# Patient Record
Sex: MALE | Race: WHITE | NOT HISPANIC OR LATINO
[De-identification: names, ages, dates, MRNs, and addresses within clinical notes are randomized per-mention and may not be internally consistent; named-entity substitution may affect disease eponyms.]

---

## 2017-02-21 ENCOUNTER — APPOINTMENT (OUTPATIENT)
Dept: VASCULAR SURGERY | Facility: CLINIC | Age: 42
End: 2017-02-21

## 2017-02-21 VITALS
TEMPERATURE: 98 F | WEIGHT: 240 LBS | BODY MASS INDEX: 32.51 KG/M2 | HEIGHT: 72 IN | DIASTOLIC BLOOD PRESSURE: 80 MMHG | SYSTOLIC BLOOD PRESSURE: 133 MMHG | HEART RATE: 89 BPM

## 2017-02-25 ENCOUNTER — INPATIENT (INPATIENT)
Facility: HOSPITAL | Age: 42
LOS: 4 days | Discharge: HOME | End: 2017-03-02
Attending: SPECIALIST | Admitting: SPECIALIST
Payer: COMMERCIAL

## 2017-02-25 VITALS
TEMPERATURE: 98 F | OXYGEN SATURATION: 97 % | RESPIRATION RATE: 16 BRPM | HEART RATE: 113 BPM | SYSTOLIC BLOOD PRESSURE: 114 MMHG | DIASTOLIC BLOOD PRESSURE: 73 MMHG

## 2017-02-25 DIAGNOSIS — K56.69 OTHER INTESTINAL OBSTRUCTION: ICD-10-CM

## 2017-02-25 DIAGNOSIS — Z98.89 OTHER SPECIFIED POSTPROCEDURAL STATES: Chronic | ICD-10-CM

## 2017-02-25 LAB
ALBUMIN SERPL ELPH-MCNC: 4.9 G/DL — SIGNIFICANT CHANGE UP (ref 3.3–5)
ALP SERPL-CCNC: 36 U/L — LOW (ref 40–120)
ALT FLD-CCNC: 20 U/L — SIGNIFICANT CHANGE UP (ref 4–41)
APTT BLD: 27.8 SEC — SIGNIFICANT CHANGE UP (ref 27.5–37.4)
AST SERPL-CCNC: 30 U/L — SIGNIFICANT CHANGE UP (ref 4–40)
BASE EXCESS BLDV CALC-SCNC: 0.6 MMOL/L — SIGNIFICANT CHANGE UP
BASOPHILS # BLD AUTO: 0.01 K/UL — SIGNIFICANT CHANGE UP (ref 0–0.2)
BASOPHILS NFR BLD AUTO: 0.1 % — SIGNIFICANT CHANGE UP (ref 0–2)
BILIRUB SERPL-MCNC: 0.5 MG/DL — SIGNIFICANT CHANGE UP (ref 0.2–1.2)
BLOOD GAS VENOUS - CREATININE: 1.1 MG/DL — SIGNIFICANT CHANGE UP (ref 0.5–1.3)
BUN SERPL-MCNC: 24 MG/DL — HIGH (ref 7–23)
CALCIUM SERPL-MCNC: 10.1 MG/DL — SIGNIFICANT CHANGE UP (ref 8.4–10.5)
CHLORIDE BLDV-SCNC: 106 MMOL/L — SIGNIFICANT CHANGE UP (ref 96–108)
CHLORIDE SERPL-SCNC: 104 MMOL/L — SIGNIFICANT CHANGE UP (ref 98–107)
CO2 SERPL-SCNC: 23 MMOL/L — SIGNIFICANT CHANGE UP (ref 22–31)
CREAT SERPL-MCNC: 1.13 MG/DL — SIGNIFICANT CHANGE UP (ref 0.5–1.3)
EOSINOPHIL # BLD AUTO: 0.03 K/UL — SIGNIFICANT CHANGE UP (ref 0–0.5)
EOSINOPHIL NFR BLD AUTO: 0.3 % — SIGNIFICANT CHANGE UP (ref 0–6)
GAS PNL BLDV: 144 MMOL/L — SIGNIFICANT CHANGE UP (ref 136–146)
GLUCOSE BLDV-MCNC: 189 — HIGH (ref 70–99)
GLUCOSE SERPL-MCNC: 189 MG/DL — HIGH (ref 70–99)
HCO3 BLDV-SCNC: 23 MMOL/L — SIGNIFICANT CHANGE UP (ref 20–27)
HCT VFR BLD CALC: 45.4 % — SIGNIFICANT CHANGE UP (ref 39–50)
HCT VFR BLDV CALC: 45.2 % — SIGNIFICANT CHANGE UP (ref 39–51)
HGB BLD-MCNC: 14.2 G/DL — SIGNIFICANT CHANGE UP (ref 13–17)
HGB BLDV-MCNC: 14.8 G/DL — SIGNIFICANT CHANGE UP (ref 13–17)
IMM GRANULOCYTES NFR BLD AUTO: 0.3 % — SIGNIFICANT CHANGE UP (ref 0–1.5)
INR BLD: 1.02 — SIGNIFICANT CHANGE UP (ref 0.87–1.18)
LACTATE BLDV-MCNC: 2.4 MMOL/L — HIGH (ref 0.5–2)
LIDOCAIN IGE QN: 19.1 U/L — SIGNIFICANT CHANGE UP (ref 7–60)
LYMPHOCYTES # BLD AUTO: 0.93 K/UL — LOW (ref 1–3.3)
LYMPHOCYTES # BLD AUTO: 8 % — LOW (ref 13–44)
MCHC RBC-ENTMCNC: 27.5 PG — SIGNIFICANT CHANGE UP (ref 27–34)
MCHC RBC-ENTMCNC: 31.3 % — LOW (ref 32–36)
MCV RBC AUTO: 88 FL — SIGNIFICANT CHANGE UP (ref 80–100)
MONOCYTES # BLD AUTO: 0.95 K/UL — HIGH (ref 0–0.9)
MONOCYTES NFR BLD AUTO: 8.2 % — SIGNIFICANT CHANGE UP (ref 2–14)
NEUTROPHILS # BLD AUTO: 9.63 K/UL — HIGH (ref 1.8–7.4)
NEUTROPHILS NFR BLD AUTO: 83.1 % — HIGH (ref 43–77)
PCO2 BLDV: 49 MMHG — SIGNIFICANT CHANGE UP (ref 41–51)
PH BLDV: 7.34 PH — SIGNIFICANT CHANGE UP (ref 7.32–7.43)
PLATELET # BLD AUTO: 267 K/UL — SIGNIFICANT CHANGE UP (ref 150–400)
PMV BLD: 11.5 FL — SIGNIFICANT CHANGE UP (ref 7–13)
PO2 BLDV: 34 MMHG — LOW (ref 35–40)
POTASSIUM BLDV-SCNC: 4 MMOL/L — SIGNIFICANT CHANGE UP (ref 3.4–4.5)
POTASSIUM SERPL-MCNC: 4.9 MMOL/L — SIGNIFICANT CHANGE UP (ref 3.5–5.3)
POTASSIUM SERPL-SCNC: 4.9 MMOL/L — SIGNIFICANT CHANGE UP (ref 3.5–5.3)
PROT SERPL-MCNC: 8.3 G/DL — SIGNIFICANT CHANGE UP (ref 6–8.3)
PROTHROM AB SERPL-ACNC: 11.6 SEC — SIGNIFICANT CHANGE UP (ref 10–13.1)
RBC # BLD: 5.16 M/UL — SIGNIFICANT CHANGE UP (ref 4.2–5.8)
RBC # FLD: 15.3 % — HIGH (ref 10.3–14.5)
SAO2 % BLDV: 53.6 % — LOW (ref 60–85)
SODIUM SERPL-SCNC: 146 MMOL/L — HIGH (ref 135–145)
WBC # BLD: 11.58 K/UL — HIGH (ref 3.8–10.5)
WBC # FLD AUTO: 11.58 K/UL — HIGH (ref 3.8–10.5)

## 2017-02-25 PROCEDURE — 74176 CT ABD & PELVIS W/O CONTRAST: CPT | Mod: 26

## 2017-02-25 PROCEDURE — 71010: CPT | Mod: 26,59

## 2017-02-25 PROCEDURE — 71020: CPT | Mod: 26

## 2017-02-25 RX ORDER — INSULIN LISPRO 100/ML
VIAL (ML) SUBCUTANEOUS EVERY 6 HOURS
Qty: 0 | Refills: 0 | Status: DISCONTINUED | OUTPATIENT
Start: 2017-02-25 | End: 2017-02-28

## 2017-02-25 RX ORDER — SODIUM CHLORIDE 9 MG/ML
1000 INJECTION INTRAMUSCULAR; INTRAVENOUS; SUBCUTANEOUS ONCE
Qty: 0 | Refills: 0 | Status: COMPLETED | OUTPATIENT
Start: 2017-02-25 | End: 2017-02-25

## 2017-02-25 RX ORDER — PANTOPRAZOLE SODIUM 20 MG/1
40 TABLET, DELAYED RELEASE ORAL DAILY
Qty: 0 | Refills: 0 | Status: DISCONTINUED | OUTPATIENT
Start: 2017-02-25 | End: 2017-02-28

## 2017-02-25 RX ORDER — SODIUM CHLORIDE 9 MG/ML
1000 INJECTION, SOLUTION INTRAVENOUS
Qty: 0 | Refills: 0 | Status: DISCONTINUED | OUTPATIENT
Start: 2017-02-25 | End: 2017-02-27

## 2017-02-25 RX ORDER — ENOXAPARIN SODIUM 100 MG/ML
40 INJECTION SUBCUTANEOUS EVERY 24 HOURS
Qty: 0 | Refills: 0 | Status: DISCONTINUED | OUTPATIENT
Start: 2017-02-25 | End: 2017-03-02

## 2017-02-25 RX ORDER — METOPROLOL TARTRATE 50 MG
5 TABLET ORAL EVERY 6 HOURS
Qty: 0 | Refills: 0 | Status: DISCONTINUED | OUTPATIENT
Start: 2017-02-25 | End: 2017-02-28

## 2017-02-25 RX ORDER — ACETAMINOPHEN 500 MG
650 TABLET ORAL ONCE
Qty: 0 | Refills: 0 | Status: COMPLETED | OUTPATIENT
Start: 2017-02-25 | End: 2017-02-25

## 2017-02-25 RX ORDER — ONDANSETRON 8 MG/1
4 TABLET, FILM COATED ORAL ONCE
Qty: 0 | Refills: 0 | Status: COMPLETED | OUTPATIENT
Start: 2017-02-25 | End: 2017-02-25

## 2017-02-25 RX ADMIN — Medication 650 MILLIGRAM(S): at 22:16

## 2017-02-25 RX ADMIN — ONDANSETRON 4 MILLIGRAM(S): 8 TABLET, FILM COATED ORAL at 22:16

## 2017-02-25 RX ADMIN — Medication 650 MILLIGRAM(S): at 23:24

## 2017-02-25 RX ADMIN — SODIUM CHLORIDE 1000 MILLILITER(S): 9 INJECTION INTRAMUSCULAR; INTRAVENOUS; SUBCUTANEOUS at 21:44

## 2017-02-25 NOTE — ED SUB INTERN NOTE - OBJECTIVE STATEMENT FT
41M w/PMH of Diverticulitis s/p Buchanan's and reversal, multiple SBO's s/p 2x lysis of adhesions, post surgical DVT 7/16 s/p Eliquis  DM, HTN, HLD,  p/w 1 day of progressively worse colicky, achy, 5/10 abdominal pain, nausea, and watery, diarrhea. The patient has no appetite, denies vomiting, and last ate at 2 pm, which he describes as "uncomfortable." The patient states that this is similar to his usual SBO symptoms, and states that he usually is dehydrated and requires an NGT to resolve his symptoms. He endorses some sweats, lightheadedness and abdominal distention.   (-) fever, SOB, CP, V, dysuria, polyuria, recent illness/antibiotics, leg swelling/pain.

## 2017-02-25 NOTE — ED PROVIDER NOTE - OBJECTIVE STATEMENT
41M w/PMH of Diverticulitis s/p Buchanan's and reversal, multiple SBO's s/p 2x lysis of adhesions, post surgical DVT 7/16 s/p Eliquis  DM, HTN, HLD,  p/w 1 day of progressively worse colicky, achy, 5/10 abdominal pain, nausea, and watery, diarrhea. The patient has no appetite, denies vomiting, and last ate at 2 pm, which he describes as "uncomfortable." The patient states that this is similar to his usual SBO symptoms, and states that he usually is dehydrated and requires an NGT to resolve his symptoms. He endorses some sweats, lightheadedness and abdominal distention.

## 2017-02-25 NOTE — ED ADULT NURSE NOTE - OBJECTIVE STATEMENT
40 yo male.  Pt c/o colicky, achy, 5/10 abdominal pain.  Pt denies vomiting, and last ate at 2 pm.  The patient states that this is similar to his usual SBO symptoms. complains of some sweats, lightheadedness and abdominal distention.  22g R hand.  meds as ordered

## 2017-02-25 NOTE — ED PROVIDER NOTE - ATTENDING CONTRIBUTION TO CARE
DR Bloch authored hpi, pmh, ROS, PE,MDM, SH DR Bloch authored hpi, pmh, ROS, PE,MDM, SH. Results reviewed, SBO on CT scan- admit to surgery

## 2017-02-25 NOTE — H&P ADULT. - ASSESSMENT
42 yo M with recurrent SBO, likely adhesive  - admit to surgery  - NPO/IVF  - NGT placed in ED with immediate return of 400cc gastric contents  - await GI function  - trend labs

## 2017-02-25 NOTE — ED ADULT NURSE REASSESSMENT NOTE - NS ED NURSE REASSESS COMMENT FT1
Pt has NG tube to continuous suction.  Pt already drained 1100cc green bile from NG tube.  Pt is resting comfortable in bed.  Will continue to monitor patient closely.  Kang SWAN.

## 2017-02-25 NOTE — H&P ADULT. - GASTROINTESTINAL COMMENTS
soft, mod distended, R sided tenderness (most RLQ) soft, mod distended, R sided tenderness (most RLQ), reducible ventral hernia

## 2017-02-25 NOTE — ED SUB INTERN NOTE - PHYSICAL EXAMINATION
Gen: well-nourished, appears in mild discomfort  Resp: CTAB,  CV: tachycardic, m/r/g  Abd: well-healed midline incisions, increased bowel sounds with tinkling and rushes, distended, mildly tender in epigastric and middle right quadrant,   MSK: no calf swelling or pain to palpation or dorsiflexion.

## 2017-02-25 NOTE — ED ADULT NURSE NOTE - CHIEF COMPLAINT QUOTE
Pt st "I have stomach pains and nausea since this am...I have hx of bowel obstructions and this is a freq problem." HX 2009 bowel resection, ostomy reversal . hx diverticulitis. Pt c/o feeling lightheaded, appears pale. DVG=[id number]

## 2017-02-25 NOTE — ED SUB INTERN NOTE - MEDICAL DECISION MAKING DETAILS
41M w/ diverticulitis sp giron's and reversal as well as multiple SBO's who presents w/ abdominal pain/N/watery D, considering partial SBO vs gastroenteritis.   -CBC, CMP, UA, CXR, CT abdomen wo contrast  - NPO for likely SBO, 1000 bolus NS, then with maintenance fluids  -Tylenol for pain  -Zofran for Nausea,

## 2017-02-25 NOTE — ED ADULT TRIAGE NOTE - CHIEF COMPLAINT QUOTE
Pt st "I have stomach pains and nausea since this am...I have hx of bowel obstructions and this is a freq problem." HX 2009 bowel resection, ostomy reversal . hx diverticulitis. Pt c/o feeling lightheaded, appears pale. MLE=025

## 2017-02-25 NOTE — ED SUB INTERN NOTE - PMH
Diabetes mellitus, type 2    Diverticulitis    DVT (deep venous thrombosis)    Hyperlipidemia    Hypertension    SBO (small bowel obstruction)

## 2017-02-26 LAB
BLD GP AB SCN SERPL QL: NEGATIVE — SIGNIFICANT CHANGE UP
BUN SERPL-MCNC: 24 MG/DL — HIGH (ref 7–23)
CALCIUM SERPL-MCNC: 9.3 MG/DL — SIGNIFICANT CHANGE UP (ref 8.4–10.5)
CHLORIDE SERPL-SCNC: 104 MMOL/L — SIGNIFICANT CHANGE UP (ref 98–107)
CO2 SERPL-SCNC: 23 MMOL/L — SIGNIFICANT CHANGE UP (ref 22–31)
CREAT SERPL-MCNC: 0.96 MG/DL — SIGNIFICANT CHANGE UP (ref 0.5–1.3)
GLUCOSE SERPL-MCNC: 137 MG/DL — HIGH (ref 70–99)
HBA1C BLD-MCNC: 6.9 % — HIGH (ref 4–5.6)
HCT VFR BLD CALC: 41.2 % — SIGNIFICANT CHANGE UP (ref 39–50)
HGB BLD-MCNC: 12.9 G/DL — LOW (ref 13–17)
MAGNESIUM SERPL-MCNC: 1.9 MG/DL — SIGNIFICANT CHANGE UP (ref 1.6–2.6)
MCHC RBC-ENTMCNC: 27.4 PG — SIGNIFICANT CHANGE UP (ref 27–34)
MCHC RBC-ENTMCNC: 31.3 % — LOW (ref 32–36)
MCV RBC AUTO: 87.7 FL — SIGNIFICANT CHANGE UP (ref 80–100)
PHOSPHATE SERPL-MCNC: 4 MG/DL — SIGNIFICANT CHANGE UP (ref 2.5–4.5)
PLATELET # BLD AUTO: 248 K/UL — SIGNIFICANT CHANGE UP (ref 150–400)
PMV BLD: 11.7 FL — SIGNIFICANT CHANGE UP (ref 7–13)
POTASSIUM SERPL-MCNC: 3.9 MMOL/L — SIGNIFICANT CHANGE UP (ref 3.5–5.3)
POTASSIUM SERPL-SCNC: 3.9 MMOL/L — SIGNIFICANT CHANGE UP (ref 3.5–5.3)
RBC # BLD: 4.7 M/UL — SIGNIFICANT CHANGE UP (ref 4.2–5.8)
RBC # FLD: 15.5 % — HIGH (ref 10.3–14.5)
RH IG SCN BLD-IMP: POSITIVE — SIGNIFICANT CHANGE UP
RH IG SCN BLD-IMP: POSITIVE — SIGNIFICANT CHANGE UP
SODIUM SERPL-SCNC: 143 MMOL/L — SIGNIFICANT CHANGE UP (ref 135–145)
WBC # BLD: 4.45 K/UL — SIGNIFICANT CHANGE UP (ref 3.8–10.5)
WBC # FLD AUTO: 4.45 K/UL — SIGNIFICANT CHANGE UP (ref 3.8–10.5)

## 2017-02-26 RX ORDER — SODIUM CHLORIDE 9 MG/ML
1000 INJECTION, SOLUTION INTRAVENOUS ONCE
Qty: 0 | Refills: 0 | Status: COMPLETED | OUTPATIENT
Start: 2017-02-26 | End: 2017-02-27

## 2017-02-26 RX ORDER — MAGNESIUM SULFATE 500 MG/ML
2 VIAL (ML) INJECTION ONCE
Qty: 0 | Refills: 0 | Status: COMPLETED | OUTPATIENT
Start: 2017-02-26 | End: 2017-02-26

## 2017-02-26 RX ORDER — ACETAMINOPHEN 500 MG
1000 TABLET ORAL ONCE
Qty: 0 | Refills: 0 | Status: COMPLETED | OUTPATIENT
Start: 2017-02-26 | End: 2017-02-26

## 2017-02-26 RX ORDER — POTASSIUM CHLORIDE 20 MEQ
10 PACKET (EA) ORAL ONCE
Qty: 0 | Refills: 0 | Status: COMPLETED | OUTPATIENT
Start: 2017-02-26 | End: 2017-02-26

## 2017-02-26 RX ORDER — KETOROLAC TROMETHAMINE 30 MG/ML
30 SYRINGE (ML) INJECTION ONCE
Qty: 0 | Refills: 0 | Status: DISCONTINUED | OUTPATIENT
Start: 2017-02-26 | End: 2017-02-26

## 2017-02-26 RX ADMIN — Medication 5 MILLIGRAM(S): at 06:29

## 2017-02-26 RX ADMIN — Medication 400 MILLIGRAM(S): at 02:55

## 2017-02-26 RX ADMIN — ENOXAPARIN SODIUM 40 MILLIGRAM(S): 100 INJECTION SUBCUTANEOUS at 06:29

## 2017-02-26 RX ADMIN — Medication 5 MILLIGRAM(S): at 01:09

## 2017-02-26 RX ADMIN — PANTOPRAZOLE SODIUM 40 MILLIGRAM(S): 20 TABLET, DELAYED RELEASE ORAL at 12:48

## 2017-02-26 RX ADMIN — Medication 100 MILLIEQUIVALENT(S): at 12:49

## 2017-02-26 RX ADMIN — Medication 5 MILLIGRAM(S): at 17:52

## 2017-02-26 RX ADMIN — Medication 1000 MILLIGRAM(S): at 03:25

## 2017-02-26 RX ADMIN — Medication 5 MILLIGRAM(S): at 12:48

## 2017-02-26 RX ADMIN — Medication 50 GRAM(S): at 12:48

## 2017-02-27 LAB
BUN SERPL-MCNC: 23 MG/DL — SIGNIFICANT CHANGE UP (ref 7–23)
CALCIUM SERPL-MCNC: 9 MG/DL — SIGNIFICANT CHANGE UP (ref 8.4–10.5)
CHLORIDE SERPL-SCNC: 108 MMOL/L — HIGH (ref 98–107)
CO2 SERPL-SCNC: 25 MMOL/L — SIGNIFICANT CHANGE UP (ref 22–31)
CREAT SERPL-MCNC: 0.87 MG/DL — SIGNIFICANT CHANGE UP (ref 0.5–1.3)
GLUCOSE SERPL-MCNC: 134 MG/DL — HIGH (ref 70–99)
POTASSIUM SERPL-MCNC: 3.6 MMOL/L — SIGNIFICANT CHANGE UP (ref 3.5–5.3)
POTASSIUM SERPL-SCNC: 3.6 MMOL/L — SIGNIFICANT CHANGE UP (ref 3.5–5.3)
SODIUM SERPL-SCNC: 145 MMOL/L — SIGNIFICANT CHANGE UP (ref 135–145)

## 2017-02-27 RX ORDER — DEXTROSE MONOHYDRATE, SODIUM CHLORIDE, AND POTASSIUM CHLORIDE 50; .745; 4.5 G/1000ML; G/1000ML; G/1000ML
1000 INJECTION, SOLUTION INTRAVENOUS
Qty: 0 | Refills: 0 | Status: DISCONTINUED | OUTPATIENT
Start: 2017-02-27 | End: 2017-02-28

## 2017-02-27 RX ORDER — POTASSIUM CHLORIDE 20 MEQ
10 PACKET (EA) ORAL
Qty: 0 | Refills: 0 | Status: COMPLETED | OUTPATIENT
Start: 2017-02-27 | End: 2017-02-27

## 2017-02-27 RX ORDER — BENZOCAINE AND MENTHOL 5; 1 G/100ML; G/100ML
1 LIQUID ORAL THREE TIMES A DAY
Qty: 0 | Refills: 0 | Status: DISCONTINUED | OUTPATIENT
Start: 2017-02-27 | End: 2017-02-28

## 2017-02-27 RX ADMIN — DEXTROSE MONOHYDRATE, SODIUM CHLORIDE, AND POTASSIUM CHLORIDE 125 MILLILITER(S): 50; .745; 4.5 INJECTION, SOLUTION INTRAVENOUS at 10:06

## 2017-02-27 RX ADMIN — ENOXAPARIN SODIUM 40 MILLIGRAM(S): 100 INJECTION SUBCUTANEOUS at 05:09

## 2017-02-27 RX ADMIN — SODIUM CHLORIDE 2000 MILLILITER(S): 9 INJECTION, SOLUTION INTRAVENOUS at 00:51

## 2017-02-27 RX ADMIN — Medication 5 MILLIGRAM(S): at 00:19

## 2017-02-27 RX ADMIN — Medication 5 MILLIGRAM(S): at 12:14

## 2017-02-27 RX ADMIN — PANTOPRAZOLE SODIUM 40 MILLIGRAM(S): 20 TABLET, DELAYED RELEASE ORAL at 12:15

## 2017-02-27 RX ADMIN — Medication 100 MILLIEQUIVALENT(S): at 11:08

## 2017-02-27 RX ADMIN — Medication 5 MILLIGRAM(S): at 05:09

## 2017-02-27 RX ADMIN — Medication 100 MILLIEQUIVALENT(S): at 12:15

## 2017-02-27 RX ADMIN — Medication 100 MILLIEQUIVALENT(S): at 10:06

## 2017-02-27 RX ADMIN — Medication 5 MILLIGRAM(S): at 17:37

## 2017-02-28 LAB
BUN SERPL-MCNC: 15 MG/DL — SIGNIFICANT CHANGE UP (ref 7–23)
CALCIUM SERPL-MCNC: 8.9 MG/DL — SIGNIFICANT CHANGE UP (ref 8.4–10.5)
CHLORIDE SERPL-SCNC: 109 MMOL/L — HIGH (ref 98–107)
CO2 SERPL-SCNC: 22 MMOL/L — SIGNIFICANT CHANGE UP (ref 22–31)
CREAT SERPL-MCNC: 0.72 MG/DL — SIGNIFICANT CHANGE UP (ref 0.5–1.3)
GLUCOSE SERPL-MCNC: 163 MG/DL — HIGH (ref 70–99)
HCT VFR BLD CALC: 35 % — LOW (ref 39–50)
HGB BLD-MCNC: 10.6 G/DL — LOW (ref 13–17)
MAGNESIUM SERPL-MCNC: 2.1 MG/DL — SIGNIFICANT CHANGE UP (ref 1.6–2.6)
MCHC RBC-ENTMCNC: 26.7 PG — LOW (ref 27–34)
MCHC RBC-ENTMCNC: 30.3 % — LOW (ref 32–36)
MCV RBC AUTO: 88.2 FL — SIGNIFICANT CHANGE UP (ref 80–100)
PHOSPHATE SERPL-MCNC: 2.4 MG/DL — LOW (ref 2.5–4.5)
PLATELET # BLD AUTO: 169 K/UL — SIGNIFICANT CHANGE UP (ref 150–400)
PMV BLD: 10.5 FL — SIGNIFICANT CHANGE UP (ref 7–13)
POTASSIUM SERPL-MCNC: 4 MMOL/L — SIGNIFICANT CHANGE UP (ref 3.5–5.3)
POTASSIUM SERPL-SCNC: 4 MMOL/L — SIGNIFICANT CHANGE UP (ref 3.5–5.3)
RBC # BLD: 3.97 M/UL — LOW (ref 4.2–5.8)
RBC # FLD: 15.2 % — HIGH (ref 10.3–14.5)
SODIUM SERPL-SCNC: 145 MMOL/L — SIGNIFICANT CHANGE UP (ref 135–145)
WBC # BLD: 6.83 K/UL — SIGNIFICANT CHANGE UP (ref 3.8–10.5)
WBC # FLD AUTO: 6.83 K/UL — SIGNIFICANT CHANGE UP (ref 3.8–10.5)

## 2017-02-28 RX ORDER — INSULIN LISPRO 100/ML
VIAL (ML) SUBCUTANEOUS
Qty: 0 | Refills: 0 | Status: DISCONTINUED | OUTPATIENT
Start: 2017-02-28 | End: 2017-03-02

## 2017-02-28 RX ORDER — FENOFIBRATE,MICRONIZED 130 MG
145 CAPSULE ORAL DAILY
Qty: 0 | Refills: 0 | Status: DISCONTINUED | OUTPATIENT
Start: 2017-02-28 | End: 2017-03-02

## 2017-02-28 RX ORDER — PANTOPRAZOLE SODIUM 20 MG/1
40 TABLET, DELAYED RELEASE ORAL
Qty: 0 | Refills: 0 | Status: DISCONTINUED | OUTPATIENT
Start: 2017-02-28 | End: 2017-03-02

## 2017-02-28 RX ORDER — INSULIN LISPRO 100/ML
VIAL (ML) SUBCUTANEOUS AT BEDTIME
Qty: 0 | Refills: 0 | Status: DISCONTINUED | OUTPATIENT
Start: 2017-02-28 | End: 2017-03-02

## 2017-02-28 RX ORDER — METOPROLOL TARTRATE 50 MG
50 TABLET ORAL
Qty: 0 | Refills: 0 | Status: DISCONTINUED | OUTPATIENT
Start: 2017-02-28 | End: 2017-03-02

## 2017-02-28 RX ORDER — VALSARTAN 80 MG/1
80 TABLET ORAL DAILY
Qty: 0 | Refills: 0 | Status: DISCONTINUED | OUTPATIENT
Start: 2017-02-28 | End: 2017-03-02

## 2017-02-28 RX ADMIN — DEXTROSE MONOHYDRATE, SODIUM CHLORIDE, AND POTASSIUM CHLORIDE 125 MILLILITER(S): 50; .745; 4.5 INJECTION, SOLUTION INTRAVENOUS at 00:50

## 2017-02-28 RX ADMIN — ENOXAPARIN SODIUM 40 MILLIGRAM(S): 100 INJECTION SUBCUTANEOUS at 06:26

## 2017-02-28 RX ADMIN — BENZOCAINE AND MENTHOL 1 LOZENGE: 5; 1 LIQUID ORAL at 06:27

## 2017-02-28 RX ADMIN — Medication 5 MILLIGRAM(S): at 12:04

## 2017-02-28 RX ADMIN — DEXTROSE MONOHYDRATE, SODIUM CHLORIDE, AND POTASSIUM CHLORIDE 125 MILLILITER(S): 50; .745; 4.5 INJECTION, SOLUTION INTRAVENOUS at 06:26

## 2017-02-28 RX ADMIN — Medication 5 MILLIGRAM(S): at 06:27

## 2017-02-28 RX ADMIN — PANTOPRAZOLE SODIUM 40 MILLIGRAM(S): 20 TABLET, DELAYED RELEASE ORAL at 12:04

## 2017-03-01 ENCOUNTER — TRANSCRIPTION ENCOUNTER (OUTPATIENT)
Age: 42
End: 2017-03-01

## 2017-03-01 RX ORDER — LORATADINE 10 MG/1
10 TABLET ORAL DAILY
Qty: 0 | Refills: 0 | Status: DISCONTINUED | OUTPATIENT
Start: 2017-03-01 | End: 2017-03-02

## 2017-03-01 RX ADMIN — Medication 145 MILLIGRAM(S): at 12:21

## 2017-03-01 RX ADMIN — Medication 50 MILLIGRAM(S): at 19:03

## 2017-03-01 RX ADMIN — PANTOPRAZOLE SODIUM 40 MILLIGRAM(S): 20 TABLET, DELAYED RELEASE ORAL at 06:01

## 2017-03-01 RX ADMIN — VALSARTAN 80 MILLIGRAM(S): 80 TABLET ORAL at 06:18

## 2017-03-01 RX ADMIN — Medication 50 MILLIGRAM(S): at 06:01

## 2017-03-01 RX ADMIN — ENOXAPARIN SODIUM 40 MILLIGRAM(S): 100 INJECTION SUBCUTANEOUS at 06:02

## 2017-03-01 NOTE — DISCHARGE NOTE ADULT - CARE PROVIDER_API CALL
Hubert Whitney), Surgery  [address] [facility] [address] [place] [id number]  Phone: [phone]  Fax: [phone]

## 2017-03-01 NOTE — DISCHARGE NOTE ADULT - ADDITIONAL INSTRUCTIONS
Please follow up with your primary care doctor regarding your hospitalization and Dr. Whitney in 1-2 weeks.

## 2017-03-01 NOTE — DISCHARGE NOTE ADULT - MEDICATION SUMMARY - MEDICATIONS TO TAKE
I will START or STAY ON the medications listed below when I get home from the hospital:    Diovan 80 mg oral tablet  -- 1 tab(s) by mouth once a day  -- hold for low blood pressure  -- Indication: For Blood Pressure    metFORMIN 500 mg oral tablet  -- 1 tab(s) by mouth 3 times a day  -- Indication: For Diabetes    Farxiga 5 mg oral tablet  -- 1 tab(s) by mouth once a day  -- Indication: For Diabetes    TriCor 145 mg oral tablet  -- 1 tab(s) by mouth once a day  -- Indication: For Cholesterol    Toprol- mg oral tablet, extended release  -- 1 tab(s) by mouth once a day  -- Indication: For Blood Pressure    Protonix 40 mg oral delayed release tablet  -- 1 tab(s) by mouth once a day  -- Indication: For Reflux

## 2017-03-01 NOTE — DISCHARGE NOTE ADULT - PLAN OF CARE
normal/full GI function ACTIVITY: You may return to your usual level of physical activity.  DIET: Return to your usual diet.  NOTIFY YOUR SURGEON IF: You have any fever (over 100.4 F) or chills, persistent nausea/vomiting, persistent diarrhea, or if your pain is not controlled on your discharge pain medications.  FOLLOW-UP: Please follow up with your primary care physician in one week regarding your hospitalization.  Please follow up with Dr. Whitney in 1-2 weeks. Please follow up with your primary care physician and endocrinologist regarding your hospitalization. Please follow up with your primary care physician regarding your hospitalization

## 2017-03-01 NOTE — DISCHARGE NOTE ADULT - CARE PLAN
Principal Discharge DX:	Small bowel obstruction  Goal:	normal/full GI function  Instructions for follow-up, activity and diet:	ACTIVITY: You may return to your usual level of physical activity.  DIET: Return to your usual diet.  NOTIFY YOUR SURGEON IF: You have any fever (over 100.4 F) or chills, persistent nausea/vomiting, persistent diarrhea, or if your pain is not controlled on your discharge pain medications.  FOLLOW-UP: Please follow up with your primary care physician in one week regarding your hospitalization.  Please follow up with Dr. Whitney in 1-2 weeks.  Secondary Diagnosis:	Diabetes mellitus, type 2  Instructions for follow-up, activity and diet:	Please follow up with your primary care physician and endocrinologist regarding your hospitalization.  Secondary Diagnosis:	Hypertension  Instructions for follow-up, activity and diet:	Please follow up with your primary care physician regarding your hospitalization  Secondary Diagnosis:	Hyperlipidemia  Instructions for follow-up, activity and diet:	Please follow up with your primary care physician regarding your hospitalization

## 2017-03-01 NOTE — DISCHARGE NOTE ADULT - CONDITIONS AT DISCHARGE
Pt A&Ox4. VS Stable. Pt tolerating regular diet, denies any nausea/vomiting. Out of bed and ambulating and voiding adequately.

## 2017-03-01 NOTE — DISCHARGE NOTE ADULT - HOSPITAL COURSE
40 yo M with hx of Isi's and reversal 2009 c/b recurrent SBOs (>15, most managed conservatively, laparotomy/LINDA x2) now presents with 1 day hx of abd pain and distention.  Pt reports no flatus/BM since yesterday AM, with increasing abd distention and associated nausea/emesis.  Feels same as prior episodes of SBOs.    [Of note, pt was previously on A/C for RLE DVT, recently discontinued by vascular surgeon ]    CT abd/pelvis: IMPRESSION: Small bowel obstruction, likely secondary to an effusion in   the right lower quadrant.     Pt admitted to general surgery service and managed nonoperatively with NGT decompression, bowel rest, IV fluid hydration.  As GI function returned, NGT was clamped and removed.  Diet was slowly restarted and advanced as tolerated. Fingerstick glucose remained well controlled.  Pt currently ambulating, voiding, tolerating a regular diet, and pain free. 40 yo M with hx of Isi's and reversal 2009 c/b recurrent SBOs (>15, most managed conservatively, laparotomy/LINDA x2) now presents with 1 day hx of abd pain and distention.  Pt reports no flatus/BM since yesterday AM, with increasing abd distention and associated nausea/emesis.  Feels same as prior episodes of SBOs.    [Of note, pt was previously on A/C for RLE DVT, recently discontinued by vascular surgeon ]    CT abd/pelvis showed small bowel obstruction, likely secondary to an effusion in the right lower quadrant.     Pt admitted to general surgery service and managed nonoperatively with NGT decompression, bowel rest, IV fluid hydration.  As GI function returned, NGT was clamped and removed.  Diet was slowly restarted and advanced as tolerated. Fingerstick glucose remained well controlled.  Pt currently ambulating, voiding, tolerating a regular diet, and pain free. Pt discharged in stable condition.

## 2017-03-02 VITALS
DIASTOLIC BLOOD PRESSURE: 71 MMHG | TEMPERATURE: 98 F | HEART RATE: 70 BPM | SYSTOLIC BLOOD PRESSURE: 117 MMHG | RESPIRATION RATE: 16 BRPM | OXYGEN SATURATION: 96 %

## 2017-03-02 RX ADMIN — Medication 50 MILLIGRAM(S): at 06:51

## 2017-03-02 RX ADMIN — Medication 145 MILLIGRAM(S): at 11:47

## 2017-03-02 RX ADMIN — ENOXAPARIN SODIUM 40 MILLIGRAM(S): 100 INJECTION SUBCUTANEOUS at 06:51

## 2017-03-02 RX ADMIN — VALSARTAN 80 MILLIGRAM(S): 80 TABLET ORAL at 06:51

## 2017-03-02 RX ADMIN — PANTOPRAZOLE SODIUM 40 MILLIGRAM(S): 20 TABLET, DELAYED RELEASE ORAL at 06:51

## 2017-03-02 RX ADMIN — LORATADINE 10 MILLIGRAM(S): 10 TABLET ORAL at 13:41

## 2017-05-05 ENCOUNTER — INPATIENT (INPATIENT)
Facility: HOSPITAL | Age: 42
LOS: 2 days | Discharge: HOME | End: 2017-05-08
Attending: SURGERY | Admitting: SURGERY
Payer: COMMERCIAL

## 2017-05-05 VITALS
OXYGEN SATURATION: 99 % | DIASTOLIC BLOOD PRESSURE: 80 MMHG | SYSTOLIC BLOOD PRESSURE: 132 MMHG | HEART RATE: 101 BPM | TEMPERATURE: 99 F | RESPIRATION RATE: 20 BRPM

## 2017-05-05 DIAGNOSIS — K56.69 OTHER INTESTINAL OBSTRUCTION: ICD-10-CM

## 2017-05-05 DIAGNOSIS — Z98.89 OTHER SPECIFIED POSTPROCEDURAL STATES: Chronic | ICD-10-CM

## 2017-05-05 LAB
ALBUMIN SERPL ELPH-MCNC: 4.6 G/DL — SIGNIFICANT CHANGE UP (ref 3.3–5)
ALP SERPL-CCNC: 33 U/L — LOW (ref 40–120)
ALT FLD-CCNC: 20 U/L — SIGNIFICANT CHANGE UP (ref 4–41)
APPEARANCE UR: CLEAR — SIGNIFICANT CHANGE UP
APTT BLD: 28.2 SEC — SIGNIFICANT CHANGE UP (ref 27.5–37.4)
AST SERPL-CCNC: 16 U/L — SIGNIFICANT CHANGE UP (ref 4–40)
BASE EXCESS BLDV CALC-SCNC: 1.4 MMOL/L — SIGNIFICANT CHANGE UP
BASOPHILS # BLD AUTO: 0.01 K/UL — SIGNIFICANT CHANGE UP (ref 0–0.2)
BASOPHILS NFR BLD AUTO: 0.1 % — SIGNIFICANT CHANGE UP (ref 0–2)
BILIRUB SERPL-MCNC: 0.8 MG/DL — SIGNIFICANT CHANGE UP (ref 0.2–1.2)
BILIRUB UR-MCNC: NEGATIVE — SIGNIFICANT CHANGE UP
BLD GP AB SCN SERPL QL: NEGATIVE — SIGNIFICANT CHANGE UP
BLOOD GAS VENOUS - CREATININE: 0.87 MG/DL — SIGNIFICANT CHANGE UP (ref 0.5–1.3)
BLOOD UR QL VISUAL: NEGATIVE — SIGNIFICANT CHANGE UP
BUN SERPL-MCNC: 23 MG/DL — SIGNIFICANT CHANGE UP (ref 7–23)
CALCIUM SERPL-MCNC: 9.9 MG/DL — SIGNIFICANT CHANGE UP (ref 8.4–10.5)
CHLORIDE BLDV-SCNC: 107 MMOL/L — SIGNIFICANT CHANGE UP (ref 96–108)
CHLORIDE SERPL-SCNC: 105 MMOL/L — SIGNIFICANT CHANGE UP (ref 98–107)
CO2 SERPL-SCNC: 22 MMOL/L — SIGNIFICANT CHANGE UP (ref 22–31)
COLOR SPEC: YELLOW — SIGNIFICANT CHANGE UP
CREAT SERPL-MCNC: 0.9 MG/DL — SIGNIFICANT CHANGE UP (ref 0.5–1.3)
EOSINOPHIL # BLD AUTO: 0.02 K/UL — SIGNIFICANT CHANGE UP (ref 0–0.5)
EOSINOPHIL NFR BLD AUTO: 0.2 % — SIGNIFICANT CHANGE UP (ref 0–6)
GAS PNL BLDV: 137 MMOL/L — SIGNIFICANT CHANGE UP (ref 136–146)
GLUCOSE BLDV-MCNC: 158 — HIGH (ref 70–99)
GLUCOSE SERPL-MCNC: 152 MG/DL — HIGH (ref 70–99)
GLUCOSE UR-MCNC: >1000 — SIGNIFICANT CHANGE UP
HCO3 BLDV-SCNC: 25 MMOL/L — SIGNIFICANT CHANGE UP (ref 20–27)
HCT VFR BLD CALC: 43.1 % — SIGNIFICANT CHANGE UP (ref 39–50)
HCT VFR BLDV CALC: 41.7 % — SIGNIFICANT CHANGE UP (ref 39–51)
HGB BLD-MCNC: 13.3 G/DL — SIGNIFICANT CHANGE UP (ref 13–17)
HGB BLDV-MCNC: 13.6 G/DL — SIGNIFICANT CHANGE UP (ref 13–17)
IMM GRANULOCYTES NFR BLD AUTO: 0.3 % — SIGNIFICANT CHANGE UP (ref 0–1.5)
INR BLD: 0.99 — SIGNIFICANT CHANGE UP (ref 0.88–1.17)
KETONES UR-MCNC: SIGNIFICANT CHANGE UP
LACTATE BLDV-MCNC: 1.7 MMOL/L — SIGNIFICANT CHANGE UP (ref 0.5–2)
LEUKOCYTE ESTERASE UR-ACNC: NEGATIVE — SIGNIFICANT CHANGE UP
LYMPHOCYTES # BLD AUTO: 0.73 K/UL — LOW (ref 1–3.3)
LYMPHOCYTES # BLD AUTO: 6.2 % — LOW (ref 13–44)
MCHC RBC-ENTMCNC: 27.1 PG — SIGNIFICANT CHANGE UP (ref 27–34)
MCHC RBC-ENTMCNC: 30.9 % — LOW (ref 32–36)
MCV RBC AUTO: 88 FL — SIGNIFICANT CHANGE UP (ref 80–100)
MONOCYTES # BLD AUTO: 0.48 K/UL — SIGNIFICANT CHANGE UP (ref 0–0.9)
MONOCYTES NFR BLD AUTO: 4.1 % — SIGNIFICANT CHANGE UP (ref 2–14)
MUCOUS THREADS # UR AUTO: SIGNIFICANT CHANGE UP
NEUTROPHILS # BLD AUTO: 10.47 K/UL — HIGH (ref 1.8–7.4)
NEUTROPHILS NFR BLD AUTO: 89.1 % — HIGH (ref 43–77)
NITRITE UR-MCNC: NEGATIVE — SIGNIFICANT CHANGE UP
PCO2 BLDV: 41 MMHG — SIGNIFICANT CHANGE UP (ref 41–51)
PH BLDV: 7.41 PH — SIGNIFICANT CHANGE UP (ref 7.32–7.43)
PH UR: 6.5 — SIGNIFICANT CHANGE UP (ref 4.6–8)
PLATELET # BLD AUTO: 247 K/UL — SIGNIFICANT CHANGE UP (ref 150–400)
PMV BLD: 11 FL — SIGNIFICANT CHANGE UP (ref 7–13)
PO2 BLDV: 56 MMHG — HIGH (ref 35–40)
POTASSIUM BLDV-SCNC: 3.8 MMOL/L — SIGNIFICANT CHANGE UP (ref 3.4–4.5)
POTASSIUM SERPL-MCNC: 4.1 MMOL/L — SIGNIFICANT CHANGE UP (ref 3.5–5.3)
POTASSIUM SERPL-SCNC: 4.1 MMOL/L — SIGNIFICANT CHANGE UP (ref 3.5–5.3)
PROT SERPL-MCNC: 7.8 G/DL — SIGNIFICANT CHANGE UP (ref 6–8.3)
PROT UR-MCNC: 10 — SIGNIFICANT CHANGE UP
PROTHROM AB SERPL-ACNC: 11.1 SEC — SIGNIFICANT CHANGE UP (ref 9.8–13.1)
RBC # BLD: 4.9 M/UL — SIGNIFICANT CHANGE UP (ref 4.2–5.8)
RBC # FLD: 15.4 % — HIGH (ref 10.3–14.5)
RBC CASTS # UR COMP ASSIST: SIGNIFICANT CHANGE UP (ref 0–?)
RH IG SCN BLD-IMP: POSITIVE — SIGNIFICANT CHANGE UP
SAO2 % BLDV: 87.5 % — HIGH (ref 60–85)
SODIUM SERPL-SCNC: 143 MMOL/L — SIGNIFICANT CHANGE UP (ref 135–145)
SP GR SPEC: > 1.04 — HIGH (ref 1–1.03)
UROBILINOGEN FLD QL: NORMAL E.U. — SIGNIFICANT CHANGE UP (ref 0.1–0.2)
WBC # BLD: 11.74 K/UL — HIGH (ref 3.8–10.5)
WBC # FLD AUTO: 11.74 K/UL — HIGH (ref 3.8–10.5)
WBC UR QL: SIGNIFICANT CHANGE UP (ref 0–?)

## 2017-05-05 PROCEDURE — 74176 CT ABD & PELVIS W/O CONTRAST: CPT | Mod: 26

## 2017-05-05 PROCEDURE — 74010: CPT | Mod: 26

## 2017-05-05 PROCEDURE — 99222 1ST HOSP IP/OBS MODERATE 55: CPT | Mod: GC

## 2017-05-05 RX ORDER — METOPROLOL TARTRATE 50 MG
5 TABLET ORAL EVERY 6 HOURS
Qty: 0 | Refills: 0 | Status: DISCONTINUED | OUTPATIENT
Start: 2017-05-05 | End: 2017-05-07

## 2017-05-05 RX ORDER — ENOXAPARIN SODIUM 100 MG/ML
40 INJECTION SUBCUTANEOUS DAILY
Qty: 0 | Refills: 0 | Status: DISCONTINUED | OUTPATIENT
Start: 2017-05-05 | End: 2017-05-08

## 2017-05-05 RX ORDER — INSULIN LISPRO 100/ML
VIAL (ML) SUBCUTANEOUS EVERY 6 HOURS
Qty: 0 | Refills: 0 | Status: DISCONTINUED | OUTPATIENT
Start: 2017-05-05 | End: 2017-05-07

## 2017-05-05 RX ORDER — ONDANSETRON 8 MG/1
4 TABLET, FILM COATED ORAL ONCE
Qty: 0 | Refills: 0 | Status: DISCONTINUED | OUTPATIENT
Start: 2017-05-05 | End: 2017-05-05

## 2017-05-05 RX ORDER — ACETAMINOPHEN 500 MG
1000 TABLET ORAL ONCE
Qty: 0 | Refills: 0 | Status: COMPLETED | OUTPATIENT
Start: 2017-05-05 | End: 2017-05-05

## 2017-05-05 RX ORDER — SODIUM CHLORIDE 9 MG/ML
1000 INJECTION, SOLUTION INTRAVENOUS
Qty: 0 | Refills: 0 | Status: DISCONTINUED | OUTPATIENT
Start: 2017-05-05 | End: 2017-05-07

## 2017-05-05 RX ORDER — ONDANSETRON 8 MG/1
4 TABLET, FILM COATED ORAL ONCE
Qty: 0 | Refills: 0 | Status: COMPLETED | OUTPATIENT
Start: 2017-05-05 | End: 2017-05-05

## 2017-05-05 RX ORDER — SODIUM CHLORIDE 9 MG/ML
2000 INJECTION INTRAMUSCULAR; INTRAVENOUS; SUBCUTANEOUS ONCE
Qty: 0 | Refills: 0 | Status: COMPLETED | OUTPATIENT
Start: 2017-05-05 | End: 2017-05-05

## 2017-05-05 RX ORDER — PANTOPRAZOLE SODIUM 20 MG/1
40 TABLET, DELAYED RELEASE ORAL DAILY
Qty: 0 | Refills: 0 | Status: DISCONTINUED | OUTPATIENT
Start: 2017-05-05 | End: 2017-05-07

## 2017-05-05 RX ADMIN — ONDANSETRON 4 MILLIGRAM(S): 8 TABLET, FILM COATED ORAL at 19:55

## 2017-05-05 RX ADMIN — SODIUM CHLORIDE 1000 MILLILITER(S): 9 INJECTION INTRAMUSCULAR; INTRAVENOUS; SUBCUTANEOUS at 19:29

## 2017-05-05 RX ADMIN — Medication 1000 MILLIGRAM(S): at 19:55

## 2017-05-05 RX ADMIN — SODIUM CHLORIDE 125 MILLILITER(S): 9 INJECTION, SOLUTION INTRAVENOUS at 23:23

## 2017-05-05 RX ADMIN — Medication 400 MILLIGRAM(S): at 19:29

## 2017-05-05 NOTE — ED ADULT NURSE NOTE - OBJECTIVE STATEMENT
40 y/o male pt accompanied by parents, aox3, ambulatory, presents to the ed with c/o diffuse abd pain and nausea that started this morning, last BM today, unable to pass gas. Pt has had several episodes of bowel obstruction before, 2009 - perforated diverticulitis, hartmans procedure, sts symptoms now are similar. Pt seen by MD, SL placed by MD, labs sent, VS as noted, NGT being placed by surgery team at this time, NAD, will monitor

## 2017-05-05 NOTE — ED ADULT TRIAGE NOTE - CHIEF COMPLAINT QUOTE
pt with Mult. Hx. of mult. ABD obstructions after 2009 for burst Diverticulitis coming today with ABD + Nausea since this AM.   denies fever, dysuria.

## 2017-05-05 NOTE — ED PROVIDER NOTE - MEDICAL DECISION MAKING DETAILS
42 yo M, c/o 16 hrs abdominal pain, nausea, vomiting, and Hx of multiple SBOs.  2009 had perforated diverticulitis, sepsis, giron's procedure by Dr. White.  Since then has experienced multiple SBOs requiring lysis of adhesions.  VS: tachy 101.  Physical Exam: adult M, mild distress, CTA B, tachy, Abd:  soft, tympanic, diffusely TTP, decreased bowel sounds.  No lower extremity edema.  Impression:  likely recurrent SBO.  Multiple CTs in past, will

## 2017-05-05 NOTE — ED PROVIDER NOTE - OBJECTIVE STATEMENT
40 yo M, c/o 16 hrs abdominal pain, nausea, vomiting, and Hx of multiple SBOs.  2009 had perforated diverticulitis, sepsis, giron's procedure by Dr. White.  Since then has experienced multiple SBOs requiring lysis of adhesions.

## 2017-05-05 NOTE — ED PROVIDER NOTE - PHYSICAL EXAMINATION
VS: tachy 101.  Physical Exam: adult M, mild distress, CTA B, tachy, Abd:  soft, tympanic, diffusely TTP, decreased bowel sounds.  No lower extremity edema.

## 2017-05-06 PROCEDURE — 71010: CPT | Mod: 26

## 2017-05-06 RX ADMIN — ENOXAPARIN SODIUM 40 MILLIGRAM(S): 100 INJECTION SUBCUTANEOUS at 12:27

## 2017-05-06 RX ADMIN — Medication 5 MILLIGRAM(S): at 12:27

## 2017-05-06 RX ADMIN — SODIUM CHLORIDE 125 MILLILITER(S): 9 INJECTION, SOLUTION INTRAVENOUS at 06:19

## 2017-05-06 RX ADMIN — Medication 5 MILLIGRAM(S): at 06:19

## 2017-05-06 RX ADMIN — Medication 5 MILLIGRAM(S): at 00:11

## 2017-05-06 RX ADMIN — Medication 5 MILLIGRAM(S): at 18:26

## 2017-05-06 RX ADMIN — PANTOPRAZOLE SODIUM 40 MILLIGRAM(S): 20 TABLET, DELAYED RELEASE ORAL at 12:27

## 2017-05-06 NOTE — H&P ADULT. - GASTROINTESTINAL COMMENTS
Seem HPI soft, NT, mildly distended, large mouth ventral hernia,  well healed incisions, no skin erythema

## 2017-05-06 NOTE — H&P ADULT. - HISTORY OF PRESENT ILLNESS
The patient is a 41 year old M with hx of Isi's and reversal 2009 c/b recurrent SBOs (>15, most managed conservatively, laparotomy/LINDA x2) now presents with 1 day hx of abd pain and distention.  Pt reports no flatus/BM since yesterday AM, with increasing abd distention and associated nausea/emesis.  Feels same as prior episodes of SBOs The patient is a 41 year old M with hx of Isi's and reversal 2009 c/b recurrent SBOs (>15, most managed conservatively, laparotomy/LINDA x2) now presents with 1 day hx of abd pain and distention.  Pt reports no flatus/BM since yesterday AM, with increasing abd distention and associated nausea/emesis.  Feels same as prior episodes of SBOs    In the ED; hemodynamically stable.  Abd exam with mild distension, diffuse TTP without rebound or guarding, large mouth ventral hernia, well healed abdominal scars.  Labs benign.  CT with SBO.  NGT inserted prior to CT with return of 500 cc bilious contents

## 2017-05-07 LAB
BUN SERPL-MCNC: 18 MG/DL — SIGNIFICANT CHANGE UP (ref 7–23)
CALCIUM SERPL-MCNC: 8.7 MG/DL — SIGNIFICANT CHANGE UP (ref 8.4–10.5)
CHLORIDE SERPL-SCNC: 105 MMOL/L — SIGNIFICANT CHANGE UP (ref 98–107)
CO2 SERPL-SCNC: 23 MMOL/L — SIGNIFICANT CHANGE UP (ref 22–31)
CREAT SERPL-MCNC: 0.85 MG/DL — SIGNIFICANT CHANGE UP (ref 0.5–1.3)
GLUCOSE SERPL-MCNC: 97 MG/DL — SIGNIFICANT CHANGE UP (ref 70–99)
HCT VFR BLD CALC: 35.7 % — LOW (ref 39–50)
HGB BLD-MCNC: 10.8 G/DL — LOW (ref 13–17)
MAGNESIUM SERPL-MCNC: 1.8 MG/DL — SIGNIFICANT CHANGE UP (ref 1.6–2.6)
MCHC RBC-ENTMCNC: 27.1 PG — SIGNIFICANT CHANGE UP (ref 27–34)
MCHC RBC-ENTMCNC: 30.3 % — LOW (ref 32–36)
MCV RBC AUTO: 89.5 FL — SIGNIFICANT CHANGE UP (ref 80–100)
PHOSPHATE SERPL-MCNC: 2.5 MG/DL — SIGNIFICANT CHANGE UP (ref 2.5–4.5)
PLATELET # BLD AUTO: 177 K/UL — SIGNIFICANT CHANGE UP (ref 150–400)
PMV BLD: 10.7 FL — SIGNIFICANT CHANGE UP (ref 7–13)
POTASSIUM SERPL-MCNC: 3.7 MMOL/L — SIGNIFICANT CHANGE UP (ref 3.5–5.3)
POTASSIUM SERPL-SCNC: 3.7 MMOL/L — SIGNIFICANT CHANGE UP (ref 3.5–5.3)
RBC # BLD: 3.99 M/UL — LOW (ref 4.2–5.8)
RBC # FLD: 15.7 % — HIGH (ref 10.3–14.5)
SODIUM SERPL-SCNC: 143 MMOL/L — SIGNIFICANT CHANGE UP (ref 135–145)
WBC # BLD: 4.16 K/UL — SIGNIFICANT CHANGE UP (ref 3.8–10.5)
WBC # FLD AUTO: 4.16 K/UL — SIGNIFICANT CHANGE UP (ref 3.8–10.5)

## 2017-05-07 RX ORDER — DEXTROSE MONOHYDRATE, SODIUM CHLORIDE, AND POTASSIUM CHLORIDE 50; .745; 4.5 G/1000ML; G/1000ML; G/1000ML
1000 INJECTION, SOLUTION INTRAVENOUS
Qty: 0 | Refills: 0 | Status: DISCONTINUED | OUTPATIENT
Start: 2017-05-07 | End: 2017-05-07

## 2017-05-07 RX ORDER — METOPROLOL TARTRATE 50 MG
50 TABLET ORAL
Qty: 0 | Refills: 0 | Status: DISCONTINUED | OUTPATIENT
Start: 2017-05-07 | End: 2017-05-08

## 2017-05-07 RX ORDER — PANTOPRAZOLE SODIUM 20 MG/1
40 TABLET, DELAYED RELEASE ORAL
Qty: 0 | Refills: 0 | Status: DISCONTINUED | OUTPATIENT
Start: 2017-05-07 | End: 2017-05-08

## 2017-05-07 RX ORDER — INSULIN LISPRO 100/ML
VIAL (ML) SUBCUTANEOUS
Qty: 0 | Refills: 0 | Status: DISCONTINUED | OUTPATIENT
Start: 2017-05-07 | End: 2017-05-08

## 2017-05-07 RX ORDER — VALSARTAN 80 MG/1
80 TABLET ORAL DAILY
Qty: 0 | Refills: 0 | Status: DISCONTINUED | OUTPATIENT
Start: 2017-05-07 | End: 2017-05-08

## 2017-05-07 RX ORDER — FENOFIBRATE,MICRONIZED 130 MG
145 CAPSULE ORAL DAILY
Qty: 0 | Refills: 0 | Status: DISCONTINUED | OUTPATIENT
Start: 2017-05-07 | End: 2017-05-08

## 2017-05-07 RX ADMIN — Medication 145 MILLIGRAM(S): at 11:49

## 2017-05-07 RX ADMIN — Medication 50 MILLIGRAM(S): at 06:00

## 2017-05-07 RX ADMIN — Medication 5 MILLIGRAM(S): at 00:12

## 2017-05-07 RX ADMIN — SODIUM CHLORIDE 125 MILLILITER(S): 9 INJECTION, SOLUTION INTRAVENOUS at 00:12

## 2017-05-07 RX ADMIN — VALSARTAN 80 MILLIGRAM(S): 80 TABLET ORAL at 06:00

## 2017-05-07 RX ADMIN — Medication: at 23:01

## 2017-05-07 RX ADMIN — PANTOPRAZOLE SODIUM 40 MILLIGRAM(S): 20 TABLET, DELAYED RELEASE ORAL at 06:00

## 2017-05-07 RX ADMIN — Medication 50 MILLIGRAM(S): at 17:54

## 2017-05-07 RX ADMIN — ENOXAPARIN SODIUM 40 MILLIGRAM(S): 100 INJECTION SUBCUTANEOUS at 11:49

## 2017-05-08 ENCOUNTER — TRANSCRIPTION ENCOUNTER (OUTPATIENT)
Age: 42
End: 2017-05-08

## 2017-05-08 VITALS
OXYGEN SATURATION: 98 % | RESPIRATION RATE: 16 BRPM | HEART RATE: 71 BPM | TEMPERATURE: 99 F | DIASTOLIC BLOOD PRESSURE: 69 MMHG | SYSTOLIC BLOOD PRESSURE: 116 MMHG

## 2017-05-08 RX ADMIN — Medication 145 MILLIGRAM(S): at 11:16

## 2017-05-08 RX ADMIN — PANTOPRAZOLE SODIUM 40 MILLIGRAM(S): 20 TABLET, DELAYED RELEASE ORAL at 06:12

## 2017-05-08 RX ADMIN — Medication 50 MILLIGRAM(S): at 06:12

## 2017-05-08 RX ADMIN — ENOXAPARIN SODIUM 40 MILLIGRAM(S): 100 INJECTION SUBCUTANEOUS at 11:16

## 2017-05-08 RX ADMIN — VALSARTAN 80 MILLIGRAM(S): 80 TABLET ORAL at 06:12

## 2017-05-08 NOTE — DISCHARGE NOTE ADULT - CARE PLAN
Principal Discharge DX:	SBO (small bowel obstruction)  Goal:	Diet was advanced as tolerated  Instructions for follow-up, activity and diet:	ACTIVITY: No heavy lifting or straining. Otherwise, you may return to your usual level of physical activity.    DIET: Return to your usual diet.  NOTIFY YOUR SURGEON IF: You have persistent nausea/vomiting, persistent diarrhea, or if your pain is not controlled on your discharge pain medications.  FOLLOW-UP: Please follow up with your primary care physician in one week regarding your hospitalization. Please call Dr. White to make an appointment in one week [phone]  Secondary Diagnosis:	Diabetes mellitus, type 2  Goal:	Please follow up with primary medical doctor within one week of discharge  Secondary Diagnosis:	Hypertension  Goal:	Please follow up with primary medical doctor within one week of discharge Principal Discharge DX:	SBO (small bowel obstruction)  Goal:	Diet was advanced as tolerated  Instructions for follow-up, activity and diet:	ACTIVITY: No heavy lifting or straining. Otherwise, you may return to your usual level of physical activity.    DIET: Return to your usual diet.  NOTIFY YOUR SURGEON IF: You have persistent nausea/vomiting, persistent diarrhea, or if your pain is not controlled on your discharge pain medications.  FOLLOW-UP: Please follow up with your primary care physician in one week regarding your hospitalization. Please call Dr. White to make an appointment in one week [phone]  Secondary Diagnosis:	Diabetes mellitus, type 2  Goal:	Please follow up with primary medical doctor within one week of discharge  Secondary Diagnosis:	Hypertension  Goal:	Please follow up with primary medical doctor within one week of discharge Principal Discharge DX:	SBO (small bowel obstruction)  Goal:	Diet was advanced as tolerated  Instructions for follow-up, activity and diet:	ACTIVITY: No heavy lifting or straining. Otherwise, you may return to your usual level of physical activity.    DIET: Return to your usual diet.  NOTIFY YOUR SURGEON IF: You have persistent nausea/vomiting, persistent diarrhea, or if your pain is not controlled on your discharge pain medications.  FOLLOW-UP: Please follow up with your primary care physician in one week regarding your hospitalization. Please call Dr. White to make an appointment in one week [phone]  Secondary Diagnosis:	Diabetes mellitus, type 2  Goal:	Please follow up with primary medical doctor within one week of discharge  Secondary Diagnosis:	Hypertension  Goal:	Please follow up with primary medical doctor within one week of discharge

## 2017-05-08 NOTE — DISCHARGE NOTE ADULT - HOSPITAL COURSE
The patient is a 41 year old M with hx of Isi's and reversal 2009 c/b recurrent SBOs (>15, most managed conservatively, laparotomy/LINDA x2) now presents with 1 day hx of abd pain and distention.  Pt reports no flatus/BM since yesterday AM, with increasing abd distention and associated nausea/emesis.  Feels same as prior episodes of SBOs  In the ED; hemodynamically stable.  Abd exam with mild distension, diffuse TTP without rebound or guarding, large mouth ventral hernia, well healed abdominal scars.  Labs benign.  CT with SBO.  NGT inserted prior to CT with return of 500 cc bilious contents     Patient began passing flatus, and reporting GI function. Diet was advanced as tolerated. Patient currently on LFD. Vital signs remain stable.   5/8: Patient voiding on own, ambulating, tolerating LFD. Stable for discharge home with follow up appointment with surgeon within one week of discharge.

## 2017-05-08 NOTE — DISCHARGE NOTE ADULT - CARE PROVIDER_API CALL
Musa White), ColonRectal Surgery; Surgery  [address] [place] [address]  Phone: [phone]  Fax: [phone]

## 2017-05-08 NOTE — DISCHARGE NOTE ADULT - PATIENT PORTAL LINK FT
“You can access the Follow[employer] Patient Portal, offered by [facility], by registering with the following website: [email or web address]”

## 2017-05-08 NOTE — DISCHARGE NOTE ADULT - CONDITIONS AT DISCHARGE
Patient alert and oriented times 4. Vitals signs stable. Tolerating low fiber diet. No c/o nausea or vomiting. Patient discharged to home. Discharge instructions given and understood. IV removed.

## 2017-05-08 NOTE — DISCHARGE NOTE ADULT - PLAN OF CARE
Diet was advanced as tolerated ACTIVITY: No heavy lifting or straining. Otherwise, you may return to your usual level of physical activity.    DIET: Return to your usual diet.  NOTIFY YOUR SURGEON IF: You have persistent nausea/vomiting, persistent diarrhea, or if your pain is not controlled on your discharge pain medications.  FOLLOW-UP: Please follow up with your primary care physician in one week regarding your hospitalization. Please call Dr. White to make an appointment in one week [phone] Please follow up with primary medical doctor within one week of discharge

## 2017-05-08 NOTE — DISCHARGE NOTE ADULT - INSTRUCTIONS
Low fiber diet Report of inability to tolerate diet, increasing abdominal pain, or nausea or vomiting

## 2017-05-08 NOTE — DISCHARGE NOTE ADULT - MEDICATION SUMMARY - MEDICATIONS TO TAKE
I will START or STAY ON the medications listed below when I get home from the hospital:    Diovan 80 mg oral tablet  -- 1 tab(s) by mouth once a day  -- hold for low blood pressure  -- Indication: For HTN    metFORMIN 500 mg oral tablet  -- 1 tab(s) by mouth 3 times a day  -- Indication: For DM    Farxiga 5 mg oral tablet  -- 1 tab(s) by mouth once a day  -- Indication: For DM    TriCor 145 mg oral tablet  -- 1 tab(s) by mouth once a day  -- Indication: For HLD    Toprol- mg oral tablet, extended release  -- 1 tab(s) by mouth once a day  -- Indication: For HTN    Protonix 40 mg oral delayed release tablet  -- 1 tab(s) by mouth once a day  -- Indication: For Protonix

## 2017-07-11 ENCOUNTER — APPOINTMENT (OUTPATIENT)
Dept: VASCULAR SURGERY | Facility: CLINIC | Age: 42
End: 2017-07-11

## 2017-07-11 VITALS
WEIGHT: 242 LBS | HEART RATE: 79 BPM | HEIGHT: 72 IN | TEMPERATURE: 98 F | BODY MASS INDEX: 32.78 KG/M2 | SYSTOLIC BLOOD PRESSURE: 125 MMHG | DIASTOLIC BLOOD PRESSURE: 79 MMHG

## 2017-07-11 DIAGNOSIS — I80.201 PHLEBITIS AND THROMBOPHLEBITIS OF UNSPECIFIED DEEP VESSELS OF RIGHT LOWER EXTREMITY: ICD-10-CM

## 2017-08-28 ENCOUNTER — EMERGENCY (EMERGENCY)
Facility: HOSPITAL | Age: 42
LOS: 1 days | Discharge: HOME | End: 2017-08-28
Attending: EMERGENCY MEDICINE
Payer: COMMERCIAL

## 2017-08-28 VITALS
HEART RATE: 106 BPM | WEIGHT: 240.08 LBS | RESPIRATION RATE: 16 BRPM | SYSTOLIC BLOOD PRESSURE: 134 MMHG | TEMPERATURE: 98 F | OXYGEN SATURATION: 98 % | DIASTOLIC BLOOD PRESSURE: 80 MMHG

## 2017-08-28 DIAGNOSIS — Z98.89 OTHER SPECIFIED POSTPROCEDURAL STATES: Chronic | ICD-10-CM

## 2017-08-28 PROCEDURE — 99284 EMERGENCY DEPT VISIT MOD MDM: CPT

## 2017-08-28 PROCEDURE — 99282 EMERGENCY DEPT VISIT SF MDM: CPT

## 2017-08-28 PROCEDURE — 99283 EMERGENCY DEPT VISIT LOW MDM: CPT

## 2017-08-28 NOTE — ED PROVIDER NOTE - ATTENDING CONTRIBUTION TO CARE
41M h/o DM presents with bleeding from varicose vein on R leg. No specific trauma, bleeding started spontaneously. On exam well appearing, nad, breathing comfortably, 2+ pulses, RLE + punctate area of persistent bleeding on posterior calf.  Local wound care and bleeding stopped.

## 2017-08-28 NOTE — ED PROVIDER NOTE - OBJECTIVE STATEMENT
41 year-old male, history of DVT (stopped Eliquis a few months ago as per doctor's instructions), HTN, HLD, DM, varicose veins, presents with cc RLE bleeding today. While walking noticed spontaneous bleeding from varicose veins on the posterior ankle area. Applied pressure with relief of bleeding. Denies any pain, injury, numbness/tingling or any other complaints.

## 2017-08-28 NOTE — ED PROVIDER NOTE - PROGRESS NOTE DETAILS
Bleeding stopped with local infiltration of lido/epi, surgicell and pressure dressing. No bleeding for 1 hour. Will discharge with vasc surgery follow up in 2-3 days. Instructed to keep dressing and avoid wetting area x 24-36 hrs. Will give cane to help relieve pressure. Pt is well appearing walking with steady gait, stable for discharge and follow up without fail with medical doctor. I had a detailed discussion with the patient and/or guardian regarding the historical points, exam findings, and any diagnostic results supporting the discharge diagnosis. Pt educated on care and need for follow up. Strict return instructions and red flag signs and symptoms discussed with patient. Questions answered. Pt shows understanding of discharge information and agrees to follow.

## 2017-08-28 NOTE — ED ADULT TRIAGE NOTE - CHIEF COMPLAINT QUOTE
BIBA with c/o bleeding from right lower leg, as per patient he has vericous vein bleeding. Patient not on blood thinners.

## 2017-08-28 NOTE — ED PROVIDER NOTE - PHYSICAL EXAMINATION
Right distal posterior extremity varicose vein site actively bleeding. No calf tenderness. No sensory deficits.

## 2017-08-28 NOTE — ED PROVIDER NOTE - MEDICAL DECISION MAKING DETAILS
41 year-old male, presents with spontaneous varicose vein bleeding today. Well-appearing, tachycardic, afebrile. Neurovascularly intact. PlanL pressure dressing and elevation, and re-assess.

## 2017-08-29 ENCOUNTER — APPOINTMENT (OUTPATIENT)
Dept: VASCULAR SURGERY | Facility: CLINIC | Age: 42
End: 2017-08-29
Payer: COMMERCIAL

## 2017-08-29 VITALS
SYSTOLIC BLOOD PRESSURE: 120 MMHG | TEMPERATURE: 98.4 F | DIASTOLIC BLOOD PRESSURE: 76 MMHG | HEIGHT: 72 IN | WEIGHT: 242 LBS | HEART RATE: 86 BPM | BODY MASS INDEX: 32.78 KG/M2

## 2017-08-29 DIAGNOSIS — I83.891 VARICOSE VEINS OF RIGHT LOWER EXTREMITY WITH OTHER COMPLICATIONS: ICD-10-CM

## 2017-08-29 PROCEDURE — 93971 EXTREMITY STUDY: CPT | Mod: RT

## 2017-08-29 PROCEDURE — 99213 OFFICE O/P EST LOW 20 MIN: CPT | Mod: 25

## 2017-09-01 DIAGNOSIS — E78.5 HYPERLIPIDEMIA, UNSPECIFIED: ICD-10-CM

## 2017-09-01 DIAGNOSIS — I83.891 VARICOSE VEINS OF RIGHT LOWER EXTREMITY WITH OTHER COMPLICATIONS: ICD-10-CM

## 2017-09-01 DIAGNOSIS — Z88.5 ALLERGY STATUS TO NARCOTIC AGENT: ICD-10-CM

## 2017-09-01 DIAGNOSIS — Z91.041 RADIOGRAPHIC DYE ALLERGY STATUS: ICD-10-CM

## 2017-09-01 DIAGNOSIS — I10 ESSENTIAL (PRIMARY) HYPERTENSION: ICD-10-CM

## 2017-09-01 DIAGNOSIS — E11.9 TYPE 2 DIABETES MELLITUS WITHOUT COMPLICATIONS: ICD-10-CM

## 2017-09-01 DIAGNOSIS — Z86.718 PERSONAL HISTORY OF OTHER VENOUS THROMBOSIS AND EMBOLISM: ICD-10-CM

## 2017-09-06 ENCOUNTER — INPATIENT (INPATIENT)
Facility: HOSPITAL | Age: 42
LOS: 4 days | Discharge: HOME | End: 2017-09-11
Attending: SURGERY | Admitting: SURGERY
Payer: COMMERCIAL

## 2017-09-06 VITALS
RESPIRATION RATE: 18 BRPM | DIASTOLIC BLOOD PRESSURE: 65 MMHG | HEART RATE: 116 BPM | OXYGEN SATURATION: 100 % | SYSTOLIC BLOOD PRESSURE: 118 MMHG | TEMPERATURE: 98 F

## 2017-09-06 DIAGNOSIS — Z98.89 OTHER SPECIFIED POSTPROCEDURAL STATES: Chronic | ICD-10-CM

## 2017-09-06 DIAGNOSIS — K56.69 OTHER INTESTINAL OBSTRUCTION: ICD-10-CM

## 2017-09-06 LAB
APPEARANCE UR: CLEAR — SIGNIFICANT CHANGE UP
APTT BLD: 28.2 SEC — SIGNIFICANT CHANGE UP (ref 27.5–37.4)
BACTERIA # UR AUTO: SIGNIFICANT CHANGE UP
BASE EXCESS BLDV CALC-SCNC: 1.2 MMOL/L — SIGNIFICANT CHANGE UP
BASOPHILS # BLD AUTO: 0.01 K/UL — SIGNIFICANT CHANGE UP (ref 0–0.2)
BASOPHILS NFR BLD AUTO: 0.1 % — SIGNIFICANT CHANGE UP (ref 0–2)
BASOPHILS NFR SPEC: 0 % — SIGNIFICANT CHANGE UP (ref 0–2)
BILIRUB UR-MCNC: NEGATIVE — SIGNIFICANT CHANGE UP
BLD GP AB SCN SERPL QL: NEGATIVE — SIGNIFICANT CHANGE UP
BLOOD GAS VENOUS - CREATININE: 1.14 MG/DL — SIGNIFICANT CHANGE UP (ref 0.5–1.3)
BLOOD UR QL VISUAL: NEGATIVE — SIGNIFICANT CHANGE UP
BUN SERPL-MCNC: 29 MG/DL — HIGH (ref 7–23)
CALCIUM SERPL-MCNC: 10.3 MG/DL — SIGNIFICANT CHANGE UP (ref 8.4–10.5)
CHLORIDE BLDV-SCNC: 105 MMOL/L — SIGNIFICANT CHANGE UP (ref 96–108)
CHLORIDE SERPL-SCNC: 103 MMOL/L — SIGNIFICANT CHANGE UP (ref 98–107)
CO2 SERPL-SCNC: 22 MMOL/L — SIGNIFICANT CHANGE UP (ref 22–31)
COLOR SPEC: YELLOW — SIGNIFICANT CHANGE UP
CREAT SERPL-MCNC: 1.2 MG/DL — SIGNIFICANT CHANGE UP (ref 0.5–1.3)
EOSINOPHIL # BLD AUTO: 0.02 K/UL — SIGNIFICANT CHANGE UP (ref 0–0.5)
EOSINOPHIL NFR BLD AUTO: 0.2 % — SIGNIFICANT CHANGE UP (ref 0–6)
EOSINOPHIL NFR FLD: 0 % — SIGNIFICANT CHANGE UP (ref 0–6)
GAS PNL BLDV: 144 MMOL/L — SIGNIFICANT CHANGE UP (ref 136–146)
GLUCOSE BLDV-MCNC: 171 — HIGH (ref 70–99)
GLUCOSE SERPL-MCNC: 176 MG/DL — HIGH (ref 70–99)
GLUCOSE UR-MCNC: >1000 — SIGNIFICANT CHANGE UP
HCO3 BLDV-SCNC: 24 MMOL/L — SIGNIFICANT CHANGE UP (ref 20–27)
HCT VFR BLD CALC: 41.2 % — SIGNIFICANT CHANGE UP (ref 39–50)
HCT VFR BLDV CALC: 40.1 % — SIGNIFICANT CHANGE UP (ref 39–51)
HGB BLD-MCNC: 12.8 G/DL — LOW (ref 13–17)
HGB BLDV-MCNC: 13 G/DL — SIGNIFICANT CHANGE UP (ref 13–17)
IMM GRANULOCYTES # BLD AUTO: 0.03 # — SIGNIFICANT CHANGE UP
IMM GRANULOCYTES NFR BLD AUTO: 0.3 % — SIGNIFICANT CHANGE UP (ref 0–1.5)
INR BLD: 0.97 — SIGNIFICANT CHANGE UP (ref 0.88–1.17)
KETONES UR-MCNC: SIGNIFICANT CHANGE UP
LACTATE BLDV-MCNC: 2.9 MMOL/L — HIGH (ref 0.5–2)
LEUKOCYTE ESTERASE UR-ACNC: NEGATIVE — SIGNIFICANT CHANGE UP
LYMPHOCYTES # BLD AUTO: 0.78 K/UL — LOW (ref 1–3.3)
LYMPHOCYTES # BLD AUTO: 8.5 % — LOW (ref 13–44)
LYMPHOCYTES NFR SPEC AUTO: 13 % — SIGNIFICANT CHANGE UP (ref 13–44)
MANUAL SMEAR VERIFICATION: SIGNIFICANT CHANGE UP
MCHC RBC-ENTMCNC: 27.4 PG — SIGNIFICANT CHANGE UP (ref 27–34)
MCHC RBC-ENTMCNC: 31.1 % — LOW (ref 32–36)
MCV RBC AUTO: 88 FL — SIGNIFICANT CHANGE UP (ref 80–100)
MONOCYTES # BLD AUTO: 0.82 K/UL — SIGNIFICANT CHANGE UP (ref 0–0.9)
MONOCYTES NFR BLD AUTO: 9 % — SIGNIFICANT CHANGE UP (ref 2–14)
MONOCYTES NFR BLD: 9 % — SIGNIFICANT CHANGE UP (ref 2–9)
MORPHOLOGY BLD-IMP: NORMAL — SIGNIFICANT CHANGE UP
MUCOUS THREADS # UR AUTO: SIGNIFICANT CHANGE UP
NEUTROPHIL AB SER-ACNC: 41 % — LOW (ref 43–77)
NEUTROPHILS # BLD AUTO: 7.47 K/UL — HIGH (ref 1.8–7.4)
NEUTROPHILS NFR BLD AUTO: 81.9 % — HIGH (ref 43–77)
NEUTS BAND # BLD: 37 % — HIGH (ref 0–6)
NITRITE UR-MCNC: NEGATIVE — SIGNIFICANT CHANGE UP
NON-SQ EPI CELLS # UR AUTO: <1 — SIGNIFICANT CHANGE UP
NRBC # FLD: 0 — SIGNIFICANT CHANGE UP
PCO2 BLDV: 45 MMHG — SIGNIFICANT CHANGE UP (ref 41–51)
PH BLDV: 7.38 PH — SIGNIFICANT CHANGE UP (ref 7.32–7.43)
PH UR: 6 — SIGNIFICANT CHANGE UP (ref 4.6–8)
PLATELET # BLD AUTO: 267 K/UL — SIGNIFICANT CHANGE UP (ref 150–400)
PLATELET COUNT - ESTIMATE: NORMAL — SIGNIFICANT CHANGE UP
PMV BLD: 11.1 FL — SIGNIFICANT CHANGE UP (ref 7–13)
PO2 BLDV: 38 MMHG — SIGNIFICANT CHANGE UP (ref 35–40)
POTASSIUM BLDV-SCNC: 4.2 MMOL/L — SIGNIFICANT CHANGE UP (ref 3.4–4.5)
POTASSIUM SERPL-MCNC: 4.3 MMOL/L — SIGNIFICANT CHANGE UP (ref 3.5–5.3)
POTASSIUM SERPL-SCNC: 4.3 MMOL/L — SIGNIFICANT CHANGE UP (ref 3.5–5.3)
PROT UR-MCNC: 10 — SIGNIFICANT CHANGE UP
PROTHROM AB SERPL-ACNC: 10.9 SEC — SIGNIFICANT CHANGE UP (ref 9.8–13.1)
RBC # BLD: 4.68 M/UL — SIGNIFICANT CHANGE UP (ref 4.2–5.8)
RBC # FLD: 15.2 % — HIGH (ref 10.3–14.5)
RBC CASTS # UR COMP ASSIST: SIGNIFICANT CHANGE UP (ref 0–?)
REVIEW TO FOLLOW: YES — SIGNIFICANT CHANGE UP
RH IG SCN BLD-IMP: POSITIVE — SIGNIFICANT CHANGE UP
SAO2 % BLDV: 64 % — SIGNIFICANT CHANGE UP (ref 60–85)
SODIUM SERPL-SCNC: 145 MMOL/L — SIGNIFICANT CHANGE UP (ref 135–145)
SP GR SPEC: > 1.04 — HIGH (ref 1–1.03)
SQUAMOUS # UR AUTO: SIGNIFICANT CHANGE UP
UROBILINOGEN FLD QL: NORMAL E.U. — SIGNIFICANT CHANGE UP (ref 0.1–0.2)
WBC # BLD: 9.13 K/UL — SIGNIFICANT CHANGE UP (ref 3.8–10.5)
WBC # FLD AUTO: 9.13 K/UL — SIGNIFICANT CHANGE UP (ref 3.8–10.5)
WBC UR QL: SIGNIFICANT CHANGE UP (ref 0–?)

## 2017-09-06 PROCEDURE — 99222 1ST HOSP IP/OBS MODERATE 55: CPT | Mod: GC

## 2017-09-06 PROCEDURE — 74010: CPT | Mod: 26

## 2017-09-06 RX ORDER — ONDANSETRON 8 MG/1
4 TABLET, FILM COATED ORAL ONCE
Qty: 0 | Refills: 0 | Status: COMPLETED | OUTPATIENT
Start: 2017-09-06 | End: 2017-09-07

## 2017-09-06 RX ORDER — METFORMIN HYDROCHLORIDE 850 MG/1
1 TABLET ORAL
Qty: 0 | Refills: 0 | COMMUNITY

## 2017-09-06 RX ORDER — CIPROFLOXACIN LACTATE 400MG/40ML
400 VIAL (ML) INTRAVENOUS ONCE
Qty: 0 | Refills: 0 | Status: COMPLETED | OUTPATIENT
Start: 2017-09-06 | End: 2017-09-06

## 2017-09-06 RX ORDER — METRONIDAZOLE 500 MG
500 TABLET ORAL ONCE
Qty: 0 | Refills: 0 | Status: COMPLETED | OUTPATIENT
Start: 2017-09-06 | End: 2017-09-06

## 2017-09-06 RX ORDER — ACETAMINOPHEN 500 MG
1000 TABLET ORAL ONCE
Qty: 0 | Refills: 0 | Status: COMPLETED | OUTPATIENT
Start: 2017-09-06 | End: 2017-09-06

## 2017-09-06 RX ORDER — SODIUM CHLORIDE 9 MG/ML
1000 INJECTION INTRAMUSCULAR; INTRAVENOUS; SUBCUTANEOUS ONCE
Qty: 0 | Refills: 0 | Status: COMPLETED | OUTPATIENT
Start: 2017-09-06 | End: 2017-09-06

## 2017-09-06 RX ORDER — ONDANSETRON 8 MG/1
4 TABLET, FILM COATED ORAL ONCE
Qty: 0 | Refills: 0 | Status: DISCONTINUED | OUTPATIENT
Start: 2017-09-06 | End: 2017-09-06

## 2017-09-06 RX ADMIN — Medication 1000 MILLIGRAM(S): at 21:00

## 2017-09-06 RX ADMIN — Medication 400 MILLIGRAM(S): at 20:30

## 2017-09-06 RX ADMIN — Medication 200 MILLIGRAM(S): at 22:13

## 2017-09-06 RX ADMIN — SODIUM CHLORIDE 1000 MILLILITER(S): 9 INJECTION INTRAMUSCULAR; INTRAVENOUS; SUBCUTANEOUS at 20:30

## 2017-09-06 RX ADMIN — Medication 100 MILLIGRAM(S): at 23:39

## 2017-09-06 NOTE — H&P ADULT - NSHPPHYSICALEXAM_GEN_ALL_CORE
41 years old male alert and oriented x3 in no acute distress, well nourished, obese, well groomed.  Head: atraumatic, normo cephalic  Abdomen: softly distended, tender, no CVA tenderness  MS: varicose veins in bilateral lower extremity, no edema, no calf tenderness.  Neurologic: intact, reflexes present, strength 5/5 alert and oriented x3 in no acute distress, well nourished, obese, well groomed.  Head: atraumatic, normo cephalic  Abdomen: softly distended, tender, no CVA tenderness  MS: varicose veins in bilateral lower extremity, no edema, no calf tenderness.  Neurologic: intact, reflexes present, strength 5/5

## 2017-09-06 NOTE — ED ADULT NURSE NOTE - OBJECTIVE STATEMENT
Patient received in RM 25 A&ox3 and ambulatory at baseline. C/Of epigastric pain, n/v x1 and abdominal distention since today. Patient with a history of diverticulitis and several SBO's. Denies diarrhea, fevers, chills. PE and history as noted below.

## 2017-09-06 NOTE — ED PROVIDER NOTE - ATTENDING CONTRIBUTION TO CARE
40 yo male with DM and multiple SBO s/p bowel resection for perforated diverticulitis years ago c/o abdominal pain, nausea and vomiting consistent with his previous SBO. Pt typically gets barium for PO contrast since has allergy to PO contrast. Pt requesting NG tube at this time. Exam: well appearing, MMM, epigastric and RUQ TTP, + hypoactive bowel sounds, cardiac and lung exam wnl. Plan: labs, CXR, NG, CT abdomen, pain control, surgical consult, reassess.

## 2017-09-06 NOTE — H&P ADULT - NSHPLABSRESULTS_GEN_ALL_CORE
Abx - Flat and Upright  Ct with contrast  pending official radiology results labs: BMP, PT, aPTT, INR, Plasma, Type and screen, blood gas venous, UA,   Abx - Flat and Upright  Ct abdomen and pelvis with PO contrast  pending official radiology results labs: BMP, PT, aPTT, INR, Plasma, Type and screen, blood gas venous, UA,   Abx - Flat and Upright  Ct abdomen and pelvis with PO contrast  pending official radiology results    < from: CT Abdomen and Pelvis w/ Oral Cont (09.07.17 @ 01:07) >      EXAM:  CT ABDOMEN AND PELVIS OC        PROCEDURE DATE:  Sep  7 2017         INTERPRETATION:  CLINICAL INFORMATION: History of diverticulitis and   small bowel obstruction presenting with abdominal pain and distention.    COMPARISON: CT abdomen and pelvis performed 5/5/2017.    PROCEDURE:   CT of the Abdomen and Pelvis was performed without intravenous contrast.   Intravenous contrast: None.  Oral contrast: positive contrast was administered.  Sagittal and coronal reformats were performed.    FINDINGS:    LOWER CHEST: Bilateral gynecomastia.    LIVER: Diffuse hepatic steatosis. Hepatomegaly measuring up to 21 cm.  BILE DUCTS: Normal caliber.  GALLBLADDER: Cholelithiasis.  SPLEEN: Within normal limits.  PANCREAS: Within normal limits.  ADRENALS: Within normal limits.  KIDNEYS/URETERS: No hydronephrosis.    BLADDER: Collapsed limiting evaluation.  REPRODUCTIVE ORGANS: The prostate is within normal limits.    BOWEL: Enteric tube terminates with tip in the gastric body. There is   redemonstration of a large ventral abdominal wall hernia containing small   bowel. The proximal small bowel is dilated measuring up to 4.6 cm to the   level of a transition point in the distal jejunum/proximal ileum exiting   the ventral abdominal wall hernia in the right lower quadrant (series 2,   images ) compatible with a small bowel obstruction. A ventral   pelvic wall hernia is seen containing nonobstructed distal small bowel. A   rectosigmoid anastomosis is noted. Colonic diverticulosis. Appendix is   unremarkable.  PERITONEUM: No ascites.  VESSELS: Within normal limits.  RETROPERITONEUM: No lymphadenopathy.    ABDOMINAL WALL: Ventral abdominal and pelvic wall hernias.  BONES: Mild degenerative changes of the spine.    IMPRESSION:     Smallbowel obstruction with a transition point in the distal   jejunum/proximal ileum exiting the ventral abdominal hernia in the right   lower quadrant which is similar in appearance compared to a CT of the   abdomen and pelvis from 5/5/2017.                  IZABELLA MCGUIRE M.D., RADIOLOGY RESIDENT  This document has been electronically signed.  OLIMPIA BATISTA M.D., ATTENDING RADIOLOGIST  This document has been electronically signed. Sep  7 2017  2:18AM

## 2017-09-06 NOTE — H&P ADULT - ASSESSMENT
41 years old male with PMHX SBO presents to ED for complaints of abdominal pain similar to previous SBO. Pt was put NPO, NG tube drained up to 2 liters abdominal fluid. Pt received 450 ml Barium sulfate 2% contrast for CT imaging. Pt is admitted to

## 2017-09-06 NOTE — ED PROVIDER NOTE - OBJECTIVE STATEMENT
42yo male pmh DM, HTN, diverticulitis s/p Buchanan's procedure c/b multiple SBOs p/w abdominal pain, cramping in epigastrium now constant a/w nausea, NBNB emesis x 2, mild diarrhea. States this is similar to SBO pain in past. +abd distention. Denies f/c/chest pain, dyspnea, urinary symptoms, LE swelling.  Surgeon Dr White

## 2017-09-06 NOTE — ED ADULT TRIAGE NOTE - CHIEF COMPLAINT QUOTE
c/o generalized abd pain and bloating with nausea and vomiting since early this morning.   pt appears pale.   h/o diverticulitis, SBO, DM, HTN

## 2017-09-06 NOTE — H&P ADULT - HISTORY OF PRESENT ILLNESS
41yrs old male with PMHX DM, HTN, diverticulitis s/p Buchanan's procedure c/b multiple SBOs p/w abdominal pain, cramping in epigastrium now constant a/w nausea, NBNB emesis x 2, mild diarrhea x 1day. Patient states this is similar to SBO pain in past. +abd distention. Patient admits to bloating and nausea, vomiting, last meal at 12:30 pm and denies chest pain, dyspnea, vomiting, fever, HA, urinary symptoms, LE swelling. Patient also states that his right leg varicose veins spontaneously started bleeding  when riding home in the train one week ago and he was treated by Dr. Rubio with injections. 41yrs old male with PMHX DM, HTN, diverticulitis s/p Buchanan's procedure c/b multiple SBOs p/w abdominal pain, cramping in epigastrium now constant a/w nausea, NBNB emesis x 2, mild diarrhea x 1day. Patient states this is similar to SBO pain in past. +abd distention. Patient admits to bloating and nausea, vomiting, last meal at 12:30 pm and denies chest pain, dyspnea, fever, HA, urinary symptoms, LE swelling. Patient also states that his right leg varicose veins spontaneously started bleeding  when riding home in the train one week ago and he was treated by Dr. Rubio with injections. 41yrs old male with PMHX DM, HTN, diverticulitis s/p Buchanan's procedure c/b multiple SBOs p/w abdominal pain, cramping in epigastrium now constant a/w nausea, NBNB emesis x 2, mild diarrhea x 1day. Patient states this is similar to SBO pain in past. +abd distention. Patient admits to bloating and nausea, vomiting, last meal at 12:30 pm and denies chest pain, dyspnea, fever, HA, urinary symptoms, LE swelling. Patient also states that his right leg varicose veins spontaneously started bleeding  when riding home in the train one week ago and he was treated by Dr. Willams with injections.

## 2017-09-06 NOTE — ED PROVIDER NOTE - MEDICAL DECISION MAKING DETAILS
41M pmh SBOs, diverticulitis p/w abd pain and distention, concern for SBO. Surgery consulted. CT a/p with barium contrast due to gastrograffin allergy. Pain control, labs, zofran

## 2017-09-06 NOTE — H&P ADULT - NSHPREVIEWOFSYSTEMS_GEN_ALL_CORE
general: no fever and no chills, no recent weight change, fatigue, night sweats, weakness.  Skin: bilateral Lower extremity varicose veins, no abrasions, no jaundice, no lesions, no pruritis, and no rashes.  Eyes: no discharge, no irritation, no pain, no redness, and no visual changes.  ENMT:no ear pain no hearing loss, tinnittus, vertigo. Nose: no nasal congestion, no rhinorrhea, epistaxis.Mouth/Throat: no dysphagia, no hoarseness and no throat pain.Neck: no lumps, no pain, no stiffness and no swollen glands.  CARDIOVASCULAR: normal rate and rhythm, no chest pain and no edema.  RESPIRATORY: no chest pain, no cough, and no shortness of breath.    · Gastrointestinal [+]: abdominal pain, diarrhea, vomiting, no melena  GENITOURINARY: no dysuria, no frequency, and no hematuria.  MUSCULOSKELETAL: no back pain, no gout, no musculoskeletal pain, no neck pain, and no weakness.  SKIN:   NEURO: no loss of consciousness, no gait abnormality, no headache, no sensory deficits, and no weakness.  PSYCHIATRIC: no known mental health issues. general: no fever and no chills, no recent weight change, fatigue, night sweats, weakness.  Skin: bilateral Lower extremity varicose veins, no abrasions, no jaundice, no lesions, no pruritis, and no rashes.  Eyes: no discharge, no irritation, no pain, no redness, and no visual changes.  ENMT:no ear pain no hearing loss, tinnitus, vertigo. Nose: no nasal congestion, no rhinorrhea, epistaxis.Mouth/Throat: no dysphagia, no hoarseness and no throat pain. Neck: no lumps, no pain, no stiffness and no swollen glands.  CARDIOVASCULAR: normal rate and rhythm, no chest pain and no edema.  RESPIRATORY: no chest pain, no cough, and no shortness of breath.    · Gastrointestinal [+]: abdominal pain, diarrhea, vomiting, no melena  GENITOURINARY: no dysuria, no frequency, and no hematuria.  MUSCULOSKELETAL: no back pain, no gout, no musculoskeletal pain, no neck pain, and no weakness.  SKIN:   NEURO: no loss of consciousness, no gait abnormality, no headache, no sensory deficits, and no weakness.  PSYCHIATRIC: no known mental health issues.

## 2017-09-07 DIAGNOSIS — E78.5 HYPERLIPIDEMIA, UNSPECIFIED: ICD-10-CM

## 2017-09-07 DIAGNOSIS — K56.69 OTHER INTESTINAL OBSTRUCTION: ICD-10-CM

## 2017-09-07 DIAGNOSIS — I10 ESSENTIAL (PRIMARY) HYPERTENSION: ICD-10-CM

## 2017-09-07 DIAGNOSIS — E11.9 TYPE 2 DIABETES MELLITUS WITHOUT COMPLICATIONS: ICD-10-CM

## 2017-09-07 DIAGNOSIS — K57.93 DIVERTICULITIS OF INTESTINE, PART UNSPECIFIED, WITHOUT PERFORATION OR ABSCESS WITH BLEEDING: ICD-10-CM

## 2017-09-07 LAB
BUN SERPL-MCNC: 31 MG/DL — HIGH (ref 7–23)
CALCIUM SERPL-MCNC: 9.6 MG/DL — SIGNIFICANT CHANGE UP (ref 8.4–10.5)
CHLORIDE SERPL-SCNC: 104 MMOL/L — SIGNIFICANT CHANGE UP (ref 98–107)
CO2 SERPL-SCNC: 23 MMOL/L — SIGNIFICANT CHANGE UP (ref 22–31)
CREAT SERPL-MCNC: 1 MG/DL — SIGNIFICANT CHANGE UP (ref 0.5–1.3)
GLUCOSE SERPL-MCNC: 137 MG/DL — HIGH (ref 70–99)
HCT VFR BLD CALC: 37.1 % — LOW (ref 39–50)
HGB BLD-MCNC: 11.8 G/DL — LOW (ref 13–17)
LACTATE SERPL-SCNC: 1.3 MMOL/L — SIGNIFICANT CHANGE UP (ref 0.5–2)
MAGNESIUM SERPL-MCNC: 1.9 MG/DL — SIGNIFICANT CHANGE UP (ref 1.6–2.6)
MCHC RBC-ENTMCNC: 27.8 PG — SIGNIFICANT CHANGE UP (ref 27–34)
MCHC RBC-ENTMCNC: 31.8 % — LOW (ref 32–36)
MCV RBC AUTO: 87.3 FL — SIGNIFICANT CHANGE UP (ref 80–100)
NRBC # FLD: 0 — SIGNIFICANT CHANGE UP
PHOSPHATE SERPL-MCNC: 3.3 MG/DL — SIGNIFICANT CHANGE UP (ref 2.5–4.5)
PLATELET # BLD AUTO: 222 K/UL — SIGNIFICANT CHANGE UP (ref 150–400)
PMV BLD: 10.7 FL — SIGNIFICANT CHANGE UP (ref 7–13)
POTASSIUM SERPL-MCNC: 3.8 MMOL/L — SIGNIFICANT CHANGE UP (ref 3.5–5.3)
POTASSIUM SERPL-SCNC: 3.8 MMOL/L — SIGNIFICANT CHANGE UP (ref 3.5–5.3)
RBC # BLD: 4.25 M/UL — SIGNIFICANT CHANGE UP (ref 4.2–5.8)
RBC # FLD: 15.4 % — HIGH (ref 10.3–14.5)
SODIUM SERPL-SCNC: 145 MMOL/L — SIGNIFICANT CHANGE UP (ref 135–145)
WBC # BLD: 5.18 K/UL — SIGNIFICANT CHANGE UP (ref 3.8–10.5)
WBC # FLD AUTO: 5.18 K/UL — SIGNIFICANT CHANGE UP (ref 3.8–10.5)

## 2017-09-07 PROCEDURE — 99232 SBSQ HOSP IP/OBS MODERATE 35: CPT | Mod: GC

## 2017-09-07 PROCEDURE — 74176 CT ABD & PELVIS W/O CONTRAST: CPT | Mod: 26

## 2017-09-07 RX ORDER — SODIUM CHLORIDE 9 MG/ML
1000 INJECTION, SOLUTION INTRAVENOUS ONCE
Qty: 0 | Refills: 0 | Status: COMPLETED | OUTPATIENT
Start: 2017-09-07 | End: 2017-09-07

## 2017-09-07 RX ORDER — ENOXAPARIN SODIUM 100 MG/ML
40 INJECTION SUBCUTANEOUS DAILY
Qty: 0 | Refills: 0 | Status: DISCONTINUED | OUTPATIENT
Start: 2017-09-07 | End: 2017-09-11

## 2017-09-07 RX ORDER — ACETAMINOPHEN 500 MG
1000 TABLET ORAL ONCE
Qty: 0 | Refills: 0 | Status: COMPLETED | OUTPATIENT
Start: 2017-09-07 | End: 2017-09-07

## 2017-09-07 RX ORDER — DEXTROSE MONOHYDRATE, SODIUM CHLORIDE, AND POTASSIUM CHLORIDE 50; .745; 4.5 G/1000ML; G/1000ML; G/1000ML
1000 INJECTION, SOLUTION INTRAVENOUS
Qty: 0 | Refills: 0 | Status: DISCONTINUED | OUTPATIENT
Start: 2017-09-07 | End: 2017-09-09

## 2017-09-07 RX ORDER — SODIUM CHLORIDE 9 MG/ML
1000 INJECTION, SOLUTION INTRAVENOUS
Qty: 0 | Refills: 0 | Status: DISCONTINUED | OUTPATIENT
Start: 2017-09-07 | End: 2017-09-07

## 2017-09-07 RX ORDER — PANTOPRAZOLE SODIUM 20 MG/1
40 TABLET, DELAYED RELEASE ORAL DAILY
Qty: 0 | Refills: 0 | Status: DISCONTINUED | OUTPATIENT
Start: 2017-09-07 | End: 2017-09-10

## 2017-09-07 RX ORDER — BENZOCAINE AND MENTHOL 5; 1 G/100ML; G/100ML
1 LIQUID ORAL
Qty: 0 | Refills: 0 | Status: DISCONTINUED | OUTPATIENT
Start: 2017-09-07 | End: 2017-09-10

## 2017-09-07 RX ADMIN — ENOXAPARIN SODIUM 40 MILLIGRAM(S): 100 INJECTION SUBCUTANEOUS at 12:10

## 2017-09-07 RX ADMIN — PANTOPRAZOLE SODIUM 40 MILLIGRAM(S): 20 TABLET, DELAYED RELEASE ORAL at 12:10

## 2017-09-07 RX ADMIN — ONDANSETRON 4 MILLIGRAM(S): 8 TABLET, FILM COATED ORAL at 00:13

## 2017-09-07 RX ADMIN — SODIUM CHLORIDE 1000 MILLILITER(S): 9 INJECTION, SOLUTION INTRAVENOUS at 18:48

## 2017-09-07 RX ADMIN — Medication 400 MILLIGRAM(S): at 10:34

## 2017-09-07 RX ADMIN — BENZOCAINE AND MENTHOL 1 LOZENGE: 5; 1 LIQUID ORAL at 20:34

## 2017-09-07 RX ADMIN — Medication 1000 MILLIGRAM(S): at 11:00

## 2017-09-07 RX ADMIN — SODIUM CHLORIDE 100 MILLILITER(S): 9 INJECTION, SOLUTION INTRAVENOUS at 10:35

## 2017-09-07 NOTE — PROGRESS NOTE ADULT - SUBJECTIVE AND OBJECTIVE BOX
A Team Progress Note    S: No acute events overnight. Patient resting comfortably in bed. Pain well controlled. Nausea improved after placement of NGT. Denies flatus or bowel movement.    O:  T(C): 37.5 (09-07-17 @ 06:35), Max: 37.5 (09-07-17 @ 06:35)  HR: 103 (09-07-17 @ 06:35) (103 - 116)  BP: 140/87 (09-07-17 @ 06:35) (118/65 - 140/87)  RR: 18 (09-07-17 @ 06:35) (16 - 18)  SpO2: 98% (09-07-17 @ 06:35) (97% - 100%)  Wt(kg): --    09-06 @ 07:01  -  09-07 @ 07:00  --------------------------------------------------------  IN:  Total IN: 0 mL    OUT:    Nasoenteral Tube: 400 mL    Voided: 400 mL  Total OUT: 800 mL    Total NET: -800 mL        CBC Full  -  ( 07 Sep 2017 03:20 )  WBC Count : 5.18 K/uL  Hemoglobin : 11.8 g/dL  Hematocrit : 37.1 %  Platelet Count - Automated : 222 K/uL  Mean Cell Volume : 87.3 fL  Mean Cell Hemoglobin : 27.8 pg  Mean Cell Hemoglobin Concentration : 31.8 %      CAPILLARY BLOOD GLUCOSE  132 (07 Sep 2017 01:19)  176 (06 Sep 2017 18:33)    CT 9/7/17  IMPRESSION:   Small bowel obstruction with a transition point in the distal   jejunum/proximal ileum exiting the ventral abdominal hernia in the right   lower quadrant which is similar in appearance compared to a CT of the   abdomen and pelvis from 5/5/2017.        PHYSICAL EXAM:  Gen: A&Ox3, laying in bed in NAD  Abd: Soft, slightly tender to palpation in lower quadrants, ND  Lines: NGT in place

## 2017-09-08 LAB
BACTERIA UR CULT: SIGNIFICANT CHANGE UP
BUN SERPL-MCNC: 24 MG/DL — HIGH (ref 7–23)
CALCIUM SERPL-MCNC: 8.5 MG/DL — SIGNIFICANT CHANGE UP (ref 8.4–10.5)
CHLORIDE SERPL-SCNC: 107 MMOL/L — SIGNIFICANT CHANGE UP (ref 98–107)
CO2 SERPL-SCNC: 25 MMOL/L — SIGNIFICANT CHANGE UP (ref 22–31)
CREAT SERPL-MCNC: 0.92 MG/DL — SIGNIFICANT CHANGE UP (ref 0.5–1.3)
GLUCOSE SERPL-MCNC: 154 MG/DL — HIGH (ref 70–99)
HBA1C BLD-MCNC: 6.7 % — HIGH (ref 4–5.6)
HCT VFR BLD CALC: 35.2 % — LOW (ref 39–50)
HGB BLD-MCNC: 11 G/DL — LOW (ref 13–17)
MAGNESIUM SERPL-MCNC: 1.8 MG/DL — SIGNIFICANT CHANGE UP (ref 1.6–2.6)
MCHC RBC-ENTMCNC: 27.7 PG — SIGNIFICANT CHANGE UP (ref 27–34)
MCHC RBC-ENTMCNC: 31.3 % — LOW (ref 32–36)
MCV RBC AUTO: 88.7 FL — SIGNIFICANT CHANGE UP (ref 80–100)
NRBC # FLD: 0 — SIGNIFICANT CHANGE UP
PHOSPHATE SERPL-MCNC: 2.1 MG/DL — LOW (ref 2.5–4.5)
PLATELET # BLD AUTO: 190 K/UL — SIGNIFICANT CHANGE UP (ref 150–400)
PMV BLD: 10.9 FL — SIGNIFICANT CHANGE UP (ref 7–13)
POTASSIUM SERPL-MCNC: 3.4 MMOL/L — LOW (ref 3.5–5.3)
POTASSIUM SERPL-SCNC: 3.4 MMOL/L — LOW (ref 3.5–5.3)
RBC # BLD: 3.97 M/UL — LOW (ref 4.2–5.8)
RBC # FLD: 15.2 % — HIGH (ref 10.3–14.5)
SODIUM SERPL-SCNC: 142 MMOL/L — SIGNIFICANT CHANGE UP (ref 135–145)
SPECIMEN SOURCE: SIGNIFICANT CHANGE UP
WBC # BLD: 4.86 K/UL — SIGNIFICANT CHANGE UP (ref 3.8–10.5)
WBC # FLD AUTO: 4.86 K/UL — SIGNIFICANT CHANGE UP (ref 3.8–10.5)

## 2017-09-08 PROCEDURE — 99232 SBSQ HOSP IP/OBS MODERATE 35: CPT | Mod: GC

## 2017-09-08 RX ORDER — VALSARTAN 80 MG/1
80 TABLET ORAL DAILY
Qty: 0 | Refills: 0 | Status: DISCONTINUED | OUTPATIENT
Start: 2017-09-08 | End: 2017-09-11

## 2017-09-08 RX ORDER — DEXTROSE 50 % IN WATER 50 %
25 SYRINGE (ML) INTRAVENOUS ONCE
Qty: 0 | Refills: 0 | Status: DISCONTINUED | OUTPATIENT
Start: 2017-09-08 | End: 2017-09-08

## 2017-09-08 RX ORDER — SODIUM CHLORIDE 9 MG/ML
1000 INJECTION, SOLUTION INTRAVENOUS
Qty: 0 | Refills: 0 | Status: DISCONTINUED | OUTPATIENT
Start: 2017-09-08 | End: 2017-09-08

## 2017-09-08 RX ORDER — DEXTROSE 50 % IN WATER 50 %
1 SYRINGE (ML) INTRAVENOUS ONCE
Qty: 0 | Refills: 0 | Status: DISCONTINUED | OUTPATIENT
Start: 2017-09-08 | End: 2017-09-08

## 2017-09-08 RX ORDER — DEXTROSE 50 % IN WATER 50 %
12.5 SYRINGE (ML) INTRAVENOUS ONCE
Qty: 0 | Refills: 0 | Status: DISCONTINUED | OUTPATIENT
Start: 2017-09-08 | End: 2017-09-08

## 2017-09-08 RX ORDER — INSULIN LISPRO 100/ML
VIAL (ML) SUBCUTANEOUS
Qty: 0 | Refills: 0 | Status: DISCONTINUED | OUTPATIENT
Start: 2017-09-08 | End: 2017-09-08

## 2017-09-08 RX ORDER — DIPHENHYDRAMINE HCL 50 MG
25 CAPSULE ORAL ONCE
Qty: 0 | Refills: 0 | Status: COMPLETED | OUTPATIENT
Start: 2017-09-08 | End: 2017-09-09

## 2017-09-08 RX ORDER — DIPHENHYDRAMINE HCL 50 MG
25 CAPSULE ORAL ONCE
Qty: 0 | Refills: 0 | Status: COMPLETED | OUTPATIENT
Start: 2017-09-08 | End: 2017-09-08

## 2017-09-08 RX ORDER — INSULIN LISPRO 100/ML
VIAL (ML) SUBCUTANEOUS EVERY 6 HOURS
Qty: 0 | Refills: 0 | Status: DISCONTINUED | OUTPATIENT
Start: 2017-09-08 | End: 2017-09-09

## 2017-09-08 RX ORDER — POTASSIUM CHLORIDE 20 MEQ
10 PACKET (EA) ORAL
Qty: 0 | Refills: 0 | Status: COMPLETED | OUTPATIENT
Start: 2017-09-08 | End: 2017-09-08

## 2017-09-08 RX ORDER — METOPROLOL TARTRATE 50 MG
100 TABLET ORAL DAILY
Qty: 0 | Refills: 0 | Status: DISCONTINUED | OUTPATIENT
Start: 2017-09-08 | End: 2017-09-11

## 2017-09-08 RX ORDER — GLUCAGON INJECTION, SOLUTION 0.5 MG/.1ML
1 INJECTION, SOLUTION SUBCUTANEOUS ONCE
Qty: 0 | Refills: 0 | Status: DISCONTINUED | OUTPATIENT
Start: 2017-09-08 | End: 2017-09-08

## 2017-09-08 RX ADMIN — Medication 100 MILLIEQUIVALENT(S): at 14:00

## 2017-09-08 RX ADMIN — ENOXAPARIN SODIUM 40 MILLIGRAM(S): 100 INJECTION SUBCUTANEOUS at 11:14

## 2017-09-08 RX ADMIN — Medication 100 MILLIEQUIVALENT(S): at 17:41

## 2017-09-08 RX ADMIN — Medication 63.75 MILLIMOLE(S): at 11:13

## 2017-09-08 RX ADMIN — Medication 25 MILLIGRAM(S): at 18:52

## 2017-09-08 RX ADMIN — DEXTROSE MONOHYDRATE, SODIUM CHLORIDE, AND POTASSIUM CHLORIDE 100 MILLILITER(S): 50; .745; 4.5 INJECTION, SOLUTION INTRAVENOUS at 11:13

## 2017-09-08 RX ADMIN — Medication 100 MILLIEQUIVALENT(S): at 16:17

## 2017-09-08 RX ADMIN — DEXTROSE MONOHYDRATE, SODIUM CHLORIDE, AND POTASSIUM CHLORIDE 50 MILLILITER(S): 50; .745; 4.5 INJECTION, SOLUTION INTRAVENOUS at 18:53

## 2017-09-08 RX ADMIN — PANTOPRAZOLE SODIUM 40 MILLIGRAM(S): 20 TABLET, DELAYED RELEASE ORAL at 11:13

## 2017-09-08 NOTE — PROGRESS NOTE ADULT - SUBJECTIVE AND OBJECTIVE BOX
A TEAM PROGRESS NOTE     Patient is a 41y old  Male who presents with a chief complaint of Pt is 41 years old male complaining of abdominal pain (06 Sep 2017 23:27)      SUBJECTIVE: Pt seen and examined at bedside . Pt states pain is improving . Passing  had bowel movement. NGT output was 600 ml.   pain controlled, no nausea/vomiting/headache/dizziness/chest pain/shortness of breath.     OBJECTIVE:  PHYSICAL EXAM:  GA: NAD  Abdomen:  -  soft, less distended, non tender .       Vitals/Labs:  Vital Signs Last 24 Hrs  T(C): 36.6 (08 Sep 2017 05:41), Max: 37.7 (07 Sep 2017 10:53)  T(F): 97.9 (08 Sep 2017 05:41), Max: 99.8 (07 Sep 2017 10:53)  HR: 99 (08 Sep 2017 05:41) (93 - 110)  BP: 116/63 (08 Sep 2017 05:41) (116/63 - 131/74)  BP(mean): --  RR: 17 (08 Sep 2017 05:41) (17 - 20)  SpO2: 96% (08 Sep 2017 05:41) (96% - 99%)    I&O's Detail    07 Sep 2017 07:01  -  08 Sep 2017 07:00  --------------------------------------------------------  IN:    dextrose 5% + sodium chloride 0.45% with potassium chloride 20 mEq/L: 1200 mL    lactated ringers.: 600 mL  Total IN: 1800 mL    OUT:    Nasoenteral Tube: 600 mL    Voided: 1525 mL  Total OUT: 2125 mL    Total NET: -325 mL                                11.0   4.86  )-----------( 190      ( 08 Sep 2017 06:30 )             35.2       09-08    142  |  107  |  24<H>  ----------------------------<  154<H>  3.4<L>   |  25  |  0.92    Ca    8.5      08 Sep 2017 06:30  Phos  2.1     09-08  Mg     1.8     09-08        CAPILLARY BLOOD GLUCOSE  141 (08 Sep 2017 05:41)  120 (07 Sep 2017 22:29)  102 (07 Sep 2017 17:34)  129 (07 Sep 2017 12:04)  139 (07 Sep 2017 10:53)              PT/INR - ( 06 Sep 2017 20:30 )   PT: 10.9 SEC;   INR: 0.97          PTT - ( 06 Sep 2017 20:30 )  PTT:28.2 SEC    Urinalysis Basic - ( 06 Sep 2017 20:32 )    Color: YELLOW / Appearance: CLEAR / SG: > 1.040 / pH: 6.0  Gluc: >1000 / Ketone: TRACE  / Bili: NEGATIVE / Urobili: NORMAL E.U.   Blood: NEGATIVE / Protein: 10 / Nitrite: NEGATIVE   Leuk Esterase: NEGATIVE / RBC: 0-2 / WBC 0-2   Sq Epi: OCC / Non Sq Epi: x / Bacteria: FEW        ASSESSMENT/PLAN: LAUREN ESCAMILLA 41y Male SBO   - Diet: NPO   - Pain control   - Input and outputs   - DVT ppx  - OOB as tolerated   -NGT clamp  trial     A team  [id number]

## 2017-09-08 NOTE — PROGRESS NOTE ADULT - SUBJECTIVE AND OBJECTIVE BOX
overnite. Abdomen soft, non tender, Mild distention , Ct reviewed . Surgical option again discussed. To clamp NG. Abigailld

## 2017-09-09 LAB
BUN SERPL-MCNC: 13 MG/DL — SIGNIFICANT CHANGE UP (ref 7–23)
CALCIUM SERPL-MCNC: 8.4 MG/DL — SIGNIFICANT CHANGE UP (ref 8.4–10.5)
CHLORIDE SERPL-SCNC: 102 MMOL/L — SIGNIFICANT CHANGE UP (ref 98–107)
CO2 SERPL-SCNC: 23 MMOL/L — SIGNIFICANT CHANGE UP (ref 22–31)
CREAT SERPL-MCNC: 0.81 MG/DL — SIGNIFICANT CHANGE UP (ref 0.5–1.3)
GLUCOSE SERPL-MCNC: 114 MG/DL — HIGH (ref 70–99)
HCT VFR BLD CALC: 34.5 % — LOW (ref 39–50)
HGB BLD-MCNC: 10.9 G/DL — LOW (ref 13–17)
MAGNESIUM SERPL-MCNC: 1.7 MG/DL — SIGNIFICANT CHANGE UP (ref 1.6–2.6)
MCHC RBC-ENTMCNC: 27.8 PG — SIGNIFICANT CHANGE UP (ref 27–34)
MCHC RBC-ENTMCNC: 31.6 % — LOW (ref 32–36)
MCV RBC AUTO: 88 FL — SIGNIFICANT CHANGE UP (ref 80–100)
NRBC # FLD: 0 — SIGNIFICANT CHANGE UP
PHOSPHATE SERPL-MCNC: 2.4 MG/DL — LOW (ref 2.5–4.5)
PLATELET # BLD AUTO: 176 K/UL — SIGNIFICANT CHANGE UP (ref 150–400)
PMV BLD: 10.6 FL — SIGNIFICANT CHANGE UP (ref 7–13)
POTASSIUM SERPL-MCNC: 3.2 MMOL/L — LOW (ref 3.5–5.3)
POTASSIUM SERPL-SCNC: 3.2 MMOL/L — LOW (ref 3.5–5.3)
RBC # BLD: 3.92 M/UL — LOW (ref 4.2–5.8)
RBC # FLD: 15.1 % — HIGH (ref 10.3–14.5)
SODIUM SERPL-SCNC: 140 MMOL/L — SIGNIFICANT CHANGE UP (ref 135–145)
WBC # BLD: 5.36 K/UL — SIGNIFICANT CHANGE UP (ref 3.8–10.5)
WBC # FLD AUTO: 5.36 K/UL — SIGNIFICANT CHANGE UP (ref 3.8–10.5)

## 2017-09-09 RX ORDER — SODIUM CHLORIDE 9 MG/ML
1000 INJECTION, SOLUTION INTRAVENOUS ONCE
Qty: 0 | Refills: 0 | Status: COMPLETED | OUTPATIENT
Start: 2017-09-09 | End: 2017-09-09

## 2017-09-09 RX ORDER — INSULIN LISPRO 100/ML
VIAL (ML) SUBCUTANEOUS
Qty: 0 | Refills: 0 | Status: DISCONTINUED | OUTPATIENT
Start: 2017-09-09 | End: 2017-09-11

## 2017-09-09 RX ORDER — DIPHENHYDRAMINE HCL 50 MG
25 CAPSULE ORAL ONCE
Qty: 0 | Refills: 0 | Status: COMPLETED | OUTPATIENT
Start: 2017-09-09 | End: 2017-09-09

## 2017-09-09 RX ORDER — MAGNESIUM SULFATE 500 MG/ML
2 VIAL (ML) INJECTION ONCE
Qty: 0 | Refills: 0 | Status: COMPLETED | OUTPATIENT
Start: 2017-09-09 | End: 2017-09-09

## 2017-09-09 RX ORDER — POTASSIUM PHOSPHATE, MONOBASIC POTASSIUM PHOSPHATE, DIBASIC 236; 224 MG/ML; MG/ML
15 INJECTION, SOLUTION INTRAVENOUS ONCE
Qty: 0 | Refills: 0 | Status: COMPLETED | OUTPATIENT
Start: 2017-09-09 | End: 2017-09-09

## 2017-09-09 RX ORDER — POTASSIUM CHLORIDE 20 MEQ
10 PACKET (EA) ORAL
Qty: 0 | Refills: 0 | Status: COMPLETED | OUTPATIENT
Start: 2017-09-09 | End: 2017-09-09

## 2017-09-09 RX ORDER — HYDROXYZINE HCL 10 MG
25 TABLET ORAL EVERY 6 HOURS
Qty: 0 | Refills: 0 | Status: DISCONTINUED | OUTPATIENT
Start: 2017-09-09 | End: 2017-09-11

## 2017-09-09 RX ORDER — DIPHENHYDRAMINE HCL 50 MG
25 CAPSULE ORAL EVERY 6 HOURS
Qty: 0 | Refills: 0 | Status: DISCONTINUED | OUTPATIENT
Start: 2017-09-09 | End: 2017-09-11

## 2017-09-09 RX ADMIN — Medication 25 MILLIGRAM(S): at 10:30

## 2017-09-09 RX ADMIN — Medication 100 MILLIEQUIVALENT(S): at 16:29

## 2017-09-09 RX ADMIN — POTASSIUM PHOSPHATE, MONOBASIC POTASSIUM PHOSPHATE, DIBASIC 62.5 MILLIMOLE(S): 236; 224 INJECTION, SOLUTION INTRAVENOUS at 20:24

## 2017-09-09 RX ADMIN — Medication 50 GRAM(S): at 18:48

## 2017-09-09 RX ADMIN — SODIUM CHLORIDE 500 MILLILITER(S): 9 INJECTION, SOLUTION INTRAVENOUS at 20:28

## 2017-09-09 RX ADMIN — DEXTROSE MONOHYDRATE, SODIUM CHLORIDE, AND POTASSIUM CHLORIDE 50 MILLILITER(S): 50; .745; 4.5 INJECTION, SOLUTION INTRAVENOUS at 19:42

## 2017-09-09 RX ADMIN — Medication 100 MILLIEQUIVALENT(S): at 17:52

## 2017-09-09 RX ADMIN — Medication 25 MILLIGRAM(S): at 18:17

## 2017-09-09 RX ADMIN — PANTOPRAZOLE SODIUM 40 MILLIGRAM(S): 20 TABLET, DELAYED RELEASE ORAL at 11:30

## 2017-09-09 RX ADMIN — ENOXAPARIN SODIUM 40 MILLIGRAM(S): 100 INJECTION SUBCUTANEOUS at 11:30

## 2017-09-09 RX ADMIN — Medication 100 MILLIEQUIVALENT(S): at 15:18

## 2017-09-09 RX ADMIN — Medication 25 MILLIGRAM(S): at 02:45

## 2017-09-09 RX ADMIN — Medication 25 MILLIGRAM(S): at 19:40

## 2017-09-09 RX ADMIN — Medication 1: at 18:47

## 2017-09-09 NOTE — PROGRESS NOTE ADULT - SUBJECTIVE AND OBJECTIVE BOX
A Team Progress Note    S: No acute events overnight. Continues to deny nausea. +flatus; continues to have diarrhea but no formed bowel movement.    O:  T(C): 37.6 (09-09-17 @ 10:00), Max: 37.6 (09-09-17 @ 10:00)  HR: 102 (09-09-17 @ 10:00) (88 - 109)  BP: 124/71 (09-09-17 @ 10:00) (122/73 - 136/72)  RR: 18 (09-09-17 @ 10:00) (18 - 20)  SpO2: 96% (09-09-17 @ 10:00) (95% - 100%)  Wt(kg): --    09-08 @ 07:01  -  09-09 @ 07:00  --------------------------------------------------------  IN:  Total IN: 0 mL    OUT:    Voided: 450 mL  Total OUT: 450 mL    Total NET: -450 mL      09-09 @ 07:01  -  09-09 @ 14:04  --------------------------------------------------------  IN:  Total IN: 0 mL    OUT:    Other: 450 mL  Total OUT: 450 mL    Total NET: -450 mL        CBC Full  -  ( 09 Sep 2017 06:00 )  WBC Count : 5.36 K/uL  Hemoglobin : 10.9 g/dL  Hematocrit : 34.5 %  Platelet Count - Automated : 176 K/uL  Mean Cell Volume : 88.0 fL  Mean Cell Hemoglobin : 27.8 pg  Mean Cell Hemoglobin Concentration : 31.6 %    CAPILLARY BLOOD GLUCOSE  105 (09 Sep 2017 12:35)  124 (08 Sep 2017 21:25)  139 (08 Sep 2017 17:37)          PHYSICAL EXAM:  Gen: A&Ox3, laying in bed in NAD  Abdomen soft, non tender, Mild distention

## 2017-09-10 LAB
BUN SERPL-MCNC: 12 MG/DL — SIGNIFICANT CHANGE UP (ref 7–23)
CALCIUM SERPL-MCNC: 8.4 MG/DL — SIGNIFICANT CHANGE UP (ref 8.4–10.5)
CHLORIDE SERPL-SCNC: 102 MMOL/L — SIGNIFICANT CHANGE UP (ref 98–107)
CO2 SERPL-SCNC: 24 MMOL/L — SIGNIFICANT CHANGE UP (ref 22–31)
CREAT SERPL-MCNC: 0.8 MG/DL — SIGNIFICANT CHANGE UP (ref 0.5–1.3)
GLUCOSE SERPL-MCNC: 121 MG/DL — HIGH (ref 70–99)
HCT VFR BLD CALC: 33.7 % — LOW (ref 39–50)
HGB BLD-MCNC: 10.5 G/DL — LOW (ref 13–17)
MAGNESIUM SERPL-MCNC: 2 MG/DL — SIGNIFICANT CHANGE UP (ref 1.6–2.6)
MCHC RBC-ENTMCNC: 27.1 PG — SIGNIFICANT CHANGE UP (ref 27–34)
MCHC RBC-ENTMCNC: 31.2 % — LOW (ref 32–36)
MCV RBC AUTO: 86.9 FL — SIGNIFICANT CHANGE UP (ref 80–100)
NRBC # FLD: 0 — SIGNIFICANT CHANGE UP
PHOSPHATE SERPL-MCNC: 3.2 MG/DL — SIGNIFICANT CHANGE UP (ref 2.5–4.5)
PLATELET # BLD AUTO: 191 K/UL — SIGNIFICANT CHANGE UP (ref 150–400)
PMV BLD: 11.1 FL — SIGNIFICANT CHANGE UP (ref 7–13)
POTASSIUM SERPL-MCNC: 3.6 MMOL/L — SIGNIFICANT CHANGE UP (ref 3.5–5.3)
POTASSIUM SERPL-SCNC: 3.6 MMOL/L — SIGNIFICANT CHANGE UP (ref 3.5–5.3)
RBC # BLD: 3.88 M/UL — LOW (ref 4.2–5.8)
RBC # FLD: 15.1 % — HIGH (ref 10.3–14.5)
SODIUM SERPL-SCNC: 140 MMOL/L — SIGNIFICANT CHANGE UP (ref 135–145)
WBC # BLD: 5.17 K/UL — SIGNIFICANT CHANGE UP (ref 3.8–10.5)
WBC # FLD AUTO: 5.17 K/UL — SIGNIFICANT CHANGE UP (ref 3.8–10.5)

## 2017-09-10 PROCEDURE — 99232 SBSQ HOSP IP/OBS MODERATE 35: CPT | Mod: GC

## 2017-09-10 RX ORDER — FENOFIBRATE,MICRONIZED 130 MG
145 CAPSULE ORAL DAILY
Qty: 0 | Refills: 0 | Status: DISCONTINUED | OUTPATIENT
Start: 2017-09-10 | End: 2017-09-11

## 2017-09-10 RX ORDER — PANTOPRAZOLE SODIUM 20 MG/1
40 TABLET, DELAYED RELEASE ORAL
Qty: 0 | Refills: 0 | Status: DISCONTINUED | OUTPATIENT
Start: 2017-09-10 | End: 2017-09-11

## 2017-09-10 RX ADMIN — Medication: at 22:42

## 2017-09-10 RX ADMIN — Medication 25 MILLIGRAM(S): at 00:51

## 2017-09-10 RX ADMIN — Medication 25 MILLIGRAM(S): at 14:31

## 2017-09-10 RX ADMIN — Medication 25 MILLIGRAM(S): at 23:14

## 2017-09-10 RX ADMIN — PANTOPRAZOLE SODIUM 40 MILLIGRAM(S): 20 TABLET, DELAYED RELEASE ORAL at 11:46

## 2017-09-10 RX ADMIN — Medication 25 MILLIGRAM(S): at 18:06

## 2017-09-10 RX ADMIN — Medication 2: at 18:05

## 2017-09-10 RX ADMIN — Medication 25 MILLIGRAM(S): at 06:36

## 2017-09-10 RX ADMIN — Medication 20 MILLIGRAM(S): at 18:06

## 2017-09-10 RX ADMIN — Medication 80 MILLIGRAM(S): at 11:47

## 2017-09-10 RX ADMIN — Medication 1: at 14:30

## 2017-09-10 RX ADMIN — ENOXAPARIN SODIUM 40 MILLIGRAM(S): 100 INJECTION SUBCUTANEOUS at 11:46

## 2017-09-10 NOTE — PROGRESS NOTE ADULT - SUBJECTIVE AND OBJECTIVE BOX
Surgery A Team Progress Note    STATUS POST:      POST OPERATIVE DAY #        SUBJECTIVE:       OBJECTIVE:   T(C): 37.4 (09-10-17 @ 02:06), Max: 37.9 (09-09-17 @ 22:00)  HR: 79 (09-10-17 @ 02:06) (79 - 102)  BP: 116/65 (09-10-17 @ 02:06) (103/52 - 134/84)  RR: 18 (09-10-17 @ 02:06) (18 - 18)  SpO2: 99% (09-10-17 @ 02:06) (96% - 100%)  Wt(kg): --  CAPILLARY BLOOD GLUCOSE  142 (09 Sep 2017 22:55)  184 (09 Sep 2017 17:52)  105 (09 Sep 2017 12:35)        I&O's Detail    08 Sep 2017 07:01  -  09 Sep 2017 07:00  --------------------------------------------------------  IN:  Total IN: 0 mL    OUT:    Voided: 450 mL  Total OUT: 450 mL    Total NET: -450 mL      09 Sep 2017 07:01  -  10 Sep 2017 06:25  --------------------------------------------------------  IN:    IV PiggyBack: 1600 mL    Oral Fluid: 150 mL  Total IN: 1750 mL    OUT:    Other: 450 mL    Voided: 1400 mL  Total OUT: 1850 mL    Total NET: -100 mL          Physical exam:  General: Surgery A Team Progress Note    Presented with JAREDO, HD#5    SUBJECTIVE:   Patient was seen and examined this morning. There was no acute event overnight. He is resting comfortably in bed. Pain is well controlled. he has developed a patchy rash on her abdomen and LE bilaterally (most likely 2/2 antibiotics treatment). he has moderate intching and burning sensation. Patient has flatus, but no bowel movement. He does not report fever, n/v, chest pain, or shortness of breath.     OBJECTIVE:   T(C): 37.4 (09-10-17 @ 02:06), Max: 37.9 (09-09-17 @ 22:00)  HR: 79 (09-10-17 @ 02:06) (79 - 102)  BP: 116/65 (09-10-17 @ 02:06) (103/52 - 134/84)  RR: 18 (09-10-17 @ 02:06) (18 - 18)  SpO2: 99% (09-10-17 @ 02:06) (96% - 100%)  Wt(kg): --  CAPILLARY BLOOD GLUCOSE  142 (09 Sep 2017 22:55)  184 (09 Sep 2017 17:52)  105 (09 Sep 2017 12:35)        I&O's Detail    08 Sep 2017 07:01  -  09 Sep 2017 07:00  --------------------------------------------------------  IN:  Total IN: 0 mL    OUT:    Voided: 450 mL  Total OUT: 450 mL    Total NET: -450 mL      09 Sep 2017 07:01  -  10 Sep 2017 06:25  --------------------------------------------------------  IN:    IV PiggyBack: 1600 mL    Oral Fluid: 150 mL  Total IN: 1750 mL    OUT:    Other: 450 mL    Voided: 1400 mL  Total OUT: 1850 mL    Total NET: -100 mL        PHYSICAL EXAM:  Gen: Well-nourished, well-developed, A&O x3, resting in bed in no acute distress  CV: RRR  Resp: Patent airways, unlabored   Abdomen: Soft, nontender, mild distention  Extremities: All 4 extremities warm and well perfused, no edema

## 2017-09-10 NOTE — PROGRESS NOTE ADULT - SUBJECTIVE AND OBJECTIVE BOX
Tolerating diet, VSS, Had major rash likely related to antibiotics. Exam, diffuse patchy  rash. Abdomen soft nontender. To start steroid tapering dose. Likely Discharge. Instructions given. DHeld

## 2017-09-11 ENCOUNTER — TRANSCRIPTION ENCOUNTER (OUTPATIENT)
Age: 42
End: 2017-09-11

## 2017-09-11 VITALS
OXYGEN SATURATION: 100 % | HEART RATE: 80 BPM | TEMPERATURE: 98 F | DIASTOLIC BLOOD PRESSURE: 92 MMHG | RESPIRATION RATE: 16 BRPM | SYSTOLIC BLOOD PRESSURE: 126 MMHG

## 2017-09-11 LAB
BUN SERPL-MCNC: 14 MG/DL — SIGNIFICANT CHANGE UP (ref 7–23)
CALCIUM SERPL-MCNC: 9.4 MG/DL — SIGNIFICANT CHANGE UP (ref 8.4–10.5)
CHLORIDE SERPL-SCNC: 106 MMOL/L — SIGNIFICANT CHANGE UP (ref 98–107)
CO2 SERPL-SCNC: 22 MMOL/L — SIGNIFICANT CHANGE UP (ref 22–31)
CREAT SERPL-MCNC: 0.75 MG/DL — SIGNIFICANT CHANGE UP (ref 0.5–1.3)
GLUCOSE SERPL-MCNC: 163 MG/DL — HIGH (ref 70–99)
MAGNESIUM SERPL-MCNC: 2.2 MG/DL — SIGNIFICANT CHANGE UP (ref 1.6–2.6)
PHOSPHATE SERPL-MCNC: 3.6 MG/DL — SIGNIFICANT CHANGE UP (ref 2.5–4.5)
POTASSIUM SERPL-MCNC: 4.3 MMOL/L — SIGNIFICANT CHANGE UP (ref 3.5–5.3)
POTASSIUM SERPL-SCNC: 4.3 MMOL/L — SIGNIFICANT CHANGE UP (ref 3.5–5.3)
SODIUM SERPL-SCNC: 141 MMOL/L — SIGNIFICANT CHANGE UP (ref 135–145)

## 2017-09-11 RX ORDER — INFLUENZA VIRUS VACCINE 15; 15; 15; 15 UG/.5ML; UG/.5ML; UG/.5ML; UG/.5ML
0.5 SUSPENSION INTRAMUSCULAR ONCE
Qty: 0 | Refills: 0 | Status: COMPLETED | OUTPATIENT
Start: 2017-09-11 | End: 2017-09-11

## 2017-09-11 RX ORDER — DIPHENHYDRAMINE HCL 50 MG
1 CAPSULE ORAL
Qty: 0 | Refills: 0 | COMMUNITY
Start: 2017-09-11

## 2017-09-11 RX ORDER — HYDROXYZINE HCL 10 MG
1 TABLET ORAL
Qty: 0 | Refills: 0 | COMMUNITY
Start: 2017-09-11

## 2017-09-11 RX ORDER — HYDROXYZINE HCL 10 MG
25 TABLET ORAL EVERY 6 HOURS
Qty: 0 | Refills: 0 | Status: DISCONTINUED | OUTPATIENT
Start: 2017-09-11 | End: 2017-09-11

## 2017-09-11 RX ADMIN — Medication 4 MILLIGRAM(S): at 06:30

## 2017-09-11 RX ADMIN — INFLUENZA VIRUS VACCINE 0.5 MILLILITER(S): 15; 15; 15; 15 SUSPENSION INTRAMUSCULAR at 14:02

## 2017-09-11 RX ADMIN — Medication 40 MILLIGRAM(S): at 08:02

## 2017-09-11 RX ADMIN — Medication 25 MILLIGRAM(S): at 06:30

## 2017-09-11 RX ADMIN — Medication 145 MILLIGRAM(S): at 14:02

## 2017-09-11 RX ADMIN — Medication 4 MILLIGRAM(S): at 14:02

## 2017-09-11 RX ADMIN — VALSARTAN 80 MILLIGRAM(S): 80 TABLET ORAL at 06:30

## 2017-09-11 RX ADMIN — PANTOPRAZOLE SODIUM 40 MILLIGRAM(S): 20 TABLET, DELAYED RELEASE ORAL at 06:30

## 2017-09-11 RX ADMIN — Medication: at 13:04

## 2017-09-11 NOTE — PROGRESS NOTE ADULT - SUBJECTIVE AND OBJECTIVE BOX
A Team Progress Note    S: No acute events overnight. Still feels itchy from rash. Slightly improved.    O:  T(C): 36.4 (09-11-17 @ 06:27), Max: 37.2 (09-10-17 @ 17:56)  HR: 64 (09-11-17 @ 06:27) (64 - 111)  BP: 121/74 (09-11-17 @ 06:27) (121/74 - 134/79)  RR: 17 (09-11-17 @ 06:27) (16 - 18)  SpO2: 100% (09-11-17 @ 06:27) (97% - 100%)  Wt(kg): --    09-10 @ 07:01  -  09-11 @ 07:00  --------------------------------------------------------  IN:    Oral Fluid: 1540 mL  Total IN: 1540 mL    OUT:    Voided: 1900 mL  Total OUT: 1900 mL    Total NET: -360 mL        CBC Full  -  ( 10 Sep 2017 06:30 )  WBC Count : 5.17 K/uL  Hemoglobin : 10.5 g/dL  Hematocrit : 33.7 %  Platelet Count - Automated : 191 K/uL  Mean Cell Volume : 86.9 fL  Mean Cell Hemoglobin : 27.1 pg  Mean Cell Hemoglobin Concentration : 31.2 %      CAPILLARY BLOOD GLUCOSE  253 (10 Sep 2017 22:21)  235 (10 Sep 2017 17:56)  161 (10 Sep 2017 12:01)  133 (10 Sep 2017 08:35)          PHYSICAL EXAM:  Gen: A&Ox3, laying in bed in NAD  Abdomen soft, non tender, Mild distention   Skin: Rash covering B/l UE and LE and abdomen; varies in shade from light to dark red; areas of clearing now noted on arms and thighs b/l that are improved since yesterday. Overall improving.

## 2017-09-11 NOTE — DISCHARGE NOTE ADULT - ADDITIONAL INSTRUCTIONS
Please follow up with Dr. White within 1-2 weeks after discharge from the hospital. You may call [phone] to schedule an appointment.  Please follow up with an allergist regarding possible antibiotic allergy. Please follow up with Dr. White within 1-2 weeks after discharge from the hospital. You may call [phone] to schedule an appointment.  Please follow up with an allergist regarding possible antibiotic allergy.   Below are directions for the steroid taper  Day 2: 4 mg (2 tabs) after dinner, and 8 mg (4 tabs) at bedtime  Day 3: 4 mg (2 tabs) PO before breakfast, 4 (2 tabs) mg after lunch, 4 (2 tabs) mg after dinner, and 4 mg (2 tabs) at bedtime  Day 4: 4 mg (2 tabs) PO before breakfast, 4 mg (2 tabs) after lunch, and 4 mg ( 2 tabs) at bedtime  Day 5: 4 mg (2 tabs) PO before breakfast, and 4 (tabs) mg at bedtime  Day 6: 4 mg (2 tabs) PO before breakfast Please follow up with Dr. White within 1-2 weeks after discharge from the hospital. You may call [phone] to schedule an appointment.  Please follow up with an allergist regarding possible antibiotic allergy.   Spoke with [facility] pharmacist at [facility] Below are directions for the steroid taper:  Day 2: 4 mg (2 tabs) after dinner, and 8 mg (4 tabs) at bedtime  Day 3: 4 mg (2 tabs) PO before breakfast, 4 (2 tabs) mg after lunch, 4 (2 tabs) mg after dinner, and 4 mg (2 tabs) at bedtime  Day 4: 4 mg (2 tabs) PO before breakfast, 4 mg (2 tabs) after lunch, and 4 mg ( 2 tabs) at bedtime  Day 5: 4 mg (2 tabs) PO before breakfast, and 4 (tabs) mg at bedtime  Day 6: 4 mg (2 tabs) PO before breakfast

## 2017-09-11 NOTE — DISCHARGE NOTE ADULT - PATIENT PORTAL LINK FT
“You can access the Follow[employer] Patient Portal, offered by [facility], by registering with the following website: [email or web address]”

## 2017-09-11 NOTE — DISCHARGE NOTE ADULT - CARE PROVIDER_API CALL
Musa White), ColonRectal Surgery; Surgery  [address] [place] [address]  Phone: [phone]  Fax: [phone]

## 2017-09-11 NOTE — DISCHARGE NOTE ADULT - MEDICATION SUMMARY - MEDICATIONS TO TAKE
I will START or STAY ON the medications listed below when I get home from the hospital:    Medrol 2 mg oral tablet  -- 4 milligram(s) by mouth taper as directed  -- It is very important that you take or use this exactly as directed.  Do not skip doses or discontinue unless directed by your doctor.  Obtain medical advice before taking any non-prescription drugs as some may affect the action of this medication.  Take with food or milk.    -- Indication: For Taper steriods as directed    Diovan 80 mg oral tablet  -- 1 tab(s) by mouth once a day  -- hold for low blood pressure  -- Indication: For HTN    glimepiride 4 mg oral tablet  -- 1 tab(s) by mouth once a day  -- Indication: For DM    glimepiride 1 mg oral tablet  -- 1 tab(s) by mouth once a day  -- Indication: For DM    Farxiga 5 mg oral tablet  -- 1 tab(s) by mouth once a day  -- Indication: For DM    diphenhydrAMINE 25 mg oral capsule  -- 1 cap(s) by mouth every 6 hours, As needed, Rash and/or Itching  -- Indication: For benadryl     TriCor 145 mg oral tablet  -- 1 tab(s) by mouth once a day  -- Indication: For HLD    hydrOXYzine hydrochloride 25 mg oral tablet  -- 1 tab(s) by mouth every 6 hours, As needed, Itching  -- Indication: For Home medication    Toprol- mg oral tablet, extended release  -- 1 tab(s) by mouth once a day  -- Indication: For HTN    Protonix 40 mg oral delayed release tablet  -- 1 tab(s) by mouth once a day  -- Indication: For Home medication

## 2017-09-11 NOTE — DISCHARGE NOTE ADULT - CARE PLAN
Principal Discharge DX:	SBO (small bowel obstruction)  Goal:	Resolution and tolerance of PO intake  Instructions for follow-up, activity and diet:	The patient may resume a regular diet and activity. Take medications as prescribed.  Secondary Diagnosis:	Hyperlipidemia, unspecified hyperlipidemia type  Secondary Diagnosis:	Type 2 diabetes mellitus without complication, unspecified long term insulin use status  Secondary Diagnosis:	Essential hypertension

## 2017-09-11 NOTE — DISCHARGE NOTE ADULT - HOSPITAL COURSE
41yrs old male with PMHX DM, HTN, diverticulitis s/p Buchanan's procedure c/b multiple SBOs p/w abdominal pain, cramping in epigastrium now constant a/w nausea, NBNB emesis x 2, mild diarrhea x 1day. Patient states this is similar to SBO pain in past. +abd distention. Patient admits to bloating and nausea, vomiting, last meal at 12:30 pm and denies chest pain, dyspnea, fever, HA, urinary symptoms, LE swelling.     Admitted for SBO. Findings on CXR and CT consistent with SBO. CT showed a small bowel obstruction with a transition point in the distal jejunum/proximal ileum exiting the ventral abdominal hernia in the right lower quadrant which is similar in appearance compared to a CT of the abdomen and pelvis from 5/5/2017.    Lactate was normal. Nausea improved after placement of NGT. Patient developed rash after administration of antibiotics in ED (allergy not previously documented). On HD 2 NGT was clamped w/ low residual and then pulled. Patient was given IV Solumedrol for rash. The patient continued to tolerate a diet and his rash improved with steroids. On 9/11 the patient is hemodynamically stable, tolerating a diet, and his rash is improving on steroids. At this time he is ready for discharge. 41yrs old male with PMHX DM, HTN, diverticulitis s/p Buchanan's procedure c/b multiple SBOs p/w abdominal pain, cramping in epigastrium now constant a/w nausea, NBNB emesis x 2, mild diarrhea x 1day. Patient states this is similar to SBO pain in past. +abd distention. Patient admits to bloating and nausea, vomiting, last meal at 12:30 pm and denies chest pain, dyspnea, fever, HA, urinary symptoms, LE swelling.     Admitted for SBO. Findings on CXR and CT consistent with SBO. CT showed a small bowel obstruction with a transition point in the distal jejunum/proximal ileum exiting the ventral abdominal hernia in the right lower quadrant which is similar in appearance compared to a CT of the abdomen and pelvis from 5/5/2017.    Lactate was normal. Nausea improved after placement of NGT. Patient developed rash after administration of antibiotics in ED (allergy not previously documented). On HD 2 NGT was clamped w/ low residual and then pulled. Patient was given IV Solumedrol for rash. The patient continued to tolerate a diet and his rash improved with steroids. On 9/11 the patient is hemodynamically stable, tolerating a diet, and his rash is improving on steroids. At this time he is ready for discharge.     Below are directions for the steroid taper  Day 2: 4 mg after dinner, and 8 mg at bedtime  Day 3: 4 mg PO before breakfast, 4 mg after lunch, 4 mg after dinner, and 4 mg at bedtime  Day 4: 4 mg PO before breakfast, 4 mg after lunch, and 4 mg at bedtime  Day 5: 4 mg PO before breakfast, and 4 mg at bedtime  Day 6: 4 mg PO before breakfast 41yrs old male with PMHX DM, HTN, diverticulitis s/p Buchanan's procedure c/b multiple SBOs p/w abdominal pain, cramping in epigastrium now constant a/w nausea, NBNB emesis x 2, mild diarrhea x 1day. Patient states this is similar to SBO pain in past. +abd distention. Patient admits to bloating and nausea, vomiting, last meal at 12:30 pm and denies chest pain, dyspnea, fever, HA, urinary symptoms, LE swelling.     Admitted for SBO. Findings on CXR and CT consistent with SBO. CT showed a small bowel obstruction with a transition point in the distal jejunum/proximal ileum exiting the ventral abdominal hernia in the right lower quadrant which is similar in appearance compared to a CT of the abdomen and pelvis from 5/5/2017.    Lactate was normal. Nausea improved after placement of NGT. Patient developed rash after administration of antibiotics in ED (allergy not previously documented). On HD 2 NGT was clamped w/ low residual and then pulled. Patient was given IV Solumedrol for rash. The patient continued to tolerate a diet and his rash improved with steroids. On 9/11 the patient is hemodynamically stable, tolerating a diet, and his rash is improving on steroids. At this time he is ready for discharge. Spoke with [facility] pharmacist at [facility] because Unable to free text the rest of tape. Instructions given to patient.     Below are directions for the steroid taper  Day 2: 4 mg after dinner, and 8 mg at bedtime  Day 3: 4 mg PO before breakfast, 4 mg after lunch, 4 mg after dinner, and 4 mg at bedtime  Day 4: 4 mg PO before breakfast, 4 mg after lunch, and 4 mg at bedtime  Day 5: 4 mg PO before breakfast, and 4 mg at bedtime  Day 6: 4 mg PO before breakfast

## 2017-09-11 NOTE — DISCHARGE NOTE ADULT - SECONDARY DIAGNOSIS.
Hyperlipidemia, unspecified hyperlipidemia type Type 2 diabetes mellitus without complication, unspecified long term insulin use status Essential hypertension

## 2017-09-11 NOTE — PROGRESS NOTE ADULT - ASSESSMENT
A/P: 41M w/ hx of multiple SBO p/w abd pain, nausea, NBNB emesis, and mild diarrhea w/ relief of nausea following NGT placement. Patient is doing well awaiting ROBF. Lactate normal.      - Monitor GI function  - Diet: NPO/NGT  - Monitor labs, replete electrolytes as needed  - Encourage OOB as tolerated    Tim Carter M.D.
A/P: 41M w/ hx of multiple SBO p/w abd pain, nausea, NBNB emesis, and mild diarrhea w/ relief of nausea following NGT placement. Patient is doing well with ROBF and has not had any more nausea. Patient tolerated CLD.    - Adv diet to regular  - Monitor patient tolerating diet.  - Monitor GI function  - Monitor labs, replete electrolytes as needed  - Encourage OOB as tolerated    Tim Carter M.D.
A/P: 41M w/ hx of multiple SBO p/w abd pain, nausea, NBNB emesis, and mild diarrhea w/ relief of nausea following NGT placement. Patient is doing well with ROBF and has not had any more nausea. Patient tolerating diet. Rash improving.    - Rash: Medrol taper; atarax prn benadryl prn  - Diet: Regular  - Monitor patient tolerating diet / Monitor GI function  - Dispo: Discharge to home today    Tim Carter M.D.
A/P: 41M w/ hx of multiple SBO p/w abd pain, nausea, NBNB emesis, and mild diarrhea w/ relief of nausea following NGT placement. Patient is doing well with ROBF and has not had any more nausea. His rash is most likely secondary to antibiotics therapy which he received in ED on Wednesday. labs is unremarkable    - Diet: regular  - Ordered Salumoderal 80 mg IVP,monitor the rash, may need another dose or prednisone tapering upon discgarge.  - Monitor GI function  - Monitor labs, replete electrolytes as needed  - Encourage OOB as tolerated

## 2017-09-11 NOTE — DISCHARGE NOTE ADULT - INSTRUCTIONS
Resume your normal diet. Please NOTIFY MD for any of the following s/s: S/S infection (Fever >100.4, chills), uncontrolled pain not relieved by pain medications, persistent nausea/vomiting or inability to tolerate diet. No heavy lifting; Please drink 6-8 glasses of water daily to stay hydrated.

## 2017-09-11 NOTE — DISCHARGE NOTE ADULT - PLAN OF CARE
Resolution and tolerance of PO intake The patient may resume a regular diet and activity. Take medications as prescribed.

## 2017-09-12 ENCOUNTER — APPOINTMENT (OUTPATIENT)
Dept: VASCULAR SURGERY | Facility: CLINIC | Age: 42
End: 2017-09-12
Payer: COMMERCIAL

## 2017-09-12 VITALS
TEMPERATURE: 98.3 F | WEIGHT: 242 LBS | SYSTOLIC BLOOD PRESSURE: 131 MMHG | BODY MASS INDEX: 32.78 KG/M2 | HEART RATE: 82 BPM | DIASTOLIC BLOOD PRESSURE: 80 MMHG | HEIGHT: 72 IN

## 2017-09-12 PROCEDURE — 99024 POSTOP FOLLOW-UP VISIT: CPT
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
## 2017-11-02 NOTE — ED PROVIDER NOTE - INPATIENT RESIDENT/ACP NOTIFIED DATE
Left message on answering machine for pt to return call. He is not her primary PCP.  If she is still at [facility] asked her to address with Nurses there.    06-Sep-2017 23:45

## 2017-11-13 ENCOUNTER — INPATIENT (INPATIENT)
Facility: HOSPITAL | Age: 42
LOS: 4 days | Discharge: HOME | End: 2017-11-18
Attending: SURGERY | Admitting: SURGERY
Payer: COMMERCIAL

## 2017-11-13 VITALS
RESPIRATION RATE: 16 BRPM | OXYGEN SATURATION: 99 % | DIASTOLIC BLOOD PRESSURE: 80 MMHG | SYSTOLIC BLOOD PRESSURE: 126 MMHG | HEART RATE: 100 BPM

## 2017-11-13 DIAGNOSIS — K56.609 UNSPECIFIED INTESTINAL OBSTRUCTION, UNSPECIFIED AS TO PARTIAL VERSUS COMPLETE OBSTRUCTION: ICD-10-CM

## 2017-11-13 DIAGNOSIS — Z98.89 OTHER SPECIFIED POSTPROCEDURAL STATES: Chronic | ICD-10-CM

## 2017-11-13 LAB
ALBUMIN SERPL ELPH-MCNC: 5 G/DL — SIGNIFICANT CHANGE UP (ref 3.3–5)
ALP SERPL-CCNC: 35 U/L — LOW (ref 40–120)
ALT FLD-CCNC: 24 U/L — SIGNIFICANT CHANGE UP (ref 4–41)
APTT BLD: 29.2 SEC — SIGNIFICANT CHANGE UP (ref 27.5–37.4)
AST SERPL-CCNC: 23 U/L — SIGNIFICANT CHANGE UP (ref 4–40)
BASE EXCESS BLDV CALC-SCNC: 1.9 MMOL/L — SIGNIFICANT CHANGE UP
BASOPHILS # BLD AUTO: 0.01 K/UL — SIGNIFICANT CHANGE UP (ref 0–0.2)
BASOPHILS NFR BLD AUTO: 0.1 % — SIGNIFICANT CHANGE UP (ref 0–2)
BILIRUB SERPL-MCNC: 0.8 MG/DL — SIGNIFICANT CHANGE UP (ref 0.2–1.2)
BLD GP AB SCN SERPL QL: NEGATIVE — SIGNIFICANT CHANGE UP
BLOOD GAS VENOUS - CREATININE: 1.22 MG/DL — SIGNIFICANT CHANGE UP (ref 0.5–1.3)
BUN SERPL-MCNC: 27 MG/DL — HIGH (ref 7–23)
CALCIUM SERPL-MCNC: 9.8 MG/DL — SIGNIFICANT CHANGE UP (ref 8.4–10.5)
CHLORIDE BLDV-SCNC: 108 MMOL/L — SIGNIFICANT CHANGE UP (ref 96–108)
CHLORIDE SERPL-SCNC: 102 MMOL/L — SIGNIFICANT CHANGE UP (ref 98–107)
CO2 SERPL-SCNC: 24 MMOL/L — SIGNIFICANT CHANGE UP (ref 22–31)
CREAT SERPL-MCNC: 1.22 MG/DL — SIGNIFICANT CHANGE UP (ref 0.5–1.3)
EOSINOPHIL # BLD AUTO: 0.03 K/UL — SIGNIFICANT CHANGE UP (ref 0–0.5)
EOSINOPHIL NFR BLD AUTO: 0.4 % — SIGNIFICANT CHANGE UP (ref 0–6)
GAS PNL BLDV: 137 MMOL/L — SIGNIFICANT CHANGE UP (ref 136–146)
GLUCOSE BLDV-MCNC: 159 — HIGH (ref 70–99)
GLUCOSE SERPL-MCNC: 168 MG/DL — HIGH (ref 70–99)
HCO3 BLDV-SCNC: 25 MMOL/L — SIGNIFICANT CHANGE UP (ref 20–27)
HCT VFR BLD CALC: 43.7 % — SIGNIFICANT CHANGE UP (ref 39–50)
HCT VFR BLDV CALC: 43.7 % — SIGNIFICANT CHANGE UP (ref 39–51)
HGB BLD-MCNC: 13.6 G/DL — SIGNIFICANT CHANGE UP (ref 13–17)
HGB BLDV-MCNC: 14.2 G/DL — SIGNIFICANT CHANGE UP (ref 13–17)
IMM GRANULOCYTES # BLD AUTO: 0.02 # — SIGNIFICANT CHANGE UP
IMM GRANULOCYTES NFR BLD AUTO: 0.2 % — SIGNIFICANT CHANGE UP (ref 0–1.5)
INR BLD: 1.01 — SIGNIFICANT CHANGE UP (ref 0.88–1.17)
LACTATE BLDV-MCNC: 2.1 MMOL/L — HIGH (ref 0.5–2)
LIDOCAIN IGE QN: 15.3 U/L — SIGNIFICANT CHANGE UP (ref 7–60)
LYMPHOCYTES # BLD AUTO: 1.22 K/UL — SIGNIFICANT CHANGE UP (ref 1–3.3)
LYMPHOCYTES # BLD AUTO: 15 % — SIGNIFICANT CHANGE UP (ref 13–44)
MCHC RBC-ENTMCNC: 27 PG — SIGNIFICANT CHANGE UP (ref 27–34)
MCHC RBC-ENTMCNC: 31.1 % — LOW (ref 32–36)
MCV RBC AUTO: 86.9 FL — SIGNIFICANT CHANGE UP (ref 80–100)
MONOCYTES # BLD AUTO: 1.01 K/UL — HIGH (ref 0–0.9)
MONOCYTES NFR BLD AUTO: 12.4 % — SIGNIFICANT CHANGE UP (ref 2–14)
NEUTROPHILS # BLD AUTO: 5.87 K/UL — SIGNIFICANT CHANGE UP (ref 1.8–7.4)
NEUTROPHILS NFR BLD AUTO: 71.9 % — SIGNIFICANT CHANGE UP (ref 43–77)
NRBC # FLD: 0 — SIGNIFICANT CHANGE UP
PCO2 BLDV: 46 MMHG — SIGNIFICANT CHANGE UP (ref 41–51)
PH BLDV: 7.38 PH — SIGNIFICANT CHANGE UP (ref 7.32–7.43)
PLATELET # BLD AUTO: 301 K/UL — SIGNIFICANT CHANGE UP (ref 150–400)
PMV BLD: 11.2 FL — SIGNIFICANT CHANGE UP (ref 7–13)
PO2 BLDV: 37 MMHG — SIGNIFICANT CHANGE UP (ref 35–40)
POTASSIUM BLDV-SCNC: 3.8 MMOL/L — SIGNIFICANT CHANGE UP (ref 3.4–4.5)
POTASSIUM SERPL-MCNC: 4 MMOL/L — SIGNIFICANT CHANGE UP (ref 3.5–5.3)
POTASSIUM SERPL-SCNC: 4 MMOL/L — SIGNIFICANT CHANGE UP (ref 3.5–5.3)
PROT SERPL-MCNC: 8.5 G/DL — HIGH (ref 6–8.3)
PROTHROM AB SERPL-ACNC: 11.3 SEC — SIGNIFICANT CHANGE UP (ref 9.8–13.1)
RBC # BLD: 5.03 M/UL — SIGNIFICANT CHANGE UP (ref 4.2–5.8)
RBC # FLD: 15 % — HIGH (ref 10.3–14.5)
RH IG SCN BLD-IMP: POSITIVE — SIGNIFICANT CHANGE UP
SAO2 % BLDV: 64.8 % — SIGNIFICANT CHANGE UP (ref 60–85)
SODIUM SERPL-SCNC: 143 MMOL/L — SIGNIFICANT CHANGE UP (ref 135–145)
WBC # BLD: 8.16 K/UL — SIGNIFICANT CHANGE UP (ref 3.8–10.5)
WBC # FLD AUTO: 8.16 K/UL — SIGNIFICANT CHANGE UP (ref 3.8–10.5)

## 2017-11-13 PROCEDURE — 99222 1ST HOSP IP/OBS MODERATE 55: CPT | Mod: GC

## 2017-11-13 PROCEDURE — 71010: CPT | Mod: 26

## 2017-11-13 PROCEDURE — 74000: CPT | Mod: 26

## 2017-11-13 RX ORDER — DEXTROSE 50 % IN WATER 50 %
25 SYRINGE (ML) INTRAVENOUS ONCE
Qty: 0 | Refills: 0 | Status: DISCONTINUED | OUTPATIENT
Start: 2017-11-13 | End: 2017-11-18

## 2017-11-13 RX ORDER — SODIUM CHLORIDE 9 MG/ML
1000 INJECTION, SOLUTION INTRAVENOUS
Qty: 0 | Refills: 0 | Status: DISCONTINUED | OUTPATIENT
Start: 2017-11-13 | End: 2017-11-15

## 2017-11-13 RX ORDER — ONDANSETRON 8 MG/1
4 TABLET, FILM COATED ORAL ONCE
Qty: 0 | Refills: 0 | Status: COMPLETED | OUTPATIENT
Start: 2017-11-13 | End: 2017-11-13

## 2017-11-13 RX ORDER — ENOXAPARIN SODIUM 100 MG/ML
40 INJECTION SUBCUTANEOUS EVERY 24 HOURS
Qty: 0 | Refills: 0 | Status: DISCONTINUED | OUTPATIENT
Start: 2017-11-13 | End: 2017-11-18

## 2017-11-13 RX ORDER — DEXTROSE 50 % IN WATER 50 %
12.5 SYRINGE (ML) INTRAVENOUS ONCE
Qty: 0 | Refills: 0 | Status: DISCONTINUED | OUTPATIENT
Start: 2017-11-13 | End: 2017-11-18

## 2017-11-13 RX ORDER — SODIUM CHLORIDE 9 MG/ML
1000 INJECTION, SOLUTION INTRAVENOUS
Qty: 0 | Refills: 0 | Status: DISCONTINUED | OUTPATIENT
Start: 2017-11-13 | End: 2017-11-18

## 2017-11-13 RX ORDER — INSULIN LISPRO 100/ML
VIAL (ML) SUBCUTANEOUS EVERY 6 HOURS
Qty: 0 | Refills: 0 | Status: DISCONTINUED | OUTPATIENT
Start: 2017-11-13 | End: 2017-11-18

## 2017-11-13 RX ORDER — PANTOPRAZOLE SODIUM 20 MG/1
40 TABLET, DELAYED RELEASE ORAL DAILY
Qty: 0 | Refills: 0 | Status: DISCONTINUED | OUTPATIENT
Start: 2017-11-13 | End: 2017-11-16

## 2017-11-13 RX ORDER — SODIUM CHLORIDE 9 MG/ML
1000 INJECTION INTRAMUSCULAR; INTRAVENOUS; SUBCUTANEOUS ONCE
Qty: 0 | Refills: 0 | Status: COMPLETED | OUTPATIENT
Start: 2017-11-13 | End: 2017-11-13

## 2017-11-13 RX ORDER — DEXTROSE 50 % IN WATER 50 %
1 SYRINGE (ML) INTRAVENOUS ONCE
Qty: 0 | Refills: 0 | Status: DISCONTINUED | OUTPATIENT
Start: 2017-11-13 | End: 2017-11-18

## 2017-11-13 RX ORDER — METOPROLOL TARTRATE 50 MG
5 TABLET ORAL EVERY 6 HOURS
Qty: 0 | Refills: 0 | Status: DISCONTINUED | OUTPATIENT
Start: 2017-11-13 | End: 2017-11-16

## 2017-11-13 RX ORDER — GLUCAGON INJECTION, SOLUTION 0.5 MG/.1ML
1 INJECTION, SOLUTION SUBCUTANEOUS ONCE
Qty: 0 | Refills: 0 | Status: DISCONTINUED | OUTPATIENT
Start: 2017-11-13 | End: 2017-11-18

## 2017-11-13 RX ADMIN — SODIUM CHLORIDE 100 MILLILITER(S): 9 INJECTION, SOLUTION INTRAVENOUS at 20:33

## 2017-11-13 RX ADMIN — PANTOPRAZOLE SODIUM 40 MILLIGRAM(S): 20 TABLET, DELAYED RELEASE ORAL at 20:34

## 2017-11-13 RX ADMIN — SODIUM CHLORIDE 1000 MILLILITER(S): 9 INJECTION INTRAMUSCULAR; INTRAVENOUS; SUBCUTANEOUS at 17:23

## 2017-11-13 RX ADMIN — ENOXAPARIN SODIUM 40 MILLIGRAM(S): 100 INJECTION SUBCUTANEOUS at 20:34

## 2017-11-13 RX ADMIN — ONDANSETRON 4 MILLIGRAM(S): 8 TABLET, FILM COATED ORAL at 17:23

## 2017-11-13 NOTE — ED PROVIDER NOTE - ATTENDING CONTRIBUTION TO CARE
Dr. Kennedy:  I have personally performed a face to face bedside history and physical examination of this patient. I have discussed the history, examination, review of systems, assessment and plan of management with the resident. I have reviewed the electronic medical record and amended it to reflect my history, review of systems, physical exam, assessment and plan.    42M h/o HTN, DM, diverticulitis s/p Buchanan's, multiple SBOs, presents with one day of abdominal pain/distension with N/V.  States it feels similar to previous SBO.  Had diarrhea this morning, no flatus since then.      Exam:  - nad  - rrr  - ctab  - abd distended, diffusely uncomfortable to palpation    A/P  - concern for recurrent SBO  - cbc, cmp, ua, coags  - abd XR  - surgery consult

## 2017-11-13 NOTE — H&P ADULT - NSHPLABSRESULTS_GEN_ALL_CORE
CBC Full  -  ( 13 Nov 2017 17:30 )  WBC Count : 8.16 K/uL  Hemoglobin : 13.6 g/dL  Hematocrit : 43.7 %  Platelet Count - Automated : 301 K/uL  Mean Cell Volume : 86.9 fL  Mean Cell Hemoglobin : 27.0 pg  Mean Cell Hemoglobin Concentration : 31.1 %  Auto Neutrophil # : 5.87 K/uL  Auto Lymphocyte # : 1.22 K/uL  Auto Monocyte # : 1.01 K/uL  Auto Eosinophil # : 0.03 K/uL  Auto Basophil # : 0.01 K/uL  Auto Neutrophil % : 71.9 %  Auto Lymphocyte % : 15.0 %  Auto Monocyte % : 12.4 %  Auto Eosinophil % : 0.4 %  Auto Basophil % : 0.1 %

## 2017-11-13 NOTE — ED PROVIDER NOTE - OBJECTIVE STATEMENT
42yrs old male with PMHX DM, HTN, diverticulitis s/p Buchanan's procedure c/b multiple SBOs p/w abdominal pain, nausea, vomiting, feels similar to prior SBOs. no fevers, chills. Pt spoke with Dr abbott prior to arrival, aware that pt is in ED. Last BM today, had diarrhea.  Pt with contrast allergy, usually get barium through NGT

## 2017-11-13 NOTE — ED ADULT NURSE NOTE - OBJECTIVE STATEMENT
pt is a 42 yr old male c/o abd pain with nausea. pt has extensive hx sbo, diverticulitis. PIV placed, labs sent. ngt to be placed. no s/s of acute distress at this time.

## 2017-11-13 NOTE — H&P ADULT - HISTORY OF PRESENT ILLNESS
41yo M with hx perforated diverticulitis (2009; c/b liver/brain abscesses) s/p Isi's s/p reversal c/b multiple (20+) SBOs over the last 8 years, three episodes requiring exlap, LINDA, now presents with abdominal pain x 1 day a/w nausea (no vomiting). No fevers/chills. Pain began last night and has been constant and located in the midline. Pt has a known ventral hernia which is followed by his surgeon. Pt states in the past that he usually gets immediate relief after the placement of an NGT.

## 2017-11-13 NOTE — H&P ADULT - ASSESSMENT
43yo M with hx of recurrent SBOs p/w likely SBO.   - Will obtain CXR/AXR, and defer obtaining a CT scan since the pt's symptoms closely mirror his previous presentations.  - NPO / NGT  - IVF  - Serial abd exams  - Will hold PO diabetes meds and continue beta blocker in IV form  - AM labs  - D/w  Held    A team  [id number]

## 2017-11-13 NOTE — ED ADULT TRIAGE NOTE - CHIEF COMPLAINT QUOTE
Patient has c/o abdominal pain, nausea and vomiting since this morning. Pt also c/o lightheadedness.

## 2017-11-13 NOTE — H&P ADULT - NSHPPHYSICALEXAM_GEN_ALL_CORE
Gen: NAD  HEENT: no scleral injection, EOMI, no neck masses  CV: S1/S2  Resp: clear to auscultation bilaterally  Abdomen: soft, obese, tender to palpation in the midline, large ventral hernia noted (reducible), moderately distended, no guarding/rebound  Ext: warm well perfused

## 2017-11-14 LAB
BUN SERPL-MCNC: 29 MG/DL — HIGH (ref 7–23)
CALCIUM SERPL-MCNC: 9 MG/DL — SIGNIFICANT CHANGE UP (ref 8.4–10.5)
CHLORIDE SERPL-SCNC: 106 MMOL/L — SIGNIFICANT CHANGE UP (ref 98–107)
CO2 SERPL-SCNC: 24 MMOL/L — SIGNIFICANT CHANGE UP (ref 22–31)
CREAT SERPL-MCNC: 1.11 MG/DL — SIGNIFICANT CHANGE UP (ref 0.5–1.3)
GLUCOSE BLDC GLUCOMTR-MCNC: 104 MG/DL — HIGH (ref 70–99)
GLUCOSE BLDC GLUCOMTR-MCNC: 107 MG/DL — HIGH (ref 70–99)
GLUCOSE BLDC GLUCOMTR-MCNC: 119 MG/DL — HIGH (ref 70–99)
GLUCOSE BLDC GLUCOMTR-MCNC: 141 MG/DL — HIGH (ref 70–99)
GLUCOSE SERPL-MCNC: 128 MG/DL — HIGH (ref 70–99)
HBA1C BLD-MCNC: 6.8 % — HIGH (ref 4–5.6)
HCT VFR BLD CALC: 38.5 % — LOW (ref 39–50)
HGB BLD-MCNC: 11.9 G/DL — LOW (ref 13–17)
MAGNESIUM SERPL-MCNC: 2.1 MG/DL — SIGNIFICANT CHANGE UP (ref 1.6–2.6)
MCHC RBC-ENTMCNC: 27 PG — SIGNIFICANT CHANGE UP (ref 27–34)
MCHC RBC-ENTMCNC: 30.9 % — LOW (ref 32–36)
MCV RBC AUTO: 87.3 FL — SIGNIFICANT CHANGE UP (ref 80–100)
NRBC # FLD: 0 — SIGNIFICANT CHANGE UP
PHOSPHATE SERPL-MCNC: 2.4 MG/DL — LOW (ref 2.5–4.5)
PLATELET # BLD AUTO: 220 K/UL — SIGNIFICANT CHANGE UP (ref 150–400)
PMV BLD: 11.2 FL — SIGNIFICANT CHANGE UP (ref 7–13)
POTASSIUM SERPL-MCNC: 3.8 MMOL/L — SIGNIFICANT CHANGE UP (ref 3.5–5.3)
POTASSIUM SERPL-SCNC: 3.8 MMOL/L — SIGNIFICANT CHANGE UP (ref 3.5–5.3)
RBC # BLD: 4.41 M/UL — SIGNIFICANT CHANGE UP (ref 4.2–5.8)
RBC # FLD: 15.2 % — HIGH (ref 10.3–14.5)
SODIUM SERPL-SCNC: 143 MMOL/L — SIGNIFICANT CHANGE UP (ref 135–145)
WBC # BLD: 5.25 K/UL — SIGNIFICANT CHANGE UP (ref 3.8–10.5)
WBC # FLD AUTO: 5.25 K/UL — SIGNIFICANT CHANGE UP (ref 3.8–10.5)

## 2017-11-14 PROCEDURE — 99232 SBSQ HOSP IP/OBS MODERATE 35: CPT | Mod: GC

## 2017-11-14 RX ORDER — POTASSIUM PHOSPHATE, MONOBASIC POTASSIUM PHOSPHATE, DIBASIC 236; 224 MG/ML; MG/ML
15 INJECTION, SOLUTION INTRAVENOUS ONCE
Qty: 0 | Refills: 0 | Status: COMPLETED | OUTPATIENT
Start: 2017-11-14 | End: 2017-11-14

## 2017-11-14 RX ORDER — BENZOCAINE AND MENTHOL 5; 1 G/100ML; G/100ML
1 LIQUID ORAL
Qty: 0 | Refills: 0 | Status: DISCONTINUED | OUTPATIENT
Start: 2017-11-14 | End: 2017-11-18

## 2017-11-14 RX ORDER — SODIUM CHLORIDE 9 MG/ML
1000 INJECTION, SOLUTION INTRAVENOUS ONCE
Qty: 0 | Refills: 0 | Status: COMPLETED | OUTPATIENT
Start: 2017-11-14 | End: 2017-11-14

## 2017-11-14 RX ADMIN — PANTOPRAZOLE SODIUM 40 MILLIGRAM(S): 20 TABLET, DELAYED RELEASE ORAL at 11:19

## 2017-11-14 RX ADMIN — Medication 5 MILLIGRAM(S): at 17:59

## 2017-11-14 RX ADMIN — SODIUM CHLORIDE 2000 MILLILITER(S): 9 INJECTION, SOLUTION INTRAVENOUS at 18:01

## 2017-11-14 RX ADMIN — SODIUM CHLORIDE 100 MILLILITER(S): 9 INJECTION, SOLUTION INTRAVENOUS at 18:00

## 2017-11-14 RX ADMIN — SODIUM CHLORIDE 100 MILLILITER(S): 9 INJECTION, SOLUTION INTRAVENOUS at 18:01

## 2017-11-14 RX ADMIN — Medication 5 MILLIGRAM(S): at 05:34

## 2017-11-14 RX ADMIN — POTASSIUM PHOSPHATE, MONOBASIC POTASSIUM PHOSPHATE, DIBASIC 62.5 MILLIMOLE(S): 236; 224 INJECTION, SOLUTION INTRAVENOUS at 10:45

## 2017-11-14 RX ADMIN — BENZOCAINE AND MENTHOL 1 LOZENGE: 5; 1 LIQUID ORAL at 17:59

## 2017-11-14 RX ADMIN — Medication 5 MILLIGRAM(S): at 00:03

## 2017-11-14 RX ADMIN — SODIUM CHLORIDE 100 MILLILITER(S): 9 INJECTION, SOLUTION INTRAVENOUS at 00:03

## 2017-11-14 RX ADMIN — Medication 5 MILLIGRAM(S): at 11:19

## 2017-11-15 LAB
BUN SERPL-MCNC: 23 MG/DL — SIGNIFICANT CHANGE UP (ref 7–23)
CALCIUM SERPL-MCNC: 8.8 MG/DL — SIGNIFICANT CHANGE UP (ref 8.4–10.5)
CHLORIDE SERPL-SCNC: 108 MMOL/L — HIGH (ref 98–107)
CO2 SERPL-SCNC: 24 MMOL/L — SIGNIFICANT CHANGE UP (ref 22–31)
CREAT SERPL-MCNC: 0.96 MG/DL — SIGNIFICANT CHANGE UP (ref 0.5–1.3)
GLUCOSE BLDC GLUCOMTR-MCNC: 112 MG/DL — HIGH (ref 70–99)
GLUCOSE BLDC GLUCOMTR-MCNC: 116 MG/DL — HIGH (ref 70–99)
GLUCOSE BLDC GLUCOMTR-MCNC: 99 MG/DL — SIGNIFICANT CHANGE UP (ref 70–99)
GLUCOSE SERPL-MCNC: 112 MG/DL — HIGH (ref 70–99)
HCT VFR BLD CALC: 36 % — LOW (ref 39–50)
HGB BLD-MCNC: 10.7 G/DL — LOW (ref 13–17)
MAGNESIUM SERPL-MCNC: 2 MG/DL — SIGNIFICANT CHANGE UP (ref 1.6–2.6)
MCHC RBC-ENTMCNC: 26.5 PG — LOW (ref 27–34)
MCHC RBC-ENTMCNC: 29.7 % — LOW (ref 32–36)
MCV RBC AUTO: 89.1 FL — SIGNIFICANT CHANGE UP (ref 80–100)
NRBC # FLD: 0 — SIGNIFICANT CHANGE UP
PHOSPHATE SERPL-MCNC: 2.7 MG/DL — SIGNIFICANT CHANGE UP (ref 2.5–4.5)
PLATELET # BLD AUTO: 190 K/UL — SIGNIFICANT CHANGE UP (ref 150–400)
PMV BLD: 11.3 FL — SIGNIFICANT CHANGE UP (ref 7–13)
POTASSIUM SERPL-MCNC: 3.8 MMOL/L — SIGNIFICANT CHANGE UP (ref 3.5–5.3)
POTASSIUM SERPL-SCNC: 3.8 MMOL/L — SIGNIFICANT CHANGE UP (ref 3.5–5.3)
RBC # BLD: 4.04 M/UL — LOW (ref 4.2–5.8)
RBC # FLD: 14.8 % — HIGH (ref 10.3–14.5)
SODIUM SERPL-SCNC: 145 MMOL/L — SIGNIFICANT CHANGE UP (ref 135–145)
WBC # BLD: 5.46 K/UL — SIGNIFICANT CHANGE UP (ref 3.8–10.5)
WBC # FLD AUTO: 5.46 K/UL — SIGNIFICANT CHANGE UP (ref 3.8–10.5)

## 2017-11-15 RX ORDER — POTASSIUM PHOSPHATE, MONOBASIC POTASSIUM PHOSPHATE, DIBASIC 236; 224 MG/ML; MG/ML
15 INJECTION, SOLUTION INTRAVENOUS ONCE
Qty: 0 | Refills: 0 | Status: COMPLETED | OUTPATIENT
Start: 2017-11-15 | End: 2017-11-15

## 2017-11-15 RX ORDER — DEXTROSE MONOHYDRATE, SODIUM CHLORIDE, AND POTASSIUM CHLORIDE 50; .745; 4.5 G/1000ML; G/1000ML; G/1000ML
1000 INJECTION, SOLUTION INTRAVENOUS
Qty: 0 | Refills: 0 | Status: DISCONTINUED | OUTPATIENT
Start: 2017-11-15 | End: 2017-11-16

## 2017-11-15 RX ADMIN — BENZOCAINE AND MENTHOL 1 LOZENGE: 5; 1 LIQUID ORAL at 15:38

## 2017-11-15 RX ADMIN — Medication 5 MILLIGRAM(S): at 17:20

## 2017-11-15 RX ADMIN — Medication 5 MILLIGRAM(S): at 00:00

## 2017-11-15 RX ADMIN — DEXTROSE MONOHYDRATE, SODIUM CHLORIDE, AND POTASSIUM CHLORIDE 100 MILLILITER(S): 50; .745; 4.5 INJECTION, SOLUTION INTRAVENOUS at 09:42

## 2017-11-15 RX ADMIN — Medication 5 MILLIGRAM(S): at 11:28

## 2017-11-15 RX ADMIN — SODIUM CHLORIDE 100 MILLILITER(S): 9 INJECTION, SOLUTION INTRAVENOUS at 00:00

## 2017-11-15 RX ADMIN — DEXTROSE MONOHYDRATE, SODIUM CHLORIDE, AND POTASSIUM CHLORIDE 100 MILLILITER(S): 50; .745; 4.5 INJECTION, SOLUTION INTRAVENOUS at 23:15

## 2017-11-15 RX ADMIN — Medication 5 MILLIGRAM(S): at 05:55

## 2017-11-15 RX ADMIN — SODIUM CHLORIDE 100 MILLILITER(S): 9 INJECTION, SOLUTION INTRAVENOUS at 05:55

## 2017-11-15 RX ADMIN — Medication 5 MILLIGRAM(S): at 23:15

## 2017-11-15 RX ADMIN — POTASSIUM PHOSPHATE, MONOBASIC POTASSIUM PHOSPHATE, DIBASIC 62.5 MILLIMOLE(S): 236; 224 INJECTION, SOLUTION INTRAVENOUS at 08:41

## 2017-11-15 RX ADMIN — PANTOPRAZOLE SODIUM 40 MILLIGRAM(S): 20 TABLET, DELAYED RELEASE ORAL at 11:28

## 2017-11-16 LAB
BUN SERPL-MCNC: 19 MG/DL — SIGNIFICANT CHANGE UP (ref 7–23)
CALCIUM SERPL-MCNC: 9 MG/DL — SIGNIFICANT CHANGE UP (ref 8.4–10.5)
CHLORIDE SERPL-SCNC: 108 MMOL/L — HIGH (ref 98–107)
CO2 SERPL-SCNC: 23 MMOL/L — SIGNIFICANT CHANGE UP (ref 22–31)
CREAT SERPL-MCNC: 0.82 MG/DL — SIGNIFICANT CHANGE UP (ref 0.5–1.3)
GLUCOSE BLDC GLUCOMTR-MCNC: 123 MG/DL — HIGH (ref 70–99)
GLUCOSE BLDC GLUCOMTR-MCNC: 124 MG/DL — HIGH (ref 70–99)
GLUCOSE BLDC GLUCOMTR-MCNC: 92 MG/DL — SIGNIFICANT CHANGE UP (ref 70–99)
GLUCOSE SERPL-MCNC: 136 MG/DL — HIGH (ref 70–99)
HCT VFR BLD CALC: 35.8 % — LOW (ref 39–50)
HGB BLD-MCNC: 10.7 G/DL — LOW (ref 13–17)
MAGNESIUM SERPL-MCNC: 2 MG/DL — SIGNIFICANT CHANGE UP (ref 1.6–2.6)
MCHC RBC-ENTMCNC: 26.5 PG — LOW (ref 27–34)
MCHC RBC-ENTMCNC: 29.9 % — LOW (ref 32–36)
MCV RBC AUTO: 88.6 FL — SIGNIFICANT CHANGE UP (ref 80–100)
NRBC # FLD: 0 — SIGNIFICANT CHANGE UP
PHOSPHATE SERPL-MCNC: 3 MG/DL — SIGNIFICANT CHANGE UP (ref 2.5–4.5)
PLATELET # BLD AUTO: 181 K/UL — SIGNIFICANT CHANGE UP (ref 150–400)
PMV BLD: 10.9 FL — SIGNIFICANT CHANGE UP (ref 7–13)
POTASSIUM SERPL-MCNC: 3.8 MMOL/L — SIGNIFICANT CHANGE UP (ref 3.5–5.3)
POTASSIUM SERPL-SCNC: 3.8 MMOL/L — SIGNIFICANT CHANGE UP (ref 3.5–5.3)
RBC # BLD: 4.04 M/UL — LOW (ref 4.2–5.8)
RBC # FLD: 14.5 % — SIGNIFICANT CHANGE UP (ref 10.3–14.5)
SODIUM SERPL-SCNC: 143 MMOL/L — SIGNIFICANT CHANGE UP (ref 135–145)
WBC # BLD: 5.65 K/UL — SIGNIFICANT CHANGE UP (ref 3.8–10.5)
WBC # FLD AUTO: 5.65 K/UL — SIGNIFICANT CHANGE UP (ref 3.8–10.5)

## 2017-11-16 PROCEDURE — 99232 SBSQ HOSP IP/OBS MODERATE 35: CPT | Mod: GC

## 2017-11-16 RX ORDER — FENOFIBRATE,MICRONIZED 130 MG
145 CAPSULE ORAL DAILY
Qty: 0 | Refills: 0 | Status: DISCONTINUED | OUTPATIENT
Start: 2017-11-16 | End: 2017-11-18

## 2017-11-16 RX ORDER — METOPROLOL TARTRATE 50 MG
100 TABLET ORAL DAILY
Qty: 0 | Refills: 0 | Status: DISCONTINUED | OUTPATIENT
Start: 2017-11-16 | End: 2017-11-18

## 2017-11-16 RX ORDER — PANTOPRAZOLE SODIUM 20 MG/1
40 TABLET, DELAYED RELEASE ORAL
Qty: 0 | Refills: 0 | Status: DISCONTINUED | OUTPATIENT
Start: 2017-11-16 | End: 2017-11-18

## 2017-11-16 RX ORDER — POTASSIUM CHLORIDE 20 MEQ
10 PACKET (EA) ORAL
Qty: 0 | Refills: 0 | Status: COMPLETED | OUTPATIENT
Start: 2017-11-16 | End: 2017-11-16

## 2017-11-16 RX ADMIN — DEXTROSE MONOHYDRATE, SODIUM CHLORIDE, AND POTASSIUM CHLORIDE 100 MILLILITER(S): 50; .745; 4.5 INJECTION, SOLUTION INTRAVENOUS at 06:59

## 2017-11-16 RX ADMIN — Medication 5 MILLIGRAM(S): at 06:59

## 2017-11-16 RX ADMIN — Medication 100 MILLIEQUIVALENT(S): at 10:55

## 2017-11-16 RX ADMIN — Medication 5 MILLIGRAM(S): at 12:48

## 2017-11-16 RX ADMIN — ENOXAPARIN SODIUM 40 MILLIGRAM(S): 100 INJECTION SUBCUTANEOUS at 21:18

## 2017-11-16 RX ADMIN — PANTOPRAZOLE SODIUM 40 MILLIGRAM(S): 20 TABLET, DELAYED RELEASE ORAL at 12:48

## 2017-11-16 RX ADMIN — Medication 100 MILLIGRAM(S): at 19:58

## 2017-11-16 RX ADMIN — Medication 145 MILLIGRAM(S): at 19:58

## 2017-11-16 RX ADMIN — Medication 100 MILLIEQUIVALENT(S): at 12:48

## 2017-11-16 RX ADMIN — DEXTROSE MONOHYDRATE, SODIUM CHLORIDE, AND POTASSIUM CHLORIDE 50 MILLILITER(S): 50; .745; 4.5 INJECTION, SOLUTION INTRAVENOUS at 10:55

## 2017-11-17 LAB
BUN SERPL-MCNC: 14 MG/DL — SIGNIFICANT CHANGE UP (ref 7–23)
CALCIUM SERPL-MCNC: 9 MG/DL — SIGNIFICANT CHANGE UP (ref 8.4–10.5)
CHLORIDE SERPL-SCNC: 105 MMOL/L — SIGNIFICANT CHANGE UP (ref 98–107)
CO2 SERPL-SCNC: 14 MMOL/L — LOW (ref 22–31)
CREAT SERPL-MCNC: 0.84 MG/DL — SIGNIFICANT CHANGE UP (ref 0.5–1.3)
GLUCOSE BLDC GLUCOMTR-MCNC: 102 MG/DL — HIGH (ref 70–99)
GLUCOSE BLDC GLUCOMTR-MCNC: 121 MG/DL — HIGH (ref 70–99)
GLUCOSE BLDC GLUCOMTR-MCNC: 146 MG/DL — HIGH (ref 70–99)
GLUCOSE BLDC GLUCOMTR-MCNC: 98 MG/DL — SIGNIFICANT CHANGE UP (ref 70–99)
GLUCOSE SERPL-MCNC: 111 MG/DL — HIGH (ref 70–99)
HCT VFR BLD CALC: 36.8 % — LOW (ref 39–50)
HGB BLD-MCNC: 10.9 G/DL — LOW (ref 13–17)
MAGNESIUM SERPL-MCNC: 2 MG/DL — SIGNIFICANT CHANGE UP (ref 1.6–2.6)
MCHC RBC-ENTMCNC: 26.4 PG — LOW (ref 27–34)
MCHC RBC-ENTMCNC: 29.6 % — LOW (ref 32–36)
MCV RBC AUTO: 89.1 FL — SIGNIFICANT CHANGE UP (ref 80–100)
NRBC # FLD: 0 — SIGNIFICANT CHANGE UP
PHOSPHATE SERPL-MCNC: 3.9 MG/DL — SIGNIFICANT CHANGE UP (ref 2.5–4.5)
PLATELET # BLD AUTO: 173 K/UL — SIGNIFICANT CHANGE UP (ref 150–400)
PMV BLD: 10.8 FL — SIGNIFICANT CHANGE UP (ref 7–13)
POTASSIUM SERPL-MCNC: 5.3 MMOL/L — SIGNIFICANT CHANGE UP (ref 3.5–5.3)
POTASSIUM SERPL-SCNC: 5.3 MMOL/L — SIGNIFICANT CHANGE UP (ref 3.5–5.3)
RBC # BLD: 4.13 M/UL — LOW (ref 4.2–5.8)
RBC # FLD: 14.2 % — SIGNIFICANT CHANGE UP (ref 10.3–14.5)
SODIUM SERPL-SCNC: 135 MMOL/L — SIGNIFICANT CHANGE UP (ref 135–145)
WBC # BLD: 5.77 K/UL — SIGNIFICANT CHANGE UP (ref 3.8–10.5)
WBC # FLD AUTO: 5.77 K/UL — SIGNIFICANT CHANGE UP (ref 3.8–10.5)

## 2017-11-17 PROCEDURE — 99232 SBSQ HOSP IP/OBS MODERATE 35: CPT | Mod: GC

## 2017-11-17 RX ADMIN — ENOXAPARIN SODIUM 40 MILLIGRAM(S): 100 INJECTION SUBCUTANEOUS at 18:36

## 2017-11-17 RX ADMIN — PANTOPRAZOLE SODIUM 40 MILLIGRAM(S): 20 TABLET, DELAYED RELEASE ORAL at 05:48

## 2017-11-17 RX ADMIN — Medication 145 MILLIGRAM(S): at 11:47

## 2017-11-18 ENCOUNTER — TRANSCRIPTION ENCOUNTER (OUTPATIENT)
Age: 42
End: 2017-11-18

## 2017-11-18 VITALS
RESPIRATION RATE: 18 BRPM | SYSTOLIC BLOOD PRESSURE: 120 MMHG | OXYGEN SATURATION: 96 % | HEART RATE: 65 BPM | TEMPERATURE: 99 F | DIASTOLIC BLOOD PRESSURE: 67 MMHG

## 2017-11-18 LAB
GLUCOSE BLDC GLUCOMTR-MCNC: 114 MG/DL — HIGH (ref 70–99)
GLUCOSE BLDC GLUCOMTR-MCNC: 120 MG/DL — HIGH (ref 70–99)
GLUCOSE BLDC GLUCOMTR-MCNC: 128 MG/DL — HIGH (ref 70–99)

## 2017-11-18 PROCEDURE — 99238 HOSP IP/OBS DSCHRG MGMT 30/<: CPT

## 2017-11-18 RX ORDER — PANTOPRAZOLE SODIUM 20 MG/1
1 TABLET, DELAYED RELEASE ORAL
Qty: 0 | Refills: 0 | COMMUNITY
Start: 2017-11-18

## 2017-11-18 RX ADMIN — Medication 100 MILLIGRAM(S): at 06:05

## 2017-11-18 RX ADMIN — Medication 145 MILLIGRAM(S): at 11:20

## 2017-11-18 RX ADMIN — PANTOPRAZOLE SODIUM 40 MILLIGRAM(S): 20 TABLET, DELAYED RELEASE ORAL at 06:05

## 2017-11-18 NOTE — PROGRESS NOTE ADULT - ASSESSMENT
43yo M with SBO, history of recurrent SBOs    Plan:  - NPO / NGT  - IVF  - Serial abd exams  - F/u GI function  - Ambulate  - DVT ppx
41yo M with SBO, history of recurrent SBOs    PLAN:    - Diet: LRD  - Activity: OOB/ambulation  - Incentive spirometry  - F/u GI function  - DVT prophylaxis  - Discharge today

## 2017-11-18 NOTE — DISCHARGE NOTE ADULT - ADDITIONAL INSTRUCTIONS
Please make a follow up appointment with Dr. White in 1-2 weeks. You can call his office at [phone].

## 2017-11-18 NOTE — DISCHARGE NOTE ADULT - HOSPITAL COURSE
42 year old male with hx perforated diverticulitis (2009; c/b liver/brain abscesses) s/p Isi's s/p reversal c/b multiple (20+) SBOs over the last 8 years, three episodes requiring exlap, LINDA, now presented to the ED on 11/13/17 with abdominal pain x 1 day a/w nausea (no vomiting). No fevers/chills. Pain began the night before and was constant and located in the midline. Pt has a known ventral hernia which is followed by his surgeon. Pt stated in the past that he usually gets immediate relief after the placement of an NGT. He was admitted with NG tube and IV fluids. He began passing gas on 11/15/17. He was advanced to a liquid diet on 11/16/17. He was advanced to a low residue diet on 11/17/17 and was having bowel movements. He now has no pain or nausea and has regular GI function. Pt is tolerating a diet, voiding and ambulating.  Pt is ready for discharge in stable condition.  Pt will follow up with Dr. White as an outpatient in one to two weeks.

## 2017-11-18 NOTE — DISCHARGE NOTE ADULT - CONDITIONS AT DISCHARGE
A&OX4. No distress noted. No c/o pain. Skin assessment as per documented. Pt being discharged home. Pt verbalized understanding of discharge instructions. IV removed with clean gauzed placed. Pt refuses wheelchair to lobby and will walk to lobby with family. Safety maintained.

## 2017-11-18 NOTE — DISCHARGE NOTE ADULT - PATIENT PORTAL LINK FT
“You can access the Follow[facility] Patient Portal, offered by [facility], by registering with the following website: [email or web address]”

## 2017-11-18 NOTE — DISCHARGE NOTE ADULT - CARE PROVIDERS DIRECT ADDRESSES
[email or web address]

## 2017-11-18 NOTE — DISCHARGE NOTE ADULT - CARE PROVIDER_API CALL
Musa White), ColonRectal Surgery; Surgery  [address] [place] [address]  Phone: [phone]  Fax: [phone]

## 2017-11-18 NOTE — PROGRESS NOTE ADULT - SUBJECTIVE AND OBJECTIVE BOX
A Team Surgery Progress Note - pager [phone]    Patient is a 42y old  Male who presents with a chief complaint of Abdominal pain, nausea (13 Nov 2017 18:08)      SUBJECTIVE: Patient seen and examined on A team morning rounds. No acute events overnight. No nausea. NG tube functioning with dark brown output. Mildly distended today. Has been ambulating.      OBJECTIVE:     ** Physical Exam **  Gen: Alert, NAD  Abd: Soft, mildly distended and tender.     ** Vital signs / I&O's **    Vital Signs Last 24 Hrs  T(C): 36.7 (14 Nov 2017 10:33), Max: 37 (14 Nov 2017 02:05)  T(F): 98 (14 Nov 2017 10:33), Max: 98.6 (14 Nov 2017 02:05)  HR: 93 (14 Nov 2017 10:33) (88 - 100)  BP: 121/77 (14 Nov 2017 10:33) (121/61 - 135/73)  BP(mean): --  RR: 18 (14 Nov 2017 10:33) (16 - 18)  SpO2: 96% (14 Nov 2017 10:33) (96% - 100%)      13 Nov 2017 07:01  -  14 Nov 2017 07:00  --------------------------------------------------------  IN:    lactated ringers.: 900 mL  Total IN: 900 mL    OUT:    Nasoenteral Tube: 1575 mL    Voided: 605 mL  Total OUT: 2180 mL    Total NET: -1280 mL    ** Labs **                          11.9   5.25  )-----------( 220      ( 14 Nov 2017 07:29 )             38.5     14 Nov 2017 07:29    143    |  106    |  29     ----------------------------<  128    3.8     |  24     |  1.11     Ca    9.0        14 Nov 2017 07:29  Phos  2.4       14 Nov 2017 07:29  Mg     2.1       14 Nov 2017 07:29    TPro  8.5    /  Alb  5.0    /  TBili  0.8    /  DBili  x      /  AST  23     /  ALT  24     /  AlkPhos  35     13 Nov 2017 17:21    PT/INR - ( 13 Nov 2017 17:30 )   PT: 11.3 SEC;   INR: 1.01          PTT - ( 13 Nov 2017 17:30 )  PTT:29.2 SEC  CAPILLARY BLOOD GLUCOSE      POCT Blood Glucose.: 119 mg/dL (14 Nov 2017 11:59)  POCT Blood Glucose.: 104 mg/dL (14 Nov 2017 05:39)  POCT Blood Glucose.: 141 mg/dL (13 Nov 2017 23:57)        LIVER FUNCTIONS - ( 13 Nov 2017 17:21 )  Alb: 5.0 g/dL / Pro: 8.5 g/dL / ALK PHOS: 35 u/L / ALT: 24 u/L / AST: 23 u/L / GGT: x               MEDICATIONS  (STANDING):  dextrose 5%. 1000 milliLiter(s) (50 mL/Hr) IV Continuous <Continuous>  dextrose 50% Injectable 12.5 Gram(s) IV Push once  dextrose 50% Injectable 25 Gram(s) IV Push once  dextrose 50% Injectable 25 Gram(s) IV Push once  enoxaparin Injectable 40 milliGRAM(s) SubCutaneous every 24 hours  insulin lispro (HumaLOG) corrective regimen sliding scale   SubCutaneous every 6 hours  lactated ringers. 1000 milliLiter(s) (100 mL/Hr) IV Continuous <Continuous>  metoprolol    tartrate Injectable 5 milliGRAM(s) IV Push every 6 hours  pantoprazole  Injectable 40 milliGRAM(s) IV Push daily    MEDICATIONS  (PRN):  dextrose Gel 1 Dose(s) Oral once PRN Blood Glucose LESS THAN 70 milliGRAM(s)/deciliter  glucagon  Injectable 1 milliGRAM(s) IntraMuscular once PRN Glucose LESS THAN 70 milligrams/deciliter
A Team (General Surgery) Daily Progress Note    SUBJECTIVE:  Pt seen and examined, and is resting comfortably in bed. No acute events overnight. Pain is adequately controlled on current regimen. Pt has no complaints at this time. Positive for flatus and negative for BM (last BM on 11/13).     OBJECTIVE:  Vital Signs Last 24 Hrs  T(C): 36.7 (15 Nov 2017 05:53), Max: 36.7 (14 Nov 2017 10:33)  T(F): 98.1 (15 Nov 2017 05:53), Max: 98.1 (14 Nov 2017 14:30)  HR: 91 (15 Nov 2017 05:53) (85 - 94)  BP: 130/75 (15 Nov 2017 05:53) (121/77 - 136/79)  BP(mean): --  RR: 18 (15 Nov 2017 05:53) (18 - 18)  SpO2: 97% (15 Nov 2017 05:53) (96% - 100%)    I&O's Detail    14 Nov 2017 07:01  -  15 Nov 2017 07:00  --------------------------------------------------------  IN:    IV PiggyBack: 400 mL    Lactated Ringers IV Bolus: 1000 mL    lactated ringers.: 2250 mL  Total IN: 3650 mL    OUT:    Nasoenteral Tube: 520 mL    Voided: 790 mL  Total OUT: 1310 mL    Total NET: 2340 mL        Exam:  GEN: A&Ox3, NAD  HEENT: atraumatic, normocephalic  CV: no JVD  RESP: no increased work of breathing, no use of accessory muscles  GI/ABD: soft, appropriately tender, mildly distended, no rebound or guarding, NGT w/ bilious output  : deferred  EXTREMITIES: warm, pink, well-perfused                        10.7   5.46  )-----------( 190      ( 15 Nov 2017 06:00 )             36.0       11-15    145  |  108<H>  |  23  ----------------------------<  112<H>  3.8   |  24  |  0.96    Ca    8.8      15 Nov 2017 06:00  Phos  2.7     11-15  Mg     2.0     11-15    TPro  8.5<H>  /  Alb  5.0  /  TBili  0.8  /  DBili  x   /  AST  23  /  ALT  24  /  AlkPhos  35<L>  11-13      PT/INR - ( 13 Nov 2017 17:30 )   PT: 11.3 SEC;   INR: 1.01          PTT - ( 13 Nov 2017 17:30 )  PTT:29.2 SEC    ASSESSMENT:  43yo M with SBO, history of recurrent SBOs    PLAN:    - Diet: NPO/NGT/IVF  - Activity: OOB/ambulation  - Incentive spirometry  - F/u GI function  - DVT prophylaxis  - Dispo: admitted to A-team surgery        Fanny Selby MD PGY-1  pager: [phone]
A Team (General Surgery) Daily Progress Note    SUBJECTIVE:  Pt seen and examined, and is resting comfortably in bed. No acute events overnight. Pain is adequately controlled on current regimen. Pt has no complaints at this time. Positive for flatus and negative for BM.     OBJECTIVE:  Vital Signs Last 24 Hrs  T(C): 36.4 (17 Nov 2017 05:48), Max: 36.9 (16 Nov 2017 14:05)  T(F): 97.6 (17 Nov 2017 05:48), Max: 98.4 (16 Nov 2017 14:05)  HR: 62 (17 Nov 2017 05:48) (60 - 72)  BP: 126/83 (17 Nov 2017 05:48) (117/74 - 128/81)  BP(mean): --  RR: 18 (17 Nov 2017 05:48) (16 - 18)  SpO2: 98% (17 Nov 2017 05:48) (94% - 99%)    I&O's Detail    16 Nov 2017 07:01  -  17 Nov 2017 07:00  --------------------------------------------------------  IN:    dextrose 5% + sodium chloride 0.45% with potassium chloride 20 mEq/L: 400 mL  Total IN: 400 mL    OUT:    Voided: 835 mL  Total OUT: 835 mL    Total NET: -435 mL        Exam:  GEN: A&Ox3, NAD  HEENT: atraumatic, normocephalic  CV: no JVD  RESP: no increased work of breathing, no use of accessory muscles  GI/ABD: soft, appropriately tender, mildly distended, no rebound or guarding  : deferred  EXTREMITIES: warm, pink, well-perfused                        10.9   5.77  )-----------( 173      ( 17 Nov 2017 06:15 )             36.8       11-16    143  |  108<H>  |  19  ----------------------------<  136<H>  3.8   |  23  |  0.82    Ca    9.0      16 Nov 2017 06:05  Phos  3.0     11-16  Mg     2.0     11-16        ASSESSMENT:  43yo M with SBO, history of recurrent SBOs    PLAN:    - Diet: LRD  - Decrease IVF  - Activity: OOB/ambulation  - Incentive spirometry  - F/u GI function  - DVT prophylaxis  - Dispo: planning        Fanny Selby MD PGY-1  pager: [phone]
A Team (General Surgery) Daily Progress Note    SUBJECTIVE:  Pt seen and examined, and is resting comfortably in bed. No acute events overnight. Pain is adequately controlled on current regimen. Pt has no complaints at this time. Positive for flatus and negative for BM.     OBJECTIVE:  Vital Signs Last 24 Hrs  T(C): 36.9 (16 Nov 2017 06:58), Max: 36.9 (15 Nov 2017 23:13)  T(F): 98.5 (16 Nov 2017 06:58), Max: 98.5 (15 Nov 2017 23:13)  HR: 78 (16 Nov 2017 06:58) (76 - 86)  BP: 128/83 (16 Nov 2017 06:58) (124/72 - 129/82)  BP(mean): --  RR: 18 (16 Nov 2017 06:58) (18 - 18)  SpO2: 99% (16 Nov 2017 06:58) (97% - 99%)    I&O's Detail    15 Nov 2017 07:01  -  16 Nov 2017 07:00  --------------------------------------------------------  IN:    dextrose 5% + sodium chloride 0.45% with potassium chloride 20 mEq/L: 1200 mL    IV PiggyBack: 105 mL    lactated ringers.: 400 mL  Total IN: 1705 mL    OUT:    Nasoenteral Tube: 100 mL    Voided: 760 mL  Total OUT: 860 mL    Total NET: 845 mL      16 Nov 2017 07:01  -  16 Nov 2017 10:29  --------------------------------------------------------  IN:    dextrose 5% + sodium chloride 0.45% with potassium chloride 20 mEq/L: 400 mL  Total IN: 400 mL    OUT:  Total OUT: 0 mL    Total NET: 400 mL        Exam:  GEN: A&Ox3, NAD  HEENT: atraumatic, normocephalic  CV: no JVD  RESP: no increased work of breathing, no use of accessory muscles  GI/ABD: soft, non-tender, mildly distended, no rebound or guarding  : deferred  EXTREMITIES: warm, pink, well-perfused                        10.7   5.65  )-----------( 181      ( 16 Nov 2017 06:05 )             35.8       11-16    143  |  108<H>  |  19  ----------------------------<  136<H>  3.8   |  23  |  0.82    Ca    9.0      16 Nov 2017 06:05  Phos  3.0     11-16  Mg     2.0     11-16            ASSESSMENT:  43yo M with SBO, history of recurrent SBOs    PLAN:    - Diet: CLD  - Decrease IVF  - Activity: OOB/ambulation  - Incentive spirometry  - F/u GI function  - DVT prophylaxis  - Dispo: planning        Fanny Selby MD PGY-1  pager: [phone]
A Team Surgery Progress Note - pager [phone]    Patient is a 42y old  Male who presents with a chief complaint of Abdominal pain, nausea (13 Nov 2017 18:08)      SUBJECTIVE: Patient seen and examined on A team morning rounds. No acute events overnight. Positive for flatus/BM. Tolerating low residue diet. No pain/nausea/vomiting. Ambulating and voiding appropriately.      OBJECTIVE:     ** Physical Exam **  Gen: Alert, NAD  Abd: Soft, nontender, nondistended.    ** Vital signs / I&O's **    Vital Signs Last 24 Hrs  T(C): 37.2 (18 Nov 2017 09:54), Max: 37.2 (18 Nov 2017 09:54)  T(F): 98.9 (18 Nov 2017 09:54), Max: 98.9 (18 Nov 2017 09:54)  HR: 65 (18 Nov 2017 09:54) (65 - 77)  BP: 120/67 (18 Nov 2017 09:54) (116/64 - 133/79)  BP(mean): --  RR: 18 (18 Nov 2017 09:54) (18 - 18)  SpO2: 96% (18 Nov 2017 09:54) (96% - 99%)      17 Nov 2017 07:01  -  18 Nov 2017 07:00  --------------------------------------------------------  IN:  Total IN: 0 mL    OUT:    Voided: 1100 mL  Total OUT: 1100 mL    Total NET: -1100 mL            ** Labs **                          10.9   5.77  )-----------( 173      ( 17 Nov 2017 06:15 )             36.8     17 Nov 2017 06:15    135    |  105    |  14     ----------------------------<  111    5.3     |  14     |  0.84     Ca    9.0        17 Nov 2017 06:15  Phos  3.9       17 Nov 2017 06:15  Mg     2.0       17 Nov 2017 06:15    CAPILLARY BLOOD GLUCOSE    POCT Blood Glucose.: 114 mg/dL (18 Nov 2017 07:20)  POCT Blood Glucose.: 120 mg/dL (18 Nov 2017 00:58)  POCT Blood Glucose.: 146 mg/dL (17 Nov 2017 17:52)  POCT Blood Glucose.: 102 mg/dL (17 Nov 2017 11:40)    MEDICATIONS  (STANDING):  dextrose 5%. 1000 milliLiter(s) (50 mL/Hr) IV Continuous <Continuous>  dextrose 50% Injectable 12.5 Gram(s) IV Push once  dextrose 50% Injectable 25 Gram(s) IV Push once  dextrose 50% Injectable 25 Gram(s) IV Push once  enoxaparin Injectable 40 milliGRAM(s) SubCutaneous every 24 hours  fenofibrate Tablet 145 milliGRAM(s) Oral daily  insulin lispro (HumaLOG) corrective regimen sliding scale   SubCutaneous every 6 hours  metoprolol succinate  milliGRAM(s) Oral daily  pantoprazole    Tablet 40 milliGRAM(s) Oral before breakfast    MEDICATIONS  (PRN):  benzocaine 15 mG/menthol 3.6 mG Lozenge 1 Lozenge Oral every 3 hours PRN Sore Throat  dextrose Gel 1 Dose(s) Oral once PRN Blood Glucose LESS THAN 70 milliGRAM(s)/deciliter  glucagon  Injectable 1 milliGRAM(s) IntraMuscular once PRN Glucose LESS THAN 70 milligrams/deciliter
Soing well, resumed GI function, Exam =neg. to advance diet. DHeld
VSS, Exam negative. Passing flatus, to startLiquids. DHeld

## 2017-11-18 NOTE — DISCHARGE NOTE ADULT - PLAN OF CARE
Return to level of activity prior to admission Please follow up with Dr. White within 1-2 weeks after discharge from the hospital. You may call [phone] to schedule an appointment. Notify your surgeon and return to ER for temperatures greater than 101, chills sweats, pain not controlled with pain medications, persistent nausea and vomiting, or acutely concerning matters to you, that may require urgent medical attention.

## 2017-11-18 NOTE — DISCHARGE NOTE ADULT - CARE PLAN
Principal Discharge DX:	SBO (small bowel obstruction)  Goal:	Return to level of activity prior to admission  Instructions for follow-up, activity and diet:	Please follow up with Dr. White within 1-2 weeks after discharge from the hospital. You may call [phone] to schedule an appointment. Notify your surgeon and return to ER for temperatures greater than 101, chills sweats, pain not controlled with pain medications, persistent nausea and vomiting, or acutely concerning matters to you, that may require urgent medical attention.

## 2017-11-18 NOTE — DISCHARGE NOTE ADULT - MEDICATION SUMMARY - MEDICATIONS TO TAKE
I will START or STAY ON the medications listed below when I get home from the hospital:    Medrol 2 mg oral tablet  -- 4 milligram(s) by mouth taper as directed  -- It is very important that you take or use this exactly as directed.  Do not skip doses or discontinue unless directed by your doctor.  Obtain medical advice before taking any non-prescription drugs as some may affect the action of this medication.  Take with food or milk.    -- Indication: For Home medication    Diovan 80 mg oral tablet  -- 1 tab(s) by mouth once a day  -- hold for low blood pressure  -- Indication: For Home medication    glimepiride 4 mg oral tablet  -- 1 tab(s) by mouth once a day  -- Indication: For Home medication    glimepiride 1 mg oral tablet  -- 1 tab(s) by mouth once a day  -- Indication: For Home medication    Farxiga 5 mg oral tablet  -- 1 tab(s) by mouth once a day  -- Indication: For Home medication    diphenhydrAMINE 25 mg oral capsule  -- 1 cap(s) by mouth every 6 hours, As needed, Rash and/or Itching  -- Indication: For Home medication    TriCor 145 mg oral tablet  -- 1 tab(s) by mouth once a day  -- Indication: For Home medication    Toprol- mg oral tablet, extended release  -- 1 tab(s) by mouth once a day  -- Indication: For Home medication    pantoprazole 40 mg oral delayed release tablet  -- 1 tab(s) by mouth once a day (before a meal)  -- Indication: For Home medication

## 2017-12-22 ENCOUNTER — INPATIENT (INPATIENT)
Facility: HOSPITAL | Age: 42
LOS: 3 days | Discharge: HOME | End: 2017-12-26
Attending: SPECIALIST | Admitting: SPECIALIST
Payer: COMMERCIAL

## 2017-12-22 VITALS
HEART RATE: 100 BPM | DIASTOLIC BLOOD PRESSURE: 72 MMHG | TEMPERATURE: 98 F | SYSTOLIC BLOOD PRESSURE: 122 MMHG | RESPIRATION RATE: 16 BRPM | OXYGEN SATURATION: 98 %

## 2017-12-22 DIAGNOSIS — R10.9 UNSPECIFIED ABDOMINAL PAIN: ICD-10-CM

## 2017-12-22 DIAGNOSIS — Z98.89 OTHER SPECIFIED POSTPROCEDURAL STATES: Chronic | ICD-10-CM

## 2017-12-22 LAB
ALBUMIN SERPL ELPH-MCNC: 4.6 G/DL — SIGNIFICANT CHANGE UP (ref 3.3–5)
ALP SERPL-CCNC: 36 U/L — LOW (ref 40–120)
ALT FLD-CCNC: 33 U/L — SIGNIFICANT CHANGE UP (ref 4–41)
APTT BLD: 27.8 SEC — SIGNIFICANT CHANGE UP (ref 27.5–37.4)
AST SERPL-CCNC: 30 U/L — SIGNIFICANT CHANGE UP (ref 4–40)
BASE EXCESS BLDV CALC-SCNC: 1.1 MMOL/L — SIGNIFICANT CHANGE UP
BASOPHILS # BLD AUTO: 0.03 K/UL — SIGNIFICANT CHANGE UP (ref 0–0.2)
BASOPHILS NFR BLD AUTO: 0.2 % — SIGNIFICANT CHANGE UP (ref 0–2)
BILIRUB SERPL-MCNC: 0.5 MG/DL — SIGNIFICANT CHANGE UP (ref 0.2–1.2)
BLD GP AB SCN SERPL QL: NEGATIVE — SIGNIFICANT CHANGE UP
BLOOD GAS VENOUS - CREATININE: 0.99 MG/DL — SIGNIFICANT CHANGE UP (ref 0.5–1.3)
BUN SERPL-MCNC: 22 MG/DL — SIGNIFICANT CHANGE UP (ref 7–23)
CALCIUM SERPL-MCNC: 9.7 MG/DL — SIGNIFICANT CHANGE UP (ref 8.4–10.5)
CHLORIDE BLDV-SCNC: 111 MMOL/L — HIGH (ref 96–108)
CHLORIDE SERPL-SCNC: 103 MMOL/L — SIGNIFICANT CHANGE UP (ref 98–107)
CO2 SERPL-SCNC: 22 MMOL/L — SIGNIFICANT CHANGE UP (ref 22–31)
CREAT SERPL-MCNC: 1.06 MG/DL — SIGNIFICANT CHANGE UP (ref 0.5–1.3)
EOSINOPHIL # BLD AUTO: 0.01 K/UL — SIGNIFICANT CHANGE UP (ref 0–0.5)
EOSINOPHIL NFR BLD AUTO: 0.1 % — SIGNIFICANT CHANGE UP (ref 0–6)
GAS PNL BLDV: 137 MMOL/L — SIGNIFICANT CHANGE UP (ref 136–146)
GLUCOSE BLDV-MCNC: 140 — HIGH (ref 70–99)
GLUCOSE SERPL-MCNC: 135 MG/DL — HIGH (ref 70–99)
HCO3 BLDV-SCNC: 25 MMOL/L — SIGNIFICANT CHANGE UP (ref 20–27)
HCT VFR BLD CALC: 43.1 % — SIGNIFICANT CHANGE UP (ref 39–50)
HCT VFR BLDV CALC: 42.3 % — SIGNIFICANT CHANGE UP (ref 39–51)
HGB BLD-MCNC: 13.5 G/DL — SIGNIFICANT CHANGE UP (ref 13–17)
HGB BLDV-MCNC: 13.8 G/DL — SIGNIFICANT CHANGE UP (ref 13–17)
IMM GRANULOCYTES # BLD AUTO: 0.06 # — SIGNIFICANT CHANGE UP
IMM GRANULOCYTES NFR BLD AUTO: 0.3 % — SIGNIFICANT CHANGE UP (ref 0–1.5)
INR BLD: 1 — SIGNIFICANT CHANGE UP (ref 0.88–1.17)
LACTATE BLDV-MCNC: 1.6 MMOL/L — SIGNIFICANT CHANGE UP (ref 0.5–2)
LYMPHOCYTES # BLD AUTO: 1.24 K/UL — SIGNIFICANT CHANGE UP (ref 1–3.3)
LYMPHOCYTES # BLD AUTO: 7 % — LOW (ref 13–44)
MCHC RBC-ENTMCNC: 27.2 PG — SIGNIFICANT CHANGE UP (ref 27–34)
MCHC RBC-ENTMCNC: 31.3 % — LOW (ref 32–36)
MCV RBC AUTO: 86.9 FL — SIGNIFICANT CHANGE UP (ref 80–100)
MONOCYTES # BLD AUTO: 0.79 K/UL — SIGNIFICANT CHANGE UP (ref 0–0.9)
MONOCYTES NFR BLD AUTO: 4.5 % — SIGNIFICANT CHANGE UP (ref 2–14)
NEUTROPHILS # BLD AUTO: 15.51 K/UL — HIGH (ref 1.8–7.4)
NEUTROPHILS NFR BLD AUTO: 87.9 % — HIGH (ref 43–77)
NRBC # FLD: 0 — SIGNIFICANT CHANGE UP
PCO2 BLDV: 43 MMHG — SIGNIFICANT CHANGE UP (ref 41–51)
PH BLDV: 7.39 PH — SIGNIFICANT CHANGE UP (ref 7.32–7.43)
PLATELET # BLD AUTO: 264 K/UL — SIGNIFICANT CHANGE UP (ref 150–400)
PMV BLD: 10.9 FL — SIGNIFICANT CHANGE UP (ref 7–13)
PO2 BLDV: 53 MMHG — HIGH (ref 35–40)
POTASSIUM BLDV-SCNC: 4.3 MMOL/L — SIGNIFICANT CHANGE UP (ref 3.4–4.5)
POTASSIUM SERPL-MCNC: 4.7 MMOL/L — SIGNIFICANT CHANGE UP (ref 3.5–5.3)
POTASSIUM SERPL-SCNC: 4.7 MMOL/L — SIGNIFICANT CHANGE UP (ref 3.5–5.3)
PROT SERPL-MCNC: 8.1 G/DL — SIGNIFICANT CHANGE UP (ref 6–8.3)
PROTHROM AB SERPL-ACNC: 11.1 SEC — SIGNIFICANT CHANGE UP (ref 9.8–13.1)
RBC # BLD: 4.96 M/UL — SIGNIFICANT CHANGE UP (ref 4.2–5.8)
RBC # FLD: 15.4 % — HIGH (ref 10.3–14.5)
RH IG SCN BLD-IMP: POSITIVE — SIGNIFICANT CHANGE UP
SAO2 % BLDV: 84.3 % — SIGNIFICANT CHANGE UP (ref 60–85)
SODIUM SERPL-SCNC: 143 MMOL/L — SIGNIFICANT CHANGE UP (ref 135–145)
WBC # BLD: 17.64 K/UL — HIGH (ref 3.8–10.5)
WBC # FLD AUTO: 17.64 K/UL — HIGH (ref 3.8–10.5)

## 2017-12-22 PROCEDURE — 71010: CPT | Mod: 26

## 2017-12-22 PROCEDURE — 74176 CT ABD & PELVIS W/O CONTRAST: CPT | Mod: 26

## 2017-12-22 RX ORDER — SODIUM CHLORIDE 9 MG/ML
1000 INJECTION INTRAMUSCULAR; INTRAVENOUS; SUBCUTANEOUS ONCE
Qty: 0 | Refills: 0 | Status: COMPLETED | OUTPATIENT
Start: 2017-12-22 | End: 2017-12-22

## 2017-12-22 RX ORDER — ONDANSETRON 8 MG/1
8 TABLET, FILM COATED ORAL ONCE
Qty: 0 | Refills: 0 | Status: COMPLETED | OUTPATIENT
Start: 2017-12-22 | End: 2017-12-22

## 2017-12-22 RX ORDER — ACETAMINOPHEN 500 MG
1000 TABLET ORAL ONCE
Qty: 0 | Refills: 0 | Status: COMPLETED | OUTPATIENT
Start: 2017-12-22 | End: 2017-12-22

## 2017-12-22 RX ADMIN — Medication 1000 MILLIGRAM(S): at 19:13

## 2017-12-22 RX ADMIN — ONDANSETRON 8 MILLIGRAM(S): 8 TABLET, FILM COATED ORAL at 18:44

## 2017-12-22 RX ADMIN — Medication 400 MILLIGRAM(S): at 18:44

## 2017-12-22 RX ADMIN — SODIUM CHLORIDE 1000 MILLILITER(S): 9 INJECTION INTRAMUSCULAR; INTRAVENOUS; SUBCUTANEOUS at 19:13

## 2017-12-22 NOTE — ED PROVIDER NOTE - OBJECTIVE STATEMENT
41 y/o male w/ hx of DM, HTN, HLD, SBO s/p resections (total of 4 last july 2016),  p/w abdominal pain and nausea. Symptoms started today, several hours after eating. Endorsing nausea, generalized abdominal pain, pressure-like, 7/10, and abdominal distention. Not passing flatus. No diarrhea. No urinary complaints. Currently endorsing nausea.    Surgeon: Held  PMD: private 41 y/o male w/ hx of DM, HTN, HLD, SBO s/p resections (total of 4 last july 2016),  p/w abdominal pain and nausea. Symptoms started today, several hours after eating. Endorsing nausea, generalized abdominal pain, pressure-like, 7/10, and abdominal distention. Not passing flatus. No diarrhea. No urinary complaints. Currently endorsing nausea.  Surgeon: Christopher  PMD: private  Attending - Agree with above.  I evaluated patient myself.  41 y/o M with parents at bedside.  Hx multiple episodes SBO post having perforated diverticulitis.  To ED with c/o abd pain, abd distention, and nausea since yesterday, c/w SBO.  His surgeon is Dr. White and told Dr. Whitney is covering and referred to ED.  No chest pain or shortness of breathe.  No fever.

## 2017-12-22 NOTE — ED ADULT TRIAGE NOTE - CHIEF COMPLAINT QUOTE
Pt with PMH of multiple intestinal obstructions c/o abd pain/bloating and nausea. Pt states the symptoms are the same as when he had SBOs in the past.

## 2017-12-22 NOTE — ED PROVIDER NOTE - MEDICAL DECISION MAKING DETAILS
43 y/o with hx SBO, presents with abd pain and nausea.  Likely SBO.  Check labs, CT.  Dr. Whitney at bedside.

## 2017-12-22 NOTE — ED ADULT NURSE NOTE - OBJECTIVE STATEMENT
Pt recd A+Ox3. C/o generalized abd pain that began today a few hours after eating with N/V. Pt with hx of SOB with 4 bowel resections. Abd is distended and pt c/o pressure and nausea. Will medicate as ordered and continue to monitor and reassess.

## 2017-12-22 NOTE — ED PROVIDER NOTE - PHYSICAL EXAMINATION
ATTENDING PHYSICAL EXAM DR. Williamson ***GEN - NAD; A+O x3 ***HEAD - NC/AT ***EYES/NOSE - PERRL, EOMI, mucous membranes moist, no discharge ***THROAT: Oral cavity and pharynx normal. No inflammation, swelling, exudate, or lesions.  ***NECK: Neck supple, non-tender without lymphadenopathy, no masses, no JVD.   ***PULMONARY - CTA b/l, symmetric breath sounds. ***CARDIAC -s1s2, RRR, no M,R,G  ***ABDOMEN - Noted multiple surgical scars, distended, soft, b/l upper abd tenderness to palpation, No rebound or guarding.  No mass appreciated.   ***BACK - no CVA tenderness, Normal  spine ***EXTREMITIES - symmetric pulses, 2+ dp, capillary refill < 2 seconds, no clubbing, no cyanosis, no edema ***SKIN - no rash or bruising   ***NEUROLOGIC - alert, CN 2-12 intact

## 2017-12-23 LAB
BUN SERPL-MCNC: 26 MG/DL — HIGH (ref 7–23)
CALCIUM SERPL-MCNC: 8.8 MG/DL — SIGNIFICANT CHANGE UP (ref 8.4–10.5)
CHLORIDE SERPL-SCNC: 106 MMOL/L — SIGNIFICANT CHANGE UP (ref 98–107)
CO2 SERPL-SCNC: 24 MMOL/L — SIGNIFICANT CHANGE UP (ref 22–31)
CREAT SERPL-MCNC: 1.02 MG/DL — SIGNIFICANT CHANGE UP (ref 0.5–1.3)
GLUCOSE BLDC GLUCOMTR-MCNC: 116 MG/DL — HIGH (ref 70–99)
GLUCOSE BLDC GLUCOMTR-MCNC: 129 MG/DL — HIGH (ref 70–99)
GLUCOSE BLDC GLUCOMTR-MCNC: 92 MG/DL — SIGNIFICANT CHANGE UP (ref 70–99)
GLUCOSE BLDC GLUCOMTR-MCNC: 94 MG/DL — SIGNIFICANT CHANGE UP (ref 70–99)
GLUCOSE SERPL-MCNC: 120 MG/DL — HIGH (ref 70–99)
HCT VFR BLD CALC: 38.7 % — LOW (ref 39–50)
HGB BLD-MCNC: 11.9 G/DL — LOW (ref 13–17)
MAGNESIUM SERPL-MCNC: 2 MG/DL — SIGNIFICANT CHANGE UP (ref 1.6–2.6)
MCHC RBC-ENTMCNC: 26.3 PG — LOW (ref 27–34)
MCHC RBC-ENTMCNC: 30.7 % — LOW (ref 32–36)
MCV RBC AUTO: 85.6 FL — SIGNIFICANT CHANGE UP (ref 80–100)
NRBC # FLD: 0 — SIGNIFICANT CHANGE UP
PHOSPHATE SERPL-MCNC: 3.1 MG/DL — SIGNIFICANT CHANGE UP (ref 2.5–4.5)
PLATELET # BLD AUTO: 221 K/UL — SIGNIFICANT CHANGE UP (ref 150–400)
PMV BLD: 11.1 FL — SIGNIFICANT CHANGE UP (ref 7–13)
POTASSIUM SERPL-MCNC: 4.1 MMOL/L — SIGNIFICANT CHANGE UP (ref 3.5–5.3)
POTASSIUM SERPL-SCNC: 4.1 MMOL/L — SIGNIFICANT CHANGE UP (ref 3.5–5.3)
RBC # BLD: 4.52 M/UL — SIGNIFICANT CHANGE UP (ref 4.2–5.8)
RBC # FLD: 15.8 % — HIGH (ref 10.3–14.5)
SODIUM SERPL-SCNC: 144 MMOL/L — SIGNIFICANT CHANGE UP (ref 135–145)
WBC # BLD: 6.86 K/UL — SIGNIFICANT CHANGE UP (ref 3.8–10.5)
WBC # FLD AUTO: 6.86 K/UL — SIGNIFICANT CHANGE UP (ref 3.8–10.5)

## 2017-12-23 RX ORDER — DEXTROSE 50 % IN WATER 50 %
25 SYRINGE (ML) INTRAVENOUS ONCE
Qty: 0 | Refills: 0 | Status: DISCONTINUED | OUTPATIENT
Start: 2017-12-23 | End: 2017-12-26

## 2017-12-23 RX ORDER — DEXTROSE 50 % IN WATER 50 %
12.5 SYRINGE (ML) INTRAVENOUS ONCE
Qty: 0 | Refills: 0 | Status: DISCONTINUED | OUTPATIENT
Start: 2017-12-23 | End: 2017-12-26

## 2017-12-23 RX ORDER — SODIUM CHLORIDE 9 MG/ML
1000 INJECTION, SOLUTION INTRAVENOUS
Qty: 0 | Refills: 0 | Status: DISCONTINUED | OUTPATIENT
Start: 2017-12-23 | End: 2017-12-24

## 2017-12-23 RX ORDER — GLUCAGON INJECTION, SOLUTION 0.5 MG/.1ML
1 INJECTION, SOLUTION SUBCUTANEOUS ONCE
Qty: 0 | Refills: 0 | Status: DISCONTINUED | OUTPATIENT
Start: 2017-12-23 | End: 2017-12-26

## 2017-12-23 RX ORDER — ENOXAPARIN SODIUM 100 MG/ML
40 INJECTION SUBCUTANEOUS DAILY
Qty: 0 | Refills: 0 | Status: DISCONTINUED | OUTPATIENT
Start: 2017-12-23 | End: 2017-12-26

## 2017-12-23 RX ORDER — BENZOCAINE AND MENTHOL 5; 1 G/100ML; G/100ML
1 LIQUID ORAL
Qty: 0 | Refills: 0 | Status: DISCONTINUED | OUTPATIENT
Start: 2017-12-23 | End: 2017-12-25

## 2017-12-23 RX ORDER — DEXTROSE 50 % IN WATER 50 %
1 SYRINGE (ML) INTRAVENOUS ONCE
Qty: 0 | Refills: 0 | Status: DISCONTINUED | OUTPATIENT
Start: 2017-12-23 | End: 2017-12-26

## 2017-12-23 RX ORDER — INSULIN LISPRO 100/ML
VIAL (ML) SUBCUTANEOUS EVERY 6 HOURS
Qty: 0 | Refills: 0 | Status: DISCONTINUED | OUTPATIENT
Start: 2017-12-23 | End: 2017-12-25

## 2017-12-23 RX ORDER — SODIUM CHLORIDE 9 MG/ML
1000 INJECTION, SOLUTION INTRAVENOUS
Qty: 0 | Refills: 0 | Status: DISCONTINUED | OUTPATIENT
Start: 2017-12-23 | End: 2017-12-26

## 2017-12-23 RX ORDER — METOPROLOL TARTRATE 50 MG
5 TABLET ORAL EVERY 6 HOURS
Qty: 0 | Refills: 0 | Status: DISCONTINUED | OUTPATIENT
Start: 2017-12-23 | End: 2017-12-25

## 2017-12-23 RX ORDER — PANTOPRAZOLE SODIUM 20 MG/1
40 TABLET, DELAYED RELEASE ORAL DAILY
Qty: 0 | Refills: 0 | Status: DISCONTINUED | OUTPATIENT
Start: 2017-12-23 | End: 2017-12-25

## 2017-12-23 RX ADMIN — Medication 5 MILLIGRAM(S): at 18:35

## 2017-12-23 RX ADMIN — PANTOPRAZOLE SODIUM 40 MILLIGRAM(S): 20 TABLET, DELAYED RELEASE ORAL at 12:31

## 2017-12-23 RX ADMIN — SODIUM CHLORIDE 150 MILLILITER(S): 9 INJECTION, SOLUTION INTRAVENOUS at 21:57

## 2017-12-23 RX ADMIN — SODIUM CHLORIDE 150 MILLILITER(S): 9 INJECTION, SOLUTION INTRAVENOUS at 18:40

## 2017-12-23 RX ADMIN — Medication 5 MILLIGRAM(S): at 23:49

## 2017-12-23 RX ADMIN — ENOXAPARIN SODIUM 40 MILLIGRAM(S): 100 INJECTION SUBCUTANEOUS at 12:30

## 2017-12-23 RX ADMIN — Medication 5 MILLIGRAM(S): at 12:35

## 2017-12-23 RX ADMIN — SODIUM CHLORIDE 150 MILLILITER(S): 9 INJECTION, SOLUTION INTRAVENOUS at 12:30

## 2017-12-23 RX ADMIN — Medication 5 MILLIGRAM(S): at 06:41

## 2017-12-23 RX ADMIN — SODIUM CHLORIDE 150 MILLILITER(S): 9 INJECTION, SOLUTION INTRAVENOUS at 03:37

## 2017-12-23 RX ADMIN — BENZOCAINE AND MENTHOL 1 LOZENGE: 5; 1 LIQUID ORAL at 21:57

## 2017-12-23 NOTE — H&P ADULT - HISTORY OF PRESENT ILLNESS
GENERAL SURGERY H&P    HPI: 43 y/o M w/ hx of HTN, HLD, DM, DVT (s/p surgery, previously on eliquis), diverticulitis s/p Isi's and ostomy reversal (2009 with Dr. White) c/b recurrent SBOs requiring two bowel resections (last 7/2016, both with Dr. White) who presents with abdominal pain, distention and nausea. Patient says that over the past year he has been hospitalized multiple times with SBOs and they have resolved with conservative management. He says that today he went to work and began to notice worsening abdominal pain. Describes the pain and being located in a horizontal band across the upper quadrants of the abdomen and crampy in nature. Throughout the day he passed no flatus and did not have a BM, and increasingly became nausea, however did not vomit. Since it felt similar to previous SBOs, he came to the ED. In the ED an NGT was placed and per the patient he quickly filled a canister and felt immediate relief. Currently he does not have any pain and is resting comfortably in his bed. Says that 1 hour ago he had a small episode of diarrhea.    PAST MEDICAL & SURGICAL HISTORY:  DVT (deep venous thrombosis)  Diverticulitis  SBO (small bowel obstruction)  Diabetes mellitus, type 2  Hyperlipidemia  Hypertension  H/O exploratory laparotomy: LINDA, x3  S/P Isi's procedure: with reversal    FAMILY HISTORY:  No pertinent family history in first degree relatives    MEDICATIONS  (STANDING):  dextrose 5%. 1000 milliLiter(s) (50 mL/Hr) IV Continuous <Continuous>  dextrose 50% Injectable 12.5 Gram(s) IV Push once  dextrose 50% Injectable 25 Gram(s) IV Push once  dextrose 50% Injectable 25 Gram(s) IV Push once  insulin lispro (HumaLOG) corrective regimen sliding scale   SubCutaneous every 6 hours  lactated ringers. 1000 milliLiter(s) (150 mL/Hr) IV Continuous <Continuous>  metoprolol    tartrate Injectable 5 milliGRAM(s) IV Push every 6 hours  pantoprazole  Injectable 40 milliGRAM(s) IV Push daily    MEDICATIONS  (PRN):  dextrose Gel 1 Dose(s) Oral once PRN Blood Glucose LESS THAN 70 milliGRAM(s)/deciliter  glucagon  Injectable 1 milliGRAM(s) IntraMuscular once PRN Glucose LESS THAN 70 milligrams/deciliter    Allergies  ceftriaxone (Rash)  cephalosporins (Rash)  Cipro (Unknown)  contrast media (iodine-based) (Rash)  Flagyl (Unknown)  iodine containing compounds (Rash)  morphine (Rash)    Vital Signs Last 24 Hrs  T(C): 36.8 (23 Dec 2017 01:33), Max: 36.8 (23 Dec 2017 01:33)  T(F): 98.3 (23 Dec 2017 01:33), Max: 98.3 (23 Dec 2017 01:33)  HR: 98 (23 Dec 2017 01:33) (98 - 100)  BP: 130/86 (23 Dec 2017 01:33) (122/72 - 142/80)  RR: 20 (23 Dec 2017 01:33) (16 - 20)  SpO2: 95% (23 Dec 2017 01:33) (95% - 100%)    Daily Height in cm: 182.88 (22 Dec 2017 23:26)                          13.5   17.64 )-----------( 264      ( 22 Dec 2017 18:30 )             43.1     12-22    143  |  103  |  22  ----------------------------<  135<H>  4.7   |  22  |  1.06    Ca    9.7      22 Dec 2017 18:30    TPro  8.1  /  Alb  4.6  /  TBili  0.5  /  DBili  x   /  AST  30  /  ALT  33  /  AlkPhos  36<L>  12-22    PT/INR - ( 22 Dec 2017 18:30 )   PT: 11.1 SEC;   INR: 1.00       PTT - ( 22 Dec 2017 18:30 )  PTT:27.8 SEC    IMAGING STUDIES:  CT A/P non-contrast  IMPRESSION:   Small bowel obstruction with a transition point in the distal small bowel   exiting the ventral abdominal wall hernia slightly right of midline,   similar in presentation to prior studies.  No abdominal ascites or free air.    CXR  NGT below diaphragm with tip in stomach (prelim read)    PHYSICAL EXAM:  Gen: NAD, appears well, obese  Resp: non-labored breathing, no distress  Abd: softly distended, minimally TTP in upper quadrants, no rebound, no guarding. Healed surgical scars present. Large ventral right sided hernia palpable and reducible.   Ext: WWP distally, moves all extremities spontaneously

## 2017-12-23 NOTE — PROGRESS NOTE ADULT - ATTENDING COMMENTS
Above noted. Feels better. Had one episode of diarrhea overnight. Hasn't passed anything since.  Physical Exam: Afebrile.  Abdomen: Soft, has mild tenderness on palpation over the right side.  Labs:  WBC 6.86 today. Nasogastric output is blood tinged, less output than initially in the ER.  Plan: Continue present management.

## 2017-12-23 NOTE — H&P ADULT - ASSESSMENT
43 y/o M w/ hx of HTN, HLD, DM, DVT (s/p surgery, previously on eliquis), diverticulitis s/p Isi's and ostomy reversal (2009 with Dr. White) c/b recurrent SBOs s/p two bowel resections (last 7/16) who presents with abdominal pain, distention and nausea. Imaging is consistent with small bowel obstruction.    Plan:  - NPO/NGT  - IVF  - serial abdominal exams  - monitor for return of bowel fxn, monitor NGT output  - f/u AM labs  - pain medications PRN following abdominal exams  - SSI, home meds  - q4 vitals  - q4 I/Os    Patient discussed with Dr. Wero Cagle MD

## 2017-12-23 NOTE — PROGRESS NOTE ADULT - SUBJECTIVE AND OBJECTIVE BOX
B TEAM SURGERY DAILY PROGRESS NOTE:    S: Patient seen and examined. An NGT was placed overnight that has had significant output. He also had some diarrhea overnight, but it could have been contents distal to his obstruction.    O:  Vital Signs Last 24 Hrs  T(C): 36.7 (23 Dec 2017 09:56), Max: 36.8 (23 Dec 2017 01:33)  T(F): 98 (23 Dec 2017 09:56), Max: 98.3 (23 Dec 2017 01:33)  HR: 98 (23 Dec 2017 12:28) (89 - 100)  BP: 133/77 (23 Dec 2017 12:28) (122/72 - 142/80)  BP(mean): --  RR: 18 (23 Dec 2017 12:28) (16 - 20)  SpO2: 98% (23 Dec 2017 12:28) (95% - 100%)    I&O's Detail    22 Dec 2017 07:01  -  23 Dec 2017 07:00  --------------------------------------------------------  IN:  Total IN: 0 mL    OUT:    Nasoenteral Tube: 400 mL  Total OUT: 400 mL    Total NET: -400 mL      23 Dec 2017 07:01  -  23 Dec 2017 14:55  --------------------------------------------------------  IN:  Total IN: 0 mL    OUT:    Voided: 350 mL  Total OUT: 350 mL    Total NET: -350 mL    MEDICATIONS  (STANDING):  dextrose 5%. 1000 milliLiter(s) (50 mL/Hr) IV Continuous <Continuous>  dextrose 50% Injectable 12.5 Gram(s) IV Push once  dextrose 50% Injectable 25 Gram(s) IV Push once  dextrose 50% Injectable 25 Gram(s) IV Push once  enoxaparin Injectable 40 milliGRAM(s) SubCutaneous daily  insulin lispro (HumaLOG) corrective regimen sliding scale   SubCutaneous every 6 hours  lactated ringers. 1000 milliLiter(s) (150 mL/Hr) IV Continuous <Continuous>  metoprolol    tartrate Injectable 5 milliGRAM(s) IV Push every 6 hours  pantoprazole  Injectable 40 milliGRAM(s) IV Push daily    MEDICATIONS  (PRN):  dextrose Gel 1 Dose(s) Oral once PRN Blood Glucose LESS THAN 70 milliGRAM(s)/deciliter  glucagon  Injectable 1 milliGRAM(s) IntraMuscular once PRN Glucose LESS THAN 70 milligrams/deciliter                        11.9   6.86  )-----------( 221      ( 23 Dec 2017 06:00 )             38.7   12-23    144  |  106  |  26<H>  ----------------------------<  120<H>  4.1   |  24  |  1.02    Ca    8.8      23 Dec 2017 06:00  Phos  3.1     12-23  Mg     2.0     12-23    TPro  8.1  /  Alb  4.6  /  TBili  0.5  /  DBili  x   /  AST  30  /  ALT  33  /  AlkPhos  36<L>  12-22    Physical Exam:  Gen: Laying in bed, NAD, alert and oriented, NGT in place  Resp: Unlabored breathing  Abd: soft, non-tender, non-distended, significant ventral hernia to the right of midline    Lines:   IV: patent, in place.     < from: CT Abdomen and Pelvis No Cont (12.22.17 @ 21:20) >  IMPRESSION:   Small bowel obstruction with a transition point in the distal small bowel   exiting the ventral abdominal wall hernia slightly right of midline,   similar in presentation to prior studies.  No abdominal ascites or free air.    < end of copied text >

## 2017-12-23 NOTE — PROGRESS NOTE ADULT - ASSESSMENT
A: 43 y/o M w/ diverticulitis s/p Isi's and ostomy reversal (2009 with Dr. White) c/b recurrent SBOs s/p two bowel resections (last 7/16), presented with abdominal pain, distention and nausea. Found to have a small bowel obstruction.    Plan:  - NPO/NGT  - IVF  - Monitor GI function  - Lovenox DVT ppx

## 2017-12-24 LAB
BUN SERPL-MCNC: 19 MG/DL — SIGNIFICANT CHANGE UP (ref 7–23)
CALCIUM SERPL-MCNC: 8.7 MG/DL — SIGNIFICANT CHANGE UP (ref 8.4–10.5)
CHLORIDE SERPL-SCNC: 107 MMOL/L — SIGNIFICANT CHANGE UP (ref 98–107)
CO2 SERPL-SCNC: 25 MMOL/L — SIGNIFICANT CHANGE UP (ref 22–31)
CREAT SERPL-MCNC: 0.82 MG/DL — SIGNIFICANT CHANGE UP (ref 0.5–1.3)
GLUCOSE BLDC GLUCOMTR-MCNC: 106 MG/DL — HIGH (ref 70–99)
GLUCOSE BLDC GLUCOMTR-MCNC: 123 MG/DL — HIGH (ref 70–99)
GLUCOSE BLDC GLUCOMTR-MCNC: 126 MG/DL — HIGH (ref 70–99)
GLUCOSE BLDC GLUCOMTR-MCNC: 91 MG/DL — SIGNIFICANT CHANGE UP (ref 70–99)
GLUCOSE SERPL-MCNC: 125 MG/DL — HIGH (ref 70–99)
HCT VFR BLD CALC: 36.9 % — LOW (ref 39–50)
HGB BLD-MCNC: 11 G/DL — LOW (ref 13–17)
MCHC RBC-ENTMCNC: 26.1 PG — LOW (ref 27–34)
MCHC RBC-ENTMCNC: 29.8 % — LOW (ref 32–36)
MCV RBC AUTO: 87.6 FL — SIGNIFICANT CHANGE UP (ref 80–100)
NRBC # FLD: 0 — SIGNIFICANT CHANGE UP
PLATELET # BLD AUTO: 194 K/UL — SIGNIFICANT CHANGE UP (ref 150–400)
PMV BLD: 10.8 FL — SIGNIFICANT CHANGE UP (ref 7–13)
POTASSIUM SERPL-MCNC: 3.9 MMOL/L — SIGNIFICANT CHANGE UP (ref 3.5–5.3)
POTASSIUM SERPL-SCNC: 3.9 MMOL/L — SIGNIFICANT CHANGE UP (ref 3.5–5.3)
RBC # BLD: 4.21 M/UL — SIGNIFICANT CHANGE UP (ref 4.2–5.8)
RBC # FLD: 15.4 % — HIGH (ref 10.3–14.5)
SODIUM SERPL-SCNC: 145 MMOL/L — SIGNIFICANT CHANGE UP (ref 135–145)
WBC # BLD: 6.9 K/UL — SIGNIFICANT CHANGE UP (ref 3.8–10.5)
WBC # FLD AUTO: 6.9 K/UL — SIGNIFICANT CHANGE UP (ref 3.8–10.5)

## 2017-12-24 RX ORDER — DEXTROSE MONOHYDRATE, SODIUM CHLORIDE, AND POTASSIUM CHLORIDE 50; .745; 4.5 G/1000ML; G/1000ML; G/1000ML
1000 INJECTION, SOLUTION INTRAVENOUS
Qty: 0 | Refills: 0 | Status: DISCONTINUED | OUTPATIENT
Start: 2017-12-24 | End: 2017-12-25

## 2017-12-24 RX ADMIN — DEXTROSE MONOHYDRATE, SODIUM CHLORIDE, AND POTASSIUM CHLORIDE 125 MILLILITER(S): 50; .745; 4.5 INJECTION, SOLUTION INTRAVENOUS at 11:58

## 2017-12-24 RX ADMIN — DEXTROSE MONOHYDRATE, SODIUM CHLORIDE, AND POTASSIUM CHLORIDE 125 MILLILITER(S): 50; .745; 4.5 INJECTION, SOLUTION INTRAVENOUS at 18:05

## 2017-12-24 RX ADMIN — ENOXAPARIN SODIUM 40 MILLIGRAM(S): 100 INJECTION SUBCUTANEOUS at 11:44

## 2017-12-24 RX ADMIN — SODIUM CHLORIDE 150 MILLILITER(S): 9 INJECTION, SOLUTION INTRAVENOUS at 05:57

## 2017-12-24 RX ADMIN — PANTOPRAZOLE SODIUM 40 MILLIGRAM(S): 20 TABLET, DELAYED RELEASE ORAL at 11:44

## 2017-12-24 RX ADMIN — Medication 5 MILLIGRAM(S): at 18:02

## 2017-12-24 RX ADMIN — BENZOCAINE AND MENTHOL 1 LOZENGE: 5; 1 LIQUID ORAL at 11:17

## 2017-12-24 RX ADMIN — Medication 5 MILLIGRAM(S): at 05:55

## 2017-12-24 NOTE — PROGRESS NOTE ADULT - SUBJECTIVE AND OBJECTIVE BOX
B TEAM SURGERY DAILY PROGRESS NOTE:    S: Patient seen and examined. No acute events overnight. He endorses some discomfort from the NGT. He denies any nausea/vomiting/pain. He is negative for flatus and bowel movements.    O:  Vital Signs Last 24 Hrs  T(C): 36.8 (23 Dec 2017 23:45), Max: 36.8 (23 Dec 2017 23:45)  T(F): 98.2 (23 Dec 2017 23:45), Max: 98.2 (23 Dec 2017 23:45)  HR: 98 (23 Dec 2017 23:45) (87 - 98)  BP: 124/58 (23 Dec 2017 23:45) (124/58 - 142/87)  BP(mean): --  RR: 18 (23 Dec 2017 23:45) (18 - 18)  SpO2: 96% (23 Dec 2017 23:45) (96% - 99%)    I&O's Detail    22 Dec 2017 07:01  -  23 Dec 2017 07:00  --------------------------------------------------------  IN:  Total IN: 0 mL    OUT:    Nasoenteral Tube: 400 mL  Total OUT: 400 mL    Total NET: -400 mL      23 Dec 2017 07:01  -  24 Dec 2017 01:56  --------------------------------------------------------  IN:    lactated ringers.: 1350 mL  Total IN: 1350 mL    OUT:    Nasoenteral Tube: 225 mL    Voided: 700 mL  Total OUT: 925 mL    Total NET: 425 mL    MEDICATIONS  (STANDING):  dextrose 5%. 1000 milliLiter(s) (50 mL/Hr) IV Continuous <Continuous>  dextrose 50% Injectable 12.5 Gram(s) IV Push once  dextrose 50% Injectable 25 Gram(s) IV Push once  dextrose 50% Injectable 25 Gram(s) IV Push once  enoxaparin Injectable 40 milliGRAM(s) SubCutaneous daily  insulin lispro (HumaLOG) corrective regimen sliding scale   SubCutaneous every 6 hours  lactated ringers. 1000 milliLiter(s) (150 mL/Hr) IV Continuous <Continuous>  metoprolol    tartrate Injectable 5 milliGRAM(s) IV Push every 6 hours  pantoprazole  Injectable 40 milliGRAM(s) IV Push daily    MEDICATIONS  (PRN):  benzocaine 15 mG/menthol 3.6 mG Lozenge 1 Lozenge Oral every 2 hours PRN Sore Throat  dextrose Gel 1 Dose(s) Oral once PRN Blood Glucose LESS THAN 70 milliGRAM(s)/deciliter  glucagon  Injectable 1 milliGRAM(s) IntraMuscular once PRN Glucose LESS THAN 70 milligrams/deciliter                        11.9   6.86  )-----------( 221      ( 23 Dec 2017 06:00 )             38.7     12-23    144  |  106  |  26<H>  ----------------------------<  120<H>  4.1   |  24  |  1.02    Ca    8.8      23 Dec 2017 06:00  Phos  3.1     12-23  Mg     2.0     12-23    TPro  8.1  /  Alb  4.6  /  TBili  0.5  /  DBili  x   /  AST  30  /  ALT  33  /  AlkPhos  36<L>  12-22    Physical Exam:  Gen: Laying in bed, NAD, alert and oriented, NGT in place  Resp: Unlabored breathing  Abd: soft, non-tender, non-distended, significant ventral hernia to the right of midline    Lines:   IV: patent, in place.

## 2017-12-24 NOTE — PROGRESS NOTE ADULT - ASSESSMENT
A: 43 y/o M w/ diverticulitis s/p Isi's and ostomy reversal (2009 with Dr. White) c/b recurrent SBOs s/p two bowel resections (last 7/16), presented with abdominal pain, distention and nausea. Found to have a small bowel obstruction.    Plan:  - NPO/NGT  - IVF  - Monitor GI function  - Lovenox DVT ppx  - Lozenges for throat discomfort

## 2017-12-25 ENCOUNTER — TRANSCRIPTION ENCOUNTER (OUTPATIENT)
Age: 42
End: 2017-12-25

## 2017-12-25 LAB
BUN SERPL-MCNC: 15 MG/DL — SIGNIFICANT CHANGE UP (ref 7–23)
CALCIUM SERPL-MCNC: 8.5 MG/DL — SIGNIFICANT CHANGE UP (ref 8.4–10.5)
CHLORIDE SERPL-SCNC: 105 MMOL/L — SIGNIFICANT CHANGE UP (ref 98–107)
CO2 SERPL-SCNC: 26 MMOL/L — SIGNIFICANT CHANGE UP (ref 22–31)
CREAT SERPL-MCNC: 0.85 MG/DL — SIGNIFICANT CHANGE UP (ref 0.5–1.3)
GLUCOSE BLDC GLUCOMTR-MCNC: 111 MG/DL — HIGH (ref 70–99)
GLUCOSE BLDC GLUCOMTR-MCNC: 122 MG/DL — HIGH (ref 70–99)
GLUCOSE BLDC GLUCOMTR-MCNC: 144 MG/DL — HIGH (ref 70–99)
GLUCOSE BLDC GLUCOMTR-MCNC: 148 MG/DL — HIGH (ref 70–99)
GLUCOSE SERPL-MCNC: 139 MG/DL — HIGH (ref 70–99)
HCT VFR BLD CALC: 35.2 % — LOW (ref 39–50)
HGB BLD-MCNC: 10.5 G/DL — LOW (ref 13–17)
MAGNESIUM SERPL-MCNC: 2 MG/DL — SIGNIFICANT CHANGE UP (ref 1.6–2.6)
MCHC RBC-ENTMCNC: 26.3 PG — LOW (ref 27–34)
MCHC RBC-ENTMCNC: 29.8 % — LOW (ref 32–36)
MCV RBC AUTO: 88.2 FL — SIGNIFICANT CHANGE UP (ref 80–100)
NRBC # FLD: 0 — SIGNIFICANT CHANGE UP
PHOSPHATE SERPL-MCNC: 2.9 MG/DL — SIGNIFICANT CHANGE UP (ref 2.5–4.5)
PLATELET # BLD AUTO: 176 K/UL — SIGNIFICANT CHANGE UP (ref 150–400)
PMV BLD: 10.9 FL — SIGNIFICANT CHANGE UP (ref 7–13)
POTASSIUM SERPL-MCNC: 3.9 MMOL/L — SIGNIFICANT CHANGE UP (ref 3.5–5.3)
POTASSIUM SERPL-SCNC: 3.9 MMOL/L — SIGNIFICANT CHANGE UP (ref 3.5–5.3)
RBC # BLD: 3.99 M/UL — LOW (ref 4.2–5.8)
RBC # FLD: 15.1 % — HIGH (ref 10.3–14.5)
SODIUM SERPL-SCNC: 141 MMOL/L — SIGNIFICANT CHANGE UP (ref 135–145)
WBC # BLD: 5.72 K/UL — SIGNIFICANT CHANGE UP (ref 3.8–10.5)
WBC # FLD AUTO: 5.72 K/UL — SIGNIFICANT CHANGE UP (ref 3.8–10.5)

## 2017-12-25 RX ORDER — SODIUM CHLORIDE 9 MG/ML
3 INJECTION INTRAMUSCULAR; INTRAVENOUS; SUBCUTANEOUS EVERY 8 HOURS
Qty: 0 | Refills: 0 | Status: DISCONTINUED | OUTPATIENT
Start: 2017-12-25 | End: 2017-12-26

## 2017-12-25 RX ORDER — METOPROLOL TARTRATE 50 MG
100 TABLET ORAL DAILY
Qty: 0 | Refills: 0 | Status: DISCONTINUED | OUTPATIENT
Start: 2017-12-25 | End: 2017-12-26

## 2017-12-25 RX ORDER — PANTOPRAZOLE SODIUM 20 MG/1
40 TABLET, DELAYED RELEASE ORAL
Qty: 0 | Refills: 0 | Status: DISCONTINUED | OUTPATIENT
Start: 2017-12-25 | End: 2017-12-26

## 2017-12-25 RX ORDER — ATORVASTATIN CALCIUM 80 MG/1
20 TABLET, FILM COATED ORAL AT BEDTIME
Qty: 0 | Refills: 0 | Status: DISCONTINUED | OUTPATIENT
Start: 2017-12-25 | End: 2017-12-26

## 2017-12-25 RX ORDER — FENOFIBRATE,MICRONIZED 130 MG
145 CAPSULE ORAL DAILY
Qty: 0 | Refills: 0 | Status: DISCONTINUED | OUTPATIENT
Start: 2017-12-25 | End: 2017-12-26

## 2017-12-25 RX ORDER — VALSARTAN 80 MG/1
80 TABLET ORAL DAILY
Qty: 0 | Refills: 0 | Status: DISCONTINUED | OUTPATIENT
Start: 2017-12-25 | End: 2017-12-26

## 2017-12-25 RX ORDER — INSULIN LISPRO 100/ML
VIAL (ML) SUBCUTANEOUS
Qty: 0 | Refills: 0 | Status: DISCONTINUED | OUTPATIENT
Start: 2017-12-25 | End: 2017-12-26

## 2017-12-25 RX ORDER — DEXTROSE MONOHYDRATE, SODIUM CHLORIDE, AND POTASSIUM CHLORIDE 50; .745; 4.5 G/1000ML; G/1000ML; G/1000ML
1000 INJECTION, SOLUTION INTRAVENOUS
Qty: 0 | Refills: 0 | Status: DISCONTINUED | OUTPATIENT
Start: 2017-12-25 | End: 2017-12-25

## 2017-12-25 RX ADMIN — SODIUM CHLORIDE 3 MILLILITER(S): 9 INJECTION INTRAMUSCULAR; INTRAVENOUS; SUBCUTANEOUS at 14:10

## 2017-12-25 RX ADMIN — Medication 100 MILLIGRAM(S): at 14:08

## 2017-12-25 RX ADMIN — VALSARTAN 80 MILLIGRAM(S): 80 TABLET ORAL at 14:08

## 2017-12-25 RX ADMIN — Medication 145 MILLIGRAM(S): at 12:55

## 2017-12-25 RX ADMIN — DEXTROSE MONOHYDRATE, SODIUM CHLORIDE, AND POTASSIUM CHLORIDE 75 MILLILITER(S): 50; .745; 4.5 INJECTION, SOLUTION INTRAVENOUS at 06:25

## 2017-12-25 RX ADMIN — ENOXAPARIN SODIUM 40 MILLIGRAM(S): 100 INJECTION SUBCUTANEOUS at 12:31

## 2017-12-25 RX ADMIN — Medication 5 MILLIGRAM(S): at 06:24

## 2017-12-25 RX ADMIN — ATORVASTATIN CALCIUM 20 MILLIGRAM(S): 80 TABLET, FILM COATED ORAL at 21:43

## 2017-12-25 RX ADMIN — SODIUM CHLORIDE 3 MILLILITER(S): 9 INJECTION INTRAMUSCULAR; INTRAVENOUS; SUBCUTANEOUS at 21:37

## 2017-12-25 NOTE — DISCHARGE NOTE ADULT - CARE PLAN
Principal Discharge DX:	Abdominal pain  Goal:	recovery  Instructions for follow-up, activity and diet:	You have had obstruction of your bowel. If you go without passing flatus or bowel movements for an extended period of time and have symptoms similar to what brought you in, please call the office.

## 2017-12-25 NOTE — DISCHARGE NOTE ADULT - PLAN OF CARE
recovery You have had obstruction of your bowel. If you go without passing flatus or bowel movements for an extended period of time and have symptoms similar to what brought you in, please call the office.

## 2017-12-25 NOTE — DISCHARGE NOTE ADULT - OTHER SIGNIFICANT FINDINGS
< from: CT Abdomen and Pelvis No Cont (12.22.17 @ 21:20) >  IMPRESSION:   Small bowel obstruction with a transition point in the distal small bowel   exiting the ventral abdominal wall hernia slightly right of midline,   similar in presentation to prior studies.  No abdominal ascites or free air.    < end of copied text >

## 2017-12-25 NOTE — PROGRESS NOTE ADULT - ASSESSMENT
41 y/o M w/ diverticulitis s/p Isi's and ostomy reversal (2009 with Dr. White) c/b recurrent SBOs s/p two bowel resections (last 7/16), presented with abdominal pain, distention and nausea. Found to have a small bowel obstruction.    Plan:  - Diet: Full liquids  - IVF @ 75  - Monitor GI function  - AM labs   - Lovenox DVT ppx

## 2017-12-25 NOTE — PROGRESS NOTE ADULT - ATTENDING COMMENTS
Above noted. Tolerating a regular diet. Had a bowel movement earlier today.  Plan: Discharge this evening if stable.

## 2017-12-25 NOTE — DISCHARGE NOTE ADULT - HOSPITAL COURSE
This patient is a 43 y/o M w/ hx of HTN, HLD, DM, DVT (s/p surgery, previously on eliquis), diverticulitis s/p Isi's and ostomy reversal (2009 with Dr. White) c/b recurrent SBOs requiring two bowel resections (last 7/2016, both with Dr. White) who presented with abdominal pain, distention and nausea. In the ED an NGT was placed and per the patient he quickly filled a canister and felt immediate relief. He underwent a CT scan (results below) and was found to have a small bowel obstruction. During his hospital stay, he re-gained flatus and bowel movements, NGT was removed, and diet was advanced. At discharge, he was tolerating a diet and can follow-up with Dr. Whitney/Christopher on an as-needed basis.

## 2017-12-25 NOTE — PROGRESS NOTE ADULT - SUBJECTIVE AND OBJECTIVE BOX
B Team Surgery Progress Note - pager [phone]    Patient is a 42y old  Male who presents with a chief complaint of Abdominal pain (23 Dec 2017 04:02)      SUBJECTIVE: Patient seen and examined on B team morning rounds. No acute events overnight. Patient tolerating full liquids. Has been ambulating. Denies nausea/vomiting since removal of NGT.       OBJECTIVE:     ** Physical Exam **  Gen: Laying in bed, NAD, alert and oriented  Resp: Unlabored breathing  Abd: soft, non-tender, non-distended, significant ventral hernia to the right of midline      ** Vital signs / I&O's **    Vital Signs Last 24 Hrs  T(C): 36.8 (25 Dec 2017 00:27), Max: 37.1 (24 Dec 2017 11:43)  T(F): 98.3 (25 Dec 2017 00:27), Max: 98.8 (24 Dec 2017 11:43)  HR: 62 (25 Dec 2017 00:27) (62 - 91)  BP: 118/53 (25 Dec 2017 00:27) (109/68 - 138/74)  BP(mean): --  RR: 17 (25 Dec 2017 00:27) (17 - 18)  SpO2: 97% (25 Dec 2017 00:27) (96% - 99%)      23 Dec 2017 07:01  -  24 Dec 2017 07:00  --------------------------------------------------------  IN:    lactated ringers.: 3150 mL  Total IN: 3150 mL    OUT:    Nasoenteral Tube: 450 mL    Voided: 1200 mL  Total OUT: 1650 mL    Total NET: 1500 mL      24 Dec 2017 07:01  -  25 Dec 2017 04:58  --------------------------------------------------------  IN:    dextrose 5% + sodium chloride 0.45% with potassium chloride 20 mEq/L: 750 mL    lactated ringers.: 600 mL  Total IN: 1350 mL    OUT:    Nasoenteral Tube: 100 mL  Total OUT: 100 mL    Total NET: 1250 mL            ** Labs **                          11.0   6.90  )-----------( 194      ( 24 Dec 2017 06:12 )             36.9     24 Dec 2017 18:43    145    |  107    |  19     ----------------------------<  125    3.9     |  25     |  0.82     Ca    8.7        24 Dec 2017 18:43  Phos  3.1       23 Dec 2017 06:00  Mg     2.0       23 Dec 2017 06:00        CAPILLARY BLOOD GLUCOSE      POCT Blood Glucose.: 126 mg/dL (24 Dec 2017 23:45)  POCT Blood Glucose.: 123 mg/dL (24 Dec 2017 18:25)  POCT Blood Glucose.: 91 mg/dL (24 Dec 2017 11:52)  POCT Blood Glucose.: 106 mg/dL (24 Dec 2017 05:59)              MEDICATIONS  (STANDING):  dextrose 5% + sodium chloride 0.45% with potassium chloride 20 mEq/L 1000 milliLiter(s) (125 mL/Hr) IV Continuous <Continuous>  dextrose 5%. 1000 milliLiter(s) (50 mL/Hr) IV Continuous <Continuous>  dextrose 50% Injectable 12.5 Gram(s) IV Push once  dextrose 50% Injectable 25 Gram(s) IV Push once  dextrose 50% Injectable 25 Gram(s) IV Push once  enoxaparin Injectable 40 milliGRAM(s) SubCutaneous daily  insulin lispro (HumaLOG) corrective regimen sliding scale   SubCutaneous Before meals and at bedtime  metoprolol    tartrate Injectable 5 milliGRAM(s) IV Push every 6 hours  pantoprazole  Injectable 40 milliGRAM(s) IV Push daily    MEDICATIONS  (PRN):  benzocaine 15 mG/menthol 3.6 mG Lozenge 1 Lozenge Oral every 2 hours PRN Sore Throat  dextrose Gel 1 Dose(s) Oral once PRN Blood Glucose LESS THAN 70 milliGRAM(s)/deciliter  glucagon  Injectable 1 milliGRAM(s) IntraMuscular once PRN Glucose LESS THAN 70 milligrams/deciliter

## 2017-12-25 NOTE — DISCHARGE NOTE ADULT - PATIENT PORTAL LINK FT
“You can access the [facility] Patient Portal, offered by [facility], by registering with the following website: [email or web address]”

## 2017-12-25 NOTE — DISCHARGE NOTE ADULT - CARE PROVIDER_API CALL
Hubert Whitney), Surgery  [address] [facility]  [address] [place] [address]  Phone: [phone]  Fax: [phone]

## 2017-12-26 VITALS
TEMPERATURE: 98 F | RESPIRATION RATE: 17 BRPM | OXYGEN SATURATION: 98 % | DIASTOLIC BLOOD PRESSURE: 60 MMHG | SYSTOLIC BLOOD PRESSURE: 121 MMHG | HEART RATE: 67 BPM

## 2017-12-26 LAB
GLUCOSE BLDC GLUCOMTR-MCNC: 113 MG/DL — HIGH (ref 70–99)
GLUCOSE BLDC GLUCOMTR-MCNC: 126 MG/DL — HIGH (ref 70–99)

## 2017-12-26 RX ADMIN — SODIUM CHLORIDE 3 MILLILITER(S): 9 INJECTION INTRAMUSCULAR; INTRAVENOUS; SUBCUTANEOUS at 05:37

## 2017-12-26 RX ADMIN — PANTOPRAZOLE SODIUM 40 MILLIGRAM(S): 20 TABLET, DELAYED RELEASE ORAL at 05:36

## 2017-12-26 RX ADMIN — ENOXAPARIN SODIUM 40 MILLIGRAM(S): 100 INJECTION SUBCUTANEOUS at 11:34

## 2017-12-26 RX ADMIN — VALSARTAN 80 MILLIGRAM(S): 80 TABLET ORAL at 05:36

## 2017-12-26 RX ADMIN — Medication 145 MILLIGRAM(S): at 11:34

## 2017-12-26 RX ADMIN — Medication 100 MILLIGRAM(S): at 05:36

## 2017-12-26 NOTE — PROGRESS NOTE ADULT - SUBJECTIVE AND OBJECTIVE BOX
B TEAM SURGERY DAILY PROGRESS NOTE:    S: Patient seen and examined. No acute events overnight. He did not feel comfortable leaving last night, despite having tolerated lunch and dinner. He is positive for flatus and bowel movements and will be leaving this morning.     O:  Vital Signs Last 24 Hrs  T(C): 36.9 (26 Dec 2017 09:49), Max: 36.9 (25 Dec 2017 14:06)  T(F): 98.5 (26 Dec 2017 09:49), Max: 98.5 (26 Dec 2017 09:49)  HR: 67 (26 Dec 2017 09:49) (65 - 73)  BP: 121/60 (26 Dec 2017 09:49) (105/67 - 131/82)  BP(mean): --  RR: 17 (26 Dec 2017 09:49) (17 - 18)  SpO2: 98% (26 Dec 2017 09:49) (96% - 98%)    I&O's Detail    25 Dec 2017 07:01  -  26 Dec 2017 07:00  --------------------------------------------------------  IN:    Oral Fluid: 720 mL  Total IN: 720 mL    OUT:  Total OUT: 0 mL    Total NET: 720 mL    MEDICATIONS  (STANDING):  atorvastatin 20 milliGRAM(s) Oral at bedtime  dextrose 5%. 1000 milliLiter(s) (50 mL/Hr) IV Continuous <Continuous>  dextrose 50% Injectable 12.5 Gram(s) IV Push once  dextrose 50% Injectable 25 Gram(s) IV Push once  dextrose 50% Injectable 25 Gram(s) IV Push once  enoxaparin Injectable 40 milliGRAM(s) SubCutaneous daily  fenofibrate Tablet 145 milliGRAM(s) Oral daily  insulin lispro (HumaLOG) corrective regimen sliding scale   SubCutaneous Before meals and at bedtime  metoprolol succinate  milliGRAM(s) Oral daily  pantoprazole    Tablet 40 milliGRAM(s) Oral before breakfast  sodium chloride 0.9% lock flush 3 milliLiter(s) IV Push every 8 hours  valsartan 80 milliGRAM(s) Oral daily    MEDICATIONS  (PRN):  dextrose Gel 1 Dose(s) Oral once PRN Blood Glucose LESS THAN 70 milliGRAM(s)/deciliter  glucagon  Injectable 1 milliGRAM(s) IntraMuscular once PRN Glucose LESS THAN 70 milligrams/deciliter                    10.5   5.72  )-----------( 176      ( 25 Dec 2017 06:40 )             35.2   12-25    141  |  105  |  15  ----------------------------<  139<H>  3.9   |  26  |  0.85    Ca    8.5      25 Dec 2017 06:40  Phos  2.9     12-25  Mg     2.0     12-25    Physical Exam:  Gen: Laying in bed, NAD, alert and oriented.   Resp: Unlabored breathing  Abd: soft, NT, ND, ventral hernia to the R of midline    Lines:   IV: patent, in place.

## 2017-12-26 NOTE — PROGRESS NOTE ADULT - ASSESSMENT
A: 41 y/o M w/ diverticulitis s/p Isi's and ostomy reversal (2009 with Dr. White) c/b recurrent SBOs s/p two bowel resections (last 7/16), presented with abdominal pain, distention and nausea. Found to have a small bowel obstruction.    Plan:  - Regular diet  - Monitor GI function  - d/c

## 2018-01-23 ENCOUNTER — INPATIENT (INPATIENT)
Facility: HOSPITAL | Age: 43
LOS: 2 days | Discharge: HOME | End: 2018-01-26
Attending: SURGERY | Admitting: SURGERY
Payer: COMMERCIAL

## 2018-01-23 VITALS
DIASTOLIC BLOOD PRESSURE: 80 MMHG | OXYGEN SATURATION: 100 % | HEART RATE: 110 BPM | TEMPERATURE: 98 F | RESPIRATION RATE: 18 BRPM | SYSTOLIC BLOOD PRESSURE: 131 MMHG

## 2018-01-23 DIAGNOSIS — Z98.89 OTHER SPECIFIED POSTPROCEDURAL STATES: Chronic | ICD-10-CM

## 2018-01-23 DIAGNOSIS — K56.609 UNSPECIFIED INTESTINAL OBSTRUCTION, UNSPECIFIED AS TO PARTIAL VERSUS COMPLETE OBSTRUCTION: ICD-10-CM

## 2018-01-23 LAB
ALBUMIN SERPL ELPH-MCNC: 4.7 G/DL — SIGNIFICANT CHANGE UP (ref 3.3–5)
ALP SERPL-CCNC: 35 U/L — LOW (ref 40–120)
ALT FLD-CCNC: 26 U/L — SIGNIFICANT CHANGE UP (ref 4–41)
ANISOCYTOSIS BLD QL: SLIGHT — SIGNIFICANT CHANGE UP
APPEARANCE UR: CLEAR — SIGNIFICANT CHANGE UP
AST SERPL-CCNC: 19 U/L — SIGNIFICANT CHANGE UP (ref 4–40)
BACTERIA # UR AUTO: SIGNIFICANT CHANGE UP
BASE EXCESS BLDV CALC-SCNC: 1.2 MMOL/L — SIGNIFICANT CHANGE UP
BASOPHILS # BLD AUTO: 0.02 K/UL — SIGNIFICANT CHANGE UP (ref 0–0.2)
BASOPHILS NFR BLD AUTO: 0.2 % — SIGNIFICANT CHANGE UP (ref 0–2)
BASOPHILS NFR SPEC: 0 % — SIGNIFICANT CHANGE UP (ref 0–2)
BILIRUB SERPL-MCNC: 0.8 MG/DL — SIGNIFICANT CHANGE UP (ref 0.2–1.2)
BILIRUB UR-MCNC: NEGATIVE — SIGNIFICANT CHANGE UP
BLOOD GAS VENOUS - CREATININE: 0.99 MG/DL — SIGNIFICANT CHANGE UP (ref 0.5–1.3)
BLOOD UR QL VISUAL: NEGATIVE — SIGNIFICANT CHANGE UP
BUN SERPL-MCNC: 28 MG/DL — HIGH (ref 7–23)
CALCIUM SERPL-MCNC: 9.4 MG/DL — SIGNIFICANT CHANGE UP (ref 8.4–10.5)
CHLORIDE BLDV-SCNC: 106 MMOL/L — SIGNIFICANT CHANGE UP (ref 96–108)
CHLORIDE SERPL-SCNC: 105 MMOL/L — SIGNIFICANT CHANGE UP (ref 98–107)
CO2 SERPL-SCNC: 25 MMOL/L — SIGNIFICANT CHANGE UP (ref 22–31)
COLOR SPEC: YELLOW — SIGNIFICANT CHANGE UP
CREAT SERPL-MCNC: 1.17 MG/DL — SIGNIFICANT CHANGE UP (ref 0.5–1.3)
EOSINOPHIL # BLD AUTO: 0.04 K/UL — SIGNIFICANT CHANGE UP (ref 0–0.5)
EOSINOPHIL NFR BLD AUTO: 0.4 % — SIGNIFICANT CHANGE UP (ref 0–6)
EOSINOPHIL NFR FLD: 0 % — SIGNIFICANT CHANGE UP (ref 0–6)
GAS PNL BLDV: 140 MMOL/L — SIGNIFICANT CHANGE UP (ref 136–146)
GIANT PLATELETS BLD QL SMEAR: PRESENT — SIGNIFICANT CHANGE UP
GLUCOSE BLDV-MCNC: 142 — HIGH (ref 70–99)
GLUCOSE SERPL-MCNC: 151 MG/DL — HIGH (ref 70–99)
GLUCOSE UR-MCNC: >1000 — HIGH
HCO3 BLDV-SCNC: 24 MMOL/L — SIGNIFICANT CHANGE UP (ref 20–27)
HCT VFR BLD CALC: 44.7 % — SIGNIFICANT CHANGE UP (ref 39–50)
HCT VFR BLDV CALC: 39.3 % — SIGNIFICANT CHANGE UP (ref 39–51)
HGB BLD-MCNC: 13.2 G/DL — SIGNIFICANT CHANGE UP (ref 13–17)
HGB BLDV-MCNC: 12.8 G/DL — LOW (ref 13–17)
HYPOCHROMIA BLD QL: SLIGHT — SIGNIFICANT CHANGE UP
IMM GRANULOCYTES # BLD AUTO: 0.03 # — SIGNIFICANT CHANGE UP
IMM GRANULOCYTES NFR BLD AUTO: 0.3 % — SIGNIFICANT CHANGE UP (ref 0–1.5)
KETONES UR-MCNC: SIGNIFICANT CHANGE UP
LACTATE BLDV-MCNC: 1.6 MMOL/L — SIGNIFICANT CHANGE UP (ref 0.5–2)
LEUKOCYTE ESTERASE UR-ACNC: NEGATIVE — SIGNIFICANT CHANGE UP
LIDOCAIN IGE QN: 20.4 U/L — SIGNIFICANT CHANGE UP (ref 7–60)
LYMPHOCYTES # BLD AUTO: 1.94 K/UL — SIGNIFICANT CHANGE UP (ref 1–3.3)
LYMPHOCYTES # BLD AUTO: 19.2 % — SIGNIFICANT CHANGE UP (ref 13–44)
LYMPHOCYTES NFR SPEC AUTO: 22.4 % — SIGNIFICANT CHANGE UP (ref 13–44)
MCHC RBC-ENTMCNC: 25.9 PG — LOW (ref 27–34)
MCHC RBC-ENTMCNC: 29.5 % — LOW (ref 32–36)
MCV RBC AUTO: 87.8 FL — SIGNIFICANT CHANGE UP (ref 80–100)
METAMYELOCYTES # FLD: 0.9 % — SIGNIFICANT CHANGE UP (ref 0–1)
MICROCYTES BLD QL: SLIGHT — SIGNIFICANT CHANGE UP
MONOCYTES # BLD AUTO: 1.02 K/UL — HIGH (ref 0–0.9)
MONOCYTES NFR BLD AUTO: 10.1 % — SIGNIFICANT CHANGE UP (ref 2–14)
MONOCYTES NFR BLD: 8.6 % — SIGNIFICANT CHANGE UP (ref 2–9)
MUCOUS THREADS # UR AUTO: SIGNIFICANT CHANGE UP
NEUTROPHIL AB SER-ACNC: 51.7 % — SIGNIFICANT CHANGE UP (ref 43–77)
NEUTROPHILS # BLD AUTO: 7.05 K/UL — SIGNIFICANT CHANGE UP (ref 1.8–7.4)
NEUTROPHILS NFR BLD AUTO: 69.8 % — SIGNIFICANT CHANGE UP (ref 43–77)
NEUTS BAND # BLD: 9.5 % — HIGH (ref 0–6)
NITRITE UR-MCNC: NEGATIVE — SIGNIFICANT CHANGE UP
NON-SQ EPI CELLS # UR AUTO: <1 — SIGNIFICANT CHANGE UP
NRBC # FLD: 0 — SIGNIFICANT CHANGE UP
OVALOCYTES BLD QL SMEAR: SLIGHT — SIGNIFICANT CHANGE UP
PCO2 BLDV: 44 MMHG — SIGNIFICANT CHANGE UP (ref 41–51)
PH BLDV: 7.39 PH — SIGNIFICANT CHANGE UP (ref 7.32–7.43)
PH UR: 6.5 — SIGNIFICANT CHANGE UP (ref 4.6–8)
PLATELET # BLD AUTO: 279 K/UL — SIGNIFICANT CHANGE UP (ref 150–400)
PLATELET COUNT - ESTIMATE: NORMAL — SIGNIFICANT CHANGE UP
PMV BLD: 10.7 FL — SIGNIFICANT CHANGE UP (ref 7–13)
PO2 BLDV: 36 MMHG — SIGNIFICANT CHANGE UP (ref 35–40)
POTASSIUM BLDV-SCNC: 3.8 MMOL/L — SIGNIFICANT CHANGE UP (ref 3.4–4.5)
POTASSIUM SERPL-MCNC: 4.2 MMOL/L — SIGNIFICANT CHANGE UP (ref 3.5–5.3)
POTASSIUM SERPL-SCNC: 4.2 MMOL/L — SIGNIFICANT CHANGE UP (ref 3.5–5.3)
PROT SERPL-MCNC: 8 G/DL — SIGNIFICANT CHANGE UP (ref 6–8.3)
PROT UR-MCNC: 20 MG/DL — SIGNIFICANT CHANGE UP
RBC # BLD: 5.09 M/UL — SIGNIFICANT CHANGE UP (ref 4.2–5.8)
RBC # FLD: 15.9 % — HIGH (ref 10.3–14.5)
RBC CASTS # UR COMP ASSIST: HIGH (ref 0–?)
REVIEW TO FOLLOW: YES — SIGNIFICANT CHANGE UP
SAO2 % BLDV: 62.3 % — SIGNIFICANT CHANGE UP (ref 60–85)
SODIUM SERPL-SCNC: 144 MMOL/L — SIGNIFICANT CHANGE UP (ref 135–145)
SP GR SPEC: > 1.04 — HIGH (ref 1–1.04)
UROBILINOGEN FLD QL: 1 MG/DL — SIGNIFICANT CHANGE UP
VARIANT LYMPHS # BLD: 6.9 % — SIGNIFICANT CHANGE UP
WBC # BLD: 10.1 K/UL — SIGNIFICANT CHANGE UP (ref 3.8–10.5)
WBC # FLD AUTO: 10.1 K/UL — SIGNIFICANT CHANGE UP (ref 3.8–10.5)
WBC UR QL: SIGNIFICANT CHANGE UP (ref 0–?)

## 2018-01-23 PROCEDURE — 74176 CT ABD & PELVIS W/O CONTRAST: CPT | Mod: 26

## 2018-01-23 PROCEDURE — 74019 RADEX ABDOMEN 2 VIEWS: CPT | Mod: 26

## 2018-01-23 PROCEDURE — 99222 1ST HOSP IP/OBS MODERATE 55: CPT | Mod: GC

## 2018-01-23 RX ORDER — DEXTROSE 50 % IN WATER 50 %
25 SYRINGE (ML) INTRAVENOUS ONCE
Qty: 0 | Refills: 0 | Status: DISCONTINUED | OUTPATIENT
Start: 2018-01-23 | End: 2018-01-26

## 2018-01-23 RX ORDER — METOPROLOL TARTRATE 50 MG
5 TABLET ORAL EVERY 6 HOURS
Qty: 0 | Refills: 0 | Status: DISCONTINUED | OUTPATIENT
Start: 2018-01-23 | End: 2018-01-26

## 2018-01-23 RX ORDER — FENOFIBRATE,MICRONIZED 130 MG
145 CAPSULE ORAL DAILY
Qty: 0 | Refills: 0 | Status: DISCONTINUED | OUTPATIENT
Start: 2018-01-23 | End: 2018-01-26

## 2018-01-23 RX ORDER — SODIUM CHLORIDE 9 MG/ML
1000 INJECTION INTRAMUSCULAR; INTRAVENOUS; SUBCUTANEOUS ONCE
Qty: 0 | Refills: 0 | Status: COMPLETED | OUTPATIENT
Start: 2018-01-23 | End: 2018-01-23

## 2018-01-23 RX ORDER — INSULIN LISPRO 100/ML
VIAL (ML) SUBCUTANEOUS EVERY 6 HOURS
Qty: 0 | Refills: 0 | Status: DISCONTINUED | OUTPATIENT
Start: 2018-01-23 | End: 2018-01-26

## 2018-01-23 RX ORDER — SODIUM CHLORIDE 9 MG/ML
1000 INJECTION INTRAMUSCULAR; INTRAVENOUS; SUBCUTANEOUS
Qty: 0 | Refills: 0 | Status: DISCONTINUED | OUTPATIENT
Start: 2018-01-23 | End: 2018-01-24

## 2018-01-23 RX ORDER — SODIUM CHLORIDE 9 MG/ML
1000 INJECTION, SOLUTION INTRAVENOUS
Qty: 0 | Refills: 0 | Status: DISCONTINUED | OUTPATIENT
Start: 2018-01-23 | End: 2018-01-26

## 2018-01-23 RX ORDER — ONDANSETRON 8 MG/1
4 TABLET, FILM COATED ORAL ONCE
Qty: 0 | Refills: 0 | Status: COMPLETED | OUTPATIENT
Start: 2018-01-23 | End: 2018-01-23

## 2018-01-23 RX ORDER — DEXTROSE 50 % IN WATER 50 %
12.5 SYRINGE (ML) INTRAVENOUS ONCE
Qty: 0 | Refills: 0 | Status: DISCONTINUED | OUTPATIENT
Start: 2018-01-23 | End: 2018-01-26

## 2018-01-23 RX ORDER — HEPARIN SODIUM 5000 [USP'U]/ML
5000 INJECTION INTRAVENOUS; SUBCUTANEOUS EVERY 8 HOURS
Qty: 0 | Refills: 0 | Status: DISCONTINUED | OUTPATIENT
Start: 2018-01-23 | End: 2018-01-26

## 2018-01-23 RX ORDER — GLUCAGON INJECTION, SOLUTION 0.5 MG/.1ML
1 INJECTION, SOLUTION SUBCUTANEOUS ONCE
Qty: 0 | Refills: 0 | Status: DISCONTINUED | OUTPATIENT
Start: 2018-01-23 | End: 2018-01-26

## 2018-01-23 RX ORDER — ATORVASTATIN CALCIUM 80 MG/1
20 TABLET, FILM COATED ORAL AT BEDTIME
Qty: 0 | Refills: 0 | Status: DISCONTINUED | OUTPATIENT
Start: 2018-01-23 | End: 2018-01-24

## 2018-01-23 RX ORDER — VALSARTAN 80 MG/1
80 TABLET ORAL DAILY
Qty: 0 | Refills: 0 | Status: DISCONTINUED | OUTPATIENT
Start: 2018-01-23 | End: 2018-01-24

## 2018-01-23 RX ORDER — ACETAMINOPHEN 500 MG
1000 TABLET ORAL ONCE
Qty: 0 | Refills: 0 | Status: COMPLETED | OUTPATIENT
Start: 2018-01-23 | End: 2018-01-23

## 2018-01-23 RX ORDER — DEXTROSE 50 % IN WATER 50 %
1 SYRINGE (ML) INTRAVENOUS ONCE
Qty: 0 | Refills: 0 | Status: DISCONTINUED | OUTPATIENT
Start: 2018-01-23 | End: 2018-01-26

## 2018-01-23 RX ORDER — PANTOPRAZOLE SODIUM 20 MG/1
40 TABLET, DELAYED RELEASE ORAL
Qty: 0 | Refills: 0 | Status: DISCONTINUED | OUTPATIENT
Start: 2018-01-23 | End: 2018-01-24

## 2018-01-23 RX ADMIN — Medication 1000 MILLIGRAM(S): at 13:15

## 2018-01-23 RX ADMIN — ONDANSETRON 4 MILLIGRAM(S): 8 TABLET, FILM COATED ORAL at 12:26

## 2018-01-23 RX ADMIN — SODIUM CHLORIDE 120 MILLILITER(S): 9 INJECTION INTRAMUSCULAR; INTRAVENOUS; SUBCUTANEOUS at 21:57

## 2018-01-23 RX ADMIN — SODIUM CHLORIDE 1000 MILLILITER(S): 9 INJECTION INTRAMUSCULAR; INTRAVENOUS; SUBCUTANEOUS at 12:26

## 2018-01-23 RX ADMIN — HEPARIN SODIUM 5000 UNIT(S): 5000 INJECTION INTRAVENOUS; SUBCUTANEOUS at 22:21

## 2018-01-23 RX ADMIN — SODIUM CHLORIDE 1000 MILLILITER(S): 9 INJECTION INTRAMUSCULAR; INTRAVENOUS; SUBCUTANEOUS at 14:40

## 2018-01-23 RX ADMIN — SODIUM CHLORIDE 120 MILLILITER(S): 9 INJECTION INTRAMUSCULAR; INTRAVENOUS; SUBCUTANEOUS at 17:01

## 2018-01-23 RX ADMIN — Medication 400 MILLIGRAM(S): at 12:40

## 2018-01-23 NOTE — H&P ADULT - NSHPLABSRESULTS_GEN_ALL_CORE
13.2   10.10 )-----------( 279      ( 23 Jan 2018 11:40 )             44.7   01-23    144  |  105  |  28<H>  ----------------------------<  151<H>  4.2   |  25  |  1.17    Ca    9.4      23 Jan 2018 11:40    TPro  8.0  /  Alb  4.7  /  TBili  0.8  /  DBili  x   /  AST  19  /  ALT  26  /  AlkPhos  35<L>  01-23    < from: CT Abdomen and Pelvis No Cont (01.23.18 @ 15:32) >    IMPRESSION:     Small bowel obstruction with transition point in a right lower quadrant   ventral wall hernia.     Mild mesenteric edema is present, suspicious for closed loop.    < end of copied text >

## 2018-01-23 NOTE — H&P ADULT - NSHPPHYSICALEXAM_GEN_ALL_CORE
General: well developed, well nourished, NAD  Neuro: alert and oriented, no focal deficits, moves all extremities spontaneously  HEENT: NCAT, EOMI, anicteric, mucosa moist  Respiratory: airway patent, respirations unlabored  CVS: regular rate and rhythm  Abdomen: moderately distended, minimal tenderness, no rebound, no guarding, palpable, reducible hernia.  Extremities: no edema, sensation and movement grossly intact  Skin: warm, dry, appropriate color

## 2018-01-23 NOTE — ED ADULT NURSE NOTE - ED STAT RN HANDOFF DETAILS
handoff report given to SOSA Major pt in NAD, awaiting transportation.  Will continue to monitor patient closely.

## 2018-01-23 NOTE — ED ADULT TRIAGE NOTE - CHIEF COMPLAINT QUOTE
Co diffuse upper abdominal pain, nausea and diarrhea since yesterday. Hx of SBO. States pain is similar to when he had an obstruction.

## 2018-01-23 NOTE — ED PROVIDER NOTE - MEDICAL DECISION MAKING DETAILS
Epigastric abdominal pain, similar in nature to previous SBO Concern for pancreatitis, obstruction, gastroenteritis. Will give IVFs and antiemetics reassess.

## 2018-01-23 NOTE — ED ADULT NURSE NOTE - OBJECTIVE STATEMENT
received pt A&Ox3 in no apparent distress at this time. #20g IVl to R AC, bloods drawn and sent to the lab. no s/s of infiltration noted at this time. family and MD at bedside. cardiac monitor in place. vss. pending MD reyna.

## 2018-01-23 NOTE — H&P ADULT - NSHPSOCIALHISTORY_GEN_ALL_CORE
Denies EtOH, tobacco, illicit drug use. Currently employed as a school psychologist. Lives at home with mother, father and sister.

## 2018-01-23 NOTE — ED PROVIDER NOTE - NS ED ROS FT
CONSTITUTIONAL: No fevers, no chills  Eyes: no visual changes  Ears: no ear drainage, no ear pain  Nose: no nasal congestion  Mouth/Throat: no sore throat  Cardiovascular: No Chest pain  Respiratory: No SOB  Gastrointestinal: +nausea, +abd pain  Genitourinary: no dysuria, no hematuria  SKIN: no rashes.  NEURO: no headache  PSYCHIATRIC: no known mental health issues.

## 2018-01-23 NOTE — ED PROVIDER NOTE - OBJECTIVE STATEMENT
42 YOM pmh of DM, HTN, HLD, SBO s/p resections (total of 4 last july 2016),  p/w abdominal pain and nausea. Epigastric abdominal pain and distension since last night progressively worsening. Pt endorses nausea no vomiting. Pt also had diarrhea last episode 30 minutes ago. Pt denies any blood in stools or dark tarry stools, denies fevers, chills, denies chest pain. Pt states pain is similar to previous SBOs.

## 2018-01-23 NOTE — H&P ADULT - HISTORY OF PRESENT ILLNESS
HPI: 42 year old male with history of multiple small bowel obstructions, well known to the surgical service, presenting with abdominal pain, distension and nausea consistent with prior episodes of SBO. Patient started having discomfort yesterday evening, last flatus overnight, and last BM (loose) this AM. Continued to have some nausea, and came to the ED for further management. Patient has significant allergy to both IV and PO contrast and underwent a non-con CT that showed evidence of bowel obstruction with ventral hernia. Denies fevers, chills.    PMHx: HTN, HLD, DM II, diverticulitis s/p Hartmanns (2009) and reversal (2009), SBO with SBR (most recently 2016), DVT s/p eliquis now off A/C, DM II, brain abscess    PSHx: Isi's, reversal, ex-lap/SBR    Medications (home): crestor, metoprolol, diovan, protonix  Allergies: ceftriaxone (Rash), cephalosporins (Rash), Cipro (Unknown), contrast media (iodine-based) (Rash), Flagyl (Unknown)  iodine containing compounds (Rash)  morphine (Rash)    Social Hx: Denies tobacco, EtOH, illicit drug use.    Physical exam significant for abdominal distension with minimal tenderness    Imaging and labs remarkable for evidence of SBO

## 2018-01-23 NOTE — H&P ADULT - ASSESSMENT
42 year old male with recurrent small bowel obstruction, hemodynamically stable, afebrile.    - lower concern for closed loop SBO by clinical metrics  - NPO, NG tube if nausea worsen or patient vomits  - serial abdominal exams  - await return of bowel function    discussed with Dr. White  --CARMELINA Sanabria, [id number]

## 2018-01-24 LAB
BASE EXCESS BLDV CALC-SCNC: -1.2 MMOL/L — SIGNIFICANT CHANGE UP
BLOOD GAS VENOUS - CREATININE: 0.91 MG/DL — SIGNIFICANT CHANGE UP (ref 0.5–1.3)
BUN SERPL-MCNC: 24 MG/DL — HIGH (ref 7–23)
CALCIUM SERPL-MCNC: 8 MG/DL — LOW (ref 8.4–10.5)
CHLORIDE BLDV-SCNC: 111 MMOL/L — HIGH (ref 96–108)
CHLORIDE SERPL-SCNC: 109 MMOL/L — HIGH (ref 98–107)
CO2 SERPL-SCNC: 22 MMOL/L — SIGNIFICANT CHANGE UP (ref 22–31)
CREAT SERPL-MCNC: 0.95 MG/DL — SIGNIFICANT CHANGE UP (ref 0.5–1.3)
GAS PNL BLDV: 141 MMOL/L — SIGNIFICANT CHANGE UP (ref 136–146)
GLUCOSE BLDC GLUCOMTR-MCNC: 101 MG/DL — HIGH (ref 70–99)
GLUCOSE BLDC GLUCOMTR-MCNC: 111 MG/DL — HIGH (ref 70–99)
GLUCOSE BLDC GLUCOMTR-MCNC: 113 MG/DL — HIGH (ref 70–99)
GLUCOSE BLDC GLUCOMTR-MCNC: 121 MG/DL — HIGH (ref 70–99)
GLUCOSE BLDC GLUCOMTR-MCNC: 89 MG/DL — SIGNIFICANT CHANGE UP (ref 70–99)
GLUCOSE BLDV-MCNC: 92 — SIGNIFICANT CHANGE UP (ref 70–99)
GLUCOSE SERPL-MCNC: 102 MG/DL — HIGH (ref 70–99)
HCO3 BLDV-SCNC: 23 MMOL/L — SIGNIFICANT CHANGE UP (ref 20–27)
HCT VFR BLD CALC: 35.6 % — LOW (ref 39–50)
HCT VFR BLDV CALC: 46.4 % — SIGNIFICANT CHANGE UP (ref 39–51)
HGB BLD-MCNC: 10.8 G/DL — LOW (ref 13–17)
HGB BLDV-MCNC: 15.2 G/DL — SIGNIFICANT CHANGE UP (ref 13–17)
LACTATE BLDV-MCNC: 1.2 MMOL/L — SIGNIFICANT CHANGE UP (ref 0.5–2)
MAGNESIUM SERPL-MCNC: 1.9 MG/DL — SIGNIFICANT CHANGE UP (ref 1.6–2.6)
MCHC RBC-ENTMCNC: 26.1 PG — LOW (ref 27–34)
MCHC RBC-ENTMCNC: 30.3 % — LOW (ref 32–36)
MCV RBC AUTO: 86 FL — SIGNIFICANT CHANGE UP (ref 80–100)
NRBC # FLD: 0 — SIGNIFICANT CHANGE UP
PCO2 BLDV: 41 MMHG — SIGNIFICANT CHANGE UP (ref 41–51)
PH BLDV: 7.37 PH — SIGNIFICANT CHANGE UP (ref 7.32–7.43)
PHOSPHATE SERPL-MCNC: 2.2 MG/DL — LOW (ref 2.5–4.5)
PLATELET # BLD AUTO: 181 K/UL — SIGNIFICANT CHANGE UP (ref 150–400)
PMV BLD: 10.8 FL — SIGNIFICANT CHANGE UP (ref 7–13)
PO2 BLDV: 62 MMHG — HIGH (ref 35–40)
POTASSIUM BLDV-SCNC: 3.6 MMOL/L — SIGNIFICANT CHANGE UP (ref 3.4–4.5)
POTASSIUM SERPL-MCNC: 3.7 MMOL/L — SIGNIFICANT CHANGE UP (ref 3.5–5.3)
POTASSIUM SERPL-SCNC: 3.7 MMOL/L — SIGNIFICANT CHANGE UP (ref 3.5–5.3)
RBC # BLD: 4.14 M/UL — LOW (ref 4.2–5.8)
RBC # FLD: 15.9 % — HIGH (ref 10.3–14.5)
SAO2 % BLDV: 89.2 % — HIGH (ref 60–85)
SODIUM SERPL-SCNC: 143 MMOL/L — SIGNIFICANT CHANGE UP (ref 135–145)
WBC # BLD: 4.42 K/UL — SIGNIFICANT CHANGE UP (ref 3.8–10.5)
WBC # FLD AUTO: 4.42 K/UL — SIGNIFICANT CHANGE UP (ref 3.8–10.5)

## 2018-01-24 PROCEDURE — 99232 SBSQ HOSP IP/OBS MODERATE 35: CPT | Mod: GC

## 2018-01-24 RX ORDER — DEXTROSE MONOHYDRATE, SODIUM CHLORIDE, AND POTASSIUM CHLORIDE 50; .745; 4.5 G/1000ML; G/1000ML; G/1000ML
1000 INJECTION, SOLUTION INTRAVENOUS
Qty: 0 | Refills: 0 | Status: DISCONTINUED | OUTPATIENT
Start: 2018-01-24 | End: 2018-01-26

## 2018-01-24 RX ORDER — BENZOCAINE AND MENTHOL 5; 1 G/100ML; G/100ML
1 LIQUID ORAL
Qty: 0 | Refills: 0 | Status: DISCONTINUED | OUTPATIENT
Start: 2018-01-24 | End: 2018-01-26

## 2018-01-24 RX ORDER — PANTOPRAZOLE SODIUM 20 MG/1
40 TABLET, DELAYED RELEASE ORAL DAILY
Qty: 0 | Refills: 0 | Status: DISCONTINUED | OUTPATIENT
Start: 2018-01-24 | End: 2018-01-26

## 2018-01-24 RX ORDER — MAGNESIUM SULFATE 500 MG/ML
1 VIAL (ML) INJECTION ONCE
Qty: 0 | Refills: 0 | Status: COMPLETED | OUTPATIENT
Start: 2018-01-24 | End: 2018-01-24

## 2018-01-24 RX ORDER — POTASSIUM PHOSPHATE, MONOBASIC POTASSIUM PHOSPHATE, DIBASIC 236; 224 MG/ML; MG/ML
15 INJECTION, SOLUTION INTRAVENOUS ONCE
Qty: 0 | Refills: 0 | Status: COMPLETED | OUTPATIENT
Start: 2018-01-24 | End: 2018-01-24

## 2018-01-24 RX ADMIN — HEPARIN SODIUM 5000 UNIT(S): 5000 INJECTION INTRAVENOUS; SUBCUTANEOUS at 07:50

## 2018-01-24 RX ADMIN — SODIUM CHLORIDE 120 MILLILITER(S): 9 INJECTION INTRAMUSCULAR; INTRAVENOUS; SUBCUTANEOUS at 01:54

## 2018-01-24 RX ADMIN — DEXTROSE MONOHYDRATE, SODIUM CHLORIDE, AND POTASSIUM CHLORIDE 125 MILLILITER(S): 50; .745; 4.5 INJECTION, SOLUTION INTRAVENOUS at 17:45

## 2018-01-24 RX ADMIN — HEPARIN SODIUM 5000 UNIT(S): 5000 INJECTION INTRAVENOUS; SUBCUTANEOUS at 21:12

## 2018-01-24 RX ADMIN — HEPARIN SODIUM 5000 UNIT(S): 5000 INJECTION INTRAVENOUS; SUBCUTANEOUS at 14:20

## 2018-01-24 RX ADMIN — Medication 5 MILLIGRAM(S): at 01:54

## 2018-01-24 RX ADMIN — POTASSIUM PHOSPHATE, MONOBASIC POTASSIUM PHOSPHATE, DIBASIC 62.5 MILLIMOLE(S): 236; 224 INJECTION, SOLUTION INTRAVENOUS at 10:51

## 2018-01-24 RX ADMIN — DEXTROSE MONOHYDRATE, SODIUM CHLORIDE, AND POTASSIUM CHLORIDE 125 MILLILITER(S): 50; .745; 4.5 INJECTION, SOLUTION INTRAVENOUS at 21:12

## 2018-01-24 RX ADMIN — Medication 5 MILLIGRAM(S): at 07:51

## 2018-01-24 RX ADMIN — BENZOCAINE AND MENTHOL 1 LOZENGE: 5; 1 LIQUID ORAL at 14:38

## 2018-01-24 RX ADMIN — PANTOPRAZOLE SODIUM 40 MILLIGRAM(S): 20 TABLET, DELAYED RELEASE ORAL at 17:45

## 2018-01-24 RX ADMIN — Medication 5 MILLIGRAM(S): at 11:53

## 2018-01-24 RX ADMIN — Medication 5 MILLIGRAM(S): at 17:45

## 2018-01-24 RX ADMIN — DEXTROSE MONOHYDRATE, SODIUM CHLORIDE, AND POTASSIUM CHLORIDE 125 MILLILITER(S): 50; .745; 4.5 INJECTION, SOLUTION INTRAVENOUS at 10:49

## 2018-01-24 RX ADMIN — Medication 100 GRAM(S): at 09:34

## 2018-01-24 NOTE — PROGRESS NOTE ADULT - ASSESSMENT
42 year old male with recurrent small bowel obstruction, hemodynamically stable, afebrile.    Plan:  - NPO/IVF  - Monitor NGT output  - Monitor for return of Bowel Function  - Strict I's and O's  - DVT PPx  - f/u AM labs, replete as needed  - Dispo: d/c home when bowel function returns and tolerating diet     A Team [id number]

## 2018-01-24 NOTE — PROGRESS NOTE ADULT - SUBJECTIVE AND OBJECTIVE BOX
A TEAM GENERAL SURGERY DAILY PROGRESS NOTE:       Subjective: Patient examined at bedside. No issues overnight. Pain well controlled. - Flatus/ - BM. Denied n/v, fever, chills, CP or SOB.        MEDICATIONS  (STANDING):  atorvastatin 20 milliGRAM(s) Oral at bedtime  dextrose 5%. 1000 milliLiter(s) (50 mL/Hr) IV Continuous <Continuous>  dextrose 50% Injectable 12.5 Gram(s) IV Push once  dextrose 50% Injectable 25 Gram(s) IV Push once  dextrose 50% Injectable 25 Gram(s) IV Push once  fenofibrate Tablet 145 milliGRAM(s) Oral daily  heparin  Injectable 5000 Unit(s) SubCutaneous every 8 hours  insulin lispro (HumaLOG) corrective regimen sliding scale   SubCutaneous every 6 hours  metoprolol    tartrate Injectable 5 milliGRAM(s) IV Push every 6 hours  pantoprazole    Tablet 40 milliGRAM(s) Oral before breakfast  sodium chloride 0.9%. 1000 milliLiter(s) (120 mL/Hr) IV Continuous <Continuous>  valsartan 80 milliGRAM(s) Oral daily    MEDICATIONS  (PRN):  dextrose Gel 1 Dose(s) Oral once PRN Blood Glucose LESS THAN 70 milliGRAM(s)/deciliter  glucagon  Injectable 1 milliGRAM(s) IntraMuscular once PRN Glucose LESS THAN 70 milligrams/deciliter      Objective:  Vital Signs Last 24 Hrs  T(C): 36.6 (24 Jan 2018 01:45), Max: 36.8 (23 Jan 2018 16:53)  T(F): 97.9 (24 Jan 2018 01:45), Max: 98.2 (23 Jan 2018 16:53)  HR: 88 (24 Jan 2018 01:45) (88 - 114)  BP: 116/72 (24 Jan 2018 01:45) (116/72 - 170/89)  BP(mean): --  RR: 18 (24 Jan 2018 01:45) (15 - 18)  SpO2: 97% (24 Jan 2018 01:45) (96% - 100%)    Gen: NAD, lying comfortably in bed  Resp: Breathing comfortably on RA  Cardiac: RRR, no murmurs , gallops or rubs  GI: soft, distended, tender at epigastrium . NGT in place to suction with bilious output. no rebound or guarding.   Ext: All 4 extremities warm and well perfused, no edema    I&O's Detail    23 Jan 2018 07:01  -  24 Jan 2018 05:55  --------------------------------------------------------  IN:    sodium chloride 0.9%.: 720 mL  Total IN: 720 mL    OUT:    Nasoenteral Tube: 25 mL    Voided: 150 mL  Total OUT: 175 mL    Total NET: 545 mL          Daily Height in cm: 182.88 (23 Jan 2018 22:37)    Daily     LABS:                        13.2   10.10 )-----------( 279      ( 23 Jan 2018 11:40 )             44.7     01-23    144  |  105  |  28<H>  ----------------------------<  151<H>  4.2   |  25  |  1.17    Ca    9.4      23 Jan 2018 11:40    TPro  8.0  /  Alb  4.7  /  TBili  0.8  /  DBili  x   /  AST  19  /  ALT  26  /  AlkPhos  35<L>  01-23      Urinalysis Basic - ( 23 Jan 2018 11:40 )    Color: YELLOW / Appearance: CLEAR / SG: > 1.040 / pH: 6.5  Gluc: >1000 / Ketone: TRACE  / Bili: NEGATIVE / Urobili: 1 mg/dL   Blood: NEGATIVE / Protein: 20 mg/dL / Nitrite: NEGATIVE   Leuk Esterase: NEGATIVE / RBC: 5-10 / WBC 2-5   Sq Epi: x / Non Sq Epi: x / Bacteria: FEW        RADIOLOGY & ADDITIONAL STUDIES:

## 2018-01-24 NOTE — CHART NOTE - NSCHARTNOTEFT_GEN_A_CORE
Patient seen and examined at bedside. Endorsing nausea and abdominal pain much improved with NGT. Still no flatus or BM.     Gen: Lying comfortably in bed, NAD  Resp: Breathing comfortably on RA  GI: soft, distended, TTP of epigastrium. No rebound or guarding.   Ext: Moving all 4 ext spontaneously  Lines: NGT with dark bilious output    SBO appears stable currently. No peritoneal signs. Will continue to monitor closely. Repeat VBG, f/u repeat lactate.

## 2018-01-25 LAB
BUN SERPL-MCNC: 14 MG/DL — SIGNIFICANT CHANGE UP (ref 7–23)
CALCIUM SERPL-MCNC: 8.2 MG/DL — LOW (ref 8.4–10.5)
CHLORIDE SERPL-SCNC: 107 MMOL/L — SIGNIFICANT CHANGE UP (ref 98–107)
CO2 SERPL-SCNC: 24 MMOL/L — SIGNIFICANT CHANGE UP (ref 22–31)
CREAT SERPL-MCNC: 0.77 MG/DL — SIGNIFICANT CHANGE UP (ref 0.5–1.3)
GLUCOSE BLDC GLUCOMTR-MCNC: 110 MG/DL — HIGH (ref 70–99)
GLUCOSE BLDC GLUCOMTR-MCNC: 129 MG/DL — HIGH (ref 70–99)
GLUCOSE BLDC GLUCOMTR-MCNC: 131 MG/DL — HIGH (ref 70–99)
GLUCOSE BLDC GLUCOMTR-MCNC: 142 MG/DL — HIGH (ref 70–99)
GLUCOSE SERPL-MCNC: 144 MG/DL — HIGH (ref 70–99)
HCT VFR BLD CALC: 34.4 % — LOW (ref 39–50)
HGB BLD-MCNC: 10.3 G/DL — LOW (ref 13–17)
MAGNESIUM SERPL-MCNC: 2.1 MG/DL — SIGNIFICANT CHANGE UP (ref 1.6–2.6)
MCHC RBC-ENTMCNC: 25.8 PG — LOW (ref 27–34)
MCHC RBC-ENTMCNC: 29.9 % — LOW (ref 32–36)
MCV RBC AUTO: 86 FL — SIGNIFICANT CHANGE UP (ref 80–100)
NRBC # FLD: 0 — SIGNIFICANT CHANGE UP
PHOSPHATE SERPL-MCNC: 2.1 MG/DL — LOW (ref 2.5–4.5)
PLATELET # BLD AUTO: 162 K/UL — SIGNIFICANT CHANGE UP (ref 150–400)
PMV BLD: 10.3 FL — SIGNIFICANT CHANGE UP (ref 7–13)
POTASSIUM SERPL-MCNC: 3.9 MMOL/L — SIGNIFICANT CHANGE UP (ref 3.5–5.3)
POTASSIUM SERPL-SCNC: 3.9 MMOL/L — SIGNIFICANT CHANGE UP (ref 3.5–5.3)
RBC # BLD: 4 M/UL — LOW (ref 4.2–5.8)
RBC # FLD: 15.5 % — HIGH (ref 10.3–14.5)
SODIUM SERPL-SCNC: 141 MMOL/L — SIGNIFICANT CHANGE UP (ref 135–145)
WBC # BLD: 5.32 K/UL — SIGNIFICANT CHANGE UP (ref 3.8–10.5)
WBC # FLD AUTO: 5.32 K/UL — SIGNIFICANT CHANGE UP (ref 3.8–10.5)

## 2018-01-25 RX ORDER — POTASSIUM PHOSPHATE, MONOBASIC POTASSIUM PHOSPHATE, DIBASIC 236; 224 MG/ML; MG/ML
15 INJECTION, SOLUTION INTRAVENOUS ONCE
Qty: 0 | Refills: 0 | Status: COMPLETED | OUTPATIENT
Start: 2018-01-25 | End: 2018-01-25

## 2018-01-25 RX ADMIN — HEPARIN SODIUM 5000 UNIT(S): 5000 INJECTION INTRAVENOUS; SUBCUTANEOUS at 23:08

## 2018-01-25 RX ADMIN — DEXTROSE MONOHYDRATE, SODIUM CHLORIDE, AND POTASSIUM CHLORIDE 125 MILLILITER(S): 50; .745; 4.5 INJECTION, SOLUTION INTRAVENOUS at 00:05

## 2018-01-25 RX ADMIN — POTASSIUM PHOSPHATE, MONOBASIC POTASSIUM PHOSPHATE, DIBASIC 62.5 MILLIMOLE(S): 236; 224 INJECTION, SOLUTION INTRAVENOUS at 11:15

## 2018-01-25 RX ADMIN — DEXTROSE MONOHYDRATE, SODIUM CHLORIDE, AND POTASSIUM CHLORIDE 125 MILLILITER(S): 50; .745; 4.5 INJECTION, SOLUTION INTRAVENOUS at 19:10

## 2018-01-25 RX ADMIN — Medication 5 MILLIGRAM(S): at 05:51

## 2018-01-25 RX ADMIN — Medication 5 MILLIGRAM(S): at 23:08

## 2018-01-25 RX ADMIN — Medication 5 MILLIGRAM(S): at 00:05

## 2018-01-25 RX ADMIN — Medication 5 MILLIGRAM(S): at 17:28

## 2018-01-25 RX ADMIN — DEXTROSE MONOHYDRATE, SODIUM CHLORIDE, AND POTASSIUM CHLORIDE 125 MILLILITER(S): 50; .745; 4.5 INJECTION, SOLUTION INTRAVENOUS at 11:15

## 2018-01-25 RX ADMIN — HEPARIN SODIUM 5000 UNIT(S): 5000 INJECTION INTRAVENOUS; SUBCUTANEOUS at 14:18

## 2018-01-25 RX ADMIN — HEPARIN SODIUM 5000 UNIT(S): 5000 INJECTION INTRAVENOUS; SUBCUTANEOUS at 05:51

## 2018-01-25 RX ADMIN — Medication 145 MILLIGRAM(S): at 11:40

## 2018-01-25 RX ADMIN — Medication 5 MILLIGRAM(S): at 11:50

## 2018-01-25 RX ADMIN — PANTOPRAZOLE SODIUM 40 MILLIGRAM(S): 20 TABLET, DELAYED RELEASE ORAL at 11:40

## 2018-01-25 NOTE — PROGRESS NOTE ADULT - ASSESSMENT
42 year old male with recurrent small bowel obstruction, now resolved.     Plan:  - CLD  - Strict I's and O's  - DVT PPx  - f/u AM labs, replete as needed  - Dispo: d/c home when bowel function returns and tolerating diet     A Team [id number]

## 2018-01-25 NOTE — PROGRESS NOTE ADULT - SUBJECTIVE AND OBJECTIVE BOX
A TEAM GENERAL SURGERY DAILY PROGRESS NOTE:       Subjective: Passed clamp trial today; NGT removed. Tolerated CLD.     MEDICATIONS  (STANDING):  dextrose 5% + sodium chloride 0.45% with potassium chloride 20 mEq/L 1000 milliLiter(s) (125 mL/Hr) IV Continuous <Continuous>  dextrose 5%. 1000 milliLiter(s) (50 mL/Hr) IV Continuous <Continuous>  dextrose 50% Injectable 12.5 Gram(s) IV Push once  dextrose 50% Injectable 25 Gram(s) IV Push once  dextrose 50% Injectable 25 Gram(s) IV Push once  fenofibrate Tablet 145 milliGRAM(s) Oral daily  heparin  Injectable 5000 Unit(s) SubCutaneous every 8 hours  insulin lispro (HumaLOG) corrective regimen sliding scale   SubCutaneous every 6 hours  metoprolol    tartrate Injectable 5 milliGRAM(s) IV Push every 6 hours  pantoprazole  Injectable 40 milliGRAM(s) IV Push daily    MEDICATIONS  (PRN):  benzocaine 15 mG/menthol 3.6 mG Lozenge 1 Lozenge Oral every 1 hour PRN Sore Throat  dextrose Gel 1 Dose(s) Oral once PRN Blood Glucose LESS THAN 70 milliGRAM(s)/deciliter  glucagon  Injectable 1 milliGRAM(s) IntraMuscular once PRN Glucose LESS THAN 70 milligrams/deciliter    ICU Vital Signs Last 24 Hrs  T(C): 36.8 (25 Jan 2018 23:07), Max: 36.9 (25 Jan 2018 10:00)  T(F): 98.2 (25 Jan 2018 23:07), Max: 98.5 (25 Jan 2018 10:00)  HR: 72 (25 Jan 2018 23:07) (70 - 84)  BP: 125/75 (25 Jan 2018 23:07) (112/72 - 132/85)  BP(mean): --  ABP: --  ABP(mean): --  RR: 18 (25 Jan 2018 23:07) (18 - 18)  SpO2: 98% (25 Jan 2018 23:07) (97% - 99%)    Gen: NAD, lying comfortably in bed  Resp: Breathing comfortably on RA  Cardiac: normotensive, regular rate   GI: soft, minimally distended, tender at epigastrium .   Ext: All 4 extremities warm and well perfused, no edema    I&O's Detail    24 Jan 2018 07:01  -  25 Jan 2018 07:00  --------------------------------------------------------  IN:    dextrose 5% + sodium chloride 0.45% with potassium chloride 20 mEq/L: 1000 mL    IV PiggyBack: 350 mL  Total IN: 1350 mL    OUT:    Nasoenteral Tube: 525 mL    Voided: 1000 mL  Total OUT: 1525 mL    Total NET: -175 mL      25 Jan 2018 07:01  -  25 Jan 2018 23:33  --------------------------------------------------------  IN:    dextrose 5% + sodium chloride 0.45% with potassium chloride 20 mEq/L: 1375 mL    IV PiggyBack: 250 mL    Oral Fluid: 380 mL  Total IN: 2005 mL    OUT:    Voided: 600 mL  Total OUT: 600 mL    Total NET: 1405 mL    LABS:                        10.3   5.32  )-----------( 162      ( 25 Jan 2018 06:05 )             34.4     01-25    141  |  107  |  14  ----------------------------<  144<H>  3.9   |  24  |  0.77    Ca    8.2<L>      25 Jan 2018 06:05  Phos  2.1     01-25  Mg     2.1     01-25      RADIOLOGY & ADDITIONAL STUDIES:

## 2018-01-26 ENCOUNTER — TRANSCRIPTION ENCOUNTER (OUTPATIENT)
Age: 43
End: 2018-01-26

## 2018-01-26 VITALS
TEMPERATURE: 98 F | DIASTOLIC BLOOD PRESSURE: 79 MMHG | HEART RATE: 78 BPM | RESPIRATION RATE: 18 BRPM | SYSTOLIC BLOOD PRESSURE: 131 MMHG | OXYGEN SATURATION: 99 %

## 2018-01-26 LAB
BUN SERPL-MCNC: 9 MG/DL — SIGNIFICANT CHANGE UP (ref 7–23)
CALCIUM SERPL-MCNC: 8.5 MG/DL — SIGNIFICANT CHANGE UP (ref 8.4–10.5)
CHLORIDE SERPL-SCNC: 105 MMOL/L — SIGNIFICANT CHANGE UP (ref 98–107)
CO2 SERPL-SCNC: 24 MMOL/L — SIGNIFICANT CHANGE UP (ref 22–31)
CREAT SERPL-MCNC: 0.77 MG/DL — SIGNIFICANT CHANGE UP (ref 0.5–1.3)
GLUCOSE BLDC GLUCOMTR-MCNC: 114 MG/DL — HIGH (ref 70–99)
GLUCOSE BLDC GLUCOMTR-MCNC: 138 MG/DL — HIGH (ref 70–99)
GLUCOSE SERPL-MCNC: 133 MG/DL — HIGH (ref 70–99)
HCT VFR BLD CALC: 34.5 % — LOW (ref 39–50)
HGB BLD-MCNC: 10.4 G/DL — LOW (ref 13–17)
MAGNESIUM SERPL-MCNC: 2 MG/DL — SIGNIFICANT CHANGE UP (ref 1.6–2.6)
MCHC RBC-ENTMCNC: 26.1 PG — LOW (ref 27–34)
MCHC RBC-ENTMCNC: 30.1 % — LOW (ref 32–36)
MCV RBC AUTO: 86.5 FL — SIGNIFICANT CHANGE UP (ref 80–100)
NRBC # FLD: 0 — SIGNIFICANT CHANGE UP
PHOSPHATE SERPL-MCNC: 3 MG/DL — SIGNIFICANT CHANGE UP (ref 2.5–4.5)
PLATELET # BLD AUTO: 167 K/UL — SIGNIFICANT CHANGE UP (ref 150–400)
PMV BLD: 10.7 FL — SIGNIFICANT CHANGE UP (ref 7–13)
POTASSIUM SERPL-MCNC: 4.1 MMOL/L — SIGNIFICANT CHANGE UP (ref 3.5–5.3)
POTASSIUM SERPL-SCNC: 4.1 MMOL/L — SIGNIFICANT CHANGE UP (ref 3.5–5.3)
RBC # BLD: 3.99 M/UL — LOW (ref 4.2–5.8)
RBC # FLD: 15.2 % — HIGH (ref 10.3–14.5)
SODIUM SERPL-SCNC: 138 MMOL/L — SIGNIFICANT CHANGE UP (ref 135–145)
WBC # BLD: 5.34 K/UL — SIGNIFICANT CHANGE UP (ref 3.8–10.5)
WBC # FLD AUTO: 5.34 K/UL — SIGNIFICANT CHANGE UP (ref 3.8–10.5)

## 2018-01-26 PROCEDURE — 99238 HOSP IP/OBS DSCHRG MGMT 30/<: CPT

## 2018-01-26 RX ORDER — SODIUM CHLORIDE 9 MG/ML
3 INJECTION INTRAMUSCULAR; INTRAVENOUS; SUBCUTANEOUS EVERY 8 HOURS
Qty: 0 | Refills: 0 | Status: DISCONTINUED | OUTPATIENT
Start: 2018-01-26 | End: 2018-01-26

## 2018-01-26 RX ADMIN — DEXTROSE MONOHYDRATE, SODIUM CHLORIDE, AND POTASSIUM CHLORIDE 125 MILLILITER(S): 50; .745; 4.5 INJECTION, SOLUTION INTRAVENOUS at 13:53

## 2018-01-26 RX ADMIN — Medication 5 MILLIGRAM(S): at 06:44

## 2018-01-26 RX ADMIN — DEXTROSE MONOHYDRATE, SODIUM CHLORIDE, AND POTASSIUM CHLORIDE 125 MILLILITER(S): 50; .745; 4.5 INJECTION, SOLUTION INTRAVENOUS at 03:50

## 2018-01-26 RX ADMIN — Medication 145 MILLIGRAM(S): at 11:44

## 2018-01-26 RX ADMIN — HEPARIN SODIUM 5000 UNIT(S): 5000 INJECTION INTRAVENOUS; SUBCUTANEOUS at 06:44

## 2018-01-26 RX ADMIN — SODIUM CHLORIDE 3 MILLILITER(S): 9 INJECTION INTRAMUSCULAR; INTRAVENOUS; SUBCUTANEOUS at 14:00

## 2018-01-26 RX ADMIN — Medication 5 MILLIGRAM(S): at 11:48

## 2018-01-26 RX ADMIN — PANTOPRAZOLE SODIUM 40 MILLIGRAM(S): 20 TABLET, DELAYED RELEASE ORAL at 11:44

## 2018-01-26 RX ADMIN — HEPARIN SODIUM 5000 UNIT(S): 5000 INJECTION INTRAVENOUS; SUBCUTANEOUS at 13:53

## 2018-01-26 NOTE — DISCHARGE NOTE ADULT - CARE PROVIDER_API CALL
Musa White), ColonRectal Surgery; Surgery  [address] [place] [address]  Phone: [phone]  Fax: [phone]

## 2018-01-26 NOTE — DISCHARGE NOTE ADULT - CARE PLAN
Principal Discharge DX:	SBO (small bowel obstruction)  Goal:	normal GI function  Assessment and plan of treatment:	ACTIVITY: You may return to your usual level of physical activity.   DIET: Return to your diabetic, low fiber diet.  NOTIFY YOUR SURGEON IF: You have persistent nausea/vomiting, persistent diarrhea, or if your pain is not controlled on your discharge pain medications.  FOLLOW-UP: Please follow up with your primary care physician in one week regarding your hospitalization.  Please follow up with Dr. White in 1-2 weeks.  Call [phone] to schedule an appointment.  Secondary Diagnosis:	Diabetes mellitus, type 2  Assessment and plan of treatment:	Please follow up with your primary care physician regarding your hospitalization  Secondary Diagnosis:	Hyperlipidemia  Assessment and plan of treatment:	Please follow up with your primary care physician regarding your hospitalization  Secondary Diagnosis:	Hypertension  Assessment and plan of treatment:	Please follow up with your primary care physician regarding your hospitalization

## 2018-01-26 NOTE — DISCHARGE NOTE ADULT - PATIENT PORTAL LINK FT
“You can access the [facility] Patient Portal, offered by [facility], by registering with the following website: [email or web address]”

## 2018-01-26 NOTE — DISCHARGE NOTE ADULT - HOSPITAL COURSE
HPI: 42 year old male with history of multiple small bowel obstructions, well known to the surgical service, presenting with abdominal pain, distension and nausea consistent with prior episodes of SBO. Patient started having discomfort yesterday evening, last flatus overnight, and last BM (loose) this AM. Continued to have some nausea, and came to the ED for further management. Patient has significant allergy to both IV and PO contrast and underwent a non-con CT that showed evidence of bowel obstruction with ventral hernia. Denies fevers, chills.     PMHx: HTN, HLD, DM II, diverticulitis s/p Hartmanns (2009) and reversal (2009), SBO with SBR (most recently 2016), DVT s/p eliquis now off A/C, DM II, brain abscess    PSHx: Isi's, reversal, ex-lap/SBR    Medications (home): crestor, metoprolol, diovan, protonix  Allergies: ceftriaxone (Rash), cephalosporins (Rash), Cipro (Unknown), contrast media (iodine-based) (Rash), Flagyl (Unknown)  iodine containing compounds (Rash)  morphine (Rash)    Social Hx: Denies tobacco, EtOH, illicit drug use.    Physical exam significant for abdominal distension with minimal tenderness    AXR: IMPRESSION: Several dilated small bowel loops disproportionate in caliber to the colon suspicious for obstruction. In addition, bowel loops projecting in the vicinity of the ischial tuberosities may reflect bowel herniation. Also correlate with findings on subsequently performed abdomen/pelvis CT.  No pneumoperitoneum. No abnormal calcifications, solid organomegaly, or gross mass densities.  Unremarkable osseous structures.    CT abd/pelvis: IMPRESSION: Small bowel obstruction with transition point in a right lower quadrant ventral wall hernia. Mild mesenteric edema is present, suspicious for closed loop.    Pt admitted to general surgery service and managed nonoperatively with bowel rest, NGT decompression, and IVF hydration.  As bowel function returned and NGT output decreased a clamp trial was done and NGT removed.  Diet was slowly restarted and advanced as tolerated.  Pt currently ambulating, voiding, tolerating a regular diet, and pain free.  Per team and attending, pt is hemodynamically stable for discharge home to follow up as an outpatient.

## 2018-01-26 NOTE — PROGRESS NOTE ADULT - SUBJECTIVE AND OBJECTIVE BOX
A TEAM GENERAL SURGERY DAILY PROGRESS NOTE:       Subjective: Tolerating clear diet, no nausea or vomiting. Passing flatus.     MEDICATIONS  (STANDING):  dextrose 5% + sodium chloride 0.45% with potassium chloride 20 mEq/L 1000 milliLiter(s) (125 mL/Hr) IV Continuous <Continuous>  dextrose 5%. 1000 milliLiter(s) (50 mL/Hr) IV Continuous <Continuous>  dextrose 50% Injectable 12.5 Gram(s) IV Push once  dextrose 50% Injectable 25 Gram(s) IV Push once  dextrose 50% Injectable 25 Gram(s) IV Push once  fenofibrate Tablet 145 milliGRAM(s) Oral daily  heparin  Injectable 5000 Unit(s) SubCutaneous every 8 hours  insulin lispro (HumaLOG) corrective regimen sliding scale   SubCutaneous every 6 hours  metoprolol    tartrate Injectable 5 milliGRAM(s) IV Push every 6 hours  pantoprazole  Injectable 40 milliGRAM(s) IV Push daily    MEDICATIONS  (PRN):  benzocaine 15 mG/menthol 3.6 mG Lozenge 1 Lozenge Oral every 1 hour PRN Sore Throat  dextrose Gel 1 Dose(s) Oral once PRN Blood Glucose LESS THAN 70 milliGRAM(s)/deciliter  glucagon  Injectable 1 milliGRAM(s) IntraMuscular once PRN Glucose LESS THAN 70 milligrams/deciliter    Afebrile, BP: 127/77, HR: 70, RR: 18, 97% RA    Gen: NAD, lying comfortably in bed  Resp: Breathing comfortably on RA  Cardiac: normotensive, regular rate   GI: soft, minimally distended, minimally tender at epigastrium .   Ext: All 4 extremities warm and well perfused, no edema      I&O's Summary    25 Jan 2018 07:01  -  26 Jan 2018 07:00  --------------------------------------------------------  IN: 2005 mL / OUT: 1100 mL / NET: 905 mL      LABS:                           10.4   5.34  )-----------( 167      ( 26 Jan 2018 05:30 )             34.5     01-26    138  |  105  |  9   ----------------------------<  133<H>  4.1   |  24  |  0.77    Ca    8.5      26 Jan 2018 05:30  Phos  3.0     01-26  Mg     2.0     01-26

## 2018-01-26 NOTE — DISCHARGE NOTE ADULT - MEDICATION SUMMARY - MEDICATIONS TO TAKE
I will START or STAY ON the medications listed below when I get home from the hospital:    Diovan 80 mg oral tablet  -- 1 tab(s) by mouth once a day  -- hold for low blood pressure  -- Indication: For HTN    glimepiride 4 mg oral tablet  -- 1 tab(s) by mouth once a day  -- Indication: For DM    glimepiride 1 mg oral tablet  -- 1 tab(s) by mouth once a day  -- Indication: For DM    Farxiga 5 mg oral tablet  -- 1 tab(s) by mouth once a day  -- Indication: For DM    diphenhydrAMINE 25 mg oral capsule  -- 1 cap(s) by mouth every 6 hours, As needed, Rash and/or Itching  -- Indication: For Home Med    Crestor 5 mg oral tablet  -- 1 tab(s) by mouth once a day (at bedtime)  -- Indication: For Cholesterol    TriCor 145 mg oral tablet  -- 1 tab(s) by mouth once a day  -- Indication: For Cholesterol    Toprol- mg oral tablet, extended release  -- 1 tab(s) by mouth once a day  -- Indication: For HTN    pantoprazole 40 mg oral delayed release tablet  -- 1 tab(s) by mouth once a day (before a meal)  -- Indication: For GERD

## 2018-01-26 NOTE — PROGRESS NOTE ADULT - ASSESSMENT
42 year old male with recurrent small bowel obstruction, now resolved.     Plan:  - LRD today  - Strict I's and O's  - DVT PPx  - f/u AM labs, replete as needed  - Dispo: d/c home if tolerating diet later today         A Team [id number]

## 2018-01-26 NOTE — DISCHARGE NOTE ADULT - PLAN OF CARE
normal GI function ACTIVITY: You may return to your usual level of physical activity.   DIET: Return to your diabetic, low fiber diet.  NOTIFY YOUR SURGEON IF: You have persistent nausea/vomiting, persistent diarrhea, or if your pain is not controlled on your discharge pain medications.  FOLLOW-UP: Please follow up with your primary care physician in one week regarding your hospitalization.  Please follow up with Dr. White in 1-2 weeks.  Call [phone] to schedule an appointment. Please follow up with your primary care physician regarding your hospitalization

## 2018-02-20 ENCOUNTER — APPOINTMENT (OUTPATIENT)
Dept: VASCULAR SURGERY | Facility: CLINIC | Age: 43
End: 2018-02-20
Payer: COMMERCIAL

## 2018-02-20 VITALS
TEMPERATURE: 98.6 F | WEIGHT: 250 LBS | SYSTOLIC BLOOD PRESSURE: 128 MMHG | HEART RATE: 89 BPM | HEIGHT: 72 IN | DIASTOLIC BLOOD PRESSURE: 81 MMHG | BODY MASS INDEX: 33.86 KG/M2

## 2018-02-20 DIAGNOSIS — I87.2 VENOUS INSUFFICIENCY (CHRONIC) (PERIPHERAL): ICD-10-CM

## 2018-02-20 PROCEDURE — 99212 OFFICE O/P EST SF 10 MIN: CPT

## 2018-04-03 ENCOUNTER — APPOINTMENT (OUTPATIENT)
Dept: VASCULAR SURGERY | Facility: CLINIC | Age: 43
End: 2018-04-03
Payer: SELF-PAY

## 2018-04-03 VITALS
TEMPERATURE: 98.1 F | BODY MASS INDEX: 33.86 KG/M2 | WEIGHT: 250 LBS | HEIGHT: 72 IN | SYSTOLIC BLOOD PRESSURE: 128 MMHG | DIASTOLIC BLOOD PRESSURE: 75 MMHG | HEART RATE: 97 BPM

## 2018-04-03 DIAGNOSIS — L85.3 XEROSIS CUTIS: ICD-10-CM

## 2018-04-03 PROCEDURE — 36471 NJX SCLRSNT MLT INCMPTNT VN: CPT

## 2018-04-03 RX ORDER — AMMONIUM LACTATE 12 %
12 CREAM (GRAM) TOPICAL TWICE DAILY
Qty: 280 | Refills: 6 | Status: ACTIVE | COMMUNITY
Start: 2018-04-03 | End: 1900-01-01

## 2018-04-17 ENCOUNTER — INPATIENT (INPATIENT)
Facility: HOSPITAL | Age: 43
LOS: 1 days | Discharge: HOME | End: 2018-04-19
Attending: SURGERY | Admitting: SURGERY
Payer: COMMERCIAL

## 2018-04-17 VITALS
SYSTOLIC BLOOD PRESSURE: 119 MMHG | DIASTOLIC BLOOD PRESSURE: 67 MMHG | HEART RATE: 114 BPM | RESPIRATION RATE: 18 BRPM | TEMPERATURE: 98 F | OXYGEN SATURATION: 98 %

## 2018-04-17 DIAGNOSIS — Z98.89 OTHER SPECIFIED POSTPROCEDURAL STATES: Chronic | ICD-10-CM

## 2018-04-17 DIAGNOSIS — R10.84 GENERALIZED ABDOMINAL PAIN: ICD-10-CM

## 2018-04-17 LAB
ALBUMIN SERPL ELPH-MCNC: 4.3 G/DL — SIGNIFICANT CHANGE UP (ref 3.3–5)
ALP SERPL-CCNC: 32 U/L — LOW (ref 40–120)
ALT FLD-CCNC: 25 U/L — SIGNIFICANT CHANGE UP (ref 4–41)
ANISOCYTOSIS BLD QL: SLIGHT — SIGNIFICANT CHANGE UP
APPEARANCE UR: CLEAR — SIGNIFICANT CHANGE UP
AST SERPL-CCNC: 15 U/L — SIGNIFICANT CHANGE UP (ref 4–40)
BASE EXCESS BLDV CALC-SCNC: -1.5 MMOL/L — SIGNIFICANT CHANGE UP
BASOPHILS # BLD AUTO: 0.01 K/UL — SIGNIFICANT CHANGE UP (ref 0–0.2)
BASOPHILS NFR BLD AUTO: 0.1 % — SIGNIFICANT CHANGE UP (ref 0–2)
BASOPHILS NFR SPEC: 0 % — SIGNIFICANT CHANGE UP (ref 0–2)
BILIRUB SERPL-MCNC: 0.6 MG/DL — SIGNIFICANT CHANGE UP (ref 0.2–1.2)
BILIRUB UR-MCNC: NEGATIVE — SIGNIFICANT CHANGE UP
BLOOD GAS VENOUS - CREATININE: 0.93 MG/DL — SIGNIFICANT CHANGE UP (ref 0.5–1.3)
BLOOD UR QL VISUAL: NEGATIVE — SIGNIFICANT CHANGE UP
BUN SERPL-MCNC: 26 MG/DL — HIGH (ref 7–23)
CALCIUM SERPL-MCNC: 9.1 MG/DL — SIGNIFICANT CHANGE UP (ref 8.4–10.5)
CHLORIDE BLDV-SCNC: 110 MMOL/L — HIGH (ref 96–108)
CHLORIDE SERPL-SCNC: 103 MMOL/L — SIGNIFICANT CHANGE UP (ref 98–107)
CO2 SERPL-SCNC: 21 MMOL/L — LOW (ref 22–31)
COLOR SPEC: YELLOW — SIGNIFICANT CHANGE UP
CREAT SERPL-MCNC: 1.05 MG/DL — SIGNIFICANT CHANGE UP (ref 0.5–1.3)
EOSINOPHIL # BLD AUTO: 0.12 K/UL — SIGNIFICANT CHANGE UP (ref 0–0.5)
EOSINOPHIL NFR BLD AUTO: 1.6 % — SIGNIFICANT CHANGE UP (ref 0–6)
EOSINOPHIL NFR FLD: 1 % — SIGNIFICANT CHANGE UP (ref 0–6)
GAS PNL BLDV: 139 MMOL/L — SIGNIFICANT CHANGE UP (ref 136–146)
GLUCOSE BLDC GLUCOMTR-MCNC: 91 MG/DL — SIGNIFICANT CHANGE UP (ref 70–99)
GLUCOSE BLDV-MCNC: 141 — HIGH (ref 70–99)
GLUCOSE SERPL-MCNC: 139 MG/DL — HIGH (ref 70–99)
GLUCOSE UR-MCNC: >1000 — SIGNIFICANT CHANGE UP
HCO3 BLDV-SCNC: 23 MMOL/L — SIGNIFICANT CHANGE UP (ref 20–27)
HCT VFR BLD CALC: 41.5 % — SIGNIFICANT CHANGE UP (ref 39–50)
HCT VFR BLDV CALC: 41.8 % — SIGNIFICANT CHANGE UP (ref 39–51)
HGB BLD-MCNC: 12.5 G/DL — LOW (ref 13–17)
HGB BLDV-MCNC: 13.6 G/DL — SIGNIFICANT CHANGE UP (ref 13–17)
IMM GRANULOCYTES # BLD AUTO: 0.01 # — SIGNIFICANT CHANGE UP
IMM GRANULOCYTES NFR BLD AUTO: 0.1 % — SIGNIFICANT CHANGE UP (ref 0–1.5)
KETONES UR-MCNC: NEGATIVE — SIGNIFICANT CHANGE UP
LACTATE BLDV-MCNC: 1.8 MMOL/L — SIGNIFICANT CHANGE UP (ref 0.5–2)
LEUKOCYTE ESTERASE UR-ACNC: NEGATIVE — SIGNIFICANT CHANGE UP
LG PLATELETS BLD QL AUTO: SLIGHT — SIGNIFICANT CHANGE UP
LYMPHOCYTES # BLD AUTO: 2.78 K/UL — SIGNIFICANT CHANGE UP (ref 1–3.3)
LYMPHOCYTES # BLD AUTO: 37.8 % — SIGNIFICANT CHANGE UP (ref 13–44)
LYMPHOCYTES NFR SPEC AUTO: 29 % — SIGNIFICANT CHANGE UP (ref 13–44)
MANUAL SMEAR VERIFICATION: SIGNIFICANT CHANGE UP
MCHC RBC-ENTMCNC: 26.5 PG — LOW (ref 27–34)
MCHC RBC-ENTMCNC: 30.1 % — LOW (ref 32–36)
MCV RBC AUTO: 87.9 FL — SIGNIFICANT CHANGE UP (ref 80–100)
MONOCYTES # BLD AUTO: 1.09 K/UL — HIGH (ref 0–0.9)
MONOCYTES NFR BLD AUTO: 14.8 % — HIGH (ref 2–14)
MONOCYTES NFR BLD: 12 % — HIGH (ref 2–9)
MUCOUS THREADS # UR AUTO: SIGNIFICANT CHANGE UP
NEUTROPHIL AB SER-ACNC: 33 % — LOW (ref 43–77)
NEUTROPHILS # BLD AUTO: 3.35 K/UL — SIGNIFICANT CHANGE UP (ref 1.8–7.4)
NEUTROPHILS NFR BLD AUTO: 45.6 % — SIGNIFICANT CHANGE UP (ref 43–77)
NEUTS BAND # BLD: 15 % — HIGH (ref 0–6)
NITRITE UR-MCNC: NEGATIVE — SIGNIFICANT CHANGE UP
NRBC # BLD: 0 /100WBC — SIGNIFICANT CHANGE UP
NRBC # FLD: 0 — SIGNIFICANT CHANGE UP
OVALOCYTES BLD QL SMEAR: SLIGHT — SIGNIFICANT CHANGE UP
PCO2 BLDV: 41 MMHG — SIGNIFICANT CHANGE UP (ref 41–51)
PH BLDV: 7.37 PH — SIGNIFICANT CHANGE UP (ref 7.32–7.43)
PH UR: 6 — SIGNIFICANT CHANGE UP (ref 4.6–8)
PLATELET # BLD AUTO: 278 K/UL — SIGNIFICANT CHANGE UP (ref 150–400)
PLATELET COUNT - ESTIMATE: NORMAL — SIGNIFICANT CHANGE UP
PMV BLD: 10.8 FL — SIGNIFICANT CHANGE UP (ref 7–13)
PO2 BLDV: 46 MMHG — HIGH (ref 35–40)
POTASSIUM BLDV-SCNC: 3.8 MMOL/L — SIGNIFICANT CHANGE UP (ref 3.4–4.5)
POTASSIUM SERPL-MCNC: 4 MMOL/L — SIGNIFICANT CHANGE UP (ref 3.5–5.3)
POTASSIUM SERPL-SCNC: 4 MMOL/L — SIGNIFICANT CHANGE UP (ref 3.5–5.3)
PROT SERPL-MCNC: 7.6 G/DL — SIGNIFICANT CHANGE UP (ref 6–8.3)
PROT UR-MCNC: 30 MG/DL — HIGH
RBC # BLD: 4.72 M/UL — SIGNIFICANT CHANGE UP (ref 4.2–5.8)
RBC # FLD: 15.5 % — HIGH (ref 10.3–14.5)
RBC CASTS # UR COMP ASSIST: SIGNIFICANT CHANGE UP (ref 0–?)
SAO2 % BLDV: 77.8 % — SIGNIFICANT CHANGE UP (ref 60–85)
SODIUM SERPL-SCNC: 140 MMOL/L — SIGNIFICANT CHANGE UP (ref 135–145)
SP GR SPEC: > 1.04 — HIGH (ref 1–1.04)
UROBILINOGEN FLD QL: NORMAL MG/DL — SIGNIFICANT CHANGE UP
VARIANT LYMPHS # BLD: 10 % — SIGNIFICANT CHANGE UP
WBC # BLD: 7.36 K/UL — SIGNIFICANT CHANGE UP (ref 3.8–10.5)
WBC # FLD AUTO: 7.36 K/UL — SIGNIFICANT CHANGE UP (ref 3.8–10.5)
WBC UR QL: SIGNIFICANT CHANGE UP (ref 0–?)

## 2018-04-17 PROCEDURE — 99222 1ST HOSP IP/OBS MODERATE 55: CPT | Mod: GC

## 2018-04-17 PROCEDURE — 74018 RADEX ABDOMEN 1 VIEW: CPT | Mod: 26

## 2018-04-17 PROCEDURE — 71046 X-RAY EXAM CHEST 2 VIEWS: CPT | Mod: 26

## 2018-04-17 PROCEDURE — 74176 CT ABD & PELVIS W/O CONTRAST: CPT | Mod: 26

## 2018-04-17 RX ORDER — DEXTROSE 50 % IN WATER 50 %
12.5 SYRINGE (ML) INTRAVENOUS ONCE
Qty: 0 | Refills: 0 | Status: DISCONTINUED | OUTPATIENT
Start: 2018-04-17 | End: 2018-04-19

## 2018-04-17 RX ORDER — DEXTROSE 50 % IN WATER 50 %
25 SYRINGE (ML) INTRAVENOUS ONCE
Qty: 0 | Refills: 0 | Status: DISCONTINUED | OUTPATIENT
Start: 2018-04-17 | End: 2018-04-19

## 2018-04-17 RX ORDER — SODIUM CHLORIDE 9 MG/ML
1000 INJECTION, SOLUTION INTRAVENOUS
Qty: 0 | Refills: 0 | Status: DISCONTINUED | OUTPATIENT
Start: 2018-04-17 | End: 2018-04-18

## 2018-04-17 RX ORDER — DEXTROSE 50 % IN WATER 50 %
1 SYRINGE (ML) INTRAVENOUS ONCE
Qty: 0 | Refills: 0 | Status: DISCONTINUED | OUTPATIENT
Start: 2018-04-17 | End: 2018-04-19

## 2018-04-17 RX ORDER — ENOXAPARIN SODIUM 100 MG/ML
40 INJECTION SUBCUTANEOUS EVERY 24 HOURS
Qty: 0 | Refills: 0 | Status: DISCONTINUED | OUTPATIENT
Start: 2018-04-17 | End: 2018-04-19

## 2018-04-17 RX ORDER — SODIUM CHLORIDE 9 MG/ML
1000 INJECTION, SOLUTION INTRAVENOUS ONCE
Qty: 0 | Refills: 0 | Status: COMPLETED | OUTPATIENT
Start: 2018-04-17 | End: 2018-04-17

## 2018-04-17 RX ORDER — PANTOPRAZOLE SODIUM 20 MG/1
40 TABLET, DELAYED RELEASE ORAL DAILY
Qty: 0 | Refills: 0 | Status: DISCONTINUED | OUTPATIENT
Start: 2018-04-17 | End: 2018-04-18

## 2018-04-17 RX ORDER — ONDANSETRON 8 MG/1
4 TABLET, FILM COATED ORAL ONCE
Qty: 0 | Refills: 0 | Status: COMPLETED | OUTPATIENT
Start: 2018-04-17 | End: 2018-04-17

## 2018-04-17 RX ORDER — METOPROLOL TARTRATE 50 MG
5 TABLET ORAL EVERY 6 HOURS
Qty: 0 | Refills: 0 | Status: DISCONTINUED | OUTPATIENT
Start: 2018-04-17 | End: 2018-04-18

## 2018-04-17 RX ORDER — GLUCAGON INJECTION, SOLUTION 0.5 MG/.1ML
1 INJECTION, SOLUTION SUBCUTANEOUS ONCE
Qty: 0 | Refills: 0 | Status: DISCONTINUED | OUTPATIENT
Start: 2018-04-17 | End: 2018-04-19

## 2018-04-17 RX ORDER — SODIUM CHLORIDE 9 MG/ML
1000 INJECTION INTRAMUSCULAR; INTRAVENOUS; SUBCUTANEOUS ONCE
Qty: 0 | Refills: 0 | Status: COMPLETED | OUTPATIENT
Start: 2018-04-17 | End: 2018-04-17

## 2018-04-17 RX ORDER — INSULIN LISPRO 100/ML
VIAL (ML) SUBCUTANEOUS EVERY 6 HOURS
Qty: 0 | Refills: 0 | Status: DISCONTINUED | OUTPATIENT
Start: 2018-04-17 | End: 2018-04-18

## 2018-04-17 RX ADMIN — SODIUM CHLORIDE 125 MILLILITER(S): 9 INJECTION, SOLUTION INTRAVENOUS at 23:35

## 2018-04-17 RX ADMIN — SODIUM CHLORIDE 1000 MILLILITER(S): 9 INJECTION INTRAMUSCULAR; INTRAVENOUS; SUBCUTANEOUS at 13:36

## 2018-04-17 RX ADMIN — Medication 5 MILLIGRAM(S): at 23:35

## 2018-04-17 RX ADMIN — SODIUM CHLORIDE 1000 MILLILITER(S): 9 INJECTION, SOLUTION INTRAVENOUS at 20:13

## 2018-04-17 RX ADMIN — ONDANSETRON 4 MILLIGRAM(S): 8 TABLET, FILM COATED ORAL at 13:36

## 2018-04-17 RX ADMIN — ENOXAPARIN SODIUM 40 MILLIGRAM(S): 100 INJECTION SUBCUTANEOUS at 23:34

## 2018-04-17 NOTE — ED PROVIDER NOTE - MEDICAL DECISION MAKING DETAILS
42 year old M PMHx diverticulitis, DM2, multiple small bowel obstructions, presenting with abdominal pain, N/V, diarrhea since last PM, DDx: SBO, appendicitis, will obtain basic labs + VBG, IVF, anti-emetics CT abd/pelvis with PO contrast, reevaluate 42 year old M PMHx diverticulitis, DM2, multiple small bowel obstructions, presenting with abdominal pain, N/V, diarrhea since last PM, DDx: SBO, appendicitis, will obtain basic labs + VBG, IVF, anti-emetics CT abd/pelvis with PO contrast, reevaluate    ROCHELLE Holden MD: Pt is a 41 y/o male with PMH of diverticulitis, DM2, multiple small bowel obstructions, who p/w abdominal pain, N/V, diarrhea since last PM. Pt has had multiple abdominal surgeries done by Dr. White in the past for multiple SBOs. He had 2 bouts of N/V and 2 episodes of diarrhea since last PM. Last BM was this AM, does not think he has passed flatus since then. Denies fevers, + chills, denies CP/palpitations, dysuria.  DDx: SBO, ileus, gastroenteritis, other intraabdominal pathology  Plan: basic labs, CT A/P, and per Surgery (who knows pt and who has already seen him in ED), place NGT and once labs result, admit to Surgery under Dr. White

## 2018-04-17 NOTE — ED PROVIDER NOTE - OBJECTIVE STATEMENT
42 year old M PMHx diverticulitis, DM2, multiple small bowel obstructions, presenting with abdominal pain, N/V, diarrhea since last PM. Pt has had multiple abdominal surgeries done by Dr. White in the past for multiple SBOs. He had 2 bouts of N/V and 2 episodes of diarrhea since last PM. Denies fevers, has had some chills, denies CP/palpitations, dysuria.

## 2018-04-17 NOTE — ED PROVIDER NOTE - NS ED ROS FT
GENERAL: +chills, EYES: no change in vision, HEENT: no trouble swallowing or speaking, CARDIAC: no chest pain, PULMONARY: no cough or SOB, GI: +abd pain, +N/V, +diarrhea, no constipation, : No changes in urination, SKIN: no rashes, NEURO: no headache,  MSK: No joint pain ~William Gardner M.D. Resident

## 2018-04-17 NOTE — ED ADULT NURSE NOTE - OBJECTIVE STATEMENT
Received patient to room 2 with c/o abdominal discomfort, nausea, vomiting and diarrhea. Pt has hx of multiple bowel obstructions. Pt evaluated by MD. Pt has family at bedside. IVL placed to left AC 20 gauge and labs drawn and sent. Awaiting results, further testing, evaluation and disposition.    SOSA Chung

## 2018-04-17 NOTE — ED PROVIDER NOTE - PROGRESS NOTE DETAILS
Per surgery, insert NGT, admit to their service after initial labwork results ED Attending Dr. Dickerson: called by lab RE: 15% bands; I notified surgery team ED Attending Dr. Dickerson: called by lab RE: 15% bands; I notified surgery team that pt is admitted to

## 2018-04-17 NOTE — H&P ADULT - NSHPPHYSICALEXAM_GEN_ALL_CORE
T(C): 36.9 (04-17-18 @ 23:33), Max: 36.9 (04-17-18 @ 23:33)  HR: 84 (04-17-18 @ 23:33) (81 - 114)  BP: 129/72 (04-17-18 @ 23:33) (111/65 - 129/72)  RR: 18 (04-17-18 @ 23:33) (18 - 20)  SpO2: 97% (04-17-18 @ 23:33) (97% - 99%)    POCT Blood Glucose.: 91 mg/dL (17 Apr 2018 23:19)    General: NAD, Lying in bed comfortably  Neuro: A+Ox3, no focal deficits  HEENT: NC/AT, EOMI  Cardio: RRR, nml S1/S2  Resp: Good effort, CTA b/l  GI/Abd: Soft, mildly distended, obese, multiple well-healed incisions, right sided hernia soft without overlying changes  Vascular: All 4 extremities warm  Musculoskeletal: All 4 extremities moving spontaneously, no limitations.

## 2018-04-17 NOTE — H&P ADULT - HISTORY OF PRESENT ILLNESS
Patient is a 42y old  Male who presents with a chief complaint of abdominal pain and nausea.  Patient has a history of multiple abdominal surgeries, including most recently a laparotomy with SBR in 2016.  He has had multiple admissions for bowel obstructions, most recently in January 2018, managed non-operatively.  Starting yesterday evening, he began having abdominal pain.  He also had 2 episodes of emesis through the night.  He states that he hasn't been feeling well since sunday morning.  Last BM was early this morning, and was diarrhea.  He hasn't had any PO intake today due to nausea.  Patient states that he feels the same now as he did on his previous admission for SBO.  Patient has had chronic ventral hernias, and he states this ventral hernia feels soft and unchanged from usual.      Patient denies fevers/chills, denies lightheadedness/dizziness, denies SOB/chest pain.    --------------------------------------------------------------------------------------------

## 2018-04-17 NOTE — ED ADULT TRIAGE NOTE - CHIEF COMPLAINT QUOTE
p/t with hx multiple SBO c/o of diffuse abd pain, nausea, vomiting and diarrhea since this am p/t appears uncomfortable

## 2018-04-17 NOTE — ED PROVIDER NOTE - PHYSICAL EXAMINATION
Gen: AAOx3, non-toxic  Head: NCAT  HEENT: EOMI, oral mucosa moist, normal conjunctiva  Lung: CTAB, no respiratory distress, no wheezes/rhonchi/rales B/L, speaking in full sentences  CV: RRR, no murmurs, rubs or gallops  Abd: soft, RLQ tenderness no guarding, no CVA tenderness  MSK: no visible deformities  Neuro: No focal sensory or motor deficits  Skin: Warm, well perfused, no rash  Psych: normal affect.   ~William Gardner M.D. Resident

## 2018-04-17 NOTE — H&P ADULT - ASSESSMENT
ASSESSMENT: Patient is a 42y old m with abdominal pain, nausea, concerning for SBO.    PLAN:   - NGT placed by ED, 300 mL obtained on insertion, placement confirmed by radiographs  - CT performed after NGT placed, no further evidence of SBO (likely resolving)  - Keep NGT to suction overnight  - f/u Gi function, patient having BM after contrast through NGT.   - Admit to general surgery, Dr. White.   - Serial abdominal exams.   - Patient and plan discussed with Attending, Dr. White.     Asa Rosales MD PGY3  A Team Surgery, #[id number]

## 2018-04-17 NOTE — H&P ADULT - NSHPLABSRESULTS_GEN_ALL_CORE
CBC (04-17 @ 16:20)                              12.5<L>                         7.36    )----------------(  278        45.6  % Neutrophils, 37.8  % Lymphocytes, ANC: 3.35                                41.5      BMP (04-17 @ 11:40)             140     |  103     |  26<H> 		Ca++ --      Ca 9.1                ---------------------------------( 139<H>		Mg --                 4.0     |  21<L>   |  1.05  			Ph --        LFTs (04-17 @ 11:40)      TPro 7.6 / Alb 4.3 / TBili 0.6 / DBili -- / AST 15 / ALT 25 / AlkPhos 32<L>      VBG (04-17 @ 11:40)     7.37 / 41 / 46<H> / 23 / -1.5 / 77.8%     Lactate: 1.8    < from: CT Abdomen and Pelvis w/ Oral Cont (04.17.18 @ 16:38) >    IMPRESSION: Multiple ventral hernias are unchanged. No bowel obstruction.   No appendicitis.    < end of copied text >

## 2018-04-18 ENCOUNTER — APPOINTMENT (OUTPATIENT)
Dept: VASCULAR SURGERY | Facility: CLINIC | Age: 43
End: 2018-04-18

## 2018-04-18 ENCOUNTER — TRANSCRIPTION ENCOUNTER (OUTPATIENT)
Age: 43
End: 2018-04-18

## 2018-04-18 LAB
BUN SERPL-MCNC: 22 MG/DL — SIGNIFICANT CHANGE UP (ref 7–23)
CALCIUM SERPL-MCNC: 8.4 MG/DL — SIGNIFICANT CHANGE UP (ref 8.4–10.5)
CHLORIDE SERPL-SCNC: 106 MMOL/L — SIGNIFICANT CHANGE UP (ref 98–107)
CO2 SERPL-SCNC: 21 MMOL/L — LOW (ref 22–31)
CREAT SERPL-MCNC: 0.93 MG/DL — SIGNIFICANT CHANGE UP (ref 0.5–1.3)
GLUCOSE BLDC GLUCOMTR-MCNC: 110 MG/DL — HIGH (ref 70–99)
GLUCOSE BLDC GLUCOMTR-MCNC: 153 MG/DL — HIGH (ref 70–99)
GLUCOSE BLDC GLUCOMTR-MCNC: 86 MG/DL — SIGNIFICANT CHANGE UP (ref 70–99)
GLUCOSE BLDC GLUCOMTR-MCNC: 98 MG/DL — SIGNIFICANT CHANGE UP (ref 70–99)
GLUCOSE SERPL-MCNC: 90 MG/DL — SIGNIFICANT CHANGE UP (ref 70–99)
HBA1C BLD-MCNC: 6.7 % — HIGH (ref 4–5.6)
HCT VFR BLD CALC: 36.5 % — LOW (ref 39–50)
HGB BLD-MCNC: 11 G/DL — LOW (ref 13–17)
MAGNESIUM SERPL-MCNC: 2 MG/DL — SIGNIFICANT CHANGE UP (ref 1.6–2.6)
MCHC RBC-ENTMCNC: 26.7 PG — LOW (ref 27–34)
MCHC RBC-ENTMCNC: 30.1 % — LOW (ref 32–36)
MCV RBC AUTO: 88.6 FL — SIGNIFICANT CHANGE UP (ref 80–100)
NRBC # FLD: 0 — SIGNIFICANT CHANGE UP
PHOSPHATE SERPL-MCNC: 2.6 MG/DL — SIGNIFICANT CHANGE UP (ref 2.5–4.5)
PLATELET # BLD AUTO: 187 K/UL — SIGNIFICANT CHANGE UP (ref 150–400)
PMV BLD: 11 FL — SIGNIFICANT CHANGE UP (ref 7–13)
POTASSIUM SERPL-MCNC: 3.5 MMOL/L — SIGNIFICANT CHANGE UP (ref 3.5–5.3)
POTASSIUM SERPL-SCNC: 3.5 MMOL/L — SIGNIFICANT CHANGE UP (ref 3.5–5.3)
RBC # BLD: 4.12 M/UL — LOW (ref 4.2–5.8)
RBC # FLD: 15.3 % — HIGH (ref 10.3–14.5)
SODIUM SERPL-SCNC: 141 MMOL/L — SIGNIFICANT CHANGE UP (ref 135–145)
WBC # BLD: 4.76 K/UL — SIGNIFICANT CHANGE UP (ref 3.8–10.5)
WBC # FLD AUTO: 4.76 K/UL — SIGNIFICANT CHANGE UP (ref 3.8–10.5)

## 2018-04-18 RX ORDER — PANTOPRAZOLE SODIUM 20 MG/1
40 TABLET, DELAYED RELEASE ORAL
Qty: 0 | Refills: 0 | Status: DISCONTINUED | OUTPATIENT
Start: 2018-04-18 | End: 2018-04-19

## 2018-04-18 RX ORDER — FENOFIBRATE,MICRONIZED 130 MG
145 CAPSULE ORAL DAILY
Qty: 0 | Refills: 0 | Status: DISCONTINUED | OUTPATIENT
Start: 2018-04-18 | End: 2018-04-19

## 2018-04-18 RX ORDER — ATORVASTATIN CALCIUM 80 MG/1
20 TABLET, FILM COATED ORAL AT BEDTIME
Qty: 0 | Refills: 0 | Status: DISCONTINUED | OUTPATIENT
Start: 2018-04-18 | End: 2018-04-19

## 2018-04-18 RX ORDER — INSULIN LISPRO 100/ML
VIAL (ML) SUBCUTANEOUS
Qty: 0 | Refills: 0 | Status: DISCONTINUED | OUTPATIENT
Start: 2018-04-18 | End: 2018-04-19

## 2018-04-18 RX ORDER — POTASSIUM PHOSPHATE, MONOBASIC POTASSIUM PHOSPHATE, DIBASIC 236; 224 MG/ML; MG/ML
15 INJECTION, SOLUTION INTRAVENOUS ONCE
Qty: 0 | Refills: 0 | Status: COMPLETED | OUTPATIENT
Start: 2018-04-18 | End: 2018-04-18

## 2018-04-18 RX ORDER — VALSARTAN 80 MG/1
80 TABLET ORAL DAILY
Qty: 0 | Refills: 0 | Status: DISCONTINUED | OUTPATIENT
Start: 2018-04-18 | End: 2018-04-19

## 2018-04-18 RX ORDER — INSULIN LISPRO 100/ML
VIAL (ML) SUBCUTANEOUS AT BEDTIME
Qty: 0 | Refills: 0 | Status: DISCONTINUED | OUTPATIENT
Start: 2018-04-18 | End: 2018-04-19

## 2018-04-18 RX ORDER — METOPROLOL TARTRATE 50 MG
100 TABLET ORAL DAILY
Qty: 0 | Refills: 0 | Status: DISCONTINUED | OUTPATIENT
Start: 2018-04-18 | End: 2018-04-19

## 2018-04-18 RX ADMIN — Medication 5 MILLIGRAM(S): at 12:53

## 2018-04-18 RX ADMIN — Medication 5 MILLIGRAM(S): at 06:53

## 2018-04-18 RX ADMIN — ENOXAPARIN SODIUM 40 MILLIGRAM(S): 100 INJECTION SUBCUTANEOUS at 21:24

## 2018-04-18 RX ADMIN — PANTOPRAZOLE SODIUM 40 MILLIGRAM(S): 20 TABLET, DELAYED RELEASE ORAL at 12:53

## 2018-04-18 RX ADMIN — ATORVASTATIN CALCIUM 20 MILLIGRAM(S): 80 TABLET, FILM COATED ORAL at 21:25

## 2018-04-18 RX ADMIN — Medication 145 MILLIGRAM(S): at 17:00

## 2018-04-18 RX ADMIN — VALSARTAN 80 MILLIGRAM(S): 80 TABLET ORAL at 17:01

## 2018-04-18 RX ADMIN — POTASSIUM PHOSPHATE, MONOBASIC POTASSIUM PHOSPHATE, DIBASIC 62.5 MILLIMOLE(S): 236; 224 INJECTION, SOLUTION INTRAVENOUS at 10:33

## 2018-04-18 RX ADMIN — SODIUM CHLORIDE 125 MILLILITER(S): 9 INJECTION, SOLUTION INTRAVENOUS at 06:53

## 2018-04-18 NOTE — DISCHARGE NOTE ADULT - CARE PLAN
Principal Discharge DX:	SBO (small bowel obstruction)  Goal:	Resolution of obstruction  Assessment and plan of treatment:	Please resume regular diet at home    If you begin to have nausea/vomiting or decreased gas/bowel movements, refrain from eating and call Dr. White's office or present to the Emergency department for further evaluation.

## 2018-04-18 NOTE — DISCHARGE NOTE ADULT - CARE PROVIDER_API CALL
Musa White), ColonRectal Surgery; Surgery  [address] [place] [address]  Phone: [phone]  Fax: [phone]

## 2018-04-18 NOTE — DISCHARGE NOTE ADULT - PATIENT PORTAL LINK FT
You can access the [email or web address], offered by [facility], by registering with the following website: [email or web address]

## 2018-04-18 NOTE — DISCHARGE NOTE ADULT - HOSPITAL COURSE
Patient is a 42y old  Male who presents with a chief complaint of abdominal pain and nausea.  Patient has a history of multiple abdominal surgeries, including most recently a laparotomy with SBR in 2016.  He has had multiple admissions for bowel obstructions, most recently in January 2018, managed non-operatively.  Starting yesterday evening, he began having abdominal pain.  He also had 2 episodes of emesis through the night.  He states that he hasn't been feeling well since sunday morning.  Last BM was early this morning, and was diarrhea.  He hasn't had any PO intake today due to nausea.  Patient states that he feels the same now as he did on his previous admission for SBO.  Patient has had chronic ventral hernias, and he states this ventral hernia feels soft and unchanged from usual.      Patient denies fevers/chills, denies lightheadedness/dizziness, denies SOB/chest pain.    Patient made NPO/IVF, NGT placed. CT scan preformed using contrast through NGT:    CT Abdomen and Pelvis w/ Oral Cont (04.17.18 @ 16:38)  Multiple ventral hernias are unchanged. No bowel obstruction.   No appendicitis.    Patient began to have BM after receiving contrast. Patient continued to have flatus and BM overnight. Passed NGT clamp trial in the AM with 0 residuals. NGT discontinued and advanced to CLD. Tolerated CLD for lunch, advanced to LRD for dinner. Patient tolerated dinner and was discharged home in stable condition

## 2018-04-18 NOTE — PROGRESS NOTE ADULT - ASSESSMENT
42y old m with abdominal pain, nausea, concerning for SBO, currently with +GI fxn    PLAN:   - NGT clamp trial  - If passes clamp trial with low residuals, will ADAT  - Monitor abdominal exams  - F/U AM labs  - DVT ppx  - Dispo: Floor

## 2018-04-18 NOTE — DISCHARGE NOTE ADULT - ADDITIONAL INSTRUCTIONS
Please follow up with:  1) Dr. White 7-10 days from discharge  2) Your primary care physician 7-10 days from discharge

## 2018-04-18 NOTE — DISCHARGE NOTE ADULT - MEDICATION SUMMARY - MEDICATIONS TO TAKE
I will START or STAY ON the medications listed below when I get home from the hospital:    Diovan 80 mg oral tablet  -- 1 tab(s) by mouth once a day  -- hold for low blood pressure  -- Indication: For Home medication    glimepiride 4 mg oral tablet  -- 1 tab(s) by mouth once a day  -- Indication: For Home medication    glimepiride 1 mg oral tablet  -- 1 tab(s) by mouth once a day  -- Indication: For Home medication    Farxiga 5 mg oral tablet  -- 1 tab(s) by mouth once a day  -- Indication: For Home medication    diphenhydrAMINE 25 mg oral capsule  -- 1 cap(s) by mouth every 6 hours, As needed, Rash and/or Itching  -- Indication: For Home medication    Crestor 5 mg oral tablet  -- 1 tab(s) by mouth once a day (at bedtime)  -- Indication: For Home medication    TriCor 145 mg oral tablet  -- 1 tab(s) by mouth once a day  -- Indication: For Home medication    Toprol- mg oral tablet, extended release  -- 1 tab(s) by mouth once a day  -- Indication: For Home medication    pantoprazole 40 mg oral delayed release tablet  -- 1 tab(s) by mouth once a day (before a meal)  -- Indication: For Home medication

## 2018-04-18 NOTE — DISCHARGE NOTE ADULT - PLAN OF CARE
Resolution of obstruction Please resume regular diet at home    If you begin to have nausea/vomiting or decreased gas/bowel movements, refrain from eating and call Dr. White's office or present to the Emergency department for further evaluation.

## 2018-04-18 NOTE — PROGRESS NOTE ADULT - SUBJECTIVE AND OBJECTIVE BOX
GENERAL SURGERY DAILY PROGRESS NOTE      Subjective:  Admitted overnight for SBO. This AM pt feeling well overall. Denies flatus but endorses BM. Pain well controlled.        Objective:    PE:  General: NAD, Lying in bed comfortably  Resp: Good effort, CTA b/l  GI/Abd: Soft, mildly distended, obese, multiple well-healed incisions, right sided hernia soft without overlying changes. NGT in place    Vital Signs Last 24 Hrs  T(C): 36.6 (18 Apr 2018 06:50), Max: 36.9 (17 Apr 2018 23:33)  T(F): 97.8 (18 Apr 2018 06:50), Max: 98.4 (17 Apr 2018 23:33)  HR: 85 (18 Apr 2018 06:50) (78 - 114)  BP: 138/87 (18 Apr 2018 06:50) (111/65 - 138/87)  BP(mean): --  RR: 18 (18 Apr 2018 06:50) (17 - 20)  SpO2: 98% (18 Apr 2018 06:50) (97% - 99%)    I&O's Detail    17 Apr 2018 07:01  -  18 Apr 2018 06:58  --------------------------------------------------------  IN:  Total IN: 0 mL    OUT:    Voided: 210 mL  Total OUT: 210 mL    Total NET: -210 mL          Daily     Daily     MEDICATIONS  (STANDING):  dextrose 5%. 1000 milliLiter(s) (50 mL/Hr) IV Continuous <Continuous>  dextrose 50% Injectable 12.5 Gram(s) IV Push once  dextrose 50% Injectable 25 Gram(s) IV Push once  dextrose 50% Injectable 25 Gram(s) IV Push once  enoxaparin Injectable 40 milliGRAM(s) SubCutaneous every 24 hours  insulin lispro (HumaLOG) corrective regimen sliding scale   SubCutaneous every 6 hours  lactated ringers. 1000 milliLiter(s) (125 mL/Hr) IV Continuous <Continuous>  metoprolol tartrate Injectable 5 milliGRAM(s) IV Push every 6 hours  pantoprazole  Injectable 40 milliGRAM(s) IV Push daily    MEDICATIONS  (PRN):  dextrose Gel 1 Dose(s) Oral once PRN Blood Glucose LESS THAN 70 milliGRAM(s)/deciliter  glucagon  Injectable 1 milliGRAM(s) IntraMuscular once PRN Glucose LESS THAN 70 milligrams/deciliter      LABS:                        11.0   4.76  )-----------( 187      ( 18 Apr 2018 06:00 )             36.5     04-17    140  |  103  |  26<H>  ----------------------------<  139<H>  4.0   |  21<L>  |  1.05    Ca    9.1      17 Apr 2018 11:40    TPro  7.6  /  Alb  4.3  /  TBili  0.6  /  DBili  x   /  AST  15  /  ALT  25  /  AlkPhos  32<L>  04-17      Urinalysis Basic - ( 17 Apr 2018 16:53 )    Color: YELLOW / Appearance: CLEAR / SG: > 1.040 / pH: 6.0  Gluc: >1000 / Ketone: NEGATIVE  / Bili: NEGATIVE / Urobili: NORMAL mg/dL   Blood: NEGATIVE / Protein: 30 mg/dL / Nitrite: NEGATIVE   Leuk Esterase: NEGATIVE / RBC: 0-2 / WBC 0-2   Sq Epi: x / Non Sq Epi: x / Bacteria: x        RADIOLOGY & ADDITIONAL STUDIES:

## 2018-04-19 VITALS
DIASTOLIC BLOOD PRESSURE: 86 MMHG | RESPIRATION RATE: 18 BRPM | OXYGEN SATURATION: 97 % | SYSTOLIC BLOOD PRESSURE: 125 MMHG | TEMPERATURE: 98 F | HEART RATE: 72 BPM

## 2018-04-19 LAB
GLUCOSE BLDC GLUCOMTR-MCNC: 146 MG/DL — HIGH (ref 70–99)
GLUCOSE BLDC GLUCOMTR-MCNC: 147 MG/DL — HIGH (ref 70–99)

## 2018-04-19 PROCEDURE — 99238 HOSP IP/OBS DSCHRG MGMT 30/<: CPT

## 2018-04-19 RX ADMIN — VALSARTAN 80 MILLIGRAM(S): 80 TABLET ORAL at 05:58

## 2018-04-19 RX ADMIN — Medication 100 MILLIGRAM(S): at 05:58

## 2018-04-19 RX ADMIN — PANTOPRAZOLE SODIUM 40 MILLIGRAM(S): 20 TABLET, DELAYED RELEASE ORAL at 05:58

## 2018-04-19 NOTE — PROGRESS NOTE ADULT - ASSESSMENT
42y old m with abdominal pain, nausea, concerning for SBO, currently with +GI fxn    PLAN:   - Continue Low residue diet  - DVT ppx  - Dispo: Discharge home today

## 2018-04-19 NOTE — PROGRESS NOTE ADULT - SUBJECTIVE AND OBJECTIVE BOX
Doing well. Wound clean, stoma functional. WBC coming down . Tolerating regular diet. To arrane discharge with antibiotic and wound care. DHeld

## 2018-04-19 NOTE — PROGRESS NOTE ADULT - SUBJECTIVE AND OBJECTIVE BOX
GENERAL SURGERY DAILY PROGRESS NOTE      Subjective:  NAEO. This AM pt feels well overall. Tolerating LRD. Denies N/V. Endorses flatus.       Objective:    PE:  General: NAD, Lying in bed comfortably  Resp: Good effort, CTA b/l  GI/Abd: Soft, mildly distended, obese, multiple well-healed incisions, right sided hernia soft without overlying changes.     Vital Signs Last 24 Hrs  T(C): 36.4 (2018 05:56), Max: 36.8 (2018 17:18)  T(F): 97.6 (2018 05:56), Max: 98.2 (2018 17:18)  HR: 72 (2018 05:56) (72 - 78)  BP: 125/86 (2018 05:56) (112/79 - 125/86)  BP(mean): --  RR: 18 (2018 05:56) (18 - 20)  SpO2: 97% (2018 05:56) (97% - 100%)    I&O's Detail    2018 07:01  -  2018 07:00  --------------------------------------------------------  IN:    Oral Fluid: 250 mL  Total IN: 250 mL    OUT:    Voided: 1150 mL  Total OUT: 1150 mL    Total NET: -900 mL          Daily     Daily Weight in k.3 (2018 01:43)    MEDICATIONS  (STANDING):  atorvastatin 20 milliGRAM(s) Oral at bedtime  dextrose 50% Injectable 12.5 Gram(s) IV Push once  dextrose 50% Injectable 25 Gram(s) IV Push once  dextrose 50% Injectable 25 Gram(s) IV Push once  enoxaparin Injectable 40 milliGRAM(s) SubCutaneous every 24 hours  fenofibrate Tablet 145 milliGRAM(s) Oral daily  insulin lispro (HumaLOG) corrective regimen sliding scale   SubCutaneous three times a day before meals  insulin lispro (HumaLOG) corrective regimen sliding scale   SubCutaneous at bedtime  metoprolol succinate  milliGRAM(s) Oral daily  pantoprazole    Tablet 40 milliGRAM(s) Oral before breakfast  valsartan 80 milliGRAM(s) Oral daily    MEDICATIONS  (PRN):  dextrose Gel 1 Dose(s) Oral once PRN Blood Glucose LESS THAN 70 milliGRAM(s)/deciliter  glucagon  Injectable 1 milliGRAM(s) IntraMuscular once PRN Glucose LESS THAN 70 milligrams/deciliter      LABS:                        11.0   4.76  )-----------( 187      ( 2018 06:00 )             36.5     04-18    141  |  106  |  22  ----------------------------<  90  3.5   |  21<L>  |  0.93    Ca    8.4      2018 06:00  Phos  2.6     -18  Mg     2.0     -18        Urinalysis Basic - ( 2018 16:53 )    Color: YELLOW / Appearance: CLEAR / SG: > 1.040 / pH: 6.0  Gluc: >1000 / Ketone: NEGATIVE  / Bili: NEGATIVE / Urobili: NORMAL mg/dL   Blood: NEGATIVE / Protein: 30 mg/dL / Nitrite: NEGATIVE   Leuk Esterase: NEGATIVE / RBC: 0-2 / WBC 0-2   Sq Epi: x / Non Sq Epi: x / Bacteria: x        RADIOLOGY & ADDITIONAL STUDIES:

## 2018-05-15 ENCOUNTER — INPATIENT (INPATIENT)
Facility: HOSPITAL | Age: 43
LOS: 1 days | Discharge: HOME | End: 2018-05-17
Attending: SURGERY | Admitting: SURGERY
Payer: COMMERCIAL

## 2018-05-15 VITALS
OXYGEN SATURATION: 98 % | RESPIRATION RATE: 20 BRPM | HEART RATE: 91 BPM | TEMPERATURE: 99 F | DIASTOLIC BLOOD PRESSURE: 65 MMHG | SYSTOLIC BLOOD PRESSURE: 106 MMHG

## 2018-05-15 DIAGNOSIS — K56.609 UNSPECIFIED INTESTINAL OBSTRUCTION, UNSPECIFIED AS TO PARTIAL VERSUS COMPLETE OBSTRUCTION: ICD-10-CM

## 2018-05-15 DIAGNOSIS — Z98.89 OTHER SPECIFIED POSTPROCEDURAL STATES: Chronic | ICD-10-CM

## 2018-05-15 LAB
ALBUMIN SERPL ELPH-MCNC: 4.4 G/DL — SIGNIFICANT CHANGE UP (ref 3.3–5)
ALP SERPL-CCNC: 38 U/L — LOW (ref 40–120)
ALT FLD-CCNC: 23 U/L — SIGNIFICANT CHANGE UP (ref 4–41)
ANISOCYTOSIS BLD QL: SLIGHT — SIGNIFICANT CHANGE UP
APPEARANCE UR: CLEAR — SIGNIFICANT CHANGE UP
APTT BLD: 27.8 SEC — SIGNIFICANT CHANGE UP (ref 27.5–37.4)
AST SERPL-CCNC: 15 U/L — SIGNIFICANT CHANGE UP (ref 4–40)
BACTERIA # UR AUTO: SIGNIFICANT CHANGE UP
BASE EXCESS BLDV CALC-SCNC: 1.5 MMOL/L — SIGNIFICANT CHANGE UP
BASOPHILS # BLD AUTO: 0.01 K/UL — SIGNIFICANT CHANGE UP (ref 0–0.2)
BASOPHILS NFR BLD AUTO: 0.1 % — SIGNIFICANT CHANGE UP (ref 0–2)
BASOPHILS NFR SPEC: 0 % — SIGNIFICANT CHANGE UP (ref 0–2)
BILIRUB SERPL-MCNC: 0.8 MG/DL — SIGNIFICANT CHANGE UP (ref 0.2–1.2)
BILIRUB UR-MCNC: NEGATIVE — SIGNIFICANT CHANGE UP
BLD GP AB SCN SERPL QL: NEGATIVE — SIGNIFICANT CHANGE UP
BLOOD GAS VENOUS - CREATININE: 0.84 MG/DL — SIGNIFICANT CHANGE UP (ref 0.5–1.3)
BLOOD UR QL VISUAL: NEGATIVE — SIGNIFICANT CHANGE UP
BUN SERPL-MCNC: 26 MG/DL — HIGH (ref 7–23)
CALCIUM SERPL-MCNC: 9 MG/DL — SIGNIFICANT CHANGE UP (ref 8.4–10.5)
CHLORIDE BLDV-SCNC: 110 MMOL/L — HIGH (ref 96–108)
CHLORIDE SERPL-SCNC: 101 MMOL/L — SIGNIFICANT CHANGE UP (ref 98–107)
CO2 SERPL-SCNC: 24 MMOL/L — SIGNIFICANT CHANGE UP (ref 22–31)
COLOR SPEC: YELLOW — SIGNIFICANT CHANGE UP
CREAT SERPL-MCNC: 0.99 MG/DL — SIGNIFICANT CHANGE UP (ref 0.5–1.3)
EOSINOPHIL # BLD AUTO: 0.06 K/UL — SIGNIFICANT CHANGE UP (ref 0–0.5)
EOSINOPHIL NFR BLD AUTO: 0.8 % — SIGNIFICANT CHANGE UP (ref 0–6)
EOSINOPHIL NFR FLD: 1.7 % — SIGNIFICANT CHANGE UP (ref 0–6)
GAS PNL BLDV: 136 MMOL/L — SIGNIFICANT CHANGE UP (ref 136–146)
GIANT PLATELETS BLD QL SMEAR: PRESENT — SIGNIFICANT CHANGE UP
GLUCOSE BLDC GLUCOMTR-MCNC: 106 MG/DL — HIGH (ref 70–99)
GLUCOSE BLDC GLUCOMTR-MCNC: 114 MG/DL — HIGH (ref 70–99)
GLUCOSE BLDV-MCNC: 138 — HIGH (ref 70–99)
GLUCOSE SERPL-MCNC: 142 MG/DL — HIGH (ref 70–99)
GLUCOSE UR-MCNC: 500 — SIGNIFICANT CHANGE UP
HCO3 BLDV-SCNC: 25 MMOL/L — SIGNIFICANT CHANGE UP (ref 20–27)
HCT VFR BLD CALC: 42 % — SIGNIFICANT CHANGE UP (ref 39–50)
HCT VFR BLDV CALC: 41.8 % — SIGNIFICANT CHANGE UP (ref 39–51)
HGB BLD-MCNC: 12.9 G/DL — LOW (ref 13–17)
HGB BLDV-MCNC: 13.6 G/DL — SIGNIFICANT CHANGE UP (ref 13–17)
IMM GRANULOCYTES # BLD AUTO: 0.01 # — SIGNIFICANT CHANGE UP
IMM GRANULOCYTES NFR BLD AUTO: 0.1 % — SIGNIFICANT CHANGE UP (ref 0–1.5)
INR BLD: 1.02 — SIGNIFICANT CHANGE UP (ref 0.88–1.17)
KETONES UR-MCNC: SIGNIFICANT CHANGE UP
LACTATE BLDV-MCNC: 1.6 MMOL/L — SIGNIFICANT CHANGE UP (ref 0.5–2)
LEUKOCYTE ESTERASE UR-ACNC: NEGATIVE — SIGNIFICANT CHANGE UP
LIDOCAIN IGE QN: 16.8 U/L — SIGNIFICANT CHANGE UP (ref 7–60)
LYMPHOCYTES # BLD AUTO: 1.86 K/UL — SIGNIFICANT CHANGE UP (ref 1–3.3)
LYMPHOCYTES # BLD AUTO: 25.4 % — SIGNIFICANT CHANGE UP (ref 13–44)
LYMPHOCYTES NFR SPEC AUTO: 18.4 % — SIGNIFICANT CHANGE UP (ref 13–44)
MACROCYTES BLD QL: SLIGHT — SIGNIFICANT CHANGE UP
MCHC RBC-ENTMCNC: 27.1 PG — SIGNIFICANT CHANGE UP (ref 27–34)
MCHC RBC-ENTMCNC: 30.7 % — LOW (ref 32–36)
MCV RBC AUTO: 88.2 FL — SIGNIFICANT CHANGE UP (ref 80–100)
METAMYELOCYTES # FLD: 1.8 % — HIGH (ref 0–1)
MONOCYTES # BLD AUTO: 0.84 K/UL — SIGNIFICANT CHANGE UP (ref 0–0.9)
MONOCYTES NFR BLD AUTO: 11.5 % — SIGNIFICANT CHANGE UP (ref 2–14)
MONOCYTES NFR BLD: 8.8 % — SIGNIFICANT CHANGE UP (ref 2–9)
MUCOUS THREADS # UR AUTO: SIGNIFICANT CHANGE UP
MYELOCYTES NFR BLD: 0.9 % — HIGH (ref 0–0)
NEUTROPHIL AB SER-ACNC: 52.6 % — SIGNIFICANT CHANGE UP (ref 43–77)
NEUTROPHILS # BLD AUTO: 4.54 K/UL — SIGNIFICANT CHANGE UP (ref 1.8–7.4)
NEUTROPHILS NFR BLD AUTO: 62.1 % — SIGNIFICANT CHANGE UP (ref 43–77)
NEUTS BAND # BLD: 8.8 % — HIGH (ref 0–6)
NITRITE UR-MCNC: NEGATIVE — SIGNIFICANT CHANGE UP
NON-SQ EPI CELLS # UR AUTO: 1 — SIGNIFICANT CHANGE UP
NRBC # FLD: 0 — SIGNIFICANT CHANGE UP
PCO2 BLDV: 47 MMHG — SIGNIFICANT CHANGE UP (ref 41–51)
PH BLDV: 7.37 PH — SIGNIFICANT CHANGE UP (ref 7.32–7.43)
PH UR: 6 — SIGNIFICANT CHANGE UP (ref 4.6–8)
PLATELET # BLD AUTO: 256 K/UL — SIGNIFICANT CHANGE UP (ref 150–400)
PLATELET COUNT - ESTIMATE: NORMAL — SIGNIFICANT CHANGE UP
PMV BLD: 10.6 FL — SIGNIFICANT CHANGE UP (ref 7–13)
PO2 BLDV: 36 MMHG — SIGNIFICANT CHANGE UP (ref 35–40)
POIKILOCYTOSIS BLD QL AUTO: SLIGHT — SIGNIFICANT CHANGE UP
POLYCHROMASIA BLD QL SMEAR: SLIGHT — SIGNIFICANT CHANGE UP
POTASSIUM BLDV-SCNC: 3.9 MMOL/L — SIGNIFICANT CHANGE UP (ref 3.4–4.5)
POTASSIUM SERPL-MCNC: 4 MMOL/L — SIGNIFICANT CHANGE UP (ref 3.5–5.3)
POTASSIUM SERPL-SCNC: 4 MMOL/L — SIGNIFICANT CHANGE UP (ref 3.5–5.3)
PROT SERPL-MCNC: 7.2 G/DL — SIGNIFICANT CHANGE UP (ref 6–8.3)
PROT UR-MCNC: 30 MG/DL — HIGH
PROTHROM AB SERPL-ACNC: 11.7 SEC — SIGNIFICANT CHANGE UP (ref 9.8–13.1)
RBC # BLD: 4.76 M/UL — SIGNIFICANT CHANGE UP (ref 4.2–5.8)
RBC # FLD: 15.6 % — HIGH (ref 10.3–14.5)
RBC CASTS # UR COMP ASSIST: HIGH (ref 0–?)
RH IG SCN BLD-IMP: POSITIVE — SIGNIFICANT CHANGE UP
SAO2 % BLDV: 62.8 % — SIGNIFICANT CHANGE UP (ref 60–85)
SODIUM SERPL-SCNC: 138 MMOL/L — SIGNIFICANT CHANGE UP (ref 135–145)
SP GR SPEC: 1.03 — SIGNIFICANT CHANGE UP (ref 1–1.04)
UROBILINOGEN FLD QL: 2 MG/DL — HIGH
VARIANT LYMPHS # BLD: 7 % — SIGNIFICANT CHANGE UP
WBC # BLD: 7.32 K/UL — SIGNIFICANT CHANGE UP (ref 3.8–10.5)
WBC # FLD AUTO: 7.32 K/UL — SIGNIFICANT CHANGE UP (ref 3.8–10.5)
WBC UR QL: SIGNIFICANT CHANGE UP (ref 0–?)

## 2018-05-15 PROCEDURE — 74176 CT ABD & PELVIS W/O CONTRAST: CPT | Mod: 26

## 2018-05-15 PROCEDURE — 71045 X-RAY EXAM CHEST 1 VIEW: CPT | Mod: 26

## 2018-05-15 PROCEDURE — 99222 1ST HOSP IP/OBS MODERATE 55: CPT | Mod: GC

## 2018-05-15 RX ORDER — ENOXAPARIN SODIUM 100 MG/ML
40 INJECTION SUBCUTANEOUS EVERY 24 HOURS
Qty: 0 | Refills: 0 | Status: DISCONTINUED | OUTPATIENT
Start: 2018-05-15 | End: 2018-05-17

## 2018-05-15 RX ORDER — SODIUM CHLORIDE 9 MG/ML
1000 INJECTION INTRAMUSCULAR; INTRAVENOUS; SUBCUTANEOUS ONCE
Qty: 0 | Refills: 0 | Status: COMPLETED | OUTPATIENT
Start: 2018-05-15 | End: 2018-05-15

## 2018-05-15 RX ORDER — PANTOPRAZOLE SODIUM 20 MG/1
40 TABLET, DELAYED RELEASE ORAL EVERY 24 HOURS
Qty: 0 | Refills: 0 | Status: DISCONTINUED | OUTPATIENT
Start: 2018-05-15 | End: 2018-05-15

## 2018-05-15 RX ORDER — KETOROLAC TROMETHAMINE 30 MG/ML
15 SYRINGE (ML) INJECTION ONCE
Qty: 0 | Refills: 0 | Status: DISCONTINUED | OUTPATIENT
Start: 2018-05-15 | End: 2018-05-15

## 2018-05-15 RX ORDER — INSULIN LISPRO 100/ML
VIAL (ML) SUBCUTANEOUS EVERY 6 HOURS
Qty: 0 | Refills: 0 | Status: DISCONTINUED | OUTPATIENT
Start: 2018-05-15 | End: 2018-05-16

## 2018-05-15 RX ORDER — METOPROLOL TARTRATE 50 MG
5 TABLET ORAL EVERY 6 HOURS
Qty: 0 | Refills: 0 | Status: DISCONTINUED | OUTPATIENT
Start: 2018-05-15 | End: 2018-05-15

## 2018-05-15 RX ORDER — SODIUM CHLORIDE 9 MG/ML
1000 INJECTION, SOLUTION INTRAVENOUS
Qty: 0 | Refills: 0 | Status: DISCONTINUED | OUTPATIENT
Start: 2018-05-15 | End: 2018-05-16

## 2018-05-15 RX ORDER — METOPROLOL TARTRATE 50 MG
5 TABLET ORAL EVERY 6 HOURS
Qty: 0 | Refills: 0 | Status: DISCONTINUED | OUTPATIENT
Start: 2018-05-15 | End: 2018-05-16

## 2018-05-15 RX ORDER — PANTOPRAZOLE SODIUM 20 MG/1
40 TABLET, DELAYED RELEASE ORAL EVERY 24 HOURS
Qty: 0 | Refills: 0 | Status: COMPLETED | OUTPATIENT
Start: 2018-05-15 | End: 2018-05-15

## 2018-05-15 RX ORDER — ONDANSETRON 8 MG/1
4 TABLET, FILM COATED ORAL ONCE
Qty: 0 | Refills: 0 | Status: COMPLETED | OUTPATIENT
Start: 2018-05-15 | End: 2018-05-15

## 2018-05-15 RX ADMIN — SODIUM CHLORIDE 125 MILLILITER(S): 9 INJECTION, SOLUTION INTRAVENOUS at 16:59

## 2018-05-15 RX ADMIN — Medication 15 MILLIGRAM(S): at 14:58

## 2018-05-15 RX ADMIN — Medication 15 MILLIGRAM(S): at 15:15

## 2018-05-15 RX ADMIN — Medication 5 MILLIGRAM(S): at 18:36

## 2018-05-15 RX ADMIN — PANTOPRAZOLE SODIUM 40 MILLIGRAM(S): 20 TABLET, DELAYED RELEASE ORAL at 18:36

## 2018-05-15 RX ADMIN — SODIUM CHLORIDE 2000 MILLILITER(S): 9 INJECTION INTRAMUSCULAR; INTRAVENOUS; SUBCUTANEOUS at 12:42

## 2018-05-15 RX ADMIN — ENOXAPARIN SODIUM 40 MILLIGRAM(S): 100 INJECTION SUBCUTANEOUS at 18:36

## 2018-05-15 RX ADMIN — SODIUM CHLORIDE 125 MILLILITER(S): 9 INJECTION, SOLUTION INTRAVENOUS at 18:36

## 2018-05-15 RX ADMIN — ONDANSETRON 4 MILLIGRAM(S): 8 TABLET, FILM COATED ORAL at 12:42

## 2018-05-15 NOTE — H&P ADULT - ASSESSMENT
42M p/w abdominal pain, nausea, concerning for recurrent SBO.     #Admit to A team Surgery, Attending Dr. White    -ED requested to please place NGT  -f/u CT scan    -Diet: NPO  -IVF: LR@125/h  -serial abdominal exams  -start lispro ISS q6h  -convert home protonix to IV    -hold home diovan (valsartan), tricor (fenofibrate), oral DM Rx while NPO      (Discussed w/ attending Dr. White.)      NUZHAT Ortega MD (PGY2)    (Please page [facility] A team Surgery, [phone] for questions/concerns.)

## 2018-05-15 NOTE — H&P ADULT - HISTORY OF PRESENT ILLNESS
42M PMHx multiple abdominal surgeries, including most recently a laparotomy with SBR in 2016, who presented to [facility] ED the morning of 5/15/18 complaining of nausea/vomiting x3days. Patient recently admitted w/ SBO 1/23-1/26 and 4/17-4/19, and has had multiple admissions for bowel obstructions, managed non-operatively.  Began having abdominal pain ~3 days ago associated w/ N/V and increasing distension. Last BM the night prior to presentation, diarrhea; denies flatus today.  He hasn't had any PO intake today due to nausea.  Reports that his symptoms feel similar to those he experience during his prior admissions for SBO. Patient has had chronic ventral hernias, and he states this ventral hernia feels soft and unchanged from usual.      In ED VS: T37.1, P91, /65, RR20, SpO2 98% on RA. Labs notable for WBC 7.3, venous lactate 1.6. Surgery consulted.

## 2018-05-15 NOTE — ED PROVIDER NOTE - OBJECTIVE STATEMENT
42M with PMH of DM, HLD, HTN, multiple SBO presenting with abd pain, nausea, vomiting x3 days. Patient endorses that symptoms feel very similar to prior episodes of obstruction with last SBO about 1 month ago. Endorses obstruction issues started after diverticulitis c/b perforation with about 27 SBO episodes since. States he had 3 loose BM yesterday. Denies fever, chills, cp, sob, dizziness, dysuria   Surgeon: Dr. White

## 2018-05-15 NOTE — H&P ADULT - NSHPLABSRESULTS_GEN_ALL_CORE
Labs:                        12.9   7.32  )-----------( 256      ( 15 May 2018 12:20 )             42.0     PT/INR - ( 15 May 2018 12:20 )   PT: 11.7 SEC;   INR: 1.02          PTT - ( 15 May 2018 12:20 )  PTT:27.8 SEC  05-15    138  |  101  |  26<H>  ----------------------------<  142<H>  4.0   |  24  |  0.99    Ca    9.0      15 May 2018 12:20    TPro  7.2  /  Alb  4.4  /  TBili  0.8  /  DBili  x   /  AST  15  /  ALT  23  /  AlkPhos  38<L>  0515    Urinalysis Basic - ( 15 May 2018 13:00 )    Color: YELLOW / Appearance: CLEAR / S.031 / pH: 6.0  Gluc: 500 / Ketone: TRACE  / Bili: NEGATIVE / Urobili: 2 mg/dL   Blood: NEGATIVE / Protein: 30 mg/dL / Nitrite: NEGATIVE   Leuk Esterase: NEGATIVE / RBC: 5-10 / WBC 2-5   Sq Epi: x / Non Sq Epi: x / Bacteria: FEW      Imaging and other studies:  CT abd/pelvis: pending

## 2018-05-15 NOTE — ED PROVIDER NOTE - ATTENDING CONTRIBUTION TO CARE
[place] pt presents with nausea  no BM  and mid abd pain  c/w prior episodes of obstruction  exam  pt with clear lungs  card RRR S1S2  abd distended and resonant  no significant focal tenderness   ?hernia felt rt mid/low abd  non tender  Pt seen by surgery  CT arranged  admitted by surgery prior to CT

## 2018-05-15 NOTE — ED PROVIDER NOTE - MEDICAL DECISION MAKING DETAILS
42M hx of multiple SBO presenting with n/v abd pain. Concern for episode of SBO. WIll obtain CT, surgery consult, reassess

## 2018-05-15 NOTE — H&P ADULT - NSHPPHYSICALEXAM_GEN_ALL_CORE
T(C): 37.4 (05-15-18 @ 12:23)  HR: 89 (05-15-18 @ 12:23) (89 - 91)  BP: 109/64 (05-15-18 @ 12:23) (106/65 - 109/64)  RR: 16 (05-15-18 @ 12:23) (16 - 20)  SpO2: 96% (05-15-18 @ 12:23) (96% - 98%)  Wt(kg): --  Tmax: T(C): , Max: 37.4 (05-15-18 @ 12:23)  Wt(kg): --    General: well developed, well nourished, NAD, overweight  Neuro: alert and oriented, no focal deficits, moves all extremities spontaneously  HEENT: NCAT, anicteric, mucosa moist  Respiratory: airway patent, respirations unlabored on RA  CVS: regular  Abdomen: soft, mildly tender, mildly distended; R lower abdominal hernia, soft, no skin changes; multiple well healed surgical scars  Extremities: no LE edema, sensation and movement grossly intact  Skin: warm, dry, appropriate color

## 2018-05-15 NOTE — ED ADULT NURSE NOTE - OBJECTIVE STATEMENT
Received pt into spot #15. Pt ambulatory to room, no acute distress noted. Pt A/O x 4 calm cooperative, c/o abdominal distension 4 days with pain, nausea and diarrhea. Last BM yesterday. Denies any urinary symptoms. Hx bowel obstruction in past. Pt c/o nausea presently with pain 6/10 pain scale. Bloodwork obtained and sent. Pt evaluated by Dr. Smith. Parents at bedside.

## 2018-05-15 NOTE — ED ADULT NURSE NOTE - CHIEF COMPLAINT QUOTE
c/o abdominal pain headaches c/o abdominal distention pt history of obstruction has had two surgeries for SBO pt nauseous c/o diarrhea

## 2018-05-15 NOTE — ED PROVIDER NOTE - PROGRESS NOTE DETAILS
Sarah: as per surgery, can admit to Dr White without CT results however, recommends CT scan before transfer. Request NGT placement

## 2018-05-15 NOTE — CONSULT NOTE ADULT - ASSESSMENT
42M p/w abdominal pain, nausea, concerning for recurrent SBO.     #Admit to A team Surgery, Attending Dr. White    -ED requested to please place NGT  -f/u CT scan    -Diet: NPO  -IVF: LR@125/h  -serial abdominal exams      NUZHAT Ortega MD (PGY2)    (Please page [facility] A team Surgery, [phone] for questions/concerns.) 42M p/w abdominal pain, nausea, concerning for recurrent SBO.     #Admit to A team Surgery, Attending Dr. White    -ED requested to please place NGT  -f/u CT scan    -Diet: NPO  -IVF: LR@125/h  -serial abdominal exams  -start lispro ISS q6h  -convert home protonix to IV    -hold home diovan (valsartan), tricor (fenofibrate), oral DM Rx while NPO      (Discussed w/ attending Dr. White.)      NUZHAT Ortega MD (PGY2)    (Please page [facility] A team Surgery, [phone] for questions/concerns.)

## 2018-05-15 NOTE — CONSULT NOTE ADULT - SUBJECTIVE AND OBJECTIVE BOX
#Admit to A team Surgery, Attending Dr. White    -please place NGT  -f/u CT scan    -Diet: NPO  -IVF: General Surgery Consult  A team [id number]    Consultant: Dr. SARAH White    CC: 42y old Male admitted with a chief complaint of nausea/vomiting/distension    HPI:  42M PMHx multiple abdominal surgeries, including most recently a laparotomy with SBR in , who presented to [facility] ED the morning of 5/15/18 complaining of nausea/vomiting x3days. Patient recently admitted w/ SBO - and -, and has had multiple admissions for bowel obstructions, managed non-operatively.  Began having abdominal pain ~3 days ago associated w/ N/V and increasing distension. Last BM the night prior to presentation, diarrhea; denies flatus today.  He hasn't had any PO intake today due to nausea.  Reports that his symptoms feel similar to those he experience during his prior admissions for SBO. Patient has had chronic ventral hernias, and he states this ventral hernia feels soft and unchanged from usual.      In ED VS: T37.1, P91, /65, RR20, SpO2 98% on RA. Labs notable for WBC 7.3, venous lactate 1.6. Surgery consulted.     PMHx: DVT (deep venous thrombosis)  Diverticulitis  SBO (small bowel obstruction)  Diabetes mellitus, type 2  Brain abscess  Personal History of DVT (Deep  Hyperlipidemia  Hypertension    PSHx: H/O exploratory laparotomy  S/P Isi's procedure  Diverticulitis of Sigmoid Colon    Medications (inpatient): x  Medications (PRN): x    Allergies: ceftriaxone (Rash)  cephalosporins (Rash)  Cipro (Unknown)  contrast media (iodine-based) (Rash)  Flagyl (Unknown)  iodine containing compounds (Rash)  morphine (Rash)  (Intolerances: x)    Social Hx: denies tobacco/EtOH    Physical Exam  T(C): 37.4 (05-15-18 @ 12:23)  HR: 89 (05-15-18 @ 12:23) (89 - 91)  BP: 109/64 (05-15-18 @ 12:23) (106/65 - 109/64)  RR: 16 (05-15-18 @ 12:23) (16 - 20)  SpO2: 96% (05-15-18 @ 12:23) (96% - 98%)  Wt(kg): --  Tmax: T(C): , Max: 37.4 (05-15-18 @ 12:23)  Wt(kg): --    General: well developed, well nourished, NAD, overweight  Neuro: alert and oriented, no focal deficits, moves all extremities spontaneously  HEENT: NCAT, anicteric, mucosa moist  Respiratory: airway patent, respirations unlabored on RA  CVS: regular  Abdomen: soft, mildly tender, mildly distended; R lower abdominal hernia, soft, no skin changes; multiple well healed surgical scars  Extremities: no LE edema, sensation and movement grossly intact  Skin: warm, dry, appropriate color    Labs:                        12.9   7.32  )-----------( 256      ( 15 May 2018 12:20 )             42.0     PT/INR - ( 15 May 2018 12:20 )   PT: 11.7 SEC;   INR: 1.02          PTT - ( 15 May 2018 12:20 )  PTT:27.8 SEC  05-15    138  |  101  |  26<H>  ----------------------------<  142<H>  4.0   |  24  |  0.99    Ca    9.0      15 May 2018 12:20    TPro  7.2  /  Alb  4.4  /  TBili  0.8  /  DBili  x   /  AST  15  /  ALT  23  /  AlkPhos  38<L>  05-15    Urinalysis Basic - ( 15 May 2018 13:00 )    Color: YELLOW / Appearance: CLEAR / S.031 / pH: 6.0  Gluc: 500 / Ketone: TRACE  / Bili: NEGATIVE / Urobili: 2 mg/dL   Blood: NEGATIVE / Protein: 30 mg/dL / Nitrite: NEGATIVE   Leuk Esterase: NEGATIVE / RBC: 5-10 / WBC 2-5   Sq Epi: x / Non Sq Epi: x / Bacteria: FEW      Imaging and other studies:  CT abd/pelvis: pending

## 2018-05-15 NOTE — ED PROVIDER NOTE - CARE PLAN
Principal Discharge DX:	SBO (small bowel obstruction) Principal Discharge DX:	Abdominal pain  Secondary Diagnosis:	Nausea and vomiting

## 2018-05-16 LAB
ALBUMIN SERPL ELPH-MCNC: 4.1 G/DL — SIGNIFICANT CHANGE UP (ref 3.3–5)
ALP SERPL-CCNC: 36 U/L — LOW (ref 40–120)
ALT FLD-CCNC: 20 U/L — SIGNIFICANT CHANGE UP (ref 4–41)
AST SERPL-CCNC: 17 U/L — SIGNIFICANT CHANGE UP (ref 4–40)
BASOPHILS # BLD AUTO: 0.01 K/UL — SIGNIFICANT CHANGE UP (ref 0–0.2)
BASOPHILS NFR BLD AUTO: 0.2 % — SIGNIFICANT CHANGE UP (ref 0–2)
BILIRUB SERPL-MCNC: 0.6 MG/DL — SIGNIFICANT CHANGE UP (ref 0.2–1.2)
BUN SERPL-MCNC: 22 MG/DL — SIGNIFICANT CHANGE UP (ref 7–23)
CALCIUM SERPL-MCNC: 9.1 MG/DL — SIGNIFICANT CHANGE UP (ref 8.4–10.5)
CHLORIDE SERPL-SCNC: 102 MMOL/L — SIGNIFICANT CHANGE UP (ref 98–107)
CO2 SERPL-SCNC: 25 MMOL/L — SIGNIFICANT CHANGE UP (ref 22–31)
CREAT SERPL-MCNC: 0.94 MG/DL — SIGNIFICANT CHANGE UP (ref 0.5–1.3)
EOSINOPHIL # BLD AUTO: 0.06 K/UL — SIGNIFICANT CHANGE UP (ref 0–0.5)
EOSINOPHIL NFR BLD AUTO: 0.9 % — SIGNIFICANT CHANGE UP (ref 0–6)
GLUCOSE BLDC GLUCOMTR-MCNC: 115 MG/DL — HIGH (ref 70–99)
GLUCOSE BLDC GLUCOMTR-MCNC: 116 MG/DL — HIGH (ref 70–99)
GLUCOSE SERPL-MCNC: 133 MG/DL — HIGH (ref 70–99)
HCT VFR BLD CALC: 38.9 % — LOW (ref 39–50)
HGB BLD-MCNC: 12.1 G/DL — LOW (ref 13–17)
IMM GRANULOCYTES # BLD AUTO: 0.02 # — SIGNIFICANT CHANGE UP
IMM GRANULOCYTES NFR BLD AUTO: 0.3 % — SIGNIFICANT CHANGE UP (ref 0–1.5)
LYMPHOCYTES # BLD AUTO: 1.53 K/UL — SIGNIFICANT CHANGE UP (ref 1–3.3)
LYMPHOCYTES # BLD AUTO: 23.6 % — SIGNIFICANT CHANGE UP (ref 13–44)
MAGNESIUM SERPL-MCNC: 1.9 MG/DL — SIGNIFICANT CHANGE UP (ref 1.6–2.6)
MCHC RBC-ENTMCNC: 26.9 PG — LOW (ref 27–34)
MCHC RBC-ENTMCNC: 31.1 % — LOW (ref 32–36)
MCV RBC AUTO: 86.6 FL — SIGNIFICANT CHANGE UP (ref 80–100)
MONOCYTES # BLD AUTO: 0.46 K/UL — SIGNIFICANT CHANGE UP (ref 0–0.9)
MONOCYTES NFR BLD AUTO: 7.1 % — SIGNIFICANT CHANGE UP (ref 2–14)
NEUTROPHILS # BLD AUTO: 4.41 K/UL — SIGNIFICANT CHANGE UP (ref 1.8–7.4)
NEUTROPHILS NFR BLD AUTO: 67.9 % — SIGNIFICANT CHANGE UP (ref 43–77)
NRBC # FLD: 0 — SIGNIFICANT CHANGE UP
PHOSPHATE SERPL-MCNC: 2.4 MG/DL — LOW (ref 2.5–4.5)
PLATELET # BLD AUTO: 201 K/UL — SIGNIFICANT CHANGE UP (ref 150–400)
PMV BLD: 10.7 FL — SIGNIFICANT CHANGE UP (ref 7–13)
POTASSIUM SERPL-MCNC: 3.8 MMOL/L — SIGNIFICANT CHANGE UP (ref 3.5–5.3)
POTASSIUM SERPL-SCNC: 3.8 MMOL/L — SIGNIFICANT CHANGE UP (ref 3.5–5.3)
PROT SERPL-MCNC: 7.1 G/DL — SIGNIFICANT CHANGE UP (ref 6–8.3)
RBC # BLD: 4.49 M/UL — SIGNIFICANT CHANGE UP (ref 4.2–5.8)
RBC # FLD: 15.2 % — HIGH (ref 10.3–14.5)
SODIUM SERPL-SCNC: 141 MMOL/L — SIGNIFICANT CHANGE UP (ref 135–145)
WBC # BLD: 6.49 K/UL — SIGNIFICANT CHANGE UP (ref 3.8–10.5)
WBC # FLD AUTO: 6.49 K/UL — SIGNIFICANT CHANGE UP (ref 3.8–10.5)

## 2018-05-16 PROCEDURE — 99232 SBSQ HOSP IP/OBS MODERATE 35: CPT | Mod: GC

## 2018-05-16 RX ORDER — POLYETHYLENE GLYCOL 3350 17 G/17G
17 POWDER, FOR SOLUTION ORAL ONCE
Qty: 0 | Refills: 0 | Status: COMPLETED | OUTPATIENT
Start: 2018-05-16 | End: 2018-05-16

## 2018-05-16 RX ORDER — SODIUM CHLORIDE 9 MG/ML
1000 INJECTION, SOLUTION INTRAVENOUS
Qty: 0 | Refills: 0 | Status: DISCONTINUED | OUTPATIENT
Start: 2018-05-16 | End: 2018-05-17

## 2018-05-16 RX ORDER — VALSARTAN 80 MG/1
80 TABLET ORAL DAILY
Qty: 0 | Refills: 0 | Status: DISCONTINUED | OUTPATIENT
Start: 2018-05-16 | End: 2018-05-17

## 2018-05-16 RX ORDER — INSULIN LISPRO 100/ML
VIAL (ML) SUBCUTANEOUS
Qty: 0 | Refills: 0 | Status: DISCONTINUED | OUTPATIENT
Start: 2018-05-16 | End: 2018-05-17

## 2018-05-16 RX ORDER — SIMETHICONE 80 MG/1
80 TABLET, CHEWABLE ORAL
Qty: 0 | Refills: 0 | Status: DISCONTINUED | OUTPATIENT
Start: 2018-05-16 | End: 2018-05-17

## 2018-05-16 RX ORDER — SIMETHICONE 80 MG/1
80 TABLET, CHEWABLE ORAL
Qty: 0 | Refills: 0 | Status: DISCONTINUED | OUTPATIENT
Start: 2018-05-16 | End: 2018-05-16

## 2018-05-16 RX ORDER — ATORVASTATIN CALCIUM 80 MG/1
20 TABLET, FILM COATED ORAL AT BEDTIME
Qty: 0 | Refills: 0 | Status: DISCONTINUED | OUTPATIENT
Start: 2018-05-16 | End: 2018-05-17

## 2018-05-16 RX ORDER — METOPROLOL TARTRATE 50 MG
50 TABLET ORAL
Qty: 0 | Refills: 0 | Status: DISCONTINUED | OUTPATIENT
Start: 2018-05-16 | End: 2018-05-17

## 2018-05-16 RX ORDER — FENOFIBRATE,MICRONIZED 130 MG
145 CAPSULE ORAL DAILY
Qty: 0 | Refills: 0 | Status: DISCONTINUED | OUTPATIENT
Start: 2018-05-16 | End: 2018-05-17

## 2018-05-16 RX ORDER — POTASSIUM PHOSPHATE, MONOBASIC POTASSIUM PHOSPHATE, DIBASIC 236; 224 MG/ML; MG/ML
15 INJECTION, SOLUTION INTRAVENOUS ONCE
Qty: 0 | Refills: 0 | Status: COMPLETED | OUTPATIENT
Start: 2018-05-16 | End: 2018-05-16

## 2018-05-16 RX ORDER — PANTOPRAZOLE SODIUM 20 MG/1
40 TABLET, DELAYED RELEASE ORAL
Qty: 0 | Refills: 0 | Status: DISCONTINUED | OUTPATIENT
Start: 2018-05-16 | End: 2018-05-17

## 2018-05-16 RX ADMIN — SODIUM CHLORIDE 100 MILLILITER(S): 9 INJECTION, SOLUTION INTRAVENOUS at 13:08

## 2018-05-16 RX ADMIN — Medication 50 MILLIGRAM(S): at 18:19

## 2018-05-16 RX ADMIN — SIMETHICONE 80 MILLIGRAM(S): 80 TABLET, CHEWABLE ORAL at 18:23

## 2018-05-16 RX ADMIN — Medication 145 MILLIGRAM(S): at 18:19

## 2018-05-16 RX ADMIN — POTASSIUM PHOSPHATE, MONOBASIC POTASSIUM PHOSPHATE, DIBASIC 62.5 MILLIMOLE(S): 236; 224 INJECTION, SOLUTION INTRAVENOUS at 18:20

## 2018-05-16 RX ADMIN — Medication 5 MILLIGRAM(S): at 00:36

## 2018-05-16 RX ADMIN — VALSARTAN 80 MILLIGRAM(S): 80 TABLET ORAL at 18:19

## 2018-05-16 RX ADMIN — ATORVASTATIN CALCIUM 20 MILLIGRAM(S): 80 TABLET, FILM COATED ORAL at 22:03

## 2018-05-16 RX ADMIN — PANTOPRAZOLE SODIUM 40 MILLIGRAM(S): 20 TABLET, DELAYED RELEASE ORAL at 18:22

## 2018-05-16 RX ADMIN — Medication 5 MILLIGRAM(S): at 05:43

## 2018-05-16 RX ADMIN — Medication 5 MILLIGRAM(S): at 13:04

## 2018-05-16 RX ADMIN — ENOXAPARIN SODIUM 40 MILLIGRAM(S): 100 INJECTION SUBCUTANEOUS at 18:20

## 2018-05-16 NOTE — PROGRESS NOTE ADULT - SUBJECTIVE AND OBJECTIVE BOX
Surgery Progress Note    S:   No acute events overnight  CT scan shows chronically dilated small bowel but no obstruction  No n/v.  Hemodynamically stable and afebrile.    T(C): 36.8 (05-16-18 @ 02:32), Max: 37.4 (05-15-18 @ 12:23)  HR: 82 (05-16-18 @ 02:32) (81 - 91)  BP: 130/75 (05-16-18 @ 02:32) (104/65 - 133/70)  RR: 17 (05-16-18 @ 02:32) (16 - 20)  SpO2: 96% (05-16-18 @ 02:32) (96% - 98%)  Wt(kg): --    General: well developed, well nourished, NAD, overweight  HEENT: NGT  Respiratory: airway patent, respirations unlabored on RA  CV RRR  Abdomen: soft, mildly tender, mildly distended; R lower abdominal hernia, soft, no skin changes; multiple well healed surgical scars  Extremities: no LE edema, sensation and movement grossly intact    05-15 @ 07:01  -  05-16 @ 04:16  --------------------------------------------------------  IN:    lactated ringers.: 625 mL  Total IN: 625 mL    OUT:    Nasoenteral Tube: 250 mL    Voided: 200 mL  Total OUT: 450 mL    Total NET: 175 mL               12.9   7.32  )-----------( 256      ( 15 May 2018 12:20 )             42.0     CAPILLARY BLOOD GLUCOSE      POCT Blood Glucose.: 106 mg/dL (15 May 2018 23:48)  POCT Blood Glucose.: 114 mg/dL (15 May 2018 17:45)  POCT Blood Glucose.: 107 mg/dL (15 May 2018 15:20)    < from: CT Abdomen and Pelvis w/ Oral Cont (05.15.18 @ 15:58) >  IMPRESSION: Chronically dilated small bowel and ventral hernias,   unchanged. No evidence of obstruction.    < end of copied text >  < from: CT Abdomen and Pelvis w/ Oral Cont (05.15.18 @ 15:58) >  BOWEL: Rectosigmoid anastomosis. Normal appendix. Chronically dilated   small bowel without evidence of obstruction. Oral contrast progressed   into the right colon. Ventral hernias containing small bowel are again   noted.    < end of copied text >

## 2018-05-16 NOTE — PROGRESS NOTE ADULT - ASSESSMENT
42M p/w abdominal pain, nausea, concerning for recurrent SBO. CT scan shows no obstruction, but has chronically dilated small bowel    -NGT  -NPO/IVF  -Await return of bowel function  -IVF: LR@125/h  -serial abdominal exams  -Change medications to IV  -hold home diovan (valsartan), tricor (fenofibrate), oral DM Rx while NPO    Surgery A  [id number]

## 2018-05-17 ENCOUNTER — TRANSCRIPTION ENCOUNTER (OUTPATIENT)
Age: 43
End: 2018-05-17

## 2018-05-17 VITALS
OXYGEN SATURATION: 97 % | DIASTOLIC BLOOD PRESSURE: 85 MMHG | SYSTOLIC BLOOD PRESSURE: 137 MMHG | RESPIRATION RATE: 17 BRPM | HEART RATE: 71 BPM | TEMPERATURE: 98 F

## 2018-05-17 LAB
BUN SERPL-MCNC: 15 MG/DL — SIGNIFICANT CHANGE UP (ref 7–23)
CALCIUM SERPL-MCNC: 8.9 MG/DL — SIGNIFICANT CHANGE UP (ref 8.4–10.5)
CHLORIDE SERPL-SCNC: 104 MMOL/L — SIGNIFICANT CHANGE UP (ref 98–107)
CO2 SERPL-SCNC: 25 MMOL/L — SIGNIFICANT CHANGE UP (ref 22–31)
CREAT SERPL-MCNC: 0.85 MG/DL — SIGNIFICANT CHANGE UP (ref 0.5–1.3)
GLUCOSE BLDC GLUCOMTR-MCNC: 149 MG/DL — HIGH (ref 70–99)
GLUCOSE BLDC GLUCOMTR-MCNC: 157 MG/DL — HIGH (ref 70–99)
GLUCOSE SERPL-MCNC: 144 MG/DL — HIGH (ref 70–99)
HCT VFR BLD CALC: 33.4 % — LOW (ref 39–50)
HGB BLD-MCNC: 10.6 G/DL — LOW (ref 13–17)
MAGNESIUM SERPL-MCNC: 2 MG/DL — SIGNIFICANT CHANGE UP (ref 1.6–2.6)
MCHC RBC-ENTMCNC: 27.7 PG — SIGNIFICANT CHANGE UP (ref 27–34)
MCHC RBC-ENTMCNC: 31.7 % — LOW (ref 32–36)
MCV RBC AUTO: 87.4 FL — SIGNIFICANT CHANGE UP (ref 80–100)
NRBC # FLD: 0 — SIGNIFICANT CHANGE UP
PHOSPHATE SERPL-MCNC: 3.1 MG/DL — SIGNIFICANT CHANGE UP (ref 2.5–4.5)
PLATELET # BLD AUTO: 166 K/UL — SIGNIFICANT CHANGE UP (ref 150–400)
PMV BLD: 10.6 FL — SIGNIFICANT CHANGE UP (ref 7–13)
POTASSIUM SERPL-MCNC: 3.5 MMOL/L — SIGNIFICANT CHANGE UP (ref 3.5–5.3)
POTASSIUM SERPL-SCNC: 3.5 MMOL/L — SIGNIFICANT CHANGE UP (ref 3.5–5.3)
RBC # BLD: 3.82 M/UL — LOW (ref 4.2–5.8)
RBC # FLD: 14.9 % — HIGH (ref 10.3–14.5)
SODIUM SERPL-SCNC: 136 MMOL/L — SIGNIFICANT CHANGE UP (ref 135–145)
WBC # BLD: 4.74 K/UL — SIGNIFICANT CHANGE UP (ref 3.8–10.5)
WBC # FLD AUTO: 4.74 K/UL — SIGNIFICANT CHANGE UP (ref 3.8–10.5)

## 2018-05-17 PROCEDURE — 99238 HOSP IP/OBS DSCHRG MGMT 30/<: CPT

## 2018-05-17 RX ORDER — POTASSIUM CHLORIDE 20 MEQ
20 PACKET (EA) ORAL
Qty: 0 | Refills: 0 | Status: COMPLETED | OUTPATIENT
Start: 2018-05-17 | End: 2018-05-17

## 2018-05-17 RX ADMIN — SIMETHICONE 80 MILLIGRAM(S): 80 TABLET, CHEWABLE ORAL at 11:25

## 2018-05-17 RX ADMIN — SIMETHICONE 80 MILLIGRAM(S): 80 TABLET, CHEWABLE ORAL at 06:03

## 2018-05-17 RX ADMIN — Medication 145 MILLIGRAM(S): at 11:24

## 2018-05-17 RX ADMIN — SIMETHICONE 80 MILLIGRAM(S): 80 TABLET, CHEWABLE ORAL at 00:00

## 2018-05-17 RX ADMIN — Medication 1: at 13:24

## 2018-05-17 RX ADMIN — Medication 50 MILLIGRAM(S): at 06:03

## 2018-05-17 RX ADMIN — PANTOPRAZOLE SODIUM 40 MILLIGRAM(S): 20 TABLET, DELAYED RELEASE ORAL at 06:03

## 2018-05-17 RX ADMIN — Medication 20 MILLIEQUIVALENT(S): at 13:24

## 2018-05-17 RX ADMIN — Medication 20 MILLIEQUIVALENT(S): at 11:24

## 2018-05-17 RX ADMIN — VALSARTAN 80 MILLIGRAM(S): 80 TABLET ORAL at 06:03

## 2018-05-17 NOTE — DISCHARGE NOTE ADULT - PATIENT PORTAL LINK FT
You can access the [facility], offered by [facility], by registering with the following website: [email or web address]

## 2018-05-17 NOTE — DISCHARGE NOTE ADULT - PLAN OF CARE
normal GI function ACTIVITY: You may return to your usual level of physical activity.   DIET: Return to your usual diabetic DASH diet.  NOTIFY YOUR SURGEON IF: You have any fever (over 100.4 F) or chills, persistent nausea/vomiting, persistent diarrhea, or if your pain is not controlled on your discharge pain medications.  FOLLOW-UP: Please follow up with your primary care physician in one week regarding your hospitalization.  You may follow up with Dr. White in two weeks.  Call to schedule an appointment. Please follow up with your primary care physician regarding your hospitalization

## 2018-05-17 NOTE — PROGRESS NOTE ADULT - ASSESSMENT
42M p/w abdominal pain, nausea, concerning for recurrent SBO. CT scan shows no obstruction, but has chronically dilated small bowel.  NGT removed yesterday.  Started miralax and simethicone.    -Advance diet as tolerated, likely Regular today  -mIVF  -Monitor GI fxn  -serial abdominal exams  -PO medications  -If tolerating diet, likely d/c home today    Surgery A  [id number]

## 2018-05-17 NOTE — DISCHARGE NOTE ADULT - CARE PLAN
Principal Discharge DX:	Abdominal pain  Goal:	normal GI function  Assessment and plan of treatment:	ACTIVITY: You may return to your usual level of physical activity.   DIET: Return to your usual diabetic DASH diet.  NOTIFY YOUR SURGEON IF: You have any fever (over 100.4 F) or chills, persistent nausea/vomiting, persistent diarrhea, or if your pain is not controlled on your discharge pain medications.  FOLLOW-UP: Please follow up with your primary care physician in one week regarding your hospitalization.  You may follow up with Dr. White in two weeks.  Call to schedule an appointment.  Secondary Diagnosis:	Diabetes mellitus, type 2  Assessment and plan of treatment:	Please follow up with your primary care physician regarding your hospitalization  Secondary Diagnosis:	Hyperlipidemia  Assessment and plan of treatment:	Please follow up with your primary care physician regarding your hospitalization  Secondary Diagnosis:	Hypertension  Assessment and plan of treatment:	Please follow up with your primary care physician regarding your hospitalization

## 2018-05-17 NOTE — DISCHARGE NOTE ADULT - MEDICATION SUMMARY - MEDICATIONS TO TAKE
I will START or STAY ON the medications listed below when I get home from the hospital:    Diovan 80 mg oral tablet  -- 1 tab(s) by mouth once a day  -- hold for low blood pressure  -- Indication: For HTN    glimepiride 4 mg oral tablet  -- 1 tab(s) by mouth once a day  -- Indication: For DM    glimepiride 1 mg oral tablet  -- 1 tab(s) by mouth once a day  -- Indication: For DM    Farxiga 5 mg oral tablet  -- 1 tab(s) by mouth once a day  -- Indication: For DM    diphenhydrAMINE 25 mg oral capsule  -- 1 cap(s) by mouth every 6 hours, As needed, Rash and/or Itching  -- Indication: For PRN itching/rash    Crestor 5 mg oral tablet  -- 1 tab(s) by mouth once a day (at bedtime)  -- Indication: For HLD    TriCor 145 mg oral tablet  -- 1 tab(s) by mouth once a day  -- Indication: For HLD    Toprol- mg oral tablet, extended release  -- 1 tab(s) by mouth once a day  -- Indication: For HTN    pantoprazole 40 mg oral delayed release tablet  -- 1 tab(s) by mouth once a day (before a meal)  -- Indication: For GERD

## 2018-05-17 NOTE — DISCHARGE NOTE ADULT - MEDICATION SUMMARY - MEDICATIONS TO CHANGE
The following information and instructions were given:    NPO after MN except sips of water with routine prescribed medication (except blood thinner, diabetes, or weight reducing medication) unless otherwise instructed by your physician.  Do not eat, drink, smoke or chew gum after MN the night before surgery. This also includes no mints.    DO NOT shave, wear makeup or dark nail polish.    Remove all jewelry (advised to go to jeweler if unable to remove).    Leave anything you consider valuable at home.    Leave your suitcase in the car until after your surgery.    Bring the following with you (if applicable)   -picture ID and insurance cards   -Co-pay/deductible required by insurance   -Medications in the original bottles (not a list) including all over-the-counter  medications if not brought to PAT   -Copy of advance directive, living will or power of  documents if not  brought to PAT   -CPAP or BIPAP mask and tubing (do not bring machine)   -Skin prep instructions sheet   -PAT Pass   Education booklet, brochure, handout or binder given to patient.    Pain Control After Surgery handout given to patient.    Respirex use (handout given to patient) and pneumonia prevention.    Signs and Symptoms of infection.    DVT Prevention stressing the importance of ambulation.    Patient to apply Chlorhexadine wipes to surgical area (as instructed) the night before procedure and the AM of procedure.      Hysterectomy book given  
I will SWITCH the dose or number of times a day I take the medications listed below when I get home from the hospital:  None

## 2018-05-17 NOTE — PROGRESS NOTE ADULT - SUBJECTIVE AND OBJECTIVE BOX
Surgery Progress Note    S:   No acute events overnight  Passed NGT clamp trial, and NGT removed yesterday  Advanced to CLD, Started simethicone and miralax, PO medications    T(C): 36.7 (05-17-18 @ 02:16), Max: 37.1 (05-16-18 @ 10:29)  HR: 67 (05-17-18 @ 02:16) (66 - 95)  BP: 117/75 (05-17-18 @ 02:16) (109/71 - 142/86)  RR: 18 (05-17-18 @ 02:16) (16 - 18)  SpO2: 98% (05-17-18 @ 02:16) (97% - 99%)  Wt(kg): --    Physical Exam  General: well developed, well nourished, NAD, overweight  Respiratory: airway patent, respirations unlabored on RA  CV RRR  Abdomen: soft, mildly tender, mildly distended; R lower abdominal hernia, soft, no skin changes; multiple well healed surgical scars  Extremities: no LE edema, sensation and movement grossly intact    05-15 @ 07:01  -  05-16 @ 07:00  --------------------------------------------------------  IN:    lactated ringers.: 1625 mL  Total IN: 1625 mL    OUT:    Nasoenteral Tube: 450 mL    Voided: 600 mL  Total OUT: 1050 mL    Total NET: 575 mL      05-16 @ 07:01  -  05-17 @ 03:56  --------------------------------------------------------  IN:    dextrose 5% + sodium chloride 0.45%.: 800 mL    IV PiggyBack: 250 mL    lactated ringers.: 500 mL    Oral Fluid: 840 mL  Total IN: 2390 mL    OUT:    Voided: 1200 mL  Total OUT: 1200 mL    Total NET: 1190 mL                        12.1   6.49  )-----------( 201      ( 16 May 2018 11:00 )             38.9     CAPILLARY BLOOD GLUCOSE      POCT Blood Glucose.: 137 mg/dL (16 May 2018 17:13)  POCT Blood Glucose.: 116 mg/dL (16 May 2018 12:06)  POCT Blood Glucose.: 115 mg/dL (16 May 2018 06:04)

## 2018-05-17 NOTE — DISCHARGE NOTE ADULT - CARE PROVIDER_API CALL
Musa White), ColonRectal Surgery; Surgery  [address] [place] [address]  Phone: [phone]  Fax: [phone]

## 2018-05-17 NOTE — PROGRESS NOTE ADULT - SUBJECTIVE AND OBJECTIVE BOX
Passing flatus, No complaint,Exam , mild distention, hernia noted, non tender. Potential surgery discussed with patient. Abigailld

## 2018-05-17 NOTE — DISCHARGE NOTE ADULT - HOSPITAL COURSE
42M PMHx multiple abdominal surgeries, including most recently a laparotomy with SBR in 2016, who presented to [facility] ED the morning of 5/15/18 complaining of nausea/vomiting x3days. Patient recently admitted w/ SBO 1/23-1/26 and 4/17-4/19, and has had multiple admissions for bowel obstructions, managed non-operatively.  Began having abdominal pain ~3 days ago associated w/ N/V and increasing distension. Last BM the night prior to presentation, diarrhea; denies flatus today.  He hasn't had any PO intake today due to nausea.  Reports that his symptoms feel similar to those he experience during his prior admissions for SBO. Patient has had chronic ventral hernias, and he states this ventral hernia feels soft and unchanged from usual.      CTabd/pelvis: IMPRESSION: Chronically dilated small bowel and ventral hernias, unchanged. No evidence of obstruction.    Pt admitted to general surgery service and managed nonoperatively with bowel rest, NGT decompression, and IVF hydration.  As GI function returned and abdominal pain resolved diet was slowly restarted and advanced as tolerated.  Pt currently ambulating, voiding, tolerating a regular diet, and pain free.  Per team and attending, pt is hemodynamically stable for discharge home to follow up as an outpatient. 42M PMHx multiple abdominal surgeries, including most recently a laparotomy with SBR in 2016, who presented to [facility] ED the morning of 5/15/18 complaining of nausea/vomiting x3days. Patient recently admitted w/ SBO 1/23-1/26 and 4/17-4/19, and has had multiple admissions for bowel obstructions, managed non-operatively.  Began having abdominal pain ~3 days ago associated w/ N/V and increasing distension. Last BM the night prior to presentation, diarrhea; denies flatus today.  He hasn't had any PO intake today due to nausea.  Reports that his symptoms feel similar to those he experience during his prior admissions for SBO. Patient has had chronic ventral hernias, and he states this ventral hernia feels soft and unchanged from usual.      CTabd/pelvis: IMPRESSION: Chronically dilated small bowel and ventral hernias, unchanged. No evidence of obstruction.    Pt admitted to general surgery service and managed nonoperatively with bowel rest, NGT decompression, and IVF hydration.  As GI function returned and abdominal pain resolved NGT clamp trial performed and NGT removed.  Diet was slowly restarted and advanced as tolerated.  Pt currently ambulating, voiding, tolerating a regular diet, and pain free.  Per team and attending, pt is hemodynamically stable for discharge home to follow up as an outpatient.

## 2018-06-14 ENCOUNTER — INPATIENT (INPATIENT)
Facility: HOSPITAL | Age: 43
LOS: 10 days | Discharge: HOME | End: 2018-06-25
Attending: SURGERY | Admitting: SURGERY
Payer: COMMERCIAL

## 2018-06-14 VITALS
SYSTOLIC BLOOD PRESSURE: 133 MMHG | DIASTOLIC BLOOD PRESSURE: 76 MMHG | WEIGHT: 250 LBS | TEMPERATURE: 98 F | HEIGHT: 72 IN | RESPIRATION RATE: 18 BRPM | OXYGEN SATURATION: 100 % | HEART RATE: 115 BPM

## 2018-06-14 DIAGNOSIS — Z98.89 OTHER SPECIFIED POSTPROCEDURAL STATES: Chronic | ICD-10-CM

## 2018-06-14 DIAGNOSIS — K56.600 PARTIAL INTESTINAL OBSTRUCTION, UNSPECIFIED AS TO CAUSE: ICD-10-CM

## 2018-06-14 PROCEDURE — 74176 CT ABD & PELVIS W/O CONTRAST: CPT | Mod: 26

## 2018-06-14 PROCEDURE — 71045 X-RAY EXAM CHEST 1 VIEW: CPT | Mod: 26

## 2018-06-14 PROCEDURE — 99222 1ST HOSP IP/OBS MODERATE 55: CPT | Mod: GC

## 2018-06-14 RX ORDER — METOPROLOL TARTRATE 50 MG
5 TABLET ORAL EVERY 6 HOURS
Qty: 0 | Refills: 0 | Status: DISCONTINUED | OUTPATIENT
Start: 2018-06-14 | End: 2018-06-23

## 2018-06-14 RX ORDER — ONDANSETRON 8 MG/1
4 TABLET, FILM COATED ORAL ONCE
Qty: 0 | Refills: 0 | Status: COMPLETED | OUTPATIENT
Start: 2018-06-14 | End: 2018-06-14

## 2018-06-14 RX ORDER — SODIUM CHLORIDE 9 MG/ML
1000 INJECTION, SOLUTION INTRAVENOUS ONCE
Qty: 0 | Refills: 0 | Status: COMPLETED | OUTPATIENT
Start: 2018-06-14 | End: 2018-06-14

## 2018-06-14 RX ORDER — PANTOPRAZOLE SODIUM 20 MG/1
40 TABLET, DELAYED RELEASE ORAL DAILY
Qty: 0 | Refills: 0 | Status: DISCONTINUED | OUTPATIENT
Start: 2018-06-14 | End: 2018-06-23

## 2018-06-14 RX ORDER — TETRACAINE/BENZOCAINE/BUTAMBEN 2%-14%-2%
1 OINTMENT (GRAM) TOPICAL ONCE
Qty: 0 | Refills: 0 | Status: COMPLETED | OUTPATIENT
Start: 2018-06-14 | End: 2018-06-14

## 2018-06-14 RX ORDER — ENOXAPARIN SODIUM 100 MG/ML
40 INJECTION SUBCUTANEOUS DAILY
Qty: 0 | Refills: 0 | Status: DISCONTINUED | OUTPATIENT
Start: 2018-06-14 | End: 2018-06-25

## 2018-06-14 RX ORDER — SODIUM CHLORIDE 9 MG/ML
1000 INJECTION, SOLUTION INTRAVENOUS
Qty: 0 | Refills: 0 | Status: DISCONTINUED | OUTPATIENT
Start: 2018-06-14 | End: 2018-06-17

## 2018-06-14 RX ORDER — INSULIN LISPRO 100/ML
VIAL (ML) SUBCUTANEOUS
Qty: 0 | Refills: 0 | Status: DISCONTINUED | OUTPATIENT
Start: 2018-06-14 | End: 2018-06-16

## 2018-06-14 RX ORDER — ACETAMINOPHEN 500 MG
1000 TABLET ORAL ONCE
Qty: 0 | Refills: 0 | Status: COMPLETED | OUTPATIENT
Start: 2018-06-14 | End: 2018-06-14

## 2018-06-14 RX ADMIN — PANTOPRAZOLE SODIUM 40 MILLIGRAM(S): 20 TABLET, DELAYED RELEASE ORAL at 21:01

## 2018-06-14 RX ADMIN — Medication 1000 MILLIGRAM(S): at 21:01

## 2018-06-14 RX ADMIN — SODIUM CHLORIDE 1000 MILLILITER(S): 9 INJECTION, SOLUTION INTRAVENOUS at 18:10

## 2018-06-14 RX ADMIN — Medication 400 MILLIGRAM(S): at 18:10

## 2018-06-14 RX ADMIN — ENOXAPARIN SODIUM 40 MILLIGRAM(S): 100 INJECTION SUBCUTANEOUS at 21:01

## 2018-06-14 RX ADMIN — ONDANSETRON 4 MILLIGRAM(S): 8 TABLET, FILM COATED ORAL at 18:10

## 2018-06-14 RX ADMIN — Medication 5 MILLIGRAM(S): at 21:01

## 2018-06-14 RX ADMIN — SODIUM CHLORIDE 125 MILLILITER(S): 9 INJECTION, SOLUTION INTRAVENOUS at 21:01

## 2018-06-14 RX ADMIN — Medication 1 SPRAY(S): at 18:10

## 2018-06-14 NOTE — H&P ADULT - HISTORY OF PRESENT ILLNESS
HPI: This is a 42M PMHx multiple abdominal surgeries, including most recently a laparotomy with SBR in 2016, who presented to [facility] ED the morning of 5/15/18 complaining of nausea/vomiting x3days. Patient recently admitted w/ SBO 1/23-1/26 and 4/17-4/19, and has had multiple admissions for bowel obstructions, managed non-operatively.  Began having abdominal pain today associated w/ N/V and increasing distension. Last BM today and not sure if passed flatus today. Reports that his symptoms feel similar to those he experience during his prior admissions for SBO. Patient has had chronic ventral hernias, and he states this ventral hernia feels soft and unchanged from usual.

## 2018-06-14 NOTE — ED PROVIDER NOTE - MEDICAL DECISION MAKING DETAILS
Plan: Pre-op labs, NG tube, CXR confirm placement, zofran and analgesia, CT abd/pelvis with PO contrast, EKG

## 2018-06-14 NOTE — H&P ADULT - NSHPPHYSICALEXAM_GEN_ALL_CORE
General:  NAD  Neurology: A&Ox3  Respiratory: CTA B/L  CV: RRR, S1S2, no murmur  Abdominal: Soft, mildly distended, mildly tender in right lower quadrant. Ventral chronic hernia on right side of abdomen.   MSK: No edema, + peripheral pulses, FROM all 4 extremity

## 2018-06-14 NOTE — ED PROVIDER NOTE - PROGRESS NOTE DETAILS
NGT placed in right nare on first attempt without issue, 800cc of stomach content evacuated and suction placed to intermittent. Pt tolerated procedure well and felt improvement of abdominal distension and discomfort. Pt has been admitted to Dr. White

## 2018-06-14 NOTE — ED ADULT NURSE NOTE - OBJECTIVE STATEMENT
received pt A&Ox3 in no apparent distress at this time. received pt A&Ox3 in no apparent distress at this time. #20g IVL to L AC, bloods drawn and sent to the lab. no s/s of infiltration noted at this time. family and MD at bedside. vss. ngt placed by MD. pt for ct scan.

## 2018-06-14 NOTE — H&P ADULT - ASSESSMENT
Assessment: This is a 42M PMHx multiple abdominal surgeries, including most recently a laparotomy with SBR in 2016, who was admitted for SBO recently, presents with SBO.     - Admit to A team surgery (Dr. White)   - NPO/NGT  - IVF  - Follow up GI function   - Discussed with Dr. White     [id number]

## 2018-06-14 NOTE — ED ADULT TRIAGE NOTE - CHIEF COMPLAINT QUOTE
Pt c/o of abdomina pain nausea, vomiting with diarrhea x 2 Pt appears very uncomfortable in triage and is diaphoretic.  PMH SBO diverticulitis DM

## 2018-06-14 NOTE — H&P ADULT - NSHPLABSRESULTS_GEN_ALL_CORE
LABS:                          13.9   9.19  )-----------( 289      ( 14 Jun 2018 17:30 )             45.8       06-14    142  |  100  |  23  ----------------------------<  172<H>  4.3   |  26  |  1.01    Ca    10.2      14 Jun 2018 17:30    TPro  8.3  /  Alb  5.1<H>  /  TBili  0.8  /  DBili  x   /  AST  19  /  ALT  28  /  AlkPhos  41  06-14    PT/INR - ( 14 Jun 2018 17:30 )   PT: 11.3 SEC;   INR: 0.98          PTT - ( 14 Jun 2018 17:30 )  PTT:25.3 SEC      Urinalysis Basic - ( 14 Jun 2018 17:30 )    Color: YELLOW / Appearance: CLEAR / SG: > 1.040 / pH: 6.5  Gluc: >1000 / Ketone: TRACE  / Bili: NEGATIVE / Urobili: NORMAL mg/dL   Blood: NEGATIVE / Protein: 30 mg/dL / Nitrite: NEGATIVE   Leuk Esterase: NEGATIVE / RBC: 0-2 / WBC 0-2   Sq Epi: x / Non Sq Epi: x / Bacteria: x        RADIOLOGY & ADDITIONAL STUDIES :

## 2018-06-15 LAB
BLD GP AB SCN SERPL QL: NEGATIVE — SIGNIFICANT CHANGE UP
BUN SERPL-MCNC: 25 MG/DL — HIGH (ref 7–23)
CALCIUM SERPL-MCNC: 8.9 MG/DL — SIGNIFICANT CHANGE UP (ref 8.4–10.5)
CHLORIDE SERPL-SCNC: 102 MMOL/L — SIGNIFICANT CHANGE UP (ref 98–107)
CO2 SERPL-SCNC: 23 MMOL/L — SIGNIFICANT CHANGE UP (ref 22–31)
CREAT SERPL-MCNC: 0.93 MG/DL — SIGNIFICANT CHANGE UP (ref 0.5–1.3)
GLUCOSE BLDC GLUCOMTR-MCNC: 135 MG/DL — HIGH (ref 70–99)
GLUCOSE BLDC GLUCOMTR-MCNC: 141 MG/DL — HIGH (ref 70–99)
GLUCOSE BLDC GLUCOMTR-MCNC: 95 MG/DL — SIGNIFICANT CHANGE UP (ref 70–99)
GLUCOSE BLDC GLUCOMTR-MCNC: 99 MG/DL — SIGNIFICANT CHANGE UP (ref 70–99)
GLUCOSE SERPL-MCNC: 120 MG/DL — HIGH (ref 70–99)
HCT VFR BLD CALC: 37.4 % — LOW (ref 39–50)
HGB BLD-MCNC: 11.8 G/DL — LOW (ref 13–17)
MCHC RBC-ENTMCNC: 26.7 PG — LOW (ref 27–34)
MCHC RBC-ENTMCNC: 31.6 % — LOW (ref 32–36)
MCV RBC AUTO: 84.6 FL — SIGNIFICANT CHANGE UP (ref 80–100)
NRBC # FLD: 0 — SIGNIFICANT CHANGE UP
PLATELET # BLD AUTO: 219 K/UL — SIGNIFICANT CHANGE UP (ref 150–400)
PMV BLD: 10.9 FL — SIGNIFICANT CHANGE UP (ref 7–13)
POTASSIUM SERPL-MCNC: 3.7 MMOL/L — SIGNIFICANT CHANGE UP (ref 3.5–5.3)
POTASSIUM SERPL-SCNC: 3.7 MMOL/L — SIGNIFICANT CHANGE UP (ref 3.5–5.3)
RBC # BLD: 4.42 M/UL — SIGNIFICANT CHANGE UP (ref 4.2–5.8)
RBC # FLD: 16 % — HIGH (ref 10.3–14.5)
RH IG SCN BLD-IMP: POSITIVE — SIGNIFICANT CHANGE UP
SODIUM SERPL-SCNC: 141 MMOL/L — SIGNIFICANT CHANGE UP (ref 135–145)
WBC # BLD: 5.35 K/UL — SIGNIFICANT CHANGE UP (ref 3.8–10.5)
WBC # FLD AUTO: 5.35 K/UL — SIGNIFICANT CHANGE UP (ref 3.8–10.5)

## 2018-06-15 PROCEDURE — 99232 SBSQ HOSP IP/OBS MODERATE 35: CPT | Mod: GC

## 2018-06-15 RX ORDER — SODIUM CHLORIDE 9 MG/ML
1000 INJECTION, SOLUTION INTRAVENOUS ONCE
Qty: 0 | Refills: 0 | Status: COMPLETED | OUTPATIENT
Start: 2018-06-15 | End: 2018-06-15

## 2018-06-15 RX ORDER — POTASSIUM CHLORIDE 20 MEQ
20 PACKET (EA) ORAL ONCE
Qty: 0 | Refills: 0 | Status: DISCONTINUED | OUTPATIENT
Start: 2018-06-15 | End: 2018-06-15

## 2018-06-15 RX ORDER — POTASSIUM CHLORIDE 20 MEQ
10 PACKET (EA) ORAL ONCE
Qty: 0 | Refills: 0 | Status: COMPLETED | OUTPATIENT
Start: 2018-06-15 | End: 2018-06-15

## 2018-06-15 RX ADMIN — Medication 5 MILLIGRAM(S): at 12:13

## 2018-06-15 RX ADMIN — Medication 5 MILLIGRAM(S): at 00:01

## 2018-06-15 RX ADMIN — SODIUM CHLORIDE 125 MILLILITER(S): 9 INJECTION, SOLUTION INTRAVENOUS at 11:06

## 2018-06-15 RX ADMIN — PANTOPRAZOLE SODIUM 40 MILLIGRAM(S): 20 TABLET, DELAYED RELEASE ORAL at 12:13

## 2018-06-15 RX ADMIN — SODIUM CHLORIDE 2000 MILLILITER(S): 9 INJECTION, SOLUTION INTRAVENOUS at 00:30

## 2018-06-15 RX ADMIN — Medication 5 MILLIGRAM(S): at 18:06

## 2018-06-15 RX ADMIN — Medication 5 MILLIGRAM(S): at 06:11

## 2018-06-15 RX ADMIN — ENOXAPARIN SODIUM 40 MILLIGRAM(S): 100 INJECTION SUBCUTANEOUS at 11:12

## 2018-06-15 RX ADMIN — Medication 100 MILLIEQUIVALENT(S): at 11:06

## 2018-06-15 RX ADMIN — Medication 5 MILLIGRAM(S): at 23:45

## 2018-06-15 NOTE — PROGRESS NOTE ADULT - ASSESSMENT
A: 42M presenting with SBO.    P:  - NPO/NGT  - Lovenox DVT ppx  - Clarify CT read  - Pain control  - OOB/Ambulate

## 2018-06-16 LAB
BUN SERPL-MCNC: 19 MG/DL — SIGNIFICANT CHANGE UP (ref 7–23)
CALCIUM SERPL-MCNC: 8.7 MG/DL — SIGNIFICANT CHANGE UP (ref 8.4–10.5)
CHLORIDE SERPL-SCNC: 101 MMOL/L — SIGNIFICANT CHANGE UP (ref 98–107)
CO2 SERPL-SCNC: 23 MMOL/L — SIGNIFICANT CHANGE UP (ref 22–31)
CREAT SERPL-MCNC: 0.83 MG/DL — SIGNIFICANT CHANGE UP (ref 0.5–1.3)
GLUCOSE BLDC GLUCOMTR-MCNC: 112 MG/DL — HIGH (ref 70–99)
GLUCOSE BLDC GLUCOMTR-MCNC: 124 MG/DL — HIGH (ref 70–99)
GLUCOSE BLDC GLUCOMTR-MCNC: 87 MG/DL — SIGNIFICANT CHANGE UP (ref 70–99)
GLUCOSE BLDC GLUCOMTR-MCNC: 92 MG/DL — SIGNIFICANT CHANGE UP (ref 70–99)
GLUCOSE SERPL-MCNC: 114 MG/DL — HIGH (ref 70–99)
HCT VFR BLD CALC: 34.3 % — LOW (ref 39–50)
HGB BLD-MCNC: 10.5 G/DL — LOW (ref 13–17)
MAGNESIUM SERPL-MCNC: 1.9 MG/DL — SIGNIFICANT CHANGE UP (ref 1.6–2.6)
MCHC RBC-ENTMCNC: 27.2 PG — SIGNIFICANT CHANGE UP (ref 27–34)
MCHC RBC-ENTMCNC: 30.6 % — LOW (ref 32–36)
MCV RBC AUTO: 88.9 FL — SIGNIFICANT CHANGE UP (ref 80–100)
NRBC # FLD: 0 — SIGNIFICANT CHANGE UP
PHOSPHATE SERPL-MCNC: 2.5 MG/DL — SIGNIFICANT CHANGE UP (ref 2.5–4.5)
PLATELET # BLD AUTO: 178 K/UL — SIGNIFICANT CHANGE UP (ref 150–400)
PMV BLD: 11.1 FL — SIGNIFICANT CHANGE UP (ref 7–13)
POTASSIUM SERPL-MCNC: 3.6 MMOL/L — SIGNIFICANT CHANGE UP (ref 3.5–5.3)
POTASSIUM SERPL-SCNC: 3.6 MMOL/L — SIGNIFICANT CHANGE UP (ref 3.5–5.3)
RBC # BLD: 3.86 M/UL — LOW (ref 4.2–5.8)
RBC # FLD: 15.5 % — HIGH (ref 10.3–14.5)
SODIUM SERPL-SCNC: 140 MMOL/L — SIGNIFICANT CHANGE UP (ref 135–145)
WBC # BLD: 5.02 K/UL — SIGNIFICANT CHANGE UP (ref 3.8–10.5)
WBC # FLD AUTO: 5.02 K/UL — SIGNIFICANT CHANGE UP (ref 3.8–10.5)

## 2018-06-16 RX ORDER — SODIUM CHLORIDE 9 MG/ML
1000 INJECTION, SOLUTION INTRAVENOUS ONCE
Qty: 0 | Refills: 0 | Status: COMPLETED | OUTPATIENT
Start: 2018-06-16 | End: 2018-06-16

## 2018-06-16 RX ORDER — POTASSIUM PHOSPHATE, MONOBASIC POTASSIUM PHOSPHATE, DIBASIC 236; 224 MG/ML; MG/ML
15 INJECTION, SOLUTION INTRAVENOUS ONCE
Qty: 0 | Refills: 0 | Status: COMPLETED | OUTPATIENT
Start: 2018-06-16 | End: 2018-06-16

## 2018-06-16 RX ORDER — INSULIN LISPRO 100/ML
VIAL (ML) SUBCUTANEOUS EVERY 6 HOURS
Qty: 0 | Refills: 0 | Status: DISCONTINUED | OUTPATIENT
Start: 2018-06-16 | End: 2018-06-22

## 2018-06-16 RX ORDER — BENZOCAINE AND MENTHOL 5; 1 G/100ML; G/100ML
1 LIQUID ORAL EVERY 4 HOURS
Qty: 0 | Refills: 0 | Status: DISCONTINUED | OUTPATIENT
Start: 2018-06-16 | End: 2018-06-23

## 2018-06-16 RX ORDER — ACETAMINOPHEN 500 MG
1000 TABLET ORAL ONCE
Qty: 0 | Refills: 0 | Status: COMPLETED | OUTPATIENT
Start: 2018-06-16 | End: 2018-06-16

## 2018-06-16 RX ADMIN — PANTOPRAZOLE SODIUM 40 MILLIGRAM(S): 20 TABLET, DELAYED RELEASE ORAL at 13:05

## 2018-06-16 RX ADMIN — SODIUM CHLORIDE 125 MILLILITER(S): 9 INJECTION, SOLUTION INTRAVENOUS at 23:15

## 2018-06-16 RX ADMIN — Medication 5 MILLIGRAM(S): at 13:05

## 2018-06-16 RX ADMIN — POTASSIUM PHOSPHATE, MONOBASIC POTASSIUM PHOSPHATE, DIBASIC 62.5 MILLIMOLE(S): 236; 224 INJECTION, SOLUTION INTRAVENOUS at 13:05

## 2018-06-16 RX ADMIN — SODIUM CHLORIDE 2000 MILLILITER(S): 9 INJECTION, SOLUTION INTRAVENOUS at 17:31

## 2018-06-16 RX ADMIN — BENZOCAINE AND MENTHOL 1 LOZENGE: 5; 1 LIQUID ORAL at 17:31

## 2018-06-16 RX ADMIN — SODIUM CHLORIDE 125 MILLILITER(S): 9 INJECTION, SOLUTION INTRAVENOUS at 10:38

## 2018-06-16 RX ADMIN — Medication 1000 MILLIGRAM(S): at 10:55

## 2018-06-16 RX ADMIN — Medication 400 MILLIGRAM(S): at 10:38

## 2018-06-16 RX ADMIN — Medication 5 MILLIGRAM(S): at 05:25

## 2018-06-16 RX ADMIN — Medication 5 MILLIGRAM(S): at 23:15

## 2018-06-16 RX ADMIN — ENOXAPARIN SODIUM 40 MILLIGRAM(S): 100 INJECTION SUBCUTANEOUS at 13:05

## 2018-06-16 RX ADMIN — Medication 5 MILLIGRAM(S): at 17:31

## 2018-06-17 LAB
APTT BLD: 29.3 SEC — SIGNIFICANT CHANGE UP (ref 27.5–37.4)
BLD GP AB SCN SERPL QL: NEGATIVE — SIGNIFICANT CHANGE UP
BUN SERPL-MCNC: 14 MG/DL — SIGNIFICANT CHANGE UP (ref 7–23)
CALCIUM SERPL-MCNC: 9.1 MG/DL — SIGNIFICANT CHANGE UP (ref 8.4–10.5)
CHLORIDE SERPL-SCNC: 104 MMOL/L — SIGNIFICANT CHANGE UP (ref 98–107)
CO2 SERPL-SCNC: 21 MMOL/L — LOW (ref 22–31)
CREAT SERPL-MCNC: 0.72 MG/DL — SIGNIFICANT CHANGE UP (ref 0.5–1.3)
GLUCOSE BLDC GLUCOMTR-MCNC: 104 MG/DL — HIGH (ref 70–99)
GLUCOSE BLDC GLUCOMTR-MCNC: 121 MG/DL — HIGH (ref 70–99)
GLUCOSE BLDC GLUCOMTR-MCNC: 85 MG/DL — SIGNIFICANT CHANGE UP (ref 70–99)
GLUCOSE BLDC GLUCOMTR-MCNC: 90 MG/DL — SIGNIFICANT CHANGE UP (ref 70–99)
GLUCOSE BLDC GLUCOMTR-MCNC: 93 MG/DL — SIGNIFICANT CHANGE UP (ref 70–99)
GLUCOSE SERPL-MCNC: 93 MG/DL — SIGNIFICANT CHANGE UP (ref 70–99)
HCT VFR BLD CALC: 32.8 % — LOW (ref 39–50)
HGB BLD-MCNC: 10.1 G/DL — LOW (ref 13–17)
INR BLD: 1.06 — SIGNIFICANT CHANGE UP (ref 0.88–1.17)
MAGNESIUM SERPL-MCNC: 1.9 MG/DL — SIGNIFICANT CHANGE UP (ref 1.6–2.6)
MCHC RBC-ENTMCNC: 27.1 PG — SIGNIFICANT CHANGE UP (ref 27–34)
MCHC RBC-ENTMCNC: 30.8 % — LOW (ref 32–36)
MCV RBC AUTO: 87.9 FL — SIGNIFICANT CHANGE UP (ref 80–100)
NRBC # FLD: 0 — SIGNIFICANT CHANGE UP
PHOSPHATE SERPL-MCNC: 2.9 MG/DL — SIGNIFICANT CHANGE UP (ref 2.5–4.5)
PLATELET # BLD AUTO: 157 K/UL — SIGNIFICANT CHANGE UP (ref 150–400)
PMV BLD: 10.4 FL — SIGNIFICANT CHANGE UP (ref 7–13)
POTASSIUM SERPL-MCNC: 3.8 MMOL/L — SIGNIFICANT CHANGE UP (ref 3.5–5.3)
POTASSIUM SERPL-SCNC: 3.8 MMOL/L — SIGNIFICANT CHANGE UP (ref 3.5–5.3)
PROTHROM AB SERPL-ACNC: 11.8 SEC — SIGNIFICANT CHANGE UP (ref 9.8–13.1)
RBC # BLD: 3.73 M/UL — LOW (ref 4.2–5.8)
RBC # FLD: 14.9 % — HIGH (ref 10.3–14.5)
RH IG SCN BLD-IMP: POSITIVE — SIGNIFICANT CHANGE UP
SODIUM SERPL-SCNC: 144 MMOL/L — SIGNIFICANT CHANGE UP (ref 135–145)
WBC # BLD: 6.02 K/UL — SIGNIFICANT CHANGE UP (ref 3.8–10.5)
WBC # FLD AUTO: 6.02 K/UL — SIGNIFICANT CHANGE UP (ref 3.8–10.5)

## 2018-06-17 PROCEDURE — 99232 SBSQ HOSP IP/OBS MODERATE 35: CPT | Mod: GC

## 2018-06-17 RX ORDER — SODIUM CHLORIDE 9 MG/ML
1000 INJECTION, SOLUTION INTRAVENOUS
Qty: 0 | Refills: 0 | Status: DISCONTINUED | OUTPATIENT
Start: 2018-06-17 | End: 2018-06-23

## 2018-06-17 RX ADMIN — Medication 5 MILLIGRAM(S): at 23:49

## 2018-06-17 RX ADMIN — SODIUM CHLORIDE 125 MILLILITER(S): 9 INJECTION, SOLUTION INTRAVENOUS at 16:34

## 2018-06-17 RX ADMIN — Medication 5 MILLIGRAM(S): at 17:43

## 2018-06-17 RX ADMIN — BENZOCAINE AND MENTHOL 1 LOZENGE: 5; 1 LIQUID ORAL at 19:55

## 2018-06-17 RX ADMIN — Medication 5 MILLIGRAM(S): at 11:47

## 2018-06-17 RX ADMIN — Medication 5 MILLIGRAM(S): at 05:17

## 2018-06-17 RX ADMIN — BENZOCAINE AND MENTHOL 1 LOZENGE: 5; 1 LIQUID ORAL at 09:21

## 2018-06-17 RX ADMIN — PANTOPRAZOLE SODIUM 40 MILLIGRAM(S): 20 TABLET, DELAYED RELEASE ORAL at 11:44

## 2018-06-17 RX ADMIN — ENOXAPARIN SODIUM 40 MILLIGRAM(S): 100 INJECTION SUBCUTANEOUS at 11:44

## 2018-06-17 NOTE — PROGRESS NOTE ADULT - ASSESSMENT
42M presenting with SBO:  - NPO/NGT  - clamp trial for NGT today  - Pain control  - F/u AM labs  - OOB/DVT ppx  - OR planning for a later date  - Full support in place

## 2018-06-18 ENCOUNTER — TRANSCRIPTION ENCOUNTER (OUTPATIENT)
Age: 43
End: 2018-06-18

## 2018-06-18 LAB
BUN SERPL-MCNC: 12 MG/DL — SIGNIFICANT CHANGE UP (ref 7–23)
CALCIUM SERPL-MCNC: 8.8 MG/DL — SIGNIFICANT CHANGE UP (ref 8.4–10.5)
CHLORIDE SERPL-SCNC: 101 MMOL/L — SIGNIFICANT CHANGE UP (ref 98–107)
CO2 SERPL-SCNC: 23 MMOL/L — SIGNIFICANT CHANGE UP (ref 22–31)
CREAT SERPL-MCNC: 0.74 MG/DL — SIGNIFICANT CHANGE UP (ref 0.5–1.3)
GLUCOSE BLDC GLUCOMTR-MCNC: 116 MG/DL — HIGH (ref 70–99)
GLUCOSE BLDC GLUCOMTR-MCNC: 120 MG/DL — HIGH (ref 70–99)
GLUCOSE BLDC GLUCOMTR-MCNC: 123 MG/DL — HIGH (ref 70–99)
GLUCOSE BLDC GLUCOMTR-MCNC: 146 MG/DL — HIGH (ref 70–99)
GLUCOSE SERPL-MCNC: 140 MG/DL — HIGH (ref 70–99)
HCT VFR BLD CALC: 34.5 % — LOW (ref 39–50)
HGB BLD-MCNC: 10.5 G/DL — LOW (ref 13–17)
MAGNESIUM SERPL-MCNC: 1.8 MG/DL — SIGNIFICANT CHANGE UP (ref 1.6–2.6)
MCHC RBC-ENTMCNC: 26.4 PG — LOW (ref 27–34)
MCHC RBC-ENTMCNC: 30.4 % — LOW (ref 32–36)
MCV RBC AUTO: 86.7 FL — SIGNIFICANT CHANGE UP (ref 80–100)
NRBC # FLD: 0 — SIGNIFICANT CHANGE UP
PHOSPHATE SERPL-MCNC: 3 MG/DL — SIGNIFICANT CHANGE UP (ref 2.5–4.5)
PLATELET # BLD AUTO: 176 K/UL — SIGNIFICANT CHANGE UP (ref 150–400)
PMV BLD: 10.7 FL — SIGNIFICANT CHANGE UP (ref 7–13)
POTASSIUM SERPL-MCNC: 3.5 MMOL/L — SIGNIFICANT CHANGE UP (ref 3.5–5.3)
POTASSIUM SERPL-SCNC: 3.5 MMOL/L — SIGNIFICANT CHANGE UP (ref 3.5–5.3)
RBC # BLD: 3.98 M/UL — LOW (ref 4.2–5.8)
RBC # FLD: 14.7 % — HIGH (ref 10.3–14.5)
SODIUM SERPL-SCNC: 138 MMOL/L — SIGNIFICANT CHANGE UP (ref 135–145)
WBC # BLD: 6.18 K/UL — SIGNIFICANT CHANGE UP (ref 3.8–10.5)
WBC # FLD AUTO: 6.18 K/UL — SIGNIFICANT CHANGE UP (ref 3.8–10.5)

## 2018-06-18 PROCEDURE — 99232 SBSQ HOSP IP/OBS MODERATE 35: CPT | Mod: 57,GC

## 2018-06-18 RX ORDER — POTASSIUM CHLORIDE 20 MEQ
10 PACKET (EA) ORAL
Qty: 0 | Refills: 0 | Status: COMPLETED | OUTPATIENT
Start: 2018-06-18 | End: 2018-06-18

## 2018-06-18 RX ORDER — MAGNESIUM SULFATE 500 MG/ML
2 VIAL (ML) INJECTION ONCE
Qty: 0 | Refills: 0 | Status: COMPLETED | OUTPATIENT
Start: 2018-06-18 | End: 2018-06-18

## 2018-06-18 RX ADMIN — PANTOPRAZOLE SODIUM 40 MILLIGRAM(S): 20 TABLET, DELAYED RELEASE ORAL at 11:40

## 2018-06-18 RX ADMIN — Medication 100 MILLIEQUIVALENT(S): at 11:41

## 2018-06-18 RX ADMIN — Medication 5 MILLIGRAM(S): at 11:41

## 2018-06-18 RX ADMIN — ENOXAPARIN SODIUM 40 MILLIGRAM(S): 100 INJECTION SUBCUTANEOUS at 11:39

## 2018-06-18 RX ADMIN — Medication 100 MILLIEQUIVALENT(S): at 10:15

## 2018-06-18 RX ADMIN — Medication 100 MILLIEQUIVALENT(S): at 13:29

## 2018-06-18 RX ADMIN — Medication 50 GRAM(S): at 09:08

## 2018-06-18 RX ADMIN — Medication 5 MILLIGRAM(S): at 06:16

## 2018-06-18 NOTE — PROGRESS NOTE ADULT - ASSESSMENT
42M presenting with SBO:  - NPO/NGT  - May clamp trial and assess for N/V  - Pain control  - F/u AM labs  - OOB/DVT ppx  - OR planning for sometime this week  - Full support in place

## 2018-06-19 ENCOUNTER — RESULT REVIEW (OUTPATIENT)
Age: 43
End: 2018-06-19

## 2018-06-19 LAB
APTT BLD: 29.3 SEC — SIGNIFICANT CHANGE UP (ref 27.5–37.4)
BLD GP AB SCN SERPL QL: NEGATIVE — SIGNIFICANT CHANGE UP
BUN SERPL-MCNC: 7 MG/DL — SIGNIFICANT CHANGE UP (ref 7–23)
CALCIUM SERPL-MCNC: 8.6 MG/DL — SIGNIFICANT CHANGE UP (ref 8.4–10.5)
CHLORIDE SERPL-SCNC: 103 MMOL/L — SIGNIFICANT CHANGE UP (ref 98–107)
CO2 SERPL-SCNC: 23 MMOL/L — SIGNIFICANT CHANGE UP (ref 22–31)
CREAT SERPL-MCNC: 0.76 MG/DL — SIGNIFICANT CHANGE UP (ref 0.5–1.3)
GLUCOSE BLDC GLUCOMTR-MCNC: 133 MG/DL — HIGH (ref 70–99)
GLUCOSE BLDC GLUCOMTR-MCNC: 140 MG/DL — HIGH (ref 70–99)
GLUCOSE BLDC GLUCOMTR-MCNC: 166 MG/DL — HIGH (ref 70–99)
GLUCOSE SERPL-MCNC: 140 MG/DL — HIGH (ref 70–99)
HCT VFR BLD CALC: 33.9 % — LOW (ref 39–50)
HGB BLD-MCNC: 10.5 G/DL — LOW (ref 13–17)
INR BLD: 1.07 — SIGNIFICANT CHANGE UP (ref 0.88–1.17)
MAGNESIUM SERPL-MCNC: 2 MG/DL — SIGNIFICANT CHANGE UP (ref 1.6–2.6)
MCHC RBC-ENTMCNC: 26.8 PG — LOW (ref 27–34)
MCHC RBC-ENTMCNC: 31 % — LOW (ref 32–36)
MCV RBC AUTO: 86.5 FL — SIGNIFICANT CHANGE UP (ref 80–100)
NRBC # FLD: 0 — SIGNIFICANT CHANGE UP
PHOSPHATE SERPL-MCNC: 3.5 MG/DL — SIGNIFICANT CHANGE UP (ref 2.5–4.5)
PLATELET # BLD AUTO: 156 K/UL — SIGNIFICANT CHANGE UP (ref 150–400)
PMV BLD: 11 FL — SIGNIFICANT CHANGE UP (ref 7–13)
POTASSIUM SERPL-MCNC: 3.5 MMOL/L — SIGNIFICANT CHANGE UP (ref 3.5–5.3)
POTASSIUM SERPL-SCNC: 3.5 MMOL/L — SIGNIFICANT CHANGE UP (ref 3.5–5.3)
PROTHROM AB SERPL-ACNC: 11.9 SEC — SIGNIFICANT CHANGE UP (ref 9.8–13.1)
RBC # BLD: 3.92 M/UL — LOW (ref 4.2–5.8)
RBC # FLD: 14.6 % — HIGH (ref 10.3–14.5)
RH IG SCN BLD-IMP: POSITIVE — SIGNIFICANT CHANGE UP
SODIUM SERPL-SCNC: 137 MMOL/L — SIGNIFICANT CHANGE UP (ref 135–145)
WBC # BLD: 5.57 K/UL — SIGNIFICANT CHANGE UP (ref 3.8–10.5)
WBC # FLD AUTO: 5.57 K/UL — SIGNIFICANT CHANGE UP (ref 3.8–10.5)

## 2018-06-19 PROCEDURE — 49568: CPT | Mod: GC

## 2018-06-19 PROCEDURE — 49561: CPT | Mod: GC

## 2018-06-19 PROCEDURE — 88307 TISSUE EXAM BY PATHOLOGIST: CPT | Mod: 26

## 2018-06-19 RX ORDER — FENTANYL CITRATE 50 UG/ML
30 INJECTION INTRAVENOUS
Qty: 0 | Refills: 0 | Status: DISCONTINUED | OUTPATIENT
Start: 2018-06-19 | End: 2018-06-22

## 2018-06-19 RX ORDER — ONDANSETRON 8 MG/1
4 TABLET, FILM COATED ORAL EVERY 6 HOURS
Qty: 0 | Refills: 0 | Status: DISCONTINUED | OUTPATIENT
Start: 2018-06-19 | End: 2018-06-23

## 2018-06-19 RX ORDER — POTASSIUM CHLORIDE 20 MEQ
10 PACKET (EA) ORAL
Qty: 0 | Refills: 0 | Status: COMPLETED | OUTPATIENT
Start: 2018-06-19 | End: 2018-06-19

## 2018-06-19 RX ORDER — NALOXONE HYDROCHLORIDE 4 MG/.1ML
0.1 SPRAY NASAL
Qty: 0 | Refills: 0 | Status: DISCONTINUED | OUTPATIENT
Start: 2018-06-19 | End: 2018-06-22

## 2018-06-19 RX ADMIN — ENOXAPARIN SODIUM 40 MILLIGRAM(S): 100 INJECTION SUBCUTANEOUS at 11:59

## 2018-06-19 RX ADMIN — Medication 5 MILLIGRAM(S): at 11:59

## 2018-06-19 RX ADMIN — Medication 100 MILLIEQUIVALENT(S): at 09:51

## 2018-06-19 RX ADMIN — SODIUM CHLORIDE 125 MILLILITER(S): 9 INJECTION, SOLUTION INTRAVENOUS at 05:51

## 2018-06-19 RX ADMIN — Medication 100 MILLIEQUIVALENT(S): at 07:26

## 2018-06-19 RX ADMIN — Medication 5 MILLIGRAM(S): at 23:37

## 2018-06-19 RX ADMIN — Medication 5 MILLIGRAM(S): at 05:51

## 2018-06-19 RX ADMIN — FENTANYL CITRATE 30 MILLILITER(S): 50 INJECTION INTRAVENOUS at 22:47

## 2018-06-19 RX ADMIN — Medication 100 MILLIEQUIVALENT(S): at 08:40

## 2018-06-19 RX ADMIN — Medication 1: at 23:37

## 2018-06-19 RX ADMIN — SODIUM CHLORIDE 125 MILLILITER(S): 9 INJECTION, SOLUTION INTRAVENOUS at 23:38

## 2018-06-19 RX ADMIN — PANTOPRAZOLE SODIUM 40 MILLIGRAM(S): 20 TABLET, DELAYED RELEASE ORAL at 11:59

## 2018-06-19 NOTE — BRIEF OPERATIVE NOTE - OPERATION/FINDINGS
ventral hernia with SBO, lysis of adhesions, ventral hernia repaired with Progrip Mesh overlay, two BHUMI drains at subQ

## 2018-06-19 NOTE — BRIEF OPERATIVE NOTE - PROCEDURE
<<-----Click on this checkbox to enter Procedure Ventral hernia repair with mesh  06/19/2018    Active  [id number]  Exploratory laparotomy for bowel obstruction  06/19/2018    Active  [id number]

## 2018-06-19 NOTE — PROGRESS NOTE ADULT - ASSESSMENT
42M presenting with SBO, nonresolving:  - NPO  - Pain control  - To OR today for Ex lap; LINDA; poss SBR; poss ost; poss washout  - F/u AM labs  - OOB/DVT ppx  - Full support in place

## 2018-06-20 LAB
BUN SERPL-MCNC: 8 MG/DL — SIGNIFICANT CHANGE UP (ref 7–23)
CALCIUM SERPL-MCNC: 8.6 MG/DL — SIGNIFICANT CHANGE UP (ref 8.4–10.5)
CHLORIDE SERPL-SCNC: 99 MMOL/L — SIGNIFICANT CHANGE UP (ref 98–107)
CO2 SERPL-SCNC: 18 MMOL/L — LOW (ref 22–31)
CREAT SERPL-MCNC: 0.75 MG/DL — SIGNIFICANT CHANGE UP (ref 0.5–1.3)
GLUCOSE BLDC GLUCOMTR-MCNC: 149 MG/DL — HIGH (ref 70–99)
GLUCOSE BLDC GLUCOMTR-MCNC: 154 MG/DL — HIGH (ref 70–99)
GLUCOSE BLDC GLUCOMTR-MCNC: 160 MG/DL — HIGH (ref 70–99)
GLUCOSE BLDC GLUCOMTR-MCNC: 166 MG/DL — HIGH (ref 70–99)
GLUCOSE SERPL-MCNC: 229 MG/DL — HIGH (ref 70–99)
HCT VFR BLD CALC: 36 % — LOW (ref 39–50)
HGB BLD-MCNC: 11.4 G/DL — LOW (ref 13–17)
MAGNESIUM SERPL-MCNC: 1.8 MG/DL — SIGNIFICANT CHANGE UP (ref 1.6–2.6)
MCHC RBC-ENTMCNC: 27.3 PG — SIGNIFICANT CHANGE UP (ref 27–34)
MCHC RBC-ENTMCNC: 31.7 % — LOW (ref 32–36)
MCV RBC AUTO: 86.1 FL — SIGNIFICANT CHANGE UP (ref 80–100)
NRBC # FLD: 0 — SIGNIFICANT CHANGE UP
PHOSPHATE SERPL-MCNC: 4.3 MG/DL — SIGNIFICANT CHANGE UP (ref 2.5–4.5)
PLATELET # BLD AUTO: 169 K/UL — SIGNIFICANT CHANGE UP (ref 150–400)
PMV BLD: 11.5 FL — SIGNIFICANT CHANGE UP (ref 7–13)
POTASSIUM SERPL-MCNC: 4.9 MMOL/L — SIGNIFICANT CHANGE UP (ref 3.5–5.3)
POTASSIUM SERPL-SCNC: 4.9 MMOL/L — SIGNIFICANT CHANGE UP (ref 3.5–5.3)
RBC # BLD: 4.18 M/UL — LOW (ref 4.2–5.8)
RBC # FLD: 14.6 % — HIGH (ref 10.3–14.5)
SODIUM SERPL-SCNC: 134 MMOL/L — LOW (ref 135–145)
WBC # BLD: 11.93 K/UL — HIGH (ref 3.8–10.5)
WBC # FLD AUTO: 11.93 K/UL — HIGH (ref 3.8–10.5)

## 2018-06-20 RX ORDER — MAGNESIUM SULFATE 500 MG/ML
2 VIAL (ML) INJECTION ONCE
Qty: 0 | Refills: 0 | Status: COMPLETED | OUTPATIENT
Start: 2018-06-20 | End: 2018-06-20

## 2018-06-20 RX ADMIN — FENTANYL CITRATE 30 MILLILITER(S): 50 INJECTION INTRAVENOUS at 21:54

## 2018-06-20 RX ADMIN — ENOXAPARIN SODIUM 40 MILLIGRAM(S): 100 INJECTION SUBCUTANEOUS at 11:21

## 2018-06-20 RX ADMIN — Medication 1: at 17:29

## 2018-06-20 RX ADMIN — Medication 50 GRAM(S): at 10:46

## 2018-06-20 RX ADMIN — Medication 1: at 12:33

## 2018-06-20 RX ADMIN — BENZOCAINE AND MENTHOL 1 LOZENGE: 5; 1 LIQUID ORAL at 17:29

## 2018-06-20 RX ADMIN — FENTANYL CITRATE 30 MILLILITER(S): 50 INJECTION INTRAVENOUS at 19:59

## 2018-06-20 RX ADMIN — PANTOPRAZOLE SODIUM 40 MILLIGRAM(S): 20 TABLET, DELAYED RELEASE ORAL at 11:21

## 2018-06-20 RX ADMIN — Medication 5 MILLIGRAM(S): at 17:29

## 2018-06-20 RX ADMIN — FENTANYL CITRATE 30 MILLILITER(S): 50 INJECTION INTRAVENOUS at 08:32

## 2018-06-20 RX ADMIN — Medication 5 MILLIGRAM(S): at 11:22

## 2018-06-20 RX ADMIN — Medication 1: at 06:50

## 2018-06-20 RX ADMIN — Medication 5 MILLIGRAM(S): at 06:50

## 2018-06-20 NOTE — PROGRESS NOTE ADULT - ASSESSMENT
A: 42M with a history of numerous SBOs on POD#1 s/p ex-lap for bowel obstruction and ventral hernia repair with mesh.    P:  - Lovenox DVT ppx  - NPO/NGT  - PCA for pain control  - keene for I&Os  - Monitor drain output  - Transfer to the floor  - f/u AM labs 90

## 2018-06-21 LAB
BUN SERPL-MCNC: 6 MG/DL — LOW (ref 7–23)
CALCIUM SERPL-MCNC: 8.7 MG/DL — SIGNIFICANT CHANGE UP (ref 8.4–10.5)
CHLORIDE SERPL-SCNC: 103 MMOL/L — SIGNIFICANT CHANGE UP (ref 98–107)
CO2 SERPL-SCNC: 25 MMOL/L — SIGNIFICANT CHANGE UP (ref 22–31)
CREAT SERPL-MCNC: 0.9 MG/DL — SIGNIFICANT CHANGE UP (ref 0.5–1.3)
GLUCOSE BLDC GLUCOMTR-MCNC: 126 MG/DL — HIGH (ref 70–99)
GLUCOSE BLDC GLUCOMTR-MCNC: 144 MG/DL — HIGH (ref 70–99)
GLUCOSE BLDC GLUCOMTR-MCNC: 155 MG/DL — HIGH (ref 70–99)
GLUCOSE BLDC GLUCOMTR-MCNC: 166 MG/DL — HIGH (ref 70–99)
GLUCOSE SERPL-MCNC: 172 MG/DL — HIGH (ref 70–99)
HCT VFR BLD CALC: 34.5 % — LOW (ref 39–50)
HGB BLD-MCNC: 10.7 G/DL — LOW (ref 13–17)
MAGNESIUM SERPL-MCNC: 1.9 MG/DL — SIGNIFICANT CHANGE UP (ref 1.6–2.6)
MCHC RBC-ENTMCNC: 26.8 PG — LOW (ref 27–34)
MCHC RBC-ENTMCNC: 31 % — LOW (ref 32–36)
MCV RBC AUTO: 86.5 FL — SIGNIFICANT CHANGE UP (ref 80–100)
NRBC # FLD: 0 — SIGNIFICANT CHANGE UP
PHOSPHATE SERPL-MCNC: 3.1 MG/DL — SIGNIFICANT CHANGE UP (ref 2.5–4.5)
PLATELET # BLD AUTO: 204 K/UL — SIGNIFICANT CHANGE UP (ref 150–400)
PMV BLD: 11.1 FL — SIGNIFICANT CHANGE UP (ref 7–13)
POTASSIUM SERPL-MCNC: 3.5 MMOL/L — SIGNIFICANT CHANGE UP (ref 3.5–5.3)
POTASSIUM SERPL-SCNC: 3.5 MMOL/L — SIGNIFICANT CHANGE UP (ref 3.5–5.3)
RBC # BLD: 3.99 M/UL — LOW (ref 4.2–5.8)
RBC # FLD: 15.1 % — HIGH (ref 10.3–14.5)
SODIUM SERPL-SCNC: 140 MMOL/L — SIGNIFICANT CHANGE UP (ref 135–145)
WBC # BLD: 7.21 K/UL — SIGNIFICANT CHANGE UP (ref 3.8–10.5)
WBC # FLD AUTO: 7.21 K/UL — SIGNIFICANT CHANGE UP (ref 3.8–10.5)

## 2018-06-21 RX ORDER — POTASSIUM CHLORIDE 20 MEQ
10 PACKET (EA) ORAL
Qty: 0 | Refills: 0 | Status: COMPLETED | OUTPATIENT
Start: 2018-06-21 | End: 2018-06-21

## 2018-06-21 RX ORDER — MAGNESIUM SULFATE 500 MG/ML
1 VIAL (ML) INJECTION ONCE
Qty: 0 | Refills: 0 | Status: COMPLETED | OUTPATIENT
Start: 2018-06-21 | End: 2018-06-21

## 2018-06-21 RX ADMIN — FENTANYL CITRATE 30 MILLILITER(S): 50 INJECTION INTRAVENOUS at 08:18

## 2018-06-21 RX ADMIN — SODIUM CHLORIDE 125 MILLILITER(S): 9 INJECTION, SOLUTION INTRAVENOUS at 08:50

## 2018-06-21 RX ADMIN — BENZOCAINE AND MENTHOL 1 LOZENGE: 5; 1 LIQUID ORAL at 12:20

## 2018-06-21 RX ADMIN — Medication 5 MILLIGRAM(S): at 05:10

## 2018-06-21 RX ADMIN — Medication 1: at 05:19

## 2018-06-21 RX ADMIN — Medication 1: at 12:16

## 2018-06-21 RX ADMIN — Medication 5 MILLIGRAM(S): at 00:49

## 2018-06-21 RX ADMIN — PANTOPRAZOLE SODIUM 40 MILLIGRAM(S): 20 TABLET, DELAYED RELEASE ORAL at 12:17

## 2018-06-21 RX ADMIN — FENTANYL CITRATE 30 MILLILITER(S): 50 INJECTION INTRAVENOUS at 19:58

## 2018-06-21 RX ADMIN — Medication 5 MILLIGRAM(S): at 12:17

## 2018-06-21 RX ADMIN — SODIUM CHLORIDE 125 MILLILITER(S): 9 INJECTION, SOLUTION INTRAVENOUS at 21:40

## 2018-06-21 RX ADMIN — Medication 100 MILLIEQUIVALENT(S): at 12:16

## 2018-06-21 RX ADMIN — Medication 100 MILLIEQUIVALENT(S): at 08:50

## 2018-06-21 RX ADMIN — Medication 100 GRAM(S): at 08:49

## 2018-06-21 RX ADMIN — FENTANYL CITRATE 30 MILLILITER(S): 50 INJECTION INTRAVENOUS at 21:38

## 2018-06-21 RX ADMIN — Medication 100 MILLIEQUIVALENT(S): at 12:18

## 2018-06-21 RX ADMIN — Medication 5 MILLIGRAM(S): at 18:28

## 2018-06-21 RX ADMIN — ENOXAPARIN SODIUM 40 MILLIGRAM(S): 100 INJECTION SUBCUTANEOUS at 12:17

## 2018-06-21 NOTE — CHART NOTE - NSCHARTNOTEFT_GEN_A_CORE
NUTRITION SERVICES                                                                                  MALNUTRITION ALERT     Attention Health Care Provider: Upon nutritional assessment by the Registered Dietitian your patient was determined to meet criteria / has evidence of the following diagnosis/diagnoses:    [ ] Mild Protein Calorie Malnutrition   [x] Moderate Protein Calorie Malnutrition   [ ] Severe Protein Calorie Malnutrition   [ ] Unspecified Protein Calorie Malnutrition   [ ] Underweight / BMI <19  [ ] Morbid Obesity / BMI >40      By signing this assessment you are acknowledging the diagnosis/diagnoses.       PLAN OF CARE: Refer to Initial Dietitian Evaluation or Nutrition Follow-Up Documentation for Nutritional Recommendations.

## 2018-06-21 NOTE — DIETITIAN INITIAL EVALUATION ADULT. - ENERGY NEEDS
Weight: 247# (112.1kg) (6/21), admit 250# (113.4kg) (6/14)  Height: 72 inches BMI: 33kg/m^2 IBW: 178# (80.9kg) +/-10%  No edema recorded.   *IBW used to calculate protein needs.

## 2018-06-21 NOTE — DIETITIAN INITIAL EVALUATION ADULT. - OTHER INFO
Initial Dietitian Evaluation 2/2 to extended length of stay. Per chart review patient with medical history of multiple abdominal surgeries, including most recently a laparotomy with SBR in 2016, who presented to [facility] ED complaining of nausea/vomiting x3days  presents with SBO. Now /p ex-lap for bowel obstruction and ventral hernia repair with mesh (6/19). Patient NPO with NGT. At baseline patient has good appetite and PO intake. Checks Finger sticks daily at home and reports blood glucose levels are usually ~120. Patient reports possible food allergy to shellfish. Noted to have nausea/vomiting x3 days PTA. No episodes of nausea or vomiting in-house. Patient reports no recent bowel movement. In-house patient with 1.2% weight loss over 1 week based on admit weight 250# and current weight 247#. Patient has not been meeting nutrition needs x7 days. If diet unable to be advanced, strongly consider alternate means of nutrition (TPN) given prolonged NPO status. Case discussed with A-team.

## 2018-06-21 NOTE — PROGRESS NOTE ADULT - ASSESSMENT
43 yo gentleman w history of numerous SBOs s/p ex-lap for bowel obstruction and ventral hernia repair with mesh:  -NPO/NGT  - NGT clamp trial this AM  - Pain control w PCA  - Solorio in place; will d/c and TOV  - Lovenox DVT ppx  - Monitor drain output  - F/u GI function  - F/u AM labs  - Full support in place

## 2018-06-21 NOTE — DIETITIAN INITIAL EVALUATION ADULT. - NS AS NUTRI INTERV MEALS SNACK
1. Continue NPO. As medically appropriate advance diet as tolerated to clear liquid -> low fiber. 2. Given prolonged NPO status strongly consider alternate means of nutrition (TPN). 3. Monitor weights, labs, BM's, skin integrity, p.o. intake if diet able to be advanced.

## 2018-06-22 LAB
BUN SERPL-MCNC: 7 MG/DL — SIGNIFICANT CHANGE UP (ref 7–23)
CALCIUM SERPL-MCNC: 8.6 MG/DL — SIGNIFICANT CHANGE UP (ref 8.4–10.5)
CHLORIDE SERPL-SCNC: 102 MMOL/L — SIGNIFICANT CHANGE UP (ref 98–107)
CO2 SERPL-SCNC: 25 MMOL/L — SIGNIFICANT CHANGE UP (ref 22–31)
CREAT SERPL-MCNC: 0.87 MG/DL — SIGNIFICANT CHANGE UP (ref 0.5–1.3)
GLUCOSE BLDC GLUCOMTR-MCNC: 133 MG/DL — HIGH (ref 70–99)
GLUCOSE BLDC GLUCOMTR-MCNC: 141 MG/DL — HIGH (ref 70–99)
GLUCOSE BLDC GLUCOMTR-MCNC: 149 MG/DL — HIGH (ref 70–99)
GLUCOSE BLDC GLUCOMTR-MCNC: 156 MG/DL — HIGH (ref 70–99)
GLUCOSE SERPL-MCNC: 167 MG/DL — HIGH (ref 70–99)
HCT VFR BLD CALC: 33.6 % — LOW (ref 39–50)
HGB BLD-MCNC: 10.2 G/DL — LOW (ref 13–17)
MAGNESIUM SERPL-MCNC: 1.9 MG/DL — SIGNIFICANT CHANGE UP (ref 1.6–2.6)
MCHC RBC-ENTMCNC: 26.8 PG — LOW (ref 27–34)
MCHC RBC-ENTMCNC: 30.4 % — LOW (ref 32–36)
MCV RBC AUTO: 88.2 FL — SIGNIFICANT CHANGE UP (ref 80–100)
NRBC # FLD: 0 — SIGNIFICANT CHANGE UP
PHOSPHATE SERPL-MCNC: 3 MG/DL — SIGNIFICANT CHANGE UP (ref 2.5–4.5)
PLATELET # BLD AUTO: 201 K/UL — SIGNIFICANT CHANGE UP (ref 150–400)
PMV BLD: 11.1 FL — SIGNIFICANT CHANGE UP (ref 7–13)
POTASSIUM SERPL-MCNC: 3.7 MMOL/L — SIGNIFICANT CHANGE UP (ref 3.5–5.3)
POTASSIUM SERPL-SCNC: 3.7 MMOL/L — SIGNIFICANT CHANGE UP (ref 3.5–5.3)
RBC # BLD: 3.81 M/UL — LOW (ref 4.2–5.8)
RBC # FLD: 15.2 % — HIGH (ref 10.3–14.5)
SODIUM SERPL-SCNC: 137 MMOL/L — SIGNIFICANT CHANGE UP (ref 135–145)
WBC # BLD: 9.21 K/UL — SIGNIFICANT CHANGE UP (ref 3.8–10.5)
WBC # FLD AUTO: 9.21 K/UL — SIGNIFICANT CHANGE UP (ref 3.8–10.5)

## 2018-06-22 RX ORDER — ACETAMINOPHEN 500 MG
1000 TABLET ORAL ONCE
Qty: 0 | Refills: 0 | Status: COMPLETED | OUTPATIENT
Start: 2018-06-22 | End: 2018-06-22

## 2018-06-22 RX ORDER — INSULIN LISPRO 100/ML
VIAL (ML) SUBCUTANEOUS EVERY 6 HOURS
Qty: 0 | Refills: 0 | Status: DISCONTINUED | OUTPATIENT
Start: 2018-06-22 | End: 2018-06-22

## 2018-06-22 RX ORDER — OXYCODONE HYDROCHLORIDE 5 MG/1
5 TABLET ORAL ONCE
Qty: 0 | Refills: 0 | Status: DISCONTINUED | OUTPATIENT
Start: 2018-06-22 | End: 2018-06-25

## 2018-06-22 RX ORDER — ACETAMINOPHEN 500 MG
1000 TABLET ORAL ONCE
Qty: 0 | Refills: 0 | Status: COMPLETED | OUTPATIENT
Start: 2018-06-23 | End: 2018-06-23

## 2018-06-22 RX ORDER — INSULIN LISPRO 100/ML
VIAL (ML) SUBCUTANEOUS
Qty: 0 | Refills: 0 | Status: DISCONTINUED | OUTPATIENT
Start: 2018-06-22 | End: 2018-06-25

## 2018-06-22 RX ORDER — POTASSIUM CHLORIDE 20 MEQ
10 PACKET (EA) ORAL
Qty: 0 | Refills: 0 | Status: COMPLETED | OUTPATIENT
Start: 2018-06-22 | End: 2018-06-22

## 2018-06-22 RX ORDER — DIPHENHYDRAMINE HCL 50 MG
25 CAPSULE ORAL EVERY 6 HOURS
Qty: 0 | Refills: 0 | Status: DISCONTINUED | OUTPATIENT
Start: 2018-06-22 | End: 2018-06-23

## 2018-06-22 RX ADMIN — SODIUM CHLORIDE 75 MILLILITER(S): 9 INJECTION, SOLUTION INTRAVENOUS at 21:36

## 2018-06-22 RX ADMIN — Medication 5 MILLIGRAM(S): at 17:45

## 2018-06-22 RX ADMIN — Medication 5 MILLIGRAM(S): at 23:01

## 2018-06-22 RX ADMIN — Medication 1000 MILLIGRAM(S): at 11:46

## 2018-06-22 RX ADMIN — Medication 400 MILLIGRAM(S): at 11:16

## 2018-06-22 RX ADMIN — Medication 400 MILLIGRAM(S): at 23:00

## 2018-06-22 RX ADMIN — Medication 5 MILLIGRAM(S): at 05:33

## 2018-06-22 RX ADMIN — Medication 100 MILLIEQUIVALENT(S): at 08:01

## 2018-06-22 RX ADMIN — Medication 25 MILLIGRAM(S): at 21:37

## 2018-06-22 RX ADMIN — ENOXAPARIN SODIUM 40 MILLIGRAM(S): 100 INJECTION SUBCUTANEOUS at 11:15

## 2018-06-22 RX ADMIN — PANTOPRAZOLE SODIUM 40 MILLIGRAM(S): 20 TABLET, DELAYED RELEASE ORAL at 11:15

## 2018-06-22 RX ADMIN — Medication 5 MILLIGRAM(S): at 00:54

## 2018-06-22 RX ADMIN — Medication 25 MILLIGRAM(S): at 09:22

## 2018-06-22 RX ADMIN — Medication 100 MILLIEQUIVALENT(S): at 13:47

## 2018-06-22 RX ADMIN — Medication 100 MILLIEQUIVALENT(S): at 11:15

## 2018-06-22 RX ADMIN — Medication 5 MILLIGRAM(S): at 11:23

## 2018-06-22 RX ADMIN — FENTANYL CITRATE 30 MILLILITER(S): 50 INJECTION INTRAVENOUS at 08:03

## 2018-06-22 RX ADMIN — SODIUM CHLORIDE 75 MILLILITER(S): 9 INJECTION, SOLUTION INTRAVENOUS at 17:45

## 2018-06-22 RX ADMIN — Medication 1: at 06:11

## 2018-06-22 RX ADMIN — Medication 400 MILLIGRAM(S): at 17:45

## 2018-06-22 NOTE — PROGRESS NOTE ADULT - ASSESSMENT
43 yo gentleman w history of numerous SBOs s/p ex-lap for bowel obstruction and ventral hernia repair with mesh:  - NPO, will advance this AM  - Pain control w PCA; may try to transition to PO meds  - Monitor drain output  - Lovenox DVT ppx  - F/u GI function  - F/u AM labs  - Full support in place

## 2018-06-23 LAB
BUN SERPL-MCNC: 5 MG/DL — LOW (ref 7–23)
CALCIUM SERPL-MCNC: 8.7 MG/DL — SIGNIFICANT CHANGE UP (ref 8.4–10.5)
CHLORIDE SERPL-SCNC: 103 MMOL/L — SIGNIFICANT CHANGE UP (ref 98–107)
CO2 SERPL-SCNC: 24 MMOL/L — SIGNIFICANT CHANGE UP (ref 22–31)
CREAT SERPL-MCNC: 0.78 MG/DL — SIGNIFICANT CHANGE UP (ref 0.5–1.3)
GLUCOSE BLDC GLUCOMTR-MCNC: 132 MG/DL — HIGH (ref 70–99)
GLUCOSE BLDC GLUCOMTR-MCNC: 136 MG/DL — HIGH (ref 70–99)
GLUCOSE BLDC GLUCOMTR-MCNC: 147 MG/DL — HIGH (ref 70–99)
GLUCOSE BLDC GLUCOMTR-MCNC: 98 MG/DL — SIGNIFICANT CHANGE UP (ref 70–99)
GLUCOSE SERPL-MCNC: 131 MG/DL — HIGH (ref 70–99)
HCT VFR BLD CALC: 31.9 % — LOW (ref 39–50)
HGB BLD-MCNC: 10.1 G/DL — LOW (ref 13–17)
MAGNESIUM SERPL-MCNC: 1.8 MG/DL — SIGNIFICANT CHANGE UP (ref 1.6–2.6)
MCHC RBC-ENTMCNC: 26.6 PG — LOW (ref 27–34)
MCHC RBC-ENTMCNC: 31.7 % — LOW (ref 32–36)
MCV RBC AUTO: 84.2 FL — SIGNIFICANT CHANGE UP (ref 80–100)
NRBC # FLD: 0 — SIGNIFICANT CHANGE UP
PHOSPHATE SERPL-MCNC: 3.5 MG/DL — SIGNIFICANT CHANGE UP (ref 2.5–4.5)
PLATELET # BLD AUTO: 197 K/UL — SIGNIFICANT CHANGE UP (ref 150–400)
PMV BLD: 10.8 FL — SIGNIFICANT CHANGE UP (ref 7–13)
POTASSIUM SERPL-MCNC: 3.9 MMOL/L — SIGNIFICANT CHANGE UP (ref 3.5–5.3)
POTASSIUM SERPL-SCNC: 3.9 MMOL/L — SIGNIFICANT CHANGE UP (ref 3.5–5.3)
RBC # BLD: 3.79 M/UL — LOW (ref 4.2–5.8)
RBC # FLD: 15.2 % — HIGH (ref 10.3–14.5)
SODIUM SERPL-SCNC: 137 MMOL/L — SIGNIFICANT CHANGE UP (ref 135–145)
WBC # BLD: 7.12 K/UL — SIGNIFICANT CHANGE UP (ref 3.8–10.5)
WBC # FLD AUTO: 7.12 K/UL — SIGNIFICANT CHANGE UP (ref 3.8–10.5)

## 2018-06-23 RX ORDER — ATORVASTATIN CALCIUM 80 MG/1
20 TABLET, FILM COATED ORAL AT BEDTIME
Qty: 0 | Refills: 0 | Status: DISCONTINUED | OUTPATIENT
Start: 2018-06-23 | End: 2018-06-25

## 2018-06-23 RX ORDER — FENOFIBRATE,MICRONIZED 130 MG
145 CAPSULE ORAL DAILY
Qty: 0 | Refills: 0 | Status: DISCONTINUED | OUTPATIENT
Start: 2018-06-23 | End: 2018-06-25

## 2018-06-23 RX ORDER — MAGNESIUM SULFATE 500 MG/ML
1 VIAL (ML) INJECTION ONCE
Qty: 0 | Refills: 0 | Status: COMPLETED | OUTPATIENT
Start: 2018-06-23 | End: 2018-06-23

## 2018-06-23 RX ORDER — VALSARTAN 80 MG/1
80 TABLET ORAL DAILY
Qty: 0 | Refills: 0 | Status: DISCONTINUED | OUTPATIENT
Start: 2018-06-23 | End: 2018-06-25

## 2018-06-23 RX ORDER — ACETAMINOPHEN 500 MG
650 TABLET ORAL EVERY 6 HOURS
Qty: 0 | Refills: 0 | Status: DISCONTINUED | OUTPATIENT
Start: 2018-06-23 | End: 2018-06-25

## 2018-06-23 RX ORDER — DIPHENHYDRAMINE HCL 50 MG
25 CAPSULE ORAL EVERY 6 HOURS
Qty: 0 | Refills: 0 | Status: DISCONTINUED | OUTPATIENT
Start: 2018-06-23 | End: 2018-06-25

## 2018-06-23 RX ORDER — METOPROLOL TARTRATE 50 MG
100 TABLET ORAL DAILY
Qty: 0 | Refills: 0 | Status: DISCONTINUED | OUTPATIENT
Start: 2018-06-23 | End: 2018-06-25

## 2018-06-23 RX ORDER — PANTOPRAZOLE SODIUM 20 MG/1
40 TABLET, DELAYED RELEASE ORAL
Qty: 0 | Refills: 0 | Status: DISCONTINUED | OUTPATIENT
Start: 2018-06-23 | End: 2018-06-25

## 2018-06-23 RX ADMIN — Medication 400 MILLIGRAM(S): at 05:02

## 2018-06-23 RX ADMIN — Medication 1000 MILLIGRAM(S): at 00:07

## 2018-06-23 RX ADMIN — Medication 5 MILLIGRAM(S): at 11:18

## 2018-06-23 RX ADMIN — Medication 650 MILLIGRAM(S): at 20:54

## 2018-06-23 RX ADMIN — Medication 5 MILLIGRAM(S): at 18:25

## 2018-06-23 RX ADMIN — PANTOPRAZOLE SODIUM 40 MILLIGRAM(S): 20 TABLET, DELAYED RELEASE ORAL at 11:18

## 2018-06-23 RX ADMIN — SODIUM CHLORIDE 75 MILLILITER(S): 9 INJECTION, SOLUTION INTRAVENOUS at 12:23

## 2018-06-23 RX ADMIN — Medication 100 GRAM(S): at 12:23

## 2018-06-23 RX ADMIN — Medication 1000 MILLIGRAM(S): at 06:28

## 2018-06-23 RX ADMIN — ENOXAPARIN SODIUM 40 MILLIGRAM(S): 100 INJECTION SUBCUTANEOUS at 11:16

## 2018-06-23 RX ADMIN — Medication 5 MILLIGRAM(S): at 05:02

## 2018-06-23 RX ADMIN — Medication 25 MILLIGRAM(S): at 21:00

## 2018-06-23 RX ADMIN — ATORVASTATIN CALCIUM 20 MILLIGRAM(S): 80 TABLET, FILM COATED ORAL at 21:00

## 2018-06-23 RX ADMIN — Medication 650 MILLIGRAM(S): at 19:38

## 2018-06-23 NOTE — PROGRESS NOTE ADULT - ASSESSMENT
A: 43 yo gentleman w history of numerous SBOs s/p ex-lap for bowel obstruction and ventral hernia repair with mesh:    P:  - CLD  - Pain controL  - Monitor drain output  - Lovenox DVT ppx  - F/u GI function  - Full support in place

## 2018-06-24 LAB
BUN SERPL-MCNC: 5 MG/DL — LOW (ref 7–23)
CALCIUM SERPL-MCNC: 8.7 MG/DL — SIGNIFICANT CHANGE UP (ref 8.4–10.5)
CHLORIDE SERPL-SCNC: 102 MMOL/L — SIGNIFICANT CHANGE UP (ref 98–107)
CO2 SERPL-SCNC: 23 MMOL/L — SIGNIFICANT CHANGE UP (ref 22–31)
CREAT SERPL-MCNC: 0.75 MG/DL — SIGNIFICANT CHANGE UP (ref 0.5–1.3)
GLUCOSE BLDC GLUCOMTR-MCNC: 122 MG/DL — HIGH (ref 70–99)
GLUCOSE BLDC GLUCOMTR-MCNC: 153 MG/DL — HIGH (ref 70–99)
GLUCOSE BLDC GLUCOMTR-MCNC: 154 MG/DL — HIGH (ref 70–99)
GLUCOSE BLDC GLUCOMTR-MCNC: 178 MG/DL — HIGH (ref 70–99)
GLUCOSE SERPL-MCNC: 117 MG/DL — HIGH (ref 70–99)
HCT VFR BLD CALC: 34.7 % — LOW (ref 39–50)
HGB BLD-MCNC: 10.5 G/DL — LOW (ref 13–17)
MAGNESIUM SERPL-MCNC: 1.8 MG/DL — SIGNIFICANT CHANGE UP (ref 1.6–2.6)
MCHC RBC-ENTMCNC: 26.3 PG — LOW (ref 27–34)
MCHC RBC-ENTMCNC: 30.3 % — LOW (ref 32–36)
MCV RBC AUTO: 87 FL — SIGNIFICANT CHANGE UP (ref 80–100)
NRBC # FLD: 0 — SIGNIFICANT CHANGE UP
PHOSPHATE SERPL-MCNC: 3.6 MG/DL — SIGNIFICANT CHANGE UP (ref 2.5–4.5)
PLATELET # BLD AUTO: 241 K/UL — SIGNIFICANT CHANGE UP (ref 150–400)
PMV BLD: 11.2 FL — SIGNIFICANT CHANGE UP (ref 7–13)
POTASSIUM SERPL-MCNC: 3.7 MMOL/L — SIGNIFICANT CHANGE UP (ref 3.5–5.3)
POTASSIUM SERPL-SCNC: 3.7 MMOL/L — SIGNIFICANT CHANGE UP (ref 3.5–5.3)
RBC # BLD: 3.99 M/UL — LOW (ref 4.2–5.8)
RBC # FLD: 15 % — HIGH (ref 10.3–14.5)
SODIUM SERPL-SCNC: 137 MMOL/L — SIGNIFICANT CHANGE UP (ref 135–145)
WBC # BLD: 8.76 K/UL — SIGNIFICANT CHANGE UP (ref 3.8–10.5)
WBC # FLD AUTO: 8.76 K/UL — SIGNIFICANT CHANGE UP (ref 3.8–10.5)

## 2018-06-24 RX ADMIN — Medication 25 MILLIGRAM(S): at 09:38

## 2018-06-24 RX ADMIN — Medication 145 MILLIGRAM(S): at 12:32

## 2018-06-24 RX ADMIN — Medication 2: at 12:32

## 2018-06-24 RX ADMIN — Medication 100 MILLIGRAM(S): at 05:04

## 2018-06-24 RX ADMIN — Medication 25 MILLIGRAM(S): at 03:01

## 2018-06-24 RX ADMIN — ENOXAPARIN SODIUM 40 MILLIGRAM(S): 100 INJECTION SUBCUTANEOUS at 12:32

## 2018-06-24 RX ADMIN — Medication 2: at 17:34

## 2018-06-24 RX ADMIN — ATORVASTATIN CALCIUM 20 MILLIGRAM(S): 80 TABLET, FILM COATED ORAL at 21:07

## 2018-06-24 RX ADMIN — Medication 25 MILLIGRAM(S): at 22:07

## 2018-06-24 RX ADMIN — VALSARTAN 80 MILLIGRAM(S): 80 TABLET ORAL at 05:06

## 2018-06-24 RX ADMIN — PANTOPRAZOLE SODIUM 40 MILLIGRAM(S): 20 TABLET, DELAYED RELEASE ORAL at 06:30

## 2018-06-24 RX ADMIN — Medication 25 MILLIGRAM(S): at 15:53

## 2018-06-24 NOTE — PROGRESS NOTE ADULT - ASSESSMENT
A: 41 yo gentleman w history of numerous SBOs s/p ex-lap for bowel obstruction and ventral hernia repair with mesh:    P:  - LRD  - Pain controL  - Monitor drain output  - Lovenox DVT ppx  - F/u GI function  - Full support in place  - discharge planning

## 2018-06-25 ENCOUNTER — TRANSCRIPTION ENCOUNTER (OUTPATIENT)
Age: 43
End: 2018-06-25

## 2018-06-25 VITALS
SYSTOLIC BLOOD PRESSURE: 136 MMHG | TEMPERATURE: 98 F | RESPIRATION RATE: 17 BRPM | HEART RATE: 92 BPM | OXYGEN SATURATION: 98 % | DIASTOLIC BLOOD PRESSURE: 70 MMHG

## 2018-06-25 LAB
BUN SERPL-MCNC: 9 MG/DL — SIGNIFICANT CHANGE UP (ref 7–23)
CALCIUM SERPL-MCNC: 8.9 MG/DL — SIGNIFICANT CHANGE UP (ref 8.4–10.5)
CHLORIDE SERPL-SCNC: 101 MMOL/L — SIGNIFICANT CHANGE UP (ref 98–107)
CO2 SERPL-SCNC: 23 MMOL/L — SIGNIFICANT CHANGE UP (ref 22–31)
CREAT SERPL-MCNC: 0.83 MG/DL — SIGNIFICANT CHANGE UP (ref 0.5–1.3)
GLUCOSE BLDC GLUCOMTR-MCNC: 133 MG/DL — HIGH (ref 70–99)
GLUCOSE BLDC GLUCOMTR-MCNC: 164 MG/DL — HIGH (ref 70–99)
GLUCOSE SERPL-MCNC: 140 MG/DL — HIGH (ref 70–99)
HCT VFR BLD CALC: 32.9 % — LOW (ref 39–50)
HGB BLD-MCNC: 10.5 G/DL — LOW (ref 13–17)
MAGNESIUM SERPL-MCNC: 1.8 MG/DL — SIGNIFICANT CHANGE UP (ref 1.6–2.6)
MCHC RBC-ENTMCNC: 26.6 PG — LOW (ref 27–34)
MCHC RBC-ENTMCNC: 31.9 % — LOW (ref 32–36)
MCV RBC AUTO: 83.3 FL — SIGNIFICANT CHANGE UP (ref 80–100)
NRBC # FLD: 0 — SIGNIFICANT CHANGE UP
PHOSPHATE SERPL-MCNC: 3.9 MG/DL — SIGNIFICANT CHANGE UP (ref 2.5–4.5)
PLATELET # BLD AUTO: 248 K/UL — SIGNIFICANT CHANGE UP (ref 150–400)
PMV BLD: 11.2 FL — SIGNIFICANT CHANGE UP (ref 7–13)
POTASSIUM SERPL-MCNC: 3.8 MMOL/L — SIGNIFICANT CHANGE UP (ref 3.5–5.3)
POTASSIUM SERPL-SCNC: 3.8 MMOL/L — SIGNIFICANT CHANGE UP (ref 3.5–5.3)
RBC # BLD: 3.95 M/UL — LOW (ref 4.2–5.8)
RBC # FLD: 15.2 % — HIGH (ref 10.3–14.5)
SODIUM SERPL-SCNC: 139 MMOL/L — SIGNIFICANT CHANGE UP (ref 135–145)
SURGICAL PATHOLOGY STUDY: SIGNIFICANT CHANGE UP
WBC # BLD: 10.16 K/UL — SIGNIFICANT CHANGE UP (ref 3.8–10.5)
WBC # FLD AUTO: 10.16 K/UL — SIGNIFICANT CHANGE UP (ref 3.8–10.5)

## 2018-06-25 RX ORDER — ACETAMINOPHEN 500 MG
2 TABLET ORAL
Qty: 0 | Refills: 0 | COMMUNITY
Start: 2018-06-25

## 2018-06-25 RX ADMIN — Medication 145 MILLIGRAM(S): at 11:32

## 2018-06-25 RX ADMIN — Medication 25 MILLIGRAM(S): at 10:18

## 2018-06-25 RX ADMIN — Medication 2: at 12:23

## 2018-06-25 RX ADMIN — Medication 100 MILLIGRAM(S): at 05:02

## 2018-06-25 RX ADMIN — PANTOPRAZOLE SODIUM 40 MILLIGRAM(S): 20 TABLET, DELAYED RELEASE ORAL at 06:55

## 2018-06-25 RX ADMIN — ENOXAPARIN SODIUM 40 MILLIGRAM(S): 100 INJECTION SUBCUTANEOUS at 11:32

## 2018-06-25 RX ADMIN — VALSARTAN 80 MILLIGRAM(S): 80 TABLET ORAL at 05:02

## 2018-06-25 RX ADMIN — Medication 25 MILLIGRAM(S): at 04:44

## 2018-06-25 NOTE — PROGRESS NOTE ADULT - ASSESSMENT
A: 43 yo gentleman w history of numerous SBOs s/p ex-lap for bowel obstruction and ventral hernia repair with mesh:    P:  - LRD  - Pain controL  - Monitor drain output  - Lovenox DVT ppx  - F/u GI function  - Full support in place  - discharge planning

## 2018-06-25 NOTE — DISCHARGE NOTE ADULT - HOSPITAL COURSE
This is a 42M PMHx multiple abdominal surgeries, including most recently a laparotomy with SBR in 2016, who presented to [facility] ED the morning of 5/15/18 complaining of nausea/vomiting x3days. Patient recently admitted w/ SBO 1/23-1/26 and 4/17-4/19, and has had multiple admissions for bowel obstructions, managed non-operatively.  Began having abdominal pain today associated w/ N/V and increasing distension. Last BM today and not sure if passed flatus today. Reports that his symptoms feel similar to those he experience during his prior admissions for SBO. Patient has had chronic ventral hernias, and he states this ventral hernia feels soft and unchanged from usual. This is a 42M PMHx multiple abdominal surgeries, including most recently a laparotomy with SBR in 2016, who presented to [facility] ED the morning of 5/15/18 complaining of nausea/vomiting x3days. Patient recently admitted w/ SBO 1/23-1/26 and 4/17-4/19, and has had multiple admissions for bowel obstructions, managed non-operatively.  Began having abdominal pain today associated w/ N/V and increasing distension. Last BM today and not sure if passed flatus today. Reports that his symptoms feel similar to those he experience during his prior admissions for SBO. Patient has had chronic ventral hernias, and he states this ventral hernia feels soft and unchanged from usual.    Patient made NPO and NG tube placed. CT scan 6/14: No small bowel obstruction secondary to a ventral hernia. Appearance is not significantly changed from prior imaging studies. No ascites.  Patient's GI function did not resolve with bowel rest. Patient taken to the OR on 6/19/18 for a ventral hernia with SBO, lysis of adhesions, ventral hernia repaired with Progrip Mesh overlay, two BHUMI drains at subQ. Post operatively patient hemodynamically stable and transferred to the floor. Patient with NG tube post operatively.  Patient OOB and ambulating and pain well controlled. Patient developed a rash post operatively, possibly due to the analgesics or pre op skin cleaning. The rash was helped with benadryl. This is a 42M PMHx multiple abdominal surgeries, including most recently a laparotomy with SBR in 2016, who presented to [facility] ED the morning of 5/15/18 complaining of nausea/vomiting x3days. Patient recently admitted w/ SBO 1/23-1/26 and 4/17-4/19, and has had multiple admissions for bowel obstructions, managed non-operatively.  Began having abdominal pain today associated w/ N/V and increasing distension. Last BM today and not sure if passed flatus today. Reports that his symptoms feel similar to those he experience during his prior admissions for SBO. Patient has had chronic ventral hernias, and he states this ventral hernia feels soft and unchanged from usual.    Patient made NPO and NG tube placed. CT scan 6/14: No small bowel obstruction secondary to a ventral hernia. Appearance is not significantly changed from prior imaging studies. No ascites.  Patient's GI function did not resolve with bowel rest. Patient taken to the OR on 6/19/18 for a ventral hernia with SBO, lysis of adhesions, ventral hernia repaired with Progrip Mesh overlay, two BHUMI drains at subQ. Post operatively patient hemodynamically stable and transferred to the floor. Patient with NG tube post operatively.  Patient OOB and ambulating and pain well controlled. Patient developed a rash post operatively, possibly due to the analgesics or pre op skin cleaning. The rash was helped with benadryl.  Patient's NG tube and keene removed and patient began having return of GI function.  Patient's diet advanced and tolerated. Patient stable for discharge home to follow up as an outpatient.

## 2018-06-25 NOTE — DISCHARGE NOTE ADULT - PLAN OF CARE
You went to the OR for a hernia repair and lysis of adhesions Follow up with Dr. White in one week, call for an appointment. Please follow up with your primary care physician regarding your hospitalization. Do not drive while taking pain medications

## 2018-06-25 NOTE — DISCHARGE NOTE ADULT - ADDITIONAL INSTRUCTIONS
You will be discharged with a BHUMI drain. You will need to empty it and record outputs accurately. This will be taught to you by the nursing staff. Please do not remove the BHUMI drain. It will be removed in the office. Please bring to the office accurate records of output.

## 2018-06-25 NOTE — DISCHARGE NOTE ADULT - CARE PROVIDER_API CALL
Musa White), ColonRectal Surgery; Surgery  [address] [place] [address]  Phone: [phone]  Fax: [phone]

## 2018-06-25 NOTE — PROGRESS NOTE ADULT - SUBJECTIVE AND OBJECTIVE BOX
1Anesthesia Pain Management Service    SUBJECTIVE: Patient is doing well with IV PCA and no significant problems reported.    Pain Scale Score	At rest: ___ 	With Activity: ___ 	[X ] Refer to charted pain scores    THERAPY:    [ ] IV PCA Morphine		[ ] 5 mg/mL	[ ] 1 mg/mL  [ ] IV PCA Hydromorphone	[ ] 5 mg/mL	[X ] 1 mg/mL  [X ] IV PCA Fentanyl		[ X] 50 micrograms/mL    Demand dose __15_ lockout __10_ (minutes) Continuous Rate _0__ Total: __~500_  mcg used (in past 24 hours)      MEDICATIONS  (STANDING):  dextrose 5% + sodium chloride 0.45%. 1000 milliLiter(s) (125 mL/Hr) IV Continuous <Continuous>  enoxaparin Injectable 40 milliGRAM(s) SubCutaneous daily  fentaNYL PCA (50 MICROgram(s)/mL) 30 milliLiter(s) PCA Continuous PCA Continuous  insulin lispro (HumaLOG) corrective regimen sliding scale   SubCutaneous every 6 hours  metoprolol tartrate Injectable 5 milliGRAM(s) IV Push every 6 hours  pantoprazole  Injectable 40 milliGRAM(s) IV Push daily    MEDICATIONS  (PRN):  benzocaine 15 mG/menthol 3.6 mG Lozenge 1 Lozenge Oral every 4 hours PRN Sore Throat  naloxone Injectable 0.1 milliGRAM(s) IV Push every 3 minutes PRN For ANY of the following changes in patient status:  A. RR LESS THAN 10 breaths per minute, B. Oxygen saturation LESS THAN 90%, C. Sedation score of 6  ondansetron Injectable 4 milliGRAM(s) IV Push every 6 hours PRN Nausea      OBJECTIVE:    Sedation Score:	[ X] Alert	[ ] Drowsy 	[ ] Arousable	[ ] Asleep	[ ] Unresponsive    Side Effects:	[X ] None	[ ] Nausea	[ ] Vomiting	[ ] Pruritus  		[ ] Other:    Vital Signs Last 24 Hrs  T(C): 36.7 (21 Jun 2018 10:06), Max: 37.2 (21 Jun 2018 05:09)  T(F): 98 (21 Jun 2018 10:06), Max: 98.9 (21 Jun 2018 05:09)  HR: 111 (21 Jun 2018 10:06) (96 - 111)  BP: 108/84 (21 Jun 2018 10:06) (108/84 - 136/75)  BP(mean): --  RR: 16 (21 Jun 2018 10:06) (16 - 20)  SpO2: 99% (21 Jun 2018 10:06) (95% - 99%)    ASSESSMENT/ PLAN    Therapy to  be:	[ X] Continue   [ ] Discontinued   [ ] Change to prn Analgesics    Documentation and Verification of current medications:   [X] Done	[ ] Not done, not elligible    Comments: Doing well Alert x3 & denies GUSTAVO history. NPO.    Progress Note written now but Patient was seen earlier.
A TEAM SURGERY PROGRESS NOTE    SUBJECTIVE: Pt seen at bedside. NAEO. Pain controlled. -/- GI function. No N/V. Remains afebrile.       Vital Signs Last 24 Hrs  T(C): 37.2 (21 Jun 2018 05:09), Max: 37.2 (21 Jun 2018 05:09)  T(F): 98.9 (21 Jun 2018 05:09), Max: 98.9 (21 Jun 2018 05:09)  HR: 96 (21 Jun 2018 05:09) (88 - 105)  BP: 136/67 (21 Jun 2018 05:09) (127/64 - 137/87)  BP(mean): --  RR: 19 (21 Jun 2018 05:09) (15 - 20)  SpO2: 95% (21 Jun 2018 05:09) (95% - 100%)    Physical Exam  General: NAD, NGT in place  Resp: Unlabored breathing  Abd: soft, appropriately tender, non-distended, midline incision with gauze c/d/i, JPs with serosanguinous fluid  : keene in place    I&O's Summary    20 Jun 2018 07:01  -  21 Jun 2018 07:00  --------------------------------------------------------  IN: 2425 mL / OUT: 3165.5 mL / NET: -740.5 mL      I&O's Detail    20 Jun 2018 07:01  -  21 Jun 2018 07:00  --------------------------------------------------------  IN:    dextrose 5% + sodium chloride 0.45%.: 2375 mL    IV PiggyBack: 50 mL  Total IN: 2425 mL    OUT:    Bulb: 85.5 mL    Bulb: 35 mL    Indwelling Catheter - Urethral: 2145 mL    Nasoenteral Tube: 900 mL  Total OUT: 3165.5 mL    Total NET: -740.5 mL          MEDICATIONS  (STANDING):  dextrose 5% + sodium chloride 0.45%. 1000 milliLiter(s) (125 mL/Hr) IV Continuous <Continuous>  enoxaparin Injectable 40 milliGRAM(s) SubCutaneous daily  fentaNYL PCA (50 MICROgram(s)/mL) 30 milliLiter(s) PCA Continuous PCA Continuous  insulin lispro (HumaLOG) corrective regimen sliding scale   SubCutaneous every 6 hours  magnesium sulfate  IVPB 1 Gram(s) IV Intermittent once  metoprolol tartrate Injectable 5 milliGRAM(s) IV Push every 6 hours  pantoprazole  Injectable 40 milliGRAM(s) IV Push daily  potassium chloride  10 mEq/100 mL IVPB 10 milliEquivalent(s) IV Intermittent every 1 hour    MEDICATIONS  (PRN):  benzocaine 15 mG/menthol 3.6 mG Lozenge 1 Lozenge Oral every 4 hours PRN Sore Throat  naloxone Injectable 0.1 milliGRAM(s) IV Push every 3 minutes PRN For ANY of the following changes in patient status:  A. RR LESS THAN 10 breaths per minute, B. Oxygen saturation LESS THAN 90%, C. Sedation score of 6  ondansetron Injectable 4 milliGRAM(s) IV Push every 6 hours PRN Nausea      LABS:                        10.7   7.21  )-----------( 204      ( 21 Jun 2018 05:15 )             34.5     06-21    140  |  103  |  6<L>  ----------------------------<  172<H>  3.5   |  25  |  0.90    Ca    8.7      21 Jun 2018 05:15  Phos  3.1     06-21  Mg     1.9     06-21            RADIOLOGY & ADDITIONAL STUDIES:
A TEAM SURGERY PROGRESS NOTE    SUBJECTIVE: Pt seen at bedside. NAEO. Pain controlled. No N/V. NPO for OR today. Remains afebrile.       Vital Signs Last 24 Hrs  T(C): 36.4 (19 Jun 2018 05:50), Max: 36.9 (18 Jun 2018 09:45)  T(F): 97.5 (19 Jun 2018 05:50), Max: 98.4 (18 Jun 2018 09:45)  HR: 61 (19 Jun 2018 05:50) (56 - 70)  BP: 125/82 (19 Jun 2018 05:50) (121/73 - 136/80)  BP(mean): --  RR: 20 (19 Jun 2018 05:50) (16 - 20)  SpO2: 95% (19 Jun 2018 05:50) (95% - 100%)    Physical Exam  General: NAD, alert and oriented  Resp: Unlabored breathing  Abd: softly distended, minimally tender in RLQ    I&O's Summary    17 Jun 2018 07:01  -  18 Jun 2018 07:00  --------------------------------------------------------  IN: 3000 mL / OUT: 1070 mL / NET: 1930 mL    18 Jun 2018 07:01  -  19 Jun 2018 06:24  --------------------------------------------------------  IN: 2600 mL / OUT: 2155 mL / NET: 445 mL      I&O's Detail    17 Jun 2018 07:01  -  18 Jun 2018 07:00  --------------------------------------------------------  IN:    dextrose 5% + sodium chloride 0.45%.: 2000 mL    lactated ringers.: 1000 mL  Total IN: 3000 mL    OUT:    Nasoenteral Tube: 25 mL    Voided: 1045 mL  Total OUT: 1070 mL    Total NET: 1930 mL      18 Jun 2018 07:01  -  19 Jun 2018 06:24  --------------------------------------------------------  IN:    dextrose 5% + sodium chloride 0.45%.: 2250 mL    IV PiggyBack: 350 mL  Total IN: 2600 mL    OUT:    Voided: 2155 mL  Total OUT: 2155 mL    Total NET: 445 mL          MEDICATIONS  (STANDING):  dextrose 5% + sodium chloride 0.45%. 1000 milliLiter(s) (125 mL/Hr) IV Continuous <Continuous>  enoxaparin Injectable 40 milliGRAM(s) SubCutaneous daily  insulin lispro (HumaLOG) corrective regimen sliding scale   SubCutaneous every 6 hours  metoprolol tartrate Injectable 5 milliGRAM(s) IV Push every 6 hours  pantoprazole  Injectable 40 milliGRAM(s) IV Push daily    MEDICATIONS  (PRN):  benzocaine 15 mG/menthol 3.6 mG Lozenge 1 Lozenge Oral every 4 hours PRN Sore Throat      LABS:                        10.5   6.18  )-----------( 176      ( 18 Jun 2018 06:10 )             34.5     06-18    138  |  101  |  12  ----------------------------<  140<H>  3.5   |  23  |  0.74    Ca    8.8      18 Jun 2018 06:10  Phos  3.0     06-18  Mg     1.8     06-18            RADIOLOGY & ADDITIONAL STUDIES:
A TEAM SURGERY PROGRESS NOTE    SUBJECTIVE: Pt seen at bedside. NAEO. Pain controlled. No N/V. Remains afebrile.        Vital Signs Last 24 Hrs  T(C): 36.8 (21 Jun 2018 21:30), Max: 37.2 (21 Jun 2018 05:09)  T(F): 98.2 (21 Jun 2018 21:30), Max: 98.9 (21 Jun 2018 05:09)  HR: 91 (21 Jun 2018 21:30) (91 - 111)  BP: 110/54 (21 Jun 2018 21:30) (108/84 - 136/67)  BP(mean): --  RR: 18 (21 Jun 2018 21:30) (16 - 20)  SpO2: 100% (21 Jun 2018 21:30) (95% - 100%)    Physical Exam  General: NAD  Resp: Unlabored breathing  Abd: soft, appropriately tender, non-distended, midline incision with gauze c/d/i, JPs with serosanguinous fluid       I&O's Summary    20 Jun 2018 07:01  -  21 Jun 2018 07:00  --------------------------------------------------------  IN: 2925 mL / OUT: 3265.5 mL / NET: -340.5 mL    21 Jun 2018 07:01  -  22 Jun 2018 00:33  --------------------------------------------------------  IN: 2275 mL / OUT: 810 mL / NET: 1465 mL      I&O's Detail    20 Jun 2018 07:01  -  21 Jun 2018 07:00  --------------------------------------------------------  IN:    dextrose 5% + sodium chloride 0.45%.: 2875 mL    IV PiggyBack: 50 mL  Total IN: 2925 mL    OUT:    Bulb: 85.5 mL    Bulb: 35 mL    Indwelling Catheter - Urethral: 2145 mL    Nasoenteral Tube: 1000 mL  Total OUT: 3265.5 mL    Total NET: -340.5 mL      21 Jun 2018 07:01  -  22 Jun 2018 00:33  --------------------------------------------------------  IN:    dextrose 5% + sodium chloride 0.45%.: 1875 mL    IV PiggyBack: 400 mL  Total IN: 2275 mL    OUT:    Bulb: 50 mL    Bulb: 30 mL    Indwelling Catheter - Urethral: 355 mL    Nasoenteral Tube: 50 mL    Voided: 325 mL  Total OUT: 810 mL    Total NET: 1465 mL          MEDICATIONS  (STANDING):  dextrose 5% + sodium chloride 0.45%. 1000 milliLiter(s) (125 mL/Hr) IV Continuous <Continuous>  enoxaparin Injectable 40 milliGRAM(s) SubCutaneous daily  fentaNYL PCA (50 MICROgram(s)/mL) 30 milliLiter(s) PCA Continuous PCA Continuous  insulin lispro (HumaLOG) corrective regimen sliding scale   SubCutaneous every 6 hours  metoprolol tartrate Injectable 5 milliGRAM(s) IV Push every 6 hours  pantoprazole  Injectable 40 milliGRAM(s) IV Push daily    MEDICATIONS  (PRN):  benzocaine 15 mG/menthol 3.6 mG Lozenge 1 Lozenge Oral every 4 hours PRN Sore Throat  naloxone Injectable 0.1 milliGRAM(s) IV Push every 3 minutes PRN For ANY of the following changes in patient status:  A. RR LESS THAN 10 breaths per minute, B. Oxygen saturation LESS THAN 90%, C. Sedation score of 6  ondansetron Injectable 4 milliGRAM(s) IV Push every 6 hours PRN Nausea      LABS:                        10.7   7.21  )-----------( 204      ( 21 Jun 2018 05:15 )             34.5     06-21    140  |  103  |  6<L>  ----------------------------<  172<H>  3.5   |  25  |  0.90    Ca    8.7      21 Jun 2018 05:15  Phos  3.1     06-21  Mg     1.9     06-21            RADIOLOGY & ADDITIONAL STUDIES:
A TEAM SURGERY PROGRESS NOTE    SUBJECTIVE: Pt seen at bedside. Pain controlled. NGT remains in place. No N/V. + flatus/-BMs. Remains afebrile.       Vital Signs Last 24 Hrs  T(C): 36.6 (17 Jun 2018 05:16), Max: 36.7 (16 Jun 2018 09:52)  T(F): 97.9 (17 Jun 2018 05:16), Max: 98.1 (16 Jun 2018 09:52)  HR: 68 (17 Jun 2018 05:16) (68 - 98)  BP: 125/67 (17 Jun 2018 05:16) (115/64 - 148/87)  BP(mean): --  RR: 16 (17 Jun 2018 05:16) (16 - 20)  SpO2: 97% (17 Jun 2018 05:16) (96% - 99%)    Physical Exam  General: NAD, alert and oriented, NGT in place  Resp: Unlabored breathing  Abd: softly distended, minimally tender in RLQ    I&O's Summary    16 Jun 2018 07:01  -  17 Jun 2018 07:00  --------------------------------------------------------  IN: 1500 mL / OUT: 2050 mL / NET: -550 mL      I&O's Detail    16 Jun 2018 07:01  -  17 Jun 2018 07:00  --------------------------------------------------------  IN:    lactated ringers.: 1500 mL  Total IN: 1500 mL    OUT:    Nasoenteral Tube: 725 mL    Voided: 1325 mL  Total OUT: 2050 mL    Total NET: -550 mL          MEDICATIONS  (STANDING):  enoxaparin Injectable 40 milliGRAM(s) SubCutaneous daily  insulin lispro (HumaLOG) corrective regimen sliding scale   SubCutaneous every 6 hours  lactated ringers. 1000 milliLiter(s) (125 mL/Hr) IV Continuous <Continuous>  metoprolol tartrate Injectable 5 milliGRAM(s) IV Push every 6 hours  pantoprazole  Injectable 40 milliGRAM(s) IV Push daily    MEDICATIONS  (PRN):  benzocaine 15 mG/menthol 3.6 mG Lozenge 1 Lozenge Oral every 4 hours PRN Sore Throat      LABS:                        10.1   6.02  )-----------( 157      ( 17 Jun 2018 05:22 )             32.8     06-17    144  |  104  |  14  ----------------------------<  93  3.8   |  21<L>  |  0.72    Ca    9.1      17 Jun 2018 05:22  Phos  2.9     06-17  Mg     1.9     06-17      PT/INR - ( 17 Jun 2018 05:22 )   PT: 11.8 SEC;   INR: 1.06          PTT - ( 17 Jun 2018 05:22 )  PTT:29.3 SEC      RADIOLOGY & ADDITIONAL STUDIES:
A TEAM SURGERY PROGRESS NOTE    SUBJECTIVE: Pt seen at bedside. Pain controlled. Tolerated NGT to gravity overnight. No N/V. Remains afebrile.       Vital Signs Last 24 Hrs  T(C): 36.6 (17 Jun 2018 23:54), Max: 36.8 (17 Jun 2018 10:50)  T(F): 97.8 (17 Jun 2018 23:54), Max: 98.3 (17 Jun 2018 10:50)  HR: 63 (17 Jun 2018 23:54) (63 - 82)  BP: 118/67 (17 Jun 2018 23:54) (118/67 - 151/77)  BP(mean): --  RR: 18 (17 Jun 2018 23:54) (15 - 20)  SpO2: 98% (17 Jun 2018 23:54) (96% - 100%)    Physical Exam  General: NAD, alert and oriented, NGT in place  Resp: Unlabored breathing  Abd: softly distended, minimally tender in RLQ      I&O's Summary    16 Jun 2018 07:01  -  17 Jun 2018 07:00  --------------------------------------------------------  IN: 1500 mL / OUT: 2050 mL / NET: -550 mL    17 Jun 2018 07:01  -  18 Jun 2018 00:45  --------------------------------------------------------  IN: 1500 mL / OUT: 795 mL / NET: 705 mL      I&O's Detail    16 Jun 2018 07:01  -  17 Jun 2018 07:00  --------------------------------------------------------  IN:    lactated ringers.: 1500 mL  Total IN: 1500 mL    OUT:    Nasoenteral Tube: 725 mL    Voided: 1325 mL  Total OUT: 2050 mL    Total NET: -550 mL      17 Jun 2018 07:01  -  18 Jun 2018 00:45  --------------------------------------------------------  IN:    dextrose 5% + sodium chloride 0.45%.: 500 mL    lactated ringers.: 1000 mL  Total IN: 1500 mL    OUT:    Nasoenteral Tube: 25 mL    Voided: 770 mL  Total OUT: 795 mL    Total NET: 705 mL          MEDICATIONS  (STANDING):  dextrose 5% + sodium chloride 0.45%. 1000 milliLiter(s) (125 mL/Hr) IV Continuous <Continuous>  enoxaparin Injectable 40 milliGRAM(s) SubCutaneous daily  insulin lispro (HumaLOG) corrective regimen sliding scale   SubCutaneous every 6 hours  metoprolol tartrate Injectable 5 milliGRAM(s) IV Push every 6 hours  pantoprazole  Injectable 40 milliGRAM(s) IV Push daily    MEDICATIONS  (PRN):  benzocaine 15 mG/menthol 3.6 mG Lozenge 1 Lozenge Oral every 4 hours PRN Sore Throat      LABS:                        10.1   6.02  )-----------( 157      ( 17 Jun 2018 05:22 )             32.8     06-17    144  |  104  |  14  ----------------------------<  93  3.8   |  21<L>  |  0.72    Ca    9.1      17 Jun 2018 05:22  Phos  2.9     06-17  Mg     1.9     06-17      PT/INR - ( 17 Jun 2018 05:22 )   PT: 11.8 SEC;   INR: 1.06          PTT - ( 17 Jun 2018 05:22 )  PTT:29.3 SEC      RADIOLOGY & ADDITIONAL STUDIES:
A Team Surgery Daily Progress Note:    S: Patient seen and examined. He was admitted overnight. Pain is improved. NGT remains to suction. He denies any flatus/bowel movements.    O:  Vital Signs Last 24 Hrs  T(C): 36.9 (15 Feliciano 2018 06:07), Max: 36.9 (15 Feliciano 2018 06:07)  T(F): 98.5 (15 Feliciano 2018 06:07), Max: 98.5 (15 Feliciano 2018 06:07)  HR: 95 (15 Feliciano 2018 06:07) (94 - 115)  BP: 148/87 (15 Feliciano 2018 06:07) (116/64 - 148/87)  BP(mean): --  RR: 18 (15 Feliciano 2018 06:07) (16 - 19)  SpO2: 97% (15 Feliciano 2018 06:07) (97% - 100%)    I&O's Detail    14 Jun 2018 07:01  -  15 Feliciano 2018 06:51  --------------------------------------------------------  IN:    lactated ringers.: 250 mL  Total IN: 250 mL    OUT:    Voided: 400 mL  Total OUT: 400 mL    Total NET: -150 mL    MEDICATIONS  (STANDING):  enoxaparin Injectable 40 milliGRAM(s) SubCutaneous daily  insulin lispro (HumaLOG) corrective regimen sliding scale   SubCutaneous three times a day before meals  lactated ringers. 1000 milliLiter(s) (125 mL/Hr) IV Continuous <Continuous>  metoprolol tartrate Injectable 5 milliGRAM(s) IV Push every 6 hours  pantoprazole  Injectable 40 milliGRAM(s) IV Push daily    MEDICATIONS  (PRN):                   11.8   5.35  )-----------( 219      ( 15 Feliciano 2018 05:54 )             37.4     06-14    142  |  100  |  23  ----------------------------<  172<H>  4.3   |  26  |  1.01    Ca    10.2      14 Jun 2018 17:30    TPro  8.3  /  Alb  5.1<H>  /  TBili  0.8  /  DBili  x   /  AST  19  /  ALT  28  /  AlkPhos  41  06-14    Physical Exam:  Gen: Laying in bed, NAD, alert and oriented, NGT in  Resp: Unlabored breathing  Abd: softly distended, minimally tender in RLQ    Lines:   IV: patent, in place.     < from: CT Abdomen and Pelvis w/ Oral Cont (06.14.18 @ 20:18) >  EXAM:  CT ABDOMEN AND PELVIS OC        PROCEDURE DATE:  Jun 14 2018         INTERPRETATION:  CLINICAL INFORMATION: Multiple abdominal surgeries.   Nausea and vomiting. Abdominal pain.    COMPARISON: None.    PROCEDURE:   CT of the Abdomen and Pelvis was performed without intravenous contrast.   Intravenous contrast: None.  Oral contrast: positive contrast was administered.  Sagittal and coronal reformats were performed.    FINDINGS:    LOWER CHEST: Bilateral gynecomastia.    LIVER: Hepatic steatosis.  BILE DUCTS: Normal caliber.  GALLBLADDER: Cholelithiasis.  SPLEEN: Within normal limits.  PANCREAS: Within normal limits.  ADRENALS: Within normal limits.  KIDNEYS/URETERS: Within normal limits.    BLADDER: Within normal limits.  REPRODUCTIVE ORGANS: The prostate is within normal limits.    BOWEL: Nasogastric tube with tip in the stomach. Dilated small bowel,   similar to appearance at multiple prior imaging studies. A transition   point in the right lower quadrant associated with a large ventral hernia   consistent with small bowel obstruction. Rectosigmoid anastomosis.  PERITONEUM: No ascites.  VESSELS:  Within normal limits.  RETROPERITONEUM: No lymphadenopathy.    ABDOMINAL WALL: Large ventral hernia containing small bowel.  BONES:Degenerative changes of spine.     IMPRESSION:     No small bowel obstruction secondary to a ventral hernia. Appearance is   not significantly changed from prior imaging studies. No ascites.    < end of copied text >
A Team Surgery Daily Progress Note:    S: Patient seen and examined. No acute events overnight. Pain well controlled with current regimen. Denies nausea/vomiting. Endorses passing gas. R abdominal BHUMI was d/c'ed yesterday.    O:  Vital Signs Last 24 Hrs  T(C): 36.7 (25 Jun 2018 04:41), Max: 36.8 (24 Jun 2018 17:24)  T(F): 98.1 (25 Jun 2018 04:41), Max: 98.2 (24 Jun 2018 17:24)  HR: 85 (25 Jun 2018 04:41) (85 - 101)  BP: 127/67 (25 Jun 2018 04:41) (111/68 - 140/77)  BP(mean): --  RR: 17 (25 Jun 2018 04:41) (16 - 19)  SpO2: 98% (25 Jun 2018 04:41) (96% - 99%)    I&O's Detail    24 Jun 2018 07:01  -  25 Jun 2018 07:00  --------------------------------------------------------  IN:  Total IN: 0 mL    OUT:    Bulb: 10 mL    Bulb: 42.5 mL    Voided: 2050 mL  Total OUT: 2102.5 mL    Total NET: -2102.5 mL    MEDICATIONS  (STANDING):  atorvastatin 20 milliGRAM(s) Oral at bedtime  enoxaparin Injectable 40 milliGRAM(s) SubCutaneous daily  fenofibrate Tablet 145 milliGRAM(s) Oral daily  insulin lispro (HumaLOG) corrective regimen sliding scale   SubCutaneous three times a day before meals  metoprolol succinate  milliGRAM(s) Oral daily  pantoprazole    Tablet 40 milliGRAM(s) Oral before breakfast  valsartan 80 milliGRAM(s) Oral daily    MEDICATIONS  (PRN):  acetaminophen   Tablet. 650 milliGRAM(s) Oral every 6 hours PRN Mild Pain (1 - 3)  diphenhydrAMINE   Capsule 25 milliGRAM(s) Oral every 6 hours PRN Rash and/or Itching  oxyCODONE    IR 5 milliGRAM(s) Oral once PRN Moderate Pain (4 - 6)                        10.5   8.76  )-----------( 241      ( 24 Jun 2018 06:30 )             34.7     06-24    137  |  102  |  5<L>  ----------------------------<  117<H>  3.7   |  23  |  0.75    Ca    8.7      24 Jun 2018 06:30  Phos  3.6     06-24  Mg     1.8     06-24    Physical Exam:  Gen: Laying in bed, NAD, alert and oriented.   Resp: Unlabored breathing  Abd: soft, non-tender, non-distended, rash present, incision with [place] c/d/i, L BHUMI ss    Lines:   IV: patent, in place.
A Team Surgery Daily Progress Note:    S: Patient seen and examined. No acute events overnight. Pain well controlled with current regimen. He endorses pruritus and has a rash on his abdomen and L arm that has developed in the past after surgery. He tolerated clears and is positive for flatus and negative for bowel movements.    O:  Vital Signs Last 24 Hrs  T(C): 36.4 (24 Jun 2018 01:49), Max: 36.8 (23 Jun 2018 11:25)  T(F): 97.6 (24 Jun 2018 01:49), Max: 98.3 (23 Jun 2018 11:25)  HR: 87 (24 Jun 2018 01:49) (73 - 101)  BP: 124/78 (24 Jun 2018 01:49) (124/78 - 137/70)  BP(mean): --  RR: 17 (24 Jun 2018 01:49) (16 - 18)  SpO2: 99% (24 Jun 2018 01:49) (98% - 99%)    I&O's Detail    22 Jun 2018 07:01  -  23 Jun 2018 07:00  --------------------------------------------------------  IN:    dextrose 5% + sodium chloride 0.45%.: 1900 mL  Total IN: 1900 mL    OUT:    Bulb: 22.5 mL    Bulb: 37.5 mL    Voided: 2920 mL  Total OUT: 2980 mL    Total NET: -1080 mL    23 Jun 2018 07:01  -  24 Jun 2018 02:48  --------------------------------------------------------  IN:    dextrose 5% + sodium chloride 0.45%.: 300 mL  Total IN: 300 mL    OUT:    Bulb: 30 mL    Bulb: 22.5 mL    Voided: 3275 mL  Total OUT: 3327.5 mL    Total NET: -3027.5 mL    MEDICATIONS  (STANDING):  atorvastatin 20 milliGRAM(s) Oral at bedtime  enoxaparin Injectable 40 milliGRAM(s) SubCutaneous daily  fenofibrate Tablet 145 milliGRAM(s) Oral daily  insulin lispro (HumaLOG) corrective regimen sliding scale   SubCutaneous three times a day before meals  metoprolol succinate  milliGRAM(s) Oral daily  pantoprazole    Tablet 40 milliGRAM(s) Oral before breakfast  valsartan 80 milliGRAM(s) Oral daily    MEDICATIONS  (PRN):  acetaminophen   Tablet. 650 milliGRAM(s) Oral every 6 hours PRN Mild Pain (1 - 3)  diphenhydrAMINE   Capsule 25 milliGRAM(s) Oral every 6 hours PRN Rash and/or Itching  oxyCODONE    IR 5 milliGRAM(s) Oral once PRN Moderate Pain (4 - 6)                        10.1   7.12  )-----------( 197      ( 23 Jun 2018 05:50 )             31.9   06-23    137  |  103  |  5<L>  ----------------------------<  131<H>  3.9   |  24  |  0.78    Ca    8.7      23 Jun 2018 05:50  Phos  3.5     06-23  Mg     1.8     06-23    Physical Exam:  Gen: Laying in bed, NAD, alert and oriented.   Resp: Unlabored breathing  Abd: soft, non-tender, non-distended, rash present, incision with staples and dressing c/d/i    Lines:   IV: patent, in place.
A Team Surgery Daily Progress Note:    S: Patient seen and examined. No acute events overnight. Pain well controlled with current regimen. He has passed flatus, but has not had bowel movements.    O:  Vital Signs Last 24 Hrs  T(C): 36.8 (23 Jun 2018 11:25), Max: 36.9 (22 Jun 2018 21:00)  T(F): 98.3 (23 Jun 2018 11:25), Max: 98.4 (22 Jun 2018 21:00)  HR: 88 (23 Jun 2018 11:25) (67 - 88)  BP: 137/70 (23 Jun 2018 11:25) (110/65 - 170/74)  BP(mean): --  RR: 16 (23 Jun 2018 11:25) (16 - 18)  SpO2: 98% (23 Jun 2018 11:25) (98% - 99%)    I&O's Detail    22 Jun 2018 07:01  -  23 Jun 2018 07:00  --------------------------------------------------------  IN:    dextrose 5% + sodium chloride 0.45%.: 1900 mL  Total IN: 1900 mL    OUT:    Bulb: 22.5 mL    Bulb: 37.5 mL    Voided: 2920 mL  Total OUT: 2980 mL    Total NET: -1080 mL    23 Jun 2018 07:01  -  23 Jun 2018 12:09  --------------------------------------------------------  IN:    dextrose 5% + sodium chloride 0.45%.: 300 mL  Total IN: 300 mL    OUT:    Bulb: 20 mL    Bulb: 7.5 mL  Total OUT: 27.5 mL    Total NET: 272.5 mL    MEDICATIONS  (STANDING):  dextrose 5% + sodium chloride 0.45%. 1000 milliLiter(s) (75 mL/Hr) IV Continuous <Continuous>  enoxaparin Injectable 40 milliGRAM(s) SubCutaneous daily  insulin lispro (HumaLOG) corrective regimen sliding scale   SubCutaneous three times a day before meals  magnesium sulfate  IVPB 1 Gram(s) IV Intermittent once  metoprolol tartrate Injectable 5 milliGRAM(s) IV Push every 6 hours  pantoprazole  Injectable 40 milliGRAM(s) IV Push daily    MEDICATIONS  (PRN):  benzocaine 15 mG/menthol 3.6 mG Lozenge 1 Lozenge Oral every 4 hours PRN Sore Throat  diphenhydrAMINE   Injectable 25 milliGRAM(s) IV Push every 6 hours PRN Rash and/or Itching  ondansetron Injectable 4 milliGRAM(s) IV Push every 6 hours PRN Nausea  oxyCODONE    IR 5 milliGRAM(s) Oral once PRN Moderate Pain (4 - 6)                        10.1   7.12  )-----------( 197      ( 23 Jun 2018 05:50 )             31.9     06-23    137  |  103  |  5<L>  ----------------------------<  131<H>  3.9   |  24  |  0.78    Ca    8.7      23 Jun 2018 05:50  Phos  3.5     06-23  Mg     1.8     06-23    Physical Exam:  Gen: Laying in bed, NAD, alert and oriented   Resp: Unlabored breathing  Abd: soft, NT, ND, dressings clean/dry/intact, drains serosang.    Lines:   IV: patent, in place.
A Team Surgery Post-op Check/Progress Note:    S: Patient seen and examined. He endorses feeling "sore" in his abdomen. He has been weaned off a nasal canula. He is using his PCA. He denies any nausea/vomiting/flatus/bowel movements.    O:  Vital Signs Last 24 Hrs  T(C): 36.7 (20 Jun 2018 01:00), Max: 36.9 (19 Jun 2018 11:56)  T(F): 98 (20 Jun 2018 01:00), Max: 98.4 (19 Jun 2018 11:56)  HR: 91 (20 Jun 2018 02:30) (61 - 91)  BP: 139/86 (20 Jun 2018 02:30) (122/79 - 157/72)  BP(mean): --  RR: 17 (20 Jun 2018 02:30) (12 - 23)  SpO2: 96% (20 Jun 2018 02:30) (94% - 99%)    I&O's Detail    18 Jun 2018 07:01  -  19 Jun 2018 07:00  --------------------------------------------------------  IN:    dextrose 5% + sodium chloride 0.45%.: 2750 mL    IV PiggyBack: 450 mL  Total IN: 3200 mL    OUT:    Voided: 2155 mL  Total OUT: 2155 mL    Total NET: 1045 mL    19 Jun 2018 07:01  -  20 Jun 2018 03:04  --------------------------------------------------------  IN:    dextrose 5% + sodium chloride 0.45%.: 1250 mL    IV PiggyBack: 300 mL  Total IN: 1550 mL    OUT:    Bulb: 40 mL    Bulb: 90 mL    Indwelling Catheter - Urethral: 825 mL    Voided: 1150 mL  Total OUT: 2105 mL    Total NET: -555 mL    MEDICATIONS  (STANDING):  dextrose 5% + sodium chloride 0.45%. 1000 milliLiter(s) (125 mL/Hr) IV Continuous <Continuous>  enoxaparin Injectable 40 milliGRAM(s) SubCutaneous daily  fentaNYL PCA (50 MICROgram(s)/mL) 30 milliLiter(s) PCA Continuous PCA Continuous  insulin lispro (HumaLOG) corrective regimen sliding scale   SubCutaneous every 6 hours  metoprolol tartrate Injectable 5 milliGRAM(s) IV Push every 6 hours  pantoprazole  Injectable 40 milliGRAM(s) IV Push daily    MEDICATIONS  (PRN):  benzocaine 15 mG/menthol 3.6 mG Lozenge 1 Lozenge Oral every 4 hours PRN Sore Throat  naloxone Injectable 0.1 milliGRAM(s) IV Push every 3 minutes PRN For ANY of the following changes in patient status:  A. RR LESS THAN 10 breaths per minute, B. Oxygen saturation LESS THAN 90%, C. Sedation score of 6  ondansetron Injectable 4 milliGRAM(s) IV Push every 6 hours PRN Nausea                        10.5   5.57  )-----------( 156      ( 19 Jun 2018 05:50 )             33.9     06-19    137  |  103  |  7   ----------------------------<  140<H>  3.5   |  23  |  0.76    Ca    8.6      19 Jun 2018 05:50  Phos  3.5     06-19  Mg     2.0     06-19    Physical Exam:  Gen: Laying in bed, NAD, alert and oriented, keene/PCA/NGT in place  Resp: Unlabored breathing, low 90s saturation  Abd: soft, appropriately tender, non-distended, midline incision with gauze c/d/i, JPs with serosanguinous fluid    Lines:   IV: patent, in place.
ANESTHESIA POSTOP CHECK    42y Male POSTOP DAY 1 S/P     [ X] NO APPARENT ANESTHESIA COMPLICATIONS      Comments:
Anesthesia Pain Management Service    SUBJECTIVE: Patient had facial swelling this morning- concern for allergy to fentanyl given other opioid allergies.       Pain Scale Score	At rest: ___ 	With Activity: ___ 	[X ] Refer to charted pain scores    THERAPY:    [ ] IV PCA Morphine		[ ] 5 mg/mL	[ ] 1 mg/mL  [ ] IV PCA Hydromorphone	[ ] 5 mg/mL	[ ] 1 mg/mL  [ x] IV PCA Fentanyl		[x ] 50 micrograms/mL    Demand dose __15 lockout __10_ (minutes) Continuous Rate 0___ 360mcg      MEDICATIONS  (STANDING):  acetaminophen  IVPB. 1000 milliGRAM(s) IV Intermittent once  acetaminophen  IVPB. 1000 milliGRAM(s) IV Intermittent once  dextrose 5% + sodium chloride 0.45%. 1000 milliLiter(s) (125 mL/Hr) IV Continuous <Continuous>  enoxaparin Injectable 40 milliGRAM(s) SubCutaneous daily  insulin lispro (HumaLOG) corrective regimen sliding scale   SubCutaneous every 6 hours  metoprolol tartrate Injectable 5 milliGRAM(s) IV Push every 6 hours  pantoprazole  Injectable 40 milliGRAM(s) IV Push daily  potassium chloride  10 mEq/100 mL IVPB 10 milliEquivalent(s) IV Intermittent every 1 hour    MEDICATIONS  (PRN):  benzocaine 15 mG/menthol 3.6 mG Lozenge 1 Lozenge Oral every 4 hours PRN Sore Throat  diphenhydrAMINE   Injectable 25 milliGRAM(s) IV Push every 6 hours PRN Rash and/or Itching  ondansetron Injectable 4 milliGRAM(s) IV Push every 6 hours PRN Nausea  oxyCODONE    IR 5 milliGRAM(s) Oral once PRN Moderate Pain (4 - 6)      OBJECTIVE:    Sedation Score:	[ X] Alert	[ ] Drowsy 	[ ] Arousable	[ ] Asleep	[ ] Unresponsive    Side Effects:	[X ] None	[ ] Nausea	[ ] Vomiting	[ ] Pruritus  		[ ] Other:    Vital Signs Last 24 Hrs  T(C): 36.7 (22 Jun 2018 10:50), Max: 37.1 (21 Jun 2018 17:59)  T(F): 98 (22 Jun 2018 10:50), Max: 98.8 (21 Jun 2018 17:59)  HR: 99 (22 Jun 2018 10:50) (89 - 104)  BP: 132/74 (22 Jun 2018 10:50) (110/54 - 132/74)  BP(mean): --  RR: 16 (22 Jun 2018 10:50) (1 - 20)  SpO2: 98% (22 Jun 2018 10:50) (94% - 100%)    ASSESSMENT/ PLAN    Therapy to  be:	[ ] Continue   [ X] Discontinued   [X ] Change to prn Analgesics    Documentation and Verification of current medications:   [X] Done	[ ] Not done, not elligible    Comments: Would continue non-opioids at this point, may consider gabapentin if needed
Doing well, NG out, No flatus, Wound clean , has abdominal rash, no cellulitis. To start sips of H20. Dheld
Doing well, VSS, No fever, rash stable , no evidence of infection. Passing flatus. Wound clean, To advance to LRD. Dheld
VSS, minimal flatus. PE remains distended. labs ok. Discussion regarding surgery surgery is indicated at this time to repair hernia and lyse adhesioins. Patient will think about it and discuss with familoy. Corina
Day ___ of Anesthesia Pain Management Service    SUBJECTIVE:    Pain Scale Score	At rest: ___ 	With Activity: ___ 	[x ] Refer to charted pain scores    THERAPY:    [ ] IV PCA Morphine		[ ] 5 mg/mL	[ ] 1 mg/mL  [ ] IV PCA Hydromorphone	[ ] 5 mg/mL	[ ] 1 mg/mL  [c ] IV PCA Fentanyl		[ ] 50 micrograms/mL    Demand dose 15___ lockout 10___ (minutes) Continuous Rate _0__ Total: _315mcg__  Daily      MEDICATIONS  (STANDING):  dextrose 5% + sodium chloride 0.45%. 1000 milliLiter(s) (125 mL/Hr) IV Continuous <Continuous>  enoxaparin Injectable 40 milliGRAM(s) SubCutaneous daily  fentaNYL PCA (50 MICROgram(s)/mL) 30 milliLiter(s) PCA Continuous PCA Continuous  insulin lispro (HumaLOG) corrective regimen sliding scale   SubCutaneous every 6 hours  metoprolol tartrate Injectable 5 milliGRAM(s) IV Push every 6 hours  pantoprazole  Injectable 40 milliGRAM(s) IV Push daily    MEDICATIONS  (PRN):  benzocaine 15 mG/menthol 3.6 mG Lozenge 1 Lozenge Oral every 4 hours PRN Sore Throat  naloxone Injectable 0.1 milliGRAM(s) IV Push every 3 minutes PRN For ANY of the following changes in patient status:  A. RR LESS THAN 10 breaths per minute, B. Oxygen saturation LESS THAN 90%, C. Sedation score of 6  ondansetron Injectable 4 milliGRAM(s) IV Push every 6 hours PRN Nausea      OBJECTIVE:    Sedation Score:	[x ] Alert	[ ] Drowsy 	[ ] Arousable	[ ] Asleep	[ ] Unresponsive    Side Effects:	[x ] None	[ ] Nausea	[ ] Vomiting	[ ] Pruritus  		[ ] Other:    Vital Signs Last 24 Hrs  T(C): 36.9 (20 Jun 2018 11:20), Max: 36.9 (20 Jun 2018 06:00)  T(F): 98.5 (20 Jun 2018 11:20), Max: 98.5 (20 Jun 2018 11:20)  HR: 88 (20 Jun 2018 11:20) (67 - 97)  BP: 137/87 (20 Jun 2018 11:20) (121/71 - 157/72)  BP(mean): --  RR: 16 (20 Jun 2018 11:20) (12 - 23)  SpO2: 100% (20 Jun 2018 11:20) (94% - 100%)    ASSESSMENT/ PLAN    Therapy to  be:	x[ ] Continue   [ ] Discontinued   [ ] Change to prn Analgesics    Documentation and Verification of current medications:   [X] Done	[ ] Not done, not elligible    Comments:

## 2018-06-25 NOTE — DISCHARGE NOTE ADULT - MEDICATION SUMMARY - MEDICATIONS TO TAKE
I will START or STAY ON the medications listed below when I get home from the hospital:    acetaminophen 325 mg oral tablet  -- 2 tab(s) by mouth every 6 hours, As needed, Mild Pain (1 - 3)  -- Indication: For Pain control    Diovan 80 mg oral tablet  -- 1 tab(s) by mouth once a day  -- hold for low blood pressure  -- Indication: For htn    glimepiride 4 mg oral tablet  -- 1 tab(s) by mouth once a day  -- Indication: For dm    glimepiride 1 mg oral tablet  -- 1 tab(s) by mouth once a day  -- Indication: For dm    Farxiga 5 mg oral tablet  -- 1 tab(s) by mouth once a day  -- Indication: For dm    diphenhydrAMINE 25 mg oral capsule  -- 1 cap(s) by mouth every 6 hours, As needed, Rash and/or Itching  -- Indication: For rash treatment    Crestor 5 mg oral tablet  -- 1 tab(s) by mouth once a day (at bedtime)  -- Indication: For hld    TriCor 145 mg oral tablet  -- 1 tab(s) by mouth once a day  -- Indication: For hld    Toprol- mg oral tablet, extended release  -- 1 tab(s) by mouth once a day  -- Indication: For htn    pantoprazole 40 mg oral delayed release tablet  -- 1 tab(s) by mouth once a day (before a meal)  -- Indication: For gerd

## 2018-06-25 NOTE — DISCHARGE NOTE ADULT - CARE PLAN
Principal Discharge DX:	SBO (small bowel obstruction)  Goal:	You went to the OR for a hernia repair and lysis of adhesions  Assessment and plan of treatment:	Follow up with Dr. White in one week, call for an appointment. Please follow up with your primary care physician regarding your hospitalization. Do not drive while taking pain medications

## 2018-06-25 NOTE — PROGRESS NOTE ADULT - PROVIDER SPECIALTY LIST ADULT
Anesthesia
Pain Medicine
Pain Medicine
Surgery
Pain Medicine

## 2018-07-02 ENCOUNTER — APPOINTMENT (OUTPATIENT)
Dept: SURGERY | Facility: CLINIC | Age: 43
End: 2018-07-02
Payer: COMMERCIAL

## 2018-07-02 VITALS
SYSTOLIC BLOOD PRESSURE: 128 MMHG | BODY MASS INDEX: 33.18 KG/M2 | HEIGHT: 72 IN | TEMPERATURE: 98.8 F | WEIGHT: 245 LBS | DIASTOLIC BLOOD PRESSURE: 80 MMHG | HEART RATE: 93 BPM

## 2018-07-02 DIAGNOSIS — I82.401 ACUTE EMBOLISM AND THROMBOSIS OF UNSPECIFIED DEEP VEINS OF RIGHT LOWER EXTREMITY: ICD-10-CM

## 2018-07-02 PROCEDURE — 99024 POSTOP FOLLOW-UP VISIT: CPT

## 2018-07-11 ENCOUNTER — OUTPATIENT (OUTPATIENT)
Dept: OUTPATIENT SERVICES | Facility: HOSPITAL | Age: 43
LOS: 1 days | End: 2018-07-11
Payer: COMMERCIAL

## 2018-07-11 ENCOUNTER — APPOINTMENT (OUTPATIENT)
Dept: SURGERY | Facility: CLINIC | Age: 43
End: 2018-07-11
Payer: COMMERCIAL

## 2018-07-11 VITALS
DIASTOLIC BLOOD PRESSURE: 83 MMHG | HEART RATE: 108 BPM | BODY MASS INDEX: 33.18 KG/M2 | SYSTOLIC BLOOD PRESSURE: 127 MMHG | TEMPERATURE: 98.6 F | WEIGHT: 245 LBS | HEIGHT: 72 IN

## 2018-07-11 VITALS
HEIGHT: 71 IN | TEMPERATURE: 98 F | HEART RATE: 109 BPM | SYSTOLIC BLOOD PRESSURE: 118 MMHG | OXYGEN SATURATION: 99 % | WEIGHT: 259.93 LBS | DIASTOLIC BLOOD PRESSURE: 70 MMHG | RESPIRATION RATE: 16 BRPM

## 2018-07-11 DIAGNOSIS — E11.9 TYPE 2 DIABETES MELLITUS WITHOUT COMPLICATIONS: ICD-10-CM

## 2018-07-11 DIAGNOSIS — Z98.89 OTHER SPECIFIED POSTPROCEDURAL STATES: Chronic | ICD-10-CM

## 2018-07-11 DIAGNOSIS — S31.109A UNSPECIFIED OPEN WOUND OF ABDOMINAL WALL, UNSPECIFIED QUADRANT WITHOUT PENETRATION INTO PERITONEAL CAVITY, INITIAL ENCOUNTER: ICD-10-CM

## 2018-07-11 DIAGNOSIS — L08.9 OTHER INJURY OF UNSPECIFIED BODY REGION, INITIAL ENCOUNTER: ICD-10-CM

## 2018-07-11 DIAGNOSIS — S31.1 OPEN WOUND OF ABDOMINAL WALL WITHOUT PENETRATION INTO PERITONEAL CAVITY: ICD-10-CM

## 2018-07-11 DIAGNOSIS — Z98.890 OTHER SPECIFIED POSTPROCEDURAL STATES: Chronic | ICD-10-CM

## 2018-07-11 DIAGNOSIS — K21.9 GASTRO-ESOPHAGEAL REFLUX DISEASE WITHOUT ESOPHAGITIS: ICD-10-CM

## 2018-07-11 DIAGNOSIS — I10 ESSENTIAL (PRIMARY) HYPERTENSION: ICD-10-CM

## 2018-07-11 DIAGNOSIS — T14.8XXA OTHER INJURY OF UNSPECIFIED BODY REGION, INITIAL ENCOUNTER: ICD-10-CM

## 2018-07-11 LAB
BUN SERPL-MCNC: 18 MG/DL — SIGNIFICANT CHANGE UP (ref 7–23)
CALCIUM SERPL-MCNC: 9.7 MG/DL — SIGNIFICANT CHANGE UP (ref 8.4–10.5)
CHLORIDE SERPL-SCNC: 100 MMOL/L — SIGNIFICANT CHANGE UP (ref 98–107)
CO2 SERPL-SCNC: 25 MMOL/L — SIGNIFICANT CHANGE UP (ref 22–31)
CREAT SERPL-MCNC: 0.98 MG/DL — SIGNIFICANT CHANGE UP (ref 0.5–1.3)
GLUCOSE SERPL-MCNC: 189 MG/DL — HIGH (ref 70–99)
HBA1C BLD-MCNC: 7.1 % — HIGH (ref 4–5.6)
HCT VFR BLD CALC: 32.5 % — LOW (ref 39–50)
HGB BLD-MCNC: 10.1 G/DL — LOW (ref 13–17)
MCHC RBC-ENTMCNC: 25.9 PG — LOW (ref 27–34)
MCHC RBC-ENTMCNC: 31.1 % — LOW (ref 32–36)
MCV RBC AUTO: 83.3 FL — SIGNIFICANT CHANGE UP (ref 80–100)
NRBC # FLD: 0 — SIGNIFICANT CHANGE UP
PLATELET # BLD AUTO: 373 K/UL — SIGNIFICANT CHANGE UP (ref 150–400)
PMV BLD: 10.8 FL — SIGNIFICANT CHANGE UP (ref 7–13)
POTASSIUM SERPL-MCNC: 4 MMOL/L — SIGNIFICANT CHANGE UP (ref 3.5–5.3)
POTASSIUM SERPL-SCNC: 4 MMOL/L — SIGNIFICANT CHANGE UP (ref 3.5–5.3)
RBC # BLD: 3.9 M/UL — LOW (ref 4.2–5.8)
RBC # FLD: 15.2 % — HIGH (ref 10.3–14.5)
SODIUM SERPL-SCNC: 138 MMOL/L — SIGNIFICANT CHANGE UP (ref 135–145)
WBC # BLD: 12.06 K/UL — HIGH (ref 3.8–10.5)
WBC # FLD AUTO: 12.06 K/UL — HIGH (ref 3.8–10.5)

## 2018-07-11 PROCEDURE — 93010 ELECTROCARDIOGRAM REPORT: CPT

## 2018-07-11 PROCEDURE — 99024 POSTOP FOLLOW-UP VISIT: CPT

## 2018-07-11 RX ORDER — GLIMEPIRIDE 1 MG
1 TABLET ORAL
Qty: 0 | Refills: 0 | COMMUNITY

## 2018-07-11 RX ORDER — ROSUVASTATIN CALCIUM 5 MG/1
1 TABLET ORAL
Qty: 0 | Refills: 0 | COMMUNITY

## 2018-07-11 RX ORDER — AMOXICILLIN AND CLAVULANATE POTASSIUM 875; 125 MG/1; MG/1
875-125 TABLET, COATED ORAL
Qty: 20 | Refills: 0 | Status: ACTIVE | COMMUNITY
Start: 2018-07-11 | End: 1900-01-01

## 2018-07-11 NOTE — H&P PST ADULT - PMH
Diabetes mellitus, type 2    Diverticulitis    DVT (deep venous thrombosis)  RLE 2016, post op, was on eliquis for 6 months  Hyperlipidemia    Hypertension    Open wound of abdominal wall    SBO (small bowel obstruction) Diabetes mellitus, type 2    Diverticulitis    DVT (deep venous thrombosis)  RLE 2016, post op, was on eliquis for 6 months  GERD (gastroesophageal reflux disease)    Hyperlipidemia    Hypertension    Open wound of abdominal wall    SBO (small bowel obstruction)

## 2018-07-11 NOTE — H&P PST ADULT - GASTROINTESTINAL COMMENTS
healing midline abdominal incision, purulent drainage form umbilicus, slightly tender on palpation s/p xlap, ventral hernia repair

## 2018-07-11 NOTE — H&P PST ADULT - NEGATIVE CARDIOVASCULAR SYMPTOMS
no paroxysmal nocturnal dyspnea/no chest pain/no dyspnea on exertion/no orthopnea/no peripheral edema/no claudication/no palpitations

## 2018-07-11 NOTE — H&P PST ADULT - PROBLEM SELECTOR PLAN 2
pt instructed to hold farxiga starting now. Pt instructed to hold metformin 7/12 and 7/13.  Pt instructed to hold glimepiride 7/12 PM and 7/13.  OR booking notified of pt's DM status via fax.

## 2018-07-11 NOTE — H&P PST ADULT - PROBLEM SELECTOR PLAN 3
Pt instructed to take toprol and diovan AM of surgery with a sip of water.   Pt met STOP BANG score for GUSTAVO precaution. OR booking notified via fax.

## 2018-07-11 NOTE — H&P PST ADULT - NEGATIVE ENMT SYMPTOMS
no nasal congestion/no tinnitus/no dysphagia/no vertigo/no sinus symptoms/no hearing difficulty/no ear pain

## 2018-07-11 NOTE — H&P PST ADULT - NEGATIVE NEUROLOGICAL SYMPTOMS
no headache/no generalized seizures/no difficulty walking/no transient paralysis/no weakness/no paresthesias/no vertigo/no loss of sensation

## 2018-07-11 NOTE — H&P PST ADULT - HISTORY OF PRESENT ILLNESS
41 yo male with PMH hypertension, DM2, hyperlipidemia, perforated diverticulitis s/p Buchanan's procedure and reversal, multiple SBO s/p multiple abdominal surgeries presents to pre surgical testing. Pt reports he was admitted for [facility] for SOB 6/2018, s/p xlap, ventral hernia repair on 6/19/2018.  Pt reports he continues to have purulent drainage from umbilicus.  Pt was seen by surgeon, was advised to have wound debridement.  Pt was seen by surgeon this morning, was prescribed Augmentin Pt is scheduled for debridement of abdominal wound on 7/13/18. 41 yo male with PMH hypertension, DM2, hyperlipidemia, perforated diverticulitis s/p Buchanan's procedure and reversal, multiple SBO s/p multiple abdominal surgeries presents to pre surgical testing. Pt reports he was admitted for [facility] for SOB 6/2018, s/p xlap, ventral hernia repair on 6/19/2018.  Pt reports he continues to have purulent drainage from umbilicus.  Pt was seen by surgeon, was advised to have wound debridement.  Pt was seen by surgeon this morning, was prescribed Augmentin. Pt is scheduled for debridement of abdominal wound on 7/13/18.

## 2018-07-11 NOTE — H&P PST ADULT - PROBLEM SELECTOR PLAN 1
Pt is scheduled for debridement of abdominal wound on 7/13/18.   Pre op instructions given and pt able to verbalize understanding.   Case discussed with Dr. Robles, due to tachycardiac and multiple medical issues, pt advised to obtain medical eval preop, pt aware and states he will go to PCPs office today.  Dr. White notified via email. Pt is scheduled for debridement of abdominal wound on 7/13/18.   Pre op instructions given and pt able to verbalize understanding.   Case discussed with Dr. Robles, due to tachycardiac and multiple medical issues, pt advised to obtain medical eval preop, pt aware and states he will go to PCPs office today.  Dr. White notified via email.  OR booking notified of pt's contrast allergy via fax.

## 2018-07-11 NOTE — H&P PST ADULT - CONSTITUTIONAL DETAILS
well-groomed/no distress/well-nourished/well-developed well-developed/well-nourished/well-groomed/obese

## 2018-07-11 NOTE — H&P PST ADULT - NEGATIVE MUSCULOSKELETAL SYMPTOMS
no leg pain R/no stiffness/no myalgia/no muscle cramps/no neck pain/no arthralgia/no arm pain L/no arm pain R/no joint swelling/no muscle weakness/no back pain/no leg pain L

## 2018-07-11 NOTE — H&P PST ADULT - PSH
H/O exploratory laparotomy  LINDA, x3  H/O hernia repair  xlap, ventral hernia repair 6/2018  S/P Isi's procedure  2009 with reversal 2009

## 2018-07-11 NOTE — H&P PST ADULT - MUSCULOSKELETAL
details… detailed exam no joint swelling/no joint erythema/no calf tenderness/normal strength/ROM intact/no joint warmth

## 2018-07-12 ENCOUNTER — TRANSCRIPTION ENCOUNTER (OUTPATIENT)
Age: 43
End: 2018-07-12

## 2018-07-13 ENCOUNTER — APPOINTMENT (OUTPATIENT)
Dept: SURGERY | Facility: AMBULATORY SURGERY CENTER | Age: 43
End: 2018-07-13

## 2018-07-13 ENCOUNTER — OUTPATIENT (OUTPATIENT)
Dept: OUTPATIENT SERVICES | Facility: HOSPITAL | Age: 43
LOS: 1 days | Discharge: HOME | End: 2018-07-13
Payer: COMMERCIAL

## 2018-07-13 VITALS
HEART RATE: 97 BPM | OXYGEN SATURATION: 97 % | SYSTOLIC BLOOD PRESSURE: 124 MMHG | RESPIRATION RATE: 16 BRPM | WEIGHT: 259.93 LBS | DIASTOLIC BLOOD PRESSURE: 69 MMHG | TEMPERATURE: 98 F

## 2018-07-13 VITALS
SYSTOLIC BLOOD PRESSURE: 101 MMHG | OXYGEN SATURATION: 96 % | HEART RATE: 86 BPM | DIASTOLIC BLOOD PRESSURE: 59 MMHG | RESPIRATION RATE: 15 BRPM

## 2018-07-13 DIAGNOSIS — S31.609A UNSPECIFIED OPEN WOUND OF ABDOMINAL WALL, UNSPECIFIED QUADRANT WITH PENETRATION INTO PERITONEAL CAVITY, INITIAL ENCOUNTER: ICD-10-CM

## 2018-07-13 DIAGNOSIS — Z98.89 OTHER SPECIFIED POSTPROCEDURAL STATES: Chronic | ICD-10-CM

## 2018-07-13 DIAGNOSIS — Z98.890 OTHER SPECIFIED POSTPROCEDURAL STATES: Chronic | ICD-10-CM

## 2018-07-13 LAB
GLUCOSE BLDC GLUCOMTR-MCNC: 169 MG/DL — HIGH (ref 70–99)
GRAM STN WND: SIGNIFICANT CHANGE UP
SPECIMEN SOURCE: SIGNIFICANT CHANGE UP

## 2018-07-13 PROCEDURE — 97597 DBRDMT OPN WND 1ST 20 CM/<: CPT | Mod: 59

## 2018-07-13 PROCEDURE — 10061 I&D ABSCESS COMP/MULTIPLE: CPT | Mod: 78

## 2018-07-13 NOTE — BRIEF OPERATIVE NOTE - PROCEDURE
<<-----Click on this checkbox to enter Procedure Exploration and evacuation of wound  07/13/2018    Active  HAIR

## 2018-07-13 NOTE — ASU DISCHARGE PLAN (ADULT/PEDIATRIC). - CONDITIONS AT DISCHARGE
WOUND CARE:    BATHING: Please do not submerge wound underwater. You may shower and/or sponge bathe.  ACTIVITY: No heavy lifting or straining. Otherwise, you may return to your usual level of physical activity. If you are taking narcotic pain medication (such as Percocet) DO NOT drive a car, operate machinery or make important decisions.  DIET: Return to your usual diet.  NOTIFY YOUR SURGEON IF: You have any bleeding that does not stop, any pus draining from your wound(s), any fever (over 100.4 F) or chills, persistent nausea/vomiting, persistent diarrhea, or if your pain is not controlled on your discharge pain medications.  FOLLOW-UP: Please follow up with your primary care physician in one week regarding your hospitalization

## 2018-07-13 NOTE — BRIEF OPERATIVE NOTE - OPERATION/FINDINGS
contained midline seroma underneath prior incision observed and evacuated.  overlay mesh intact and appears uninfected.

## 2018-07-13 NOTE — ASU DISCHARGE PLAN (ADULT/PEDIATRIC). - NOTIFY
Fever greater than 101/Inability to Tolerate Liquids or Foods/Bleeding that does not stop Fever greater than 101/Inability to Tolerate Liquids or Foods/Pain not relieved by Medications/Persistent Nausea and Vomiting/Bleeding that does not stop

## 2018-07-13 NOTE — ASU DISCHARGE PLAN (ADULT/PEDIATRIC). - ASU FOLLOWUP
911 or go to the nearest Emergency Room [facility] ([facility]):

## 2018-07-14 LAB
CULTURE - ACID FAST SMEAR CONCENTRATED: SIGNIFICANT CHANGE UP
SPECIMEN SOURCE: SIGNIFICANT CHANGE UP

## 2018-07-16 LAB
GRAM STN WND: SIGNIFICANT CHANGE UP
METHOD TYPE: SIGNIFICANT CHANGE UP
ORGANISM # SPEC MICROSCOPIC CNT: SIGNIFICANT CHANGE UP
ORGANISM # SPEC MICROSCOPIC CNT: SIGNIFICANT CHANGE UP

## 2018-07-18 LAB
-  AMIKACIN: SIGNIFICANT CHANGE UP
-  AMPICILLIN/SULBACTAM: SIGNIFICANT CHANGE UP
-  AMPICILLIN: SIGNIFICANT CHANGE UP
-  AZTREONAM: SIGNIFICANT CHANGE UP
-  CEFAZOLIN: SIGNIFICANT CHANGE UP
-  CEFEPIME: SIGNIFICANT CHANGE UP
-  CEFOXITIN: SIGNIFICANT CHANGE UP
-  CEFTAZIDIME: SIGNIFICANT CHANGE UP
-  CEFTRIAXONE: SIGNIFICANT CHANGE UP
-  CIPROFLOXACIN: SIGNIFICANT CHANGE UP
-  ERTAPENEM: SIGNIFICANT CHANGE UP
-  GENTAMICIN: SIGNIFICANT CHANGE UP
-  IMIPENEM: SIGNIFICANT CHANGE UP
-  LEVOFLOXACIN: SIGNIFICANT CHANGE UP
-  MEROPENEM: SIGNIFICANT CHANGE UP
-  PIPERACILLIN/TAZOBACTAM: SIGNIFICANT CHANGE UP
-  TIGECYCLINE: SIGNIFICANT CHANGE UP
-  TOBRAMYCIN: SIGNIFICANT CHANGE UP
-  TRIMETHOPRIM/SULFAMETHOXAZOLE: SIGNIFICANT CHANGE UP
CULTURE - SURGICAL SITE: SIGNIFICANT CHANGE UP
SPECIMEN SOURCE: SIGNIFICANT CHANGE UP

## 2018-07-20 LAB — SPECIMEN SOURCE: SIGNIFICANT CHANGE UP

## 2018-07-23 ENCOUNTER — APPOINTMENT (OUTPATIENT)
Dept: SURGERY | Facility: CLINIC | Age: 43
End: 2018-07-23
Payer: COMMERCIAL

## 2018-07-23 VITALS
HEIGHT: 72 IN | DIASTOLIC BLOOD PRESSURE: 80 MMHG | BODY MASS INDEX: 33.18 KG/M2 | HEART RATE: 103 BPM | SYSTOLIC BLOOD PRESSURE: 126 MMHG | WEIGHT: 245 LBS | TEMPERATURE: 98.3 F

## 2018-07-23 DIAGNOSIS — Z86.79 PERSONAL HISTORY OF OTHER DISEASES OF THE CIRCULATORY SYSTEM: ICD-10-CM

## 2018-07-23 DIAGNOSIS — S31.109A UNSPECIFIED OPEN WOUND OF ABDOMINAL WALL, UNSPECIFIED QUADRANT WITHOUT PENETRATION INTO PERITONEAL CAVITY, INITIAL ENCOUNTER: ICD-10-CM

## 2018-07-23 DIAGNOSIS — Z86.39 PERSONAL HISTORY OF OTHER ENDOCRINE, NUTRITIONAL AND METABOLIC DISEASE: ICD-10-CM

## 2018-07-23 PROBLEM — K21.9 GASTRO-ESOPHAGEAL REFLUX DISEASE WITHOUT ESOPHAGITIS: Chronic | Status: ACTIVE | Noted: 2018-07-11

## 2018-07-23 PROCEDURE — 99024 POSTOP FOLLOW-UP VISIT: CPT

## 2018-08-06 ENCOUNTER — APPOINTMENT (OUTPATIENT)
Dept: SURGERY | Facility: CLINIC | Age: 43
End: 2018-08-06
Payer: COMMERCIAL

## 2018-08-06 VITALS
TEMPERATURE: 98.4 F | HEIGHT: 72 IN | BODY MASS INDEX: 33.18 KG/M2 | SYSTOLIC BLOOD PRESSURE: 134 MMHG | WEIGHT: 245 LBS | HEART RATE: 98 BPM | DIASTOLIC BLOOD PRESSURE: 83 MMHG

## 2018-08-06 PROCEDURE — 97605 NEG PRS WND THER DME<=50SQCM: CPT

## 2018-08-06 PROCEDURE — 99024 POSTOP FOLLOW-UP VISIT: CPT

## 2018-08-14 LAB — FUNGUS SPEC QL CULT: SIGNIFICANT CHANGE UP

## 2018-08-20 ENCOUNTER — APPOINTMENT (OUTPATIENT)
Dept: SURGERY | Facility: CLINIC | Age: 43
End: 2018-08-20
Payer: COMMERCIAL

## 2018-08-20 VITALS
HEART RATE: 91 BPM | SYSTOLIC BLOOD PRESSURE: 115 MMHG | WEIGHT: 245 LBS | DIASTOLIC BLOOD PRESSURE: 76 MMHG | BODY MASS INDEX: 33.18 KG/M2 | HEIGHT: 72 IN | TEMPERATURE: 98 F

## 2018-08-20 PROCEDURE — 99024 POSTOP FOLLOW-UP VISIT: CPT

## 2018-08-20 PROCEDURE — 97605 NEG PRS WND THER DME<=50SQCM: CPT

## 2018-08-24 LAB — ACID FAST STN SPEC: SIGNIFICANT CHANGE UP

## 2018-09-05 ENCOUNTER — APPOINTMENT (OUTPATIENT)
Dept: SURGERY | Facility: CLINIC | Age: 43
End: 2018-09-05
Payer: COMMERCIAL

## 2018-09-05 VITALS
SYSTOLIC BLOOD PRESSURE: 127 MMHG | HEART RATE: 91 BPM | DIASTOLIC BLOOD PRESSURE: 74 MMHG | HEIGHT: 72 IN | TEMPERATURE: 98.1 F | BODY MASS INDEX: 33.18 KG/M2 | WEIGHT: 245 LBS

## 2018-09-05 PROCEDURE — 99024 POSTOP FOLLOW-UP VISIT: CPT

## 2018-09-19 ENCOUNTER — APPOINTMENT (OUTPATIENT)
Dept: SURGERY | Facility: CLINIC | Age: 43
End: 2018-09-19
Payer: COMMERCIAL

## 2018-09-19 VITALS
SYSTOLIC BLOOD PRESSURE: 114 MMHG | HEIGHT: 72 IN | TEMPERATURE: 98 F | WEIGHT: 245 LBS | HEART RATE: 83 BPM | DIASTOLIC BLOOD PRESSURE: 73 MMHG | BODY MASS INDEX: 33.18 KG/M2

## 2018-09-19 DIAGNOSIS — Z09 ENCOUNTER FOR FOLLOW-UP EXAMINATION AFTER COMPLETED TREATMENT FOR CONDITIONS OTHER THAN MALIGNANT NEOPLASM: ICD-10-CM

## 2018-09-19 PROCEDURE — 99024 POSTOP FOLLOW-UP VISIT: CPT

## 2018-10-08 ENCOUNTER — APPOINTMENT (OUTPATIENT)
Dept: SURGERY | Facility: CLINIC | Age: 43
End: 2018-10-08
Payer: COMMERCIAL

## 2018-10-08 VITALS
HEIGHT: 72 IN | DIASTOLIC BLOOD PRESSURE: 80 MMHG | HEART RATE: 79 BPM | SYSTOLIC BLOOD PRESSURE: 111 MMHG | TEMPERATURE: 98.4 F | WEIGHT: 245 LBS | BODY MASS INDEX: 33.18 KG/M2

## 2018-10-08 PROCEDURE — 99024 POSTOP FOLLOW-UP VISIT: CPT

## 2018-10-15 ENCOUNTER — APPOINTMENT (OUTPATIENT)
Dept: SURGERY | Facility: CLINIC | Age: 43
End: 2018-10-15

## 2018-10-15 NOTE — DISCHARGE NOTE ADULT - CARE PROVIDERS DIRECT ADDRESSES
Dr. De Los Santos would like you to get blood work today. Occult cards given to Kelly and reviewed instructions on how to collect specimen. Kelly will return occult cards back to the clinic.     We would like to see you back in follow up for a follow up appointment with labs prior.     When you are in need of a refill, please call your pharmacy and they will send us a request.      Copy of appointments, and after visit summary (AVS) given to patient.      If you have any questions please call Chantell Cahmberlain RN, BSN Oncology Hematology  [facility] [phone]. For questions after business hours, or on holidays/weekends, please call our after hours Nurse Triage line [phone]. Thank you.   Stool, labs, UA as work up. F/u after.   
[email or web address]

## 2018-11-05 NOTE — ED ADULT TRIAGE NOTE - CHIEF COMPLAINT QUOTE
c/o abdominal pain headaches c/o abdominal distention pt history of obstruction has had two surgeries for SBO pt nauseous c/o diarrhea Complex Repair And Graft Additional Text (Will Appearing After The Standard Complex Repair Text): The complex repair was not sufficient to completely close the primary defect. The remaining additional defect was repaired with the graft mentioned below.

## 2018-12-07 ENCOUNTER — INPATIENT (INPATIENT)
Facility: HOSPITAL | Age: 43
LOS: 7 days | Discharge: HOME | End: 2018-12-15
Attending: SURGERY | Admitting: SURGERY
Payer: COMMERCIAL

## 2018-12-07 VITALS
OXYGEN SATURATION: 97 % | TEMPERATURE: 98 F | SYSTOLIC BLOOD PRESSURE: 125 MMHG | HEART RATE: 108 BPM | DIASTOLIC BLOOD PRESSURE: 75 MMHG | RESPIRATION RATE: 16 BRPM

## 2018-12-07 DIAGNOSIS — Z98.89 OTHER SPECIFIED POSTPROCEDURAL STATES: Chronic | ICD-10-CM

## 2018-12-07 DIAGNOSIS — Z98.890 OTHER SPECIFIED POSTPROCEDURAL STATES: Chronic | ICD-10-CM

## 2018-12-07 LAB
APTT BLD: 27.1 SEC — LOW (ref 27.5–36.3)
BASE EXCESS BLDV CALC-SCNC: 0.7 MMOL/L — SIGNIFICANT CHANGE UP
BASOPHILS # BLD AUTO: 0.01 K/UL — SIGNIFICANT CHANGE UP (ref 0–0.2)
BASOPHILS NFR BLD AUTO: 0.1 % — SIGNIFICANT CHANGE UP (ref 0–2)
BLOOD GAS VENOUS - CREATININE: 0.91 MG/DL — SIGNIFICANT CHANGE UP (ref 0.5–1.3)
CHLORIDE BLDV-SCNC: 108 MMOL/L — SIGNIFICANT CHANGE UP (ref 96–108)
EOSINOPHIL # BLD AUTO: 0.06 K/UL — SIGNIFICANT CHANGE UP (ref 0–0.5)
EOSINOPHIL NFR BLD AUTO: 0.6 % — SIGNIFICANT CHANGE UP (ref 0–6)
GAS PNL BLDV: 139 MMOL/L — SIGNIFICANT CHANGE UP (ref 136–146)
GLUCOSE BLDV-MCNC: 194 — HIGH (ref 70–99)
HCO3 BLDV-SCNC: 25 MMOL/L — SIGNIFICANT CHANGE UP (ref 20–27)
HCT VFR BLD CALC: 43.1 % — SIGNIFICANT CHANGE UP (ref 39–50)
HCT VFR BLDV CALC: 40.1 % — SIGNIFICANT CHANGE UP (ref 39–51)
HGB BLD-MCNC: 13.1 G/DL — SIGNIFICANT CHANGE UP (ref 13–17)
HGB BLDV-MCNC: 13 G/DL — SIGNIFICANT CHANGE UP (ref 13–17)
IMM GRANULOCYTES # BLD AUTO: 0.02 # — SIGNIFICANT CHANGE UP
IMM GRANULOCYTES NFR BLD AUTO: 0.2 % — SIGNIFICANT CHANGE UP (ref 0–1.5)
INR BLD: 1.02 — SIGNIFICANT CHANGE UP (ref 0.88–1.17)
LACTATE BLDV-MCNC: 1.6 MMOL/L — SIGNIFICANT CHANGE UP (ref 0.5–2)
LYMPHOCYTES # BLD AUTO: 1.07 K/UL — SIGNIFICANT CHANGE UP (ref 1–3.3)
LYMPHOCYTES # BLD AUTO: 11.4 % — LOW (ref 13–44)
MCHC RBC-ENTMCNC: 25.7 PG — LOW (ref 27–34)
MCHC RBC-ENTMCNC: 30.4 % — LOW (ref 32–36)
MCV RBC AUTO: 84.7 FL — SIGNIFICANT CHANGE UP (ref 80–100)
MONOCYTES # BLD AUTO: 0.71 K/UL — SIGNIFICANT CHANGE UP (ref 0–0.9)
MONOCYTES NFR BLD AUTO: 7.6 % — SIGNIFICANT CHANGE UP (ref 2–14)
NEUTROPHILS # BLD AUTO: 7.49 K/UL — HIGH (ref 1.8–7.4)
NEUTROPHILS NFR BLD AUTO: 80.1 % — HIGH (ref 43–77)
NRBC # FLD: 0 — SIGNIFICANT CHANGE UP
PCO2 BLDV: 42 MMHG — SIGNIFICANT CHANGE UP (ref 41–51)
PH BLDV: 7.4 PH — SIGNIFICANT CHANGE UP (ref 7.32–7.43)
PLATELET # BLD AUTO: 271 K/UL — SIGNIFICANT CHANGE UP (ref 150–400)
PMV BLD: 10.8 FL — SIGNIFICANT CHANGE UP (ref 7–13)
PO2 BLDV: 54 MMHG — HIGH (ref 35–40)
POTASSIUM BLDV-SCNC: 3.8 MMOL/L — SIGNIFICANT CHANGE UP (ref 3.4–4.5)
PROTHROM AB SERPL-ACNC: 11.3 SEC — SIGNIFICANT CHANGE UP (ref 9.8–13.1)
RBC # BLD: 5.09 M/UL — SIGNIFICANT CHANGE UP (ref 4.2–5.8)
RBC # FLD: 16.6 % — HIGH (ref 10.3–14.5)
SAO2 % BLDV: 86 % — HIGH (ref 60–85)
WBC # BLD: 9.36 K/UL — SIGNIFICANT CHANGE UP (ref 3.8–10.5)
WBC # FLD AUTO: 9.36 K/UL — SIGNIFICANT CHANGE UP (ref 3.8–10.5)

## 2018-12-07 RX ORDER — ACETAMINOPHEN 500 MG
1000 TABLET ORAL ONCE
Qty: 0 | Refills: 0 | Status: COMPLETED | OUTPATIENT
Start: 2018-12-07 | End: 2018-12-07

## 2018-12-07 RX ORDER — ONDANSETRON 8 MG/1
4 TABLET, FILM COATED ORAL ONCE
Qty: 0 | Refills: 0 | Status: COMPLETED | OUTPATIENT
Start: 2018-12-07 | End: 2018-12-07

## 2018-12-07 RX ORDER — SODIUM CHLORIDE 9 MG/ML
1000 INJECTION, SOLUTION INTRAVENOUS ONCE
Qty: 0 | Refills: 0 | Status: COMPLETED | OUTPATIENT
Start: 2018-12-07 | End: 2018-12-07

## 2018-12-07 RX ADMIN — ONDANSETRON 4 MILLIGRAM(S): 8 TABLET, FILM COATED ORAL at 23:15

## 2018-12-07 RX ADMIN — SODIUM CHLORIDE 1333.33 MILLILITER(S): 9 INJECTION, SOLUTION INTRAVENOUS at 23:15

## 2018-12-07 RX ADMIN — Medication 400 MILLIGRAM(S): at 23:15

## 2018-12-07 RX ADMIN — SODIUM CHLORIDE 1000 MILLILITER(S): 9 INJECTION, SOLUTION INTRAVENOUS at 23:53

## 2018-12-07 RX ADMIN — Medication 1000 MILLIGRAM(S): at 23:35

## 2018-12-07 RX ADMIN — Medication 1000 MILLIGRAM(S): at 23:30

## 2018-12-07 NOTE — ED ADULT NURSE NOTE - OBJECTIVE STATEMENT
Pt rcvd to 19 c/o upper abdominal pain, w/ nausea, no vomiting, denies bowel or bladder symptoms, denies fevers/chills/travel or sick contacts.  Pt aaox4, vitally stable, appearing in no acute distress. Pt reports hx of perforated diverticulitis w/ multiple SBOs, last 4 months ago surgery complicated by infection and requiring wound vac.  Other PMHx DM2 on oral medications.  Unable to obtain peripheral IV access . ED MD Gatito Bettencourt aware, will await USGIV placement, labs, meds and CT.  Pt provided po contrast by ED MD. Will CTM.

## 2018-12-07 NOTE — ED ADULT TRIAGE NOTE - CHIEF COMPLAINT QUOTE
pt arrives w/ c/o abdominal pain, nausea, dizziness. pt states he has hx of hernia surgery in the summer and these symptoms feels similar to previous hernias. pt denies any diarrhea states having normal stools. pt appears comfortable and in NAD.

## 2018-12-07 NOTE — ED PROVIDER NOTE - PHYSICAL EXAMINATION
tara: distended abdomen, epigastric TTP, neg rebound/guarding/pelon's tara: distended abdomen, epigastric TTP, neg rebound/guarding/pelon's    Gen: NAD, non-toxic, conversational  Eyes: PERRL, EOMI   HENT: Normocephalic, atraumatic. External ears normal, no rhinorrhea, dry mucous membranes.   CV: RRR, no M/R/G  Resp: CTAB, non-labored, speaking without difficulty on room air  Abd: soft, +bowel sounds, moderately tender diffusely across the mid abdomen, non rigid but distended, no guarding or rebound tenderness  Back: No CVAT bilaterally, no midline ttp  Skin: dry, wwp   Neuro: AOx3, speech is fluent and appropriate  Psych: Mood frustrated, affect euthymic

## 2018-12-07 NOTE — ED PROVIDER NOTE - PROGRESS NOTE DETAILS
Barium solution given to the patient, will administer second bottle at 12:00AM so patient can go for test at 12:30AM. JESUS San PGY2 surgery consulted, was aware patient would be coming to ed, will see patient. JESUS San PGY2

## 2018-12-07 NOTE — ED PROVIDER NOTE - OBJECTIVE STATEMENT
42 y/o M w/ hx mmp pw AP.  Pt w/ upper AP today, associated w/ nausea/-vomit.  + flatus, + BM today.  Denies fever, CP, SOB, cough,  Feels like his abdomen is distended.  hx of multiple revisions, follows with Dr. White.

## 2018-12-07 NOTE — ED ADULT NURSE NOTE - ED STAT RN HANDOFF DETAILS
Rpt to 891B SOSA Machado - pt vitally stable, in no acute distress, brought by EDT in wheelchair to admission bed.  Surg team aware pt to  go to admit bed w/o ngt and will place in [facility].

## 2018-12-07 NOTE — ED PROVIDER NOTE - ATTENDING CONTRIBUTION TO CARE
I, Sagar Medreos MD, personally saw the patient with the resident, and completed the key components of the history and physical exam. I then discussed the management plan with the resident.    see chart

## 2018-12-07 NOTE — ED PROVIDER NOTE - PMH
Diabetes mellitus, type 2    Diverticulitis    DVT (deep venous thrombosis)  RLE 2016, post op, was on eliquis for 6 months  GERD (gastroesophageal reflux disease)    Hyperlipidemia    Hypertension    Open wound of abdominal wall    SBO (small bowel obstruction)

## 2018-12-08 DIAGNOSIS — K56.690 OTHER PARTIAL INTESTINAL OBSTRUCTION: ICD-10-CM

## 2018-12-08 LAB
ALBUMIN SERPL ELPH-MCNC: 4.9 G/DL — SIGNIFICANT CHANGE UP (ref 3.3–5)
ALP SERPL-CCNC: 39 U/L — LOW (ref 40–120)
ALT FLD-CCNC: 26 U/L — SIGNIFICANT CHANGE UP (ref 4–41)
APTT BLD: 28.2 SEC — SIGNIFICANT CHANGE UP (ref 27.5–36.3)
AST SERPL-CCNC: 16 U/L — SIGNIFICANT CHANGE UP (ref 4–40)
BILIRUB SERPL-MCNC: 0.7 MG/DL — SIGNIFICANT CHANGE UP (ref 0.2–1.2)
BLD GP AB SCN SERPL QL: NEGATIVE — SIGNIFICANT CHANGE UP
BUN SERPL-MCNC: 25 MG/DL — HIGH (ref 7–23)
BUN SERPL-MCNC: 27 MG/DL — HIGH (ref 7–23)
CALCIUM SERPL-MCNC: 10 MG/DL — SIGNIFICANT CHANGE UP (ref 8.4–10.5)
CALCIUM SERPL-MCNC: 9.7 MG/DL — SIGNIFICANT CHANGE UP (ref 8.4–10.5)
CHLORIDE SERPL-SCNC: 100 MMOL/L — SIGNIFICANT CHANGE UP (ref 98–107)
CHLORIDE SERPL-SCNC: 101 MMOL/L — SIGNIFICANT CHANGE UP (ref 98–107)
CO2 SERPL-SCNC: 22 MMOL/L — SIGNIFICANT CHANGE UP (ref 22–31)
CO2 SERPL-SCNC: 23 MMOL/L — SIGNIFICANT CHANGE UP (ref 22–31)
CREAT SERPL-MCNC: 0.93 MG/DL — SIGNIFICANT CHANGE UP (ref 0.5–1.3)
CREAT SERPL-MCNC: 1.02 MG/DL — SIGNIFICANT CHANGE UP (ref 0.5–1.3)
GLUCOSE BLDC GLUCOMTR-MCNC: 142 MG/DL — HIGH (ref 70–99)
GLUCOSE BLDC GLUCOMTR-MCNC: 159 MG/DL — HIGH (ref 70–99)
GLUCOSE SERPL-MCNC: 164 MG/DL — HIGH (ref 70–99)
GLUCOSE SERPL-MCNC: 185 MG/DL — HIGH (ref 70–99)
HCT VFR BLD CALC: 41.4 % — SIGNIFICANT CHANGE UP (ref 39–50)
HGB BLD-MCNC: 12.8 G/DL — LOW (ref 13–17)
INR BLD: 1 — SIGNIFICANT CHANGE UP (ref 0.88–1.17)
LIDOCAIN IGE QN: 14.9 U/L — SIGNIFICANT CHANGE UP (ref 7–60)
MAGNESIUM SERPL-MCNC: 2.1 MG/DL — SIGNIFICANT CHANGE UP (ref 1.6–2.6)
MAGNESIUM SERPL-MCNC: 2.1 MG/DL — SIGNIFICANT CHANGE UP (ref 1.6–2.6)
MCHC RBC-ENTMCNC: 26 PG — LOW (ref 27–34)
MCHC RBC-ENTMCNC: 30.9 % — LOW (ref 32–36)
MCV RBC AUTO: 84.1 FL — SIGNIFICANT CHANGE UP (ref 80–100)
NRBC # FLD: 0 — SIGNIFICANT CHANGE UP
PHOSPHATE SERPL-MCNC: 3.8 MG/DL — SIGNIFICANT CHANGE UP (ref 2.5–4.5)
PHOSPHATE SERPL-MCNC: 4.2 MG/DL — SIGNIFICANT CHANGE UP (ref 2.5–4.5)
PLATELET # BLD AUTO: 253 K/UL — SIGNIFICANT CHANGE UP (ref 150–400)
PMV BLD: 11.1 FL — SIGNIFICANT CHANGE UP (ref 7–13)
POTASSIUM SERPL-MCNC: 3.7 MMOL/L — SIGNIFICANT CHANGE UP (ref 3.5–5.3)
POTASSIUM SERPL-MCNC: 3.9 MMOL/L — SIGNIFICANT CHANGE UP (ref 3.5–5.3)
POTASSIUM SERPL-SCNC: 3.7 MMOL/L — SIGNIFICANT CHANGE UP (ref 3.5–5.3)
POTASSIUM SERPL-SCNC: 3.9 MMOL/L — SIGNIFICANT CHANGE UP (ref 3.5–5.3)
PROT SERPL-MCNC: 8.2 G/DL — SIGNIFICANT CHANGE UP (ref 6–8.3)
PROTHROM AB SERPL-ACNC: 11.4 SEC — SIGNIFICANT CHANGE UP (ref 9.8–13.1)
RBC # BLD: 4.92 M/UL — SIGNIFICANT CHANGE UP (ref 4.2–5.8)
RBC # FLD: 16.8 % — HIGH (ref 10.3–14.5)
RH IG SCN BLD-IMP: POSITIVE — SIGNIFICANT CHANGE UP
SODIUM SERPL-SCNC: 140 MMOL/L — SIGNIFICANT CHANGE UP (ref 135–145)
SODIUM SERPL-SCNC: 141 MMOL/L — SIGNIFICANT CHANGE UP (ref 135–145)
WBC # BLD: 5.21 K/UL — SIGNIFICANT CHANGE UP (ref 3.8–10.5)
WBC # FLD AUTO: 5.21 K/UL — SIGNIFICANT CHANGE UP (ref 3.8–10.5)

## 2018-12-08 PROCEDURE — 71045 X-RAY EXAM CHEST 1 VIEW: CPT | Mod: 26

## 2018-12-08 PROCEDURE — 74176 CT ABD & PELVIS W/O CONTRAST: CPT | Mod: 26

## 2018-12-08 PROCEDURE — 99222 1ST HOSP IP/OBS MODERATE 55: CPT | Mod: GC

## 2018-12-08 RX ORDER — DEXTROSE 50 % IN WATER 50 %
25 SYRINGE (ML) INTRAVENOUS ONCE
Qty: 0 | Refills: 0 | Status: DISCONTINUED | OUTPATIENT
Start: 2018-12-08 | End: 2018-12-08

## 2018-12-08 RX ORDER — ACETAMINOPHEN 500 MG
1000 TABLET ORAL ONCE
Qty: 0 | Refills: 0 | Status: COMPLETED | OUTPATIENT
Start: 2018-12-08 | End: 2018-12-08

## 2018-12-08 RX ORDER — INFLUENZA VIRUS VACCINE 15; 15; 15; 15 UG/.5ML; UG/.5ML; UG/.5ML; UG/.5ML
0.5 SUSPENSION INTRAMUSCULAR ONCE
Qty: 0 | Refills: 0 | Status: COMPLETED | OUTPATIENT
Start: 2018-12-08 | End: 2018-12-08

## 2018-12-08 RX ORDER — METOPROLOL TARTRATE 50 MG
5 TABLET ORAL EVERY 6 HOURS
Qty: 0 | Refills: 0 | Status: DISCONTINUED | OUTPATIENT
Start: 2018-12-08 | End: 2018-12-10

## 2018-12-08 RX ORDER — BENZOCAINE AND MENTHOL 5; 1 G/100ML; G/100ML
1 LIQUID ORAL
Qty: 0 | Refills: 0 | Status: DISCONTINUED | OUTPATIENT
Start: 2018-12-08 | End: 2018-12-15

## 2018-12-08 RX ORDER — DEXTROSE 50 % IN WATER 50 %
15 SYRINGE (ML) INTRAVENOUS ONCE
Qty: 0 | Refills: 0 | Status: DISCONTINUED | OUTPATIENT
Start: 2018-12-08 | End: 2018-12-08

## 2018-12-08 RX ORDER — BENZOCAINE AND MENTHOL 5; 1 G/100ML; G/100ML
1 LIQUID ORAL
Qty: 0 | Refills: 0 | Status: DISCONTINUED | OUTPATIENT
Start: 2018-12-08 | End: 2018-12-08

## 2018-12-08 RX ORDER — PANTOPRAZOLE SODIUM 20 MG/1
40 TABLET, DELAYED RELEASE ORAL DAILY
Qty: 0 | Refills: 0 | Status: DISCONTINUED | OUTPATIENT
Start: 2018-12-08 | End: 2018-12-15

## 2018-12-08 RX ORDER — ONDANSETRON 8 MG/1
4 TABLET, FILM COATED ORAL ONCE
Qty: 0 | Refills: 0 | Status: COMPLETED | OUTPATIENT
Start: 2018-12-08 | End: 2018-12-08

## 2018-12-08 RX ORDER — SODIUM CHLORIDE 9 MG/ML
1000 INJECTION, SOLUTION INTRAVENOUS
Qty: 0 | Refills: 0 | Status: DISCONTINUED | OUTPATIENT
Start: 2018-12-08 | End: 2018-12-11

## 2018-12-08 RX ORDER — INSULIN LISPRO 100/ML
VIAL (ML) SUBCUTANEOUS EVERY 6 HOURS
Qty: 0 | Refills: 0 | Status: DISCONTINUED | OUTPATIENT
Start: 2018-12-08 | End: 2018-12-09

## 2018-12-08 RX ORDER — ENOXAPARIN SODIUM 100 MG/ML
40 INJECTION SUBCUTANEOUS DAILY
Qty: 0 | Refills: 0 | Status: DISCONTINUED | OUTPATIENT
Start: 2018-12-08 | End: 2018-12-15

## 2018-12-08 RX ORDER — SODIUM CHLORIDE 9 MG/ML
1000 INJECTION, SOLUTION INTRAVENOUS
Qty: 0 | Refills: 0 | Status: DISCONTINUED | OUTPATIENT
Start: 2018-12-08 | End: 2018-12-08

## 2018-12-08 RX ORDER — POTASSIUM CHLORIDE 20 MEQ
10 PACKET (EA) ORAL
Qty: 0 | Refills: 0 | Status: COMPLETED | OUTPATIENT
Start: 2018-12-08 | End: 2018-12-08

## 2018-12-08 RX ORDER — GLUCAGON INJECTION, SOLUTION 0.5 MG/.1ML
1 INJECTION, SOLUTION SUBCUTANEOUS ONCE
Qty: 0 | Refills: 0 | Status: DISCONTINUED | OUTPATIENT
Start: 2018-12-08 | End: 2018-12-08

## 2018-12-08 RX ORDER — ACETAMINOPHEN 500 MG
1000 TABLET ORAL ONCE
Qty: 0 | Refills: 0 | Status: COMPLETED | OUTPATIENT
Start: 2018-12-08 | End: 2018-12-09

## 2018-12-08 RX ORDER — DEXTROSE 50 % IN WATER 50 %
12.5 SYRINGE (ML) INTRAVENOUS ONCE
Qty: 0 | Refills: 0 | Status: DISCONTINUED | OUTPATIENT
Start: 2018-12-08 | End: 2018-12-08

## 2018-12-08 RX ADMIN — PANTOPRAZOLE SODIUM 40 MILLIGRAM(S): 20 TABLET, DELAYED RELEASE ORAL at 12:14

## 2018-12-08 RX ADMIN — Medication 100 MILLIEQUIVALENT(S): at 13:56

## 2018-12-08 RX ADMIN — SODIUM CHLORIDE 125 MILLILITER(S): 9 INJECTION, SOLUTION INTRAVENOUS at 12:14

## 2018-12-08 RX ADMIN — Medication 400 MILLIGRAM(S): at 06:47

## 2018-12-08 RX ADMIN — Medication 1000 MILLIGRAM(S): at 17:16

## 2018-12-08 RX ADMIN — Medication 100 MILLIEQUIVALENT(S): at 12:13

## 2018-12-08 RX ADMIN — Medication 1000 MILLIGRAM(S): at 07:17

## 2018-12-08 RX ADMIN — Medication 1: at 13:58

## 2018-12-08 RX ADMIN — Medication 400 MILLIGRAM(S): at 16:46

## 2018-12-08 RX ADMIN — ONDANSETRON 4 MILLIGRAM(S): 8 TABLET, FILM COATED ORAL at 02:06

## 2018-12-08 RX ADMIN — Medication 1: at 06:48

## 2018-12-08 RX ADMIN — ENOXAPARIN SODIUM 40 MILLIGRAM(S): 100 INJECTION SUBCUTANEOUS at 13:56

## 2018-12-08 RX ADMIN — Medication 5 MILLIGRAM(S): at 13:55

## 2018-12-08 NOTE — PATIENT PROFILE ADULT - OVER THE PAST TWO WEEKS HAVE YOU FELT DOWN, DEPRESSED OR HOPELESS?
[de-identified] : 76 year old  male patient with history of stable Essential Hypertension, Type 2 Diabetes Mellitus with hyperglycemia, CAD, MR, Hypercholesterolemia Vitamin D Deficiency, history as stated, presented for follow up examination of Elevated BP checked. Patient is compliant with all medications. Denies shortness of breath, chest pain or abdominal pains at this time. ROS as stated.\par  no

## 2018-12-08 NOTE — H&P ADULT - NSHPPHYSICALEXAM_GEN_ALL_CORE
Gen: Laying in bed, in NAD  HEENT: Normocephalic, atraumatic, MMM  Resp: Nonlabored  Abd: Obese. Soft. Well healed midline incision. TTP in epigastric area. No rebound or gaurding  Ext: NO C/C/E

## 2018-12-08 NOTE — H&P ADULT - HISTORY OF PRESENT ILLNESS
43M PMH GERD, DM II, HLD, HTN, perforated sigmoid diverticulitis s/p Hartmanns (2009), s/p reversal, POC c/b multiple SBOs requiring ex-lap with LINDA (2014, 2016),s/p ex-lap, ventral hernia repair, LINDA (6/2018), POC c/b wound abscess s/p I&D (7/2018), presenting to [facility] ED with abdominal pain, N/V. Patient states that he started to develop some abdominal discomfort this yesterday morning, which worsened throughout the day and became severe in the evening. Pt spoke to Dr. White who encouraged pt to present to ED for further evaluation. Patient has had nausea, no emesis. Denies fevers, CP, SOB.     Upon arrival to [facility] ED, AVSS. WBC 9.36. Lactate 1.6. CT abd/pelvis with PO contrast which showed multiple dilated small bowel loops with a transition point in the anterior abdominal wall consistent with small bowel obstruction. Upon placement of NGT, 1700cc non-bilious content evacuated from stomach.

## 2018-12-08 NOTE — H&P ADULT - ASSESSMENT
43M PMH GERD, DM II, HLD, HTN, perforated sigmoid diverticulitis s/p Hartmanns (2009), s/p reversal, POC c/b multiple SBOs requiring ex-lap with LINDA (2014, 2016),s/p ex-lap, ventral hernia repair, LINDA (6/2018), POC c/b wound abscess s/p I&D (7/2018), presenting to [facility] ED with abdominal pain, revealed on imaging to have SBO    - Admit to A team surgery  - NPO/IVF  - NGT to LCWS, monitor output  - No standing pain medication   - Serial abdominal exams  - Monitor bowel function  - Discussed with attending Dr. Christopher Mcdonough PGY2  A team Surgery  [id number]

## 2018-12-08 NOTE — ED ADULT NURSE REASSESSMENT NOTE - NS ED NURSE REASSESS COMMENT FT1
Pt in no acute distress at this time, Surg c/s @ bedside.  Pt has no active vomiting, but endorses nausea.  Vitally stable.  Plans for NGT for SBO.  Pt to be brought into rm 25 in prep.  Will CTM.

## 2018-12-08 NOTE — H&P ADULT - NSHPLABSRESULTS_GEN_ALL_CORE
Complete Blood Count + Automated Diff (12.07.18 @ 23:08)    Nucleated RBC #: 0    WBC Count: 9.36 K/uL    RBC Count: 5.09 M/uL    Hemoglobin: 13.1: Delta: 10.1 on 07/11/  Delta: 10.1 on 07/11/ g/dL    Hematocrit: 43.1 %    Mean Cell Volume: 84.7 fL    Mean Cell Hemoglobin: 25.7 pg    Mean Cell Hemoglobin Conc: 30.4 %    Red Cell Distrib Width: 16.6 %    Platelet Count - Automated: 271 K/uL    Comprehensive Metabolic, Mg + Phosphorus (12.07.18 @ 23:08)    Phosphorus Level, Serum: 4.2 mg/dL    eGFR if : 102 mL/min    eGFR if Non : 88: The units for eGFR are ml/min/1.73m2     Sodium, Serum: 140 mmol/L    Potassium, Serum: 3.9 mmol/L    Chloride, Serum: 101 mmol/L    Carbon Dioxide, Serum: 22 mmol/L    Blood Urea Nitrogen, Serum: 27 mg/dL    Creatinine, Serum: 1.02 mg/dL    Glucose, Serum: 185 mg/dL    Calcium, Total Serum: 10.0 mg/dL    Protein Total, Serum: 8.2 g/dL    Albumin, Serum: 4.9 g/dL    Bilirubin Total, Serum: 0.7 mg/dL    Alkaline Phosphatase, Serum: 39 u/L    Aspartate Aminotransferase (AST/SGOT): 16 u/L    Alanine Aminotransferase (ALT/SGPT): 26 u/L    Magnesium, Serum: 2.1 mg/dL      CT Abdomen and Pelvis w/ Oral Cont (12.08.18 @ 01:25)    LOWER CHEST: Within normal limits.    LIVER: Within normal limits.  BILE DUCTS: Normal caliber.  GALLBLADDER: Within normal limits.  SPLEEN: Within normal limits.  PANCREAS: Within normal limits.  ADRENALS: Within normal limits.  KIDNEYS/URETERS: Within normal limits.    BLADDER: Within normal limits.  REPRODUCTIVE ORGANS: Prostate within normal limits.    BOWEL: Multiple dilated loops of small bowel are identified with a   transition point in the anterior abdominal wall consistent with a small   bowel obstruction. Rectosigmoid anastomosis. Appendix is normal.  PERITONEUM: No ascites.  VESSELS:  Within normal limits.  RETROPERITONEUM: No lymphadenopathy.    ABDOMINAL WALL: Anterior abdominal wall postsurgical changes.  BONES: Degenerative changes of the spine.    IMPRESSION:     Multiple dilated small bowel loops with a transition point in the   anterior abdominal wall consistent with small bowel obstruction.

## 2018-12-09 LAB
BUN SERPL-MCNC: 24 MG/DL — HIGH (ref 7–23)
CALCIUM SERPL-MCNC: 9.1 MG/DL — SIGNIFICANT CHANGE UP (ref 8.4–10.5)
CHLORIDE SERPL-SCNC: 104 MMOL/L — SIGNIFICANT CHANGE UP (ref 98–107)
CO2 SERPL-SCNC: 25 MMOL/L — SIGNIFICANT CHANGE UP (ref 22–31)
CREAT SERPL-MCNC: 0.93 MG/DL — SIGNIFICANT CHANGE UP (ref 0.5–1.3)
GLUCOSE BLDC GLUCOMTR-MCNC: 132 MG/DL — HIGH (ref 70–99)
GLUCOSE BLDC GLUCOMTR-MCNC: 141 MG/DL — HIGH (ref 70–99)
GLUCOSE BLDC GLUCOMTR-MCNC: 148 MG/DL — HIGH (ref 70–99)
GLUCOSE BLDC GLUCOMTR-MCNC: 155 MG/DL — HIGH (ref 70–99)
GLUCOSE BLDC GLUCOMTR-MCNC: 162 MG/DL — HIGH (ref 70–99)
GLUCOSE SERPL-MCNC: 126 MG/DL — HIGH (ref 70–99)
HBA1C BLD-MCNC: 7.1 % — HIGH (ref 4–5.6)
HCT VFR BLD CALC: 38.4 % — LOW (ref 39–50)
HGB BLD-MCNC: 11.7 G/DL — LOW (ref 13–17)
INR BLD: 1.06 — SIGNIFICANT CHANGE UP (ref 0.88–1.17)
MAGNESIUM SERPL-MCNC: 2.1 MG/DL — SIGNIFICANT CHANGE UP (ref 1.6–2.6)
MCHC RBC-ENTMCNC: 25.9 PG — LOW (ref 27–34)
MCHC RBC-ENTMCNC: 30.5 % — LOW (ref 32–36)
MCV RBC AUTO: 85.1 FL — SIGNIFICANT CHANGE UP (ref 80–100)
NRBC # FLD: 0 — SIGNIFICANT CHANGE UP
PHOSPHATE SERPL-MCNC: 2.7 MG/DL — SIGNIFICANT CHANGE UP (ref 2.5–4.5)
PLATELET # BLD AUTO: 216 K/UL — SIGNIFICANT CHANGE UP (ref 150–400)
PMV BLD: 11.2 FL — SIGNIFICANT CHANGE UP (ref 7–13)
POTASSIUM SERPL-MCNC: 3.8 MMOL/L — SIGNIFICANT CHANGE UP (ref 3.5–5.3)
POTASSIUM SERPL-SCNC: 3.8 MMOL/L — SIGNIFICANT CHANGE UP (ref 3.5–5.3)
PROTHROM AB SERPL-ACNC: 11.8 SEC — SIGNIFICANT CHANGE UP (ref 9.8–13.1)
RBC # BLD: 4.51 M/UL — SIGNIFICANT CHANGE UP (ref 4.2–5.8)
RBC # FLD: 16.9 % — HIGH (ref 10.3–14.5)
SODIUM SERPL-SCNC: 142 MMOL/L — SIGNIFICANT CHANGE UP (ref 135–145)
WBC # BLD: 4.7 K/UL — SIGNIFICANT CHANGE UP (ref 3.8–10.5)
WBC # FLD AUTO: 4.7 K/UL — SIGNIFICANT CHANGE UP (ref 3.8–10.5)

## 2018-12-09 PROCEDURE — 99232 SBSQ HOSP IP/OBS MODERATE 35: CPT | Mod: GC

## 2018-12-09 RX ORDER — ONDANSETRON 8 MG/1
4 TABLET, FILM COATED ORAL ONCE
Qty: 0 | Refills: 0 | Status: COMPLETED | OUTPATIENT
Start: 2018-12-09 | End: 2018-12-09

## 2018-12-09 RX ORDER — POTASSIUM PHOSPHATE, MONOBASIC POTASSIUM PHOSPHATE, DIBASIC 236; 224 MG/ML; MG/ML
15 INJECTION, SOLUTION INTRAVENOUS ONCE
Qty: 0 | Refills: 0 | Status: COMPLETED | OUTPATIENT
Start: 2018-12-09 | End: 2018-12-09

## 2018-12-09 RX ORDER — INSULIN LISPRO 100/ML
VIAL (ML) SUBCUTANEOUS EVERY 6 HOURS
Qty: 0 | Refills: 0 | Status: DISCONTINUED | OUTPATIENT
Start: 2018-12-09 | End: 2018-12-10

## 2018-12-09 RX ORDER — INSULIN LISPRO 100/ML
VIAL (ML) SUBCUTANEOUS
Qty: 0 | Refills: 0 | Status: DISCONTINUED | OUTPATIENT
Start: 2018-12-09 | End: 2018-12-09

## 2018-12-09 RX ADMIN — SODIUM CHLORIDE 125 MILLILITER(S): 9 INJECTION, SOLUTION INTRAVENOUS at 09:42

## 2018-12-09 RX ADMIN — Medication 1: at 00:06

## 2018-12-09 RX ADMIN — ONDANSETRON 4 MILLIGRAM(S): 8 TABLET, FILM COATED ORAL at 20:53

## 2018-12-09 RX ADMIN — Medication 400 MILLIGRAM(S): at 00:06

## 2018-12-09 RX ADMIN — POTASSIUM PHOSPHATE, MONOBASIC POTASSIUM PHOSPHATE, DIBASIC 62.5 MILLIMOLE(S): 236; 224 INJECTION, SOLUTION INTRAVENOUS at 12:35

## 2018-12-09 RX ADMIN — ENOXAPARIN SODIUM 40 MILLIGRAM(S): 100 INJECTION SUBCUTANEOUS at 12:34

## 2018-12-09 RX ADMIN — PANTOPRAZOLE SODIUM 40 MILLIGRAM(S): 20 TABLET, DELAYED RELEASE ORAL at 12:34

## 2018-12-09 RX ADMIN — ONDANSETRON 4 MILLIGRAM(S): 8 TABLET, FILM COATED ORAL at 16:30

## 2018-12-09 RX ADMIN — Medication 5 MILLIGRAM(S): at 18:30

## 2018-12-09 RX ADMIN — Medication 1: at 18:30

## 2018-12-09 RX ADMIN — Medication 5 MILLIGRAM(S): at 12:34

## 2018-12-09 RX ADMIN — SODIUM CHLORIDE 63 MILLILITER(S): 9 INJECTION, SOLUTION INTRAVENOUS at 12:35

## 2018-12-09 RX ADMIN — Medication 1000 MILLIGRAM(S): at 00:36

## 2018-12-09 RX ADMIN — Medication 5 MILLIGRAM(S): at 06:05

## 2018-12-09 RX ADMIN — Medication 5 MILLIGRAM(S): at 00:07

## 2018-12-09 RX ADMIN — BENZOCAINE AND MENTHOL 1 LOZENGE: 5; 1 LIQUID ORAL at 09:41

## 2018-12-10 LAB
BUN SERPL-MCNC: 21 MG/DL — SIGNIFICANT CHANGE UP (ref 7–23)
CALCIUM SERPL-MCNC: 9 MG/DL — SIGNIFICANT CHANGE UP (ref 8.4–10.5)
CHLORIDE SERPL-SCNC: 105 MMOL/L — SIGNIFICANT CHANGE UP (ref 98–107)
CO2 SERPL-SCNC: 24 MMOL/L — SIGNIFICANT CHANGE UP (ref 22–31)
CREAT SERPL-MCNC: 0.82 MG/DL — SIGNIFICANT CHANGE UP (ref 0.5–1.3)
GLUCOSE BLDC GLUCOMTR-MCNC: 110 MG/DL — HIGH (ref 70–99)
GLUCOSE BLDC GLUCOMTR-MCNC: 121 MG/DL — HIGH (ref 70–99)
GLUCOSE BLDC GLUCOMTR-MCNC: 134 MG/DL — HIGH (ref 70–99)
GLUCOSE BLDC GLUCOMTR-MCNC: 157 MG/DL — HIGH (ref 70–99)
GLUCOSE SERPL-MCNC: 119 MG/DL — HIGH (ref 70–99)
HCT VFR BLD CALC: 36.4 % — LOW (ref 39–50)
HGB BLD-MCNC: 10.9 G/DL — LOW (ref 13–17)
MAGNESIUM SERPL-MCNC: 2.1 MG/DL — SIGNIFICANT CHANGE UP (ref 1.6–2.6)
MCHC RBC-ENTMCNC: 26 PG — LOW (ref 27–34)
MCHC RBC-ENTMCNC: 29.9 % — LOW (ref 32–36)
MCV RBC AUTO: 86.9 FL — SIGNIFICANT CHANGE UP (ref 80–100)
NRBC # FLD: 0 — SIGNIFICANT CHANGE UP
PHOSPHATE SERPL-MCNC: 2.1 MG/DL — LOW (ref 2.5–4.5)
PLATELET # BLD AUTO: 199 K/UL — SIGNIFICANT CHANGE UP (ref 150–400)
PMV BLD: 10.6 FL — SIGNIFICANT CHANGE UP (ref 7–13)
POTASSIUM SERPL-MCNC: 3.6 MMOL/L — SIGNIFICANT CHANGE UP (ref 3.5–5.3)
POTASSIUM SERPL-SCNC: 3.6 MMOL/L — SIGNIFICANT CHANGE UP (ref 3.5–5.3)
RBC # BLD: 4.19 M/UL — LOW (ref 4.2–5.8)
RBC # FLD: 16.6 % — HIGH (ref 10.3–14.5)
SODIUM SERPL-SCNC: 143 MMOL/L — SIGNIFICANT CHANGE UP (ref 135–145)
WBC # BLD: 4.98 K/UL — SIGNIFICANT CHANGE UP (ref 3.8–10.5)
WBC # FLD AUTO: 4.98 K/UL — SIGNIFICANT CHANGE UP (ref 3.8–10.5)

## 2018-12-10 RX ORDER — METOPROLOL TARTRATE 50 MG
100 TABLET ORAL DAILY
Qty: 0 | Refills: 0 | Status: DISCONTINUED | OUTPATIENT
Start: 2018-12-10 | End: 2018-12-15

## 2018-12-10 RX ORDER — METOPROLOL TARTRATE 50 MG
100 TABLET ORAL DAILY
Qty: 0 | Refills: 0 | Status: DISCONTINUED | OUTPATIENT
Start: 2018-12-10 | End: 2018-12-10

## 2018-12-10 RX ORDER — ONDANSETRON 8 MG/1
4 TABLET, FILM COATED ORAL ONCE
Qty: 0 | Refills: 0 | Status: COMPLETED | OUTPATIENT
Start: 2018-12-10 | End: 2018-12-10

## 2018-12-10 RX ORDER — INSULIN LISPRO 100/ML
VIAL (ML) SUBCUTANEOUS
Qty: 0 | Refills: 0 | Status: DISCONTINUED | OUTPATIENT
Start: 2018-12-10 | End: 2018-12-11

## 2018-12-10 RX ORDER — FENOFIBRATE,MICRONIZED 130 MG
145 CAPSULE ORAL DAILY
Qty: 0 | Refills: 0 | Status: DISCONTINUED | OUTPATIENT
Start: 2018-12-10 | End: 2018-12-15

## 2018-12-10 RX ORDER — ATORVASTATIN CALCIUM 80 MG/1
20 TABLET, FILM COATED ORAL AT BEDTIME
Qty: 0 | Refills: 0 | Status: DISCONTINUED | OUTPATIENT
Start: 2018-12-10 | End: 2018-12-15

## 2018-12-10 RX ORDER — POTASSIUM PHOSPHATE, MONOBASIC POTASSIUM PHOSPHATE, DIBASIC 236; 224 MG/ML; MG/ML
15 INJECTION, SOLUTION INTRAVENOUS ONCE
Qty: 0 | Refills: 0 | Status: COMPLETED | OUTPATIENT
Start: 2018-12-10 | End: 2018-12-10

## 2018-12-10 RX ADMIN — POTASSIUM PHOSPHATE, MONOBASIC POTASSIUM PHOSPHATE, DIBASIC 62.5 MILLIMOLE(S): 236; 224 INJECTION, SOLUTION INTRAVENOUS at 12:27

## 2018-12-10 RX ADMIN — ENOXAPARIN SODIUM 40 MILLIGRAM(S): 100 INJECTION SUBCUTANEOUS at 11:55

## 2018-12-10 RX ADMIN — SODIUM CHLORIDE 50 MILLILITER(S): 9 INJECTION, SOLUTION INTRAVENOUS at 07:31

## 2018-12-10 RX ADMIN — Medication 5 MILLIGRAM(S): at 05:48

## 2018-12-10 RX ADMIN — ONDANSETRON 4 MILLIGRAM(S): 8 TABLET, FILM COATED ORAL at 15:58

## 2018-12-10 RX ADMIN — Medication 1: at 12:55

## 2018-12-10 RX ADMIN — PANTOPRAZOLE SODIUM 40 MILLIGRAM(S): 20 TABLET, DELAYED RELEASE ORAL at 11:55

## 2018-12-10 RX ADMIN — Medication 5 MILLIGRAM(S): at 00:48

## 2018-12-10 RX ADMIN — ATORVASTATIN CALCIUM 20 MILLIGRAM(S): 80 TABLET, FILM COATED ORAL at 22:31

## 2018-12-10 RX ADMIN — Medication 5 MILLIGRAM(S): at 11:57

## 2018-12-11 LAB
BASOPHILS # BLD AUTO: 0.03 K/UL — SIGNIFICANT CHANGE UP (ref 0–0.2)
BASOPHILS NFR BLD AUTO: 0.4 % — SIGNIFICANT CHANGE UP (ref 0–2)
BUN SERPL-MCNC: 19 MG/DL — SIGNIFICANT CHANGE UP (ref 7–23)
CALCIUM SERPL-MCNC: 8.9 MG/DL — SIGNIFICANT CHANGE UP (ref 8.4–10.5)
CHLORIDE SERPL-SCNC: 104 MMOL/L — SIGNIFICANT CHANGE UP (ref 98–107)
CO2 SERPL-SCNC: 24 MMOL/L — SIGNIFICANT CHANGE UP (ref 22–31)
CREAT SERPL-MCNC: 0.78 MG/DL — SIGNIFICANT CHANGE UP (ref 0.5–1.3)
EOSINOPHIL # BLD AUTO: 0.09 K/UL — SIGNIFICANT CHANGE UP (ref 0–0.5)
EOSINOPHIL NFR BLD AUTO: 1.2 % — SIGNIFICANT CHANGE UP (ref 0–6)
GLUCOSE BLDC GLUCOMTR-MCNC: 114 MG/DL — HIGH (ref 70–99)
GLUCOSE BLDC GLUCOMTR-MCNC: 114 MG/DL — HIGH (ref 70–99)
GLUCOSE BLDC GLUCOMTR-MCNC: 129 MG/DL — HIGH (ref 70–99)
GLUCOSE BLDC GLUCOMTR-MCNC: 94 MG/DL — SIGNIFICANT CHANGE UP (ref 70–99)
GLUCOSE SERPL-MCNC: 89 MG/DL — SIGNIFICANT CHANGE UP (ref 70–99)
HCT VFR BLD CALC: 37.1 % — LOW (ref 39–50)
HGB BLD-MCNC: 11.4 G/DL — LOW (ref 13–17)
IMM GRANULOCYTES # BLD AUTO: 0.1 # — SIGNIFICANT CHANGE UP
IMM GRANULOCYTES NFR BLD AUTO: 1.3 % — SIGNIFICANT CHANGE UP (ref 0–1.5)
LYMPHOCYTES # BLD AUTO: 2.14 K/UL — SIGNIFICANT CHANGE UP (ref 1–3.3)
LYMPHOCYTES # BLD AUTO: 27.6 % — SIGNIFICANT CHANGE UP (ref 13–44)
MAGNESIUM SERPL-MCNC: 2 MG/DL — SIGNIFICANT CHANGE UP (ref 1.6–2.6)
MCHC RBC-ENTMCNC: 25.5 PG — LOW (ref 27–34)
MCHC RBC-ENTMCNC: 30.7 % — LOW (ref 32–36)
MCV RBC AUTO: 83 FL — SIGNIFICANT CHANGE UP (ref 80–100)
MONOCYTES # BLD AUTO: 0.64 K/UL — SIGNIFICANT CHANGE UP (ref 0–0.9)
MONOCYTES NFR BLD AUTO: 8.3 % — SIGNIFICANT CHANGE UP (ref 2–14)
NEUTROPHILS # BLD AUTO: 4.74 K/UL — SIGNIFICANT CHANGE UP (ref 1.8–7.4)
NEUTROPHILS NFR BLD AUTO: 61.2 % — SIGNIFICANT CHANGE UP (ref 43–77)
NRBC # FLD: 0 — SIGNIFICANT CHANGE UP
PHOSPHATE SERPL-MCNC: 2.9 MG/DL — SIGNIFICANT CHANGE UP (ref 2.5–4.5)
PLATELET # BLD AUTO: 211 K/UL — SIGNIFICANT CHANGE UP (ref 150–400)
PMV BLD: 10.5 FL — SIGNIFICANT CHANGE UP (ref 7–13)
POTASSIUM SERPL-MCNC: 4 MMOL/L — SIGNIFICANT CHANGE UP (ref 3.5–5.3)
POTASSIUM SERPL-SCNC: 4 MMOL/L — SIGNIFICANT CHANGE UP (ref 3.5–5.3)
RBC # BLD: 4.47 M/UL — SIGNIFICANT CHANGE UP (ref 4.2–5.8)
RBC # FLD: 16.2 % — HIGH (ref 10.3–14.5)
SODIUM SERPL-SCNC: 140 MMOL/L — SIGNIFICANT CHANGE UP (ref 135–145)
WBC # BLD: 7.74 K/UL — SIGNIFICANT CHANGE UP (ref 3.8–10.5)
WBC # FLD AUTO: 7.74 K/UL — SIGNIFICANT CHANGE UP (ref 3.8–10.5)

## 2018-12-11 PROCEDURE — 99232 SBSQ HOSP IP/OBS MODERATE 35: CPT | Mod: GC

## 2018-12-11 PROCEDURE — 74018 RADEX ABDOMEN 1 VIEW: CPT | Mod: 26

## 2018-12-11 RX ORDER — INSULIN LISPRO 100/ML
VIAL (ML) SUBCUTANEOUS EVERY 6 HOURS
Qty: 0 | Refills: 0 | Status: DISCONTINUED | OUTPATIENT
Start: 2018-12-11 | End: 2018-12-12

## 2018-12-11 RX ORDER — SODIUM CHLORIDE 9 MG/ML
1000 INJECTION, SOLUTION INTRAVENOUS
Qty: 0 | Refills: 0 | Status: DISCONTINUED | OUTPATIENT
Start: 2018-12-11 | End: 2018-12-13

## 2018-12-11 RX ADMIN — Medication 100 MILLIGRAM(S): at 06:30

## 2018-12-11 RX ADMIN — SODIUM CHLORIDE 125 MILLILITER(S): 9 INJECTION, SOLUTION INTRAVENOUS at 12:33

## 2018-12-11 RX ADMIN — PANTOPRAZOLE SODIUM 40 MILLIGRAM(S): 20 TABLET, DELAYED RELEASE ORAL at 12:33

## 2018-12-11 RX ADMIN — Medication 145 MILLIGRAM(S): at 12:33

## 2018-12-11 RX ADMIN — ENOXAPARIN SODIUM 40 MILLIGRAM(S): 100 INJECTION SUBCUTANEOUS at 12:33

## 2018-12-11 RX ADMIN — SODIUM CHLORIDE 150 MILLILITER(S): 9 INJECTION, SOLUTION INTRAVENOUS at 21:43

## 2018-12-11 RX ADMIN — ATORVASTATIN CALCIUM 20 MILLIGRAM(S): 80 TABLET, FILM COATED ORAL at 21:43

## 2018-12-12 LAB
BUN SERPL-MCNC: 15 MG/DL — SIGNIFICANT CHANGE UP (ref 7–23)
CALCIUM SERPL-MCNC: 8.6 MG/DL — SIGNIFICANT CHANGE UP (ref 8.4–10.5)
CHLORIDE SERPL-SCNC: 106 MMOL/L — SIGNIFICANT CHANGE UP (ref 98–107)
CO2 SERPL-SCNC: 23 MMOL/L — SIGNIFICANT CHANGE UP (ref 22–31)
CREAT SERPL-MCNC: 0.78 MG/DL — SIGNIFICANT CHANGE UP (ref 0.5–1.3)
GLUCOSE BLDC GLUCOMTR-MCNC: 123 MG/DL — HIGH (ref 70–99)
GLUCOSE BLDC GLUCOMTR-MCNC: 147 MG/DL — HIGH (ref 70–99)
GLUCOSE BLDC GLUCOMTR-MCNC: 150 MG/DL — HIGH (ref 70–99)
GLUCOSE BLDC GLUCOMTR-MCNC: 155 MG/DL — HIGH (ref 70–99)
GLUCOSE SERPL-MCNC: 152 MG/DL — HIGH (ref 70–99)
HCT VFR BLD CALC: 36.5 % — LOW (ref 39–50)
HGB BLD-MCNC: 10.7 G/DL — LOW (ref 13–17)
MAGNESIUM SERPL-MCNC: 2 MG/DL — SIGNIFICANT CHANGE UP (ref 1.6–2.6)
MCHC RBC-ENTMCNC: 25.2 PG — LOW (ref 27–34)
MCHC RBC-ENTMCNC: 29.3 % — LOW (ref 32–36)
MCV RBC AUTO: 86.1 FL — SIGNIFICANT CHANGE UP (ref 80–100)
NRBC # FLD: 0 — SIGNIFICANT CHANGE UP
PHOSPHATE SERPL-MCNC: 3.5 MG/DL — SIGNIFICANT CHANGE UP (ref 2.5–4.5)
PLATELET # BLD AUTO: 173 K/UL — SIGNIFICANT CHANGE UP (ref 150–400)
PMV BLD: 11.2 FL — SIGNIFICANT CHANGE UP (ref 7–13)
POTASSIUM SERPL-MCNC: 3.6 MMOL/L — SIGNIFICANT CHANGE UP (ref 3.5–5.3)
POTASSIUM SERPL-SCNC: 3.6 MMOL/L — SIGNIFICANT CHANGE UP (ref 3.5–5.3)
RBC # BLD: 4.24 M/UL — SIGNIFICANT CHANGE UP (ref 4.2–5.8)
RBC # FLD: 16.1 % — HIGH (ref 10.3–14.5)
SODIUM SERPL-SCNC: 140 MMOL/L — SIGNIFICANT CHANGE UP (ref 135–145)
WBC # BLD: 5.88 K/UL — SIGNIFICANT CHANGE UP (ref 3.8–10.5)
WBC # FLD AUTO: 5.88 K/UL — SIGNIFICANT CHANGE UP (ref 3.8–10.5)

## 2018-12-12 RX ORDER — INSULIN LISPRO 100/ML
VIAL (ML) SUBCUTANEOUS
Qty: 0 | Refills: 0 | Status: DISCONTINUED | OUTPATIENT
Start: 2018-12-12 | End: 2018-12-12

## 2018-12-12 RX ORDER — INSULIN LISPRO 100/ML
VIAL (ML) SUBCUTANEOUS AT BEDTIME
Qty: 0 | Refills: 0 | Status: DISCONTINUED | OUTPATIENT
Start: 2018-12-12 | End: 2018-12-15

## 2018-12-12 RX ORDER — INSULIN LISPRO 100/ML
VIAL (ML) SUBCUTANEOUS
Qty: 0 | Refills: 0 | Status: DISCONTINUED | OUTPATIENT
Start: 2018-12-12 | End: 2018-12-15

## 2018-12-12 RX ORDER — POTASSIUM CHLORIDE 20 MEQ
10 PACKET (EA) ORAL
Qty: 0 | Refills: 0 | Status: COMPLETED | OUTPATIENT
Start: 2018-12-12 | End: 2018-12-12

## 2018-12-12 RX ADMIN — Medication 100 MILLIEQUIVALENT(S): at 16:01

## 2018-12-12 RX ADMIN — ENOXAPARIN SODIUM 40 MILLIGRAM(S): 100 INJECTION SUBCUTANEOUS at 12:10

## 2018-12-12 RX ADMIN — SODIUM CHLORIDE 150 MILLILITER(S): 9 INJECTION, SOLUTION INTRAVENOUS at 13:48

## 2018-12-12 RX ADMIN — Medication 100 MILLIEQUIVALENT(S): at 12:11

## 2018-12-12 RX ADMIN — Medication 1: at 12:54

## 2018-12-12 RX ADMIN — Medication 145 MILLIGRAM(S): at 12:10

## 2018-12-12 RX ADMIN — SODIUM CHLORIDE 150 MILLILITER(S): 9 INJECTION, SOLUTION INTRAVENOUS at 05:36

## 2018-12-12 RX ADMIN — Medication 100 MILLIGRAM(S): at 05:36

## 2018-12-12 RX ADMIN — ATORVASTATIN CALCIUM 20 MILLIGRAM(S): 80 TABLET, FILM COATED ORAL at 21:50

## 2018-12-12 RX ADMIN — PANTOPRAZOLE SODIUM 40 MILLIGRAM(S): 20 TABLET, DELAYED RELEASE ORAL at 12:10

## 2018-12-12 RX ADMIN — Medication 100 MILLIEQUIVALENT(S): at 13:47

## 2018-12-13 LAB
BUN SERPL-MCNC: 9 MG/DL — SIGNIFICANT CHANGE UP (ref 7–23)
CALCIUM SERPL-MCNC: 8.8 MG/DL — SIGNIFICANT CHANGE UP (ref 8.4–10.5)
CHLORIDE SERPL-SCNC: 105 MMOL/L — SIGNIFICANT CHANGE UP (ref 98–107)
CO2 SERPL-SCNC: 23 MMOL/L — SIGNIFICANT CHANGE UP (ref 22–31)
CREAT SERPL-MCNC: 0.81 MG/DL — SIGNIFICANT CHANGE UP (ref 0.5–1.3)
GLUCOSE BLDC GLUCOMTR-MCNC: 113 MG/DL — HIGH (ref 70–99)
GLUCOSE BLDC GLUCOMTR-MCNC: 147 MG/DL — HIGH (ref 70–99)
GLUCOSE BLDC GLUCOMTR-MCNC: 164 MG/DL — HIGH (ref 70–99)
GLUCOSE SERPL-MCNC: 118 MG/DL — HIGH (ref 70–99)
HCT VFR BLD CALC: 34 % — LOW (ref 39–50)
HGB BLD-MCNC: 10.2 G/DL — LOW (ref 13–17)
MAGNESIUM SERPL-MCNC: 1.9 MG/DL — SIGNIFICANT CHANGE UP (ref 1.6–2.6)
MCHC RBC-ENTMCNC: 25.6 PG — LOW (ref 27–34)
MCHC RBC-ENTMCNC: 30 % — LOW (ref 32–36)
MCV RBC AUTO: 85.4 FL — SIGNIFICANT CHANGE UP (ref 80–100)
NRBC # FLD: 0 — SIGNIFICANT CHANGE UP
PHOSPHATE SERPL-MCNC: 3.7 MG/DL — SIGNIFICANT CHANGE UP (ref 2.5–4.5)
PLATELET # BLD AUTO: 151 K/UL — SIGNIFICANT CHANGE UP (ref 150–400)
PMV BLD: 11.1 FL — SIGNIFICANT CHANGE UP (ref 7–13)
POTASSIUM SERPL-MCNC: 3.5 MMOL/L — SIGNIFICANT CHANGE UP (ref 3.5–5.3)
POTASSIUM SERPL-SCNC: 3.5 MMOL/L — SIGNIFICANT CHANGE UP (ref 3.5–5.3)
RBC # BLD: 3.98 M/UL — LOW (ref 4.2–5.8)
RBC # FLD: 15.9 % — HIGH (ref 10.3–14.5)
SODIUM SERPL-SCNC: 139 MMOL/L — SIGNIFICANT CHANGE UP (ref 135–145)
WBC # BLD: 5.8 K/UL — SIGNIFICANT CHANGE UP (ref 3.8–10.5)
WBC # FLD AUTO: 5.8 K/UL — SIGNIFICANT CHANGE UP (ref 3.8–10.5)

## 2018-12-13 PROCEDURE — 99232 SBSQ HOSP IP/OBS MODERATE 35: CPT | Mod: GC

## 2018-12-13 RX ORDER — POTASSIUM CHLORIDE 20 MEQ
20 PACKET (EA) ORAL ONCE
Qty: 0 | Refills: 0 | Status: COMPLETED | OUTPATIENT
Start: 2018-12-13 | End: 2018-12-13

## 2018-12-13 RX ADMIN — ENOXAPARIN SODIUM 40 MILLIGRAM(S): 100 INJECTION SUBCUTANEOUS at 12:26

## 2018-12-13 RX ADMIN — ATORVASTATIN CALCIUM 20 MILLIGRAM(S): 80 TABLET, FILM COATED ORAL at 21:16

## 2018-12-13 RX ADMIN — PANTOPRAZOLE SODIUM 40 MILLIGRAM(S): 20 TABLET, DELAYED RELEASE ORAL at 12:27

## 2018-12-13 RX ADMIN — Medication 20 MILLIEQUIVALENT(S): at 12:26

## 2018-12-13 RX ADMIN — Medication 100 MILLIGRAM(S): at 05:21

## 2018-12-13 RX ADMIN — Medication 145 MILLIGRAM(S): at 12:26

## 2018-12-14 ENCOUNTER — TRANSCRIPTION ENCOUNTER (OUTPATIENT)
Age: 43
End: 2018-12-14

## 2018-12-14 LAB
BUN SERPL-MCNC: 11 MG/DL — SIGNIFICANT CHANGE UP (ref 7–23)
CALCIUM SERPL-MCNC: 9.4 MG/DL — SIGNIFICANT CHANGE UP (ref 8.4–10.5)
CHLORIDE SERPL-SCNC: 104 MMOL/L — SIGNIFICANT CHANGE UP (ref 98–107)
CO2 SERPL-SCNC: 23 MMOL/L — SIGNIFICANT CHANGE UP (ref 22–31)
CREAT SERPL-MCNC: 0.91 MG/DL — SIGNIFICANT CHANGE UP (ref 0.5–1.3)
GLUCOSE BLDC GLUCOMTR-MCNC: 135 MG/DL — HIGH (ref 70–99)
GLUCOSE BLDC GLUCOMTR-MCNC: 135 MG/DL — HIGH (ref 70–99)
GLUCOSE BLDC GLUCOMTR-MCNC: 161 MG/DL — HIGH (ref 70–99)
GLUCOSE SERPL-MCNC: 129 MG/DL — HIGH (ref 70–99)
HCT VFR BLD CALC: 35.4 % — LOW (ref 39–50)
HGB BLD-MCNC: 10.9 G/DL — LOW (ref 13–17)
MAGNESIUM SERPL-MCNC: 2.1 MG/DL — SIGNIFICANT CHANGE UP (ref 1.6–2.6)
MCHC RBC-ENTMCNC: 26.1 PG — LOW (ref 27–34)
MCHC RBC-ENTMCNC: 30.8 % — LOW (ref 32–36)
MCV RBC AUTO: 84.9 FL — SIGNIFICANT CHANGE UP (ref 80–100)
NRBC # FLD: 0 — SIGNIFICANT CHANGE UP
PHOSPHATE SERPL-MCNC: 4.1 MG/DL — SIGNIFICANT CHANGE UP (ref 2.5–4.5)
PLATELET # BLD AUTO: 175 K/UL — SIGNIFICANT CHANGE UP (ref 150–400)
PMV BLD: 11.3 FL — SIGNIFICANT CHANGE UP (ref 7–13)
POTASSIUM SERPL-MCNC: 3.6 MMOL/L — SIGNIFICANT CHANGE UP (ref 3.5–5.3)
POTASSIUM SERPL-SCNC: 3.6 MMOL/L — SIGNIFICANT CHANGE UP (ref 3.5–5.3)
RBC # BLD: 4.17 M/UL — LOW (ref 4.2–5.8)
RBC # FLD: 15.9 % — HIGH (ref 10.3–14.5)
SODIUM SERPL-SCNC: 139 MMOL/L — SIGNIFICANT CHANGE UP (ref 135–145)
WBC # BLD: 6.78 K/UL — SIGNIFICANT CHANGE UP (ref 3.8–10.5)
WBC # FLD AUTO: 6.78 K/UL — SIGNIFICANT CHANGE UP (ref 3.8–10.5)

## 2018-12-14 RX ORDER — FENOFIBRATE,MICRONIZED 130 MG
145 CAPSULE ORAL
Qty: 0 | Refills: 0 | COMMUNITY

## 2018-12-14 RX ORDER — FENOFIBRATE,MICRONIZED 130 MG
1 CAPSULE ORAL
Qty: 0 | Refills: 0 | DISCHARGE
Start: 2018-12-14

## 2018-12-14 RX ORDER — ATORVASTATIN CALCIUM 80 MG/1
1 TABLET, FILM COATED ORAL
Qty: 0 | Refills: 0 | COMMUNITY
Start: 2018-12-14

## 2018-12-14 RX ADMIN — Medication 145 MILLIGRAM(S): at 12:16

## 2018-12-14 RX ADMIN — ENOXAPARIN SODIUM 40 MILLIGRAM(S): 100 INJECTION SUBCUTANEOUS at 12:16

## 2018-12-14 RX ADMIN — Medication 100 MILLIGRAM(S): at 05:35

## 2018-12-14 RX ADMIN — ATORVASTATIN CALCIUM 20 MILLIGRAM(S): 80 TABLET, FILM COATED ORAL at 21:48

## 2018-12-14 NOTE — DISCHARGE NOTE ADULT - MEDICATION SUMMARY - MEDICATIONS TO TAKE
I will START or STAY ON the medications listed below when I get home from the hospital:    losartan 50 mg oral tablet  -- 1 tab(s) by mouth once a day  -- Indication: For Hypertension    glimepiride 1 mg oral tablet  -- 1 tab(s) by mouth once a day  -- Indication: For DM    metFORMIN  -- 500 milligram(s) by mouth 3 times a day  -- Indication: For DM    Farxiga 5 mg oral tablet  -- 1 tab(s) by mouth once a day  -- Indication: For DM    fenofibrate 145 mg oral tablet  -- 1 tab(s) by mouth once a day  -- Indication: For Hyperlipidemia    Crestor 5 mg oral tablet  -- 1 tab(s) by mouth once a day (at bedtime)  -- Indication: For Hyperlipidemia    Toprol-XL  -- 100 milligram(s) by mouth once a day  -- Indication: For Hypertension    Protonix 40 mg oral delayed release tablet  -- 1 tab(s) by mouth once a day  -- Indication: For GERD (gastroesophageal reflux disease)    multivitamin  -- 1 tab(s) by mouth once a day  -- Indication: For Supplement

## 2018-12-14 NOTE — DISCHARGE NOTE ADULT - CARE PLAN
Principal Discharge DX:	SBO (small bowel obstruction)  Goal:	Return of bowel function, Toleration of diet  Assessment and plan of treatment:	Please call to make a follow-up appointment at [phone] with Dr White in one week.  Please call to make an appointment to follow up with your primary care physician regarding your recent hospitalization.  Secondary Diagnosis:	GERD (gastroesophageal reflux disease)  Goal:	Continue current medication  Secondary Diagnosis:	Hypertension  Goal:	Continue current medication  Secondary Diagnosis:	Hyperlipidemia  Goal:	Continue current medication

## 2018-12-14 NOTE — DISCHARGE NOTE ADULT - CARE PROVIDER_API CALL
Musa White), ColonRectal Surgery; Surgery  [address] [place] [address]  Phone: [phone]  Fax: [phone]

## 2018-12-14 NOTE — DISCHARGE NOTE ADULT - PATIENT PORTAL LINK FT
You can access the [facility] Patient Portal, offered by [facility], by registering with the following website: [email or web address]

## 2018-12-14 NOTE — DISCHARGE NOTE ADULT - PLAN OF CARE
Return of bowel function, Toleration of diet Please call to make a follow-up appointment at [phone] with Dr White in one week.  Please call to make an appointment to follow up with your primary care physician regarding your recent hospitalization. Continue current medication

## 2018-12-14 NOTE — DISCHARGE NOTE ADULT - NS AS ACTIVITY OBS
No Heavy lifting/straining/Showering allowed/Walking-Outdoors allowed/Stairs allowed/Walking-Indoors allowed/Bathing allowed/Do not make important decisions/Do not drive or operate machinery

## 2018-12-14 NOTE — DISCHARGE NOTE ADULT - HOSPITAL COURSE
43M PMH GERD, DM II, HLD, HTN, perforated sigmoid diverticulitis s/p Hartmanns (2009), s/p reversal, POC c/b multiple SBOs requiring ex-lap with LINDA (2014, 2016),s/p ex-lap, ventral hernia repair, LINDA (6/2018), POC c/b wound abscess s/p I&D (7/2018), presenting to [facility] ED with abdominal pain, N/V. Patient states that he started to develop some abdominal discomfort this yesterday morning, which worsened throughout the day and became severe in the evening. Pt spoke to Dr. White who encouraged pt to present to ED for further evaluation. Patient has had nausea, no emesis. Denies fevers, CP, SOB.     Upon arrival to [facility] ED, AVSS. WBC 9.36. Lactate 1.6. CT abd/pelvis with PO contrast which showed multiple dilated small bowel loops with a transition point in the anterior abdominal wall consistent with small bowel obstruction. Upon placement of NGT, 1700cc non-bilious content evacuated from stomach.     Patient had return of GI function, and NGT was removed and patient was slowly advanced from clears to regular diet.    Patient has no pain.  He is voiding and ambulating without difficulty.    Patient is stable for discharge home and will follow up with Dr White. 43M PMH GERD, DM II, HLD, HTN, perforated sigmoid diverticulitis s/p Hartmanns (2009), s/p reversal, POC c/b multiple SBOs requiring ex-lap with LINDA (2014, 2016),s/p ex-lap, ventral hernia repair, LINDA (6/2018), POC c/b wound abscess s/p I&D (7/2018), presenting to [facility] ED with abdominal pain, N/V. Patient states that he started to develop some abdominal discomfort this yesterday morning, which worsened throughout the day and became severe in the evening. Pt spoke to Dr. White who encouraged pt to present to ED for further evaluation. Patient has had nausea, no emesis. Denies fevers, CP, SOB.     Upon arrival to [facility] ED, AVSS. WBC 9.36. Lactate 1.6. CT abd/pelvis with PO contrast which showed multiple dilated small bowel loops with a transition point in the anterior abdominal wall consistent with small bowel obstruction. Upon placement of NGT, 1700cc non-bilious content evacuated from stomach.     Patient had return of GI function, and NGT was removed and patient was slowly advanced from clears to regular diet.    Patient has no pain.  He is voiding and ambulating without difficulty.    Patient is stable for discharge home and will follow up with Dr White.

## 2018-12-15 VITALS
RESPIRATION RATE: 18 BRPM | DIASTOLIC BLOOD PRESSURE: 80 MMHG | SYSTOLIC BLOOD PRESSURE: 141 MMHG | TEMPERATURE: 99 F | HEART RATE: 78 BPM | OXYGEN SATURATION: 96 %

## 2018-12-15 LAB — GLUCOSE BLDC GLUCOMTR-MCNC: 151 MG/DL — HIGH (ref 70–99)

## 2018-12-15 RX ADMIN — Medication 100 MILLIGRAM(S): at 05:29

## 2018-12-15 NOTE — PROGRESS NOTE ADULT - ASSESSMENT
43M PMH GERD, DM II, HLD, HTN, perforated sigmoid diverticulitis s/p Hartmanns (2009), s/p reversal, POC c/b multiple SBOs requiring ex-lap with LINDA (2014, 2016),s/p ex-lap, ventral hernia repair, LINDA (6/2018), POC c/b wound abscess s/p I&D (7/2018), presenting to [facility] ED with abdominal pain, revealed on imaging to have SBO. Now having bowel function.  -Abd x-ray  - Possibly Adv to CLD, later in day today  - Monitor bowel function.  -Home meds    A Surgery
43M PMH GERD, DM II, HLD, HTN, perforated sigmoid diverticulitis s/p Hartmanns (2009), s/p reversal, POC c/b multiple SBOs requiring ex-lap with LINDA (2014, 2016), s/p ex-lap, ventral hernia repair, LINDA (6/2018), POC c/b wound abscess s/p I&D (7/2018), presenting to [facility] ED with abdominal pain, revealed on imaging to have SBO. Now having bowel function.    - On diabetic reg diet, luis enrique well.  - C/w Home meds.  - Luis Enrique diet, pain controlled, d/c home today.    A Surgery
43M PMH GERD, DM II, HLD, HTN, perforated sigmoid diverticulitis s/p Hartmanns (2009), s/p reversal, POC c/b multiple SBOs requiring ex-lap with LINDA (2014, 2016), s/p ex-lap, ventral hernia repair, LINDA (6/2018), POC c/b wound abscess s/p I&D (7/2018), presenting to [facility] ED with abdominal pain, revealed on imaging to have SBO. Now having bowel function.    - On diabetic reg diet, luis enrique well.  - Monitor bowel function. May need NGT replacement if n/v.  - C/w Home meds.  - If c/w luis enrique diet, pain controlled, likely home today.    A Surgery
43M PMH GERD, DM II, HLD, HTN, perforated sigmoid diverticulitis s/p Hartmanns (2009), s/p reversal, POC c/b multiple SBOs requiring ex-lap with LINDA (2014, 2016),s/p ex-lap, ventral hernia repair, LINDA (6/2018), POC c/b wound abscess s/p I&D (7/2018), presenting to [facility] ED with abdominal pain, revealed on imaging to have SBO    - NPO/IVF  - NGT to LCWS, monitor output  - No standing pain medication   - Serial abdominal exams  - Monitor bowel function      A Surgery
43M PMH GERD, DM II, HLD, HTN, perforated sigmoid diverticulitis s/p Hartmanns (2009), s/p reversal, POC c/b multiple SBOs requiring ex-lap with LINDA (2014, 2016),s/p ex-lap, ventral hernia repair, LINDA (6/2018), POC c/b wound abscess s/p I&D (7/2018), presenting to [facility] ED with abdominal pain, revealed on imaging to have SBO. Now having bowel function.    - NPO/IVF.  - NGT clamp trial today. If passes, will d/c NGT and advance to CLD.  - Monitor bowel function.    A Surgery
43M PMH GERD, DM II, HLD, HTN, perforated sigmoid diverticulitis s/p Hartmanns (2009), s/p reversal, POC c/b multiple SBOs requiring ex-lap with LINDA (2014, 2016),s/p ex-lap, ventral hernia repair, LINDA (6/2018), POC c/b wound abscess s/p I&D (7/2018), presenting to [facility] ED with abdominal pain, revealed on imaging to have SBO. Now having bowel function.    - Adv to CLD  - Monitor bowel function.  -Home meds    A Surgery
43M PMH GERD, DM II, HLD, HTN, perforated sigmoid diverticulitis s/p Hartmanns (2009), s/p reversal, POC c/b multiple SBOs requiring ex-lap with LINDA (2014, 2016),s/p ex-lap, ventral hernia repair, LINDA (6/2018), POC c/b wound abscess s/p I&D (7/2018), presenting to [facility] ED with abdominal pain, revealed on imaging to have SBO. Now having bowel function.  - Adv to CLD,   - Monitor bowel function.  -Home meds    A Surgery
43M PMH GERD, DM II, HLD, HTN, perforated sigmoid diverticulitis s/p Hartmanns (2009), s/p reversal, POC c/b multiple SBOs requiring ex-lap with LINDA (2014, 2016),s/p ex-lap, ventral hernia repair, LINDA (6/2018), POC c/b wound abscess s/p I&D (7/2018), presenting to [facility] ED with abdominal pain, revealed on imaging to have SBO. Now having bowel function.  - Cw CLD  - Monitor bowel function. May need NGT replacement if n/v  -C/w Home meds    A Surgery

## 2018-12-15 NOTE — PROGRESS NOTE ADULT - SUBJECTIVE AND OBJECTIVE BOX
Cramps, some loose BMs and flatus. PE minimal distention. Continus liquids. Dheld
Doing well tolerating regular diet. To D/C today. [facility]
Improved, had BM. Abd soft non tender. To advance slowly. DHeld.
Recurrent obstruction CT noted , exam negative. To continue NG. Dheld
Some pain yesterday. Minimal distention. . Again passing small amounts of flatus. Continue liquids only. DHeld
Surgery Progress Note    SUBJECTIVE: Pt seen and examined at bedside. Patient comfortable and in no-apparent distress. Kept CLD yesterday, toelrating  Pain is controlled. +Gas/+BM.             Vital Signs Last 24 Hrs  T(C): 36.8 (13 Dec 2018 05:20), Max: 37 (12 Dec 2018 22:20)  T(F): 98.2 (13 Dec 2018 05:20), Max: 98.6 (12 Dec 2018 22:20)  HR: 66 (13 Dec 2018 05:20) (58 - 69)  BP: 132/82 (13 Dec 2018 05:20) (122/71 - 133/79)  BP(mean): --  RR: 16 (13 Dec 2018 05:20) (16 - 18)  SpO2: 98% (13 Dec 2018 05:20) (98% - 100%)        Physical Exam:  Gen: in NAD  Abd: obese, soft, mildly tender in epigastric area, mildly distended    LABS:                                         10.2   5.80  )-----------( 151      ( 13 Dec 2018 05:15 )             34.0     12-13    139  |  105  |  9   ----------------------------<  118<H>  3.5   |  23  |  0.81    Ca    8.8      13 Dec 2018 05:18  Phos  3.7     12-13  Mg     1.9     12-13
Surgery Progress Note    SUBJECTIVE: Pt seen and examined at bedside. Patient comfortable and in no-apparent distress. Made npo yesterday for belching and nausea. Pain is controlled. +Gas/-BM.     Vital Signs Last 24 Hrs  T(C): 36.6 (12 Dec 2018 09:46), Max: 37 (11 Dec 2018 14:15)  T(F): 97.9 (12 Dec 2018 09:46), Max: 98.6 (11 Dec 2018 14:15)  HR: 69 (12 Dec 2018 09:46) (58 - 75)  BP: 122/71 (12 Dec 2018 09:46) (122/71 - 141/87)  BP(mean): --  RR: 18 (12 Dec 2018 09:46) (17 - 18)  SpO2: 98% (12 Dec 2018 09:46) (96% - 100%)        Physical Exam:  Gen: in NAD  Abd: obese, soft, mildly tender in epigastric area, mildly distended    LABS:                                             10.7   5.88  )-----------( 173      ( 12 Dec 2018 05:49 )             36.5       12-12    140  |  106  |  15  ----------------------------<  152<H>  3.6   |  23  |  0.78    Ca    8.6      12 Dec 2018 05:45  Phos  3.5     12-12  Mg     2.0     12-12
Surgery Progress Note    SUBJECTIVE: Pt seen and examined at bedside. Patient comfortable and in no-apparent distress. No nausea, vomiting, diarrhea. Pain is controlled.     Vital Signs Last 24 Hrs  T(C): 37.1 (15 Dec 2018 10:09), Max: 37.1 (15 Dec 2018 10:09)  T(F): 98.7 (15 Dec 2018 10:09), Max: 98.7 (15 Dec 2018 10:09)  HR: 78 (15 Dec 2018 10:09) (60 - 83)  BP: 141/80 (15 Dec 2018 10:09) (128/75 - 150/87)  BP(mean): --  RR: 18 (15 Dec 2018 10:09) (16 - 18)  SpO2: 96% (15 Dec 2018 10:09) (96% - 100%)    Physical Exam:  Gen: in NAD  Abd: obese, soft, nontender, nondistended    LABS:                        10.9   6.78  )-----------( 175      ( 14 Dec 2018 06:30 )             35.4     12-14    139  |  104  |  11  ----------------------------<  129<H>  3.6   |  23  |  0.91    Ca    9.4      14 Dec 2018 06:30  Phos  4.1     12-14  Mg     2.1     12-14            INs and OUTs:    12-14-18 @ 07:01  -  12-15-18 @ 07:00  --------------------------------------------------------  IN: 1520 mL / OUT: 1900 mL / NET: -380 mL    12-15-18 @ 07:01  -  12-15-18 @ 10:33  --------------------------------------------------------  IN: 0 mL / OUT: 300 mL / NET: -300 mL
Surgery Progress Note    SUBJECTIVE: Pt seen and examined at bedside. Patient comfortable and in no-apparent distress. No nausea, vomiting, diarrhea. Pain is controlled. +Gas. +BM yesterday.    Vital Signs Last 24 Hrs  T(C): 36.7 (14 Dec 2018 05:36), Max: 36.9 (13 Dec 2018 21:15)  T(F): 98 (14 Dec 2018 05:36), Max: 98.5 (13 Dec 2018 21:15)  HR: 68 (14 Dec 2018 05:36) (67 - 74)  BP: 127/77 (14 Dec 2018 05:36) (118/72 - 135/73)  BP(mean): --  RR: 18 (14 Dec 2018 05:36) (16 - 18)  SpO2: 98% (14 Dec 2018 05:36) (98% - 100%)    Physical Exam:  Gen: in NAD  Abd: obese, soft, nontender, nondistended    LABS:                        10.9   6.78  )-----------( 175      ( 14 Dec 2018 06:30 )             35.4     12-14    139  |  104  |  11  ----------------------------<  129<H>  3.6   |  23  |  0.91    Ca    9.4      14 Dec 2018 06:30  Phos  4.1     12-14  Mg     2.1     12-14            INs and OUTs:    12-13-18 @ 07:01  -  12-14-18 @ 07:00  --------------------------------------------------------  IN: 2040 mL / OUT: 1825 mL / NET: 215 mL    12-14-18 @ 07:01  -  12-14-18 @ 10:10  --------------------------------------------------------  IN: 0 mL / OUT: 100 mL / NET: -100 mL
Surgery Progress Note    SUBJECTIVE: Pt seen and examined at bedside. Patient comfortable and in no-apparent distress. No nausea, vomiting, diarrhea. Pain is controlled. +Gas/+BM.    Vital Signs Last 24 Hrs  T(C): 37.5 (09 Dec 2018 10:09), Max: 37.5 (09 Dec 2018 10:09)  T(F): 99.5 (09 Dec 2018 10:09), Max: 99.5 (09 Dec 2018 10:09)  HR: 105 (09 Dec 2018 10:09) (92 - 105)  BP: 132/86 (09 Dec 2018 10:09) (125/79 - 143/87)  BP(mean): --  RR: 18 (09 Dec 2018 10:09) (16 - 18)  SpO2: 97% (09 Dec 2018 10:09) (95% - 100%)    Physical Exam:  Gen: in NAD, NGT in place  Abd: obese, soft, mildly tender in epigastric area, mildly distended    LABS:                        11.7   4.70  )-----------( 216      ( 09 Dec 2018 07:55 )             38.4     12-09    142  |  104  |  24<H>  ----------------------------<  126<H>  3.8   |  25  |  0.93    Ca    9.1      09 Dec 2018 06:40  Phos  2.7     12-09  Mg     2.1     12-09    TPro  8.2  /  Alb  4.9  /  TBili  0.7  /  DBili  x   /  AST  16  /  ALT  26  /  AlkPhos  39<L>  12-07    PT/INR - ( 09 Dec 2018 06:40 )   PT: 11.8 SEC;   INR: 1.06          PTT - ( 08 Dec 2018 07:20 )  PTT:28.2 SEC      INs and OUTs:    12-08-18 @ 07:01  -  12-09-18 @ 07:00  --------------------------------------------------------  IN: 3400 mL / OUT: 1500 mL / NET: 1900 mL    12-09-18 @ 07:01 - 12-09-18 @ 12:13  --------------------------------------------------------  IN: 0 mL / OUT: 500 mL / NET: -500 mL
Surgery Progress Note    SUBJECTIVE: Pt seen and examined at bedside. Patient comfortable and in no-apparent distress. No nausea, vomiting, diarrhea. Pain is controlled. +Gas/+BM. NGT d/c'd yesterday      Vital Signs Last 24 Hrs  T(C): 36.7 (10 Dec 2018 05:45), Max: 37.5 (09 Dec 2018 10:09)  T(F): 98.1 (10 Dec 2018 05:45), Max: 99.5 (09 Dec 2018 10:09)  HR: 79 (10 Dec 2018 05:57) (79 - 105)  BP: 132/80 (10 Dec 2018 05:57) (123/73 - 151/88)  BP(mean): --  RR: 16 (10 Dec 2018 05:45) (16 - 18)  SpO2: 96% (10 Dec 2018 05:45) (95% - 99%)      Physical Exam:  Gen: in NAD  Abd: obese, soft, mildly tender in epigastric area, mildly distended    LABS:                                    11.7   4.70  )-----------( 216      ( 09 Dec 2018 07:55 )             38.4     12-09    142  |  104  |  24<H>  ----------------------------<  126<H>  3.8   |  25  |  0.93    Ca    9.1      09 Dec 2018 06:40  Phos  2.7     12-09  Mg     2.1     12-09
Team A Progress Nore    C/o abd pain. No n/v since admission. Gi fxn: -/- .     Vital Signs Last 24 Hrs  T(C): 36.8 (08 Dec 2018 06:52), Max: 36.8 (08 Dec 2018 06:52)  T(F): 98.2 (08 Dec 2018 06:52), Max: 98.2 (08 Dec 2018 06:52)  HR: 92 (08 Dec 2018 06:52) (92 - 108)  BP: 143/76 (08 Dec 2018 06:52) (125/75 - 143/76)  BP(mean): --  RR: 18 (08 Dec 2018 06:52) (16 - 18)  SpO2: 96% (08 Dec 2018 06:52) (96% - 100%)        Physical Exam: Gen: Laying in bed, in NAD  	HEENT: NGT in place  	Resp: Nonlabored  	Abd: Obese. Soft. Well healed midline incision. TTP in epigastric area. No rebound or guarding  Ext: NO C/C/E                              13.1   9.36  )-----------( 271      ( 07 Dec 2018 23:08 )             43.1       12-07    140  |  101  |  27<H>  ----------------------------<  185<H>  3.9   |  22  |  1.02    Ca    10.0      07 Dec 2018 23:08  Phos  4.2     12-07  Mg     2.1     12-07    TPro  8.2  /  Alb  4.9  /  TBili  0.7  /  DBili  x   /  AST  16  /  ALT  26  /  AlkPhos  39<L>  12-07
Surgery Progress Note    SUBJECTIVE: Pt seen and examined at bedside. Patient comfortable and in no-apparent distress. Made npo for belching and nausea yesterday . Pain is controlled. +Gas/+BM.     Vital Signs Last 24 Hrs  T(C): 36.4 (11 Dec 2018 09:40), Max: 36.9 (10 Dec 2018 17:45)  T(F): 97.6 (11 Dec 2018 09:40), Max: 98.5 (10 Dec 2018 22:18)  HR: 71 (11 Dec 2018 09:40) (65 - 78)  BP: 132/68 (11 Dec 2018 09:40) (126/73 - 143/75)  BP(mean): --  RR: 18 (11 Dec 2018 09:40) (16 - 18)  SpO2: 98% (11 Dec 2018 09:40) (95% - 98%)      Physical Exam:  Gen: in NAD  Abd: obese, soft, mildly tender in epigastric area, mildly distended    LABS:                                               11.4   7.74  )-----------( 211      ( 11 Dec 2018 07:50 )             37.1     12-11    140  |  104  |  19  ----------------------------<  89  4.0   |  24  |  0.78    Ca    8.9      11 Dec 2018 05:35  Phos  2.9     12-11  Mg     2.0     12-11

## 2018-12-22 NOTE — ED SUB INTERN NOTE - PSH
In order to meet Medicare requirements, the clinical documentation must support the information cited in the admission order.  Please be sure to provide detailed and clear documentation about the following in the admitting note/history and physical:
H/O exploratory laparotomy  LINDA  S/P Isi's procedure  with reversal

## 2018-12-22 NOTE — ED PROVIDER NOTE - MEDICAL DECISION MAKING DETAILS
43yo male with multiple SBO p/w abdominal pain n/v/d likelty SBO, surgery following, labs, NGT
no...

## 2019-01-09 NOTE — DISCHARGE NOTE ADULT - NURSING SECTION COMPLETE
Your Child's Health  18-Month-Old Visit      Shiela Vega Faraz  January 9, 2019    Visit Vitals  Temp 98.4 °F (36.9 °C) (Temporal Artery)   Ht 31.5\" (80 cm)   Wt 10.6 kg   HC 49 cm (19.29\")   BMI 16.52 kg/m²     Weight: 23.31 lbs      NUTRITION:  Avoid snacks that cause cavities, including chewy fruit snacks and sugared cereals.  Children should drink between 16 and 24 ounces of milk per day for growth of bones and teeth.       Obesity and being overweight are serious problems in the [place].  You can help your child avoid these problems by following these guidelines:  1. Limit TV and video total time to 2 hours per day or less.  2. Don't encourage your children to eat if they tell you they are full.  Healthy children will not starve themselves.  3. Don't reward your children for cleaning their plates.  If they always leave food, reduce the size of the portions and tell them to ask for more if they are still hungry.  4. Don't allow children to dish out their own portions.  They will imitate adults or imitate what they have seen on TV; in both cases, the amount will be too large.  This results in increased calories and increased waste.  For children above 5 years of age and for adults, people eat more when given larger portions.  5. They should not have access to soda or junk food.      Bottle feeding past one year of age has been associated with an increase in tooth decay. Allowing the child to carry a bottle around and sip it is particularly harmful to teeth.  Although most dentists prefer not to see children until 3 years of age, ask your dentist office for advice.  Cleaning with a rag or soft brush is recommended by most dentists.    DEVELOPMENT: Children have many physical accomplishments at this age.  They can usually walk, climb, drink from a cup, remove clothes, and initiate physical tasks around the house.    LANGUAGE:  Most children at this age can say only a few words.  They will begin to acquire  language mainly in the months ahead.  Shiela may imitate car or animal noises before she starts to say more \"traditional\" words.  You can assist Shiela's language development in many ways:  1. Name the objects in the immediate area.  2. Name body parts.  3. Give Shiela words for actions like \"jump,” \"eat,\" \"drink,\" and \"run.\"  4. Talk with Shiela and listen to what she is trying to say.  5. Read to Shiela. Even naming the objects in magazine ads will help.    TOILET TRAINING:   Readiness for toilet training is sometimes signaled by dryness after naps and the child's demands to be changed as soon as he or she is wet.  Some children express an interest as soon as 18 months, and it is OK to encourage them after that age.  The average age for training in the [place] however, is closer to 3 years.  This means that half of all children train later.  The most effective training methods involve praise and small rewards such as raisins or M and M's.  When Shiela is older (past 4), special \"grownup\" pants may be an incentive.  If she is totally disinterested or resistant, it may be best to put training off for a few months; however, you should continue to say, \"Soon, when you are big, you will go on the potty\" whenever you change her.    TEMPER TANTRUMS:  These begin about the time a child starts walking and can continue to be a problem until the child is 3 1/2 years old or older.  The most effective method of dealing with tantrums is to ignore them and to pay more attention and give more praise when the child is behaving well.   Time-outs can be effective at this age.    CAR SAFETY:  An approved car seat in the back seat of the car is required by [place] law until your child is four years old and weighs at least 40 pounds.  Forward-facing seats are not recommended until your child is two years of age.    ACCIDENT PREVENTION:  1. Kitchen:  Keep dangerous items out of reach, including hot liquids, hot foods, and electrical cords on  appliances such as irons, toasters, and coffee pots.  2. Upstairs Windows:  Use locks or guards.  3. Bathtubs:  Do not leave Shiela alone in the tub. Even babies who have had swimming classes are not safe alone in the tub at this age. Children have even drowned in buckets of water.  4. Water Safety:  Empty any buckets of water. Fence off permanent pools and drain wading pools when not in use.  Keep the toilet lid down.  5. Poisoning: Use safety caps on medicines.  Household  and polishes must be kept out of reach.  Store medicines and  out of sight.  Don't transfer medicines to non-childproofed or unlabeled containers.  Dispose of unused medications.  Be especially careful when visiting older persons or families without children in the house.  They may be in the habit of keeping their medications out on tables.  Syrup of Ipecac is no longer recommended to be kept in the home.  Please dispose of any you might have.  Call the clinic [phone] or the Poison Control Center at [phone] (long distance [phone]) for any known or suspected poisoning.    SMOKING:  Children exposed to tobacco smoke have more ear infections and pneumonia  Cold symptoms last longer. If you smoke, please quit. If you cannot quit, smoke outside. Do not smoke near Shiela, and do not let others smoke near her.    LEAD EXPOSURE:  If there is lead in the house, Shiela may be vulnerable.  Let us know if any of the following apply:  1. Your home was built before 1960 and has peeling or chipping or chalking paint.  Homes built in older cities at the turn of the last century may have lead pipes.  Check to see if any of the above applies to Shiela's sitter's home, , , or any other house where she spends time.  2. You may have other sources of lead in the home, including:  (a) herbal remedies like lucho, ramiro, ghasard, kandu, azarcon, pay-loo-ah, or balagoli; (b) antique toys, especially those made of lead or pewter; (c)  imported mini blinds; and (d) imported canned goods, especially acid foods like tomato and citrus. Do not cook with or store foods in utensils made of crystal, pewter, or imported pottery.  3. You have another child or housemate who is being followed or treated for an elevated lead level.  4. Shiela lives with an adult whose job or hobby involves lead exposure (soldering, battery manufacture, recycling, casting, stained glass, etc.).  5. You live near an active lead smelter, a battery recycling plant, or a plant that processes hot metal.    TUBERCULOSIS:  There is such a low rate of tuberculosis in our area that frequent skin tests are no longer recommended.  However, certain situations do increase Shiela's risk, including contact with AIDS patients, nursing home residents, prisoners, immigrants, or homeless persons.  Please let us know if Shiela has contacts with such persons, or if she has contact with anyone known to have or suspected of having tuberculosis.    SUN EXPOSURE:  If you plan to have Shiela outside , please apply sunscreen.    MEDICATION FOR FEVER OR PAIN:   Acetaminophen liquid (e.g., Tylenol or Tempra) may be given every four hours as needed for pain or fever.  Acetaminophen liquid is less concentrated than the infant dropper bottle type.  Be sure to check which product CONCENTRATION you are using.    INFANT Tylenol/Acetaminophen  Drops (160 mg/5 mL)    Child’s Weight: Dose:  24 - 35 pounds:   160 mg (5.0 mL (1 Teaspoon))    CHILDREN’S Tylenol/Acetaminophen  (160 mg/5 mL)    Child’s Weight: Dose:  24 - 35 pounds:   160 mg (5.0 mL (1 Teaspoon))    CHILDREN'S Ibuprofen (100 mg/5 mL) liquid (for example, Advil or Motrin) may be given every 6 hours as needed for pain or fever.    Child’s Weight: Dose:  24 - 35 pounds:   100 mg (5.0 mL(1Teaspoon))    If Shiela is outside this weight range, call your pediatrician's office for advice      Most Recent Immunizations   Administered Date(s) Administered   • DTaP  (INFANRIX)(DAPTACEL,INFANRIX) 09/26/2018   • DTaP/Hep B/IPV 2017   • HIB, Unspecified Formulation 2017   • Hib (PRP-OMP) 09/26/2018   • MMR 06/20/2018   • Pneumococcal Conjugate 13 valent 06/20/2018   • Rotavirus - pentavalent 2017   • Varicella 06/20/2018       If Shiela develops any of the following reactions within 72 hours after an immunization, notify your pediatrician by calling the pediatric phone nurse:  1. A temperature of 105 degrees or above.  2. More than 3 hours of continuous crying.  3. A shrill, high-pitched cry.  4. A pale, limp spell.  5. A seizure or fainting spell. In this case, you should call 911 or go immediately to the emergency room.    NEXT VISIT:  2 YEARS OF AGE    Thank you for entrusting your care to [facility].   Patient/Caregiver provided printed discharge information.

## 2019-02-04 ENCOUNTER — INPATIENT (INPATIENT)
Facility: HOSPITAL | Age: 44
LOS: 3 days | Discharge: HOME | End: 2019-02-08
Attending: SURGERY | Admitting: SURGERY
Payer: COMMERCIAL

## 2019-02-04 VITALS
TEMPERATURE: 99 F | OXYGEN SATURATION: 99 % | SYSTOLIC BLOOD PRESSURE: 132 MMHG | HEART RATE: 101 BPM | RESPIRATION RATE: 16 BRPM | DIASTOLIC BLOOD PRESSURE: 78 MMHG

## 2019-02-04 DIAGNOSIS — K56.609 UNSPECIFIED INTESTINAL OBSTRUCTION, UNSPECIFIED AS TO PARTIAL VERSUS COMPLETE OBSTRUCTION: ICD-10-CM

## 2019-02-04 DIAGNOSIS — Z98.890 OTHER SPECIFIED POSTPROCEDURAL STATES: Chronic | ICD-10-CM

## 2019-02-04 DIAGNOSIS — Z98.89 OTHER SPECIFIED POSTPROCEDURAL STATES: Chronic | ICD-10-CM

## 2019-02-04 LAB
ALBUMIN SERPL ELPH-MCNC: 4.8 G/DL — SIGNIFICANT CHANGE UP (ref 3.3–5)
ALP SERPL-CCNC: 41 U/L — SIGNIFICANT CHANGE UP (ref 40–120)
ALT FLD-CCNC: 25 U/L — SIGNIFICANT CHANGE UP (ref 4–41)
ANION GAP SERPL CALC-SCNC: 17 MMO/L — HIGH (ref 7–14)
APTT BLD: 27.4 SEC — LOW (ref 27.5–36.3)
AST SERPL-CCNC: 13 U/L — SIGNIFICANT CHANGE UP (ref 4–40)
BASE EXCESS BLDV CALC-SCNC: -0.4 MMOL/L — SIGNIFICANT CHANGE UP
BILIRUB SERPL-MCNC: 0.8 MG/DL — SIGNIFICANT CHANGE UP (ref 0.2–1.2)
BLOOD GAS VENOUS - CREATININE: 0.85 MG/DL — SIGNIFICANT CHANGE UP (ref 0.5–1.3)
BUN SERPL-MCNC: 24 MG/DL — HIGH (ref 7–23)
CALCIUM SERPL-MCNC: 10.5 MG/DL — SIGNIFICANT CHANGE UP (ref 8.4–10.5)
CHLORIDE BLDV-SCNC: 110 MMOL/L — HIGH (ref 96–108)
CHLORIDE SERPL-SCNC: 103 MMOL/L — SIGNIFICANT CHANGE UP (ref 98–107)
CO2 SERPL-SCNC: 21 MMOL/L — LOW (ref 22–31)
CREAT SERPL-MCNC: 1.04 MG/DL — SIGNIFICANT CHANGE UP (ref 0.5–1.3)
GAS PNL BLDV: 139 MMOL/L — SIGNIFICANT CHANGE UP (ref 136–146)
GLUCOSE BLDV-MCNC: 150 — HIGH (ref 70–99)
GLUCOSE SERPL-MCNC: 206 MG/DL — HIGH (ref 70–99)
HCO3 BLDV-SCNC: 24 MMOL/L — SIGNIFICANT CHANGE UP (ref 20–27)
HCT VFR BLD CALC: 45.4 % — SIGNIFICANT CHANGE UP (ref 39–50)
HCT VFR BLDV CALC: 40.8 % — SIGNIFICANT CHANGE UP (ref 39–51)
HGB BLD-MCNC: 13.9 G/DL — SIGNIFICANT CHANGE UP (ref 13–17)
HGB BLDV-MCNC: 13.3 G/DL — SIGNIFICANT CHANGE UP (ref 13–17)
INR BLD: 1.09 — SIGNIFICANT CHANGE UP (ref 0.88–1.17)
LACTATE BLDV-MCNC: 2 MMOL/L — SIGNIFICANT CHANGE UP (ref 0.5–2)
LIDOCAIN IGE QN: 16.6 U/L — SIGNIFICANT CHANGE UP (ref 7–60)
MCHC RBC-ENTMCNC: 26.7 PG — LOW (ref 27–34)
MCHC RBC-ENTMCNC: 30.6 % — LOW (ref 32–36)
MCV RBC AUTO: 87.3 FL — SIGNIFICANT CHANGE UP (ref 80–100)
NRBC # FLD: 0 K/UL — LOW (ref 25–125)
PCO2 BLDV: 42 MMHG — SIGNIFICANT CHANGE UP (ref 41–51)
PH BLDV: 7.38 PH — SIGNIFICANT CHANGE UP (ref 7.32–7.43)
PLATELET # BLD AUTO: 277 K/UL — SIGNIFICANT CHANGE UP (ref 150–400)
PMV BLD: 10.6 FL — SIGNIFICANT CHANGE UP (ref 7–13)
PO2 BLDV: 47 MMHG — HIGH (ref 35–40)
POTASSIUM BLDV-SCNC: 3.9 MMOL/L — SIGNIFICANT CHANGE UP (ref 3.4–4.5)
POTASSIUM SERPL-MCNC: 4.1 MMOL/L — SIGNIFICANT CHANGE UP (ref 3.5–5.3)
POTASSIUM SERPL-SCNC: 4.1 MMOL/L — SIGNIFICANT CHANGE UP (ref 3.5–5.3)
PROT SERPL-MCNC: 8.1 G/DL — SIGNIFICANT CHANGE UP (ref 6–8.3)
PROTHROM AB SERPL-ACNC: 12.1 SEC — SIGNIFICANT CHANGE UP (ref 9.8–13.1)
RBC # BLD: 5.2 M/UL — SIGNIFICANT CHANGE UP (ref 4.2–5.8)
RBC # FLD: 16.8 % — HIGH (ref 10.3–14.5)
SAO2 % BLDV: 78.4 % — SIGNIFICANT CHANGE UP (ref 60–85)
SODIUM SERPL-SCNC: 141 MMOL/L — SIGNIFICANT CHANGE UP (ref 135–145)
WBC # BLD: 7.1 K/UL — SIGNIFICANT CHANGE UP (ref 3.8–10.5)
WBC # FLD AUTO: 7.1 K/UL — SIGNIFICANT CHANGE UP (ref 3.8–10.5)

## 2019-02-04 PROCEDURE — 71045 X-RAY EXAM CHEST 1 VIEW: CPT | Mod: 26

## 2019-02-04 PROCEDURE — 74176 CT ABD & PELVIS W/O CONTRAST: CPT | Mod: 26

## 2019-02-04 PROCEDURE — 99222 1ST HOSP IP/OBS MODERATE 55: CPT | Mod: GC

## 2019-02-04 RX ORDER — ONDANSETRON 8 MG/1
4 TABLET, FILM COATED ORAL ONCE
Qty: 0 | Refills: 0 | Status: COMPLETED | OUTPATIENT
Start: 2019-02-04 | End: 2019-02-04

## 2019-02-04 RX ORDER — ACETAMINOPHEN 500 MG
1000 TABLET ORAL ONCE
Qty: 0 | Refills: 0 | Status: COMPLETED | OUTPATIENT
Start: 2019-02-04 | End: 2019-02-04

## 2019-02-04 RX ORDER — SODIUM CHLORIDE 9 MG/ML
1000 INJECTION, SOLUTION INTRAVENOUS
Qty: 0 | Refills: 0 | Status: DISCONTINUED | OUTPATIENT
Start: 2019-02-04 | End: 2019-02-07

## 2019-02-04 RX ORDER — SODIUM CHLORIDE 9 MG/ML
1000 INJECTION INTRAMUSCULAR; INTRAVENOUS; SUBCUTANEOUS ONCE
Qty: 0 | Refills: 0 | Status: COMPLETED | OUTPATIENT
Start: 2019-02-04 | End: 2019-02-04

## 2019-02-04 RX ORDER — METOPROLOL TARTRATE 50 MG
5 TABLET ORAL EVERY 6 HOURS
Qty: 0 | Refills: 0 | Status: DISCONTINUED | OUTPATIENT
Start: 2019-02-04 | End: 2019-02-08

## 2019-02-04 RX ORDER — DEXTROSE 50 % IN WATER 50 %
25 SYRINGE (ML) INTRAVENOUS ONCE
Qty: 0 | Refills: 0 | Status: DISCONTINUED | OUTPATIENT
Start: 2019-02-04 | End: 2019-02-07

## 2019-02-04 RX ORDER — SODIUM CHLORIDE 9 MG/ML
1000 INJECTION, SOLUTION INTRAVENOUS ONCE
Qty: 0 | Refills: 0 | Status: COMPLETED | OUTPATIENT
Start: 2019-02-04 | End: 2019-02-04

## 2019-02-04 RX ORDER — DEXTROSE 50 % IN WATER 50 %
15 SYRINGE (ML) INTRAVENOUS ONCE
Qty: 0 | Refills: 0 | Status: DISCONTINUED | OUTPATIENT
Start: 2019-02-04 | End: 2019-02-07

## 2019-02-04 RX ORDER — ENOXAPARIN SODIUM 100 MG/ML
40 INJECTION SUBCUTANEOUS DAILY
Qty: 0 | Refills: 0 | Status: DISCONTINUED | OUTPATIENT
Start: 2019-02-04 | End: 2019-02-08

## 2019-02-04 RX ORDER — GLUCAGON INJECTION, SOLUTION 0.5 MG/.1ML
1 INJECTION, SOLUTION SUBCUTANEOUS ONCE
Qty: 0 | Refills: 0 | Status: DISCONTINUED | OUTPATIENT
Start: 2019-02-04 | End: 2019-02-07

## 2019-02-04 RX ORDER — INSULIN LISPRO 100/ML
VIAL (ML) SUBCUTANEOUS EVERY 6 HOURS
Qty: 0 | Refills: 0 | Status: DISCONTINUED | OUTPATIENT
Start: 2019-02-04 | End: 2019-02-07

## 2019-02-04 RX ORDER — DEXTROSE 50 % IN WATER 50 %
12.5 SYRINGE (ML) INTRAVENOUS ONCE
Qty: 0 | Refills: 0 | Status: DISCONTINUED | OUTPATIENT
Start: 2019-02-04 | End: 2019-02-07

## 2019-02-04 RX ORDER — FAMOTIDINE 10 MG/ML
20 INJECTION INTRAVENOUS ONCE
Qty: 0 | Refills: 0 | Status: COMPLETED | OUTPATIENT
Start: 2019-02-04 | End: 2019-02-04

## 2019-02-04 RX ORDER — SODIUM CHLORIDE 9 MG/ML
1000 INJECTION, SOLUTION INTRAVENOUS
Qty: 0 | Refills: 0 | Status: DISCONTINUED | OUTPATIENT
Start: 2019-02-04 | End: 2019-02-06

## 2019-02-04 RX ORDER — BENZOCAINE 10 %
1 GEL (GRAM) MUCOUS MEMBRANE ONCE
Qty: 0 | Refills: 0 | Status: COMPLETED | OUTPATIENT
Start: 2019-02-04 | End: 2019-02-04

## 2019-02-04 RX ORDER — PANTOPRAZOLE SODIUM 20 MG/1
40 TABLET, DELAYED RELEASE ORAL DAILY
Qty: 0 | Refills: 0 | Status: DISCONTINUED | OUTPATIENT
Start: 2019-02-04 | End: 2019-02-08

## 2019-02-04 RX ADMIN — SODIUM CHLORIDE 1000 MILLILITER(S): 9 INJECTION INTRAMUSCULAR; INTRAVENOUS; SUBCUTANEOUS at 12:38

## 2019-02-04 RX ADMIN — SODIUM CHLORIDE 1000 MILLILITER(S): 9 INJECTION, SOLUTION INTRAVENOUS at 13:59

## 2019-02-04 RX ADMIN — Medication 400 MILLIGRAM(S): at 12:38

## 2019-02-04 RX ADMIN — Medication 5 MILLIGRAM(S): at 20:02

## 2019-02-04 RX ADMIN — ONDANSETRON 4 MILLIGRAM(S): 8 TABLET, FILM COATED ORAL at 12:38

## 2019-02-04 RX ADMIN — SODIUM CHLORIDE 125 MILLILITER(S): 9 INJECTION, SOLUTION INTRAVENOUS at 17:37

## 2019-02-04 RX ADMIN — Medication 1 SPRAY(S): at 16:25

## 2019-02-04 RX ADMIN — Medication 1000 MILLIGRAM(S): at 22:45

## 2019-02-04 RX ADMIN — Medication 1000 MILLIGRAM(S): at 12:54

## 2019-02-04 RX ADMIN — Medication 400 MILLIGRAM(S): at 22:30

## 2019-02-04 RX ADMIN — Medication 1000 MILLIGRAM(S): at 12:55

## 2019-02-04 RX ADMIN — SODIUM CHLORIDE 1000 MILLILITER(S): 9 INJECTION INTRAMUSCULAR; INTRAVENOUS; SUBCUTANEOUS at 13:55

## 2019-02-04 RX ADMIN — FAMOTIDINE 20 MILLIGRAM(S): 10 INJECTION INTRAVENOUS at 12:38

## 2019-02-04 RX ADMIN — PANTOPRAZOLE SODIUM 40 MILLIGRAM(S): 20 TABLET, DELAYED RELEASE ORAL at 20:02

## 2019-02-04 RX ADMIN — SODIUM CHLORIDE 1000 MILLILITER(S): 9 INJECTION, SOLUTION INTRAVENOUS at 16:25

## 2019-02-04 NOTE — ED ADULT TRIAGE NOTE - CHIEF COMPLAINT QUOTE
Pt complaining of abdominal pain, N/V/D since last night. Pt states he has a hx of SBO and symptoms are similar. Pt denies chest pain, SOB. fever or chills.

## 2019-02-04 NOTE — H&P ADULT - HISTORY OF PRESENT ILLNESS
43M PMH GERD, DM II, HLD, HTN, perforated sigmoid diverticulitis s/p Hartmanns (2009), s/p reversal, multiple SBOs requiring ex-lap with LINDA (2014, 2016),s/p ex-lap, incisional hernia repair, LINDA (6/2018), POC c/b wound abscess s/p I&D (7/2018), presenting to [facility] ED with abdominal pain and nausea. He was recently admitted in Dec 2018 with SBO managed nonoperatively. He returned home and was in his normal state of health until yesterday, when he began having pain and nausea. He also had diarrhea, which he states typically happens when he is obstructed. He was advised to present to the ED by Dr. White for evaluation.    In the ED he is afebrile, WBC count 7, Cr 1.04, bicarb 21, and CT showing SBO with transition point in the right hemiabdomen. 43M PMH GERD, DM II, HLD, HTN, perforated sigmoid diverticulitis s/p Hartmanns (2009), s/p reversal, multiple SBOs requiring ex-lap with LINDA (2014, 2016),s/p ex-lap, incisional hernia repair, LINDA (6/2018), POC c/b wound abscess s/p I&D (7/2018), presenting to [facility] ED with abdominal pain and nausea. He was recently admitted in Dec 2018 with SBO managed nonoperatively. He returned home and was in his normal state of health until yesterday, when he began having pain and nausea. He also had diarrhea, which he states typically happens when he is obstructed. He was advised to present to the ED by Dr. White for evaluation.    In the ED he is afebrile, WBC count 7, Cr 1.04, bicarb 21, and CT showing SBO with transition point in the right hemiabdomen. NG tube was placed in ED with 800 ml out on insertion.

## 2019-02-04 NOTE — ED PROVIDER NOTE - MEDICAL DECISION MAKING DETAILS
Abdominal pain, nausea, possible recurrent SBO. Plan - will get labs, IV fluids, pain control, speak to Dr White about dx and therapeutic tx.

## 2019-02-04 NOTE — ED ADULT NURSE NOTE - OBJECTIVE STATEMENT
43 year old male with PMH of SBO, states he has had 39 SBO's, presents to the ER with c/o nausea, watery BM, and abdominal pain x2 days Pt states it feels like a SBO   PIV placed labs drawn pt medicated as ordered Plan of care discussed with pt

## 2019-02-04 NOTE — ED PROVIDER NOTE - MUSCULOSKELETAL, MLM
Spine appears normal, range of motion is not limited, no muscle or joint tenderness. Neck supple, no CVA tenderness.

## 2019-02-04 NOTE — H&P ADULT - ASSESSMENT
43M PMH GERD, DM II, HLD, HTN, perforated sigmoid diverticulitis s/p Hartmanns (2009), s/p reversal, multiple SBOs requiring ex-lap with LINDA (2014, 2016),s/p ex-lap, incisional hernia repair, LINDA (6/2018), POC c/b wound abscess s/p I&D (7/2018), admitted now with recurrent SBO with transition point in the right hemiabdomen.    Plan:  - Admit to surgery under Dr. White  - NPO, NG tube, IV fluids  - Serial abdominal exams  - VTE ppx  - Discussed with attending, Dr. White    A team surgery  Pager [phone]

## 2019-02-04 NOTE — H&P ADULT - NSHPPHYSICALEXAM_GEN_ALL_CORE
General: Alert, NAD  Neuro: A+Ox3  HEENT: NC/AT, no asymmetry, no scleral icterus  Cardio: RRR  Resp: Airway patent, unlabored breathing  GI/Abd: Soft, distended, mild tenderness in mid abdomen, no guarding, no peritoneal signs, midline surgical scar well healed  Ext: Warm, no edema

## 2019-02-04 NOTE — ED PROVIDER NOTE - OBJECTIVE STATEMENT
44 y/o c/o x1day h/o nausea, abdominal pain, and watery diarrhea. Pt has h/o perforated diverticulitis [id number] w/ Isi's procedure and reversal. Since then reports [id number] of SBO. Usually presents how he presents today. Allergic to IV contrast and is unable to take it even w/ steroid tx. Allergic to Gastroview. Denies f/c, CP, Difficulty urinating. PMHx includes DM, HTN, HLD, GERD.

## 2019-02-04 NOTE — H&P ADULT - NSHPLABSRESULTS_GEN_ALL_CORE
T(C): 36.9 (02-04-19 @ 14:41), Max: 37 (02-04-19 @ 11:29)  HR: 97 (02-04-19 @ 14:41) (97 - 107)  BP: 160/90 (02-04-19 @ 14:41) (132/78 - 160/90)  RR: 19 (02-04-19 @ 14:41) (16 - 19)  SpO2: 96% (02-04-19 @ 14:41) (96% - 99%)    LABS:                        13.9   7.10  )-----------( 277      ( 04 Feb 2019 12:45 )             45.4     04 Feb 2019 12:45    141    |  103    |  24     ----------------------------<  206    4.1     |  21     |  1.04     Ca    10.5       04 Feb 2019 12:45    TPro  8.1    /  Alb  4.8    /  TBili  0.8    /  DBili  x      /  AST  13     /  ALT  25     /  AlkPhos  41     04 Feb 2019 12:45      CT Abdomen and Pelvis No Cont (02.04.19 @ 13:37)    FINDINGS:    LOWER CHEST: Within normal limits. .    LIVER: Hepatic steatosis.  BILE DUCTS: Normal caliber.  GALLBLADDER: Cholelithiasis.  SPLEEN: Within normal limits.  PANCREAS: Within normal limits.  ADRENALS: Within normal limits.  KIDNEYS/URETERS: Within normal limits.    BLADDER: Within normal limits.  REPRODUCTIVE ORGANS: Prostate is normal in size.    BOWEL: Sigmoid anastomosis. Multiple dilated loops of small bowel with   transition point in the right hemiabdomen (2:99), similar in appearance   to the prior exam 12/8/2018. Scattered colonic diverticula. Appendix is   normal.  PERITONEUM: No ascites and no pneumoperitoneum..  VESSELS:  Within normal limits.  RETROPERITONEUM: No lymphadenopathy.    ABDOMINAL WALL: Small periumbilical hernia containing fat and   nonobstructed loop of transverse colon.  BONES: Degenerative changes.    IMPRESSION:    Small bowel obstruction similar in appearance to the prior exam 12/8/2018.    Hepatic steatosis.

## 2019-02-04 NOTE — H&P ADULT - PSH
H/O exploratory laparotomy  LINDA, x3  H/O hernia repair  xlap, ventral hernia repair 6/2018  S/P Iis's procedure  2009 with reversal 2009

## 2019-02-05 LAB
ANION GAP SERPL CALC-SCNC: 13 MMO/L — SIGNIFICANT CHANGE UP (ref 7–14)
BUN SERPL-MCNC: 24 MG/DL — HIGH (ref 7–23)
CALCIUM SERPL-MCNC: 9.2 MG/DL — SIGNIFICANT CHANGE UP (ref 8.4–10.5)
CHLORIDE SERPL-SCNC: 107 MMOL/L — SIGNIFICANT CHANGE UP (ref 98–107)
CO2 SERPL-SCNC: 23 MMOL/L — SIGNIFICANT CHANGE UP (ref 22–31)
CREAT SERPL-MCNC: 0.97 MG/DL — SIGNIFICANT CHANGE UP (ref 0.5–1.3)
GLUCOSE BLDC GLUCOMTR-MCNC: 107 MG/DL — HIGH (ref 70–99)
GLUCOSE BLDC GLUCOMTR-MCNC: 108 MG/DL — HIGH (ref 70–99)
GLUCOSE BLDC GLUCOMTR-MCNC: 111 MG/DL — HIGH (ref 70–99)
GLUCOSE BLDC GLUCOMTR-MCNC: 142 MG/DL — HIGH (ref 70–99)
GLUCOSE BLDC GLUCOMTR-MCNC: 143 MG/DL — HIGH (ref 70–99)
GLUCOSE SERPL-MCNC: 130 MG/DL — HIGH (ref 70–99)
HCT VFR BLD CALC: 37.4 % — LOW (ref 39–50)
HGB BLD-MCNC: 11.4 G/DL — LOW (ref 13–17)
MAGNESIUM SERPL-MCNC: 1.8 MG/DL — SIGNIFICANT CHANGE UP (ref 1.6–2.6)
MCHC RBC-ENTMCNC: 26.9 PG — LOW (ref 27–34)
MCHC RBC-ENTMCNC: 30.5 % — LOW (ref 32–36)
MCV RBC AUTO: 88.2 FL — SIGNIFICANT CHANGE UP (ref 80–100)
NRBC # FLD: 0 K/UL — LOW (ref 25–125)
PHOSPHATE SERPL-MCNC: 2.5 MG/DL — SIGNIFICANT CHANGE UP (ref 2.5–4.5)
PLATELET # BLD AUTO: 194 K/UL — SIGNIFICANT CHANGE UP (ref 150–400)
PMV BLD: 10.9 FL — SIGNIFICANT CHANGE UP (ref 7–13)
POTASSIUM SERPL-MCNC: 3.6 MMOL/L — SIGNIFICANT CHANGE UP (ref 3.5–5.3)
POTASSIUM SERPL-SCNC: 3.6 MMOL/L — SIGNIFICANT CHANGE UP (ref 3.5–5.3)
RBC # BLD: 4.24 M/UL — SIGNIFICANT CHANGE UP (ref 4.2–5.8)
RBC # FLD: 16.8 % — HIGH (ref 10.3–14.5)
SODIUM SERPL-SCNC: 143 MMOL/L — SIGNIFICANT CHANGE UP (ref 135–145)
WBC # BLD: 5.04 K/UL — SIGNIFICANT CHANGE UP (ref 3.8–10.5)
WBC # FLD AUTO: 5.04 K/UL — SIGNIFICANT CHANGE UP (ref 3.8–10.5)

## 2019-02-05 RX ORDER — BENZOCAINE AND MENTHOL 5; 1 G/100ML; G/100ML
1 LIQUID ORAL
Qty: 0 | Refills: 0 | Status: DISCONTINUED | OUTPATIENT
Start: 2019-02-05 | End: 2019-02-05

## 2019-02-05 RX ORDER — BENZOCAINE AND MENTHOL 5; 1 G/100ML; G/100ML
1 LIQUID ORAL
Qty: 0 | Refills: 0 | Status: DISCONTINUED | OUTPATIENT
Start: 2019-02-05 | End: 2019-02-08

## 2019-02-05 RX ORDER — MAGNESIUM SULFATE 500 MG/ML
2 VIAL (ML) INJECTION ONCE
Qty: 0 | Refills: 0 | Status: COMPLETED | OUTPATIENT
Start: 2019-02-05 | End: 2019-02-05

## 2019-02-05 RX ORDER — POTASSIUM CHLORIDE 20 MEQ
10 PACKET (EA) ORAL
Qty: 0 | Refills: 0 | Status: COMPLETED | OUTPATIENT
Start: 2019-02-05 | End: 2019-02-05

## 2019-02-05 RX ADMIN — SODIUM CHLORIDE 125 MILLILITER(S): 9 INJECTION, SOLUTION INTRAVENOUS at 02:00

## 2019-02-05 RX ADMIN — BENZOCAINE AND MENTHOL 1 LOZENGE: 5; 1 LIQUID ORAL at 12:24

## 2019-02-05 RX ADMIN — Medication 5 MILLIGRAM(S): at 05:29

## 2019-02-05 RX ADMIN — Medication 5 MILLIGRAM(S): at 17:09

## 2019-02-05 RX ADMIN — Medication 100 MILLIEQUIVALENT(S): at 10:43

## 2019-02-05 RX ADMIN — PANTOPRAZOLE SODIUM 40 MILLIGRAM(S): 20 TABLET, DELAYED RELEASE ORAL at 12:24

## 2019-02-05 RX ADMIN — Medication 100 MILLIEQUIVALENT(S): at 14:13

## 2019-02-05 RX ADMIN — Medication 5 MILLIGRAM(S): at 00:50

## 2019-02-05 RX ADMIN — ENOXAPARIN SODIUM 40 MILLIGRAM(S): 100 INJECTION SUBCUTANEOUS at 12:24

## 2019-02-05 RX ADMIN — BENZOCAINE AND MENTHOL 1 LOZENGE: 5; 1 LIQUID ORAL at 20:15

## 2019-02-05 RX ADMIN — BENZOCAINE AND MENTHOL 1 LOZENGE: 5; 1 LIQUID ORAL at 15:44

## 2019-02-05 RX ADMIN — Medication 100 MILLIEQUIVALENT(S): at 12:24

## 2019-02-05 RX ADMIN — Medication 5 MILLIGRAM(S): at 12:25

## 2019-02-05 RX ADMIN — SODIUM CHLORIDE 125 MILLILITER(S): 9 INJECTION, SOLUTION INTRAVENOUS at 09:34

## 2019-02-05 RX ADMIN — Medication 50 GRAM(S): at 09:34

## 2019-02-05 NOTE — PROGRESS NOTE ADULT - ASSESSMENT
Assessment/Plan: 43yMale w/ SBO    -NGT, await GI function  -IVF hydration  -OOB/ambulate  -strict I&O  -VTE ppx

## 2019-02-05 NOTE — PROGRESS NOTE ADULT - SUBJECTIVE AND OBJECTIVE BOX
A Team Surgery    SUBJECTIVE: Pt seen and examined at bedside.  Pt remains NPO with NGT.  Denies N/V or any other complaint at this time.  -/- GI function.        OBJECTIVE: T(C): 36.9 (02-05-19 @ 12:25), Max: 37 (02-05-19 @ 02:28)  HR: 94 (02-05-19 @ 10:17) (87 - 107)  BP: 127/66 (02-05-19 @ 10:17) (127/66 - 160/90)  RR: 18 (02-05-19 @ 10:17) (17 - 19)  SpO2: 98% (02-05-19 @ 10:17) (96% - 100%)  Wt(kg): --  I&O's Summary    04 Feb 2019 07:01  -  05 Feb 2019 07:00  --------------------------------------------------------  IN: 600 mL / OUT: 575 mL / NET: 25 mL    05 Feb 2019 07:01  -  05 Feb 2019 12:39  --------------------------------------------------------  IN: 875 mL / OUT: 370 mL / NET: 505 mL      I&O's Detail    04 Feb 2019 07:01  -  05 Feb 2019 07:00  --------------------------------------------------------  IN:    lactated ringers.: 600 mL  Total IN: 600 mL    OUT:    Nasoenteral Tube: 350 mL    Voided: 225 mL  Total OUT: 575 mL    Total NET: 25 mL      05 Feb 2019 07:01  -  05 Feb 2019 12:39  --------------------------------------------------------  IN:    IV PiggyBack: 250 mL    lactated ringers.: 625 mL  Total IN: 875 mL    OUT:    Nasoenteral Tube: 50 mL    Voided: 320 mL  Total OUT: 370 mL    Total NET: 505 mL        General: NAD  Abdomen: soft, min LLQ TTP, ND    MEDICATIONS  (STANDING):  dextrose 5%. 1000 milliLiter(s) (50 mL/Hr) IV Continuous <Continuous>  dextrose 50% Injectable 12.5 Gram(s) IV Push once  dextrose 50% Injectable 25 Gram(s) IV Push once  dextrose 50% Injectable 25 Gram(s) IV Push once  enoxaparin Injectable 40 milliGRAM(s) SubCutaneous daily  insulin lispro (HumaLOG) corrective regimen sliding scale   SubCutaneous every 6 hours  lactated ringers. 1000 milliLiter(s) (125 mL/Hr) IV Continuous <Continuous>  metoprolol tartrate Injectable 5 milliGRAM(s) IV Push every 6 hours  pantoprazole  Injectable 40 milliGRAM(s) IV Push daily  potassium chloride  10 mEq/100 mL IVPB 10 milliEquivalent(s) IV Intermittent every 1 hour    MEDICATIONS  (PRN):  benzocaine 15 mG/menthol 3.6 mG (Sugar-Free) Lozenge 1 Lozenge Oral four times a day PRN Sore Throat  dextrose 40% Gel 15 Gram(s) Oral once PRN Blood Glucose LESS THAN 70 milliGRAM(s)/deciliter  glucagon  Injectable 1 milliGRAM(s) IntraMuscular once PRN Glucose LESS THAN 70 milligrams/deciliter      LABS:                        11.4   5.04  )-----------( 194      ( 05 Feb 2019 05:30 )             37.4     02-05    143  |  107  |  24<H>  ----------------------------<  130<H>  3.6   |  23  |  0.97    Ca    9.2      05 Feb 2019 05:30  Phos  2.5     02-05  Mg     1.8     02-05    TPro  8.1  /  Alb  4.8  /  TBili  0.8  /  DBili  x   /  AST  13  /  ALT  25  /  AlkPhos  41  02-04    PT/INR - ( 04 Feb 2019 17:40 )   PT: 12.1 SEC;   INR: 1.09          PTT - ( 04 Feb 2019 17:40 )  PTT:27.4 SEC

## 2019-02-06 LAB
ANION GAP SERPL CALC-SCNC: 14 MMO/L — SIGNIFICANT CHANGE UP (ref 7–14)
BUN SERPL-MCNC: 16 MG/DL — SIGNIFICANT CHANGE UP (ref 7–23)
CALCIUM SERPL-MCNC: 8.9 MG/DL — SIGNIFICANT CHANGE UP (ref 8.4–10.5)
CHLORIDE SERPL-SCNC: 102 MMOL/L — SIGNIFICANT CHANGE UP (ref 98–107)
CO2 SERPL-SCNC: 22 MMOL/L — SIGNIFICANT CHANGE UP (ref 22–31)
CREAT SERPL-MCNC: 0.76 MG/DL — SIGNIFICANT CHANGE UP (ref 0.5–1.3)
GLUCOSE BLDC GLUCOMTR-MCNC: 128 MG/DL — HIGH (ref 70–99)
GLUCOSE BLDC GLUCOMTR-MCNC: 128 MG/DL — HIGH (ref 70–99)
GLUCOSE BLDC GLUCOMTR-MCNC: 132 MG/DL — HIGH (ref 70–99)
GLUCOSE SERPL-MCNC: 141 MG/DL — HIGH (ref 70–99)
MAGNESIUM SERPL-MCNC: 1.9 MG/DL — SIGNIFICANT CHANGE UP (ref 1.6–2.6)
PHOSPHATE SERPL-MCNC: 2.4 MG/DL — LOW (ref 2.5–4.5)
POTASSIUM SERPL-MCNC: 3.6 MMOL/L — SIGNIFICANT CHANGE UP (ref 3.5–5.3)
POTASSIUM SERPL-SCNC: 3.6 MMOL/L — SIGNIFICANT CHANGE UP (ref 3.5–5.3)
SODIUM SERPL-SCNC: 138 MMOL/L — SIGNIFICANT CHANGE UP (ref 135–145)

## 2019-02-06 PROCEDURE — 99232 SBSQ HOSP IP/OBS MODERATE 35: CPT | Mod: GC

## 2019-02-06 RX ORDER — POTASSIUM CHLORIDE 20 MEQ
10 PACKET (EA) ORAL
Qty: 0 | Refills: 0 | Status: COMPLETED | OUTPATIENT
Start: 2019-02-06 | End: 2019-02-06

## 2019-02-06 RX ORDER — DEXTROSE MONOHYDRATE, SODIUM CHLORIDE, AND POTASSIUM CHLORIDE 50; .745; 4.5 G/1000ML; G/1000ML; G/1000ML
1000 INJECTION, SOLUTION INTRAVENOUS
Qty: 0 | Refills: 0 | Status: DISCONTINUED | OUTPATIENT
Start: 2019-02-06 | End: 2019-02-08

## 2019-02-06 RX ADMIN — DEXTROSE MONOHYDRATE, SODIUM CHLORIDE, AND POTASSIUM CHLORIDE 125 MILLILITER(S): 50; .745; 4.5 INJECTION, SOLUTION INTRAVENOUS at 11:50

## 2019-02-06 RX ADMIN — Medication 5 MILLIGRAM(S): at 00:01

## 2019-02-06 RX ADMIN — Medication 100 MILLIEQUIVALENT(S): at 16:49

## 2019-02-06 RX ADMIN — Medication 5 MILLIGRAM(S): at 11:51

## 2019-02-06 RX ADMIN — ENOXAPARIN SODIUM 40 MILLIGRAM(S): 100 INJECTION SUBCUTANEOUS at 11:51

## 2019-02-06 RX ADMIN — BENZOCAINE AND MENTHOL 1 LOZENGE: 5; 1 LIQUID ORAL at 16:24

## 2019-02-06 RX ADMIN — Medication 100 MILLIEQUIVALENT(S): at 15:03

## 2019-02-06 RX ADMIN — BENZOCAINE AND MENTHOL 1 LOZENGE: 5; 1 LIQUID ORAL at 05:53

## 2019-02-06 RX ADMIN — Medication 100 MILLIEQUIVALENT(S): at 11:50

## 2019-02-06 RX ADMIN — SODIUM CHLORIDE 125 MILLILITER(S): 9 INJECTION, SOLUTION INTRAVENOUS at 05:54

## 2019-02-06 RX ADMIN — Medication 5 MILLIGRAM(S): at 18:14

## 2019-02-06 RX ADMIN — Medication 5 MILLIGRAM(S): at 05:54

## 2019-02-06 RX ADMIN — PANTOPRAZOLE SODIUM 40 MILLIGRAM(S): 20 TABLET, DELAYED RELEASE ORAL at 11:52

## 2019-02-06 NOTE — PROGRESS NOTE ADULT - ASSESSMENT
Assessment and Plan:   · Assessment	  Assessment/Plan: 43yMale w/ SBO    -NGT, await pt readiness to start diet and dc NGT  -IVF hydration  -OOB/ambulate  -strict I&O  -VTE ppx

## 2019-02-07 LAB
ANION GAP SERPL CALC-SCNC: 10 MMO/L — SIGNIFICANT CHANGE UP (ref 7–14)
BUN SERPL-MCNC: 12 MG/DL — SIGNIFICANT CHANGE UP (ref 7–23)
CALCIUM SERPL-MCNC: 8.8 MG/DL — SIGNIFICANT CHANGE UP (ref 8.4–10.5)
CHLORIDE SERPL-SCNC: 103 MMOL/L — SIGNIFICANT CHANGE UP (ref 98–107)
CO2 SERPL-SCNC: 23 MMOL/L — SIGNIFICANT CHANGE UP (ref 22–31)
CREAT SERPL-MCNC: 0.74 MG/DL — SIGNIFICANT CHANGE UP (ref 0.5–1.3)
GLUCOSE BLDC GLUCOMTR-MCNC: 125 MG/DL — HIGH (ref 70–99)
GLUCOSE BLDC GLUCOMTR-MCNC: 141 MG/DL — HIGH (ref 70–99)
GLUCOSE BLDC GLUCOMTR-MCNC: 146 MG/DL — HIGH (ref 70–99)
GLUCOSE BLDC GLUCOMTR-MCNC: 152 MG/DL — HIGH (ref 70–99)
GLUCOSE BLDC GLUCOMTR-MCNC: 168 MG/DL — HIGH (ref 70–99)
GLUCOSE SERPL-MCNC: 161 MG/DL — HIGH (ref 70–99)
HCT VFR BLD CALC: 33.7 % — LOW (ref 39–50)
HGB BLD-MCNC: 10.3 G/DL — LOW (ref 13–17)
MAGNESIUM SERPL-MCNC: 1.9 MG/DL — SIGNIFICANT CHANGE UP (ref 1.6–2.6)
MCHC RBC-ENTMCNC: 26.5 PG — LOW (ref 27–34)
MCHC RBC-ENTMCNC: 30.6 % — LOW (ref 32–36)
MCV RBC AUTO: 86.9 FL — SIGNIFICANT CHANGE UP (ref 80–100)
NRBC # FLD: 0 K/UL — LOW (ref 25–125)
PHOSPHATE SERPL-MCNC: 2.7 MG/DL — SIGNIFICANT CHANGE UP (ref 2.5–4.5)
PLATELET # BLD AUTO: 185 K/UL — SIGNIFICANT CHANGE UP (ref 150–400)
PMV BLD: 10.6 FL — SIGNIFICANT CHANGE UP (ref 7–13)
POTASSIUM SERPL-MCNC: 3.8 MMOL/L — SIGNIFICANT CHANGE UP (ref 3.5–5.3)
POTASSIUM SERPL-SCNC: 3.8 MMOL/L — SIGNIFICANT CHANGE UP (ref 3.5–5.3)
RBC # BLD: 3.88 M/UL — LOW (ref 4.2–5.8)
RBC # FLD: 15.9 % — HIGH (ref 10.3–14.5)
SODIUM SERPL-SCNC: 136 MMOL/L — SIGNIFICANT CHANGE UP (ref 135–145)
WBC # BLD: 7.46 K/UL — SIGNIFICANT CHANGE UP (ref 3.8–10.5)
WBC # FLD AUTO: 7.46 K/UL — SIGNIFICANT CHANGE UP (ref 3.8–10.5)

## 2019-02-07 RX ORDER — SODIUM CHLORIDE 9 MG/ML
1000 INJECTION, SOLUTION INTRAVENOUS
Qty: 0 | Refills: 0 | Status: DISCONTINUED | OUTPATIENT
Start: 2019-02-07 | End: 2019-02-08

## 2019-02-07 RX ORDER — DEXTROSE 50 % IN WATER 50 %
25 SYRINGE (ML) INTRAVENOUS ONCE
Qty: 0 | Refills: 0 | Status: DISCONTINUED | OUTPATIENT
Start: 2019-02-07 | End: 2019-02-08

## 2019-02-07 RX ORDER — DEXTROSE 50 % IN WATER 50 %
15 SYRINGE (ML) INTRAVENOUS ONCE
Qty: 0 | Refills: 0 | Status: DISCONTINUED | OUTPATIENT
Start: 2019-02-07 | End: 2019-02-08

## 2019-02-07 RX ORDER — GLUCAGON INJECTION, SOLUTION 0.5 MG/.1ML
1 INJECTION, SOLUTION SUBCUTANEOUS ONCE
Qty: 0 | Refills: 0 | Status: DISCONTINUED | OUTPATIENT
Start: 2019-02-07 | End: 2019-02-08

## 2019-02-07 RX ORDER — INSULIN LISPRO 100/ML
VIAL (ML) SUBCUTANEOUS
Qty: 0 | Refills: 0 | Status: DISCONTINUED | OUTPATIENT
Start: 2019-02-07 | End: 2019-02-08

## 2019-02-07 RX ORDER — DEXTROSE 50 % IN WATER 50 %
12.5 SYRINGE (ML) INTRAVENOUS ONCE
Qty: 0 | Refills: 0 | Status: DISCONTINUED | OUTPATIENT
Start: 2019-02-07 | End: 2019-02-08

## 2019-02-07 RX ORDER — INSULIN LISPRO 100/ML
VIAL (ML) SUBCUTANEOUS AT BEDTIME
Qty: 0 | Refills: 0 | Status: DISCONTINUED | OUTPATIENT
Start: 2019-02-07 | End: 2019-02-08

## 2019-02-07 RX ORDER — POTASSIUM CHLORIDE 20 MEQ
10 PACKET (EA) ORAL
Qty: 0 | Refills: 0 | Status: COMPLETED | OUTPATIENT
Start: 2019-02-07 | End: 2019-02-07

## 2019-02-07 RX ADMIN — DEXTROSE MONOHYDRATE, SODIUM CHLORIDE, AND POTASSIUM CHLORIDE 125 MILLILITER(S): 50; .745; 4.5 INJECTION, SOLUTION INTRAVENOUS at 21:28

## 2019-02-07 RX ADMIN — PANTOPRAZOLE SODIUM 40 MILLIGRAM(S): 20 TABLET, DELAYED RELEASE ORAL at 12:23

## 2019-02-07 RX ADMIN — Medication 100 MILLIEQUIVALENT(S): at 10:00

## 2019-02-07 RX ADMIN — Medication 1: at 06:41

## 2019-02-07 RX ADMIN — DEXTROSE MONOHYDRATE, SODIUM CHLORIDE, AND POTASSIUM CHLORIDE 125 MILLILITER(S): 50; .745; 4.5 INJECTION, SOLUTION INTRAVENOUS at 06:03

## 2019-02-07 RX ADMIN — Medication 5 MILLIGRAM(S): at 17:38

## 2019-02-07 RX ADMIN — Medication 100 MILLIEQUIVALENT(S): at 12:30

## 2019-02-07 RX ADMIN — DEXTROSE MONOHYDRATE, SODIUM CHLORIDE, AND POTASSIUM CHLORIDE 125 MILLILITER(S): 50; .745; 4.5 INJECTION, SOLUTION INTRAVENOUS at 00:41

## 2019-02-07 RX ADMIN — ENOXAPARIN SODIUM 40 MILLIGRAM(S): 100 INJECTION SUBCUTANEOUS at 12:22

## 2019-02-07 RX ADMIN — Medication 5 MILLIGRAM(S): at 12:23

## 2019-02-07 RX ADMIN — Medication 5 MILLIGRAM(S): at 06:03

## 2019-02-07 RX ADMIN — Medication 5 MILLIGRAM(S): at 00:40

## 2019-02-07 NOTE — PROGRESS NOTE ADULT - SUBJECTIVE AND OBJECTIVE BOX
Progress note A team     Subjective: pt was seen and examined this morning. NGT clamped ON, denies N/V,  Pt is passing gas and stool.     Vital Signs Last 24 Hrs  T(C): 36.6 (02-07-19 @ 06:00), Max: 36.7 (02-06-19 @ 15:01)  HR: 69 (02-07-19 @ 06:19) (69 - 86)  BP: 132/69 (02-07-19 @ 06:19) (132/69 - 147/73)  RR: 18 (02-07-19 @ 06:00) (17 - 18)  SpO2: 99% (02-07-19 @ 06:00) (98% - 100%)    CAPILLARY BLOOD GLUCOSE      POCT Blood Glucose.: 152 mg/dL (07 Feb 2019 06:12)  POCT Blood Glucose.: 146 mg/dL (07 Feb 2019 00:07)  POCT Blood Glucose.: 132 mg/dL (06 Feb 2019 16:35)  POCT Blood Glucose.: 128 mg/dL (06 Feb 2019 12:00)   N/A      02-06 @ 07:01  -  02-07 @ 07:00  --------------------------------------------------------  IN:    dextrose 5% + sodium chloride 0.45% with potassium chloride 20 mEq/L: 2375 mL  Total IN: 2375 mL    OUT:    Voided: 1020 mL  Total OUT: 1020 mL    Total NET: 1355 mL    LABS:    CBC (02-07 @ 06:13)                              10.3<L>                         7.46    )----------------(  185        --    % Neutrophils, --    % Lymphocytes, ANC: --                                  33.7<L>    BMP (02-07 @ 06:13)             136     |  103     |  12    		Ca++ --      Ca 8.8                ---------------------------------( 161<H>		Mg 1.9                3.8     |  23      |  0.74  			Ph 2.7     BMP (02-06 @ 07:55)             138     |  102     |  16    		Ca++ --      Ca 8.9                ---------------------------------( 141<H>		Mg 1.9                3.6     |  22      |  0.76  			Ph 2.4<L>             Physical exam   General: no acute distress  Resp: non labored breathing   Abd. soft, non distended, NGT in place and clamped      Assessment and Plan:   		  43yMale w/ SBO    -NGT clamped ON, check residuals and possibly dc ngt  -IVF hydration  -OOB/ambulate  -strict I&O  -VTE ppx

## 2019-02-08 ENCOUNTER — TRANSCRIPTION ENCOUNTER (OUTPATIENT)
Age: 44
End: 2019-02-08

## 2019-02-08 VITALS
RESPIRATION RATE: 18 BRPM | HEART RATE: 76 BPM | OXYGEN SATURATION: 100 % | TEMPERATURE: 97 F | SYSTOLIC BLOOD PRESSURE: 123 MMHG | DIASTOLIC BLOOD PRESSURE: 72 MMHG

## 2019-02-08 LAB
ANION GAP SERPL CALC-SCNC: 12 MMO/L — SIGNIFICANT CHANGE UP (ref 7–14)
BUN SERPL-MCNC: 8 MG/DL — SIGNIFICANT CHANGE UP (ref 7–23)
CALCIUM SERPL-MCNC: 9.3 MG/DL — SIGNIFICANT CHANGE UP (ref 8.4–10.5)
CHLORIDE SERPL-SCNC: 105 MMOL/L — SIGNIFICANT CHANGE UP (ref 98–107)
CO2 SERPL-SCNC: 23 MMOL/L — SIGNIFICANT CHANGE UP (ref 22–31)
CREAT SERPL-MCNC: 0.78 MG/DL — SIGNIFICANT CHANGE UP (ref 0.5–1.3)
GLUCOSE BLDC GLUCOMTR-MCNC: 124 MG/DL — HIGH (ref 70–99)
GLUCOSE BLDC GLUCOMTR-MCNC: 157 MG/DL — HIGH (ref 70–99)
GLUCOSE BLDC GLUCOMTR-MCNC: 166 MG/DL — HIGH (ref 70–99)
GLUCOSE SERPL-MCNC: 144 MG/DL — HIGH (ref 70–99)
HCT VFR BLD CALC: 34.2 % — LOW (ref 39–50)
HGB BLD-MCNC: 10.5 G/DL — LOW (ref 13–17)
MAGNESIUM SERPL-MCNC: 1.8 MG/DL — SIGNIFICANT CHANGE UP (ref 1.6–2.6)
MCHC RBC-ENTMCNC: 26.9 PG — LOW (ref 27–34)
MCHC RBC-ENTMCNC: 30.7 % — LOW (ref 32–36)
MCV RBC AUTO: 87.7 FL — SIGNIFICANT CHANGE UP (ref 80–100)
NRBC # FLD: 0 K/UL — LOW (ref 25–125)
PHOSPHATE SERPL-MCNC: 3.5 MG/DL — SIGNIFICANT CHANGE UP (ref 2.5–4.5)
PLATELET # BLD AUTO: 179 K/UL — SIGNIFICANT CHANGE UP (ref 150–400)
PMV BLD: 10.7 FL — SIGNIFICANT CHANGE UP (ref 7–13)
POTASSIUM SERPL-MCNC: 3.9 MMOL/L — SIGNIFICANT CHANGE UP (ref 3.5–5.3)
POTASSIUM SERPL-SCNC: 3.9 MMOL/L — SIGNIFICANT CHANGE UP (ref 3.5–5.3)
RBC # BLD: 3.9 M/UL — LOW (ref 4.2–5.8)
RBC # FLD: 15.5 % — HIGH (ref 10.3–14.5)
SODIUM SERPL-SCNC: 140 MMOL/L — SIGNIFICANT CHANGE UP (ref 135–145)
WBC # BLD: 5.17 K/UL — SIGNIFICANT CHANGE UP (ref 3.8–10.5)
WBC # FLD AUTO: 5.17 K/UL — SIGNIFICANT CHANGE UP (ref 3.8–10.5)

## 2019-02-08 PROCEDURE — 99238 HOSP IP/OBS DSCHRG MGMT 30/<: CPT

## 2019-02-08 RX ORDER — LOSARTAN POTASSIUM 100 MG/1
50 TABLET, FILM COATED ORAL DAILY
Qty: 0 | Refills: 0 | Status: DISCONTINUED | OUTPATIENT
Start: 2019-02-08 | End: 2019-02-08

## 2019-02-08 RX ORDER — METOPROLOL TARTRATE 50 MG
100 TABLET ORAL DAILY
Qty: 0 | Refills: 0 | Status: DISCONTINUED | OUTPATIENT
Start: 2019-02-08 | End: 2019-02-08

## 2019-02-08 RX ADMIN — Medication 100 MILLIGRAM(S): at 05:53

## 2019-02-08 RX ADMIN — PANTOPRAZOLE SODIUM 40 MILLIGRAM(S): 20 TABLET, DELAYED RELEASE ORAL at 11:43

## 2019-02-08 RX ADMIN — Medication 1: at 11:56

## 2019-02-08 RX ADMIN — Medication 1: at 08:26

## 2019-02-08 RX ADMIN — ENOXAPARIN SODIUM 40 MILLIGRAM(S): 100 INJECTION SUBCUTANEOUS at 11:42

## 2019-02-08 RX ADMIN — DEXTROSE MONOHYDRATE, SODIUM CHLORIDE, AND POTASSIUM CHLORIDE 125 MILLILITER(S): 50; .745; 4.5 INJECTION, SOLUTION INTRAVENOUS at 05:53

## 2019-02-08 RX ADMIN — LOSARTAN POTASSIUM 50 MILLIGRAM(S): 100 TABLET, FILM COATED ORAL at 11:41

## 2019-02-08 NOTE — DISCHARGE NOTE ADULT - HOSPITAL COURSE
43M PMH GERD, DM II, HLD, HTN, perforated sigmoid diverticulitis s/p Hartmanns (2009), s/p reversal, multiple SBOs requiring ex-lap with LINDA (2014, 2016),s/p ex-lap, incisional hernia repair, LINDA (6/2018), POC c/b wound abscess s/p I&D (7/2018), presenting to [facility] ED with abdominal pain and nausea. He was recently admitted in Dec 2018 with SBO managed nonoperatively. He returned home and was in his normal state of health until yesterday, when he began having pain and nausea. He also had diarrhea, which he states typically happens when he is obstructed. He was advised to present to the ED by Dr. White for evaluation.    In the ED he is afebrile, WBC count 7, Cr 1.04, bicarb 21, and CT showing SBO with transition point in the right aquilino abdomen. NG tube was placed in ED with 800 ml out on insertion. 43M PMH GERD, DM II, HLD, HTN, perforated sigmoid diverticulitis s/p Hartmanns (2009), s/p reversal, multiple SBOs requiring ex-lap with LINDA (2014, 2016),s/p ex-lap, incisional hernia repair, LINDA (6/2018), POC c/b wound abscess s/p I&D (7/2018), presenting to [facility] ED with abdominal pain and nausea. He was recently admitted in Dec 2018 with SBO managed nonoperatively. He returned home and was in his normal state of health until yesterday, when he began having pain and nausea. He also had diarrhea, which he states typically happens when he is obstructed. He was advised to present to the ED by Dr. White for evaluation.    In the ED he is afebrile, WBC count 7, Cr 1.04, bicarb 21, and CT showing SBO with transition point in the right aquilino abdomen. NG tube was placed in ED with 800 ml out on insertion.    2/7: NGT clamp trial performed and NGT subsequently removed. Patient given clear liquid diet.    2/8: +/- GI function. Tolerated clear liquid diet and advanced to consistent carb low fiber diet. Tolerated consistent carb low fiber diet and deemed stable for discharge.

## 2019-02-08 NOTE — DISCHARGE NOTE ADULT - MEDICATION SUMMARY - MEDICATIONS TO TAKE
I will START or STAY ON the medications listed below when I get home from the hospital:    losartan 50 mg oral tablet  -- 1 tab(s) by mouth once a day  -- Indication: For For blood pressure    glimepiride 1 mg oral tablet  -- 1 tab(s) by mouth once a day  -- Indication: For For diabetes    metFORMIN  -- 500 milligram(s) by mouth 3 times a day  -- Indication: For For diabetes    Farxiga 5 mg oral tablet  -- 1 tab(s) by mouth once a day  -- Indication: For Fora diabetes    fenofibrate 145 mg oral tablet  -- 1 tab(s) by mouth once a day  -- Indication: For For cholesterol    Crestor 5 mg oral tablet  -- 1 tab(s) by mouth once a day (at bedtime)  -- Indication: For For cholesterol    Toprol-XL  -- 100 milligram(s) by mouth once a day  -- Indication: For For blood pressure    Protonix 40 mg oral delayed release tablet  -- 1 tab(s) by mouth once a day  -- Indication: For For acid reflux    multivitamin  -- 1 tab(s) by mouth once a day  -- Indication: For Vitamin supplement

## 2019-02-08 NOTE — DISCHARGE NOTE ADULT - INSTRUCTIONS
low residue/low fiber diet follow up doctors as ordered, notify the doctors or come to emergency room if you have any issues

## 2019-02-08 NOTE — DISCHARGE NOTE ADULT - PATIENT PORTAL LINK FT
You can access the [facility] Patient Portal, offered by [facility], by registering with the following website: [email or web address]

## 2019-02-08 NOTE — PROGRESS NOTE ADULT - ASSESSMENT
44yo M w/ PMHx GERD, DM II, HLD, HTN, perforated sigmoid diverticulitis s/p Hartmanns (2009), s/p reversal, multiple SBOs requiring ex-lap w/ LINDA (2014, 2016),s/p ex-lap, incisional hernia repair, LINDA (6/2018), POC c/b wound abscess s/p I&D (7/2018). Presented 2/4 w/ recurrent SBO w/ transition point in the right hemiabdomen.    Plan  - Pain control:   - Low fiber consistent carb diet  - DVT ppx: Lovenox  - D/C home later today pending diet tolerance

## 2019-02-08 NOTE — DISCHARGE NOTE ADULT - PLAN OF CARE
You had a resolution of your bowel obstruction Follow up with Dr. White in one week, call for an appointment. Please follow up with your primary care physician regarding your hospitalization

## 2019-02-08 NOTE — DISCHARGE NOTE ADULT - CARE PLAN
Principal Discharge DX:	SBO (small bowel obstruction)  Goal:	You had a resolution of your bowel obstruction  Assessment and plan of treatment:	Follow up with Dr. White in one week, call for an appointment. Please follow up with your primary care physician regarding your hospitalization

## 2019-02-08 NOTE — DISCHARGE NOTE ADULT - NS TRANSFER PATIENT BELONGINGS
Clothing/cell phone, I PAD, waalet with credit cards, 's licensure/Cell Phone/PDA (specify)/Wrist Watch/Electronic Device (specify)

## 2019-02-08 NOTE — PROGRESS NOTE ADULT - SUBJECTIVE AND OBJECTIVE BOX
A-Team Surgery Progress Note     Subjective/24hour Events: No acute events overnight. Pain controlled. Tolerating clear liquid diet. +/- GI function. No N/V. No CP or SOB.    Vital Signs:  Vital Signs Last 24 Hrs  T(C): 36.5 (08 Feb 2019 05:51), Max: 36.8 (07 Feb 2019 21:10)  T(F): 97.7 (08 Feb 2019 05:51), Max: 98.3 (07 Feb 2019 21:10)  HR: 71 (08 Feb 2019 05:51) (66 - 79)  BP: 122/62 (08 Feb 2019 05:51) (114/72 - 131/77)  BP(mean): --  RR: 17 (08 Feb 2019 05:51) (16 - 18)  SpO2: 99% (08 Feb 2019 05:51) (98% - 100%)    CAPILLARY BLOOD GLUCOSE      POCT Blood Glucose.: 157 mg/dL (08 Feb 2019 08:19)  POCT Blood Glucose.: 125 mg/dL (07 Feb 2019 22:42)  POCT Blood Glucose.: 168 mg/dL (07 Feb 2019 17:34)  POCT Blood Glucose.: 141 mg/dL (07 Feb 2019 11:52)      I&O's Detail    07 Feb 2019 07:01  -  08 Feb 2019 07:00  --------------------------------------------------------  IN:    dextrose 5% + sodium chloride 0.45% with potassium chloride 20 mEq/L: 2875 mL  Total IN: 2875 mL    OUT:    Voided: 2845 mL  Total OUT: 2845 mL    Total NET: 30 mL      08 Feb 2019 07:01  -  08 Feb 2019 08:47  --------------------------------------------------------  IN:  Total IN: 0 mL    OUT:    Voided: 250 mL  Total OUT: 250 mL    Total NET: -250 mL            MEDICATIONS  (STANDING):  dextrose 5% + sodium chloride 0.45% with potassium chloride 20 mEq/L 1000 milliLiter(s) (125 mL/Hr) IV Continuous <Continuous>  dextrose 5%. 1000 milliLiter(s) (50 mL/Hr) IV Continuous <Continuous>  dextrose 50% Injectable 12.5 Gram(s) IV Push once  dextrose 50% Injectable 25 Gram(s) IV Push once  dextrose 50% Injectable 25 Gram(s) IV Push once  enoxaparin Injectable 40 milliGRAM(s) SubCutaneous daily  insulin lispro (HumaLOG) corrective regimen sliding scale   SubCutaneous three times a day before meals  insulin lispro (HumaLOG) corrective regimen sliding scale   SubCutaneous at bedtime  losartan 50 milliGRAM(s) Oral daily  metoprolol succinate  milliGRAM(s) Oral daily  pantoprazole  Injectable 40 milliGRAM(s) IV Push daily    MEDICATIONS  (PRN):  benzocaine 15 mG/menthol 3.6 mG (Sugar-Free) Lozenge 1 Lozenge Oral four times a day PRN Sore Throat  dextrose 40% Gel 15 Gram(s) Oral once PRN Blood Glucose LESS THAN 70 milliGRAM(s)/deciliter  glucagon  Injectable 1 milliGRAM(s) IntraMuscular once PRN Glucose LESS THAN 70 milligrams/deciliter        Physical Exam:  Gen: NAD  LS: nml respiratory effort  Card: pulse regularly present  GI: abd soft, nontender  Ext: warm      Labs:    02-08    140  |  105  |  8   ----------------------------<  144<H>  3.9   |  23  |  0.78    Ca    9.3      08 Feb 2019 05:45  Phos  3.5     02-08  Mg     1.8     02-08                              10.5   5.17  )-----------( 179      ( 08 Feb 2019 05:45 )             34.2

## 2019-02-08 NOTE — DISCHARGE NOTE ADULT - CONDITIONS AT DISCHARGE
patient alert and otientedx4, ambulating self and well, tolerating to diet, no complaint of nausea or vomiting or abdominal pain, dischanrge instruction given to patient , patient discharge to home in stable conditions

## 2019-02-08 NOTE — DISCHARGE NOTE ADULT - CARE PROVIDER_API CALL
Musa White)  ColonRectal Surgery; Surgery  [address] [place] [address]  Phone: [phone]  Fax: [phone]  Follow Up Time:

## 2019-03-21 ENCOUNTER — INPATIENT (INPATIENT)
Facility: HOSPITAL | Age: 44
LOS: 1 days | Discharge: HOME | End: 2019-03-23
Attending: SURGERY | Admitting: SURGERY
Payer: COMMERCIAL

## 2019-03-21 ENCOUNTER — TRANSCRIPTION ENCOUNTER (OUTPATIENT)
Age: 44
End: 2019-03-21

## 2019-03-21 VITALS
RESPIRATION RATE: 18 BRPM | OXYGEN SATURATION: 97 % | SYSTOLIC BLOOD PRESSURE: 116 MMHG | DIASTOLIC BLOOD PRESSURE: 74 MMHG | HEART RATE: 110 BPM | TEMPERATURE: 98 F

## 2019-03-21 DIAGNOSIS — K56.609 UNSPECIFIED INTESTINAL OBSTRUCTION, UNSPECIFIED AS TO PARTIAL VERSUS COMPLETE OBSTRUCTION: ICD-10-CM

## 2019-03-21 DIAGNOSIS — Z98.890 OTHER SPECIFIED POSTPROCEDURAL STATES: Chronic | ICD-10-CM

## 2019-03-21 DIAGNOSIS — Z98.89 OTHER SPECIFIED POSTPROCEDURAL STATES: Chronic | ICD-10-CM

## 2019-03-21 LAB
ALBUMIN SERPL ELPH-MCNC: 4.8 G/DL — SIGNIFICANT CHANGE UP (ref 3.3–5)
ALP SERPL-CCNC: 39 U/L — LOW (ref 40–120)
ALT FLD-CCNC: 26 U/L — SIGNIFICANT CHANGE UP (ref 4–41)
ANION GAP SERPL CALC-SCNC: 16 MMO/L — HIGH (ref 7–14)
APTT BLD: 27.1 SEC — LOW (ref 27.5–36.3)
AST SERPL-CCNC: 18 U/L — SIGNIFICANT CHANGE UP (ref 4–40)
BASE EXCESS BLDV CALC-SCNC: -0.5 MMOL/L — SIGNIFICANT CHANGE UP
BASOPHILS # BLD AUTO: 0.02 K/UL — SIGNIFICANT CHANGE UP (ref 0–0.2)
BASOPHILS NFR BLD AUTO: 0.2 % — SIGNIFICANT CHANGE UP (ref 0–2)
BASOPHILS NFR SPEC: 0 % — SIGNIFICANT CHANGE UP (ref 0–2)
BILIRUB SERPL-MCNC: 0.7 MG/DL — SIGNIFICANT CHANGE UP (ref 0.2–1.2)
BLASTS # FLD: 0 % — SIGNIFICANT CHANGE UP (ref 0–0)
BLD GP AB SCN SERPL QL: NEGATIVE — SIGNIFICANT CHANGE UP
BLOOD GAS VENOUS - CREATININE: 1.09 MG/DL — SIGNIFICANT CHANGE UP (ref 0.5–1.3)
BUN SERPL-MCNC: 25 MG/DL — HIGH (ref 7–23)
CALCIUM SERPL-MCNC: 10.4 MG/DL — SIGNIFICANT CHANGE UP (ref 8.4–10.5)
CHLORIDE BLDV-SCNC: 109 MMOL/L — HIGH (ref 96–108)
CHLORIDE SERPL-SCNC: 105 MMOL/L — SIGNIFICANT CHANGE UP (ref 98–107)
CO2 SERPL-SCNC: 22 MMOL/L — SIGNIFICANT CHANGE UP (ref 22–31)
CREAT SERPL-MCNC: 1.11 MG/DL — SIGNIFICANT CHANGE UP (ref 0.5–1.3)
EOSINOPHIL # BLD AUTO: 0.02 K/UL — SIGNIFICANT CHANGE UP (ref 0–0.5)
EOSINOPHIL NFR BLD AUTO: 0.2 % — SIGNIFICANT CHANGE UP (ref 0–6)
EOSINOPHIL NFR FLD: 0.9 % — SIGNIFICANT CHANGE UP (ref 0–6)
GAS PNL BLDV: 144 MMOL/L — SIGNIFICANT CHANGE UP (ref 136–146)
GLUCOSE BLDV-MCNC: 194 — HIGH (ref 70–99)
GLUCOSE SERPL-MCNC: 187 MG/DL — HIGH (ref 70–99)
HCO3 BLDV-SCNC: 23 MMOL/L — SIGNIFICANT CHANGE UP (ref 20–27)
HCT VFR BLD CALC: 44.8 % — SIGNIFICANT CHANGE UP (ref 39–50)
HCT VFR BLDV CALC: 43 % — SIGNIFICANT CHANGE UP (ref 39–51)
HGB BLD-MCNC: 13.6 G/DL — SIGNIFICANT CHANGE UP (ref 13–17)
HGB BLDV-MCNC: 14 G/DL — SIGNIFICANT CHANGE UP (ref 13–17)
IMM GRANULOCYTES NFR BLD AUTO: 0.4 % — SIGNIFICANT CHANGE UP (ref 0–1.5)
INR BLD: 1.01 — SIGNIFICANT CHANGE UP (ref 0.88–1.17)
LACTATE BLDV-MCNC: 2.8 MMOL/L — HIGH (ref 0.5–2)
LYMPHOCYTES # BLD AUTO: 0.68 K/UL — LOW (ref 1–3.3)
LYMPHOCYTES # BLD AUTO: 5.4 % — LOW (ref 13–44)
LYMPHOCYTES NFR SPEC AUTO: 3.5 % — LOW (ref 13–44)
MANUAL SMEAR VERIFICATION: SIGNIFICANT CHANGE UP
MCHC RBC-ENTMCNC: 26.4 PG — LOW (ref 27–34)
MCHC RBC-ENTMCNC: 30.4 % — LOW (ref 32–36)
MCV RBC AUTO: 87 FL — SIGNIFICANT CHANGE UP (ref 80–100)
METAMYELOCYTES # FLD: 0 % — SIGNIFICANT CHANGE UP (ref 0–1)
MONOCYTES # BLD AUTO: 0.77 K/UL — SIGNIFICANT CHANGE UP (ref 0–0.9)
MONOCYTES NFR BLD AUTO: 6.2 % — SIGNIFICANT CHANGE UP (ref 2–14)
MONOCYTES NFR BLD: 6.9 % — SIGNIFICANT CHANGE UP (ref 2–9)
MORPHOLOGY BLD-IMP: NORMAL — SIGNIFICANT CHANGE UP
MYELOCYTES NFR BLD: 0 % — SIGNIFICANT CHANGE UP (ref 0–0)
NEUTROPHIL AB SER-ACNC: 60 % — SIGNIFICANT CHANGE UP (ref 43–77)
NEUTROPHILS # BLD AUTO: 10.96 K/UL — HIGH (ref 1.8–7.4)
NEUTROPHILS NFR BLD AUTO: 87.6 % — HIGH (ref 43–77)
NEUTS BAND # BLD: 27.8 % — HIGH (ref 0–6)
NRBC # FLD: 0 K/UL — LOW (ref 25–125)
OTHER - HEMATOLOGY %: 0 — SIGNIFICANT CHANGE UP
PCO2 BLDV: 42 MMHG — SIGNIFICANT CHANGE UP (ref 41–51)
PH BLDV: 7.38 PH — SIGNIFICANT CHANGE UP (ref 7.32–7.43)
PLATELET # BLD AUTO: 251 K/UL — SIGNIFICANT CHANGE UP (ref 150–400)
PLATELET COUNT - ESTIMATE: NORMAL — SIGNIFICANT CHANGE UP
PMV BLD: 10.9 FL — SIGNIFICANT CHANGE UP (ref 7–13)
PO2 BLDV: 40 MMHG — SIGNIFICANT CHANGE UP (ref 35–40)
POTASSIUM BLDV-SCNC: 4.3 MMOL/L — SIGNIFICANT CHANGE UP (ref 3.4–4.5)
POTASSIUM SERPL-MCNC: 4.4 MMOL/L — SIGNIFICANT CHANGE UP (ref 3.5–5.3)
POTASSIUM SERPL-SCNC: 4.4 MMOL/L — SIGNIFICANT CHANGE UP (ref 3.5–5.3)
PROMYELOCYTES # FLD: 0 % — SIGNIFICANT CHANGE UP (ref 0–0)
PROT SERPL-MCNC: 7.9 G/DL — SIGNIFICANT CHANGE UP (ref 6–8.3)
PROTHROM AB SERPL-ACNC: 11.2 SEC — SIGNIFICANT CHANGE UP (ref 9.8–13.1)
RBC # BLD: 5.15 M/UL — SIGNIFICANT CHANGE UP (ref 4.2–5.8)
RBC # FLD: 15.6 % — HIGH (ref 10.3–14.5)
RH IG SCN BLD-IMP: POSITIVE — SIGNIFICANT CHANGE UP
SAO2 % BLDV: 70.9 % — SIGNIFICANT CHANGE UP (ref 60–85)
SODIUM SERPL-SCNC: 143 MMOL/L — SIGNIFICANT CHANGE UP (ref 135–145)
VARIANT LYMPHS # BLD: 0.9 % — SIGNIFICANT CHANGE UP
WBC # BLD: 12.5 K/UL — HIGH (ref 3.8–10.5)
WBC # FLD AUTO: 12.5 K/UL — HIGH (ref 3.8–10.5)

## 2019-03-21 PROCEDURE — 99222 1ST HOSP IP/OBS MODERATE 55: CPT | Mod: GC

## 2019-03-21 PROCEDURE — 74176 CT ABD & PELVIS W/O CONTRAST: CPT | Mod: 26

## 2019-03-21 RX ORDER — SODIUM CHLORIDE 9 MG/ML
1000 INJECTION INTRAMUSCULAR; INTRAVENOUS; SUBCUTANEOUS ONCE
Qty: 0 | Refills: 0 | Status: COMPLETED | OUTPATIENT
Start: 2019-03-21 | End: 2019-03-21

## 2019-03-21 RX ORDER — DEXTROSE 50 % IN WATER 50 %
12.5 SYRINGE (ML) INTRAVENOUS ONCE
Qty: 0 | Refills: 0 | Status: DISCONTINUED | OUTPATIENT
Start: 2019-03-21 | End: 2019-03-23

## 2019-03-21 RX ORDER — METOPROLOL TARTRATE 50 MG
5 TABLET ORAL EVERY 6 HOURS
Qty: 0 | Refills: 0 | Status: DISCONTINUED | OUTPATIENT
Start: 2019-03-21 | End: 2019-03-22

## 2019-03-21 RX ORDER — ENOXAPARIN SODIUM 100 MG/ML
40 INJECTION SUBCUTANEOUS DAILY
Qty: 0 | Refills: 0 | Status: DISCONTINUED | OUTPATIENT
Start: 2019-03-21 | End: 2019-03-23

## 2019-03-21 RX ORDER — GLUCAGON INJECTION, SOLUTION 0.5 MG/.1ML
1 INJECTION, SOLUTION SUBCUTANEOUS ONCE
Qty: 0 | Refills: 0 | Status: DISCONTINUED | OUTPATIENT
Start: 2019-03-21 | End: 2019-03-23

## 2019-03-21 RX ORDER — DEXTROSE 50 % IN WATER 50 %
25 SYRINGE (ML) INTRAVENOUS ONCE
Qty: 0 | Refills: 0 | Status: DISCONTINUED | OUTPATIENT
Start: 2019-03-21 | End: 2019-03-23

## 2019-03-21 RX ORDER — SODIUM CHLORIDE 9 MG/ML
1000 INJECTION INTRAMUSCULAR; INTRAVENOUS; SUBCUTANEOUS
Qty: 0 | Refills: 0 | Status: DISCONTINUED | OUTPATIENT
Start: 2019-03-21 | End: 2019-03-22

## 2019-03-21 RX ORDER — PANTOPRAZOLE SODIUM 20 MG/1
40 TABLET, DELAYED RELEASE ORAL DAILY
Qty: 0 | Refills: 0 | Status: DISCONTINUED | OUTPATIENT
Start: 2019-03-21 | End: 2019-03-22

## 2019-03-21 RX ORDER — ONDANSETRON 8 MG/1
4 TABLET, FILM COATED ORAL ONCE
Qty: 0 | Refills: 0 | Status: COMPLETED | OUTPATIENT
Start: 2019-03-21 | End: 2019-03-21

## 2019-03-21 RX ORDER — INSULIN LISPRO 100/ML
VIAL (ML) SUBCUTANEOUS EVERY 6 HOURS
Qty: 0 | Refills: 0 | Status: DISCONTINUED | OUTPATIENT
Start: 2019-03-21 | End: 2019-03-22

## 2019-03-21 RX ORDER — SODIUM CHLORIDE 9 MG/ML
1000 INJECTION, SOLUTION INTRAVENOUS
Qty: 0 | Refills: 0 | Status: DISCONTINUED | OUTPATIENT
Start: 2019-03-21 | End: 2019-03-23

## 2019-03-21 RX ORDER — DEXTROSE 50 % IN WATER 50 %
15 SYRINGE (ML) INTRAVENOUS ONCE
Qty: 0 | Refills: 0 | Status: DISCONTINUED | OUTPATIENT
Start: 2019-03-21 | End: 2019-03-23

## 2019-03-21 RX ORDER — SODIUM CHLORIDE 9 MG/ML
1000 INJECTION, SOLUTION INTRAVENOUS ONCE
Qty: 0 | Refills: 0 | Status: COMPLETED | OUTPATIENT
Start: 2019-03-21 | End: 2019-03-21

## 2019-03-21 RX ADMIN — SODIUM CHLORIDE 2000 MILLILITER(S): 9 INJECTION INTRAMUSCULAR; INTRAVENOUS; SUBCUTANEOUS at 16:27

## 2019-03-21 RX ADMIN — ONDANSETRON 4 MILLIGRAM(S): 8 TABLET, FILM COATED ORAL at 23:39

## 2019-03-21 RX ADMIN — ONDANSETRON 4 MILLIGRAM(S): 8 TABLET, FILM COATED ORAL at 16:26

## 2019-03-21 RX ADMIN — SODIUM CHLORIDE 1000 MILLILITER(S): 9 INJECTION, SOLUTION INTRAVENOUS at 17:18

## 2019-03-21 NOTE — ED PROVIDER NOTE - OBJECTIVE STATEMENT
43M with pmh multiple SBO, HTN, HLD, perforated diverticulitis s/p Hartmanns presenting with 43M with pmh multiple SBO, HTN, HLD, perforated diverticulitis s/p Hartmanns presenting with    Attending/Antony: 44 yo M w/ h./o perforated diverticulitis with subsequent multiple SBP, s/p Hartmanns procedure , DM p/w since this morning with abd pain/distention and subsequent n/v/d, nonbloody. Denies fever/chills. 43M with pmh multiple SBO, HTN, HLD, perforated diverticulitis s/p Hartmanns presenting with abdominal pain, nausea, vomiting, diarrhea since this morning. States symptoms similar to prior episodes of SBO in past. Denies fever, chills, cp, sob, dizziness, headache, dysuria     Attending/Antony: 42 yo M w/ h./o perforated diverticulitis with subsequent multiple SBP, s/p Hartmanns procedure , DM p/w since this morning with abd pain/distention and subsequent n/v/d, nonbloody. Denies fever/chills.

## 2019-03-21 NOTE — ED ADULT NURSE NOTE - NSIMPLEMENTINTERV_GEN_ALL_ED
Implemented All Universal Safety Interventions:  [place] to call system. Call bell, personal items and telephone within reach. Instruct patient to call for assistance. Room bathroom lighting operational. Non-slip footwear when patient is off stretcher. Physically safe environment: no spills, clutter or unnecessary equipment. Stretcher in lowest position, wheels locked, appropriate side rails in place.

## 2019-03-21 NOTE — ED ADULT TRIAGE NOTE - CHIEF COMPLAINT QUOTE
Pt c/o abd pain, N/V/D today. Hx: bowel obstruction. Last obstruction was last month admitted with NGT. + abd distention observed. Pt states this is how his obstructions usually start.

## 2019-03-21 NOTE — ED PROVIDER NOTE - PHYSICAL EXAMINATION
Attending/Antony: NAD; PERRL/EOMI, +dry mucosa, non-icterus, supple, no ED, no JVD, RRR, CTAB; Abd-soft, +left-sided PT, no rebound or guarding, no distention, +reducible hernia, no HSM; no LE edema, A&Ox3, nonfocal; Skin-warm/dry

## 2019-03-21 NOTE — H&P ADULT - NSICDXPASTMEDICALHX_GEN_ALL_CORE_FT
PAST MEDICAL HISTORY:  Diabetes mellitus, type 2     Diverticulitis     DVT (deep venous thrombosis) RLE 2016, post op, was on eliquis for 6 months    GERD (gastroesophageal reflux disease)     Hyperlipidemia     Hypertension     Open wound of abdominal wall     SBO (small bowel obstruction)

## 2019-03-21 NOTE — H&P ADULT - NSHPPHYSICALEXAM_GEN_ALL_CORE
Vital Signs Last 24 Hrs  T(C): 36.8 (21 Mar 2019 16:19), Max: 36.8 (21 Mar 2019 15:26)  T(F): 98.2 (21 Mar 2019 16:19), Max: 98.3 (21 Mar 2019 15:26)  HR: 84 (21 Mar 2019 19:37) (84 - 110)  BP: 103/51 (21 Mar 2019 19:37) (103/51 - 116/74)  BP(mean): --  RR: 18 (21 Mar 2019 19:37) (18 - 18)  SpO2: 98% (21 Mar 2019 19:37) (97% - 100%)    PHYSICAL EXAM:  Constitutional: resting in bed with no acute distress, nontoxic appearing   Respiratory:  unlabored breathing on room air   Gastrointestinal: Abdomen distended but relatively soft, mildly tender but no peritoneal, prior surgical scar noted, no fistula, no drainage   Extremities:  No edema, no calf tenderness

## 2019-03-21 NOTE — ED ADULT NURSE REASSESSMENT NOTE - NS ED NURSE REASSESS COMMENT FT1
Pt. received in spot 20A, A&O x4, no acute distress noted. Pt. awaiting CT scan results. vitals stable. will continue to monitor. ST

## 2019-03-21 NOTE — H&P ADULT - NSICDXPASTSURGICALHX_GEN_ALL_CORE_FT
PAST SURGICAL HISTORY:  H/O exploratory laparotomy LINDA, x3    H/O hernia repair xlap, ventral hernia repair 6/2018    S/P Isi's procedure 2009 with reversal 2009

## 2019-03-21 NOTE — H&P ADULT - ASSESSMENT
44 yo male with complicated surgical history (see HPI) well known to service for frequent small bowel obstruction presented with 1 day of abdominal pain associated emesis x 1. CT demonstrated SBO with transition point in the right aquilino-abdomen with decrease in dilation compared to prior exam. Patient is hemodynamically stable with benign exam. Mild leukocytosis and hemo-concentration on CBC and elevated lactate at 2.8     - Will admit patient to team A under Dr. White   - No NGT as patient doesn't have significant gastric distension on the CT scan and nausea has improved   - NPO with IVF for bowel rest, replete electrolyte as appropriate, follow up with GI function   - Serial abdominal exam, no standing pain medication   - Seen with Dr. Calles and discussed with Dr. White

## 2019-03-21 NOTE — ED ADULT NURSE NOTE - NS ED PATIENT SAFETY CONCERN
Telephone Encounter by Olga Cespedes RN at 06/21/18 12:47 PM     Author:  Olga Cespedes RN Service:  (none) Author Type:  Registered Nurse     Filed:  06/21/18 12:47 PM Encounter Date:  6/21/2018 Status:  Signed     :  Olga Cespedes RN (Registered Nurse)       From: Colette Barrientos  To: Bari Koch MD  Sent: 6/21/2018 12:02 AM CDT  Subject: Other    The letter that you gave me for my return to work needs to have, without   any restrictions, added to it please.     Thanks so much.       Revision History        Date/Time User Provider Type Action    > 06/21/18 12:47 PM Olga Cespedes RN Registered Nurse Sign    Attribution information within the note text is not available.             No

## 2019-03-21 NOTE — H&P ADULT - NSHPLABSRESULTS_GEN_ALL_CORE
CBC (03-21 @ 16:05)                          13.6                     12.50<H>  )--------------(  251        87.6<H>% Neuts, 5.4<L>% Lymphs, ANC: 10.96<H>                          44.8      BMP (03-21 @ 16:05)       143     |  105     |  25<H> 			Ca++ --      Ca 10.4         ---------------------------------( 187<H>		Mg --           4.4     |  22      |  1.11  			Ph --        LFTs (03-21 @ 16:05)      TPro 7.9 / Alb 4.8 / TBili 0.7 / DBili -- / AST 18 / ALT 26 / AlkPhos 39<L>    Coags (03-21 @ 16:05)  aPTT 27.1<L> / INR 1.01 / PT 11.2    VBG (03-21 @ 16:15)     7.38 / 42 / 40 / 23 / -0.5 / 70.9%      Lactate: 2.8<H>    EXAM:  CT ABDOMEN AND PELVIS OC    BOWEL: Lower abdominal resection. Small bowel obstruction with transition   point in the right hemiabdomen (2:87), with overall decrease in small   bowel dilatation since prior exam. Colonic diverticulosis. Appendix is   not visualized.    IMPRESSION:   Redemonstration of a small bowel obstruction with transition point in the   right hemiabdomen, with overall decrease in small bowel dilatation since   prior exam.

## 2019-03-21 NOTE — H&P ADULT - HISTORY OF PRESENT ILLNESS
Patient is a 42 yo male with past medical history of GERD, DM II, HLD, HTN and complicated surgical history (see the list below) with recurrent small bowel obstruction presented to the ER with abdominal pain. Per patient, he had pain for 1 day and he vomited once when he was in a cab on the way to work. The symptoms are similar to the prior episodes of SBO in the past. He also endorsed some diarrhea yesterday and passing flatus. He was most recently admitted in 02/2019 for management of SBO, resolved with NGT decompression and bowel rest.     perforated sigmoid diverticulitis s/p Hartmanns (2009) s/p reversal  multiple SBOs requiring ex-lap with LINDA (2014, 2016)  ex-lap, incisional hernia repair, LINDA (6/2018)   abscess s/p I&D (7/2018)

## 2019-03-22 LAB
ANION GAP SERPL CALC-SCNC: 15 MMO/L — HIGH (ref 7–14)
BUN SERPL-MCNC: 24 MG/DL — HIGH (ref 7–23)
CALCIUM SERPL-MCNC: 8.7 MG/DL — SIGNIFICANT CHANGE UP (ref 8.4–10.5)
CHLORIDE SERPL-SCNC: 108 MMOL/L — HIGH (ref 98–107)
CO2 SERPL-SCNC: 21 MMOL/L — LOW (ref 22–31)
CREAT SERPL-MCNC: 0.98 MG/DL — SIGNIFICANT CHANGE UP (ref 0.5–1.3)
GLUCOSE BLDC GLUCOMTR-MCNC: 119 MG/DL — HIGH (ref 70–99)
GLUCOSE BLDC GLUCOMTR-MCNC: 139 MG/DL — HIGH (ref 70–99)
GLUCOSE BLDC GLUCOMTR-MCNC: 140 MG/DL — HIGH (ref 70–99)
GLUCOSE BLDC GLUCOMTR-MCNC: 140 MG/DL — HIGH (ref 70–99)
GLUCOSE SERPL-MCNC: 127 MG/DL — HIGH (ref 70–99)
HCT VFR BLD CALC: 38.4 % — LOW (ref 39–50)
HGB BLD-MCNC: 11.2 G/DL — LOW (ref 13–17)
LACTATE SERPL-SCNC: 0.8 MMOL/L — SIGNIFICANT CHANGE UP (ref 0.5–2)
LACTATE SERPL-SCNC: 1 MMOL/L — SIGNIFICANT CHANGE UP (ref 0.5–2)
MAGNESIUM SERPL-MCNC: 2.1 MG/DL — SIGNIFICANT CHANGE UP (ref 1.6–2.6)
MCHC RBC-ENTMCNC: 26.4 PG — LOW (ref 27–34)
MCHC RBC-ENTMCNC: 29.2 % — LOW (ref 32–36)
MCV RBC AUTO: 90.6 FL — SIGNIFICANT CHANGE UP (ref 80–100)
NRBC # FLD: 0 K/UL — LOW (ref 25–125)
PHOSPHATE SERPL-MCNC: 2.9 MG/DL — SIGNIFICANT CHANGE UP (ref 2.5–4.5)
PLATELET # BLD AUTO: 209 K/UL — SIGNIFICANT CHANGE UP (ref 150–400)
PMV BLD: 10.9 FL — SIGNIFICANT CHANGE UP (ref 7–13)
POTASSIUM SERPL-MCNC: 3.7 MMOL/L — SIGNIFICANT CHANGE UP (ref 3.5–5.3)
POTASSIUM SERPL-SCNC: 3.7 MMOL/L — SIGNIFICANT CHANGE UP (ref 3.5–5.3)
RBC # BLD: 4.24 M/UL — SIGNIFICANT CHANGE UP (ref 4.2–5.8)
RBC # FLD: 15.7 % — HIGH (ref 10.3–14.5)
SODIUM SERPL-SCNC: 144 MMOL/L — SIGNIFICANT CHANGE UP (ref 135–145)
WBC # BLD: 7.21 K/UL — SIGNIFICANT CHANGE UP (ref 3.8–10.5)
WBC # FLD AUTO: 7.21 K/UL — SIGNIFICANT CHANGE UP (ref 3.8–10.5)

## 2019-03-22 RX ORDER — INSULIN LISPRO 100/ML
VIAL (ML) SUBCUTANEOUS AT BEDTIME
Qty: 0 | Refills: 0 | Status: DISCONTINUED | OUTPATIENT
Start: 2019-03-22 | End: 2019-03-23

## 2019-03-22 RX ORDER — INSULIN LISPRO 100/ML
VIAL (ML) SUBCUTANEOUS EVERY 6 HOURS
Qty: 0 | Refills: 0 | Status: DISCONTINUED | OUTPATIENT
Start: 2019-03-22 | End: 2019-03-22

## 2019-03-22 RX ORDER — ACETAMINOPHEN 500 MG
1000 TABLET ORAL ONCE
Qty: 0 | Refills: 0 | Status: COMPLETED | OUTPATIENT
Start: 2019-03-22 | End: 2019-03-22

## 2019-03-22 RX ORDER — INSULIN LISPRO 100/ML
VIAL (ML) SUBCUTANEOUS
Qty: 0 | Refills: 0 | Status: DISCONTINUED | OUTPATIENT
Start: 2019-03-22 | End: 2019-03-23

## 2019-03-22 RX ORDER — METOPROLOL TARTRATE 50 MG
25 TABLET ORAL
Qty: 0 | Refills: 0 | Status: DISCONTINUED | OUTPATIENT
Start: 2019-03-22 | End: 2019-03-23

## 2019-03-22 RX ORDER — PANTOPRAZOLE SODIUM 20 MG/1
40 TABLET, DELAYED RELEASE ORAL
Qty: 0 | Refills: 0 | Status: DISCONTINUED | OUTPATIENT
Start: 2019-03-22 | End: 2019-03-23

## 2019-03-22 RX ORDER — ATORVASTATIN CALCIUM 80 MG/1
20 TABLET, FILM COATED ORAL AT BEDTIME
Qty: 0 | Refills: 0 | Status: DISCONTINUED | OUTPATIENT
Start: 2019-03-22 | End: 2019-03-23

## 2019-03-22 RX ADMIN — ATORVASTATIN CALCIUM 20 MILLIGRAM(S): 80 TABLET, FILM COATED ORAL at 21:21

## 2019-03-22 RX ADMIN — Medication 400 MILLIGRAM(S): at 00:44

## 2019-03-22 RX ADMIN — Medication 1000 MILLIGRAM(S): at 01:14

## 2019-03-22 RX ADMIN — Medication 5 MILLIGRAM(S): at 00:18

## 2019-03-22 RX ADMIN — Medication 5 MILLIGRAM(S): at 05:12

## 2019-03-22 RX ADMIN — Medication 25 MILLIGRAM(S): at 17:54

## 2019-03-22 RX ADMIN — PANTOPRAZOLE SODIUM 40 MILLIGRAM(S): 20 TABLET, DELAYED RELEASE ORAL at 12:35

## 2019-03-22 RX ADMIN — SODIUM CHLORIDE 125 MILLILITER(S): 9 INJECTION INTRAMUSCULAR; INTRAVENOUS; SUBCUTANEOUS at 00:18

## 2019-03-22 RX ADMIN — Medication 5 MILLIGRAM(S): at 12:35

## 2019-03-22 RX ADMIN — ENOXAPARIN SODIUM 40 MILLIGRAM(S): 100 INJECTION SUBCUTANEOUS at 12:35

## 2019-03-22 NOTE — PROGRESS NOTE ADULT - ASSESSMENT
43M with complicated surgical history  well known to service for frequent small bowel obstruction presented with 1 day of abdominal pain associated emesis x 1. CT demonstrated SBO with transition point in the right aquilino-abdomen with decrease in dilation compared to prior exam. Patient is hemodynamically stable with benign exam. Mild leukocytosis and hemo-concentration on CBC and elevated lactate at 2.8     - f/u lactate clearance  - advance diet   - discharge planning  - f/u bowel function    A team [id number]

## 2019-03-22 NOTE — PROGRESS NOTE ADULT - SUBJECTIVE AND OBJECTIVE BOX
Surgery Progress Note      Subjective: Patient seen and examined. No acute events overnight.   Passing some diarrhea  denies nausea/vomiting    T(C): 36.7 (03-22-19 @ 05:11), Max: 36.9 (03-21-19 @ 23:39)  HR: 85 (03-22-19 @ 05:11) (81 - 110)  BP: 128/78 (03-22-19 @ 05:11) (103/51 - 135/61)  RR: 18 (03-22-19 @ 05:11) (16 - 18)  SpO2: 97% (03-22-19 @ 05:11) (96% - 100%)      03-21-19 @ 07:01  -  03-22-19 @ 07:00  --------------------------------------------------------  IN: 850 mL / OUT: 200 mL / NET: 650 mL        Physical Exam:   General: NAD  Abdomen: soft, not tender, not distended    Labs:                          11.2   7.21  )-----------( 209      ( 22 Mar 2019 06:10 )             38.4     03-22    144  |  108<H>  |  24<H>  ----------------------------<  127<H>  3.7   |  21<L>  |  0.98    Ca    8.7      22 Mar 2019 06:10  Phos  2.9     03-22  Mg     2.1     03-22    TPro  7.9  /  Alb  4.8  /  TBili  0.7  /  DBili  x   /  AST  18  /  ALT  26  /  AlkPhos  39<L>  03-21      Medications:     dextrose 40% Gel 15 Gram(s) Oral once PRN  dextrose 5%. 1000 milliLiter(s) IV Continuous <Continuous>  dextrose 50% Injectable 12.5 Gram(s) IV Push once  dextrose 50% Injectable 25 Gram(s) IV Push once  dextrose 50% Injectable 25 Gram(s) IV Push once  enoxaparin Injectable 40 milliGRAM(s) SubCutaneous daily  glucagon  Injectable 1 milliGRAM(s) IntraMuscular once PRN  insulin lispro (HumaLOG) corrective regimen sliding scale   SubCutaneous every 6 hours  metoprolol tartrate Injectable 5 milliGRAM(s) IV Push every 6 hours  pantoprazole  Injectable 40 milliGRAM(s) IV Push daily  sodium chloride 0.9%. 1000 milliLiter(s) IV Continuous <Continuous>      Radiographs: No new imaging

## 2019-03-23 ENCOUNTER — TRANSCRIPTION ENCOUNTER (OUTPATIENT)
Age: 44
End: 2019-03-23

## 2019-03-23 VITALS
OXYGEN SATURATION: 100 % | SYSTOLIC BLOOD PRESSURE: 115 MMHG | RESPIRATION RATE: 18 BRPM | HEART RATE: 65 BPM | DIASTOLIC BLOOD PRESSURE: 60 MMHG | TEMPERATURE: 98 F

## 2019-03-23 LAB
ANION GAP SERPL CALC-SCNC: 10 MMO/L — SIGNIFICANT CHANGE UP (ref 7–14)
BUN SERPL-MCNC: 18 MG/DL — SIGNIFICANT CHANGE UP (ref 7–23)
CALCIUM SERPL-MCNC: 9 MG/DL — SIGNIFICANT CHANGE UP (ref 8.4–10.5)
CHLORIDE SERPL-SCNC: 106 MMOL/L — SIGNIFICANT CHANGE UP (ref 98–107)
CO2 SERPL-SCNC: 23 MMOL/L — SIGNIFICANT CHANGE UP (ref 22–31)
CREAT SERPL-MCNC: 0.88 MG/DL — SIGNIFICANT CHANGE UP (ref 0.5–1.3)
GLUCOSE BLDC GLUCOMTR-MCNC: 157 MG/DL — HIGH (ref 70–99)
GLUCOSE BLDC GLUCOMTR-MCNC: 170 MG/DL — HIGH (ref 70–99)
GLUCOSE SERPL-MCNC: 138 MG/DL — HIGH (ref 70–99)
HBA1C BLD-MCNC: 7.3 % — HIGH (ref 4–5.6)
HCT VFR BLD CALC: 36 % — LOW (ref 39–50)
HGB BLD-MCNC: 10.8 G/DL — LOW (ref 13–17)
MAGNESIUM SERPL-MCNC: 2.1 MG/DL — SIGNIFICANT CHANGE UP (ref 1.6–2.6)
MCHC RBC-ENTMCNC: 26.5 PG — LOW (ref 27–34)
MCHC RBC-ENTMCNC: 30 % — LOW (ref 32–36)
MCV RBC AUTO: 88.5 FL — SIGNIFICANT CHANGE UP (ref 80–100)
NRBC # FLD: 0 K/UL — LOW (ref 25–125)
PHOSPHATE SERPL-MCNC: 2.9 MG/DL — SIGNIFICANT CHANGE UP (ref 2.5–4.5)
PLATELET # BLD AUTO: 178 K/UL — SIGNIFICANT CHANGE UP (ref 150–400)
PMV BLD: 10.8 FL — SIGNIFICANT CHANGE UP (ref 7–13)
POTASSIUM SERPL-MCNC: 3.6 MMOL/L — SIGNIFICANT CHANGE UP (ref 3.5–5.3)
POTASSIUM SERPL-SCNC: 3.6 MMOL/L — SIGNIFICANT CHANGE UP (ref 3.5–5.3)
RBC # BLD: 4.07 M/UL — LOW (ref 4.2–5.8)
RBC # FLD: 15.5 % — HIGH (ref 10.3–14.5)
SODIUM SERPL-SCNC: 139 MMOL/L — SIGNIFICANT CHANGE UP (ref 135–145)
WBC # BLD: 6.35 K/UL — SIGNIFICANT CHANGE UP (ref 3.8–10.5)
WBC # FLD AUTO: 6.35 K/UL — SIGNIFICANT CHANGE UP (ref 3.8–10.5)

## 2019-03-23 RX ORDER — POTASSIUM CHLORIDE 20 MEQ
20 PACKET (EA) ORAL ONCE
Qty: 0 | Refills: 0 | Status: COMPLETED | OUTPATIENT
Start: 2019-03-23 | End: 2019-03-23

## 2019-03-23 RX ADMIN — PANTOPRAZOLE SODIUM 40 MILLIGRAM(S): 20 TABLET, DELAYED RELEASE ORAL at 06:10

## 2019-03-23 RX ADMIN — ENOXAPARIN SODIUM 40 MILLIGRAM(S): 100 INJECTION SUBCUTANEOUS at 12:22

## 2019-03-23 RX ADMIN — Medication 1: at 12:22

## 2019-03-23 RX ADMIN — Medication 20 MILLIEQUIVALENT(S): at 12:22

## 2019-03-23 NOTE — PROGRESS NOTE ADULT - ASSESSMENT
43M with complicated surgical history  well known to service for frequent small bowel obstruction presented with 1 day of abdominal pain associated emesis x 1. CT demonstrated SBO with transition point in the right aquilino-abdomen with decrease in dilation compared to prior exam. Patient is hemodynamically stable with benign exam. Mild leukocytosis and hemo-concentration on CBC and elevated lactate at 2.8, now cleared     - advance diet   - discharge planning  - f/u bowel function    A team [id number]

## 2019-03-23 NOTE — DISCHARGE NOTE PROVIDER - HOSPITAL COURSE
42 yo male with past medical history of GERD, DM II, HLD, HTN and complicated surgical history  with recurrent small bowel obstruction presented to the ER with abdominal pain.    On HD1 patient started passing flatus and was advanced diet. On HD2 patient was tolerating LRD and GI fcn was at baseline, AVSS and HDS. Denies pain, ambulates.

## 2019-03-23 NOTE — PROGRESS NOTE ADULT - SUBJECTIVE AND OBJECTIVE BOX
Surgery Progress Note      Subjective: Patient seen and examined. No acute events overnight.   Lactate cleared to 1.0  - flatus/ - BM  tolerating CLD    T(C): 36.6 (03-23-19 @ 02:57), Max: 37.1 (03-22-19 @ 17:36)  HR: 56 (03-23-19 @ 02:57) (56 - 85)  BP: 121/81 (03-23-19 @ 02:57) (116/71 - 130/72)  RR: 18 (03-23-19 @ 02:57) (14 - 18)  SpO2: 97% (03-23-19 @ 02:57) (96% - 99%)      03-21-19 @ 07:01  -  03-22-19 @ 07:00  --------------------------------------------------------  IN: 850 mL / OUT: 200 mL / NET: 650 mL    03-22-19 @ 07:01  -  03-23-19 @ 04:10  --------------------------------------------------------  IN: 1760 mL / OUT: 1920 mL / NET: -160 mL        Physical Exam:     Labs:      Medications:     atorvastatin 20 milliGRAM(s) Oral at bedtime  dextrose 40% Gel 15 Gram(s) Oral once PRN  dextrose 5%. 1000 milliLiter(s) IV Continuous <Continuous>  dextrose 50% Injectable 12.5 Gram(s) IV Push once  dextrose 50% Injectable 25 Gram(s) IV Push once  dextrose 50% Injectable 25 Gram(s) IV Push once  enoxaparin Injectable 40 milliGRAM(s) SubCutaneous daily  glucagon  Injectable 1 milliGRAM(s) IntraMuscular once PRN  insulin lispro (HumaLOG) corrective regimen sliding scale   SubCutaneous three times a day before meals  insulin lispro (HumaLOG) corrective regimen sliding scale   SubCutaneous at bedtime  metoprolol tartrate 25 milliGRAM(s) Oral two times a day  pantoprazole    Tablet 40 milliGRAM(s) Oral before breakfast      Radiographs: No new imaging Surgery Progress Note      Subjective: Patient seen and examined. No acute events overnight.   Lactate cleared to 1.0  - flatus/ - BM  tolerating CLD    T(C): 36.6 (03-23-19 @ 02:57), Max: 37.1 (03-22-19 @ 17:36)  HR: 56 (03-23-19 @ 02:57) (56 - 85)  BP: 121/81 (03-23-19 @ 02:57) (116/71 - 130/72)  RR: 18 (03-23-19 @ 02:57) (14 - 18)  SpO2: 97% (03-23-19 @ 02:57) (96% - 99%)      03-21-19 @ 07:01  -  03-22-19 @ 07:00  --------------------------------------------------------  IN: 850 mL / OUT: 200 mL / NET: 650 mL    03-22-19 @ 07:01  -  03-23-19 @ 04:10  --------------------------------------------------------  IN: 1760 mL / OUT: 1920 mL / NET: -160 mL        Physical Exam:   General: NAD  Abdomen: soft, not tender, not distended    Labs:                          11.2   7.21  )-----------( 209      ( 22 Mar 2019 06:10 )             38.4     03-22    144  |  108<H>  |  24<H>  ----------------------------<  127<H>  3.7   |  21<L>  |  0.98    Ca    8.7      22 Mar 2019 06:10  Phos  2.9     03-22  Mg     2.1     03-22    TPro  7.9  /  Alb  4.8  /  TBili  0.7  /  DBili  x   /  AST  18  /  ALT  26  /  AlkPhos  39<L>  03-21      Medications:     atorvastatin 20 milliGRAM(s) Oral at bedtime  dextrose 40% Gel 15 Gram(s) Oral once PRN  dextrose 5%. 1000 milliLiter(s) IV Continuous <Continuous>  dextrose 50% Injectable 12.5 Gram(s) IV Push once  dextrose 50% Injectable 25 Gram(s) IV Push once  dextrose 50% Injectable 25 Gram(s) IV Push once  enoxaparin Injectable 40 milliGRAM(s) SubCutaneous daily  glucagon  Injectable 1 milliGRAM(s) IntraMuscular once PRN  insulin lispro (HumaLOG) corrective regimen sliding scale   SubCutaneous three times a day before meals  insulin lispro (HumaLOG) corrective regimen sliding scale   SubCutaneous at bedtime  metoprolol tartrate 25 milliGRAM(s) Oral two times a day  pantoprazole    Tablet 40 milliGRAM(s) Oral before breakfast      Radiographs: No new imaging

## 2019-03-23 NOTE — DISCHARGE NOTE PROVIDER - CARE PROVIDER_API CALL
Musa White)  ColonRectal Surgery; Surgery  [address] [place] [address]  Phone: [phone]  Fax: [phone]  Follow Up Time:

## 2019-03-23 NOTE — DISCHARGE NOTE PROVIDER - NSDCFUADDAPPT_GEN_ALL_CORE_FT
Please follow up with Dr. White in 10-14days after your discharge. Please call the office to make your follow up appointment.

## 2019-03-23 NOTE — DISCHARGE NOTE PROVIDER - NSDCCPCAREPLAN_GEN_ALL_CORE_FT
PRINCIPAL DISCHARGE DIAGNOSIS  Diagnosis: SBO (small bowel obstruction)  Assessment and Plan of Treatment:

## 2019-03-23 NOTE — DISCHARGE NOTE NURSING/CASE MANAGEMENT/SOCIAL WORK - NSDCDPATPORTLINK_GEN_ALL_CORE
You can access the [facility], offered by [facility], by registering with the following website: [email or web address]

## 2019-04-10 NOTE — PATIENT PROFILE ADULT. - BLOOD AVOIDANCE/RESTRICTIONS, PROFILE
The Respiratory Therapy Department completed the Respiratory Care evaluation. No therapy is indicated at this time. The reason therapy is not indicated is due to the following:   [x] Patient does not meet indications for therapy. (patient has no hx. BS clear. Volumes >3000mL on IS)   [] Patient has returned to their home regime. [] Patient frequency has changed to prn, nursing to assess and call if needed. Respiratory Care will not see this patient further unless otherwise requested. Please call the respiratory care charge therapist with questions or concerns at extension [phone].  Thank you for using the Respiratory Therapy Protocol Program.    Margarita Pickup   10:37 PM none

## 2019-05-07 NOTE — ED ADULT NURSE NOTE - SKIN INTEGRITY
Detail Level: Detailed wound(s) Quality 130: Documentation Of Current Medications In The Medical Record: Current Medications Documented

## 2019-05-30 NOTE — DISCHARGE NOTE ADULT - NS AS DC FOLLOWUP INST YN
Patient calling today to let Dr. Chin know Dr. Salinas is ordering a CTscan for 3 months will she still need to 6 month one that he ordered?   Yes

## 2019-06-22 NOTE — PATIENT PROFILE ADULT. - NS PRO AD ANY ON CHART
2019     Michael Wilson (:  1944) is a 66 y.o. female, here for evaluation of the following medical concerns:  Chief Complaint   Patient presents with    Hypertension     blood pressure dropping when stands - per pt's home health nurse advised to come in        HPI  Acute visit with blood pressure dropping on standing and dizziness/pre syncope. Just in hospital with pneumonia and CHF and sent home on lasix and fluid restriction. No fever or dyspnea. Patient's medications, allergies, past medical, surgical, social and family histories were reviewed and updated as appropriate. Review of Systems   Constitutional: Positive for fatigue. Negative for fever. Respiratory: Negative for cough. Cardiovascular: Negative for chest pain and leg swelling. Prior to Visit Medications    Medication Sig Taking? Authorizing Provider   fluticasone (FLONASE) 50 MCG/ACT nasal spray USE 2 PUFFS IN EACH NOSTRIL DAILY  BIJAN Felix   NIFEdipine (ADALAT CC) 60 MG extended release tablet Take 1 tablet by mouth daily  BIJAN Felix   furosemide (LASIX) 40 MG tablet Take 1 tablet by mouth daily  BIJAN Felix   spironolactone (ALDACTONE) 25 MG tablet Take 1 tablet by mouth daily  BIJAN Felix   guaiFENesin (MUCINEX) 600 MG extended release tablet Take 1 tablet by mouth 2 times daily  Grace Perez MD   lidocaine (XYLOCAINE) 5 % ointment Apply 1 Applicatorful topically as needed for Pain Apply topically as needed.   Historical Provider, MD MONSON 2.5 MG TABS tablet Take 1 tablet by mouth 2 times daily  BIJAN Felix   propafenone (RYTHMOL) 150 MG tablet Take 1 tablet by mouth every 8 hours  MAHAD Bonilla - WICHO   metoprolol tartrate (LOPRESSOR) 50 MG tablet Take 1 tablet by mouth 2 times daily  BIJAN Felix   DULoxetine (CYMBALTA) 60 MG extended release capsule Take 1 capsule by mouth daily  BIJAN Felix   ipratropium-albuterol (DUONEB) 0.5-2.5 (3) MG/3ML SOLN nebulizer solution Inhale 3 mLs into the lungs every 6 hours as needed for Shortness of Breath DX: J45.40  BIJAN Tesfaye   budesonide (PULMICORT FLEXHALER) 180 MCG/ACT AEPB inhaler Inhale 2 puffs into the lungs 2 times daily  BIJAN Tesfaye   carbidopa-levodopa (SINEMET)  MG per tablet TAKE 1 TABLET BY MOUTH 3 TIMES A DAY  Lacey Martino MD   atorvastatin (LIPITOR) 40 MG tablet Take 1 and 1/2 tablets by mouth every evening  Lacey Martino MD   montelukast (SINGULAIR) 10 MG tablet Take 1 tablet by mouth daily. Galilea Gaming MD   albuterol sulfate HFA (VENTOLIN HFA) 108 (90 Base) MCG/ACT inhaler Inhale 2 puffs into the lungs every 6 hours as needed for Wheezing  Lacey Martino MD   salmeterol (SEREVENT) 50 MCG/DOSE diskus inhaler Inhale 1 puff by mouth 2 times a day. Galilea Gaming MD   lamoTRIgine (LAMICTAL) 25 MG tablet Take 50 mg by mouth 2 times daily   Historical Provider, MD   Multiple Vitamins-Minerals (THERAPEUTIC MULTIVITAMIN-MINERALS) tablet Take 1 tablet by mouth daily  Historical Provider, MD   Alpha-Lipoic Acid 300 MG CAPS Take 300 mg by mouth daily  Historical Provider, MD   Handicap Placard MISC by Does not apply route. 12/12/14-12/12/19  Galilea Gaming MD   solifenacin (VESICARE) 5 MG tablet Take 1 tablet by mouth daily. Galilea Gaming MD   aspirin EC 81 MG EC tablet Take 1 tablet by mouth daily. Start taking next week. Indications: OTC  Napoleon Brown MD        Social History     Tobacco Use    Smoking status: Never Smoker    Smokeless tobacco: Never Used   Substance Use Topics    Alcohol use: No     Alcohol/week: 0.0 oz        Vitals:    06/21/19 1412   BP: 124/66   Site: Right Upper Arm   Position: Sitting   Cuff Size: Large Adult   Pulse: 78   Temp: 99.5 °F (37.5 °C)   TempSrc: Oral   SpO2: 90%   Weight: Comment: in wheel chair     Estimated body mass index is 40.32 kg/m² as calculated from the following:    Height as of 6/10/19: 5' 6\" (1.676 m). Weight as of 6/10/19: 249 lb 12.8 oz (113.3 kg). Physical Exam    ASSESSMENT/PLAN:  Camilo Pedroza was seen today for hypertension. Diagnoses and all orders for this visit:    Essential hypertension  -     Renal Function Panel    Parkinson's disease ([facility] [address]    Hypotension due to hypovolemia      Can be due to parkinson's but given circumstances will hold lasix over the weekend and restart on every other day basis on Monday. If dizziness is not better next week, follow up. No follow-ups on file. An electronic signature was used to authenticate this note.     --Rob Carrillo MD on 6/22/2019 at 7:41 PM Yes/asked pt to get it from home

## 2019-07-27 ENCOUNTER — INPATIENT (INPATIENT)
Facility: HOSPITAL | Age: 44
LOS: 2 days | Discharge: HOME | End: 2019-07-30
Attending: SURGERY | Admitting: SURGERY
Payer: COMMERCIAL

## 2019-07-27 VITALS
SYSTOLIC BLOOD PRESSURE: 151 MMHG | HEART RATE: 119 BPM | OXYGEN SATURATION: 96 % | TEMPERATURE: 98 F | DIASTOLIC BLOOD PRESSURE: 94 MMHG | RESPIRATION RATE: 18 BRPM

## 2019-07-27 DIAGNOSIS — Z98.890 OTHER SPECIFIED POSTPROCEDURAL STATES: Chronic | ICD-10-CM

## 2019-07-27 DIAGNOSIS — Z98.89 OTHER SPECIFIED POSTPROCEDURAL STATES: Chronic | ICD-10-CM

## 2019-07-27 LAB
BASE EXCESS BLDV CALC-SCNC: -0.2 MMOL/L — SIGNIFICANT CHANGE UP
BASOPHILS # BLD AUTO: 0.02 K/UL — SIGNIFICANT CHANGE UP (ref 0–0.2)
BASOPHILS NFR BLD AUTO: 0.2 % — SIGNIFICANT CHANGE UP (ref 0–2)
EOSINOPHIL # BLD AUTO: 0.01 K/UL — SIGNIFICANT CHANGE UP (ref 0–0.5)
EOSINOPHIL NFR BLD AUTO: 0.1 % — SIGNIFICANT CHANGE UP (ref 0–6)
HCO3 BLDV-SCNC: 24 MMOL/L — SIGNIFICANT CHANGE UP (ref 20–27)
HCT VFR BLD CALC: 45.9 % — SIGNIFICANT CHANGE UP (ref 39–50)
HGB BLD-MCNC: 14.3 G/DL — SIGNIFICANT CHANGE UP (ref 13–17)
IMM GRANULOCYTES NFR BLD AUTO: 0.4 % — SIGNIFICANT CHANGE UP (ref 0–1.5)
LYMPHOCYTES # BLD AUTO: 1 K/UL — SIGNIFICANT CHANGE UP (ref 1–3.3)
LYMPHOCYTES # BLD AUTO: 9 % — LOW (ref 13–44)
MCHC RBC-ENTMCNC: 26.8 PG — LOW (ref 27–34)
MCHC RBC-ENTMCNC: 31.2 % — LOW (ref 32–36)
MCV RBC AUTO: 86.1 FL — SIGNIFICANT CHANGE UP (ref 80–100)
MONOCYTES # BLD AUTO: 0.66 K/UL — SIGNIFICANT CHANGE UP (ref 0–0.9)
MONOCYTES NFR BLD AUTO: 5.9 % — SIGNIFICANT CHANGE UP (ref 2–14)
NEUTROPHILS # BLD AUTO: 9.44 K/UL — HIGH (ref 1.8–7.4)
NEUTROPHILS NFR BLD AUTO: 84.4 % — HIGH (ref 43–77)
NRBC # FLD: 0 K/UL — SIGNIFICANT CHANGE UP (ref 0–0)
PCO2 BLDV: 41 MMHG — SIGNIFICANT CHANGE UP (ref 41–51)
PH BLDV: 7.39 PH — SIGNIFICANT CHANGE UP (ref 7.32–7.43)
PLATELET # BLD AUTO: 261 K/UL — SIGNIFICANT CHANGE UP (ref 150–400)
PMV BLD: 11 FL — SIGNIFICANT CHANGE UP (ref 7–13)
PO2 BLDV: 58 MMHG — HIGH (ref 35–40)
RBC # BLD: 5.33 M/UL — SIGNIFICANT CHANGE UP (ref 4.2–5.8)
RBC # FLD: 15.5 % — HIGH (ref 10.3–14.5)
SAO2 % BLDV: 88.1 % — HIGH (ref 60–85)
WBC # BLD: 11.17 K/UL — HIGH (ref 3.8–10.5)
WBC # FLD AUTO: 11.17 K/UL — HIGH (ref 3.8–10.5)

## 2019-07-27 RX ORDER — ONDANSETRON 8 MG/1
4 TABLET, FILM COATED ORAL ONCE
Refills: 0 | Status: COMPLETED | OUTPATIENT
Start: 2019-07-27 | End: 2019-07-27

## 2019-07-27 RX ORDER — ACETAMINOPHEN 500 MG
650 TABLET ORAL ONCE
Refills: 0 | Status: DISCONTINUED | OUTPATIENT
Start: 2019-07-27 | End: 2019-07-27

## 2019-07-27 RX ORDER — SODIUM CHLORIDE 9 MG/ML
1000 INJECTION INTRAMUSCULAR; INTRAVENOUS; SUBCUTANEOUS ONCE
Refills: 0 | Status: COMPLETED | OUTPATIENT
Start: 2019-07-27 | End: 2019-07-27

## 2019-07-27 RX ORDER — ACETAMINOPHEN 500 MG
1000 TABLET ORAL ONCE
Refills: 0 | Status: COMPLETED | OUTPATIENT
Start: 2019-07-27 | End: 2019-07-27

## 2019-07-27 NOTE — ED ADULT TRIAGE NOTE - CHIEF COMPLAINT QUOTE
Pt c/o abdominal pain accompanied by nausea and diarrhea beginning at 1pm today. Pt states hx of several SBO's in the past and states present symptoms are the same as previous obstructions.  Pt appears very uncomfortable.

## 2019-07-28 DIAGNOSIS — K56.609 UNSPECIFIED INTESTINAL OBSTRUCTION, UNSPECIFIED AS TO PARTIAL VERSUS COMPLETE OBSTRUCTION: ICD-10-CM

## 2019-07-28 LAB
ANION GAP SERPL CALC-SCNC: 13 MMO/L — SIGNIFICANT CHANGE UP (ref 7–14)
ANION GAP SERPL CALC-SCNC: 18 MMO/L — HIGH (ref 7–14)
ANION GAP SERPL CALC-SCNC: 19 MMO/L — HIGH (ref 7–14)
APPEARANCE UR: CLEAR — SIGNIFICANT CHANGE UP
APTT BLD: 27 SEC — LOW (ref 27.5–36.3)
BACTERIA # UR AUTO: NEGATIVE — SIGNIFICANT CHANGE UP
BILIRUB UR-MCNC: NEGATIVE — SIGNIFICANT CHANGE UP
BLOOD UR QL VISUAL: NEGATIVE — SIGNIFICANT CHANGE UP
BUN SERPL-MCNC: 25 MG/DL — HIGH (ref 7–23)
BUN SERPL-MCNC: 25 MG/DL — HIGH (ref 7–23)
BUN SERPL-MCNC: 26 MG/DL — HIGH (ref 7–23)
CALCIUM SERPL-MCNC: 10.3 MG/DL — SIGNIFICANT CHANGE UP (ref 8.4–10.5)
CALCIUM SERPL-MCNC: 10.7 MG/DL — HIGH (ref 8.4–10.5)
CALCIUM SERPL-MCNC: 9.8 MG/DL — SIGNIFICANT CHANGE UP (ref 8.4–10.5)
CHLORIDE SERPL-SCNC: 102 MMOL/L — SIGNIFICANT CHANGE UP (ref 98–107)
CHLORIDE SERPL-SCNC: 103 MMOL/L — SIGNIFICANT CHANGE UP (ref 98–107)
CHLORIDE SERPL-SCNC: 105 MMOL/L — SIGNIFICANT CHANGE UP (ref 98–107)
CO2 SERPL-SCNC: 21 MMOL/L — LOW (ref 22–31)
CO2 SERPL-SCNC: 22 MMOL/L — SIGNIFICANT CHANGE UP (ref 22–31)
CO2 SERPL-SCNC: 23 MMOL/L — SIGNIFICANT CHANGE UP (ref 22–31)
COLOR SPEC: YELLOW — SIGNIFICANT CHANGE UP
CREAT SERPL-MCNC: 0.81 MG/DL — SIGNIFICANT CHANGE UP (ref 0.5–1.3)
CREAT SERPL-MCNC: 0.84 MG/DL — SIGNIFICANT CHANGE UP (ref 0.5–1.3)
CREAT SERPL-MCNC: 0.86 MG/DL — SIGNIFICANT CHANGE UP (ref 0.5–1.3)
GLUCOSE BLDC GLUCOMTR-MCNC: 154 MG/DL — HIGH (ref 70–99)
GLUCOSE BLDC GLUCOMTR-MCNC: 167 MG/DL — HIGH (ref 70–99)
GLUCOSE BLDC GLUCOMTR-MCNC: 195 MG/DL — HIGH (ref 70–99)
GLUCOSE SERPL-MCNC: 173 MG/DL — HIGH (ref 70–99)
GLUCOSE SERPL-MCNC: 178 MG/DL — HIGH (ref 70–99)
GLUCOSE SERPL-MCNC: 223 MG/DL — HIGH (ref 70–99)
GLUCOSE UR-MCNC: >1000 — HIGH
HCT VFR BLD CALC: 43.3 % — SIGNIFICANT CHANGE UP (ref 39–50)
HGB BLD-MCNC: 13.5 G/DL — SIGNIFICANT CHANGE UP (ref 13–17)
HYALINE CASTS # UR AUTO: NEGATIVE — SIGNIFICANT CHANGE UP
INR BLD: 0.97 — SIGNIFICANT CHANGE UP (ref 0.88–1.17)
KETONES UR-MCNC: SIGNIFICANT CHANGE UP
LEUKOCYTE ESTERASE UR-ACNC: NEGATIVE — SIGNIFICANT CHANGE UP
MAGNESIUM SERPL-MCNC: 2 MG/DL — SIGNIFICANT CHANGE UP (ref 1.6–2.6)
MAGNESIUM SERPL-MCNC: 2 MG/DL — SIGNIFICANT CHANGE UP (ref 1.6–2.6)
MCHC RBC-ENTMCNC: 27.7 PG — SIGNIFICANT CHANGE UP (ref 27–34)
MCHC RBC-ENTMCNC: 31.2 % — LOW (ref 32–36)
MCV RBC AUTO: 88.9 FL — SIGNIFICANT CHANGE UP (ref 80–100)
NITRITE UR-MCNC: NEGATIVE — SIGNIFICANT CHANGE UP
NRBC # FLD: 0 K/UL — SIGNIFICANT CHANGE UP (ref 0–0)
PH UR: 6 — SIGNIFICANT CHANGE UP (ref 5–8)
PHOSPHATE SERPL-MCNC: 2.6 MG/DL — SIGNIFICANT CHANGE UP (ref 2.5–4.5)
PHOSPHATE SERPL-MCNC: 3.9 MG/DL — SIGNIFICANT CHANGE UP (ref 2.5–4.5)
PLATELET # BLD AUTO: 248 K/UL — SIGNIFICANT CHANGE UP (ref 150–400)
PMV BLD: 11.4 FL — SIGNIFICANT CHANGE UP (ref 7–13)
POTASSIUM SERPL-MCNC: 3.8 MMOL/L — SIGNIFICANT CHANGE UP (ref 3.5–5.3)
POTASSIUM SERPL-MCNC: 3.9 MMOL/L — SIGNIFICANT CHANGE UP (ref 3.5–5.3)
POTASSIUM SERPL-MCNC: 4.7 MMOL/L — SIGNIFICANT CHANGE UP (ref 3.5–5.3)
POTASSIUM SERPL-SCNC: 3.8 MMOL/L — SIGNIFICANT CHANGE UP (ref 3.5–5.3)
POTASSIUM SERPL-SCNC: 3.9 MMOL/L — SIGNIFICANT CHANGE UP (ref 3.5–5.3)
POTASSIUM SERPL-SCNC: 4.7 MMOL/L — SIGNIFICANT CHANGE UP (ref 3.5–5.3)
PROT UR-MCNC: 20 — SIGNIFICANT CHANGE UP
PROTHROM AB SERPL-ACNC: 10.7 SEC — SIGNIFICANT CHANGE UP (ref 9.8–13.1)
RBC # BLD: 4.87 M/UL — SIGNIFICANT CHANGE UP (ref 4.2–5.8)
RBC # FLD: 15.6 % — HIGH (ref 10.3–14.5)
RBC CASTS # UR COMP ASSIST: SIGNIFICANT CHANGE UP (ref 0–?)
SODIUM SERPL-SCNC: 141 MMOL/L — SIGNIFICANT CHANGE UP (ref 135–145)
SODIUM SERPL-SCNC: 142 MMOL/L — SIGNIFICANT CHANGE UP (ref 135–145)
SODIUM SERPL-SCNC: 143 MMOL/L — SIGNIFICANT CHANGE UP (ref 135–145)
SP GR SPEC: 1.04 — SIGNIFICANT CHANGE UP (ref 1–1.04)
SQUAMOUS # UR AUTO: SIGNIFICANT CHANGE UP
UROBILINOGEN FLD QL: NORMAL — SIGNIFICANT CHANGE UP
WBC # BLD: 5.79 K/UL — SIGNIFICANT CHANGE UP (ref 3.8–10.5)
WBC # FLD AUTO: 5.79 K/UL — SIGNIFICANT CHANGE UP (ref 3.8–10.5)
WBC UR QL: SIGNIFICANT CHANGE UP (ref 0–?)

## 2019-07-28 PROCEDURE — 99222 1ST HOSP IP/OBS MODERATE 55: CPT | Mod: GC

## 2019-07-28 PROCEDURE — 71045 X-RAY EXAM CHEST 1 VIEW: CPT | Mod: 26,76

## 2019-07-28 RX ORDER — INSULIN LISPRO 100/ML
VIAL (ML) SUBCUTANEOUS EVERY 6 HOURS
Refills: 0 | Status: DISCONTINUED | OUTPATIENT
Start: 2019-07-28 | End: 2019-07-29

## 2019-07-28 RX ORDER — SODIUM CHLORIDE 9 MG/ML
1000 INJECTION, SOLUTION INTRAVENOUS
Refills: 0 | Status: DISCONTINUED | OUTPATIENT
Start: 2019-07-28 | End: 2019-07-30

## 2019-07-28 RX ORDER — ACETAMINOPHEN 500 MG
1000 TABLET ORAL ONCE
Refills: 0 | Status: COMPLETED | OUTPATIENT
Start: 2019-07-28 | End: 2019-07-28

## 2019-07-28 RX ORDER — METOPROLOL TARTRATE 50 MG
5 TABLET ORAL EVERY 6 HOURS
Refills: 0 | Status: DISCONTINUED | OUTPATIENT
Start: 2019-07-28 | End: 2019-07-28

## 2019-07-28 RX ORDER — METOPROLOL TARTRATE 50 MG
5 TABLET ORAL EVERY 6 HOURS
Refills: 0 | Status: DISCONTINUED | OUTPATIENT
Start: 2019-07-28 | End: 2019-07-30

## 2019-07-28 RX ORDER — ENOXAPARIN SODIUM 100 MG/ML
40 INJECTION SUBCUTANEOUS DAILY
Refills: 0 | Status: DISCONTINUED | OUTPATIENT
Start: 2019-07-28 | End: 2019-07-30

## 2019-07-28 RX ORDER — PANTOPRAZOLE SODIUM 20 MG/1
40 TABLET, DELAYED RELEASE ORAL DAILY
Refills: 0 | Status: DISCONTINUED | OUTPATIENT
Start: 2019-07-28 | End: 2019-07-30

## 2019-07-28 RX ORDER — SODIUM CHLORIDE 9 MG/ML
1000 INJECTION, SOLUTION INTRAVENOUS
Refills: 0 | Status: DISCONTINUED | OUTPATIENT
Start: 2019-07-28 | End: 2019-07-28

## 2019-07-28 RX ORDER — POTASSIUM CHLORIDE 20 MEQ
10 PACKET (EA) ORAL
Refills: 0 | Status: COMPLETED | OUTPATIENT
Start: 2019-07-28 | End: 2019-07-28

## 2019-07-28 RX ADMIN — ONDANSETRON 4 MILLIGRAM(S): 8 TABLET, FILM COATED ORAL at 00:02

## 2019-07-28 RX ADMIN — Medication 1: at 23:41

## 2019-07-28 RX ADMIN — Medication 400 MILLIGRAM(S): at 12:57

## 2019-07-28 RX ADMIN — PANTOPRAZOLE SODIUM 40 MILLIGRAM(S): 20 TABLET, DELAYED RELEASE ORAL at 17:55

## 2019-07-28 RX ADMIN — Medication 5 MILLIGRAM(S): at 23:35

## 2019-07-28 RX ADMIN — Medication 400 MILLIGRAM(S): at 00:01

## 2019-07-28 RX ADMIN — Medication 100 MILLIEQUIVALENT(S): at 14:32

## 2019-07-28 RX ADMIN — SODIUM CHLORIDE 125 MILLILITER(S): 9 INJECTION, SOLUTION INTRAVENOUS at 11:29

## 2019-07-28 RX ADMIN — Medication 5 MILLIGRAM(S): at 17:55

## 2019-07-28 RX ADMIN — Medication 100 MILLIEQUIVALENT(S): at 12:57

## 2019-07-28 RX ADMIN — Medication 400 MILLIGRAM(S): at 06:26

## 2019-07-28 RX ADMIN — Medication 100 MILLIEQUIVALENT(S): at 15:39

## 2019-07-28 RX ADMIN — Medication 1000 MILLIGRAM(S): at 13:30

## 2019-07-28 RX ADMIN — SODIUM CHLORIDE 1000 MILLILITER(S): 9 INJECTION INTRAMUSCULAR; INTRAVENOUS; SUBCUTANEOUS at 00:02

## 2019-07-28 RX ADMIN — ENOXAPARIN SODIUM 40 MILLIGRAM(S): 100 INJECTION SUBCUTANEOUS at 11:28

## 2019-07-28 NOTE — ED PROVIDER NOTE - ATTENDING CONTRIBUTION TO CARE
RICARDO FloresD: 43M hx DM, HTN HLD multiple abd surgeries, multiple SBOs, presenting with severe diffuse abd pain, nausea, diarrhea since this afternoon. not passing flatus. no vomiting. no f/c no urine symtpoms. states symptoms consistent with prior SBOs. requesting NGT upon arrival.    agree with exam as documented by resident, notably distended abdomen which is soft and diffusely ttp.     A/P: 43M with multiple abd surgeries in the past multiple SBOs in the past, p/w abd pain, distension, nausea, feels similar to prior SBOs. requesting NGT upon arrival. NGT placed with return of feculent contents and pt with significant relief of symptoms. after discussion with surgery will hold off on more advanced imaging at this time. plan for labs, xr to confirm tube/assess for dilated loops, pain control, volume resuscitation and surgery consult.

## 2019-07-28 NOTE — H&P ADULT - HISTORY OF PRESENT ILLNESS
Patient is a 44 yo male with past medical history of GERD, DM II, HLD, HTN and complicated surgical history (see the list below) with recurrent small bowel obstruction presented to the ER with abdominal pain. This afternoon the patient was watching a movie when the pain started. It was associated with nausea, but he did not vomit. Patient says the same as multiple small bowel obstructions he has had in the past. He also endorsed some diarrhea yesterday and passing flatus. He was most recently admitted in 03/21/19 for management of SBO, resolved with NGT decompression and bowel rest.     perforated sigmoid diverticulitis s/p Hartmanns (2009) s/p reversal  multiple SBOs requiring ex-lap with LINDA (2014, 2016)  ex-lap, incisional hernia repair, LINDA (6/2018)   abscess s/p I&D (7/2018)    In the ED, patient with normal vital signs. Labs with WBC 11, bicarb of 21. Requested NGT on arrival and when examined by the surgical team, pain greatly improved from prior.

## 2019-07-28 NOTE — H&P ADULT - ASSESSMENT
Patient is a 42 yo male with past medical history of GERD, DM II, HLD, HTN and complicated surgical history and recurrent SBOs presents with SBO    -admit to A team surgery, Dr. White  -NPO/NGT  -IVF hydration  -will hold off on CT scan for now, pain improved with NGT  -strict intake and output  page [id number] with surgical questions    Sol Catalan, PGY-4

## 2019-07-28 NOTE — ED ADULT NURSE NOTE - NSIMPLEMENTINTERV_GEN_ALL_ED
Implemented All Universal Safety Interventions:  [place] to call system. Call bell, personal items and telephone within reach. Instruct patient to call for assistance. Room bathroom lighting operational. Non-slip footwear when patient is off stretcher. Physically safe environment: no spills, clutter or unnecessary equipment. Stretcher in lowest position, wheels locked, appropriate side rails in place.

## 2019-07-28 NOTE — H&P ADULT - NSHPLABSRESULTS_GEN_ALL_CORE
CBC Full  -  ( 27 Jul 2019 23:45 )  WBC Count : 11.17 K/uL  RBC Count : 5.33 M/uL  Hemoglobin : 14.3 g/dL  Hematocrit : 45.9 %  Platelet Count - Automated : 261 K/uL  Mean Cell Volume : 86.1 fL  Mean Cell Hemoglobin : 26.8 pg  Mean Cell Hemoglobin Concentration : 31.2 %  Auto Neutrophil # : 9.44 K/uL  Auto Lymphocyte # : 1.00 K/uL  Auto Monocyte # : 0.66 K/uL  Auto Eosinophil # : 0.01 K/uL  Auto Basophil # : 0.02 K/uL  Auto Neutrophil % : 84.4 %  Auto Lymphocyte % : 9.0 %  Auto Monocyte % : 5.9 %  Auto Eosinophil % : 0.1 %  Auto Basophil % : 0.2 %    07-27    142  |  102  |  26<H>  ----------------------------<  223<H>  4.7   |  21<L>  |  0.84    Ca    10.7<H>      27 Jul 2019 23:45        CAPILLARY BLOOD GLUCOSE          PT/INR - ( 27 Jul 2019 23:45 )   PT: 10.7 SEC;   INR: 0.97          PTT - ( 27 Jul 2019 23:45 )  PTT:27.0 SEC

## 2019-07-28 NOTE — ED PROVIDER NOTE - OBJECTIVE STATEMENT
Camelia Morrow M.D: 43M hx DM, HTN HLD multiple abd surgeries, multiple SBOs, presenting with severe diffuse abd pain, nausea, diarrhea since this afternoon. not passing flatus. no vomiting. no f/c no urine symtpoms. states symptoms consistent with prior SBOs. requesting NGT upon arrival.

## 2019-07-28 NOTE — ED PROVIDER NOTE - PROGRESS NOTE DETAILS
NGT placed with return of feculent contents. pt with relief. srugery at bedside requesting admission to dr held. state no CT needed at this time.

## 2019-07-28 NOTE — H&P ADULT - NSHPPHYSICALEXAM_GEN_ALL_CORE
General: WN/WD NAD  Neurology: A&Ox3, nonfocal, COLIN x 4  Head:  Normocephalic, atraumatic  ENT:  Mucosa moist, no ulcerations  Neck:  Supple, no sinuses or palpable masses  Lymphatic:  No palpable cervical, supraclavicular, axillary or inguinal adenopathy  Respiratory: CTA B/L  CV: RRR, S1S2, no murmur  Abdominal: Softly distended, mild tenderness on the left side  MSK: No edema, + peripheral pulses, FROM all 4 extremity

## 2019-07-28 NOTE — PROGRESS NOTE ADULT - SUBJECTIVE AND OBJECTIVE BOX
Admitted again with SBO. Now markedly improved , passing flatus. NG removed ..To keep NPO tonite. Corina

## 2019-07-28 NOTE — ED PROVIDER NOTE - PHYSICAL EXAMINATION
Camelia Morrow M.D.:   patient awake alert seen sitting up on stretcher uncomfortable appearing.   LUNGS CTAB no wheeze no crackle.   CARD RRR no m/r/g.    Abdomen soft diffusely ttp, distended no rebound no guarding no CVA tenderness.   EXT WWP no edema no calf tenderness CV 2+DP/PT bilaterally.   neuro A&Ox3 gait normal.    skin warm and dry no rash  HEENT: dry mucous membranes, PERRL, EOMI

## 2019-07-28 NOTE — ED ADULT NURSE NOTE - OBJECTIVE STATEMENT
pt sitting in stretcher reports abdominal pain and bloating started today with nausea and diarrhea.  pt reports extensive hx of sbo, states this feels the same.  pt requesting ngt, states that's the only thing that helps immediately.  MD at bedside, IV #20 placed in left AC, blood sent to lab, medicated as ordered

## 2019-07-29 LAB
ANION GAP SERPL CALC-SCNC: 11 MMO/L — SIGNIFICANT CHANGE UP (ref 7–14)
BUN SERPL-MCNC: 22 MG/DL — SIGNIFICANT CHANGE UP (ref 7–23)
CALCIUM SERPL-MCNC: 9.1 MG/DL — SIGNIFICANT CHANGE UP (ref 8.4–10.5)
CHLORIDE SERPL-SCNC: 107 MMOL/L — SIGNIFICANT CHANGE UP (ref 98–107)
CO2 SERPL-SCNC: 26 MMOL/L — SIGNIFICANT CHANGE UP (ref 22–31)
CREAT SERPL-MCNC: 0.87 MG/DL — SIGNIFICANT CHANGE UP (ref 0.5–1.3)
GLUCOSE BLDC GLUCOMTR-MCNC: 143 MG/DL — HIGH (ref 70–99)
GLUCOSE BLDC GLUCOMTR-MCNC: 151 MG/DL — HIGH (ref 70–99)
GLUCOSE BLDC GLUCOMTR-MCNC: 167 MG/DL — HIGH (ref 70–99)
GLUCOSE BLDC GLUCOMTR-MCNC: 174 MG/DL — HIGH (ref 70–99)
GLUCOSE SERPL-MCNC: 186 MG/DL — HIGH (ref 70–99)
HCT VFR BLD CALC: 38 % — LOW (ref 39–50)
HGB BLD-MCNC: 11.5 G/DL — LOW (ref 13–17)
MAGNESIUM SERPL-MCNC: 2 MG/DL — SIGNIFICANT CHANGE UP (ref 1.6–2.6)
MCHC RBC-ENTMCNC: 26.8 PG — LOW (ref 27–34)
MCHC RBC-ENTMCNC: 30.3 % — LOW (ref 32–36)
MCV RBC AUTO: 88.6 FL — SIGNIFICANT CHANGE UP (ref 80–100)
NRBC # FLD: 0 K/UL — SIGNIFICANT CHANGE UP (ref 0–0)
PHOSPHATE SERPL-MCNC: 2.2 MG/DL — LOW (ref 2.5–4.5)
PLATELET # BLD AUTO: 195 K/UL — SIGNIFICANT CHANGE UP (ref 150–400)
PMV BLD: 10.8 FL — SIGNIFICANT CHANGE UP (ref 7–13)
POTASSIUM SERPL-MCNC: 3.7 MMOL/L — SIGNIFICANT CHANGE UP (ref 3.5–5.3)
POTASSIUM SERPL-SCNC: 3.7 MMOL/L — SIGNIFICANT CHANGE UP (ref 3.5–5.3)
RBC # BLD: 4.29 M/UL — SIGNIFICANT CHANGE UP (ref 4.2–5.8)
RBC # FLD: 15.6 % — HIGH (ref 10.3–14.5)
SODIUM SERPL-SCNC: 144 MMOL/L — SIGNIFICANT CHANGE UP (ref 135–145)
WBC # BLD: 4.51 K/UL — SIGNIFICANT CHANGE UP (ref 3.8–10.5)
WBC # FLD AUTO: 4.51 K/UL — SIGNIFICANT CHANGE UP (ref 3.8–10.5)

## 2019-07-29 RX ORDER — INSULIN LISPRO 100/ML
VIAL (ML) SUBCUTANEOUS AT BEDTIME
Refills: 0 | Status: DISCONTINUED | OUTPATIENT
Start: 2019-07-29 | End: 2019-07-30

## 2019-07-29 RX ORDER — INSULIN LISPRO 100/ML
VIAL (ML) SUBCUTANEOUS
Refills: 0 | Status: DISCONTINUED | OUTPATIENT
Start: 2019-07-29 | End: 2019-07-30

## 2019-07-29 RX ORDER — POTASSIUM PHOSPHATE, MONOBASIC POTASSIUM PHOSPHATE, DIBASIC 236; 224 MG/ML; MG/ML
15 INJECTION, SOLUTION INTRAVENOUS ONCE
Refills: 0 | Status: COMPLETED | OUTPATIENT
Start: 2019-07-29 | End: 2019-07-29

## 2019-07-29 RX ORDER — DEXTROSE 50 % IN WATER 50 %
15 SYRINGE (ML) INTRAVENOUS ONCE
Refills: 0 | Status: DISCONTINUED | OUTPATIENT
Start: 2019-07-29 | End: 2019-07-30

## 2019-07-29 RX ORDER — DEXTROSE 50 % IN WATER 50 %
25 SYRINGE (ML) INTRAVENOUS ONCE
Refills: 0 | Status: DISCONTINUED | OUTPATIENT
Start: 2019-07-29 | End: 2019-07-30

## 2019-07-29 RX ORDER — DEXTROSE 50 % IN WATER 50 %
12.5 SYRINGE (ML) INTRAVENOUS ONCE
Refills: 0 | Status: DISCONTINUED | OUTPATIENT
Start: 2019-07-29 | End: 2019-07-30

## 2019-07-29 RX ADMIN — Medication 5 MILLIGRAM(S): at 23:33

## 2019-07-29 RX ADMIN — POTASSIUM PHOSPHATE, MONOBASIC POTASSIUM PHOSPHATE, DIBASIC 62.5 MILLIMOLE(S): 236; 224 INJECTION, SOLUTION INTRAVENOUS at 09:03

## 2019-07-29 RX ADMIN — Medication 5 MILLIGRAM(S): at 05:57

## 2019-07-29 RX ADMIN — Medication 5 MILLIGRAM(S): at 17:48

## 2019-07-29 RX ADMIN — Medication 1: at 13:01

## 2019-07-29 RX ADMIN — Medication 1: at 06:59

## 2019-07-29 RX ADMIN — ENOXAPARIN SODIUM 40 MILLIGRAM(S): 100 INJECTION SUBCUTANEOUS at 11:47

## 2019-07-29 RX ADMIN — Medication 5 MILLIGRAM(S): at 11:47

## 2019-07-29 RX ADMIN — SODIUM CHLORIDE 75 MILLILITER(S): 9 INJECTION, SOLUTION INTRAVENOUS at 09:03

## 2019-07-29 RX ADMIN — PANTOPRAZOLE SODIUM 40 MILLIGRAM(S): 20 TABLET, DELAYED RELEASE ORAL at 11:47

## 2019-07-29 NOTE — PROGRESS NOTE ADULT - SUBJECTIVE AND OBJECTIVE BOX
GENERAL SURGERY DAILY PROGRESS NOTE:       Subjective: Patient examined at bedside. No acute events overnight.   Having flatus, but no BM  NGT removed yesterday. Pt does not feel like NGT needs to be placed again.      Denies N/V     Objective:        Vital Signs Last 24 Hrs  T(C): 36.7 (2019 05:54), Max: 37.3 (2019 10:00)  T(F): 98 (2019 05:54), Max: 99.1 (2019 10:00)  HR: 87 (2019 05:54) (73 - 106)  BP: 138/- (2019 05:54) (109/63 - 139/83)  BP(mean): 92 (2019 05:54) (92 - 92)  RR: 19 (2019 05:54) (17 - 20)  SpO2: 98% (2019 05:54) (95% - 100%)    I&O's Detail    2019 07:01  -  2019 07:00  --------------------------------------------------------  IN:    dextrose 5% + lactated ringers.: 2500 mL    IV PiggyBack: 500 mL  Total IN: 3000 mL    OUT:    Nasoenteral Tube: 200 mL    Voided: 1900 mL  Total OUT: 2100 mL    Total NET: 900 mL    General: NAD, well-nourished  HEENT: Atraumatic, EOMI  Resp: Breathing comfortably on RA  CV: Normal sinus rhythm  Abd: soft, diffusely tender  Ext: ROMIx4, motor strength intact x 4      LABS:                        11.5   4.51  )-----------( 195      ( 2019 06:30 )             38.0     -    144  |  107  |  22  ----------------------------<  186<H>  3.7   |  26  |  0.87    Ca    9.1      2019 06:30  Phos  2.2     07-  Mg     2.0     -29      PT/INR - ( 2019 23:45 )   PT: 10.7 SEC;   INR: 0.97          PTT - ( 2019 23:45 )  PTT:27.0 SEC  Urinalysis Basic - ( 2019 05:45 )    Color: YELLOW / Appearance: CLEAR / S.038 / pH: 6.0  Gluc: >1000 / Ketone: SMALL  / Bili: NEGATIVE / Urobili: NORMAL   Blood: NEGATIVE / Protein: 20 / Nitrite: NEGATIVE   Leuk Esterase: NEGATIVE / RBC: 0-2 / WBC 0-2   Sq Epi: OCC / Non Sq Epi: x / Bacteria: NEGATIVE        RADIOLOGY & ADDITIONAL STUDIES:    MEDICATIONS  (STANDING):  dextrose 5% + lactated ringers. 1000 milliLiter(s) (75 mL/Hr) IV Continuous <Continuous>  enoxaparin Injectable 40 milliGRAM(s) SubCutaneous daily  insulin lispro (HumaLOG) corrective regimen sliding scale   SubCutaneous every 6 hours  metoprolol tartrate Injectable 5 milliGRAM(s) IV Push every 6 hours  pantoprazole  Injectable 40 milliGRAM(s) IV Push daily  potassium phosphate IVPB 15 milliMole(s) IV Intermittent once    MEDICATIONS  (PRN):

## 2019-07-30 ENCOUNTER — TRANSCRIPTION ENCOUNTER (OUTPATIENT)
Age: 44
End: 2019-07-30

## 2019-07-30 VITALS
HEART RATE: 79 BPM | TEMPERATURE: 98 F | RESPIRATION RATE: 16 BRPM | DIASTOLIC BLOOD PRESSURE: 70 MMHG | OXYGEN SATURATION: 96 % | SYSTOLIC BLOOD PRESSURE: 121 MMHG

## 2019-07-30 LAB
ANION GAP SERPL CALC-SCNC: 10 MMO/L — SIGNIFICANT CHANGE UP (ref 7–14)
BUN SERPL-MCNC: 16 MG/DL — SIGNIFICANT CHANGE UP (ref 7–23)
CALCIUM SERPL-MCNC: 9.3 MG/DL — SIGNIFICANT CHANGE UP (ref 8.4–10.5)
CHLORIDE SERPL-SCNC: 102 MMOL/L — SIGNIFICANT CHANGE UP (ref 98–107)
CO2 SERPL-SCNC: 27 MMOL/L — SIGNIFICANT CHANGE UP (ref 22–31)
CREAT SERPL-MCNC: 0.77 MG/DL — SIGNIFICANT CHANGE UP (ref 0.5–1.3)
GLUCOSE BLDC GLUCOMTR-MCNC: 153 MG/DL — HIGH (ref 70–99)
GLUCOSE BLDC GLUCOMTR-MCNC: 155 MG/DL — HIGH (ref 70–99)
GLUCOSE SERPL-MCNC: 156 MG/DL — HIGH (ref 70–99)
HBA1C BLD-MCNC: 7.6 % — HIGH (ref 4–5.6)
HCT VFR BLD CALC: 40.3 % — SIGNIFICANT CHANGE UP (ref 39–50)
HGB BLD-MCNC: 12.2 G/DL — LOW (ref 13–17)
MAGNESIUM SERPL-MCNC: 2 MG/DL — SIGNIFICANT CHANGE UP (ref 1.6–2.6)
MCHC RBC-ENTMCNC: 27 PG — SIGNIFICANT CHANGE UP (ref 27–34)
MCHC RBC-ENTMCNC: 30.3 % — LOW (ref 32–36)
MCV RBC AUTO: 89.2 FL — SIGNIFICANT CHANGE UP (ref 80–100)
NRBC # FLD: 0.02 K/UL — SIGNIFICANT CHANGE UP (ref 0–0)
PHOSPHATE SERPL-MCNC: 2.9 MG/DL — SIGNIFICANT CHANGE UP (ref 2.5–4.5)
PLATELET # BLD AUTO: 190 K/UL — SIGNIFICANT CHANGE UP (ref 150–400)
PMV BLD: 10.6 FL — SIGNIFICANT CHANGE UP (ref 7–13)
POTASSIUM SERPL-MCNC: 3.4 MMOL/L — LOW (ref 3.5–5.3)
POTASSIUM SERPL-SCNC: 3.4 MMOL/L — LOW (ref 3.5–5.3)
RBC # BLD: 4.52 M/UL — SIGNIFICANT CHANGE UP (ref 4.2–5.8)
RBC # FLD: 15.1 % — HIGH (ref 10.3–14.5)
SODIUM SERPL-SCNC: 139 MMOL/L — SIGNIFICANT CHANGE UP (ref 135–145)
WBC # BLD: 5.86 K/UL — SIGNIFICANT CHANGE UP (ref 3.8–10.5)
WBC # FLD AUTO: 5.86 K/UL — SIGNIFICANT CHANGE UP (ref 3.8–10.5)

## 2019-07-30 PROCEDURE — 99238 HOSP IP/OBS DSCHRG MGMT 30/<: CPT

## 2019-07-30 RX ORDER — METOPROLOL TARTRATE 50 MG
100 TABLET ORAL DAILY
Refills: 0 | Status: DISCONTINUED | OUTPATIENT
Start: 2019-07-30 | End: 2019-07-30

## 2019-07-30 RX ORDER — PANTOPRAZOLE SODIUM 20 MG/1
40 TABLET, DELAYED RELEASE ORAL
Refills: 0 | Status: DISCONTINUED | OUTPATIENT
Start: 2019-07-30 | End: 2019-07-30

## 2019-07-30 RX ORDER — POTASSIUM CHLORIDE 20 MEQ
40 PACKET (EA) ORAL ONCE
Refills: 0 | Status: COMPLETED | OUTPATIENT
Start: 2019-07-30 | End: 2019-07-30

## 2019-07-30 RX ORDER — ATORVASTATIN CALCIUM 80 MG/1
20 TABLET, FILM COATED ORAL AT BEDTIME
Refills: 0 | Status: DISCONTINUED | OUTPATIENT
Start: 2019-07-30 | End: 2019-07-30

## 2019-07-30 RX ORDER — LOSARTAN POTASSIUM 100 MG/1
50 TABLET, FILM COATED ORAL DAILY
Refills: 0 | Status: DISCONTINUED | OUTPATIENT
Start: 2019-07-30 | End: 2019-07-30

## 2019-07-30 RX ORDER — PANTOPRAZOLE SODIUM 20 MG/1
1 TABLET, DELAYED RELEASE ORAL
Qty: 0 | Refills: 0 | DISCHARGE
Start: 2019-07-30

## 2019-07-30 RX ORDER — PANTOPRAZOLE SODIUM 20 MG/1
1 TABLET, DELAYED RELEASE ORAL
Qty: 0 | Refills: 0 | DISCHARGE

## 2019-07-30 RX ADMIN — Medication 40 MILLIEQUIVALENT(S): at 13:23

## 2019-07-30 RX ADMIN — ENOXAPARIN SODIUM 40 MILLIGRAM(S): 100 INJECTION SUBCUTANEOUS at 13:23

## 2019-07-30 RX ADMIN — Medication 1: at 09:07

## 2019-07-30 RX ADMIN — Medication 100 MILLIGRAM(S): at 13:23

## 2019-07-30 RX ADMIN — LOSARTAN POTASSIUM 50 MILLIGRAM(S): 100 TABLET, FILM COATED ORAL at 13:23

## 2019-07-30 RX ADMIN — Medication 1: at 13:22

## 2019-07-30 RX ADMIN — Medication 5 MILLIGRAM(S): at 06:53

## 2019-07-30 NOTE — DISCHARGE NOTE PROVIDER - HOSPITAL COURSE
Patient is a 44 yo male with past medical history of GERD, DM II, HLD, HTN and complicated surgical history (see the list below) with recurrent small bowel obstruction presented to the ER with abdominal pain. This afternoon the patient was watching a movie when the pain started. It was associated with nausea, but he did not vomit. Patient says the same as multiple small bowel obstructions he has had in the past. He also endorsed some diarrhea yesterday and passing flatus. He was most recently admitted in 03/21/19 for management of SBO, resolved with NGT decompression and bowel rest.         perforated sigmoid diverticulitis s/p Hartmanns (2009) s/p reversal    multiple SBOs requiring ex-lap with LINDA (2014, 2016)    ex-lap, incisional hernia repair, LINDA (6/2018)     abscess s/p I&D (7/2018)        In the ED, patient with normal vital signs. Labs with WBC 11, bicarb of 21. Requested NGT on arrival and when examined by the surgical team, pain greatly improved from prior.         Patient admitted to surgery and NG tube placed, patient made NPO.         Patient began having return of GI function and NG tube was removed. Diet advanced and tolerated. Patient OOB and ambulating. Home meds were restarted. Patient stable for discharge home to follow up as an outpatient

## 2019-07-30 NOTE — DISCHARGE NOTE PROVIDER - CARE PROVIDER_API CALL
Musa White)  ColonRectal Surgery; Surgery  [address] [place] [address]  Phone: [phone]  Fax: [phone]  Follow Up Time: 1 week

## 2019-07-30 NOTE — PROGRESS NOTE ADULT - SUBJECTIVE AND OBJECTIVE BOX
GENERAL SURGERY DAILY PROGRESS NOTE:       Subjective: Patient examined at bedside. No acute events overnight. Patient resting comfortably with no complaints at this time, denies N/V and states he is passing flatus and BM    Vital Signs Last 24 Hrs  T(C): 36.5 (19 @ 06:51), Max: 37.1 (19 @ 21:50)  HR: 75 (19 @ 06:51) (72 - 89)  BP: 148/86 (19 @ 06:51) (112/67 - 148/86)  RR: 20 (19 @ 06:51) (17 - 20)  SpO2: 98% (19 @ 06:51) (96% - 100%)  CAPILLARY BLOOD GLUCOSE      POCT Blood Glucose.: 155 mg/dL (2019 08:49)  POCT Blood Glucose.: 151 mg/dL (2019 21:34)  POCT Blood Glucose.: 143 mg/dL (2019 17:26)  POCT Blood Glucose.: 167 mg/dL (2019 12:35)   N/A       @ 07:  -   @ 07:00  --------------------------------------------------------  IN:    dextrose 5% + lactated ringers.: 1500 mL    IV PiggyBack: 250 mL    Oral Fluid: 240 mL  Total IN: 1990 mL    OUT:    Voided: 2200 mL  Total OUT: 2200 mL    Total NET: -210 mL    General: NAD, well-nourished  Resp: Breathing comfortably on RA  CV: Normal sinus rhythm  Abd: soft, ND NT  Ext: ROMIx4, motor strength intact x 4      LABS:  CBC ( @ 07:05)                              12.2<L>                         5.86    )----------------(  190        --    % Neutrophils, --    % Lymphocytes, ANC: --                                  40.3    CBC ( @ 06:30)                              11.5<L>                         4.51    )----------------(  195        --    % Neutrophils, --    % Lymphocytes, ANC: --                                  38.0<L>    BMP ( @ 07:05)             139     |  102     |  16    		Ca++ --      Ca 9.3                ---------------------------------( 156<H>		Mg 2.0                3.4<L>  |  27      |  0.77  			Ph 2.9     BMP ( @ 06:30)             144     |  107     |  22    		Ca++ --      Ca 9.1                ---------------------------------( 186<H>		Mg 2.0                3.7     |  26      |  0.87  			Ph 2.2<L>      Urinalysis ( @ 05:45):     Color: YELLOW / Appearance: CLEAR / S.038 / pH: 6.0 / Gluc: >1000<H> / Ketones: SMALL / Bili: NEGATIVE / Urobili: NORMAL / Protein :20 / Nitrites: NEGATIVE / Leuk.Est: NEGATIVE / RBC: 0-2 / WBC: 0-2 / Sq Epi: OCC / Non Sq Epi:  / Bacteria NEGATIVE           Assessment and Plan:   · Assessment		  43 yr M with recurrent SBO     - monitor Bowel function   - advance diet as tolerated  - dvt ppx  - OOB    [id number]

## 2019-07-30 NOTE — DISCHARGE NOTE PROVIDER - NSDCFUADDAPPT_GEN_ALL_CORE_FT
Follow up with Dr. White in one week, call for an appointment. Please follow up with your primary care physician regarding your hospitalization. Do not drive while taking pain medications

## 2019-07-30 NOTE — DISCHARGE NOTE NURSING/CASE MANAGEMENT/SOCIAL WORK - NSDCDPATPORTLINK_GEN_ALL_CORE
You can access the [facility] Patient Portal, offered by [facility], by registering with the following website: [email or web address]

## 2019-07-30 NOTE — PATIENT PROFILE ADULT. - PATIENT REPRESENTATIVE NAME
+toleration for thin liquids, puree and soft solids w/o overt s/s penetration/aspiration. hard solids not trialed 2' sparce dentition.
Elva Em

## 2019-11-22 NOTE — DISCHARGE NOTE ADULT - NSTOBACCONEVERSMOKERY/N_GEN_A
CARDIOLOGY OFFICE NOTE        ORIGINAL REASON FOR CONSULT:  Dyspnea (03/02/2018)  Jemima Flores MD   [address] [facility] [id number]  Faby Martinez is a 63 year old female with the following issues: CARDIAC INVESTIGATIONS/IMAGING   1. Age 63  2. HTN/Dyslipidemia   3. Non-obstructive CAD  4. PH-HFpEF (post capillary PH)  5. IDDM with polyneuropathy  6. Obesity (BMI 36) s/p bariatric sleeve surgery (06/12/2019)  7. MOLLY noncompliant with CPAP (unable to tolerate mask)  8. ESRD s/p Kidney transplant (11/2007) was on HD for 4.5 years prior to transplant (Dr. Palm)  9. GERD  10. Hepatitis C  11. H/o TIA  12. MGUS  13. Depression    BNP: 185 (03/02/2018)  H2FpEF Score: 6  NYHA FC: 1-2 1. ECHO (03/09/2018): LVEF 65%, E/e' >25, RVSP 39.2 mmHg  2. LEXISCAN (04/28/2017): Negative   3. CARDIAC CATH (03/15/2018): Mild, non-obstructive CAD, LVEDP 16. RHC below (04/22/2014): Moderate mid LAD disease with FFR of 0.90 with 80 mcg of intracoronary adenosine. LVEDP 20. (11/17/2003) LAD 30-35% stenosis, CX lg dominant with 30% disease, RCA very small  4. ECG (02/17/2018): SR  5. CT CHEST (03/06/2018): several tiny nodules.  6. PFTs (10/05/2016): Normal. Moderately reduced diffusing capacity.   7. SLEEP STUDY (09/07/2018): Severe MOLLY  8. OUTSIDE LABS (08/16/2018): K 3.9, Creatinine 1.35, Hgb 11.0, , HDK 37, TSH 1.980     Ms. Martinez presents in follow up after 5/10/19.  At our last visit, we made no changes, but advised the patient to return for cardiac clearance before bariatric surgery.  At that time, the patient had stable FC 2 dyspnea. She was planning to undergo bariatric sleeve surgery on 6/12/2019 at [facility].  She underwent surgery as planned. Post-op, she had an episode of hematemesis and aspirin and heparin were temporarily held. On 7/6/19, she was seen in the ED at [facility] for generalized weakness. She was given fluids and albumin, and Lasix was held with good response. She was advised to begin her Phase 2  bariatric diet and take a multivitamin. Creatinine had increased to 1.72 during that visit, but returned to baseline (1.2) on labs after the admission. She followed up with nephrology on 7/22/19; started on ProCrit weekly for anemia and Coreg increased back to 12.5 mg BID.    Today, she denies cardiac complaints including chest pain/discomfort and dyspnea.  She has started using her walking more frequently due to increased hip pain.   Since has lost 40 lbs since her bariatric surgery.      RHC (03/15/2018):  BP= 125/85 mmHg         HR= 71  RA= 11 mmHg     RV= 55/15 mmHg             PAP= 52/24/35 mmHg   Sat= 74.6%  PCWP= 21 mmHg  TPG= 14 mmHg              PVR= 2.5 tobin (using Marta cardiac output)  Hgb= 10.7 gm/dl  Thermodilution Cardiac output (L/min)/cardiac index (L/min/m2) 5.5/2.7.  MARTA Cardiac output (L/min)/cardiac index (L/min/m2) 8.6/4.2.    We originally saw her in consult for HFpEF. She's been seen several times for dyspnea. She had an echo done at [facility] 04/2017 which reported diastolic dysfunction. She's been seen on multiple accounts in the ED at [facility] and [facility] for a variety of reasons (dyspnea, RUQ pain, chest pain). Cardiac cath was done in 2014 due to complaints of chest pain (no intervention). To note, PCP also referred her to behavioral health as she's been struggling to cope with the loss of her daughter and ex-  (both DM complications).      ACTIVITY LEVEL     Limited with no regular walking regimen; uses a walker.    Independent lifestyle, PCW comes to help with cleaning 2 days/week.    REVIEW OF SYSTEMS     Currently denies headache, fever, chills, weight loss, fatigue,change in vision or hearing, epistaxis, sinus discharge or dysphagia, cough, hemoptysis and wheeze. Also denies chest pain, syncope, presyncope, lightheadedness, dizziness, orthopnea, PND, and bendopnea. Patient also denies abdominal pain,  change in bowel habits, nausea, vomiting, diarrhea. Denies dysuria, hematuria, polyuria,  rash, pruritus, lesions, jaundice, sensory or motor deficits, seizures, loss of consciousness, change in mental status.      LAST HOSPITALIZATION     NA    PERSONAL/SOCIAL HISTORY     Single. Lives alone in a senior facility.   Previously worked as a .   Lost daughter and ex-  in 2017.  LTNS    FAMILY HISTORY     4 children (1 daughter ).  2 children live in [place] and 1 in [place].  2 brothers, 3 sisters    SURGICAL HISTORY     Kidney transplant 2007    MOLLY RISK ASSESSMENT     Diagnosed MOLLY, cannot tolerate CPAP    PAD/AAA RISK ASSESSMENT (ULTRASOUND/ABIs)     NA (age, LTNS)    CURRENT MEDICATIONS     Current Outpatient Medications   Medication Sig Dispense Refill   • Multiple Vitamins-Minerals (VITAMIN - THERAPEUTIC MULTIVITAMINS W/MINERALS) tablet Take 1 tablet by mouth 2 times daily.     • Cyanocobalamin (VITAMIN B 12 PO)      • Cholecalciferol (VITAMIN D3 PO) Indications: Takes , Thursday, and Saturday     • allopurinol (ZYLOPRIM) 100 MG tablet Take 1 tablet by mouth daily.  Take along with 300 mg tablet to total 400 mg daily. 90 tablet 4   • predniSONE (DELTASONE) 5 MG tablet Take 1 tablet by mouth daily. 90 tablet 3   • atorvastatin (LIPITOR) 40 MG tablet Take 1 tablet by mouth daily. 90 tablet 0   • furosemide (LASIX) 40 MG tablet Take 3 tablets by mouth daily. 270 tablet 1   • carvedilol (COREG) 6.25 MG tablet Take 1 tablet by mouth 2 times daily. (Patient taking differently: Take 12.5 mg by mouth 2 times daily. ) 180 tablet 3   • allopurinol (ZYLOPRIM) 100 MG tablet Take 1 tablet by mouth daily. Take along with 300 mg tablet to total 400 mg daily 90 tablet 4   • allopurinol (ZYLOPRIM) 300 MG tablet Take 1 tablet by mouth daily. Take along with 100 mg tablet to total 400 mg daily 90 tablet 4   • omeprazole (PRILOSEC) 20 MG capsule TAKE ONE CAPSULE BY MOUTH DAILY 90 capsule 1   • amLODIPine (NORVASC) 10 MG tablet Take 1 tablet by mouth daily. 90 tablet 1   • cycloSPORINE  modified 100 MG capsule Take 1 capsule (100 mg total) by mouth 2 times daily. 60 capsule 12   • MYFORTIC 360 MG DR tablet Take 1 tablet (360 mg total) by mouth 2 times daily. 60 tablet 14   • olmesartan (BENICAR) 40 MG tablet Take 1 tablet by mouth daily. 90 tablet 0   • polyethylene glycol (MIRALAX) powder Dissolve & drink one capfull(17gm) by mouth daily. 1581 g 1   • nitroGLYcerin (NITROSTAT) 0.4 MG sublingual tablet Place 1 tablet under the tongue every 5 minutes as needed for Chest pain. 25 tablet 1   • albuterol 108 (90 Base) MCG/ACT inhaler Inhale 2 puffs into the lungs every 4 hours as needed for Shortness of Breath or Wheezing. 3 Inhaler 1   • insulin aspart (NOVOLOG FLEXPEN) 100 UNIT/ML pen-injector Inject up to 120 units in divided doses as directed, daily. 105 mL 3   • insulin degludec (TRESIBA FLEXTOUCH) 200 UNIT/ML pen-injector Inject up to 120 units daily. 18 mL 5   • potassium chloride 10 MEQ CR tablet Take 2 tablets by mouth 2 times daily. 120 tablet 6   • acetaminophen (TYLENOL ARTHRITIS PAIN) 650 MG CR tablet Take 650 mg by mouth every 8 hours as needed for Pain.     • aspirin 81 MG tablet Take 1 tablet by mouth daily.     • gabapentin (NEURONTIN) 300 MG capsule Take 4 capsules by mouth 2 times daily. 720 capsule 1   • sodium chloride (OCEAN) 0.65 % nasal spray Spray 2-4 sprays in each nostril.     • nystatin (MYCOSTATIN) 939436 UNIT/GM powder Apply topically 2 times daily as needed (skin rash). 30 g 5   • valACYclovir (VALTREX) 500 MG tablet Take 1 tablet by mouth daily for 3 days at onset of rash 30 tablet 5   • lidocaine (XYLOCAINE) 5 % ointment Apply topically 4 times daily as needed (pain). 35.44 g 5   • sucralfate (CARAFATE) 1 g tablet Take 1 tablet by mouth 4 times daily. 360 tablet 1   • Lancets (ACCU-CHEK MULTICLIX) Misc Use as directed 4 times daily. 408 each 3   • DISPENSE Knee high support stockings dx edema 2 Package 0   • Magnesium 250 MG TABS Take 250 mg by mouth daily. 1 2 times  daily     • Artificial Tears 0.5-1.4 % SOLN Apply 1 drop to eye. One drop in each eye at bedtime and 2 times daily 10 mL 8     No current facility-administered medications for this visit.       PHYSICAL EXAMINATION     [facility] Extended Vitals - Weight in Kg/Lb 5/10/2019 5/16/2019 5/16/2019 8/19/2019 11/22/2019   /70 140/66 134/66 118/58 138/58   Pulse 74 74 - 79 61   Resp 16 16 - 16 -   Temp - - - - -   Weight kg 108.591 kg 108.636 kg - 89.177 kg 89.903 kg   Weight lb 239 lb 6.4 oz 239 lb 8 oz - 196 lb 9.6 oz 198 lb 3.2 oz   Height - - - - 5' 2\"   Height cm - - - - 157.5 cm   BMI - - - - 36.25   Pulse Ox 97 98 - 97 96   Patient Position - Sitting - Sitting -   BP Location - LUE - LUE -   Cuff Size - - - - -      General : No acute distress  HEENT: PERRL, Normocephalic/atraumatic, clear oropharynx  Neck: Supple, FROM, No LAP/Thyromegaly. Trachea central. No JVD (jugular vein distention) or carotid bruit.  CVS: S1, S2 normal. No M/R/G  Pulm: Clear to auscultation/percussion. No Wheeze/Rhonchi/Rales  Abd: Soft, nontender/non-distended. No bruits/peristalsis. No hepatosplenomegaly.  Ext: No cyanosis/clubbing. Trace edema  Skin: No rash/palpable nodules  Neuro: Cranial nerves II through XII are intact. Power is equal in all extremities. Gross sensory exam normal.      LABORATORY     No results for input(s): HGB, PLT, CREATININE, BUN, GFRNA, PRCR, UTPELC, UCR, NTPROB, BNP, RAPDTR, POTASSIUM, SODIUM, TSH, VITD25, CRP, HGBA1C, INR, IRON, PST, TIBC, FERR, CHOLESTEROL, HDL, CHOHDL, LDLDIR, CALCLDL, TRIGLYCERIDE in the last 8765 hours.     ECHO (03/09/2018):  1. Normal LV size. Incraesed LV wall thickness. LVEF 65%; E/e' > 25- HFpEF  2. Normal RV size and function.  3. No major valev disease  4. No pericardial effusion/aortopathy    UPCOMING APPOINTMENTS     Future Appointments   Date Time Provider Department Center   12/6/2019  9:40 AM MD HEATHER Thornton      ASSESSMENT AND PLAN     Faby Martinez is a 63 year old  female with the following cardiovascular profile:    1. Age 63  2. Hypertension   3. Coronary artery disease, nonobstructive  4. Pulmonary hypertension-Heart failure with preserved ejection fraction   5. Obstructive sleep apnea   6. Morbid obesity  7. Chronic kidney disease     Middle aged female with stable dyspnea with activity- mild (NYHA FC 2).  Elevated filling pressures on echo (E/e' >20) with restrictive filling pattern.  High pressures also demonstrated by RHC with typical PH-HFpEF findings.   Already on diuretics; does not want to increase them any further.  Her dyspnea was discussed in detail today with her and is multifactorial in nature:                 1. PH-HFpEF                 2. MOLLY                 3. General deconditioning   No further changes at this time.  Dyspnea is much better after the surgery.     RECOMMENDATIONS     1. Continue current medication regimen.    Follow up in 1 year.    IDanish, RN attest that I performed the duties of scribe in the presence of CHANELL Farrar MD.    The documentation recorded by the scribe accurately and completely reflects the service(s) I personally performed and the decisions made by me.     I, CHANELL Farrar, have personally taken a history, performed a physical examination of the patient, reviewed the clinical data, labs, imagings, ecg. I have reviewed and edited the above note as needed. I have personally formulated the clinical impression and recommendations as stated.       CHANELL OLIVA MD  [phone]       No

## 2020-07-06 NOTE — ED PROVIDER NOTE - NS_EDPROVIDERDISPOUSERTYPE_ED_A_ED
Abdominal fluid specimens from right groin RISHABH drain inserted 07/05 sent down to lab via tube system.   Attending Attestation (For Attendings USE Only)...

## 2020-08-12 NOTE — ED PROVIDER NOTE - DIAGNOSIS COUNSELING, MDM
[FreeTextEntry1] : No current complaints\par No fever, No cough, No ear pain, No nasal congestion\par No wheezing\par Normal appetite, No vomiting, No diarrhea\par No reactions to previous vaccines\par No egg allergies\par No immunocompromised contacts\par  conducted a detailed discussion... I had a detailed discussion with the patient and/or guardian regarding the historical points, exam findings, and any diagnostic results supporting the discharge/admit diagnosis.

## 2020-08-17 ENCOUNTER — INPATIENT (INPATIENT)
Facility: HOSPITAL | Age: 45
LOS: 3 days | Discharge: HOME | End: 2020-08-21
Attending: SURGERY | Admitting: SURGERY
Payer: COMMERCIAL

## 2020-08-17 VITALS
DIASTOLIC BLOOD PRESSURE: 78 MMHG | HEART RATE: 115 BPM | OXYGEN SATURATION: 97 % | SYSTOLIC BLOOD PRESSURE: 140 MMHG | TEMPERATURE: 97 F | RESPIRATION RATE: 14 BRPM

## 2020-08-17 DIAGNOSIS — K56.609 UNSPECIFIED INTESTINAL OBSTRUCTION, UNSPECIFIED AS TO PARTIAL VERSUS COMPLETE OBSTRUCTION: ICD-10-CM

## 2020-08-17 DIAGNOSIS — Z98.890 OTHER SPECIFIED POSTPROCEDURAL STATES: Chronic | ICD-10-CM

## 2020-08-17 DIAGNOSIS — Z98.89 OTHER SPECIFIED POSTPROCEDURAL STATES: Chronic | ICD-10-CM

## 2020-08-17 LAB
ALBUMIN SERPL ELPH-MCNC: 4.8 G/DL — SIGNIFICANT CHANGE UP (ref 3.3–5)
ALP SERPL-CCNC: 39 U/L — LOW (ref 40–120)
ALT FLD-CCNC: 35 U/L — SIGNIFICANT CHANGE UP (ref 4–41)
ANION GAP SERPL CALC-SCNC: 17 MMO/L — HIGH (ref 7–14)
APTT BLD: 27.6 SEC — SIGNIFICANT CHANGE UP (ref 27–36.3)
AST SERPL-CCNC: 22 U/L — SIGNIFICANT CHANGE UP (ref 4–40)
BASE EXCESS BLDV CALC-SCNC: -0.8 MMOL/L — SIGNIFICANT CHANGE UP
BASOPHILS # BLD AUTO: 0.02 K/UL — SIGNIFICANT CHANGE UP (ref 0–0.2)
BASOPHILS NFR BLD AUTO: 0.2 % — SIGNIFICANT CHANGE UP (ref 0–2)
BILIRUB SERPL-MCNC: 0.7 MG/DL — SIGNIFICANT CHANGE UP (ref 0.2–1.2)
BLD GP AB SCN SERPL QL: NEGATIVE — SIGNIFICANT CHANGE UP
BLOOD GAS VENOUS - CREATININE: 0.89 MG/DL — SIGNIFICANT CHANGE UP (ref 0.5–1.3)
BLOOD GAS VENOUS - FIO2: 21 — SIGNIFICANT CHANGE UP
BUN SERPL-MCNC: 22 MG/DL — SIGNIFICANT CHANGE UP (ref 7–23)
CALCIUM SERPL-MCNC: 9.9 MG/DL — SIGNIFICANT CHANGE UP (ref 8.4–10.5)
CHLORIDE BLDV-SCNC: 108 MMOL/L — SIGNIFICANT CHANGE UP (ref 96–108)
CHLORIDE SERPL-SCNC: 101 MMOL/L — SIGNIFICANT CHANGE UP (ref 98–107)
CO2 SERPL-SCNC: 23 MMOL/L — SIGNIFICANT CHANGE UP (ref 22–31)
CREAT SERPL-MCNC: 0.92 MG/DL — SIGNIFICANT CHANGE UP (ref 0.5–1.3)
EOSINOPHIL # BLD AUTO: 0.03 K/UL — SIGNIFICANT CHANGE UP (ref 0–0.5)
EOSINOPHIL NFR BLD AUTO: 0.3 % — SIGNIFICANT CHANGE UP (ref 0–6)
GAS PNL BLDV: 139 MMOL/L — SIGNIFICANT CHANGE UP (ref 136–146)
GLUCOSE BLDC GLUCOMTR-MCNC: 136 MG/DL — HIGH (ref 70–99)
GLUCOSE BLDC GLUCOMTR-MCNC: 142 MG/DL — HIGH (ref 70–99)
GLUCOSE BLDV-MCNC: 164 MG/DL — HIGH (ref 70–99)
GLUCOSE SERPL-MCNC: 190 MG/DL — HIGH (ref 70–99)
HCO3 BLDV-SCNC: 24 MMOL/L — SIGNIFICANT CHANGE UP (ref 20–27)
HCT VFR BLD CALC: 44.3 % — SIGNIFICANT CHANGE UP (ref 39–50)
HCT VFR BLDV CALC: 40.6 % — SIGNIFICANT CHANGE UP (ref 39–51)
HGB BLD-MCNC: 13.8 G/DL — SIGNIFICANT CHANGE UP (ref 13–17)
HGB BLDV-MCNC: 13.2 G/DL — SIGNIFICANT CHANGE UP (ref 13–17)
IMM GRANULOCYTES NFR BLD AUTO: 0.4 % — SIGNIFICANT CHANGE UP (ref 0–1.5)
INR BLD: 1.07 — SIGNIFICANT CHANGE UP (ref 0.88–1.16)
LACTATE BLDV-MCNC: 1.9 MMOL/L — SIGNIFICANT CHANGE UP (ref 0.5–2)
LIDOCAIN IGE QN: 25.2 U/L — SIGNIFICANT CHANGE UP (ref 7–60)
LYMPHOCYTES # BLD AUTO: 1.84 K/UL — SIGNIFICANT CHANGE UP (ref 1–3.3)
LYMPHOCYTES # BLD AUTO: 16.2 % — SIGNIFICANT CHANGE UP (ref 13–44)
MCHC RBC-ENTMCNC: 26.7 PG — LOW (ref 27–34)
MCHC RBC-ENTMCNC: 31.2 % — LOW (ref 32–36)
MCV RBC AUTO: 85.9 FL — SIGNIFICANT CHANGE UP (ref 80–100)
MONOCYTES # BLD AUTO: 0.63 K/UL — SIGNIFICANT CHANGE UP (ref 0–0.9)
MONOCYTES NFR BLD AUTO: 5.5 % — SIGNIFICANT CHANGE UP (ref 2–14)
NEUTROPHILS # BLD AUTO: 8.8 K/UL — HIGH (ref 1.8–7.4)
NEUTROPHILS NFR BLD AUTO: 77.4 % — HIGH (ref 43–77)
NRBC # FLD: 0 K/UL — SIGNIFICANT CHANGE UP (ref 0–0)
PCO2 BLDV: 37 MMHG — LOW (ref 41–51)
PH BLDV: 7.41 PH — SIGNIFICANT CHANGE UP (ref 7.32–7.43)
PLATELET # BLD AUTO: 270 K/UL — SIGNIFICANT CHANGE UP (ref 150–400)
PMV BLD: 10.9 FL — SIGNIFICANT CHANGE UP (ref 7–13)
PO2 BLDV: 56 MMHG — HIGH (ref 35–40)
POTASSIUM BLDV-SCNC: 5.1 MMOL/L — HIGH (ref 3.4–4.5)
POTASSIUM SERPL-MCNC: 3.9 MMOL/L — SIGNIFICANT CHANGE UP (ref 3.5–5.3)
POTASSIUM SERPL-SCNC: 3.9 MMOL/L — SIGNIFICANT CHANGE UP (ref 3.5–5.3)
PROT SERPL-MCNC: 7.9 G/DL — SIGNIFICANT CHANGE UP (ref 6–8.3)
PROTHROM AB SERPL-ACNC: 12.2 SEC — SIGNIFICANT CHANGE UP (ref 10.6–13.6)
RBC # BLD: 5.16 M/UL — SIGNIFICANT CHANGE UP (ref 4.2–5.8)
RBC # FLD: 15.3 % — HIGH (ref 10.3–14.5)
RH IG SCN BLD-IMP: POSITIVE — SIGNIFICANT CHANGE UP
SAO2 % BLDV: 86.3 % — HIGH (ref 60–85)
SARS-COV-2 RNA SPEC QL NAA+PROBE: SIGNIFICANT CHANGE UP
SODIUM SERPL-SCNC: 141 MMOL/L — SIGNIFICANT CHANGE UP (ref 135–145)
WBC # BLD: 11.37 K/UL — HIGH (ref 3.8–10.5)
WBC # FLD AUTO: 11.37 K/UL — HIGH (ref 3.8–10.5)

## 2020-08-17 PROCEDURE — 99222 1ST HOSP IP/OBS MODERATE 55: CPT | Mod: GC

## 2020-08-17 PROCEDURE — 74176 CT ABD & PELVIS W/O CONTRAST: CPT | Mod: 26

## 2020-08-17 PROCEDURE — 99285 EMERGENCY DEPT VISIT HI MDM: CPT

## 2020-08-17 RX ORDER — SODIUM CHLORIDE 9 MG/ML
1000 INJECTION INTRAMUSCULAR; INTRAVENOUS; SUBCUTANEOUS ONCE
Refills: 0 | Status: COMPLETED | OUTPATIENT
Start: 2020-08-17 | End: 2020-08-17

## 2020-08-17 RX ORDER — SODIUM CHLORIDE 9 MG/ML
1000 INJECTION, SOLUTION INTRAVENOUS
Refills: 0 | Status: DISCONTINUED | OUTPATIENT
Start: 2020-08-17 | End: 2020-08-21

## 2020-08-17 RX ORDER — INSULIN LISPRO 100/ML
VIAL (ML) SUBCUTANEOUS EVERY 6 HOURS
Refills: 0 | Status: DISCONTINUED | OUTPATIENT
Start: 2020-08-17 | End: 2020-08-19

## 2020-08-17 RX ORDER — ACETAMINOPHEN 500 MG
1000 TABLET ORAL ONCE
Refills: 0 | Status: COMPLETED | OUTPATIENT
Start: 2020-08-17 | End: 2020-08-17

## 2020-08-17 RX ORDER — KETOROLAC TROMETHAMINE 30 MG/ML
15 SYRINGE (ML) INJECTION ONCE
Refills: 0 | Status: DISCONTINUED | OUTPATIENT
Start: 2020-08-17 | End: 2020-08-17

## 2020-08-17 RX ORDER — ENOXAPARIN SODIUM 100 MG/ML
40 INJECTION SUBCUTANEOUS DAILY
Refills: 0 | Status: DISCONTINUED | OUTPATIENT
Start: 2020-08-17 | End: 2020-08-21

## 2020-08-17 RX ORDER — ONDANSETRON 8 MG/1
4 TABLET, FILM COATED ORAL EVERY 6 HOURS
Refills: 0 | Status: DISCONTINUED | OUTPATIENT
Start: 2020-08-17 | End: 2020-08-21

## 2020-08-17 RX ORDER — ONDANSETRON 8 MG/1
4 TABLET, FILM COATED ORAL ONCE
Refills: 0 | Status: COMPLETED | OUTPATIENT
Start: 2020-08-17 | End: 2020-08-17

## 2020-08-17 RX ADMIN — Medication 400 MILLIGRAM(S): at 14:39

## 2020-08-17 RX ADMIN — ONDANSETRON 4 MILLIGRAM(S): 8 TABLET, FILM COATED ORAL at 14:40

## 2020-08-17 RX ADMIN — Medication 15 MILLIGRAM(S): at 21:07

## 2020-08-17 RX ADMIN — SODIUM CHLORIDE 1000 MILLILITER(S): 9 INJECTION INTRAMUSCULAR; INTRAVENOUS; SUBCUTANEOUS at 14:40

## 2020-08-17 NOTE — ED ADULT NURSE NOTE - OBJECTIVE STATEMENT
Pt to bed 24. Alert and oriented x 3. Pt c/o abd pain with n/v since last night. IVL placed. Bloods drawn. Safety maintained. Will continue to monitor.

## 2020-08-17 NOTE — H&P ADULT - ASSESSMENT
ASSESSMENT:  Sathya is a very pleasant 44 year old gentleman with extensive surgical history and multiple prior SBO's presenting with low grade SBO    PLAN:  - Admit to surgery, Dr. White  - NPO, IVF  - Monitor bowel function, serial abdominal exams  - Will place NG if pt begins having emesis or otherwise clinically worsening  - Case discussed with surgical attending       Bobo Faulkner, PGY 3  Surgery [id number]

## 2020-08-17 NOTE — ED PROVIDER NOTE - OBJECTIVE STATEMENT
45yo M p/w periumbilical abdominal pain a/w nausea. Last bowel movement today. Denies fever, vomiting, melena/hematochezia, urinary symptoms, chest pain, shortness of breath, or any other concerning symptoms. Notes associated abdominal bloating. Has not taken any medications at home. Pain started last night and become progressively worse.

## 2020-08-17 NOTE — H&P ADULT - NSHPLABSRESULTS_GEN_ALL_CORE
< from: CT Abdomen and Pelvis w/ Oral Cont (08.17.20 @ 17:28) >      EXAM:  CT ABDOMEN AND PELVIS OC        PROCEDURE DATE:  Aug 17 2020         INTERPRETATION:  CLINICAL INFORMATION: Abdominal pain. History of multiple partial bowel obstructions.    COMPARISON: CT abdomen and pelvis 3/21/2019.    PROCEDURE:  CT of the Abdomen and Pelvis was performed without intravenous contrast.  Intravenous contrast: None.  Oral contrast: positive contrast was administered.  Sagittal and coronal reformats were performed.    FINDINGS:  LOWER CHEST: Bilateral gynecomastia.    LIVER: Hepatic steatosis.  BILE DUCTS: Normal caliber.  GALLBLADDER: Within normal limits.  SPLEEN: Within normal limits.  PANCREAS: Within normal limits.  ADRENALS: Within normal limits.  KIDNEYS/URETERS: No hydronephrosis or renal stone.    BLADDER: Within normal limits.  REPRODUCTIVE ORGANS: Normal sized prostate.    BOWEL/ABDOMINAL WALL: Status post sigmoidal rectal anastomosis. Few colonic diverticula. There are few ventral hernia containing small bowel and mobile cecum with diastases of abdominal rectus muscle. Mild jejunal dilatation with possible transition in the lower anterior mid abdomen near an inferior abdominal wall hernia. Findings consistent with low-grade small bowel obstruction. Normal appendix.  PERITONEUM: No mesenteric edema.  VESSELS: Within normal limits.  RETROPERITONEUM/LYMPH NODES: No lymphadenopathy.  BONES: Mild degenerative changes.    IMPRESSION:  Low-grade small bowel obstruction.              OTILIA AYON M.D., RADIOLOGY RESIDENT  This document has been electronically signed.  GLORIA OLSON M.D., ATTENDING RADIOLOGIST  This document has been electronically signed. Aug 17 2020  7:29PM                  < end of copied text >

## 2020-08-17 NOTE — ED ADULT TRIAGE NOTE - CHIEF COMPLAINT QUOTE
Pt presents to ED for intermittent abd pain w/ nausea and vomiting since last night. Pt endorses multiple abdominal surgeries and SBOs in the past. pmhx of diverticulitis, hernia, HTN and type 2 DM.

## 2020-08-17 NOTE — H&P ADULT - HISTORY OF PRESENT ILLNESS
Patient is a 44y old  Male who presents with a chief complaint of abdominal pain    HPI:   Sathya is a very pleasant 44 year old gentleman with history of DM, HTN, GERD, HLD, and perforated diverticulitis s/p Hartmans, s/p reversal, multiple prior SBO's s/p ex-lap, LINDA in 2014, 2016, s/p incisional hernia repair, known to the surgery service most recently admitted with SBO in 7/2019, resolved with conservative management, now presents to ED c/o abdominal pain. Patient reports 1 day of pain, epigastric/jacky-umbilical, cramping in nature and intermittent, associated with mild nausea. Pt denies any vomiting. Last BM was this morning, described as normal. However, pt states he has not noticed any flatus all day, last time he remembers passing gas was yesterday evening. Denies any other complaints including recent illness/sick contacts, fevers/chills, chest pain/shortness of breath, diarrhea/constipation.     ROS: 10-system review is otherwise negative except HPI above.      PAST MEDICAL & SURGICAL HISTORY:  GERD (gastroesophageal reflux disease)  Open wound of abdominal wall  DVT (deep venous thrombosis): RLE 2016, post op, was on eliquis for 6 months  Diverticulitis  SBO (small bowel obstruction)  Diabetes mellitus, type 2  Hyperlipidemia  Hypertension  H/O hernia repair: xlap, ventral hernia repair 6/2018  H/O exploratory laparotomy: LINDA, x3  S/P Isi's procedure: 2009 with reversal 2009    FAMILY HISTORY:  No pertinent family history in first degree relatives  [place]  [] Family history not pertinent as reviewed with the patient and family    SOCIAL HISTORY:    Nonsmoker  Social EtOH  Denies Illicit drug use     ALLERGIES: ceftriaxone (Rash)  cephalosporins (Rash)  Cipro (Unknown)  contrast media (iodine-based) (Rash)  fentanyl (Rash)  Flagyl (Unknown)  iodine containing compounds (Rash)  morphine (Rash)  --------------------------------------------------------------------------------------------

## 2020-08-17 NOTE — ED PROVIDER NOTE - ATTENDING CONTRIBUTION TO CARE
Patient presents to the ED with abd pain, nausea. Started last night, constant, mostly r. side of abd radiating diffusely, a/w nausea, progressively worsening, exacerbated by po intake, feels similar to prior bowel obstructions. No fevers, chills, ha, cough, cp, sob, abd pain, vomiting, diarrhea, dysuria. Did have a bm earlier, decreased flatus.   exam  GEN - NAD; well appearing; A+O x3   HEAD - NC/AT   EYES- PERRL, EOMI  ENT: Airway patent, dry mm, Oral cavity and pharynx normal.    NECK: Neck supple  PULMONARY - CTA b/l, symmetric breath sounds.   CARDIAC -s1s2, RRR, no M,G,R  ABDOMEN - +BS, ND, ttp rlq/periumbilical, soft, no guarding, no rebound, no masses   BACK - no CVA tenderness, Normal  spine   EXTREMITIES - FROM  SKIN - no rash or bruising   NEUROLOGIC - alert, speech clear, no focal deficits  PSYCH -nl mood/affect, nl insight.  Patient presents to the ED with abd pain and nausea, complex abd hx including surgeries and bowel obstruction. Plan for labs, ct a/p, pain ctrl-eval for diff including but not limited to bowel obstruction, infection, perforation, monitor, reass. Patient allergic to oral contrast but can use barium ready cat contrast for ct scan. patient nontoxic appearing.

## 2020-08-17 NOTE — ED PROVIDER NOTE - PROGRESS NOTE DETAILS
Discussed images with CT who have concern for partial bowel obstruction especially in clinical context. Discussed case with general surgery who agreed to see. Discussed results with patient whose condition remains stable. Understands and agrees to plan.

## 2020-08-17 NOTE — H&P ADULT - NSHPPHYSICALEXAM_GEN_ALL_CORE
General: Obese middle aged male, in no acute distress   Neurologic: Awake, alert, GCS 15, No focal Deficits   Respiratory: Normal respiratory effort  CVS: RRR, perfusing adequately  Abdomen: Abdomen obese, soft, mild epigastric TTP, no rebound or guarding. Lower midline scar and ostomy site scar well healed.   Ext: Grossly symmetric, Moving all extremities

## 2020-08-17 NOTE — ED PROVIDER NOTE - CLINICAL SUMMARY MEDICAL DECISION MAKING FREE TEXT BOX
Patient presented w/periumbilical abdominal pain and nausea consistent with prior episodes of partial bowel obstruction per patient. Most likely diagnosis is partial vs. full bowel obstruction, though other abdominal pathologies remain possible but less likely including diverticulitis, cholelithiasis, etc. EKG w/sinus tachycardia, low concern for ACS. Started IVF and zofran. labs, imaging pending; dispo per workup. Patient notified surgeon prior to arrival.

## 2020-08-18 LAB
ANION GAP SERPL CALC-SCNC: 11 MMO/L — SIGNIFICANT CHANGE UP (ref 7–14)
BUN SERPL-MCNC: 26 MG/DL — HIGH (ref 7–23)
CALCIUM SERPL-MCNC: 8.7 MG/DL — SIGNIFICANT CHANGE UP (ref 8.4–10.5)
CHLORIDE SERPL-SCNC: 109 MMOL/L — HIGH (ref 98–107)
CO2 SERPL-SCNC: 22 MMOL/L — SIGNIFICANT CHANGE UP (ref 22–31)
CREAT SERPL-MCNC: 0.92 MG/DL — SIGNIFICANT CHANGE UP (ref 0.5–1.3)
GLUCOSE BLDC GLUCOMTR-MCNC: 144 MG/DL — HIGH (ref 70–99)
GLUCOSE BLDC GLUCOMTR-MCNC: 148 MG/DL — HIGH (ref 70–99)
GLUCOSE BLDC GLUCOMTR-MCNC: 152 MG/DL — HIGH (ref 70–99)
GLUCOSE BLDC GLUCOMTR-MCNC: 165 MG/DL — HIGH (ref 70–99)
GLUCOSE SERPL-MCNC: 172 MG/DL — HIGH (ref 70–99)
HBA1C BLD-MCNC: 7.9 % — HIGH (ref 4–5.6)
HCT VFR BLD CALC: 38.1 % — LOW (ref 39–50)
HGB BLD-MCNC: 11.8 G/DL — LOW (ref 13–17)
MAGNESIUM SERPL-MCNC: 1.8 MG/DL — SIGNIFICANT CHANGE UP (ref 1.6–2.6)
MCHC RBC-ENTMCNC: 26.8 PG — LOW (ref 27–34)
MCHC RBC-ENTMCNC: 31 % — LOW (ref 32–36)
MCV RBC AUTO: 86.6 FL — SIGNIFICANT CHANGE UP (ref 80–100)
NRBC # FLD: 0 K/UL — SIGNIFICANT CHANGE UP (ref 0–0)
PHOSPHATE SERPL-MCNC: 2.3 MG/DL — LOW (ref 2.5–4.5)
PLATELET # BLD AUTO: 174 K/UL — SIGNIFICANT CHANGE UP (ref 150–400)
PMV BLD: 10.5 FL — SIGNIFICANT CHANGE UP (ref 7–13)
POTASSIUM SERPL-MCNC: 3.9 MMOL/L — SIGNIFICANT CHANGE UP (ref 3.5–5.3)
POTASSIUM SERPL-SCNC: 3.9 MMOL/L — SIGNIFICANT CHANGE UP (ref 3.5–5.3)
RBC # BLD: 4.4 M/UL — SIGNIFICANT CHANGE UP (ref 4.2–5.8)
RBC # FLD: 15.6 % — HIGH (ref 10.3–14.5)
SODIUM SERPL-SCNC: 142 MMOL/L — SIGNIFICANT CHANGE UP (ref 135–145)
WBC # BLD: 5.41 K/UL — SIGNIFICANT CHANGE UP (ref 3.8–10.5)
WBC # FLD AUTO: 5.41 K/UL — SIGNIFICANT CHANGE UP (ref 3.8–10.5)

## 2020-08-18 PROCEDURE — 99232 SBSQ HOSP IP/OBS MODERATE 35: CPT | Mod: GC

## 2020-08-18 RX ORDER — METOPROLOL TARTRATE 50 MG
25 TABLET ORAL
Refills: 0 | Status: DISCONTINUED | OUTPATIENT
Start: 2020-08-18 | End: 2020-08-21

## 2020-08-18 RX ORDER — PANTOPRAZOLE SODIUM 20 MG/1
40 TABLET, DELAYED RELEASE ORAL DAILY
Refills: 0 | Status: DISCONTINUED | OUTPATIENT
Start: 2020-08-18 | End: 2020-08-21

## 2020-08-18 RX ORDER — POTASSIUM PHOSPHATE, MONOBASIC POTASSIUM PHOSPHATE, DIBASIC 236; 224 MG/ML; MG/ML
15 INJECTION, SOLUTION INTRAVENOUS ONCE
Refills: 0 | Status: COMPLETED | OUTPATIENT
Start: 2020-08-18 | End: 2020-08-18

## 2020-08-18 RX ORDER — ACETAMINOPHEN 500 MG
1000 TABLET ORAL ONCE
Refills: 0 | Status: COMPLETED | OUTPATIENT
Start: 2020-08-18 | End: 2020-08-18

## 2020-08-18 RX ORDER — CHLORHEXIDINE GLUCONATE 213 G/1000ML
1 SOLUTION TOPICAL DAILY
Refills: 0 | Status: DISCONTINUED | OUTPATIENT
Start: 2020-08-18 | End: 2020-08-21

## 2020-08-18 RX ADMIN — ENOXAPARIN SODIUM 40 MILLIGRAM(S): 100 INJECTION SUBCUTANEOUS at 13:10

## 2020-08-18 RX ADMIN — POTASSIUM PHOSPHATE, MONOBASIC POTASSIUM PHOSPHATE, DIBASIC 62.5 MILLIMOLE(S): 236; 224 INJECTION, SOLUTION INTRAVENOUS at 13:11

## 2020-08-18 RX ADMIN — SODIUM CHLORIDE 150 MILLILITER(S): 9 INJECTION, SOLUTION INTRAVENOUS at 07:04

## 2020-08-18 RX ADMIN — SODIUM CHLORIDE 150 MILLILITER(S): 9 INJECTION, SOLUTION INTRAVENOUS at 17:31

## 2020-08-18 RX ADMIN — Medication 25 MILLIGRAM(S): at 18:25

## 2020-08-18 RX ADMIN — Medication 400 MILLIGRAM(S): at 22:10

## 2020-08-18 RX ADMIN — Medication 1000 MILLIGRAM(S): at 22:40

## 2020-08-18 RX ADMIN — Medication 1: at 06:41

## 2020-08-18 RX ADMIN — PANTOPRAZOLE SODIUM 40 MILLIGRAM(S): 20 TABLET, DELAYED RELEASE ORAL at 13:11

## 2020-08-18 RX ADMIN — Medication 1: at 18:25

## 2020-08-18 RX ADMIN — SODIUM CHLORIDE 150 MILLILITER(S): 9 INJECTION, SOLUTION INTRAVENOUS at 00:01

## 2020-08-18 NOTE — PROGRESS NOTE ADULT - ASSESSMENT
45 y/o male with extensive surgical history and multiple prior SBO's presenting with low grade SBO. Patient reports nausea and pain improving.    PLAN:  - NPO, IVF  - Monitor bowel function, serial abdominal exams  - Will place NG if pt begins having emesis or otherwise clinically worsening    [facility] Surgery  [id number]

## 2020-08-18 NOTE — PROGRESS NOTE ADULT - SUBJECTIVE AND OBJECTIVE BOX
TEAM [ A ] Surgery Daily Progress Note  =====================================================    SUBJECTIVE: Patient seen and examined at bedside on AM rounds. Patient reports that he's feeling well. NPO. Reports nausea is improved. Denies nausea, vomiting.    - Flatus/   - BM. OOB/Amublating as tolerated. Denies fever, chills.    PAST MEDICAL & SURGICAL HISTORY:  GERD (gastroesophageal reflux disease)  Open wound of abdominal wall  DVT (deep venous thrombosis): RLE 2016, post op, was on eliquis for 6 months  Diverticulitis  SBO (small bowel obstruction)  Diabetes mellitus, type 2  Hyperlipidemia  Hypertension  H/O hernia repair: xlap, ventral hernia repair 6/2018  H/O exploratory laparotomy: LINDA, x3  S/P Isi's procedure: 2009 with reversal 2009      ALLERGIES:  ceftriaxone (Rash)  cephalosporins (Rash)  Cipro (Unknown)  contrast media (iodine-based) (Rash)  fentanyl (Rash)  Flagyl (Unknown)  iodine containing compounds (Rash)  morphine (Rash)      --------------------------------------------------------------------------------------    MEDICATIONS:    Neurologic Medications  ondansetron Injectable 4 milliGRAM(s) IV Push every 6 hours PRN Nausea and/or Vomiting    Gastrointestinal Medications  dextrose 5% + sodium chloride 0.45% 1000 milliLiter(s) IV Continuous <Continuous>    Hematologic/Oncologic Medications  enoxaparin Injectable 40 milliGRAM(s) SubCutaneous daily    Endocrine/Metabolic Medications  insulin lispro (HumaLOG) corrective regimen sliding scale   SubCutaneous every 6 hours    --------------------------------------------------------------------------------------    VITAL SIGNS:  ICU Vital Signs Last 24 Hrs  T(C): 36.8 (18 Aug 2020 06:38), Max: 37.3 (18 Aug 2020 02:34)  T(F): 98.3 (18 Aug 2020 06:38), Max: 99.1 (18 Aug 2020 02:34)  HR: 85 (18 Aug 2020 06:38) (80 - 115)  BP: 116/62 (18 Aug 2020 06:38) (107/50 - 140/78)  RR: 18 (18 Aug 2020 06:38) (14 - 20)  SpO2: 96% (18 Aug 2020 06:38) (95% - 97%)      --------------------------------------------------------------------------------------    EXAM    General: NAD, resting in bed comfortably.  Respiratory: Nonlabored respirations, normal cw expansion.  Abdomen: soft, nontender, nondistended.  Vascular: Warm and well perfused.    --------------------------------------------------------------------------------------    LABS                          11.8   5.41  )-----------( 174      ( 18 Aug 2020 06:48 )             38.1     08-18    142  |  109<H>  |  26<H>  ----------------------------<  172<H>  3.9   |  22  |  0.92    Ca    8.7      18 Aug 2020 06:48  Phos  2.3     08-18  Mg     1.8     08-18    TPro  7.9  /  Alb  4.8  /  TBili  0.7  /  DBili  x   /  AST  22  /  ALT  35  /  AlkPhos  39<L>  08-17      --------------------------------------------------------------------------------------    INS AND OUTS:    I&O's Detail    17 Aug 2020 07:01  -  18 Aug 2020 07:00  --------------------------------------------------------  IN:    dextrose 5% + sodium chloride 0.45%: 900 mL  Total IN: 900 mL    OUT:    Voided: 450 mL  Total OUT: 450 mL    Total NET: 450 mL        --------------------------------------------------------------------------------------

## 2020-08-18 NOTE — PATIENT PROFILE ADULT - TRANSPORTATION
patient BIBA from home because was found by family in feces, vomit and urine today. history of drug abuse no

## 2020-08-18 NOTE — CHART NOTE - NSCHARTNOTEFT_GEN_A_CORE
CAPRINI SCORE [CLOT]    AGE RELATED RISK FACTORS                                                       MOBILITY RELATED FACTORS  [x] Age 41-60 years                                            (1 Point)                  [ ] Bed rest                                                        (1 Point)  [ ] Age: 61-74 years                                           (2 Points)                 [ ] Plaster cast                                                   (2 Points)  [ ] Age= 75 years                                              (3 Points)                 [ ] Bed bound for more than 72 hours                 (2 Points)    DISEASE RELATED RISK FACTORS                                               GENDER SPECIFIC FACTORS  [ ] Edema in the lower extremities                       (1 Point)                  [ ] Pregnancy                                                     (1 Point)  [ ] Varicose veins                                               (1 Point)                  [ ] Post-partum < 6 weeks                                   (1 Point)             [x] BMI > 25 Kg/m2                                            (1 Point)                  [ ] Hormonal therapy  or oral contraception          (1 Point)                 [ ] Sepsis (in the previous month)                        (1 Point)                  [ ] History of pregnancy complications                 (1 point)  [ ] Pneumonia or serious lung disease                                               [ ] Unexplained or recurrent                     (1 Point)           (in the previous month)                               (1 Point)  [ ] Abnormal pulmonary function test                     (1 Point)                 SURGERY RELATED RISK FACTORS  [ ] Acute myocardial infarction                              (1 Point)                 [ ]  Section                                             (1 Point)  [ ] Congestive heart failure (in the previous month)  (1 Point)               [ ] Minor surgery                                                  (1 Point)   [ ] Inflammatory bowel disease                             (1 Point)                 [ ] Arthroscopic surgery                                        (2 Points)  [ ] Central venous access                                      (2 Points)                [ ] General surgery lasting more than 45 minutes   (2 Points)       [ ] Stroke (in the previous month)                          (5 Points)               [ ] Elective arthroplasty                                         (5 Points)                                                                                                                                               HEMATOLOGY RELATED FACTORS                                                 TRAUMA RELATED RISK FACTORS  [x] Prior episodes of VTE                                     (3 Points)                [ ] Fracture of the hip, pelvis, or leg                       (5 Points)  [ ] Positive family history for VTE                         (3 Points)                 [ ] Acute spinal cord injury (in the previous month)  (5 Points)  [ ] Prothrombin 16298 A                                     (3 Points)                 [ ] Paralysis  (less than 1 month)                             (5 Points)  [ ] Factor V Leiden                                             (3 Points)                  [ ] Multiple Trauma within 1 month                        (5 Points)  [ ] Lupus anticoagulants                                     (3 Points)                                                           [ ] Anticardiolipin antibodies                               (3 Points)                                                       [ ] High homocysteine in the blood                      (3 Points)                                             [ ] Other congenital or acquired thrombophilia      (3 Points)                                                [ ] Heparin induced thrombocytopenia                  (3 Points)                                          Total Score [    5     ]    Caprini Score 0 - 2:  Low Risk, No VTE Prophylaxis required for most patients, encourage ambulation  Caprini Score 3 - 6:  At Risk, pharmacologic VTE prophylaxis is indicated for most patients (in the absence of a contraindication)  Caprini Score Greater than or = 7:  High Risk, pharmacologic VTE prophylaxis is indicated for most patients (in the absence of a contraindication)

## 2020-08-19 LAB
ANION GAP SERPL CALC-SCNC: 13 MMO/L — SIGNIFICANT CHANGE UP (ref 7–14)
BUN SERPL-MCNC: 14 MG/DL — SIGNIFICANT CHANGE UP (ref 7–23)
CALCIUM SERPL-MCNC: 8.3 MG/DL — LOW (ref 8.4–10.5)
CHLORIDE SERPL-SCNC: 107 MMOL/L — SIGNIFICANT CHANGE UP (ref 98–107)
CO2 SERPL-SCNC: 22 MMOL/L — SIGNIFICANT CHANGE UP (ref 22–31)
CREAT SERPL-MCNC: 0.72 MG/DL — SIGNIFICANT CHANGE UP (ref 0.5–1.3)
GLUCOSE BLDC GLUCOMTR-MCNC: 127 MG/DL — HIGH (ref 70–99)
GLUCOSE BLDC GLUCOMTR-MCNC: 130 MG/DL — HIGH (ref 70–99)
GLUCOSE BLDC GLUCOMTR-MCNC: 153 MG/DL — HIGH (ref 70–99)
GLUCOSE BLDC GLUCOMTR-MCNC: 179 MG/DL — HIGH (ref 70–99)
GLUCOSE SERPL-MCNC: 157 MG/DL — HIGH (ref 70–99)
HBA1C BLD-MCNC: 8.1 % — HIGH (ref 4–5.6)
HCT VFR BLD CALC: 36.5 % — LOW (ref 39–50)
HGB BLD-MCNC: 11.4 G/DL — LOW (ref 13–17)
MAGNESIUM SERPL-MCNC: 2 MG/DL — SIGNIFICANT CHANGE UP (ref 1.6–2.6)
MCHC RBC-ENTMCNC: 27.7 PG — SIGNIFICANT CHANGE UP (ref 27–34)
MCHC RBC-ENTMCNC: 31.2 % — LOW (ref 32–36)
MCV RBC AUTO: 88.6 FL — SIGNIFICANT CHANGE UP (ref 80–100)
NRBC # FLD: 0 K/UL — SIGNIFICANT CHANGE UP (ref 0–0)
PHOSPHATE SERPL-MCNC: 1.9 MG/DL — LOW (ref 2.5–4.5)
PLATELET # BLD AUTO: 162 K/UL — SIGNIFICANT CHANGE UP (ref 150–400)
PMV BLD: 11 FL — SIGNIFICANT CHANGE UP (ref 7–13)
POTASSIUM SERPL-MCNC: 3.6 MMOL/L — SIGNIFICANT CHANGE UP (ref 3.5–5.3)
POTASSIUM SERPL-SCNC: 3.6 MMOL/L — SIGNIFICANT CHANGE UP (ref 3.5–5.3)
RBC # BLD: 4.12 M/UL — LOW (ref 4.2–5.8)
RBC # FLD: 15.6 % — HIGH (ref 10.3–14.5)
SODIUM SERPL-SCNC: 142 MMOL/L — SIGNIFICANT CHANGE UP (ref 135–145)
WBC # BLD: 3.75 K/UL — LOW (ref 3.8–10.5)
WBC # FLD AUTO: 3.75 K/UL — LOW (ref 3.8–10.5)

## 2020-08-19 RX ORDER — DEXTROSE 50 % IN WATER 50 %
25 SYRINGE (ML) INTRAVENOUS ONCE
Refills: 0 | Status: DISCONTINUED | OUTPATIENT
Start: 2020-08-19 | End: 2020-08-21

## 2020-08-19 RX ORDER — INSULIN LISPRO 100/ML
VIAL (ML) SUBCUTANEOUS
Refills: 0 | Status: DISCONTINUED | OUTPATIENT
Start: 2020-08-19 | End: 2020-08-21

## 2020-08-19 RX ORDER — GLUCAGON INJECTION, SOLUTION 0.5 MG/.1ML
1 INJECTION, SOLUTION SUBCUTANEOUS ONCE
Refills: 0 | Status: DISCONTINUED | OUTPATIENT
Start: 2020-08-19 | End: 2020-08-21

## 2020-08-19 RX ORDER — DEXTROSE 50 % IN WATER 50 %
15 SYRINGE (ML) INTRAVENOUS ONCE
Refills: 0 | Status: DISCONTINUED | OUTPATIENT
Start: 2020-08-19 | End: 2020-08-21

## 2020-08-19 RX ORDER — INSULIN LISPRO 100/ML
VIAL (ML) SUBCUTANEOUS AT BEDTIME
Refills: 0 | Status: DISCONTINUED | OUTPATIENT
Start: 2020-08-19 | End: 2020-08-21

## 2020-08-19 RX ORDER — POTASSIUM CHLORIDE 20 MEQ
10 PACKET (EA) ORAL
Refills: 0 | Status: COMPLETED | OUTPATIENT
Start: 2020-08-19 | End: 2020-08-19

## 2020-08-19 RX ORDER — SODIUM,POTASSIUM PHOSPHATES 278-250MG
1 POWDER IN PACKET (EA) ORAL ONCE
Refills: 0 | Status: COMPLETED | OUTPATIENT
Start: 2020-08-19 | End: 2020-08-19

## 2020-08-19 RX ORDER — SODIUM CHLORIDE 9 MG/ML
1000 INJECTION, SOLUTION INTRAVENOUS
Refills: 0 | Status: DISCONTINUED | OUTPATIENT
Start: 2020-08-19 | End: 2020-08-21

## 2020-08-19 RX ORDER — DEXTROSE 50 % IN WATER 50 %
12.5 SYRINGE (ML) INTRAVENOUS ONCE
Refills: 0 | Status: DISCONTINUED | OUTPATIENT
Start: 2020-08-19 | End: 2020-08-21

## 2020-08-19 RX ADMIN — PANTOPRAZOLE SODIUM 40 MILLIGRAM(S): 20 TABLET, DELAYED RELEASE ORAL at 17:22

## 2020-08-19 RX ADMIN — ENOXAPARIN SODIUM 40 MILLIGRAM(S): 100 INJECTION SUBCUTANEOUS at 12:29

## 2020-08-19 RX ADMIN — Medication 1: at 12:29

## 2020-08-19 RX ADMIN — Medication 1: at 07:13

## 2020-08-19 RX ADMIN — Medication 25 MILLIGRAM(S): at 07:13

## 2020-08-19 RX ADMIN — Medication 25 MILLIGRAM(S): at 17:22

## 2020-08-19 RX ADMIN — Medication 100 MILLIEQUIVALENT(S): at 10:55

## 2020-08-19 RX ADMIN — CHLORHEXIDINE GLUCONATE 1 APPLICATION(S): 213 SOLUTION TOPICAL at 12:29

## 2020-08-19 RX ADMIN — Medication 1 PACKET(S): at 17:22

## 2020-08-19 RX ADMIN — Medication 100 MILLIEQUIVALENT(S): at 09:32

## 2020-08-19 NOTE — PROGRESS NOTE ADULT - ASSESSMENT
45 y/o male with extensive surgical history and multiple prior SBO's presenting with low grade SBO. Patient reports nausea and pain improving. Will start patient on sips and chips and evaluate his tolerance before attempting a CLD later in the afternoon.     PLAN:  - Diet: NPO with sips and chips  - Monitor bowel function, serial abdominal exams  - Will place NG if pt begins having emesis or otherwise clinically worsening    A Team Surgery  [id number]

## 2020-08-19 NOTE — PROGRESS NOTE ADULT - SUBJECTIVE AND OBJECTIVE BOX
GENERAL SURGERY PROGRESS NOTE TEAM A  ----------------------------------------------------------------------------------    LAUREN ESCAMILLA  |  [id number]   |   44y  |    Male   |   ROMERO ROBERTSON LT6M 611 A   |    Admit:  08-17-20 (2d)    Attending: Musa White      Patient is a 44y old  Male who presents with a chief complaint of abdominal pain.    ----------------------------------------------------------------------------------    SUBJECTIVE:   Patient seen today during morning rounds at bedside and without acute distress. Patient refers he is doing well without any nausea or vomiting with mild abdominal pain overnight.     Overnight: None    OBJECTIVE:  MEDICATIONS  (STANDING):  chlorhexidine 4% Liquid 1 Application(s) Topical daily  dextrose 5% + sodium chloride 0.45% 1000 milliLiter(s) (150 mL/Hr) IV Continuous <Continuous>  enoxaparin Injectable 40 milliGRAM(s) SubCutaneous daily  insulin lispro (HumaLOG) corrective regimen sliding scale   SubCutaneous every 6 hours  metoprolol tartrate 25 milliGRAM(s) Oral two times a day  pantoprazole  Injectable 40 milliGRAM(s) IV Push daily  potassium chloride  10 mEq/100 mL IVPB 10 milliEquivalent(s) IV Intermittent every 1 hour  potassium phosphate / sodium phosphate Powder (PHOS-NaK) 1 Packet(s) Oral once    MEDICATIONS  (PRN):  ondansetron Injectable 4 milliGRAM(s) IV Push every 6 hours PRN Nausea and/or Vomiting      Allergies    ceftriaxone (Rash)  cephalosporins (Rash)  Cipro (Unknown)  contrast media (iodine-based) (Rash)  fentanyl (Rash)  Flagyl (Unknown)  iodine containing compounds (Rash)  morphine (Rash)    Intolerances        PMH:   GERD (gastroesophageal reflux disease)  Open wound of abdominal wall  DVT (deep venous thrombosis)  Diverticulitis  SBO (small bowel obstruction)  Diabetes mellitus, type 2  Hyperlipidemia  Hypertension  H/O hernia repair  H/O exploratory laparotomy  S/P Isi's procedure      Vitals:  Vital Signs Last 24 Hrs  T(C): 36.4 (19 Aug 2020 06:45), Max: 37.2 (18 Aug 2020 10:48)  T(F): 97.6 (19 Aug 2020 06:45), Max: 98.9 (18 Aug 2020 10:48)  HR: 76 (19 Aug 2020 06:45) (73 - 95)  BP: 123/75 (19 Aug 2020 06:45) (116/60 - 135/77)  BP(mean): --  RR: 17 (19 Aug 2020 06:45) (17 - 18)  SpO2: 100% (19 Aug 2020 06:45) (97% - 100%)      Physical Exam:  Constitutional: resting in bed with no acute distress  Neuro: AAOx3  Respiratory:  unlabored breathing  Gastrointestinal: Abdomen soft, mild distention, mild tenderness to palpation.   Extremities:  No edema, no calf tenderness  Skin: Non-jaundiced, no cyanosis or rash observed    Intake&Output:    08-18-20 @ 07:01  -  08-19-20 @ 07:00  --------------------------------------------------------  IN: 3400 mL / OUT: 1200 mL / NET: 2200 mL        LABS:  ----------  LABORATORY DATA:  ----------                        11.4   3.75  )-----------( 162      ( 19 Aug 2020 07:30 )             36.5               08-19    142  |  107  |  14  ----------------------------<  157<H>  3.6   |  22  |  0.72    Ca    8.3<L>      19 Aug 2020 07:30  Phos  1.9     08-19  Mg     2.0     08-19    TPro  7.9  /  Alb  4.8  /  TBili  0.7  /  DBili  x   /  AST  22  /  ALT  35  /  AlkPhos  39<L>  08-17    LIVER FUNCTIONS - ( 17 Aug 2020 14:01 )  Alb: 4.8 g/dL / Pro: 7.9 g/dL / ALK PHOS: 39 u/L / ALT: 35 u/L / AST: 22 u/L / GGT: x           PT/INR - ( 17 Aug 2020 14:01 )   PT: 12.2 SEC;   INR: 1.07          PTT - ( 17 Aug 2020 14:01 )  PTT:27.6 SEC    ----------  RADIOGRAPHIC DATA:  ---------  PACS Image: Image(s) Available (08-17-20 @ 17:28)

## 2020-08-19 NOTE — CHART NOTE - NSCHARTNOTEFT_GEN_A_CORE
Team was paged by nurse that patient had a BM this AM with streak of blood, no dominic blood in the toilet.  Patient has a history of hemorrhoids.  Otherwise patient is feeling well, pain is improved.  On sips, patient instructed to monitor for pain/nausea/vomiting.     Rectal exam performed, no dominic bleeding from rectum, and no blood noted on exam.      Will Continue to monitor patient.     Jada Whaley, General Surgery PGY-1

## 2020-08-20 LAB
ANION GAP SERPL CALC-SCNC: 11 MMO/L — SIGNIFICANT CHANGE UP (ref 7–14)
BUN SERPL-MCNC: 12 MG/DL — SIGNIFICANT CHANGE UP (ref 7–23)
CALCIUM SERPL-MCNC: 8.3 MG/DL — LOW (ref 8.4–10.5)
CHLORIDE SERPL-SCNC: 107 MMOL/L — SIGNIFICANT CHANGE UP (ref 98–107)
CO2 SERPL-SCNC: 22 MMOL/L — SIGNIFICANT CHANGE UP (ref 22–31)
CREAT SERPL-MCNC: 0.7 MG/DL — SIGNIFICANT CHANGE UP (ref 0.5–1.3)
GLUCOSE BLDC GLUCOMTR-MCNC: 133 MG/DL — HIGH (ref 70–99)
GLUCOSE BLDC GLUCOMTR-MCNC: 136 MG/DL — HIGH (ref 70–99)
GLUCOSE BLDC GLUCOMTR-MCNC: 144 MG/DL — HIGH (ref 70–99)
GLUCOSE BLDC GLUCOMTR-MCNC: 151 MG/DL — HIGH (ref 70–99)
GLUCOSE SERPL-MCNC: 137 MG/DL — HIGH (ref 70–99)
HCT VFR BLD CALC: 34.5 % — LOW (ref 39–50)
HGB BLD-MCNC: 10.6 G/DL — LOW (ref 13–17)
MAGNESIUM SERPL-MCNC: 1.9 MG/DL — SIGNIFICANT CHANGE UP (ref 1.6–2.6)
MCHC RBC-ENTMCNC: 26.4 PG — LOW (ref 27–34)
MCHC RBC-ENTMCNC: 30.7 % — LOW (ref 32–36)
MCV RBC AUTO: 85.8 FL — SIGNIFICANT CHANGE UP (ref 80–100)
NRBC # FLD: 0 K/UL — SIGNIFICANT CHANGE UP (ref 0–0)
PHOSPHATE SERPL-MCNC: 2.1 MG/DL — LOW (ref 2.5–4.5)
PLATELET # BLD AUTO: 154 K/UL — SIGNIFICANT CHANGE UP (ref 150–400)
PMV BLD: 10.3 FL — SIGNIFICANT CHANGE UP (ref 7–13)
POTASSIUM SERPL-MCNC: 3.5 MMOL/L — SIGNIFICANT CHANGE UP (ref 3.5–5.3)
POTASSIUM SERPL-SCNC: 3.5 MMOL/L — SIGNIFICANT CHANGE UP (ref 3.5–5.3)
RBC # BLD: 4.02 M/UL — LOW (ref 4.2–5.8)
RBC # FLD: 15 % — HIGH (ref 10.3–14.5)
SODIUM SERPL-SCNC: 140 MMOL/L — SIGNIFICANT CHANGE UP (ref 135–145)
WBC # BLD: 4.76 K/UL — SIGNIFICANT CHANGE UP (ref 3.8–10.5)
WBC # FLD AUTO: 4.76 K/UL — SIGNIFICANT CHANGE UP (ref 3.8–10.5)

## 2020-08-20 RX ORDER — ACETAMINOPHEN 500 MG
1000 TABLET ORAL ONCE
Refills: 0 | Status: DISCONTINUED | OUTPATIENT
Start: 2020-08-20 | End: 2020-08-21

## 2020-08-20 RX ORDER — SODIUM,POTASSIUM PHOSPHATES 278-250MG
1 POWDER IN PACKET (EA) ORAL
Refills: 0 | Status: COMPLETED | OUTPATIENT
Start: 2020-08-20 | End: 2020-08-21

## 2020-08-20 RX ADMIN — Medication 25 MILLIGRAM(S): at 07:04

## 2020-08-20 RX ADMIN — Medication 1 TABLET(S): at 18:01

## 2020-08-20 RX ADMIN — Medication 1 TABLET(S): at 11:38

## 2020-08-20 RX ADMIN — PANTOPRAZOLE SODIUM 40 MILLIGRAM(S): 20 TABLET, DELAYED RELEASE ORAL at 11:39

## 2020-08-20 RX ADMIN — Medication 1: at 11:38

## 2020-08-20 RX ADMIN — SODIUM CHLORIDE 75 MILLILITER(S): 9 INJECTION, SOLUTION INTRAVENOUS at 22:21

## 2020-08-20 RX ADMIN — Medication 25 MILLIGRAM(S): at 18:01

## 2020-08-20 RX ADMIN — ENOXAPARIN SODIUM 40 MILLIGRAM(S): 100 INJECTION SUBCUTANEOUS at 11:39

## 2020-08-20 RX ADMIN — CHLORHEXIDINE GLUCONATE 1 APPLICATION(S): 213 SOLUTION TOPICAL at 11:39

## 2020-08-20 NOTE — PROGRESS NOTE ADULT - SUBJECTIVE AND OBJECTIVE BOX
GENERAL SURGERY PROGRESS NOTE TEAM A  ----------------------------------------------------------------------------------    LAUREN FRANCINE  |  [id number]   |   44y  |    Male   |   ROMERO ROBERTSON LT6M 611 A   |    Admit:  08-17-20 (3d)    Attending: Musa White      Patient is a 44y old  Male who presents with a chief complaint of abdominal pain    ----------------------------------------------------------------------------------    SUBJECTIVE:   Patient seen today during morning rounds at bedside and without acute distress.    Overnight: None    OBJECTIVE:  MEDICATIONS  (STANDING):  chlorhexidine 4% Liquid 1 Application(s) Topical daily  dextrose 5% + sodium chloride 0.45% 1000 milliLiter(s) (75 mL/Hr) IV Continuous <Continuous>  dextrose 5%. 1000 milliLiter(s) (50 mL/Hr) IV Continuous <Continuous>  dextrose 50% Injectable 12.5 Gram(s) IV Push once  dextrose 50% Injectable 25 Gram(s) IV Push once  dextrose 50% Injectable 25 Gram(s) IV Push once  enoxaparin Injectable 40 milliGRAM(s) SubCutaneous daily  insulin lispro (HumaLOG) corrective regimen sliding scale   SubCutaneous three times a day before meals  insulin lispro (HumaLOG) corrective regimen sliding scale   SubCutaneous at bedtime  metoprolol tartrate 25 milliGRAM(s) Oral two times a day  pantoprazole  Injectable 40 milliGRAM(s) IV Push daily    MEDICATIONS  (PRN):  dextrose 40% Gel 15 Gram(s) Oral once PRN Blood Glucose LESS THAN 70 milliGRAM(s)/deciliter  glucagon  Injectable 1 milliGRAM(s) IntraMuscular once PRN Glucose LESS THAN 70 milligrams/deciliter  ondansetron Injectable 4 milliGRAM(s) IV Push every 6 hours PRN Nausea and/or Vomiting      Allergies    ceftriaxone (Rash)  cephalosporins (Rash)  Cipro (Unknown)  contrast media (iodine-based) (Rash)  fentanyl (Rash)  Flagyl (Unknown)  iodine containing compounds (Rash)  morphine (Rash)    Intolerances        PMH:   GERD (gastroesophageal reflux disease)  Open wound of abdominal wall  DVT (deep venous thrombosis)  Diverticulitis  SBO (small bowel obstruction)  Diabetes mellitus, type 2  Hyperlipidemia  Hypertension  H/O hernia repair  H/O exploratory laparotomy  S/P Isi's procedure      Vitals:  Vital Signs Last 24 Hrs  T(C): 36.7 (20 Aug 2020 09:56), Max: 37.1 (19 Aug 2020 21:04)  T(F): 98.1 (20 Aug 2020 09:56), Max: 98.7 (19 Aug 2020 21:04)  HR: 70 (20 Aug 2020 09:56) (68 - 78)  BP: 141/68 (20 Aug 2020 09:56) (109/59 - 141/68)  BP(mean): --  RR: 18 (20 Aug 2020 09:56) (18 - 18)  SpO2: 99% (20 Aug 2020 09:56) (97% - 99%)      Physical Exam:  Constitutional: resting in bed with no acute distress  Neuro: AAOx3  Respiratory:  unlabored breathing  Gastrointestinal: Abdomen soft, non-distended, obese abdomen, nontender  Extremities:  No edema, no calf tenderness  Skin: Non-jaundiced, no cyanosis or rash observed    Intake&Output:    08-19-20 @ 07:01  -  08-20-20 @ 07:00  --------------------------------------------------------  IN: 1075 mL / OUT: 725 mL / NET: 350 mL        LABS:  ----------  LABORATORY DATA:  ----------                        10.6   4.76  )-----------( 154      ( 20 Aug 2020 06:05 )             34.5               08-20    140  |  107  |  12  ----------------------------<  137<H>  3.5   |  22  |  0.70    Ca    8.3<L>      20 Aug 2020 06:05  Phos  2.1     08-20  Mg     1.9     08-20

## 2020-08-20 NOTE — PROGRESS NOTE ADULT - SUBJECTIVE AND OBJECTIVE BOX
Seen last night and discussed with resident today. Obstruction resolving , passing flatus , minimal cramps. To advance diet. Dheld

## 2020-08-20 NOTE — PROGRESS NOTE ADULT - ASSESSMENT
45 y/o male with extensive surgical history and multiple prior SBO's presenting with low grade SBO. Patient reports nausea and pain continue to improve. Currently tolerating CLD and will continue to advance as patient tolerates. Patient currently passing gas. Discharge planning.     PLAN:  - Diet: CLD will advance if tolerating diet.   - Monitor bowel function  - Will place NG if pt begins having emesis or otherwise clinically worsening  - IVF  - Discharge planning    A Team Surgery  [id number]

## 2020-08-21 ENCOUNTER — TRANSCRIPTION ENCOUNTER (OUTPATIENT)
Age: 45
End: 2020-08-21

## 2020-08-21 VITALS
RESPIRATION RATE: 18 BRPM | DIASTOLIC BLOOD PRESSURE: 66 MMHG | HEART RATE: 66 BPM | TEMPERATURE: 99 F | SYSTOLIC BLOOD PRESSURE: 126 MMHG | OXYGEN SATURATION: 100 %

## 2020-08-21 LAB
GLUCOSE BLDC GLUCOMTR-MCNC: 153 MG/DL — HIGH (ref 70–99)
GLUCOSE BLDC GLUCOMTR-MCNC: 161 MG/DL — HIGH (ref 70–99)
GLUCOSE BLDC GLUCOMTR-MCNC: 204 MG/DL — HIGH (ref 70–99)

## 2020-08-21 PROCEDURE — 99238 HOSP IP/OBS DSCHRG MGMT 30/<: CPT

## 2020-08-21 RX ADMIN — Medication 1 TABLET(S): at 06:00

## 2020-08-21 RX ADMIN — Medication 1: at 11:53

## 2020-08-21 RX ADMIN — Medication 1 TABLET(S): at 00:02

## 2020-08-21 RX ADMIN — PANTOPRAZOLE SODIUM 40 MILLIGRAM(S): 20 TABLET, DELAYED RELEASE ORAL at 11:53

## 2020-08-21 RX ADMIN — CHLORHEXIDINE GLUCONATE 1 APPLICATION(S): 213 SOLUTION TOPICAL at 11:53

## 2020-08-21 RX ADMIN — Medication 2: at 08:59

## 2020-08-21 RX ADMIN — ENOXAPARIN SODIUM 40 MILLIGRAM(S): 100 INJECTION SUBCUTANEOUS at 11:53

## 2020-08-21 NOTE — DISCHARGE NOTE PROVIDER - HOSPITAL COURSE
Sathya is a very pleasant 44 year old gentleman with history of DM, HTN, GERD, HLD, and perforated diverticulitis s/p Isi's, s/p reversal, multiple prior SBO's s/p ex-lap, LINDA in 2014, 2016, s/p incisional hernia repair, known to the surgery service most recently admitted with SBO in 7/2019, resolved with conservative management, now presents to ED c/o abdominal pain. Patient reports 1 day of pain, epigastric/jacky-umbilical, cramping in nature and intermittent, associated with mild nausea. Pt denies any vomiting. Last BM was this morning, described as normal. However, pt states he has not noticed any flatus all day, last time he remembers passing gas was yesterday evening. Denies any other complaints including recent illness/sick contacts, fevers/chills, chest pain/shortness of breath, diarrhea/constipation.         CT scan revealed low-grade small bowel obstruction.        Patient treated conservatively with  bowel rest, IV fluid hydration.        Bowel function returned and patient was advanced from clears to regular diet.        Patient  no longer has c/o pain.  He is voiding and ambulating without difficulty.        He is stable for discharge home and will follow up with Dr White in one week.

## 2020-08-21 NOTE — PROGRESS NOTE ADULT - SUBJECTIVE AND OBJECTIVE BOX
Daily AM Progress Note A Team Surgery    Vital Signs Last 24 Hrs  T(C): 36.4 (21 Aug 2020 05:57), Max: 36.7 (20 Aug 2020 09:56)  T(F): 97.5 (21 Aug 2020 05:57), Max: 98.1 (20 Aug 2020 09:56)  HR: 59 (21 Aug 2020 05:57) (59 - 78)  BP: 127/67 (21 Aug 2020 05:57) (108/62 - 141/68)  RR: 18 (21 Aug 2020 05:57) (17 - 19)  SpO2: 96% (21 Aug 2020 05:57) (96% - 99%)      SUBJECTIVE: Patient seen and examined by team on morning rounds. Patient lying comfortably in bed. Patient has had BM and passing flatus. Tolerating regular diet, ambulating, voiding. Denies abdominal pain, distension, nausea, vomiting, chest pain and SOB.    General Appearance: Appears well, NAD  Neck: Supple  Chest: Equal expansion bilaterally, equal breath sounds  CV: Pulse regular presently  Abdomen: Soft, nontender, nondistended.  Extremities: Grossly symmetric, SCD's in place     I&O's Summary    19 Aug 2020 07:01  -  20 Aug 2020 07:00  --------------------------------------------------------  IN: 1075 mL / OUT: 725 mL / NET: 350 mL    20 Aug 2020 07:01  -  21 Aug 2020 06:59  --------------------------------------------------------  IN: 2565 mL / OUT: 925 mL / NET: 1640 mL      I&O's Detail    19 Aug 2020 07:01  -  20 Aug 2020 07:00  --------------------------------------------------------  IN:    dextrose 5% + sodium chloride 0.45%: 675 mL    Oral Fluid: 400 mL  Total IN: 1075 mL    OUT:    Voided: 725 mL  Total OUT: 725 mL    Total NET: 350 mL      20 Aug 2020 07:01  -  21 Aug 2020 06:59  --------------------------------------------------------  IN:    dextrose 5% + sodium chloride 0.45%: 1725 mL    Oral Fluid: 840 mL  Total IN: 2565 mL    OUT:    Voided: 925 mL  Total OUT: 925 mL    Total NET: 1640 mL          MEDICATIONS  (STANDING):  acetaminophen  IVPB .. 1000 milliGRAM(s) IV Intermittent once  chlorhexidine 4% Liquid 1 Application(s) Topical daily  dextrose 5%. 1000 milliLiter(s) (50 mL/Hr) IV Continuous <Continuous>  dextrose 50% Injectable 12.5 Gram(s) IV Push once  dextrose 50% Injectable 25 Gram(s) IV Push once  dextrose 50% Injectable 25 Gram(s) IV Push once  enoxaparin Injectable 40 milliGRAM(s) SubCutaneous daily  insulin lispro (HumaLOG) corrective regimen sliding scale   SubCutaneous three times a day before meals  insulin lispro (HumaLOG) corrective regimen sliding scale   SubCutaneous at bedtime  metoprolol tartrate 25 milliGRAM(s) Oral two times a day  pantoprazole  Injectable 40 milliGRAM(s) IV Push daily    MEDICATIONS  (PRN):  dextrose 40% Gel 15 Gram(s) Oral once PRN Blood Glucose LESS THAN 70 milliGRAM(s)/deciliter  glucagon  Injectable 1 milliGRAM(s) IntraMuscular once PRN Glucose LESS THAN 70 milligrams/deciliter  ondansetron Injectable 4 milliGRAM(s) IV Push every 6 hours PRN Nausea and/or Vomiting      LABS:                        10.6   4.76  )-----------( 154      ( 20 Aug 2020 06:05 )             34.5     08-20    140  |  107  |  12  ----------------------------<  137<H>  3.5   |  22  |  0.70    Ca    8.3<L>      20 Aug 2020 06:05  Phos  2.1     08-20  Mg     1.9     08-20            RADIOLOGY & ADDITIONAL STUDIES:

## 2020-08-21 NOTE — DISCHARGE NOTE PROVIDER - NSDCMRMEDTOKEN_GEN_ALL_CORE_FT
Crestor 5 mg oral tablet: 1 tab(s) orally once a day (at bedtime)  Farxiga 5 mg oral tablet: 1 tab(s) orally once a day  fenofibrate 145 mg oral tablet: 1 tab(s) orally once a day  glimepiride 1 mg oral tablet: 1 tab(s) orally once a day  Januvia 50 mg oral tablet: 1 tab(s) orally once a day  losartan 50 mg oral tablet: 1 tab(s) orally once a day  metFORMIN: 500 milligram(s) orally 3 times a day  multivitamin: 1 tab(s) orally once a day  pantoprazole 40 mg oral delayed release tablet: 1 tab(s) orally once a day (before a meal)  Toprol-XL: 100 milligram(s) orally once a day  Vascepa 1 g oral capsule: 2 cap(s) orally 2 times a day

## 2020-08-21 NOTE — PROGRESS NOTE ADULT - ASSESSMENT
43 y/o male with extensive surgical history and multiple prior SBO's presenting with low grade SBO. Patient reports nausea and pain continue to improve. Currently tolerating CLD and will continue to advance as patient tolerates. Patient currently passing gas. Discharge planning.     PLAN:  - Diet: Regular low residue   - Return of bowel function  - continue to monitor  - Discharge home this afternoon vs. tomorrow      A Team Surgery  [id number]

## 2020-08-21 NOTE — DISCHARGE NOTE PROVIDER - CARE PROVIDER_API CALL
Musa White  COLON/RECTAL SURGERY  [address] [place] [address]  Phone: [phone]  Fax: [phone]  Follow Up Time: 1 week

## 2020-08-21 NOTE — DISCHARGE NOTE NURSING/CASE MANAGEMENT/SOCIAL WORK - PATIENT PORTAL LINK FT
You can access the FollowMyHealth Patient Portal offered by [facility] by registering at the following website: [email or web address]. By joining [email or web address]’s FollowMyHealth portal, you will also be able to view your health information using other applications (apps) compatible with our system.

## 2020-10-09 NOTE — DISCHARGE NOTE ADULT - CARE PROVIDERS DIRECT ADDRESSES
VSS, breastfeeding.  Fundus is firm at U/2, scant flow, no clots.  Pain controlled with Tylenol and Ibuprofen.  Independent with cares.  SO is at bedside and supportive.  Need ROP filled out.  Will continue to monitor and support.    [email or web address]

## 2020-11-04 ENCOUNTER — APPOINTMENT (OUTPATIENT)
Dept: SURGERY | Facility: CLINIC | Age: 45
End: 2020-11-04
Payer: COMMERCIAL

## 2020-11-04 VITALS
WEIGHT: 270 LBS | TEMPERATURE: 98.7 F | OXYGEN SATURATION: 96 % | HEART RATE: 97 BPM | SYSTOLIC BLOOD PRESSURE: 155 MMHG | HEIGHT: 72 IN | BODY MASS INDEX: 36.57 KG/M2 | DIASTOLIC BLOOD PRESSURE: 85 MMHG

## 2020-11-04 DIAGNOSIS — Z09 ENCOUNTER FOR FOLLOW-UP EXAMINATION AFTER COMPLETED TREATMENT FOR CONDITIONS OTHER THAN MALIGNANT NEOPLASM: ICD-10-CM

## 2020-11-04 PROCEDURE — 99213 OFFICE O/P EST LOW 20 MIN: CPT

## 2020-11-04 PROCEDURE — 99072 ADDL SUPL MATRL&STAF TM PHE: CPT

## 2020-11-04 NOTE — ASSESSMENT
[FreeTextEntry1] : 44 yo male with a ruptured fluid collection which seems to be related to mesh onlay.\par the area was cauterized and patient instructed to dress daily with 4x4\par f/u if drainage persists.

## 2020-11-04 NOTE — PHYSICAL EXAM
[JVD] : no jugular venous distention  [Respiratory Effort] : normal respiratory effort [Alert] : alert [Oriented to Person] : oriented to person [Oriented to Place] : oriented to place [Oriented to Time] : oriented to time [Calm] : calm [de-identified] : CINTHIA BARRIGA NAD [de-identified] : EOMI [de-identified] : soft NT ND + small opening with serous drainage. no erythema. applicator revealed deeper communication.

## 2020-11-04 NOTE — HISTORY OF PRESENT ILLNESS
[de-identified] : Patient presents today after sending some pictures of an abdominal wall abscess which he states spontaneously drained today. He denies any fevers or chills. He states he has been feeling well, and tolerating a diet without any issues.

## 2021-01-07 ENCOUNTER — INPATIENT (INPATIENT)
Age: 46
LOS: 2 days | Discharge: HOME | End: 2021-01-10
Attending: SURGERY | Admitting: SURGERY
Payer: COMMERCIAL

## 2021-01-07 VITALS
RESPIRATION RATE: 18 BRPM | OXYGEN SATURATION: 99 % | HEART RATE: 117 BPM | SYSTOLIC BLOOD PRESSURE: 112 MMHG | HEIGHT: 72 IN | DIASTOLIC BLOOD PRESSURE: 75 MMHG | TEMPERATURE: 97 F

## 2021-01-07 DIAGNOSIS — Z98.890 OTHER SPECIFIED POSTPROCEDURAL STATES: Chronic | ICD-10-CM

## 2021-01-07 DIAGNOSIS — K56.609 UNSPECIFIED INTESTINAL OBSTRUCTION, UNSPECIFIED AS TO PARTIAL VERSUS COMPLETE OBSTRUCTION: ICD-10-CM

## 2021-01-07 DIAGNOSIS — Z98.89 OTHER SPECIFIED POSTPROCEDURAL STATES: Chronic | ICD-10-CM

## 2021-01-07 LAB
ALBUMIN SERPL ELPH-MCNC: 5.1 G/DL — HIGH (ref 3.3–5)
ALP SERPL-CCNC: 43 U/L — SIGNIFICANT CHANGE UP (ref 40–120)
ALT FLD-CCNC: 43 U/L — HIGH (ref 4–41)
ANION GAP SERPL CALC-SCNC: 14 MMOL/L — SIGNIFICANT CHANGE UP (ref 7–14)
ANION GAP SERPL CALC-SCNC: 19 MMOL/L — HIGH (ref 7–14)
APPEARANCE UR: CLEAR — SIGNIFICANT CHANGE UP
APTT BLD: 28.4 SEC — SIGNIFICANT CHANGE UP (ref 27–36.3)
AST SERPL-CCNC: 28 U/L — SIGNIFICANT CHANGE UP (ref 4–40)
BASOPHILS # BLD AUTO: 0.03 K/UL — SIGNIFICANT CHANGE UP (ref 0–0.2)
BASOPHILS NFR BLD AUTO: 0.2 % — SIGNIFICANT CHANGE UP (ref 0–2)
BILIRUB SERPL-MCNC: 0.6 MG/DL — SIGNIFICANT CHANGE UP (ref 0.2–1.2)
BILIRUB UR-MCNC: NEGATIVE — SIGNIFICANT CHANGE UP
BLD GP AB SCN SERPL QL: NEGATIVE — SIGNIFICANT CHANGE UP
BLOOD GAS VENOUS COMPREHENSIVE RESULT: SIGNIFICANT CHANGE UP
BUN SERPL-MCNC: 26 MG/DL — HIGH (ref 7–23)
BUN SERPL-MCNC: 26 MG/DL — HIGH (ref 7–23)
CALCIUM SERPL-MCNC: 10.1 MG/DL — SIGNIFICANT CHANGE UP (ref 8.4–10.5)
CALCIUM SERPL-MCNC: 10.7 MG/DL — HIGH (ref 8.4–10.5)
CHLORIDE SERPL-SCNC: 99 MMOL/L — SIGNIFICANT CHANGE UP (ref 98–107)
CHLORIDE SERPL-SCNC: 99 MMOL/L — SIGNIFICANT CHANGE UP (ref 98–107)
CO2 SERPL-SCNC: 21 MMOL/L — LOW (ref 22–31)
CO2 SERPL-SCNC: 26 MMOL/L — SIGNIFICANT CHANGE UP (ref 22–31)
COLOR SPEC: YELLOW — SIGNIFICANT CHANGE UP
CREAT SERPL-MCNC: 1.14 MG/DL — SIGNIFICANT CHANGE UP (ref 0.5–1.3)
CREAT SERPL-MCNC: 1.36 MG/DL — HIGH (ref 0.5–1.3)
DIFF PNL FLD: NEGATIVE — SIGNIFICANT CHANGE UP
EOSINOPHIL # BLD AUTO: 0.01 K/UL — SIGNIFICANT CHANGE UP (ref 0–0.5)
EOSINOPHIL NFR BLD AUTO: 0.1 % — SIGNIFICANT CHANGE UP (ref 0–6)
GLUCOSE BLDC GLUCOMTR-MCNC: 157 MG/DL — HIGH (ref 70–99)
GLUCOSE BLDC GLUCOMTR-MCNC: 206 MG/DL — HIGH (ref 70–99)
GLUCOSE SERPL-MCNC: 237 MG/DL — HIGH (ref 70–99)
GLUCOSE SERPL-MCNC: 319 MG/DL — HIGH (ref 70–99)
GLUCOSE UR QL: ABNORMAL
HCT VFR BLD CALC: 46.4 % — SIGNIFICANT CHANGE UP (ref 39–50)
HCT VFR BLD CALC: 49.5 % — SIGNIFICANT CHANGE UP (ref 39–50)
HGB BLD-MCNC: 14 G/DL — SIGNIFICANT CHANGE UP (ref 13–17)
HGB BLD-MCNC: 15.5 G/DL — SIGNIFICANT CHANGE UP (ref 13–17)
IANC: 16.53 K/UL — HIGH (ref 1.5–8.5)
IMM GRANULOCYTES NFR BLD AUTO: 0.4 % — SIGNIFICANT CHANGE UP (ref 0–1.5)
INR BLD: 1.05 RATIO — SIGNIFICANT CHANGE UP (ref 0.88–1.16)
KETONES UR-MCNC: NEGATIVE — SIGNIFICANT CHANGE UP
LACTATE SERPL-SCNC: 2.1 MMOL/L — HIGH (ref 0.5–2)
LACTATE SERPL-SCNC: 3 MMOL/L — HIGH (ref 0.5–2)
LEUKOCYTE ESTERASE UR-ACNC: NEGATIVE — SIGNIFICANT CHANGE UP
LIDOCAIN IGE QN: 36 U/L — SIGNIFICANT CHANGE UP (ref 7–60)
LYMPHOCYTES # BLD AUTO: 1.5 K/UL — SIGNIFICANT CHANGE UP (ref 1–3.3)
LYMPHOCYTES # BLD AUTO: 7.8 % — LOW (ref 13–44)
MAGNESIUM SERPL-MCNC: 1.9 MG/DL — SIGNIFICANT CHANGE UP (ref 1.6–2.6)
MAGNESIUM SERPL-MCNC: 2 MG/DL — SIGNIFICANT CHANGE UP (ref 1.6–2.6)
MCHC RBC-ENTMCNC: 26.6 PG — LOW (ref 27–34)
MCHC RBC-ENTMCNC: 27.1 PG — SIGNIFICANT CHANGE UP (ref 27–34)
MCHC RBC-ENTMCNC: 30.2 GM/DL — LOW (ref 32–36)
MCHC RBC-ENTMCNC: 31.3 GM/DL — LOW (ref 32–36)
MCV RBC AUTO: 86.5 FL — SIGNIFICANT CHANGE UP (ref 80–100)
MCV RBC AUTO: 88 FL — SIGNIFICANT CHANGE UP (ref 80–100)
MONOCYTES # BLD AUTO: 1.15 K/UL — HIGH (ref 0–0.9)
MONOCYTES NFR BLD AUTO: 6 % — SIGNIFICANT CHANGE UP (ref 2–14)
NEUTROPHILS # BLD AUTO: 16.53 K/UL — HIGH (ref 1.8–7.4)
NEUTROPHILS NFR BLD AUTO: 85.5 % — HIGH (ref 43–77)
NITRITE UR-MCNC: NEGATIVE — SIGNIFICANT CHANGE UP
NRBC # BLD: 0 /100 WBCS — SIGNIFICANT CHANGE UP
NRBC # BLD: 0 /100 WBCS — SIGNIFICANT CHANGE UP
NRBC # FLD: 0 K/UL — SIGNIFICANT CHANGE UP
NRBC # FLD: 0 K/UL — SIGNIFICANT CHANGE UP
PH UR: 6 — SIGNIFICANT CHANGE UP (ref 5–8)
PHOSPHATE SERPL-MCNC: 2.7 MG/DL — SIGNIFICANT CHANGE UP (ref 2.5–4.5)
PHOSPHATE SERPL-MCNC: 4.2 MG/DL — SIGNIFICANT CHANGE UP (ref 2.5–4.5)
PLATELET # BLD AUTO: 237 K/UL — SIGNIFICANT CHANGE UP (ref 150–400)
PLATELET # BLD AUTO: 285 K/UL — SIGNIFICANT CHANGE UP (ref 150–400)
POTASSIUM SERPL-MCNC: 4.1 MMOL/L — SIGNIFICANT CHANGE UP (ref 3.5–5.3)
POTASSIUM SERPL-MCNC: 4.4 MMOL/L — SIGNIFICANT CHANGE UP (ref 3.5–5.3)
POTASSIUM SERPL-SCNC: 4.1 MMOL/L — SIGNIFICANT CHANGE UP (ref 3.5–5.3)
POTASSIUM SERPL-SCNC: 4.4 MMOL/L — SIGNIFICANT CHANGE UP (ref 3.5–5.3)
PROT SERPL-MCNC: 8.7 G/DL — HIGH (ref 6–8.3)
PROT UR-MCNC: ABNORMAL
PROTHROM AB SERPL-ACNC: 11.9 SEC — SIGNIFICANT CHANGE UP (ref 10.6–13.6)
RBC # BLD: 5.27 M/UL — SIGNIFICANT CHANGE UP (ref 4.2–5.8)
RBC # BLD: 5.72 M/UL — SIGNIFICANT CHANGE UP (ref 4.2–5.8)
RBC # FLD: 15.1 % — HIGH (ref 10.3–14.5)
RBC # FLD: 15.1 % — HIGH (ref 10.3–14.5)
RH IG SCN BLD-IMP: POSITIVE — SIGNIFICANT CHANGE UP
SARS-COV-2 RNA SPEC QL NAA+PROBE: SIGNIFICANT CHANGE UP
SODIUM SERPL-SCNC: 139 MMOL/L — SIGNIFICANT CHANGE UP (ref 135–145)
SODIUM SERPL-SCNC: 139 MMOL/L — SIGNIFICANT CHANGE UP (ref 135–145)
SP GR SPEC: 1.03 — HIGH (ref 1.01–1.02)
UROBILINOGEN FLD QL: SIGNIFICANT CHANGE UP
WBC # BLD: 15.76 K/UL — HIGH (ref 3.8–10.5)
WBC # BLD: 19.3 K/UL — HIGH (ref 3.8–10.5)
WBC # FLD AUTO: 15.76 K/UL — HIGH (ref 3.8–10.5)
WBC # FLD AUTO: 19.3 K/UL — HIGH (ref 3.8–10.5)

## 2021-01-07 PROCEDURE — 71045 X-RAY EXAM CHEST 1 VIEW: CPT | Mod: 26

## 2021-01-07 PROCEDURE — 99222 1ST HOSP IP/OBS MODERATE 55: CPT

## 2021-01-07 PROCEDURE — 74018 RADEX ABDOMEN 1 VIEW: CPT | Mod: 26

## 2021-01-07 PROCEDURE — 74176 CT ABD & PELVIS W/O CONTRAST: CPT | Mod: 26

## 2021-01-07 PROCEDURE — 43753 TX GASTRO INTUB W/ASP: CPT

## 2021-01-07 PROCEDURE — 99285 EMERGENCY DEPT VISIT HI MDM: CPT

## 2021-01-07 RX ORDER — SODIUM CHLORIDE 9 MG/ML
1000 INJECTION, SOLUTION INTRAVENOUS
Refills: 0 | Status: DISCONTINUED | OUTPATIENT
Start: 2021-01-07 | End: 2021-01-10

## 2021-01-07 RX ORDER — PIPERACILLIN AND TAZOBACTAM 4; .5 G/20ML; G/20ML
3.38 INJECTION, POWDER, LYOPHILIZED, FOR SOLUTION INTRAVENOUS ONCE
Refills: 0 | Status: DISCONTINUED | OUTPATIENT
Start: 2021-01-07 | End: 2021-01-07

## 2021-01-07 RX ORDER — DEXTROSE 50 % IN WATER 50 %
25 SYRINGE (ML) INTRAVENOUS ONCE
Refills: 0 | Status: DISCONTINUED | OUTPATIENT
Start: 2021-01-07 | End: 2021-01-10

## 2021-01-07 RX ORDER — AZTREONAM 2 G
2000 VIAL (EA) INJECTION ONCE
Refills: 0 | Status: COMPLETED | OUTPATIENT
Start: 2021-01-07 | End: 2021-01-07

## 2021-01-07 RX ORDER — ACETAMINOPHEN 500 MG
975 TABLET ORAL ONCE
Refills: 0 | Status: DISCONTINUED | OUTPATIENT
Start: 2021-01-07 | End: 2021-01-07

## 2021-01-07 RX ORDER — PANTOPRAZOLE SODIUM 20 MG/1
40 TABLET, DELAYED RELEASE ORAL DAILY
Refills: 0 | Status: DISCONTINUED | OUTPATIENT
Start: 2021-01-07 | End: 2021-01-10

## 2021-01-07 RX ORDER — DEXTROSE 50 % IN WATER 50 %
12.5 SYRINGE (ML) INTRAVENOUS ONCE
Refills: 0 | Status: DISCONTINUED | OUTPATIENT
Start: 2021-01-07 | End: 2021-01-10

## 2021-01-07 RX ORDER — ACETAMINOPHEN 500 MG
1000 TABLET ORAL ONCE
Refills: 0 | Status: COMPLETED | OUTPATIENT
Start: 2021-01-07 | End: 2021-01-07

## 2021-01-07 RX ORDER — INSULIN LISPRO 100/ML
VIAL (ML) SUBCUTANEOUS EVERY 6 HOURS
Refills: 0 | Status: DISCONTINUED | OUTPATIENT
Start: 2021-01-07 | End: 2021-01-10

## 2021-01-07 RX ORDER — SODIUM CHLORIDE 9 MG/ML
1000 INJECTION, SOLUTION INTRAVENOUS ONCE
Refills: 0 | Status: COMPLETED | OUTPATIENT
Start: 2021-01-07 | End: 2021-01-07

## 2021-01-07 RX ORDER — HEPARIN SODIUM 5000 [USP'U]/ML
5000 INJECTION INTRAVENOUS; SUBCUTANEOUS EVERY 8 HOURS
Refills: 0 | Status: DISCONTINUED | OUTPATIENT
Start: 2021-01-07 | End: 2021-01-10

## 2021-01-07 RX ORDER — VANCOMYCIN HCL 1 G
1000 VIAL (EA) INTRAVENOUS ONCE
Refills: 0 | Status: COMPLETED | OUTPATIENT
Start: 2021-01-07 | End: 2021-01-07

## 2021-01-07 RX ORDER — ONDANSETRON 8 MG/1
4 TABLET, FILM COATED ORAL ONCE
Refills: 0 | Status: COMPLETED | OUTPATIENT
Start: 2021-01-07 | End: 2021-01-07

## 2021-01-07 RX ORDER — DEXTROSE 50 % IN WATER 50 %
15 SYRINGE (ML) INTRAVENOUS ONCE
Refills: 0 | Status: DISCONTINUED | OUTPATIENT
Start: 2021-01-07 | End: 2021-01-10

## 2021-01-07 RX ORDER — GLUCAGON INJECTION, SOLUTION 0.5 MG/.1ML
1 INJECTION, SOLUTION SUBCUTANEOUS ONCE
Refills: 0 | Status: DISCONTINUED | OUTPATIENT
Start: 2021-01-07 | End: 2021-01-10

## 2021-01-07 RX ORDER — METOPROLOL TARTRATE 50 MG
5 TABLET ORAL EVERY 6 HOURS
Refills: 0 | Status: DISCONTINUED | OUTPATIENT
Start: 2021-01-07 | End: 2021-01-08

## 2021-01-07 RX ADMIN — SODIUM CHLORIDE 1000 MILLILITER(S): 9 INJECTION, SOLUTION INTRAVENOUS at 10:55

## 2021-01-07 RX ADMIN — Medication 250 MILLIGRAM(S): at 10:55

## 2021-01-07 RX ADMIN — SODIUM CHLORIDE 1000 MILLILITER(S): 9 INJECTION, SOLUTION INTRAVENOUS at 12:50

## 2021-01-07 RX ADMIN — Medication 4: at 15:32

## 2021-01-07 RX ADMIN — Medication 1000 MILLIGRAM(S): at 12:50

## 2021-01-07 RX ADMIN — SODIUM CHLORIDE 125 MILLILITER(S): 9 INJECTION, SOLUTION INTRAVENOUS at 15:27

## 2021-01-07 RX ADMIN — Medication 5 MILLIGRAM(S): at 16:48

## 2021-01-07 RX ADMIN — Medication 2: at 18:55

## 2021-01-07 RX ADMIN — ONDANSETRON 4 MILLIGRAM(S): 8 TABLET, FILM COATED ORAL at 10:55

## 2021-01-07 RX ADMIN — SODIUM CHLORIDE 1000 MILLILITER(S): 9 INJECTION, SOLUTION INTRAVENOUS at 16:57

## 2021-01-07 RX ADMIN — SODIUM CHLORIDE 1000 MILLILITER(S): 9 INJECTION, SOLUTION INTRAVENOUS at 10:54

## 2021-01-07 RX ADMIN — HEPARIN SODIUM 5000 UNIT(S): 5000 INJECTION INTRAVENOUS; SUBCUTANEOUS at 13:27

## 2021-01-07 RX ADMIN — HEPARIN SODIUM 5000 UNIT(S): 5000 INJECTION INTRAVENOUS; SUBCUTANEOUS at 21:43

## 2021-01-07 RX ADMIN — Medication 400 MILLIGRAM(S): at 12:58

## 2021-01-07 RX ADMIN — Medication 400 MILLIGRAM(S): at 21:34

## 2021-01-07 RX ADMIN — Medication 100 MILLIGRAM(S): at 13:26

## 2021-01-07 NOTE — ED ADULT NURSE NOTE - NSIMPLEMENTINTERV_GEN_ALL_ED
Implemented All Universal Safety Interventions:  [place] to call system. Call bell, personal items and telephone within reach. Instruct patient to call for assistance. Room bathroom lighting operational. Non-slip footwear when patient is off stretcher. Physically safe environment: no spills, clutter or unnecessary equipment. Stretcher in lowest position, wheels locked, appropriate side rails in place.

## 2021-01-07 NOTE — ED PROVIDER NOTE - CLINICAL SUMMARY MEDICAL DECISION MAKING FREE TEXT BOX
Rober: H/o SBO, DM, HTN, GERD, p/w severe, sharp upper abd pain, V (no blood), similar to past SBO. Mild abd distension. Concern for SBO. CT abd, labs, pain and nausea meds. IVF.

## 2021-01-07 NOTE — ED ADULT NURSE NOTE - OBJECTIVE STATEMENT
pt received to room #15 with c/o abd pain and nausea. pt states his pain is the same as his last SBO. denies passing flatus, last BM yesterday. c/o nausea with vomiting x 2 episodes. abd distended, diffusely tender. denies urinary complaints. IV placed, labs drawn and sent. bedside sono preformed by MD Richter. covid swab obtained and sent to lab. seen by MD. will cont to monitor.

## 2021-01-07 NOTE — H&P ADULT - HISTORY OF PRESENT ILLNESS
44 yo M with hx DM, HTN, GERD, HLD, and perforated diverticulitis s/p Hartmans, s/p reversal, multiple prior SBO's s/p ex-lap (last one 8/20), LINDA in 2014, 2016, s/p incisional hernia repair, complaining of abdominal pain since 4pm yesterday. Last BM and last flatus last night. Associated with nausea and vomiting x3. Prior to yesterday tolerating diet without issues. Admitted a few months ago with SBO that was managed non-operatively.  Denies cough, SOB, c/p, diarrhea, fever, dysuria, weight loss, recent trauma or travel.

## 2021-01-07 NOTE — ED PROVIDER NOTE - OBJECTIVE STATEMENT
46 yo M with hx DM, HTN, GERD, HLD, and perforated diverticulitis s/p Hartmans, s/p reversal, multiple prior SBO's s/p ex-lap (last one 8/20), LINDA in 2014, 2016, s/p incisional hernia repair 46 yo M with hx DM, HTN, GERD, HLD, and perforated diverticulitis s/p Hartmans, s/p reversal, multiple prior SBO's s/p ex-lap (last one 8/20), LINDA in 2014, 2016, s/p incisional hernia repair, c/o upper abdominal pain a/w nausea/vomiting, similar to prior SBO episodes. Pain localized bilaterally across upper abdomen, 8/10 pain, described as sharp, and a/w 2x NBNB emesis today. Last BM last night. Denies cough, SOB, c/p, diarrhea, fever.

## 2021-01-07 NOTE — H&P ADULT - NSHPLABSRESULTS_GEN_ALL_CORE
Labs:                        15.5   19.30 )-----------( 285      ( 07 Jan 2021 10:17 )             49.5     PT/INR - ( 07 Jan 2021 10:17 )   PT: 11.9 sec;   INR: 1.05 ratio         PTT - ( 07 Jan 2021 10:17 )  PTT:28.4 sec  01-07    139  |  99  |  26<H>  ----------------------------<  319<H>  4.1   |  21<L>  |  1.36<H>    Ca    10.7<H>      07 Jan 2021 10:17  Phos  4.2     01-07  Mg     2.0     01-07    TPro  8.7<H>  /  Alb  5.1<H>  /  TBili  0.6  /  DBili  x   /  AST  28  /  ALT  43<H>  /  AlkPhos  43  01-07            Imaging and other studies:  < from: CT Abdomen and Pelvis No Cont (01.07.21 @ 10:51) >      PROCEDURE:  CT of the Abdomen and Pelvis was performed without intravenous contrast.  Intravenous contrast: None.  Oral contrast: None.  Sagittal and coronal reformats were performed.    FINDINGS:  Evaluation of the parenchymal organs and vascular structures is limited without intravenous contrast.    LOWER CHEST: Mild coronary artery calcification.    LIVER: Steatosis.  BILE DUCTS: Normal caliber.  GALLBLADDER: Cholelithiasis.  SPLEEN: Within normal limits.  PANCREAS: Within normal limits.  ADRENALS: Within normal limits.  KIDNEYS/URETERS: Within normal limits.    BLADDER: Within normal limits.  REPRODUCTIVE ORGANS: Prostate within normal limits.    BOWEL: Distended stomach. Multiple dilated fluid-filled loops of small bowel with distal fecalized small bowel with a gradual transition point to smaller caliber loops at the level of a complex ventral hernia (2, 114). Rectosigmoid anastomosis. Colonic diverticulosis. Cecum and appendix is located in the inferior aspect of the ventral hernia.  PERITONEUM: No ascites.  VESSELS: Within normal limits.  RETROPERITONEUM/LYMPH NODES: No lymphadenopathy.  ABDOMINAL WALL: Complex ventral abdominal hernia containing large and small bowel.  BONES: Within normal limits.    IMPRESSION:  Small bowel obstruction with gradual transition point at the level ofa complex ventral hernia.

## 2021-01-07 NOTE — ED ADULT TRIAGE NOTE - CHIEF COMPLAINT QUOTE
Pt states that he has been having n/v and abd pain since last night c/w pain he has had with bowel obstructions.  Past medical history: HTN, DM2, HLD, intestinal obstruction

## 2021-01-07 NOTE — ED PROVIDER NOTE - PHYSICAL EXAMINATION
Gen: NAD; well appearing  Head: NCAT  Eyes: EOMI, PERRLA, no conjunctival pallor, no scleral icterus  ENT: mucous membranes moist, no discharge  Neck: neck supple  Resp: CTAB, no W/R/R  CV: RRR, +S1/S2, no M/R/G  GI: Abdomen soft mildly distended, +TTP diffuse, no masses  MSK: No open wounds, no bruising, no lower extremity edema  Neuro: A&Ox4, sensation nl, motor 5/5 RUE/LUE/RLE/LLE, follows commands  Ext: no edema, no deformity, warm and well-perfused  Skin: no rash or bruising

## 2021-01-07 NOTE — H&P ADULT - ASSESSMENT
46 y/o presenting with abd. pain secondary to SBO     - admit to Dr. White  - IVF  -NPO  - NGT output   - serial abd. exams  - trend lactate     Discussed with attending Dr. White  [id number]

## 2021-01-07 NOTE — H&P ADULT - NSHPPHYSICALEXAM_GEN_ALL_CORE
Physical Exam  T(C): 35.9  HR: 109 (109 - 117)  BP: 135/72 (112/75 - 135/72)  RR: 16 (16 - 18)  SpO2: 100% (99% - 100%)  Tmax: T(C): , Max: 35.9 (01-07-21 @ 08:56)    General: well developed, well nourished, NAD  Neuro: alert and oriented, no focal deficits, moves all extremities spontaneously  HEENT: NCAT, EOMI, anicteric, mucosa moist  Respiratory: airway patent, respirations unlabored  CVS: regular rate and rhythm  Abdomen: soft, mild TTP, distended  Extremities: no edema, sensation and movement grossly intact  Skin: warm, dry, appropriate color

## 2021-01-07 NOTE — ED PROCEDURE NOTE - ATTENDING CONTRIBUTION TO CARE
Rober: I was present during the critical part of the procedure.
I performed a face-to-face evaluation of the patient and performed a history and physical examination. I agree with the history and physical examination.    Rober: I was present during the critical part of the procedure.

## 2021-01-07 NOTE — ED PROVIDER NOTE - ATTENDING CONTRIBUTION TO CARE
I performed a face-to-face evaluation of the patient and performed a history and physical examination. I agree with the history and physical examination.    H/o SBO, DM, HTN, GERD, p/w severe, sharp upper abd pain, V (no blood), similar to past SBO. Mild abd distension. Concern for SBO. CT abd, labs, pain and nausea meds. IVF.

## 2021-01-07 NOTE — ED PROCEDURE NOTE - ULTRASOUND FINDINGS
No strong e/o SBO/List any findings 1 loop of dilated bowel. Otherwise, no strong e/o SBO/List any findings

## 2021-01-07 NOTE — PATIENT PROFILE ADULT - NSTRANSFERBELONGINGSRESP_GEN_A_NUR
[address]  [facility], [address]       GI PROGRESS NOTE  Will Ish Hernández  [phone] office  [phone] NP/PA in-hospital cell phone M-F until 4:30PM  After 5PM or on weekends, please call  for physician on call      NAME: Jacqueline Cifuentes   :  1947   MRN:  [id number]       Subjective:   Patient is sitting in the chair. Denies nausea, vomiting, or abdominal pain. Wife is at the bedside. PEG was placed yesterday. Objective:     VITALS:   Last 24hrs VS reviewed since prior progress note. Most recent are:  Visit Vitals  BP (!) 152/87 (BP 1 Location: Left arm, BP Patient Position: At rest)   Pulse 92   Temp 97.7 °F (36.5 °C)   Resp 20   Ht 5' 11\" (1.803 m)   Wt 74.5 kg (164 lb 3.9 oz)   SpO2 98%   BMI 22.91 kg/m²       PHYSICAL EXAM:  General: No acute distress  Neurologic:  Alert, conversing  HEENT: EOMI, no scleral icterus   Lungs:  No respiratory distress  Abdomen: Soft, non-distended, no tenderness, no guarding, no rebound. PEG in place with no drainage/bleeding, easy rotation - receiving tube feeds. Abdominal binder in place. Extremities: Warm  Psych:   Not anxious or agitated      Lab Data Reviewed:     Recent Results (from the past 24 hour(s))   CBC WITH AUTOMATED DIFF    Collection Time: 21  3:51 AM   Result Value Ref Range    WBC 8.3 4.1 - 11.1 K/uL    RBC 3.33 (L) 4.10 - 5.70 M/uL    HGB 10.7 (L) 12.1 - 17.0 g/dL    HCT 32.3 (L) 36.6 - 50.3 %    MCV 97.0 80.0 - 99.0 FL    MCH 32.1 26.0 - 34.0 PG    MCHC 33.1 30.0 - 36.5 g/dL    RDW 12.7 11.5 - 14.5 %    PLATELET 600 986 - 491 K/uL    MPV 9.7 8.9 - 12.9 FL    NRBC 0.0 0  WBC    ABSOLUTE NRBC 0.00 0.00 - 0.01 K/uL    NEUTROPHILS 63 32 - 75 %    LYMPHOCYTES 16 12 - 49 %    MONOCYTES 14 (H) 5 - 13 %    EOSINOPHILS 5 0 - 7 %    BASOPHILS 2 (H) 0 - 1 %    IMMATURE GRANULOCYTES 0 0.0 - 0.5 %    ABS. NEUTROPHILS 5.2 1.8 - 8.0 K/UL    ABS. LYMPHOCYTES 1.3 0.8 - 3.5 K/UL    ABS.  MONOCYTES 1.2 (H) 0.0 - 1.0 K/UL    ABS. EOSINOPHILS 0.4 0.0 - 0.4 K/UL    ABS. BASOPHILS 0.2 (H) 0.0 - 0.1 K/UL    ABS. IMM. GRANS. 0.0 0.00 - 0.04 K/UL    DF AUTOMATED     METABOLIC PANEL, COMPREHENSIVE    Collection Time: 01/07/21  3:51 AM   Result Value Ref Range    Sodium 146 (H) 136 - 145 mmol/L    Potassium 3.5 3.5 - 5.1 mmol/L    Chloride 115 (H) 97 - 108 mmol/L    CO2 23 21 - 32 mmol/L    Anion gap 8 5 - 15 mmol/L    Glucose 163 (H) 65 - 100 mg/dL    BUN 44 (H) 6 - 20 MG/DL    Creatinine 1.58 (H) 0.70 - 1.30 MG/DL    BUN/Creatinine ratio 28 (H) 12 - 20      GFR est AA 52 (L) >60 ml/min/1.73m2    GFR est non-AA 43 (L) >60 ml/min/1.73m2    Calcium 8.9 8.5 - 10.1 MG/DL    Bilirubin, total 0.2 0.2 - 1.0 MG/DL    ALT (SGPT) 17 12 - 78 U/L    AST (SGOT) 19 15 - 37 U/L    Alk. phosphatase 111 45 - 117 U/L    Protein, total 7.1 6.4 - 8.2 g/dL    Albumin 2.6 (L) 3.5 - 5.0 g/dL    Globulin 4.5 (H) 2.0 - 4.0 g/dL    A-G Ratio 0.6 (L) 1.1 - 2.2         Assessment:   · Dysphagia: status post EGD with PEG placement (1/6/21): normal upper endoscopic exam. Hgb 10.7. · Fall/concussion  · Aspiration pneumonia  · Cardiac arrest  · ETOH withdrawal  · Encephalopathy     Patient Active Problem List   Diagnosis Code    Essential hypertension I10    Hyponatremia E87.1    HATTIE (acute kidney injury) ([facility] N17.9    AMS (altered mental status) R41.82    Excessive drinking of alcohol F10.10    UTI (urinary tract infection) N39.0    Hypokalemia E87.6    Thrombocytopenia (HCC) D69.6    Anemia D64.9    Alcohol withdrawal (HCC) F10.239    Acute respiratory failure with hypoxia (HCC) J96.01    Alcoholism ([facility] F10.20    Goals of care, counseling/discussion Z71.89    Severe protein-calorie malnutrition (HCC) E43    Oropharyngeal dysphagia R13.12    Dry mouth R68.2     Plan:   · Tube feeds per dietician  · SLP following  · Plan for outpatient follow up in 3 months  · We will sign off and be available again as needed.  Please call us with any questions. Thank you.      Signed By: Kenroy Og, [id number]     1/7/2021  11:43 AM yes

## 2021-01-07 NOTE — ED PROVIDER NOTE - NS ED ROS FT
GENERAL: No fever or chills  EYES: No change in vision  HEENT: No trouble swallowing or speaking  CARDIAC: No chest pain  PULMONARY: No cough or SOB  GI: +abdominal pain, +nausea/vomiting, no diarrhea or constipation  : No changes in urination  SKIN: No rashes  NEURO: No headache, no numbness  MSK: No joint pain    Otherwise as HPI or negative.

## 2021-01-07 NOTE — ED PROCEDURE NOTE - US DIAGNOSIS
No strong e/o SBO/List any diagnosis 1 loop of dilated bowel. Otherwise, no strong e/o SBO/List any diagnosis

## 2021-01-07 NOTE — ED PROVIDER NOTE - PROGRESS NOTE DETAILS
Rober: Labs and clinical picture c/w sepsis 2/2 SBO. IVF. ABx. Cx done. Seen by Gen Surg. Admit to Dr. White.

## 2021-01-07 NOTE — H&P ADULT - DOES THIS PATIENT HAVE A HISTORY OF OR HAS BEEN DX WITH HEART FAILURE?
Labor Progress Note    Patient reports: increased contractions, but denies need for analgesia at this time.    Vitals:    17 0700   BP:    Pulse:    Resp: 16   Temp:        Cervix:   Dilation: .5 (17)     Effacement:50 (17)     Station: -3 (17)     Consistency: Soft (17)     Position: Posterior (17)    Fetal Heart Rate/ Movement:   Mode:External US (17)   Baseline rate: 125 bpm (17)   Baseline Classification: Normal (17)   Variability: Moderate (17)   Pattern: Accelerations (17)      Uterine Activity/ Exam:   Mode: [place] (17)   Contraction frequency (min): 8 (17)   Contraction duration (sec): 140-280 (17)   Contraction quality: Mild (17)        Labs:   No results found    Recent Labs  Lab 17  1219   WBC 7.7   HGB 11.1*   HCT 34.7*     AST/SGOT (Units/L)   Date Value   2017 22     1HR O'HAAS (no units)   Date Value   2017 101     Creatinine (mg/dL)   Date Value   2017 0.38 (L)     ALT/SGPT (Units/L)   Date Value   2017 30           Impression:Ricardo Cortez is a 22 year old  female at 40w5d         reassuring FHTs and IOL for postdates, Cervidil in place]  Plan:reassuring fetal status; continue current management, Cervidil due for removal after 1000   no

## 2021-01-07 NOTE — ED ADULT NURSE REASSESSMENT NOTE - NS ED NURSE REASSESS COMMENT FT1
Receiving pt. from day RN: A&Ox4. NGT in place, hooked up to suction and draining brown fluid. Pt. reports feeling relief in pain since NGT placement. Denies any nausea at this time. VSS. Receiving pt. from day RN: A&Ox4. NGT in place to right nare, hooked up to suction and draining brown fluid. Pt. reports feeling relief in pain since NGT placement. Denies any nausea at this time. VSS.

## 2021-01-08 LAB
A1C WITH ESTIMATED AVERAGE GLUCOSE RESULT: 8 % — HIGH (ref 4–5.6)
ANION GAP SERPL CALC-SCNC: 13 MMOL/L — SIGNIFICANT CHANGE UP (ref 7–14)
BUN SERPL-MCNC: 29 MG/DL — HIGH (ref 7–23)
CALCIUM SERPL-MCNC: 9.6 MG/DL — SIGNIFICANT CHANGE UP (ref 8.4–10.5)
CHLORIDE SERPL-SCNC: 104 MMOL/L — SIGNIFICANT CHANGE UP (ref 98–107)
CO2 SERPL-SCNC: 23 MMOL/L — SIGNIFICANT CHANGE UP (ref 22–31)
CREAT SERPL-MCNC: 0.9 MG/DL — SIGNIFICANT CHANGE UP (ref 0.5–1.3)
ESTIMATED AVERAGE GLUCOSE: 183 MG/DL — HIGH (ref 68–114)
GLUCOSE BLDC GLUCOMTR-MCNC: 113 MG/DL — HIGH (ref 70–99)
GLUCOSE BLDC GLUCOMTR-MCNC: 129 MG/DL — HIGH (ref 70–99)
GLUCOSE BLDC GLUCOMTR-MCNC: 160 MG/DL — HIGH (ref 70–99)
GLUCOSE BLDC GLUCOMTR-MCNC: 163 MG/DL — HIGH (ref 70–99)
GLUCOSE BLDC GLUCOMTR-MCNC: 183 MG/DL — HIGH (ref 70–99)
GLUCOSE SERPL-MCNC: 151 MG/DL — HIGH (ref 70–99)
HCT VFR BLD CALC: 42.4 % — SIGNIFICANT CHANGE UP (ref 39–50)
HGB BLD-MCNC: 12.9 G/DL — LOW (ref 13–17)
LACTATE SERPL-SCNC: 1.1 MMOL/L — SIGNIFICANT CHANGE UP (ref 0.5–2)
MAGNESIUM SERPL-MCNC: 1.9 MG/DL — SIGNIFICANT CHANGE UP (ref 1.6–2.6)
MCHC RBC-ENTMCNC: 26.8 PG — LOW (ref 27–34)
MCHC RBC-ENTMCNC: 30.4 GM/DL — LOW (ref 32–36)
MCV RBC AUTO: 88.1 FL — SIGNIFICANT CHANGE UP (ref 80–100)
NRBC # BLD: 0 /100 WBCS — SIGNIFICANT CHANGE UP
NRBC # FLD: 0 K/UL — SIGNIFICANT CHANGE UP
PHOSPHATE SERPL-MCNC: 2.7 MG/DL — SIGNIFICANT CHANGE UP (ref 2.5–4.5)
PLATELET # BLD AUTO: 229 K/UL — SIGNIFICANT CHANGE UP (ref 150–400)
POTASSIUM SERPL-MCNC: 3.9 MMOL/L — SIGNIFICANT CHANGE UP (ref 3.5–5.3)
POTASSIUM SERPL-SCNC: 3.9 MMOL/L — SIGNIFICANT CHANGE UP (ref 3.5–5.3)
RBC # BLD: 4.81 M/UL — SIGNIFICANT CHANGE UP (ref 4.2–5.8)
RBC # FLD: 15.5 % — HIGH (ref 10.3–14.5)
SODIUM SERPL-SCNC: 140 MMOL/L — SIGNIFICANT CHANGE UP (ref 135–145)
WBC # BLD: 6.11 K/UL — SIGNIFICANT CHANGE UP (ref 3.8–10.5)
WBC # FLD AUTO: 6.11 K/UL — SIGNIFICANT CHANGE UP (ref 3.8–10.5)

## 2021-01-08 PROCEDURE — 99232 SBSQ HOSP IP/OBS MODERATE 35: CPT | Mod: GC

## 2021-01-08 RX ORDER — DIPHENHYDRAMINE HCL 50 MG
25 CAPSULE ORAL ONCE
Refills: 0 | Status: COMPLETED | OUTPATIENT
Start: 2021-01-08 | End: 2021-01-08

## 2021-01-08 RX ORDER — METOPROLOL TARTRATE 50 MG
7.5 TABLET ORAL EVERY 6 HOURS
Refills: 0 | Status: DISCONTINUED | OUTPATIENT
Start: 2021-01-08 | End: 2021-01-08

## 2021-01-08 RX ORDER — METOPROLOL TARTRATE 50 MG
100 TABLET ORAL DAILY
Refills: 0 | Status: DISCONTINUED | OUTPATIENT
Start: 2021-01-08 | End: 2021-01-08

## 2021-01-08 RX ORDER — DIPHENHYDRAMINE HCL 50 MG
50 CAPSULE ORAL EVERY 4 HOURS
Refills: 0 | Status: DISCONTINUED | OUTPATIENT
Start: 2021-01-08 | End: 2021-01-08

## 2021-01-08 RX ORDER — METOPROLOL TARTRATE 50 MG
100 TABLET ORAL DAILY
Refills: 0 | Status: DISCONTINUED | OUTPATIENT
Start: 2021-01-08 | End: 2021-01-10

## 2021-01-08 RX ORDER — POTASSIUM PHOSPHATE, MONOBASIC POTASSIUM PHOSPHATE, DIBASIC 236; 224 MG/ML; MG/ML
15 INJECTION, SOLUTION INTRAVENOUS ONCE
Refills: 0 | Status: COMPLETED | OUTPATIENT
Start: 2021-01-08 | End: 2021-01-08

## 2021-01-08 RX ORDER — ACETAMINOPHEN 500 MG
1000 TABLET ORAL ONCE
Refills: 0 | Status: COMPLETED | OUTPATIENT
Start: 2021-01-08 | End: 2021-01-08

## 2021-01-08 RX ADMIN — Medication 115 MILLIGRAM(S): at 14:02

## 2021-01-08 RX ADMIN — Medication 115 MILLIGRAM(S): at 19:45

## 2021-01-08 RX ADMIN — Medication 400 MILLIGRAM(S): at 05:00

## 2021-01-08 RX ADMIN — Medication 25 MILLIGRAM(S): at 21:15

## 2021-01-08 RX ADMIN — Medication 25 MILLIGRAM(S): at 11:55

## 2021-01-08 RX ADMIN — POTASSIUM PHOSPHATE, MONOBASIC POTASSIUM PHOSPHATE, DIBASIC 62.5 MILLIMOLE(S): 236; 224 INJECTION, SOLUTION INTRAVENOUS at 11:55

## 2021-01-08 RX ADMIN — HEPARIN SODIUM 5000 UNIT(S): 5000 INJECTION INTRAVENOUS; SUBCUTANEOUS at 05:01

## 2021-01-08 RX ADMIN — SODIUM CHLORIDE 125 MILLILITER(S): 9 INJECTION, SOLUTION INTRAVENOUS at 10:15

## 2021-01-08 RX ADMIN — HEPARIN SODIUM 5000 UNIT(S): 5000 INJECTION INTRAVENOUS; SUBCUTANEOUS at 21:15

## 2021-01-08 RX ADMIN — Medication 5 MILLIGRAM(S): at 05:01

## 2021-01-08 RX ADMIN — SODIUM CHLORIDE 125 MILLILITER(S): 9 INJECTION, SOLUTION INTRAVENOUS at 21:14

## 2021-01-08 RX ADMIN — Medication 2: at 12:07

## 2021-01-08 RX ADMIN — Medication 2: at 08:21

## 2021-01-08 RX ADMIN — PANTOPRAZOLE SODIUM 40 MILLIGRAM(S): 20 TABLET, DELAYED RELEASE ORAL at 11:55

## 2021-01-08 RX ADMIN — HEPARIN SODIUM 5000 UNIT(S): 5000 INJECTION INTRAVENOUS; SUBCUTANEOUS at 14:02

## 2021-01-08 NOTE — PROVIDER CONTACT NOTE (OTHER) - ASSESSMENT
Pt noted with new chest rash s/p showering. Pt with multiple documented allergies to antibiotics. Pt stated he received azetronam and vancomycin in the ER. Pt states the rash is similar to the rashes he had with the other antibiotics listed he was allergic to. States the rash is itchy, will continue to his toes later today (as his hx). Rash appears with redness.
VS stable except . /72. Pt does not appear in distress. Asymptomatic. States this happens to him when he doesn't get the lopressor.
Pt c/o of pain & is visibly distressed.

## 2021-01-08 NOTE — PROVIDER CONTACT NOTE (OTHER) - BACKGROUND
Pt admitted with intestinal obstruction.
Pt admitted 1/7 for SBO. R nare NG tube in place & draining moderate amounts of green, bilious output.
Pt admitted with intestinal obstruction.

## 2021-01-08 NOTE — PROVIDER CONTACT NOTE (OTHER) - ACTION/TREATMENT ORDERED:
PA notified. States will order lopressor 5mg IVP x1 dose. To recheck vital signs in 30 minutes-1 hour. No other interventions stated. Will continue to monitor.
Repeat vital signs. Admin pain medication as ordered.
PA notified. States will order benadryl for pt & will come to assess. No other interventions stated. Will continue to monitor.

## 2021-01-08 NOTE — CHART NOTE - NSCHARTNOTEFT_GEN_A_CORE
CAPRINI SCORE    AGE RELATED RISK FACTORS                                                             [x] Age 41-60 years                                            (1 Point)  [ ] Age: 61-74 years                                           (2 Points)                 [ ] Age= 75 years                                                (3 Points)             DISEASE RELATED RISK FACTORS                                                       [ ] Edema in the lower extremities                 (1 Point)                     [ ] Varicose veins                                               (1 Point)                                 [ ] BMI > 25 Kg/m2                                            (1 Point)                                  [ ] Serious infection (ie PNA)                            (1 Point)                     [ ] Lung disease ( COPD, Emphysema)            (1 Point)                                                                          [ ] Acute myocardial infarction                         (1 Point)                  [ ] Congestive heart failure (in the previous month)  (1 Point)         [ ] Inflammatory bowel disease                            (1 Point)                  [ ] Central venous access, PICC or Port               (2 points)       (within the last month)                                                                [ ] Stroke (in the previous month)                        (5 Points)    [ ] Previous or present malignancy                       (2 points)                                                                                                                                                         HEMATOLOGY RELATED FACTORS                                                         [x] Prior episodes of VTE                                     (3 Points)                     [ ] Positive family history for VTE                      (3 Points)                  [ ] Prothrombin 98214 A                                     (3 Points)                     [ ] Factor V Leiden                                                (3 Points)                        [ ] Lupus anticoagulants                                      (3 Points)                                                           [ ] Anticardiolipin antibodies                              (3 Points)                                                       [ ] High homocysteine in the blood                   (3 Points)                                             [ ] Other congenital or acquired thrombophilia      (3 Points)                                                [ ] Heparin induced thrombocytopenia                  (3 Points)                                        MOBILITY RELATED FACTORS  [ ] Bed rest                                                         (1 Point)  [ ] Plaster cast                                                    (2 points)  [ ] Bed bound for more than 72 hours           (2 Points)    GENDER SPECIFIC FACTORS  [ ] Pregnancy or had a baby within the last month   (1 Point)  [ ] Post-partum < 6 weeks                                   (1 Point)  [ ] Hormonal therapy  or oral contraception   (1 Point)  [ ] History of pregnancy complications              (1 point)  [ ] Unexplained or recurrent              (1 Point)    OTHER RISK FACTORS                                           (1 Point)  BMI >40, smoking, diabetes requiring insulin, chemotherapy  blood transfusions and length of surgery over 2 hours    SURGERY RELATED RISK FACTORS  [ ]  Section within the last month     (1 Point)  [ ] Minor surgery                                                  (1 Point)  [ ] Arthroscopic surgery                                       (2 Points)  [ ] Planned major surgery lasting more            (2 Points)      than 45 minutes     [ ] Elective hip or knee joint replacement       (5 points)       surgery                                                TRAUMA RELATED RISK FACTORS  [ ] Fracture of the hip, pelvis, or leg                       (5 Points)  [ ] Spinal cord injury resulting in paralysis             (5 points)       (in the previous month)    [ ] Paralysis  (less than 1 month)                             (5 Points)  [ ] Multiple Trauma within 1 month                        (5 Points)    Total Score [ 4 ]    Caprini Score 0-2: Low Risk, NO VTE prophylaxis required for most patients, encourage ambulation  Caprini Score 3-6: Moderate Risk , pharmacologic VTE prophylaxis is indicated for most patients (in the absence of contraindications)  Caprini Score Greater than or =7: High risk, pharmacologic VTE prophylaxis indicated for most patients (in the absence of contraindications)

## 2021-01-09 LAB
ANION GAP SERPL CALC-SCNC: 11 MMOL/L — SIGNIFICANT CHANGE UP (ref 7–14)
BUN SERPL-MCNC: 21 MG/DL — SIGNIFICANT CHANGE UP (ref 7–23)
CALCIUM SERPL-MCNC: 8.7 MG/DL — SIGNIFICANT CHANGE UP (ref 8.4–10.5)
CHLORIDE SERPL-SCNC: 103 MMOL/L — SIGNIFICANT CHANGE UP (ref 98–107)
CO2 SERPL-SCNC: 25 MMOL/L — SIGNIFICANT CHANGE UP (ref 22–31)
CREAT SERPL-MCNC: 0.89 MG/DL — SIGNIFICANT CHANGE UP (ref 0.5–1.3)
GLUCOSE BLDC GLUCOMTR-MCNC: 136 MG/DL — HIGH (ref 70–99)
GLUCOSE BLDC GLUCOMTR-MCNC: 175 MG/DL — HIGH (ref 70–99)
GLUCOSE BLDC GLUCOMTR-MCNC: 198 MG/DL — HIGH (ref 70–99)
GLUCOSE BLDC GLUCOMTR-MCNC: 216 MG/DL — HIGH (ref 70–99)
GLUCOSE SERPL-MCNC: 136 MG/DL — HIGH (ref 70–99)
HCT VFR BLD CALC: 43 % — SIGNIFICANT CHANGE UP (ref 39–50)
HGB BLD-MCNC: 12.7 G/DL — LOW (ref 13–17)
MAGNESIUM SERPL-MCNC: 1.6 MG/DL — SIGNIFICANT CHANGE UP (ref 1.6–2.6)
MCHC RBC-ENTMCNC: 26.5 PG — LOW (ref 27–34)
MCHC RBC-ENTMCNC: 29.5 GM/DL — LOW (ref 32–36)
MCV RBC AUTO: 89.6 FL — SIGNIFICANT CHANGE UP (ref 80–100)
NRBC # BLD: 0 /100 WBCS — SIGNIFICANT CHANGE UP
NRBC # FLD: 0 K/UL — SIGNIFICANT CHANGE UP
PHOSPHATE SERPL-MCNC: 2.4 MG/DL — LOW (ref 2.5–4.5)
PLATELET # BLD AUTO: 200 K/UL — SIGNIFICANT CHANGE UP (ref 150–400)
POTASSIUM SERPL-MCNC: 3.7 MMOL/L — SIGNIFICANT CHANGE UP (ref 3.5–5.3)
POTASSIUM SERPL-SCNC: 3.7 MMOL/L — SIGNIFICANT CHANGE UP (ref 3.5–5.3)
RBC # BLD: 4.8 M/UL — SIGNIFICANT CHANGE UP (ref 4.2–5.8)
RBC # FLD: 15.5 % — HIGH (ref 10.3–14.5)
SODIUM SERPL-SCNC: 139 MMOL/L — SIGNIFICANT CHANGE UP (ref 135–145)
WBC # BLD: 7.81 K/UL — SIGNIFICANT CHANGE UP (ref 3.8–10.5)
WBC # FLD AUTO: 7.81 K/UL — SIGNIFICANT CHANGE UP (ref 3.8–10.5)

## 2021-01-09 RX ORDER — DIPHENHYDRAMINE HCL 50 MG
25 CAPSULE ORAL EVERY 4 HOURS
Refills: 0 | Status: DISCONTINUED | OUTPATIENT
Start: 2021-01-09 | End: 2021-01-10

## 2021-01-09 RX ORDER — SODIUM,POTASSIUM PHOSPHATES 278-250MG
1 POWDER IN PACKET (EA) ORAL ONCE
Refills: 0 | Status: COMPLETED | OUTPATIENT
Start: 2021-01-09 | End: 2021-01-09

## 2021-01-09 RX ORDER — POTASSIUM CHLORIDE 20 MEQ
20 PACKET (EA) ORAL ONCE
Refills: 0 | Status: COMPLETED | OUTPATIENT
Start: 2021-01-09 | End: 2021-01-09

## 2021-01-09 RX ORDER — MAGNESIUM SULFATE 500 MG/ML
1 VIAL (ML) INJECTION ONCE
Refills: 0 | Status: COMPLETED | OUTPATIENT
Start: 2021-01-09 | End: 2021-01-09

## 2021-01-09 RX ADMIN — Medication 25 MILLIGRAM(S): at 03:44

## 2021-01-09 RX ADMIN — Medication 100 MILLIGRAM(S): at 05:16

## 2021-01-09 RX ADMIN — Medication 24 MILLIGRAM(S): at 14:00

## 2021-01-09 RX ADMIN — HEPARIN SODIUM 5000 UNIT(S): 5000 INJECTION INTRAVENOUS; SUBCUTANEOUS at 13:36

## 2021-01-09 RX ADMIN — Medication 100 GRAM(S): at 11:26

## 2021-01-09 RX ADMIN — Medication 25 MILLIGRAM(S): at 22:15

## 2021-01-09 RX ADMIN — Medication 20 MILLIEQUIVALENT(S): at 11:26

## 2021-01-09 RX ADMIN — Medication 25 MILLIGRAM(S): at 14:39

## 2021-01-09 RX ADMIN — Medication 1 PACKET(S): at 11:27

## 2021-01-09 RX ADMIN — Medication 2: at 00:36

## 2021-01-09 RX ADMIN — PANTOPRAZOLE SODIUM 40 MILLIGRAM(S): 20 TABLET, DELAYED RELEASE ORAL at 11:27

## 2021-01-09 RX ADMIN — HEPARIN SODIUM 5000 UNIT(S): 5000 INJECTION INTRAVENOUS; SUBCUTANEOUS at 05:16

## 2021-01-09 RX ADMIN — Medication 25 MILLIGRAM(S): at 09:56

## 2021-01-09 RX ADMIN — Medication 2: at 17:52

## 2021-01-09 RX ADMIN — HEPARIN SODIUM 5000 UNIT(S): 5000 INJECTION INTRAVENOUS; SUBCUTANEOUS at 22:49

## 2021-01-09 RX ADMIN — Medication 2: at 13:31

## 2021-01-10 ENCOUNTER — TRANSCRIPTION ENCOUNTER (OUTPATIENT)
Age: 46
End: 2021-01-10

## 2021-01-10 VITALS
SYSTOLIC BLOOD PRESSURE: 125 MMHG | DIASTOLIC BLOOD PRESSURE: 62 MMHG | RESPIRATION RATE: 16 BRPM | TEMPERATURE: 99 F | OXYGEN SATURATION: 99 % | HEART RATE: 94 BPM

## 2021-01-10 LAB
ANION GAP SERPL CALC-SCNC: 12 MMOL/L — SIGNIFICANT CHANGE UP (ref 7–14)
BUN SERPL-MCNC: 20 MG/DL — SIGNIFICANT CHANGE UP (ref 7–23)
CALCIUM SERPL-MCNC: 8.8 MG/DL — SIGNIFICANT CHANGE UP (ref 8.4–10.5)
CHLORIDE SERPL-SCNC: 104 MMOL/L — SIGNIFICANT CHANGE UP (ref 98–107)
CO2 SERPL-SCNC: 24 MMOL/L — SIGNIFICANT CHANGE UP (ref 22–31)
CREAT SERPL-MCNC: 0.78 MG/DL — SIGNIFICANT CHANGE UP (ref 0.5–1.3)
GLUCOSE BLDC GLUCOMTR-MCNC: 125 MG/DL — HIGH (ref 70–99)
GLUCOSE BLDC GLUCOMTR-MCNC: 142 MG/DL — HIGH (ref 70–99)
GLUCOSE BLDC GLUCOMTR-MCNC: 159 MG/DL — HIGH (ref 70–99)
GLUCOSE BLDC GLUCOMTR-MCNC: 189 MG/DL — HIGH (ref 70–99)
GLUCOSE SERPL-MCNC: 124 MG/DL — HIGH (ref 70–99)
HCT VFR BLD CALC: 39.1 % — SIGNIFICANT CHANGE UP (ref 39–50)
HGB BLD-MCNC: 12.2 G/DL — LOW (ref 13–17)
MAGNESIUM SERPL-MCNC: 2.1 MG/DL — SIGNIFICANT CHANGE UP (ref 1.6–2.6)
MCHC RBC-ENTMCNC: 27 PG — SIGNIFICANT CHANGE UP (ref 27–34)
MCHC RBC-ENTMCNC: 31.2 GM/DL — LOW (ref 32–36)
MCV RBC AUTO: 86.5 FL — SIGNIFICANT CHANGE UP (ref 80–100)
NRBC # BLD: 0 /100 WBCS — SIGNIFICANT CHANGE UP
NRBC # FLD: 0 K/UL — SIGNIFICANT CHANGE UP
PHOSPHATE SERPL-MCNC: 2.6 MG/DL — SIGNIFICANT CHANGE UP (ref 2.5–4.5)
PLATELET # BLD AUTO: 187 K/UL — SIGNIFICANT CHANGE UP (ref 150–400)
POTASSIUM SERPL-MCNC: 4 MMOL/L — SIGNIFICANT CHANGE UP (ref 3.5–5.3)
POTASSIUM SERPL-SCNC: 4 MMOL/L — SIGNIFICANT CHANGE UP (ref 3.5–5.3)
RBC # BLD: 4.52 M/UL — SIGNIFICANT CHANGE UP (ref 4.2–5.8)
RBC # FLD: 15.4 % — HIGH (ref 10.3–14.5)
SODIUM SERPL-SCNC: 140 MMOL/L — SIGNIFICANT CHANGE UP (ref 135–145)
WBC # BLD: 9.9 K/UL — SIGNIFICANT CHANGE UP (ref 3.8–10.5)
WBC # FLD AUTO: 9.9 K/UL — SIGNIFICANT CHANGE UP (ref 3.8–10.5)

## 2021-01-10 PROCEDURE — 99238 HOSP IP/OBS DSCHRG MGMT 30/<: CPT

## 2021-01-10 RX ADMIN — PANTOPRAZOLE SODIUM 40 MILLIGRAM(S): 20 TABLET, DELAYED RELEASE ORAL at 12:39

## 2021-01-10 RX ADMIN — Medication 100 MILLIGRAM(S): at 05:40

## 2021-01-10 RX ADMIN — Medication 4 MILLIGRAM(S): at 12:39

## 2021-01-10 RX ADMIN — Medication 2: at 00:41

## 2021-01-10 RX ADMIN — Medication 25 MILLIGRAM(S): at 12:39

## 2021-01-10 RX ADMIN — HEPARIN SODIUM 5000 UNIT(S): 5000 INJECTION INTRAVENOUS; SUBCUTANEOUS at 05:40

## 2021-01-10 RX ADMIN — Medication 2: at 12:38

## 2021-01-10 RX ADMIN — Medication 4 MILLIGRAM(S): at 05:40

## 2021-01-10 RX ADMIN — Medication 25 MILLIGRAM(S): at 05:55

## 2021-01-10 NOTE — DISCHARGE NOTE PROVIDER - CARE PROVIDERS DIRECT ADDRESSES
[email or web address]

## 2021-01-10 NOTE — PROGRESS NOTE ADULT - SUBJECTIVE AND OBJECTIVE BOX
44 yo M with hx DM, HTN, GERD, HLD, and perforated diverticulitis s/p Hartmans, s/p reversal, multiple prior SBO's s/p ex-lap (last one 8/20), LINDA in 2014, 2016, s/p incisional hernia repair, found to have SBO. Admitted, NGT placed. This morning patient reports very minimal flatus. He denies abdominal pain, chest pain or shortness of breath. No episodes of nausea or vomiting overnight.    Vital Signs Last 24 Hrs  T(C): 36.9 (08 Jan 2021 04:51), Max: 37.1 (07 Jan 2021 18:29)  T(F): 98.5 (08 Jan 2021 04:51), Max: 98.7 (07 Jan 2021 18:29)  HR: 100 (08 Jan 2021 04:51) (74 - 119)  BP: 123/68 (08 Jan 2021 04:51) (108/72 - 137/79)  RR: 19 (08 Jan 2021 04:51) (16 - 19)  SpO2: 95% (08 Jan 2021 04:51) (95% - 100%)    I&O's Detail    07 Jan 2021 07:01  -  08 Jan 2021 07:00  --------------------------------------------------------  IN:  Total IN: 0 mL    OUT:    Nasogastric/Oral tube (mL): 900 mL    Voided (mL): 750 mL  Total OUT: 1650 mL    Total NET: -1650 mL      LABS:   Pending     PE:  Gen: NAD, A&Ox3  Chest: Non labored breathing  Abd: Soft obese abdomen, non tender, no evidence of rebound or guarding   Ext: warm, well perfused    
Surgery Progress Note    SUBJECTIVE: No acute events overnight. Pt seen and examined at bedside. Patient comfortable. No nausea, vomiting, diarrhea. Pain is controlled. +Flatus. Endorses that rash is still present but has improved. Tolerating diet.    Vital Signs Last 24 Hrs  T(C): 37.1 (10 Davon 2021 10:07), Max: 37.9 (09 Jan 2021 13:45)  T(F): 98.7 (10 Davon 2021 10:07), Max: 100.2 (09 Jan 2021 13:45)  HR: 94 (10 Davon 2021 10:07) (88 - 96)  BP: 125/62 (10 Davon 2021 10:07) (113/64 - 125/62)  BP(mean): --  RR: 16 (10 Davon 2021 10:07) (16 - 19)  SpO2: 99% (10 Davon 2021 10:07) (96% - 99%)    Physical Exam:  General Appearance: Appears well, in no acute distress, awake, alert, and oriented x3.  Respiratory: No labored breathing  CV: Pulse regularly present  Abdomen: Soft, nontender, nondistended w/o rebound tenderness or guarding.   Extremities: Warm and well perfused, moving spontaneously    LABS:                        12.2   9.90  )-----------( 187      ( 10 Davon 2021 07:06 )             39.1     01-10    140  |  104  |  20  ----------------------------<  124<H>  4.0   |  24  |  0.78    Ca    8.8      10 Davon 2021 07:06  Phos  2.6     01-10  Mg     2.1     01-10            INs and OUTs:    01-09-21 @ 07:01  -  01-10-21 @ 07:00  --------------------------------------------------------  IN: 1940 mL / OUT: 1250 mL / NET: 690 mL    01-10-21 @ 07:01  -  01-10-21 @ 11:44  --------------------------------------------------------  IN: 0 mL / OUT: 200 mL / NET: -200 mL        Medications:  MEDICATIONS  (STANDING):  dextrose 40% Gel 15 Gram(s) Oral once  dextrose 5%. 1000 milliLiter(s) (50 mL/Hr) IV Continuous <Continuous>  dextrose 5%. 1000 milliLiter(s) (100 mL/Hr) IV Continuous <Continuous>  dextrose 50% Injectable 25 Gram(s) IV Push once  dextrose 50% Injectable 12.5 Gram(s) IV Push once  dextrose 50% Injectable 25 Gram(s) IV Push once  glucagon  Injectable 1 milliGRAM(s) IntraMuscular once  heparin   Injectable 5000 Unit(s) SubCutaneous every 8 hours  insulin lispro (ADMELOG) corrective regimen sliding scale   SubCutaneous every 6 hours  lactated ringers. 1000 milliLiter(s) (125 mL/Hr) IV Continuous <Continuous>  methylPREDNISolone   Oral   methylPREDNISolone 4 milliGRAM(s) Oral before breakfast  methylPREDNISolone 4 milliGRAM(s) Oral after lunch  methylPREDNISolone 4 milliGRAM(s) Oral after dinner  methylPREDNISolone 8 milliGRAM(s) Oral at bedtime  metoprolol succinate  milliGRAM(s) Oral daily  pantoprazole  Injectable 40 milliGRAM(s) IV Push daily    MEDICATIONS  (PRN):  diphenhydrAMINE 25 milliGRAM(s) Oral every 4 hours PRN Rash and/or Itching  
Surgery Progress Note    SUBJECTIVE: Overnight, pt complaining of rash. Not improved with benadryl. Pt seen and examined at bedside. Patient comfortable. No nausea, vomiting, diarrhea, shortness of breath. Pain is controlled. +Flatus Tolerating diet.    Vital Signs Last 24 Hrs  T(C): 36.9 (2021 10:14), Max: 37.5 (2021 21:13)  T(F): 98.5 (2021 10:14), Max: 99.5 (2021 21:13)  HR: 83 (2021 10:14) (83 - 119)  BP: 130/68 (2021 10:14) (110/53 - 138/63)  BP(mean): --  RR: 19 (2021 10:14) (16 - 19)  SpO2: 98% (2021 10:14) (96% - 99%)    Physical Exam:  General Appearance: Appears well, in no acute distress, awake, alert, and oriented x3.  Respiratory: No labored breathing  CV: Pulse regularly present  Abdomen: Soft, nontender, nondistended w/o rebound tenderness or guarding.   Extremities: Warm and well perfused, moving spontaneously    LABS:                        12.7   7.81  )-----------( 200      ( 2021 06:49 )             43.0         139  |  103  |  21  ----------------------------<  136<H>  3.7   |  25  |  0.89    Ca    8.7      2021 06:49  Phos  2.4       Mg     1.6             Urinalysis Basic - ( 2021 13:59 )    Color: Yellow / Appearance: Clear / S.029 / pH: x  Gluc: x / Ketone: Negative  / Bili: Negative / Urobili: <2 mg/dL   Blood: x / Protein: 30 mg/dL / Nitrite: Negative   Leuk Esterase: Negative / RBC: 2-5 /HPF / WBC 2-5 /HPF   Sq Epi: x / Non Sq Epi: Occasional / Bacteria: Negative        INs and OUTs:    21 @ 07:01  -  21 @ 07:00  --------------------------------------------------------  IN: 0 mL / OUT: 1150 mL / NET: -1150 mL        Medications:  MEDICATIONS  (STANDING):  dextrose 40% Gel 15 Gram(s) Oral once  dextrose 5%. 1000 milliLiter(s) (50 mL/Hr) IV Continuous <Continuous>  dextrose 5%. 1000 milliLiter(s) (100 mL/Hr) IV Continuous <Continuous>  dextrose 50% Injectable 25 Gram(s) IV Push once  dextrose 50% Injectable 12.5 Gram(s) IV Push once  dextrose 50% Injectable 25 Gram(s) IV Push once  glucagon  Injectable 1 milliGRAM(s) IntraMuscular once  heparin   Injectable 5000 Unit(s) SubCutaneous every 8 hours  insulin lispro (ADMELOG) corrective regimen sliding scale   SubCutaneous every 6 hours  lactated ringers. 1000 milliLiter(s) (125 mL/Hr) IV Continuous <Continuous>  magnesium sulfate  IVPB 1 Gram(s) IV Intermittent once  metoprolol succinate  milliGRAM(s) Oral daily  pantoprazole  Injectable 40 milliGRAM(s) IV Push daily  potassium chloride    Tablet ER 20 milliEquivalent(s) Oral once  potassium phosphate / sodium phosphate Powder (PHOS-NaK) 1 Packet(s) Oral once    MEDICATIONS  (PRN):  diphenhydrAMINE 25 milliGRAM(s) Oral every 4 hours PRN Rash and/or Itching

## 2021-01-10 NOTE — DISCHARGE NOTE PROVIDER - CARE PROVIDER_API CALL
Musa White)  ColonRectal Surgery; Surgery  [address] [place] [address]  Phone: [phone]  Fax: [phone]  Follow Up Time: 2 weeks

## 2021-01-10 NOTE — DISCHARGE NOTE NURSING/CASE MANAGEMENT/SOCIAL WORK - NSDPDISTO_GEN_ALL_CORE
Pt A&Ox4. VS stable. Pt had no c/o pain. No s&s of respiratory distress. OOB self. BM x1 today. Tolerating PO intake. Skin care provided. Pt turned & positioned q2hrs. Pt medicated as ordered, tolerated well. Hourly rounding performed. No distress noted. Safety maintained. Pt educated on plan of care. Will continue to monitor. Pt deemed stable for d/c. PIV removed. Pt with no c/o pain or s&s of distress at time of d/c. Pt given d/c education, pt states understanding. D/c to home./Home

## 2021-01-10 NOTE — DISCHARGE NOTE NURSING/CASE MANAGEMENT/SOCIAL WORK - PATIENT PORTAL LINK FT
You can access the FollowMyHealth Patient Portal offered by [facility] by registering at the following website: [email or web address]. By joining [facility]’s FollowMyHealth portal, you will also be able to view your health information using other applications (apps) compatible with our system.

## 2021-01-10 NOTE — PROGRESS NOTE ADULT - ASSESSMENT
45yoM with PMHx as above, found to have recurrent SBO    - Increase metoprolol dose for tachycardia  - Encourage OOB/IS use   - Diet: Advance to LRD  - Medrol for rash  - DVT PPx  - FS with correction   - Monitor for bowel function     ATeam Surgery  P. [id number] 
45yoM with PMHx as above, found to have recurrent SBO, NGT in place    - Increase metoprolol dose for tachycardia  - Encourage OOB/IS use   - NPO/NGT to CLWS  - DVT PPx  - FS with correction   - Monitor for bowel function     [facility] Surgery  P. [id number] 
45yoM with PMHx as above, found to have recurrent SBO now w/ return of bowel function, tolerating diet.    - Home metoprolol  - Encourage OOB/IS use   - Diet: Advance to LRD  - Medrol for rash  - DVT PPx  - FS with correction   - Monitor for bowel function   - Dispo: Home today with remaining medrol taper    ATe[facility] Surgery  P. [id number]

## 2021-01-10 NOTE — DISCHARGE NOTE PROVIDER - NSDCMRMEDTOKEN_GEN_ALL_CORE_FT
Crestor 5 mg oral tablet: 1 tab(s) orally once a day (at bedtime)  Farxiga 5 mg oral tablet: 1 tab(s) orally once a day  fenofibrate 145 mg oral tablet: 1 tab(s) orally once a day  glimepiride 1 mg oral tablet: 1 tab(s) orally once a day  Januvia 50 mg oral tablet: 1 tab(s) orally once a day  losartan 50 mg oral tablet: 1 tab(s) orally once a day  metFORMIN: 500 milligram(s) orally 3 times a day  multivitamin: 1 tab(s) orally once a day  pantoprazole 40 mg oral delayed release tablet: 1 tab(s) orally once a day (before a meal)  Toprol-XL: 100 milligram(s) orally once a day  Vascepa 1 g oral capsule: 2 cap(s) orally 2 times a day   Crestor 5 mg oral tablet: 1 tab(s) orally once a day (at bedtime)  Farxiga 5 mg oral tablet: 1 tab(s) orally once a day  fenofibrate 145 mg oral tablet: 1 tab(s) orally once a day  glimepiride 1 mg oral tablet: 1 tab(s) orally once a day  Januvia 50 mg oral tablet: 1 tab(s) orally once a day  losartan 50 mg oral tablet: 1 tab(s) orally once a day  Medrol 4 mg oral tablet: 1 tab(s) orally 4 times a day      today: 1 tab with lunch, 1 with dinner, 2 before bedtime 1/11: 1 with breakfast, 1 with lunch, 1 with dinner, 1 before bedtime 1/12: 1 with breakfast, 1 with lunch, 1 before bedtime 1/13: 1 with lunch, 1 with dinner 1/14: 1 with lunch   metFORMIN: 500 milligram(s) orally 3 times a day  multivitamin: 1 tab(s) orally once a day  pantoprazole 40 mg oral delayed release tablet: 1 tab(s) orally once a day (before a meal)  Toprol-XL: 100 milligram(s) orally once a day  Vascepa 1 g oral capsule: 2 cap(s) orally 2 times a day

## 2021-01-10 NOTE — DISCHARGE NOTE PROVIDER - HOSPITAL COURSE
44 yo M with hx DM, HTN, GERD, HLD, and perforated diverticulitis s/p Hartmans, s/p reversal, multiple prior SBO's s/p ex-lap (last one 8/20), LINDA in 2014, 2016, s/p incisional hernia repair, complaining of abdominal pain since 4pm yesterday. Last BM and last flatus last night. Associated with nausea and vomiting x3. Prior to yesterday tolerating diet without issues. Admitted a few months ago with SBO that was managed non-operatively.  Denies cough, SOB, c/p, diarrhea, fever, dysuria, weight loss, recent trauma or travel.    Patient treated with NGT decompression. At the time of discharge, patient has had return of bowel function, is hemodynamically stable, and tolerating regular diet. Patient is cleared for discharge with outpatient follow-up.

## 2021-01-12 LAB
CULTURE RESULTS: SIGNIFICANT CHANGE UP
CULTURE RESULTS: SIGNIFICANT CHANGE UP
SPECIMEN SOURCE: SIGNIFICANT CHANGE UP
SPECIMEN SOURCE: SIGNIFICANT CHANGE UP

## 2021-06-23 ENCOUNTER — INPATIENT (INPATIENT)
Facility: HOSPITAL | Age: 46
LOS: 5 days | Discharge: HOME | End: 2021-06-29
Attending: SURGERY | Admitting: SURGERY
Payer: COMMERCIAL

## 2021-06-23 VITALS
OXYGEN SATURATION: 97 % | HEIGHT: 72 IN | HEART RATE: 113 BPM | RESPIRATION RATE: 16 BRPM | TEMPERATURE: 97 F | SYSTOLIC BLOOD PRESSURE: 112 MMHG | DIASTOLIC BLOOD PRESSURE: 66 MMHG

## 2021-06-23 DIAGNOSIS — Z98.89 OTHER SPECIFIED POSTPROCEDURAL STATES: Chronic | ICD-10-CM

## 2021-06-23 DIAGNOSIS — Z98.890 OTHER SPECIFIED POSTPROCEDURAL STATES: Chronic | ICD-10-CM

## 2021-06-23 DIAGNOSIS — K56.609 UNSPECIFIED INTESTINAL OBSTRUCTION, UNSPECIFIED AS TO PARTIAL VERSUS COMPLETE OBSTRUCTION: ICD-10-CM

## 2021-06-23 LAB
ALBUMIN SERPL ELPH-MCNC: 4.9 G/DL — SIGNIFICANT CHANGE UP (ref 3.3–5)
ALP SERPL-CCNC: 39 U/L — LOW (ref 40–120)
ALT FLD-CCNC: 30 U/L — SIGNIFICANT CHANGE UP (ref 4–41)
ANION GAP SERPL CALC-SCNC: 15 MMOL/L — HIGH (ref 7–14)
AST SERPL-CCNC: 19 U/L — SIGNIFICANT CHANGE UP (ref 4–40)
BASOPHILS # BLD AUTO: 0 K/UL — SIGNIFICANT CHANGE UP (ref 0–0.2)
BASOPHILS NFR BLD AUTO: 0 % — SIGNIFICANT CHANGE UP (ref 0–2)
BILIRUB SERPL-MCNC: 0.8 MG/DL — SIGNIFICANT CHANGE UP (ref 0.2–1.2)
BLD GP AB SCN SERPL QL: NEGATIVE — SIGNIFICANT CHANGE UP
BUN SERPL-MCNC: 24 MG/DL — HIGH (ref 7–23)
CALCIUM SERPL-MCNC: 10.4 MG/DL — SIGNIFICANT CHANGE UP (ref 8.4–10.5)
CHLORIDE SERPL-SCNC: 101 MMOL/L — SIGNIFICANT CHANGE UP (ref 98–107)
CO2 SERPL-SCNC: 23 MMOL/L — SIGNIFICANT CHANGE UP (ref 22–31)
CREAT SERPL-MCNC: 0.85 MG/DL — SIGNIFICANT CHANGE UP (ref 0.5–1.3)
EOSINOPHIL # BLD AUTO: 0.17 K/UL — SIGNIFICANT CHANGE UP (ref 0–0.5)
EOSINOPHIL NFR BLD AUTO: 1.7 % — SIGNIFICANT CHANGE UP (ref 0–6)
GLUCOSE BLDC GLUCOMTR-MCNC: 184 MG/DL — HIGH (ref 70–99)
GLUCOSE SERPL-MCNC: 196 MG/DL — HIGH (ref 70–99)
HCT VFR BLD CALC: 46.9 % — SIGNIFICANT CHANGE UP (ref 39–50)
HGB BLD-MCNC: 14.8 G/DL — SIGNIFICANT CHANGE UP (ref 13–17)
IANC: 7.31 K/UL — SIGNIFICANT CHANGE UP (ref 1.5–8.5)
INR BLD: 1.05 RATIO — SIGNIFICANT CHANGE UP (ref 0.88–1.16)
LIDOCAIN IGE QN: 20 U/L — SIGNIFICANT CHANGE UP (ref 7–60)
LYMPHOCYTES # BLD AUTO: 1.87 K/UL — SIGNIFICANT CHANGE UP (ref 1–3.3)
LYMPHOCYTES # BLD AUTO: 19.1 % — SIGNIFICANT CHANGE UP (ref 13–44)
MCHC RBC-ENTMCNC: 27.1 PG — SIGNIFICANT CHANGE UP (ref 27–34)
MCHC RBC-ENTMCNC: 31.6 GM/DL — LOW (ref 32–36)
MCV RBC AUTO: 85.7 FL — SIGNIFICANT CHANGE UP (ref 80–100)
MONOCYTES # BLD AUTO: 0.69 K/UL — SIGNIFICANT CHANGE UP (ref 0–0.9)
MONOCYTES NFR BLD AUTO: 7 % — SIGNIFICANT CHANGE UP (ref 2–14)
NEUTROPHILS # BLD AUTO: 6.65 K/UL — SIGNIFICANT CHANGE UP (ref 1.8–7.4)
NEUTROPHILS NFR BLD AUTO: 58.3 % — SIGNIFICANT CHANGE UP (ref 43–77)
PLATELET # BLD AUTO: 274 K/UL — SIGNIFICANT CHANGE UP (ref 150–400)
POTASSIUM SERPL-MCNC: 4 MMOL/L — SIGNIFICANT CHANGE UP (ref 3.5–5.3)
POTASSIUM SERPL-SCNC: 4 MMOL/L — SIGNIFICANT CHANGE UP (ref 3.5–5.3)
PROT SERPL-MCNC: 8.3 G/DL — SIGNIFICANT CHANGE UP (ref 6–8.3)
PROTHROM AB SERPL-ACNC: 11.9 SEC — SIGNIFICANT CHANGE UP (ref 10.6–13.6)
RBC # BLD: 5.47 M/UL — SIGNIFICANT CHANGE UP (ref 4.2–5.8)
RBC # FLD: 15 % — HIGH (ref 10.3–14.5)
RH IG SCN BLD-IMP: POSITIVE — SIGNIFICANT CHANGE UP
SARS-COV-2 RNA SPEC QL NAA+PROBE: SIGNIFICANT CHANGE UP
SODIUM SERPL-SCNC: 139 MMOL/L — SIGNIFICANT CHANGE UP (ref 135–145)
WBC # BLD: 9.79 K/UL — SIGNIFICANT CHANGE UP (ref 3.8–10.5)
WBC # FLD AUTO: 9.79 K/UL — SIGNIFICANT CHANGE UP (ref 3.8–10.5)

## 2021-06-23 PROCEDURE — 74176 CT ABD & PELVIS W/O CONTRAST: CPT | Mod: 26

## 2021-06-23 PROCEDURE — 99222 1ST HOSP IP/OBS MODERATE 55: CPT

## 2021-06-23 PROCEDURE — 99285 EMERGENCY DEPT VISIT HI MDM: CPT

## 2021-06-23 RX ORDER — ONDANSETRON 8 MG/1
4 TABLET, FILM COATED ORAL ONCE
Refills: 0 | Status: COMPLETED | OUTPATIENT
Start: 2021-06-23 | End: 2021-06-23

## 2021-06-23 RX ORDER — ONDANSETRON 8 MG/1
4 TABLET, FILM COATED ORAL EVERY 6 HOURS
Refills: 0 | Status: DISCONTINUED | OUTPATIENT
Start: 2021-06-23 | End: 2021-06-29

## 2021-06-23 RX ORDER — SODIUM CHLORIDE 9 MG/ML
2000 INJECTION, SOLUTION INTRAVENOUS ONCE
Refills: 0 | Status: COMPLETED | OUTPATIENT
Start: 2021-06-23 | End: 2021-06-23

## 2021-06-23 RX ORDER — INSULIN LISPRO 100/ML
VIAL (ML) SUBCUTANEOUS EVERY 6 HOURS
Refills: 0 | Status: DISCONTINUED | OUTPATIENT
Start: 2021-06-23 | End: 2021-06-26

## 2021-06-23 RX ORDER — ACETAMINOPHEN 500 MG
1000 TABLET ORAL ONCE
Refills: 0 | Status: COMPLETED | OUTPATIENT
Start: 2021-06-23 | End: 2021-06-23

## 2021-06-23 RX ORDER — ENOXAPARIN SODIUM 100 MG/ML
40 INJECTION SUBCUTANEOUS EVERY 24 HOURS
Refills: 0 | Status: DISCONTINUED | OUTPATIENT
Start: 2021-06-23 | End: 2021-06-29

## 2021-06-23 RX ORDER — ACETAMINOPHEN 500 MG
1000 TABLET ORAL EVERY 6 HOURS
Refills: 0 | Status: COMPLETED | OUTPATIENT
Start: 2021-06-23 | End: 2021-06-24

## 2021-06-23 RX ORDER — DIATRIZOATE MEGLUMINE 180 MG/ML
60 INJECTION, SOLUTION INTRAVESICAL ONCE
Refills: 0 | Status: COMPLETED | OUTPATIENT
Start: 2021-06-23 | End: 2021-06-23

## 2021-06-23 RX ORDER — SODIUM CHLORIDE 9 MG/ML
1000 INJECTION, SOLUTION INTRAVENOUS
Refills: 0 | Status: DISCONTINUED | OUTPATIENT
Start: 2021-06-23 | End: 2021-06-25

## 2021-06-23 RX ORDER — KETOROLAC TROMETHAMINE 30 MG/ML
15 SYRINGE (ML) INJECTION ONCE
Refills: 0 | Status: DISCONTINUED | OUTPATIENT
Start: 2021-06-23 | End: 2021-06-23

## 2021-06-23 RX ADMIN — ONDANSETRON 4 MILLIGRAM(S): 8 TABLET, FILM COATED ORAL at 18:10

## 2021-06-23 RX ADMIN — Medication 400 MILLIGRAM(S): at 23:50

## 2021-06-23 RX ADMIN — ONDANSETRON 4 MILLIGRAM(S): 8 TABLET, FILM COATED ORAL at 20:49

## 2021-06-23 RX ADMIN — SODIUM CHLORIDE 1000 MILLILITER(S): 9 INJECTION, SOLUTION INTRAVENOUS at 20:48

## 2021-06-23 RX ADMIN — Medication 1000 MILLIGRAM(S): at 19:00

## 2021-06-23 RX ADMIN — Medication 1: at 23:51

## 2021-06-23 RX ADMIN — Medication 400 MILLIGRAM(S): at 18:10

## 2021-06-23 RX ADMIN — Medication 15 MILLIGRAM(S): at 22:42

## 2021-06-23 RX ADMIN — SODIUM CHLORIDE 2000 MILLILITER(S): 9 INJECTION, SOLUTION INTRAVENOUS at 23:02

## 2021-06-23 NOTE — H&P ADULT - NSHPLABSRESULTS_GEN_ALL_CORE
< from: CT Abdomen and Pelvis No Cont (06.23.21 @ 18:54) >      EXAM:  CT ABDOMEN AND PELVIS        PROCEDURE DATE:  Jun 23 2021         INTERPRETATION:  CLINICAL INFORMATION: Abdominal pain, concern for small bowel obstruction.    COMPARISON: CT abdomen pelvis dated 1/7/2021.    CONTRAST/COMPLICATIONS:  IV Contrast: None.  Oral Contrast: None.  Complications: None reported.    PROCEDURE:  CT of the Abdomen and Pelvis was performed.  Sagittal and coronal reformats were performed.    FINDINGS:  LOWER CHEST: Within normal limits.    LIVER: Hepatic steatosis.  BILE DUCTS: Normal caliber.  GALLBLADDER: Cholelithiasis.  SPLEEN: Within normal limits.  PANCREAS: Within normal limits.  ADRENALS: Within normal limits.  KIDNEYS/URETERS: Within normal limits.    BLADDER: Within normal limits.  REPRODUCTIVE ORGANS:Prostate within normal limits.    BOWEL: There is small bowel obstruction with transition in the right lower quadrant. Large complex ventral hernia containing small and large bowel again noted. Redemonstrated rectosigmoid anastomosis. Colonic diverticulosis without diverticulitis.  PERITONEUM: No ascites.  VESSELS: Within normal limits.  RETROPERITONEUM/LYMPH NODES: No lymphadenopathy.  ABDOMINAL WALL: Complex ventral hernia as above.  BONES: Degenerative changes.    IMPRESSION:  Small bowel obstruction with transition in the right lower quadrant. Large complex ventral hernia containing small and large bowel again noted.            SANDY ALVAREZ MD; Resident Radiology  This document has been electronically signed.  JUSTINA NORWOOD MD; Attending Radiologist  This document has been electronically signed. Jun 23 2021  7:32PM    < end of copied text >

## 2021-06-23 NOTE — ED ADULT NURSE NOTE - OBJECTIVE STATEMENT
Pt arriving to intake 12 A&OX4 ambulatory c/o worsening abdominal x 1 day. PMH DM, HTN, SBO. Pt has had multiple abdominal surgeries, SBO in past. States pain feels similar. Last BM yesterday. Endorsing nausea without vomiting. Resp even and unlabored. Pt denies CP, SOB. 20g IV placed in LAC. Labs drawn and sent. Covid swab sent. Medicated as per EMAR.

## 2021-06-23 NOTE — ED ADULT NURSE REASSESSMENT NOTE - NS ED NURSE REASSESS COMMENT FT1
NG tube placed by Surgery resident MD Faulkner. NG tube to right nare. Patient reporting an improvement to his nausea since tube placement. Patient medicate for pain. Suction connected as ordered 500ml of green cloudy drainage noted in suction canister. Safety maintained. Patient stable upon exiting the room.

## 2021-06-23 NOTE — ED ADULT TRIAGE NOTE - CHIEF COMPLAINT QUOTE
Pt presents to ED for abdominal pain, nausea, and weakness since yesterday. Denies dark/ bloody stools, vomiting. . Pmhx SBO, DM, HTN, and DVT.

## 2021-06-23 NOTE — ED PROVIDER NOTE - CLINICAL SUMMARY MEDICAL DECISION MAKING FREE TEXT BOX
44 y/o M p/w reoccurrence of small bowel obstruction. Will obtain CT scan of abdomen, labs, antemetic, provide pain control, and reassess.

## 2021-06-23 NOTE — ED ADULT NURSE NOTE - NSIMPLEMENTINTERV_GEN_ALL_ED
Implemented All Universal Safety Interventions:  [place] to call system. Call bell, personal items and telephone within reach. Instruct patient to call for assistance. Room bathroom lighting operational. Non-slip footwear when patient is off stretcher. Physically safe environment: no spills, clutter or unnecessary equipment. Stretcher in lowest position, wheels locked, appropriate side rails in place.

## 2021-06-23 NOTE — H&P ADULT - HISTORY OF PRESENT ILLNESS
Patient is a 45y old  Male who presents with a chief complaint of abdominal pain    HPI:   Sathya Em is a very pleasant 45 year old gentleman with history of DM type II, HTN, GERD, HLD, and perforated diverticulitis s/p Isi's, s/p reversal, multiple prior SBO's s/p ex-lap, LINDA in 2014, 2016, s/p incisional hernia repair, known to the surgery service most recently admitted with SBO in 1/2021, resolved with conservative management, now presents to ED c/o abdominal pain. Patient reports 1 day of pain, epigastric/jacky-umbilical, cramping in nature and intermittent, associated with mild nausea. Pt denies any vomiting. Last BM was this afternoon, described as normal. However, pt states he has not noticed any flatus after the BM. Denies any other complaints including recent illness/sick contacts, fevers/chills, chest pain/shortness of breath, diarrhea/constipation.     ROS: 10-system review is otherwise negative except HPI above.      PAST MEDICAL & SURGICAL HISTORY:  Hypertension    Hyperlipidemia    Diabetes mellitus, type 2    SBO (small bowel obstruction)    Diverticulitis    DVT (deep venous thrombosis)  RLE 2016, post op, was on eliquis for 6 months    Open wound of abdominal wall    GERD (gastroesophageal reflux disease)    S/P Isi&#x27;s procedure  2009 with reversal 2009    H/O exploratory laparotomy  LINDA, x3    H/O hernia repair  xlap, ventral hernia repair 6/2018      FAMILY HISTORY:  No pertinent family history in first degree relatives      [] Family history not pertinent as reviewed with the patient and family    ALLERGIES: ceftriaxone (Rash)  cephalosporins (Rash)  Cipro (Unknown)  contrast media (iodine-based) (Rash)  fentanyl (Rash)  Flagyl (Unknown)  iodine containing compounds (Rash)  morphine (Rash)    --------------------------------------------------------------------------------------------

## 2021-06-23 NOTE — ED PROVIDER NOTE - OBJECTIVE STATEMENT
44 y/o M w/ PMH diabetes, HTN, HLD, GERD, perforated diverticulitis presents to the ED w/ abdominal pain and nausea x1day. Pt states his last bowel movement was yesterday and states his symptoms feel similar to the past and is the epigastric region. Denies fever, chills, chest pain, SOB.

## 2021-06-23 NOTE — ED PROVIDER NOTE - NSFOLLOWUPINSTRUCTIONS_ED_ALL_ED_FT
I have seen and examined the patient on the patient´s visit date. I have reviewed the note written by Cristiano Jovel [facility], on that visit day. I have supervised and participated as necessary in the performance of procedures indicated for patient management and was available at all phases of the patient´s visit when needed. We discussed the history, physical exam findings, mnagement plan, and  medical decision making. I have made my additons, exceptions, and revisions within the chart and I agree with H and P as documented in its entirety. The data and my interpretation of any data collected from labs, interventions and imaging appear below as well as my independent medical decision making and considerations    The patient is a 45y Male who has a past medical and surgery history of HTN HLD DM@ SBO/Buchanan procedure for diverticulitis ventral hernia repair 6/2018 PTED with Pt presents to ED for abdominal pain, nausea, and weakness since yesterday. Denies dark/ bloody stools, vomiting. . Pmhx SBO, DM, HTN, and DVT.   Vital Signs Last 24 Hrs  T(F): 97.1 HR: 113 BP: 112/66 RR: 16 SpO2: 97% (23 Jun 2021 15:57)   PE: as described; my additions and exceptions are noted in the chart    DATA:  EKG: pending at time of evaluation  LAB: Pending at time of evaluation            IMPRESSION/RISK:  Dx=  Differential includes but not limited to conditions listed in order of most possible first:   Consideration include  Plan  acetaminophen  IVPB .. 1000 milliGRAM(s) IV Intermittent  ondansetron Injectable 4 milliGRAM(s) IV Push

## 2021-06-23 NOTE — ED PROVIDER NOTE - ATTENDING CONTRIBUTION TO CARE
I have seen and examined the patient on the patient´s visit date. I have reviewed the note written by Cristiano Jovel [facility], on that visit day. I have supervised and participated as necessary in the performance of procedures indicated for patient management and was available at all phases of the patient´s visit when needed. We discussed the history, physical exam findings, mnagement plan, and  medical decision making. I have made my additons, exceptions, and revisions within the chart and I agree with H and P as documented in its entirety. The data and my interpretation of any data collected from labs, interventions and imaging appear below as well as my independent medical decision making and considerations    The patient is a 45y Male who has a past medical and surgery history of HTN HLD DM@ SBO/Buchanan procedure for diverticulitis ventral hernia repair 6/2018 PTED with Pt presents to ED for abdominal pain, nausea,and weakness since yesterday. Denies F,C CP SOB last BM HE/BRPR/melena    Vital Signs Last 24 Hrs  T(F): 97.1 HR: 113 BP: 112/66 RR: 16 SpO2: 97% (23 Jun 2021 15:57)   PE: as described; my additions and exceptions are noted in the chart    DATA:  EKG: pending at time of evaluation  LAB: Pending at time of evaluation    IMPRESSION/RISK:  Dx=abdominal pain in complicated surgery patient sent in by Dr White for evaluation   Plan  labs pending   acetaminophen  IVPB .. 1000 milliGRAM(s) IV Intermittent  ondansetron Injectable 4 milliGRAM(s) IV Push  CTScan PO/IV  reassess  Dispo as per results reccs consult input

## 2021-06-23 NOTE — H&P ADULT - NSHPPHYSICALEXAM_GEN_ALL_CORE
General: Obese middle aged male, in no acute distress   Neurologic: Awake, alert, GCS 15, No focal Deficits   Respiratory: Normal respiratory effort  CVS: RRR, perfusing adequately  Abdomen: Abdomen obese, soft, NT, no rebound or guarding. Lower midline scar and ostomy site scar well healed.   Ext: Grossly symmetric, Moving all extremities

## 2021-06-23 NOTE — H&P ADULT - ASSESSMENT
ASSESSMENT:  Sathya Em is a very pleasant 45 year old gentleman with extensive surgical history and multiple prior SBO's presenting with SBO    PLAN:  - Admit to surgery, Dr. White  - Gastrografin challenge  - NPO, IVF  - Monitor bowel function, serial abdominal exams  - Will place NG if pt begins having emesis or otherwise clinically worsening  - Case discussed with surgical attending       Bobo Faulkner, PGY 3  Surgery [id number] ASSESSMENT:  Sathya Em is a very pleasant 45 year old gentleman with extensive surgical history and multiple prior SBO's presenting with SBO  NG placed with 500mL bilious output    PLAN:  - Admit to surgery, Dr. White  - NGT to LCWS  - NPO, IVF  - Monitor bowel function, serial abdominal exams  - Case discussed with surgical attending       Bobo Faulkner, PGY 3  Surgery [id number]

## 2021-06-24 LAB
A1C WITH ESTIMATED AVERAGE GLUCOSE RESULT: 7.9 % — HIGH (ref 4–5.6)
ANION GAP SERPL CALC-SCNC: 18 MMOL/L — HIGH (ref 7–14)
BUN SERPL-MCNC: 28 MG/DL — HIGH (ref 7–23)
CALCIUM SERPL-MCNC: 9.9 MG/DL — SIGNIFICANT CHANGE UP (ref 8.4–10.5)
CHLORIDE SERPL-SCNC: 103 MMOL/L — SIGNIFICANT CHANGE UP (ref 98–107)
CO2 SERPL-SCNC: 23 MMOL/L — SIGNIFICANT CHANGE UP (ref 22–31)
COVID-19 SPIKE DOMAIN AB INTERP: POSITIVE
COVID-19 SPIKE DOMAIN ANTIBODY RESULT: >250 U/ML — HIGH
CREAT SERPL-MCNC: 0.89 MG/DL — SIGNIFICANT CHANGE UP (ref 0.5–1.3)
ESTIMATED AVERAGE GLUCOSE: 180 MG/DL — HIGH (ref 68–114)
GLUCOSE BLDC GLUCOMTR-MCNC: 137 MG/DL — HIGH (ref 70–99)
GLUCOSE BLDC GLUCOMTR-MCNC: 159 MG/DL — HIGH (ref 70–99)
GLUCOSE BLDC GLUCOMTR-MCNC: 164 MG/DL — HIGH (ref 70–99)
GLUCOSE BLDC GLUCOMTR-MCNC: 192 MG/DL — HIGH (ref 70–99)
GLUCOSE SERPL-MCNC: 170 MG/DL — HIGH (ref 70–99)
HCT VFR BLD CALC: 46.2 % — SIGNIFICANT CHANGE UP (ref 39–50)
HGB BLD-MCNC: 14.5 G/DL — SIGNIFICANT CHANGE UP (ref 13–17)
MAGNESIUM SERPL-MCNC: 1.9 MG/DL — SIGNIFICANT CHANGE UP (ref 1.6–2.6)
MCHC RBC-ENTMCNC: 27.5 PG — SIGNIFICANT CHANGE UP (ref 27–34)
MCHC RBC-ENTMCNC: 31.4 GM/DL — LOW (ref 32–36)
MCV RBC AUTO: 87.5 FL — SIGNIFICANT CHANGE UP (ref 80–100)
NRBC # BLD: 0 /100 WBCS — SIGNIFICANT CHANGE UP
NRBC # FLD: 0.02 K/UL — HIGH
PHOSPHATE SERPL-MCNC: 3.7 MG/DL — SIGNIFICANT CHANGE UP (ref 2.5–4.5)
PLATELET # BLD AUTO: 240 K/UL — SIGNIFICANT CHANGE UP (ref 150–400)
POTASSIUM SERPL-MCNC: 3.6 MMOL/L — SIGNIFICANT CHANGE UP (ref 3.5–5.3)
POTASSIUM SERPL-SCNC: 3.6 MMOL/L — SIGNIFICANT CHANGE UP (ref 3.5–5.3)
RBC # BLD: 5.28 M/UL — SIGNIFICANT CHANGE UP (ref 4.2–5.8)
RBC # FLD: 15.5 % — HIGH (ref 10.3–14.5)
SARS-COV-2 IGG+IGM SERPL QL IA: >250 U/ML — HIGH
SARS-COV-2 IGG+IGM SERPL QL IA: POSITIVE
SODIUM SERPL-SCNC: 144 MMOL/L — SIGNIFICANT CHANGE UP (ref 135–145)
WBC # BLD: 3.48 K/UL — LOW (ref 3.8–10.5)
WBC # FLD AUTO: 3.48 K/UL — LOW (ref 3.8–10.5)

## 2021-06-24 PROCEDURE — 71045 X-RAY EXAM CHEST 1 VIEW: CPT | Mod: 26

## 2021-06-24 RX ORDER — BENZOCAINE AND MENTHOL 5; 1 G/100ML; G/100ML
1 LIQUID ORAL
Refills: 0 | Status: DISCONTINUED | OUTPATIENT
Start: 2021-06-24 | End: 2021-06-27

## 2021-06-24 RX ORDER — POTASSIUM CHLORIDE 20 MEQ
10 PACKET (EA) ORAL
Refills: 0 | Status: COMPLETED | OUTPATIENT
Start: 2021-06-24 | End: 2021-06-24

## 2021-06-24 RX ORDER — PANTOPRAZOLE SODIUM 20 MG/1
40 TABLET, DELAYED RELEASE ORAL DAILY
Refills: 0 | Status: DISCONTINUED | OUTPATIENT
Start: 2021-06-24 | End: 2021-06-27

## 2021-06-24 RX ORDER — MAGNESIUM SULFATE 500 MG/ML
1 VIAL (ML) INJECTION ONCE
Refills: 0 | Status: COMPLETED | OUTPATIENT
Start: 2021-06-24 | End: 2021-06-24

## 2021-06-24 RX ORDER — METOPROLOL TARTRATE 50 MG
5 TABLET ORAL EVERY 6 HOURS
Refills: 0 | Status: DISCONTINUED | OUTPATIENT
Start: 2021-06-24 | End: 2021-06-25

## 2021-06-24 RX ADMIN — ENOXAPARIN SODIUM 40 MILLIGRAM(S): 100 INJECTION SUBCUTANEOUS at 05:21

## 2021-06-24 RX ADMIN — Medication 400 MILLIGRAM(S): at 05:21

## 2021-06-24 RX ADMIN — Medication 1: at 18:10

## 2021-06-24 RX ADMIN — SODIUM CHLORIDE 125 MILLILITER(S): 9 INJECTION, SOLUTION INTRAVENOUS at 05:31

## 2021-06-24 RX ADMIN — Medication 100 GRAM(S): at 17:49

## 2021-06-24 RX ADMIN — Medication 5 MILLIGRAM(S): at 23:27

## 2021-06-24 RX ADMIN — PANTOPRAZOLE SODIUM 40 MILLIGRAM(S): 20 TABLET, DELAYED RELEASE ORAL at 17:50

## 2021-06-24 RX ADMIN — Medication 1000 MILLIGRAM(S): at 11:48

## 2021-06-24 RX ADMIN — Medication 100 MILLIEQUIVALENT(S): at 10:46

## 2021-06-24 RX ADMIN — Medication 1: at 11:52

## 2021-06-24 RX ADMIN — Medication 100 MILLIEQUIVALENT(S): at 11:44

## 2021-06-24 RX ADMIN — Medication 400 MILLIGRAM(S): at 11:43

## 2021-06-24 RX ADMIN — Medication 400 MILLIGRAM(S): at 17:49

## 2021-06-24 RX ADMIN — Medication 1: at 05:21

## 2021-06-24 RX ADMIN — Medication 100 MILLIEQUIVALENT(S): at 15:15

## 2021-06-24 NOTE — PROGRESS NOTE ADULT - ASSESSMENT
45 year old gentleman with extensive surgical history and multiple prior SBO's presenting with SBO. NG placed with 500mL bilious output.    PLAN:  - NGT to LCWS  - NPO, IVF  - Monitor bowel function, serial abdominal exams  - DVT ppx  - IS/OOB    Surgery [id number]

## 2021-06-24 NOTE — PROGRESS NOTE ADULT - SUBJECTIVE AND OBJECTIVE BOX
HPI:  P/w adhesive SBO s/p NGT placement    24h Events:   - Overnight, no acute events    Subjective:   Patient examined at bedside this AM. Reports     Objective:  Vital Signs  T(C): 36.8 (06-23 @ 23:44), Max: 36.8 (06-23 @ 18:59)  HR: 106 (06-23 @ 23:44) (103 - 113)  BP: 137/77 (06-23 @ 23:44) (112/66 - 137/77)  RR: 18 (06-23 @ 23:44) (16 - 18)  SpO2: 100% (06-23 @ 23:44) (97% - 100%)  06-23-21 @ 07:01  -  06-24-21 @ 02:21  --------------------------------------------------------  IN:  Total IN: 0 mL    OUT:    Nasogastric/Oral tube (mL): 150 mL    Voided (mL): 0 mL  Total OUT: 150 mL    Total NET: -150 mL          Physical Exam:  General: alert and oriented, NAD  Resp: airway patent, respirations unlabored  CVS: regular rate and rhythm  Abdomen: obese, soft, NT, no rebound or guarding. Lower midline scar and ostomy site scar well healed.   Extremities: no edema  Skin: warm, dry, appropriate color    Labs:                        14.8   9.79  )-----------( 274      ( 23 Jun 2021 18:57 )             46.9   06-23    139  |  101  |  24<H>  ----------------------------<  196<H>  4.0   |  23  |  0.85    Ca    10.4      23 Jun 2021 18:57    TPro  8.3  /  Alb  4.9  /  TBili  0.8  /  DBili  x   /  AST  19  /  ALT  30  /  AlkPhos  39<L>  06-23    CAPILLARY BLOOD GLUCOSE      POCT Blood Glucose.: 184 mg/dL (23 Jun 2021 23:42)  POCT Blood Glucose.: 191 mg/dL (23 Jun 2021 16:05)      Microbiology:      Medications:   MEDICATIONS  (STANDING):  acetaminophen  IVPB .. 1000 milliGRAM(s) IV Intermittent every 6 hours  enoxaparin Injectable 40 milliGRAM(s) SubCutaneous every 24 hours  insulin lispro (ADMELOG) corrective regimen sliding scale   SubCutaneous every 6 hours  lactated ringers. 1000 milliLiter(s) (125 mL/Hr) IV Continuous <Continuous>    MEDICATIONS  (PRN):  ondansetron Injectable 4 milliGRAM(s) IV Push every 6 hours PRN Nausea and/or Vomiting       HPI:  P/w adhesive SBO s/p NGT placement    24h Events:   - Overnight, no acute events    Subjective:   Patient examined at bedside this AM. Patient states he is passing flatus, denies N/V, denies pain, or any other complaints at this time    Objective:  Vital Signs  T(C): 36.9 (06-24-21 @ 08:40), Max: 37.1 (06-24-21 @ 05:17)  HR: 118 (06-24-21 @ 08:40) (103 - 118)  BP: 136/74 (06-24-21 @ 08:40) (112/66 - 137/77)  BP(mean): --  ABP: --  ABP(mean): --  RR: 18 (06-24-21 @ 08:40) (16 - 18)  SpO2: 96% (06-24-21 @ 08:40) (95% - 100%)  Wt(kg): --  CVP(mm Hg): --  CI: --  CAPILLARY BLOOD GLUCOSE      POCT Blood Glucose.: 164 mg/dL (24 Jun 2021 05:14)  POCT Blood Glucose.: 184 mg/dL (23 Jun 2021 23:42)  POCT Blood Glucose.: 191 mg/dL (23 Jun 2021 16:05)   N/A      06-23 @ 07:01  -  06-24 @ 07:00  --------------------------------------------------------  IN:    Lactated Ringers: 875 mL  Total IN: 875 mL    OUT:    Nasogastric/Oral tube (mL): 700 mL    Voided (mL): 700 mL  Total OUT: 1400 mL    Total NET: -525 mL      06-24 @ 07:01  -  06-24 @ 09:00  --------------------------------------------------------  IN:    Lactated Ringers: 125 mL  Total IN: 125 mL    OUT:    Nasogastric/Oral tube (mL): 1000 mL    Voided (mL): 200 mL  Total OUT: 1200 mL    Total NET: -1075 mL      Physical Exam:  General: alert and oriented, NAD  Resp: airway patent, respirations unlabored  CVS: regular rate and rhythm  Abdomen: obese, soft, NT, no rebound or guarding. Lower midline scar and ostomy site scar well healed.   Extremities: no edema  Skin: warm, dry, appropriate color    Labs:      CBC (06-24 @ 08:38)                              14.5                           3.48<L>  )----------------(  240        --    % Neutrophils, --    % Lymphocytes, ANC: --                                  46.2    CBC (06-23 @ 18:57)                              14.8                           9.79    )----------------(  274        58.3  % Neutrophils, 19.1  % Lymphocytes, ANC: 6.65                                46.9      BMP (06-23 @ 18:57)             139     |  101     |  24<H> 		Ca++ --      Ca 10.4               ---------------------------------( 196<H>		Mg --                 4.0     |  23      |  0.85  			Ph --        LFTs (06-23 @ 18:57)      TPro 8.3 / Alb 4.9 / TBili 0.8 / DBili -- / AST 19 / ALT 30 / AlkPhos 39<L>    Coags (06-23 @ 18:57)  aPTT -- / INR 1.05 / PT 11.9                       Medications:   MEDICATIONS  (STANDING):  acetaminophen  IVPB .. 1000 milliGRAM(s) IV Intermittent every 6 hours  enoxaparin Injectable 40 milliGRAM(s) SubCutaneous every 24 hours  insulin lispro (ADMELOG) corrective regimen sliding scale   SubCutaneous every 6 hours  lactated ringers. 1000 milliLiter(s) (125 mL/Hr) IV Continuous <Continuous>    MEDICATIONS  (PRN):  ondansetron Injectable 4 milliGRAM(s) IV Push every 6 hours PRN Nausea and/or Vomiting

## 2021-06-25 LAB
ANION GAP SERPL CALC-SCNC: 16 MMOL/L — HIGH (ref 7–14)
BUN SERPL-MCNC: 31 MG/DL — HIGH (ref 7–23)
CALCIUM SERPL-MCNC: 9.2 MG/DL — SIGNIFICANT CHANGE UP (ref 8.4–10.5)
CHLORIDE SERPL-SCNC: 104 MMOL/L — SIGNIFICANT CHANGE UP (ref 98–107)
CO2 SERPL-SCNC: 22 MMOL/L — SIGNIFICANT CHANGE UP (ref 22–31)
CREAT SERPL-MCNC: 0.87 MG/DL — SIGNIFICANT CHANGE UP (ref 0.5–1.3)
GLUCOSE BLDC GLUCOMTR-MCNC: 144 MG/DL — HIGH (ref 70–99)
GLUCOSE BLDC GLUCOMTR-MCNC: 152 MG/DL — HIGH (ref 70–99)
GLUCOSE BLDC GLUCOMTR-MCNC: 153 MG/DL — HIGH (ref 70–99)
GLUCOSE BLDC GLUCOMTR-MCNC: 167 MG/DL — HIGH (ref 70–99)
GLUCOSE SERPL-MCNC: 149 MG/DL — HIGH (ref 70–99)
HCT VFR BLD CALC: 41.5 % — SIGNIFICANT CHANGE UP (ref 39–50)
HGB BLD-MCNC: 12.5 G/DL — LOW (ref 13–17)
MAGNESIUM SERPL-MCNC: 2.2 MG/DL — SIGNIFICANT CHANGE UP (ref 1.6–2.6)
MCHC RBC-ENTMCNC: 26.8 PG — LOW (ref 27–34)
MCHC RBC-ENTMCNC: 30.1 GM/DL — LOW (ref 32–36)
MCV RBC AUTO: 88.9 FL — SIGNIFICANT CHANGE UP (ref 80–100)
NRBC # BLD: 0 /100 WBCS — SIGNIFICANT CHANGE UP
NRBC # FLD: 0 K/UL — SIGNIFICANT CHANGE UP
PHOSPHATE SERPL-MCNC: 2.5 MG/DL — SIGNIFICANT CHANGE UP (ref 2.5–4.5)
PLATELET # BLD AUTO: 207 K/UL — SIGNIFICANT CHANGE UP (ref 150–400)
POTASSIUM SERPL-MCNC: 3.5 MMOL/L — SIGNIFICANT CHANGE UP (ref 3.5–5.3)
POTASSIUM SERPL-SCNC: 3.5 MMOL/L — SIGNIFICANT CHANGE UP (ref 3.5–5.3)
RBC # BLD: 4.67 M/UL — SIGNIFICANT CHANGE UP (ref 4.2–5.8)
RBC # FLD: 15.6 % — HIGH (ref 10.3–14.5)
SODIUM SERPL-SCNC: 142 MMOL/L — SIGNIFICANT CHANGE UP (ref 135–145)
WBC # BLD: 4.77 K/UL — SIGNIFICANT CHANGE UP (ref 3.8–10.5)
WBC # FLD AUTO: 4.77 K/UL — SIGNIFICANT CHANGE UP (ref 3.8–10.5)

## 2021-06-25 PROCEDURE — 99232 SBSQ HOSP IP/OBS MODERATE 35: CPT | Mod: GC

## 2021-06-25 PROCEDURE — 93010 ELECTROCARDIOGRAM REPORT: CPT

## 2021-06-25 RX ORDER — METOPROLOL TARTRATE 50 MG
7.5 TABLET ORAL EVERY 6 HOURS
Refills: 0 | Status: DISCONTINUED | OUTPATIENT
Start: 2021-06-25 | End: 2021-06-27

## 2021-06-25 RX ORDER — DEXTROSE MONOHYDRATE, SODIUM CHLORIDE, AND POTASSIUM CHLORIDE 50; .745; 4.5 G/1000ML; G/1000ML; G/1000ML
1000 INJECTION, SOLUTION INTRAVENOUS
Refills: 0 | Status: DISCONTINUED | OUTPATIENT
Start: 2021-06-25 | End: 2021-06-27

## 2021-06-25 RX ORDER — SODIUM CHLORIDE 9 MG/ML
1000 INJECTION, SOLUTION INTRAVENOUS ONCE
Refills: 0 | Status: COMPLETED | OUTPATIENT
Start: 2021-06-25 | End: 2021-06-25

## 2021-06-25 RX ORDER — POTASSIUM PHOSPHATE, MONOBASIC POTASSIUM PHOSPHATE, DIBASIC 236; 224 MG/ML; MG/ML
15 INJECTION, SOLUTION INTRAVENOUS ONCE
Refills: 0 | Status: COMPLETED | OUTPATIENT
Start: 2021-06-25 | End: 2021-06-25

## 2021-06-25 RX ADMIN — PANTOPRAZOLE SODIUM 40 MILLIGRAM(S): 20 TABLET, DELAYED RELEASE ORAL at 12:39

## 2021-06-25 RX ADMIN — DEXTROSE MONOHYDRATE, SODIUM CHLORIDE, AND POTASSIUM CHLORIDE 125 MILLILITER(S): 50; .745; 4.5 INJECTION, SOLUTION INTRAVENOUS at 16:25

## 2021-06-25 RX ADMIN — Medication 1: at 18:16

## 2021-06-25 RX ADMIN — Medication 1: at 12:33

## 2021-06-25 RX ADMIN — Medication 5 MILLIGRAM(S): at 12:40

## 2021-06-25 RX ADMIN — POTASSIUM PHOSPHATE, MONOBASIC POTASSIUM PHOSPHATE, DIBASIC 62.5 MILLIMOLE(S): 236; 224 INJECTION, SOLUTION INTRAVENOUS at 11:42

## 2021-06-25 RX ADMIN — Medication 115 MILLIGRAM(S): at 18:17

## 2021-06-25 RX ADMIN — ENOXAPARIN SODIUM 40 MILLIGRAM(S): 100 INJECTION SUBCUTANEOUS at 05:25

## 2021-06-25 RX ADMIN — Medication 5 MILLIGRAM(S): at 05:27

## 2021-06-25 RX ADMIN — SODIUM CHLORIDE 1000 MILLILITER(S): 9 INJECTION, SOLUTION INTRAVENOUS at 12:29

## 2021-06-25 NOTE — PROGRESS NOTE ADULT - SUBJECTIVE AND OBJECTIVE BOX
HPI:  P/w adhesive SBO s/p NGT placement    24h Events:   - Started on Metop 5mg q6 for unopposed tachycardia   - Overnight, no acute events    Subjective:   Patient examined at bedside this AM. Reports     Objective:  Vital Signs  T(C): 37 (06-24 @ 23:18), Max: 37.1 (06-24 @ 05:17)  HR: 114 (06-24 @ 23:18) (107 - 118)  BP: 156/77 (06-24 @ 23:18) (121/72 - 156/77)  RR: 18 (06-24 @ 23:18) (17 - 18)  SpO2: 94% (06-24 @ 23:18) (94% - 98%)  06-23-21 @ 07:01  -  06-24-21 @ 07:00  --------------------------------------------------------  IN:  Total IN: 0 mL    OUT:    Nasogastric/Oral tube (mL): 700 mL    Voided (mL): 700 mL  Total OUT: 1400 mL    Total NET: -1400 mL      06-24-21 @ 07:01  -  06-25-21 @ 02:55  --------------------------------------------------------  IN:  Total IN: 0 mL    OUT:    Nasogastric/Oral tube (mL): 1600 mL    Voided (mL): 1100 mL  Total OUT: 2700 mL    Total NET: -2700 mL          Physical Exam:  General: alert and oriented, NAD  Resp: airway patent, respirations unlabored  CVS: regular rate and rhythm  Abdomen: obese, soft, NT, no rebound or guarding. Lower midline scar and ostomy site scar well healed  Extremities: no edema  Skin: warm, dry, appropriate color    Labs:                        14.5   3.48  )-----------( 240      ( 24 Jun 2021 08:38 )             46.2   06-24    144  |  103  |  28<H>  ----------------------------<  170<H>  3.6   |  23  |  0.89    Ca    9.9      24 Jun 2021 08:38  Phos  3.7     06-24  Mg     1.9     06-24    TPro  8.3  /  Alb  4.9  /  TBili  0.8  /  DBili  x   /  AST  19  /  ALT  30  /  AlkPhos  39<L>  06-23    CAPILLARY BLOOD GLUCOSE      POCT Blood Glucose.: 137 mg/dL (24 Jun 2021 23:20)  POCT Blood Glucose.: 159 mg/dL (24 Jun 2021 18:05)  POCT Blood Glucose.: 192 mg/dL (24 Jun 2021 11:45)  POCT Blood Glucose.: 164 mg/dL (24 Jun 2021 05:14)      Microbiology:      Medications:   MEDICATIONS  (STANDING):  enoxaparin Injectable 40 milliGRAM(s) SubCutaneous every 24 hours  insulin lispro (ADMELOG) corrective regimen sliding scale   SubCutaneous every 6 hours  lactated ringers. 1000 milliLiter(s) (125 mL/Hr) IV Continuous <Continuous>  metoprolol tartrate Injectable 5 milliGRAM(s) IV Push every 6 hours  pantoprazole  Injectable 40 milliGRAM(s) IV Push daily    MEDICATIONS  (PRN):  benzocaine 15 mG/menthol 3.6 mG (Sugar-Free) Lozenge 1 Lozenge Oral every 2 hours PRN Sore Throat  ondansetron Injectable 4 milliGRAM(s) IV Push every 6 hours PRN Nausea and/or Vomiting       Surgery Daily Progress Note  =====================================================  Interval / Overnight Events: Started on IV Lopressor overnight - patient missed home dose of Toprol XL.      HPI:  Patient is a 45 year old male with a PMHx of DM2, HTN, GERD, HLD and perforated diverticulitis (S/P Isi's / S/P reversal), multiple prior SBOs (S/P ex-lap, LINDA in 2014 and 2016) and S/P incisional hernia repair - known to the surgery service most recently admitted with SBO in 1/21 - resolved with conservative management who presented to the ED with abdominal pain. (23 Jun 2021 21:32)      PAST MEDICAL & SURGICAL HISTORY:  Hypertension  Hyperlipidemia  Diabetes mellitus, type 2  SBO (small bowel obstruction)  Diverticulitis  DVT (deep venous thrombosis)  RLE 2016, post op, was on eliquis for 6 months  Open wound of abdominal wall  GERD (gastroesophageal reflux disease)  S/P Isi&#x27;s procedure  2009 with reversal 2009  H/O exploratory laparotomy  LINDA, x3  H/O hernia repair  xlap, ventral hernia repair 6/2018      ALLERGIES:  ceftriaxone (Rash)  cephalosporins (Rash)  Cipro (Unknown)  contrast media (iodine-based) (Rash)  fentanyl (Rash)  Flagyl (Unknown)  iodine containing compounds (Rash)  morphine (Rash)    --------------------------------------------------------------------------------------    MEDICATIONS:    Neurologic Medications  ondansetron Injectable 4 milliGRAM(s) IV Push every 6 hours PRN Nausea and/or Vomiting    Cardiovascular Medications  metoprolol tartrate Injectable 5 milliGRAM(s) IV Push every 6 hours    Gastrointestinal Medications  lactated ringers. 1000 milliLiter(s) IV Continuous <Continuous>  pantoprazole  Injectable 40 milliGRAM(s) IV Push daily  potassium phosphate IVPB 15 milliMole(s) IV Intermittent once    Hematologic/Oncologic Medications  enoxaparin Injectable 40 milliGRAM(s) SubCutaneous every 24 hours    Endocrine/Metabolic Medications  insulin lispro (ADMELOG) corrective regimen sliding scale   SubCutaneous every 6 hours    Topical/Other Medications  benzocaine 15 mG/menthol 3.6 mG (Sugar-Free) Lozenge 1 Lozenge Oral every 2 hours PRN Sore Throat    --------------------------------------------------------------------------------------    VITAL SIGNS:  T(C): 37.3 (25 Jun 2021 08:07), Max: 37.4 (25 Jun 2021 04:39)  T(F): 99.1 (25 Jun 2021 08:07), Max: 99.3 (25 Jun 2021 04:39)  HR: 118 (25 Jun 2021 08:07) (105 - 118)  BP: 134/66 (25 Jun 2021 08:07) (121/72 - 156/77)  RR: 18 (25 Jun 2021 08:07) (17 - 18)  SpO2: 95% (25 Jun 2021 08:07) (94% - 98%)    --------------------------------------------------------------------------------------    INS AND OUTS:    24 Jun 2021 07:01  -  25 Jun 2021 07:00  --------------------------------------------------------  IN:    IV PiggyBack: 600 mL    Lactated Ringers: 2500 mL  Total IN: 3100 mL    OUT:    Nasogastric/Oral tube (mL): 1600 mL    Oral Fluid: 0 mL    Voided (mL): 1350 mL  Total OUT: 2950 mL    Total NET: 150 mL      25 Jun 2021 07:01  -  25 Jun 2021 09:20  --------------------------------------------------------  IN:  Total IN: 0 mL    OUT:    Nasogastric/Oral tube (mL): 100 mL    Voided (mL): 225 mL  Total OUT: 325 mL    Total NET: -325 mL    --------------------------------------------------------------------------------------    EXAM    NEUROLOGY  Exam: Normal, in no acute distress.    HEENT  Exam: Normocephalic, atraumatic.    RESPIRATORY  Exam: Normal expansion / effort.    CARDIOVASCULAR  Exam: S1, S2.  Regular rate and rhythm.    GI/NUTRITION  Exam: Abdomen soft, Non-tender, Non-distended.  NGT.  Current Diet: NPO    MUSCULOSKELETAL  Exam: All extremities moving spontaneously without limitations.      METABOLIC / FLUIDS / ELECTROLYTES  lactated ringers. 1000 milliLiter(s) IV Continuous <Continuous>  potassium phosphate IVPB 15 milliMole(s) IV Intermittent once      HEMATOLOGIC  [x] VTE Prophylaxis: enoxaparin Injectable 40 milliGRAM(s) SubCutaneous every 24 hours    --------------------------------------------------------------------------------------

## 2021-06-25 NOTE — PROGRESS NOTE ADULT - ASSESSMENT
45 year old gentleman with extensive surgical history and multiple prior SBO's presenting with SBO. NG placed with 500mL bilious output.    PLAN:  - NGT to LCWS  - NPO, IVF  - Monitor bowel function, serial abdominal exams  - DVT ppx  - IS/OOB    Surgery [id number] Patient is a 45 year old male with a PMHx of DM2, HTN, GERD, HLD and perforated diverticulitis (S/P Isi's / S/P reversal), multiple prior SBOs (S/P ex-lap, LINDA in 2014 and 2016) and S/P incisional hernia repair - known to the surgery service most recently admitted with SBO in 1/21 - resolved with conservative management who presented to the ED with abdominal pain.  CT Abdomen / Pelvis performed showing small bowel obstruction with transition in the right lower quadrant.  Large complex ventral hernia containing small and large bowel again noted.      PLAN:  - NPO  - NGT  - LR @125cc/hr  - Monitor bowel function  - Serial abdominal exams  - Out of bed  - Pain control  - VTE prophylaxis with Lovenox subcutaneous      #[id number]  A Team Surgery Patient is a 45 year old male with a PMHx of DM2, HTN, GERD, HLD and perforated diverticulitis (S/P Isi's / S/P reversal), multiple prior SBOs (S/P ex-lap, LINDA in 2014 and 2016) and S/P incisional hernia repair - known to the surgery service most recently admitted with SBO in 1/21 - resolved with conservative management who presented to the ED with abdominal pain.  CT Abdomen / Pelvis performed showing small bowel obstruction with transition in the right lower quadrant.  Large complex ventral hernia containing small and large bowel again noted.      PLAN:  - NPO/NGT/IVF  - LR Bolus x1  - increase lopressor to 7.5 q6   - Monitor bowel function  - Serial abdominal exams  - Out of bed  - Pain control  - VTE prophylaxis with Lovenox subcutaneous      #[id number]  A Team Surgery

## 2021-06-26 LAB
ANION GAP SERPL CALC-SCNC: 13 MMOL/L — SIGNIFICANT CHANGE UP (ref 7–14)
ANION GAP SERPL CALC-SCNC: 14 MMOL/L — SIGNIFICANT CHANGE UP (ref 7–14)
BUN SERPL-MCNC: 16 MG/DL — SIGNIFICANT CHANGE UP (ref 7–23)
BUN SERPL-MCNC: 20 MG/DL — SIGNIFICANT CHANGE UP (ref 7–23)
CALCIUM SERPL-MCNC: 8.7 MG/DL — SIGNIFICANT CHANGE UP (ref 8.4–10.5)
CALCIUM SERPL-MCNC: 8.9 MG/DL — SIGNIFICANT CHANGE UP (ref 8.4–10.5)
CHLORIDE SERPL-SCNC: 105 MMOL/L — SIGNIFICANT CHANGE UP (ref 98–107)
CHLORIDE SERPL-SCNC: 106 MMOL/L — SIGNIFICANT CHANGE UP (ref 98–107)
CO2 SERPL-SCNC: 22 MMOL/L — SIGNIFICANT CHANGE UP (ref 22–31)
CO2 SERPL-SCNC: 23 MMOL/L — SIGNIFICANT CHANGE UP (ref 22–31)
CREAT SERPL-MCNC: 0.69 MG/DL — SIGNIFICANT CHANGE UP (ref 0.5–1.3)
CREAT SERPL-MCNC: 0.7 MG/DL — SIGNIFICANT CHANGE UP (ref 0.5–1.3)
GLUCOSE BLDC GLUCOMTR-MCNC: 164 MG/DL — HIGH (ref 70–99)
GLUCOSE BLDC GLUCOMTR-MCNC: 167 MG/DL — HIGH (ref 70–99)
GLUCOSE BLDC GLUCOMTR-MCNC: 168 MG/DL — HIGH (ref 70–99)
GLUCOSE BLDC GLUCOMTR-MCNC: 187 MG/DL — HIGH (ref 70–99)
GLUCOSE SERPL-MCNC: 166 MG/DL — HIGH (ref 70–99)
GLUCOSE SERPL-MCNC: 187 MG/DL — HIGH (ref 70–99)
HCT VFR BLD CALC: 37.5 % — LOW (ref 39–50)
HGB BLD-MCNC: 11.4 G/DL — LOW (ref 13–17)
MAGNESIUM SERPL-MCNC: 2.2 MG/DL — SIGNIFICANT CHANGE UP (ref 1.6–2.6)
MAGNESIUM SERPL-MCNC: 2.3 MG/DL — SIGNIFICANT CHANGE UP (ref 1.6–2.6)
MCHC RBC-ENTMCNC: 27 PG — SIGNIFICANT CHANGE UP (ref 27–34)
MCHC RBC-ENTMCNC: 30.4 GM/DL — LOW (ref 32–36)
MCV RBC AUTO: 88.7 FL — SIGNIFICANT CHANGE UP (ref 80–100)
NRBC # BLD: 0 /100 WBCS — SIGNIFICANT CHANGE UP
NRBC # FLD: 0 K/UL — SIGNIFICANT CHANGE UP
PHOSPHATE SERPL-MCNC: 1.3 MG/DL — LOW (ref 2.5–4.5)
PHOSPHATE SERPL-MCNC: 2.4 MG/DL — LOW (ref 2.5–4.5)
PLATELET # BLD AUTO: 182 K/UL — SIGNIFICANT CHANGE UP (ref 150–400)
POTASSIUM SERPL-MCNC: 3.5 MMOL/L — SIGNIFICANT CHANGE UP (ref 3.5–5.3)
POTASSIUM SERPL-MCNC: 3.5 MMOL/L — SIGNIFICANT CHANGE UP (ref 3.5–5.3)
POTASSIUM SERPL-SCNC: 3.5 MMOL/L — SIGNIFICANT CHANGE UP (ref 3.5–5.3)
POTASSIUM SERPL-SCNC: 3.5 MMOL/L — SIGNIFICANT CHANGE UP (ref 3.5–5.3)
RBC # BLD: 4.23 M/UL — SIGNIFICANT CHANGE UP (ref 4.2–5.8)
RBC # FLD: 15.6 % — HIGH (ref 10.3–14.5)
SODIUM SERPL-SCNC: 141 MMOL/L — SIGNIFICANT CHANGE UP (ref 135–145)
SODIUM SERPL-SCNC: 142 MMOL/L — SIGNIFICANT CHANGE UP (ref 135–145)
WBC # BLD: 6.87 K/UL — SIGNIFICANT CHANGE UP (ref 3.8–10.5)
WBC # FLD AUTO: 6.87 K/UL — SIGNIFICANT CHANGE UP (ref 3.8–10.5)

## 2021-06-26 RX ORDER — INSULIN LISPRO 100/ML
VIAL (ML) SUBCUTANEOUS
Refills: 0 | Status: DISCONTINUED | OUTPATIENT
Start: 2021-06-26 | End: 2021-06-29

## 2021-06-26 RX ORDER — POTASSIUM PHOSPHATE, MONOBASIC POTASSIUM PHOSPHATE, DIBASIC 236; 224 MG/ML; MG/ML
30 INJECTION, SOLUTION INTRAVENOUS ONCE
Refills: 0 | Status: COMPLETED | OUTPATIENT
Start: 2021-06-26 | End: 2021-06-26

## 2021-06-26 RX ORDER — INSULIN LISPRO 100/ML
VIAL (ML) SUBCUTANEOUS AT BEDTIME
Refills: 0 | Status: DISCONTINUED | OUTPATIENT
Start: 2021-06-26 | End: 2021-06-29

## 2021-06-26 RX ADMIN — Medication 115 MILLIGRAM(S): at 11:57

## 2021-06-26 RX ADMIN — PANTOPRAZOLE SODIUM 40 MILLIGRAM(S): 20 TABLET, DELAYED RELEASE ORAL at 11:57

## 2021-06-26 RX ADMIN — Medication 1: at 12:11

## 2021-06-26 RX ADMIN — POTASSIUM PHOSPHATE, MONOBASIC POTASSIUM PHOSPHATE, DIBASIC 83.33 MILLIMOLE(S): 236; 224 INJECTION, SOLUTION INTRAVENOUS at 09:30

## 2021-06-26 RX ADMIN — BENZOCAINE AND MENTHOL 1 LOZENGE: 5; 1 LIQUID ORAL at 00:47

## 2021-06-26 RX ADMIN — Medication 115 MILLIGRAM(S): at 18:05

## 2021-06-26 RX ADMIN — DEXTROSE MONOHYDRATE, SODIUM CHLORIDE, AND POTASSIUM CHLORIDE 125 MILLILITER(S): 50; .745; 4.5 INJECTION, SOLUTION INTRAVENOUS at 07:47

## 2021-06-26 RX ADMIN — Medication 1: at 00:12

## 2021-06-26 RX ADMIN — Medication 115 MILLIGRAM(S): at 00:13

## 2021-06-26 RX ADMIN — Medication 115 MILLIGRAM(S): at 06:36

## 2021-06-26 RX ADMIN — Medication 1: at 18:00

## 2021-06-26 RX ADMIN — DEXTROSE MONOHYDRATE, SODIUM CHLORIDE, AND POTASSIUM CHLORIDE 125 MILLILITER(S): 50; .745; 4.5 INJECTION, SOLUTION INTRAVENOUS at 05:28

## 2021-06-26 RX ADMIN — Medication 1: at 05:29

## 2021-06-26 RX ADMIN — ENOXAPARIN SODIUM 40 MILLIGRAM(S): 100 INJECTION SUBCUTANEOUS at 05:28

## 2021-06-26 NOTE — PROGRESS NOTE ADULT - SUBJECTIVE AND OBJECTIVE BOX
HPI:  P/w adhesive SBO s/p NGT placement    24h Events:   - Given 1L bolus, metoprolol increased from 5 to 7.5  - Overnight, no acute events    Subjective:   Patient examined at bedside this AM. Reports     Objective:  Vital Signs  T(C): 36.8 (06-26 @ 05:26), Max: 37.3 (06-25 @ 08:07)  HR: 93 (06-26 @ 05:26) (93 - 118)  BP: 136/63 (06-26 @ 05:26) (120/58 - 139/61)  RR: 18 (06-26 @ 05:26) (18 - 18)  SpO2: 98% (06-26 @ 05:26) (95% - 98%)  06-24-21 @ 07:01  -  06-25-21 @ 07:00  --------------------------------------------------------  IN:  Total IN: 0 mL    OUT:    Nasogastric/Oral tube (mL): 1600 mL    Voided (mL): 1350 mL  Total OUT: 2950 mL    Total NET: -2950 mL      06-25-21 @ 07:01  -  06-26-21 @ 05:58  --------------------------------------------------------  IN:  Total IN: 0 mL    OUT:    Nasogastric/Oral tube (mL): 650 mL    Voided (mL): 1375 mL  Total OUT: 2025 mL    Total NET: -2025 mL      Physical Exam:  General: alert and oriented, NAD  Resp: airway patent, respirations unlabored  CVS: regular rate and rhythm  Abdomen: soft, nontender, nondistended  Extremities: no edema  Skin: warm, dry, appropriate color    Labs:                        12.5   4.77  )-----------( 207      ( 25 Jun 2021 06:44 )             41.5   06-25    142  |  104  |  31<H>  ----------------------------<  149<H>  3.5   |  22  |  0.87    Ca    9.2      25 Jun 2021 06:44  Phos  2.5     06-25  Mg     2.2     06-25      CAPILLARY BLOOD GLUCOSE      POCT Blood Glucose.: 167 mg/dL (26 Jun 2021 05:23)  POCT Blood Glucose.: 153 mg/dL (25 Jun 2021 23:38)  POCT Blood Glucose.: 167 mg/dL (25 Jun 2021 18:10)  POCT Blood Glucose.: 152 mg/dL (25 Jun 2021 11:53)      Microbiology:      Medications:   MEDICATIONS  (STANDING):  dextrose 5% + sodium chloride 0.45% with potassium chloride 20 mEq/L 1000 milliLiter(s) (125 mL/Hr) IV Continuous <Continuous>  enoxaparin Injectable 40 milliGRAM(s) SubCutaneous every 24 hours  insulin lispro (ADMELOG) corrective regimen sliding scale   SubCutaneous every 6 hours  metoprolol tartrate IVPB 7.5 milliGRAM(s) IV Intermittent every 6 hours  pantoprazole  Injectable 40 milliGRAM(s) IV Push daily    MEDICATIONS  (PRN):  benzocaine 15 mG/menthol 3.6 mG (Sugar-Free) Lozenge 1 Lozenge Oral every 2 hours PRN Sore Throat  ondansetron Injectable 4 milliGRAM(s) IV Push every 6 hours PRN Nausea and/or Vomiting       HPI:  P/w adhesive SBO s/p NGT placement    24h Events:   - Given 1L bolus, metoprolol increased from 5 to 7.5  - Overnight, no acute events    Subjective:   Patient examined at bedside this AM. Reports no pain and loose bowel movements.     Objective:  Vital Signs  T(C): 36.8 (06-26 @ 05:26), Max: 37.3 (06-25 @ 08:07)  HR: 93 (06-26 @ 05:26) (93 - 118)  BP: 136/63 (06-26 @ 05:26) (120/58 - 139/61)  RR: 18 (06-26 @ 05:26) (18 - 18)  SpO2: 98% (06-26 @ 05:26) (95% - 98%)  06-24-21 @ 07:01  -  06-25-21 @ 07:00  --------------------------------------------------------  IN:  Total IN: 0 mL    OUT:    Nasogastric/Oral tube (mL): 1600 mL    Voided (mL): 1350 mL  Total OUT: 2950 mL    Total NET: -2950 mL      06-25-21 @ 07:01  -  06-26-21 @ 05:58  --------------------------------------------------------  IN:  Total IN: 0 mL    OUT:    Nasogastric/Oral tube (mL): 650 mL    Voided (mL): 1375 mL  Total OUT: 2025 mL    Total NET: -2025 mL      Physical Exam:  General: alert and oriented, NAD  Resp: airway patent, respirations unlabored  CVS: regular rate and rhythm  Abdomen: soft, obese, nontender, nondistended  Extremities: no edema  Skin: warm, dry, appropriate color    Labs:                        12.5   4.77  )-----------( 207      ( 25 Jun 2021 06:44 )             41.5   06-25    142  |  104  |  31<H>  ----------------------------<  149<H>  3.5   |  22  |  0.87    Ca    9.2      25 Jun 2021 06:44  Phos  2.5     06-25  Mg     2.2     06-25      CAPILLARY BLOOD GLUCOSE      POCT Blood Glucose.: 167 mg/dL (26 Jun 2021 05:23)  POCT Blood Glucose.: 153 mg/dL (25 Jun 2021 23:38)  POCT Blood Glucose.: 167 mg/dL (25 Jun 2021 18:10)  POCT Blood Glucose.: 152 mg/dL (25 Jun 2021 11:53)      Microbiology:      Medications:   MEDICATIONS  (STANDING):  dextrose 5% + sodium chloride 0.45% with potassium chloride 20 mEq/L 1000 milliLiter(s) (125 mL/Hr) IV Continuous <Continuous>  enoxaparin Injectable 40 milliGRAM(s) SubCutaneous every 24 hours  insulin lispro (ADMELOG) corrective regimen sliding scale   SubCutaneous every 6 hours  metoprolol tartrate IVPB 7.5 milliGRAM(s) IV Intermittent every 6 hours  pantoprazole  Injectable 40 milliGRAM(s) IV Push daily    MEDICATIONS  (PRN):  benzocaine 15 mG/menthol 3.6 mG (Sugar-Free) Lozenge 1 Lozenge Oral every 2 hours PRN Sore Throat  ondansetron Injectable 4 milliGRAM(s) IV Push every 6 hours PRN Nausea and/or Vomiting

## 2021-06-26 NOTE — PROGRESS NOTE ADULT - ASSESSMENT
45 year old male with a PMHx of DM2, HTN, GERD, HLD and perforated diverticulitis (S/P Isi's / S/P reversal), multiple prior SBOs (S/P ex-lap, LINDA in 2014 and 2016) and S/P incisional hernia repair - known to the surgery service most recently admitted with SBO in 1/21 - resolved with conservative management who presented to the ED with abdominal pain.  CT Abdomen / Pelvis performed showing small bowel obstruction with transition in the right lower quadrant.  Large complex ventral hernia containing small and large bowel again noted.    PLAN:  - NPO/NGT/IVF  - lopressor to 7.5 q6   - Monitor bowel function  - Serial abdominal exams  - Out of bed  - Pain control  - VTE prophylaxis with Lovenox subcutaneous    #[id number]  A Team Surgery 45 year old male with a PMHx of DM2, HTN, GERD, HLD and perforated diverticulitis (S/P Isi's / S/P reversal), multiple prior SBOs (S/P ex-lap, LINDA in 2014 and 2016) and S/P incisional hernia repair - known to the surgery service most recently admitted with SBO in 1/21 - resolved with conservative management who presented to the ED with abdominal pain.  CT Abdomen / Pelvis performed showing small bowel obstruction with transition in the right lower quadrant.  Large complex ventral hernia containing small and large bowel again noted.    PLAN:  - NGT clamp trial, FU residual output   - NPO/IVF  - lopressor to 7.5 q6   - Monitor bowel function  - Serial abdominal exams  - Out of bed  - Pain control  - VTE prophylaxis with Lovenox subcutaneous    #[id number]  A Team Surgery

## 2021-06-27 LAB
ANION GAP SERPL CALC-SCNC: 12 MMOL/L — SIGNIFICANT CHANGE UP (ref 7–14)
BUN SERPL-MCNC: 15 MG/DL — SIGNIFICANT CHANGE UP (ref 7–23)
CALCIUM SERPL-MCNC: 8.5 MG/DL — SIGNIFICANT CHANGE UP (ref 8.4–10.5)
CHLORIDE SERPL-SCNC: 105 MMOL/L — SIGNIFICANT CHANGE UP (ref 98–107)
CO2 SERPL-SCNC: 21 MMOL/L — LOW (ref 22–31)
CREAT SERPL-MCNC: 0.67 MG/DL — SIGNIFICANT CHANGE UP (ref 0.5–1.3)
GLUCOSE BLDC GLUCOMTR-MCNC: 136 MG/DL — HIGH (ref 70–99)
GLUCOSE BLDC GLUCOMTR-MCNC: 146 MG/DL — HIGH (ref 70–99)
GLUCOSE BLDC GLUCOMTR-MCNC: 174 MG/DL — HIGH (ref 70–99)
GLUCOSE BLDC GLUCOMTR-MCNC: 193 MG/DL — HIGH (ref 70–99)
GLUCOSE SERPL-MCNC: 174 MG/DL — HIGH (ref 70–99)
HCT VFR BLD CALC: 38.5 % — LOW (ref 39–50)
HGB BLD-MCNC: 11.5 G/DL — LOW (ref 13–17)
MAGNESIUM SERPL-MCNC: 2.3 MG/DL — SIGNIFICANT CHANGE UP (ref 1.6–2.6)
MCHC RBC-ENTMCNC: 27.1 PG — SIGNIFICANT CHANGE UP (ref 27–34)
MCHC RBC-ENTMCNC: 29.9 GM/DL — LOW (ref 32–36)
MCV RBC AUTO: 90.6 FL — SIGNIFICANT CHANGE UP (ref 80–100)
NRBC # BLD: 0 /100 WBCS — SIGNIFICANT CHANGE UP
NRBC # FLD: 0 K/UL — SIGNIFICANT CHANGE UP
PHOSPHATE SERPL-MCNC: 2.1 MG/DL — LOW (ref 2.5–4.5)
PLATELET # BLD AUTO: 164 K/UL — SIGNIFICANT CHANGE UP (ref 150–400)
POTASSIUM SERPL-MCNC: 3.6 MMOL/L — SIGNIFICANT CHANGE UP (ref 3.5–5.3)
POTASSIUM SERPL-SCNC: 3.6 MMOL/L — SIGNIFICANT CHANGE UP (ref 3.5–5.3)
RBC # BLD: 4.25 M/UL — SIGNIFICANT CHANGE UP (ref 4.2–5.8)
RBC # FLD: 15.3 % — HIGH (ref 10.3–14.5)
SODIUM SERPL-SCNC: 138 MMOL/L — SIGNIFICANT CHANGE UP (ref 135–145)
WBC # BLD: 5.58 K/UL — SIGNIFICANT CHANGE UP (ref 3.8–10.5)
WBC # FLD AUTO: 5.58 K/UL — SIGNIFICANT CHANGE UP (ref 3.8–10.5)

## 2021-06-27 PROCEDURE — 99232 SBSQ HOSP IP/OBS MODERATE 35: CPT | Mod: GC

## 2021-06-27 RX ORDER — ATORVASTATIN CALCIUM 80 MG/1
20 TABLET, FILM COATED ORAL AT BEDTIME
Refills: 0 | Status: DISCONTINUED | OUTPATIENT
Start: 2021-06-27 | End: 2021-06-29

## 2021-06-27 RX ORDER — POTASSIUM PHOSPHATE, MONOBASIC POTASSIUM PHOSPHATE, DIBASIC 236; 224 MG/ML; MG/ML
30 INJECTION, SOLUTION INTRAVENOUS ONCE
Refills: 0 | Status: COMPLETED | OUTPATIENT
Start: 2021-06-27 | End: 2021-06-27

## 2021-06-27 RX ORDER — METOPROLOL TARTRATE 50 MG
100 TABLET ORAL DAILY
Refills: 0 | Status: DISCONTINUED | OUTPATIENT
Start: 2021-06-27 | End: 2021-06-29

## 2021-06-27 RX ORDER — PANTOPRAZOLE SODIUM 20 MG/1
40 TABLET, DELAYED RELEASE ORAL
Refills: 0 | Status: DISCONTINUED | OUTPATIENT
Start: 2021-06-27 | End: 2021-06-29

## 2021-06-27 RX ORDER — LOSARTAN POTASSIUM 100 MG/1
50 TABLET, FILM COATED ORAL DAILY
Refills: 0 | Status: DISCONTINUED | OUTPATIENT
Start: 2021-06-27 | End: 2021-06-29

## 2021-06-27 RX ADMIN — Medication 115 MILLIGRAM(S): at 06:14

## 2021-06-27 RX ADMIN — ATORVASTATIN CALCIUM 20 MILLIGRAM(S): 80 TABLET, FILM COATED ORAL at 21:01

## 2021-06-27 RX ADMIN — Medication 1: at 08:09

## 2021-06-27 RX ADMIN — LOSARTAN POTASSIUM 50 MILLIGRAM(S): 100 TABLET, FILM COATED ORAL at 11:41

## 2021-06-27 RX ADMIN — PANTOPRAZOLE SODIUM 40 MILLIGRAM(S): 20 TABLET, DELAYED RELEASE ORAL at 11:41

## 2021-06-27 RX ADMIN — POTASSIUM PHOSPHATE, MONOBASIC POTASSIUM PHOSPHATE, DIBASIC 83.33 MILLIMOLE(S): 236; 224 INJECTION, SOLUTION INTRAVENOUS at 11:40

## 2021-06-27 RX ADMIN — Medication 115 MILLIGRAM(S): at 00:03

## 2021-06-27 RX ADMIN — ENOXAPARIN SODIUM 40 MILLIGRAM(S): 100 INJECTION SUBCUTANEOUS at 06:14

## 2021-06-27 RX ADMIN — Medication 1: at 11:40

## 2021-06-27 NOTE — PROGRESS NOTE ADULT - ASSESSMENT
45 year old male with a PMHx of DM2, HTN, GERD, HLD and perforated diverticulitis (S/P Isi's / S/P reversal), multiple prior SBOs (S/P ex-lap, LINDA in 2014 and 2016) and S/P incisional hernia repair - known to the surgery service most recently admitted with SBO in 1/21 - resolved with conservative management who presented to the ED with abdominal pain.  CT Abdomen / Pelvis performed showing small bowel obstruction with transition in the right lower quadrant.  Large complex ventral hernia containing small and large bowel again noted.    PLAN:  - CLD, advance to LRD   - switch to po home meds   - Monitor bowel function  - Serial abdominal exams  - Out of bed  - Pain control  - VTE prophylaxis with Lovenox subcutaneous    #[id number]  A Team Surgery

## 2021-06-27 NOTE — PROGRESS NOTE ADULT - SUBJECTIVE AND OBJECTIVE BOX
HPI:  P/w adhesive SBO s/p NGT placement    24h Events:   - Overnight, no acute events  - NGT removed     Subjective:   Patient examined at bedside this AM. Reports no pain and loose bowel movements. Tolerating CLD. no nausea or vomiting.     OBJECTIVE: T(C): 36.9 (06-27-21 @ 09:11), Max: 37.3 (06-26-21 @ 20:07)  HR: 82 (06-27-21 @ 09:11) (67 - 91)  BP: 128/73 (06-27-21 @ 09:11) (114/71 - 134/97)  RR: 18 (06-27-21 @ 09:11) (17 - 18)  SpO2: 100% (06-27-21 @ 09:11) (98% - 100%)  Wt(kg): --  I&O's Summary    26 Jun 2021 07:01  -  27 Jun 2021 07:00  --------------------------------------------------------  IN: 3175 mL / OUT: 625 mL / NET: 2550 mL    27 Jun 2021 07:01  -  27 Jun 2021 11:33  --------------------------------------------------------  IN: 375 mL / OUT: 300 mL / NET: 75 mL      I&O's Detail    26 Jun 2021 07:01  -  27 Jun 2021 07:00  --------------------------------------------------------  IN:    dextrose 5% + sodium chloride 0.45% w/ Additives: 2625 mL    IV PiggyBack: 550 mL  Total IN: 3175 mL    OUT:    Nasogastric/Oral tube (mL): 0 mL    Voided (mL): 625 mL  Total OUT: 625 mL    Total NET: 2550 mL      27 Jun 2021 07:01  -  27 Jun 2021 11:33  --------------------------------------------------------  IN:    dextrose 5% + sodium chloride 0.45% w/ Additives: 375 mL  Total IN: 375 mL    OUT:    Voided (mL): 300 mL  Total OUT: 300 mL    Total NET: 75 mL        Physical Exam:  General: alert and oriented, NAD  Resp: airway patent, respirations unlabored  Abdomen: soft, obese, nontender, nondistended  Extremities: no edema  Skin: warm, dry, appropriate color    MEDICATIONS  (STANDING):  atorvastatin 20 milliGRAM(s) Oral at bedtime  enoxaparin Injectable 40 milliGRAM(s) SubCutaneous every 24 hours  insulin lispro (ADMELOG) corrective regimen sliding scale   SubCutaneous three times a day before meals  insulin lispro (ADMELOG) corrective regimen sliding scale   SubCutaneous at bedtime  losartan 50 milliGRAM(s) Oral daily  metoprolol succinate  milliGRAM(s) Oral daily  pantoprazole    Tablet 40 milliGRAM(s) Oral before breakfast  potassium phosphate IVPB 30 milliMole(s) IV Intermittent once    MEDICATIONS  (PRN):  ondansetron Injectable 4 milliGRAM(s) IV Push every 6 hours PRN Nausea and/or Vomiting      LABS:                        11.5   5.58  )-----------( 164      ( 27 Jun 2021 07:16 )             38.5     06-27    138  |  105  |  15  ----------------------------<  174<H>  3.6   |  21<L>  |  0.67    Ca    8.5      27 Jun 2021 07:16  Phos  2.1     06-27  Mg     2.3     06-27

## 2021-06-28 ENCOUNTER — TRANSCRIPTION ENCOUNTER (OUTPATIENT)
Age: 46
End: 2021-06-28

## 2021-06-28 LAB
ANION GAP SERPL CALC-SCNC: 17 MMOL/L — HIGH (ref 7–14)
ANION GAP SERPL CALC-SCNC: 29 MMOL/L — HIGH (ref 7–14)
BUN SERPL-MCNC: 14 MG/DL — SIGNIFICANT CHANGE UP (ref 7–23)
BUN SERPL-MCNC: 14 MG/DL — SIGNIFICANT CHANGE UP (ref 7–23)
CALCIUM SERPL-MCNC: 9.1 MG/DL — SIGNIFICANT CHANGE UP (ref 8.4–10.5)
CALCIUM SERPL-MCNC: 9.2 MG/DL — SIGNIFICANT CHANGE UP (ref 8.4–10.5)
CHLORIDE SERPL-SCNC: 102 MMOL/L — SIGNIFICANT CHANGE UP (ref 98–107)
CHLORIDE SERPL-SCNC: 97 MMOL/L — LOW (ref 98–107)
CO2 SERPL-SCNC: 19 MMOL/L — LOW (ref 22–31)
CO2 SERPL-SCNC: 20 MMOL/L — LOW (ref 22–31)
CREAT SERPL-MCNC: 0.66 MG/DL — SIGNIFICANT CHANGE UP (ref 0.5–1.3)
CREAT SERPL-MCNC: 0.67 MG/DL — SIGNIFICANT CHANGE UP (ref 0.5–1.3)
GLUCOSE BLDC GLUCOMTR-MCNC: 156 MG/DL — HIGH (ref 70–99)
GLUCOSE BLDC GLUCOMTR-MCNC: 182 MG/DL — HIGH (ref 70–99)
GLUCOSE BLDC GLUCOMTR-MCNC: 186 MG/DL — HIGH (ref 70–99)
GLUCOSE BLDC GLUCOMTR-MCNC: 197 MG/DL — HIGH (ref 70–99)
GLUCOSE SERPL-MCNC: 196 MG/DL — HIGH (ref 70–99)
GLUCOSE SERPL-MCNC: 205 MG/DL — HIGH (ref 70–99)
HCT VFR BLD CALC: 40 % — SIGNIFICANT CHANGE UP (ref 39–50)
HGB BLD-MCNC: 12.3 G/DL — LOW (ref 13–17)
MAGNESIUM SERPL-MCNC: 2.1 MG/DL — SIGNIFICANT CHANGE UP (ref 1.6–2.6)
MAGNESIUM SERPL-MCNC: 2.1 MG/DL — SIGNIFICANT CHANGE UP (ref 1.6–2.6)
MCHC RBC-ENTMCNC: 26.9 PG — LOW (ref 27–34)
MCHC RBC-ENTMCNC: 30.8 GM/DL — LOW (ref 32–36)
MCV RBC AUTO: 87.5 FL — SIGNIFICANT CHANGE UP (ref 80–100)
NRBC # BLD: 0 /100 WBCS — SIGNIFICANT CHANGE UP
NRBC # FLD: 0 K/UL — SIGNIFICANT CHANGE UP
PHOSPHATE SERPL-MCNC: 2.6 MG/DL — SIGNIFICANT CHANGE UP (ref 2.5–4.5)
PHOSPHATE SERPL-MCNC: 3.4 MG/DL — SIGNIFICANT CHANGE UP (ref 2.5–4.5)
PLATELET # BLD AUTO: 182 K/UL — SIGNIFICANT CHANGE UP (ref 150–400)
POTASSIUM SERPL-MCNC: 3.9 MMOL/L — SIGNIFICANT CHANGE UP (ref 3.5–5.3)
POTASSIUM SERPL-MCNC: 4 MMOL/L — SIGNIFICANT CHANGE UP (ref 3.5–5.3)
POTASSIUM SERPL-SCNC: 3.9 MMOL/L — SIGNIFICANT CHANGE UP (ref 3.5–5.3)
POTASSIUM SERPL-SCNC: 4 MMOL/L — SIGNIFICANT CHANGE UP (ref 3.5–5.3)
RBC # BLD: 4.57 M/UL — SIGNIFICANT CHANGE UP (ref 4.2–5.8)
RBC # FLD: 15 % — HIGH (ref 10.3–14.5)
SODIUM SERPL-SCNC: 139 MMOL/L — SIGNIFICANT CHANGE UP (ref 135–145)
SODIUM SERPL-SCNC: 145 MMOL/L — SIGNIFICANT CHANGE UP (ref 135–145)
WBC # BLD: 7.23 K/UL — SIGNIFICANT CHANGE UP (ref 3.8–10.5)
WBC # FLD AUTO: 7.23 K/UL — SIGNIFICANT CHANGE UP (ref 3.8–10.5)

## 2021-06-28 RX ORDER — SODIUM CHLORIDE 9 MG/ML
1000 INJECTION, SOLUTION INTRAVENOUS
Refills: 0 | Status: DISCONTINUED | OUTPATIENT
Start: 2021-06-28 | End: 2021-06-29

## 2021-06-28 RX ADMIN — Medication 1: at 08:50

## 2021-06-28 RX ADMIN — Medication 1: at 11:43

## 2021-06-28 RX ADMIN — PANTOPRAZOLE SODIUM 40 MILLIGRAM(S): 20 TABLET, DELAYED RELEASE ORAL at 06:19

## 2021-06-28 RX ADMIN — SODIUM CHLORIDE 50 MILLILITER(S): 9 INJECTION, SOLUTION INTRAVENOUS at 21:53

## 2021-06-28 RX ADMIN — SODIUM CHLORIDE 50 MILLILITER(S): 9 INJECTION, SOLUTION INTRAVENOUS at 16:47

## 2021-06-28 RX ADMIN — ATORVASTATIN CALCIUM 20 MILLIGRAM(S): 80 TABLET, FILM COATED ORAL at 21:52

## 2021-06-28 RX ADMIN — LOSARTAN POTASSIUM 50 MILLIGRAM(S): 100 TABLET, FILM COATED ORAL at 06:20

## 2021-06-28 RX ADMIN — Medication 1: at 16:46

## 2021-06-28 RX ADMIN — ENOXAPARIN SODIUM 40 MILLIGRAM(S): 100 INJECTION SUBCUTANEOUS at 06:19

## 2021-06-28 RX ADMIN — Medication 100 MILLIGRAM(S): at 06:19

## 2021-06-28 NOTE — DISCHARGE NOTE PROVIDER - CARE PROVIDER_API CALL
Musa White)  ColonRectal Surgery; Surgery  [address] [place] [address]  Phone: [phone]  Fax: [phone]  Follow Up Time:

## 2021-06-28 NOTE — DISCHARGE NOTE PROVIDER - NSDCMRMEDTOKEN_GEN_ALL_CORE_FT
Crestor 5 mg oral tablet: 1 tab(s) orally once a day (at bedtime)  Farxiga 5 mg oral tablet: 1 tab(s) orally once a day  fenofibrate 145 mg oral tablet: 1 tab(s) orally once a day  glimepiride 1 mg oral tablet: 1 tab(s) orally once a day  Januvia 50 mg oral tablet: 1 tab(s) orally once a day  losartan 50 mg oral tablet: 1 tab(s) orally once a day  Medrol 4 mg oral tablet: 1 tab(s) orally 4 times a day      today: 1 tab with lunch, 1 with dinner, 2 before bedtime 1/11: 1 with breakfast, 1 with lunch, 1 with dinner, 1 before bedtime 1/12: 1 with breakfast, 1 with lunch, 1 before bedtime 1/13: 1 with lunch, 1 with dinner 1/14: 1 with lunch   metFORMIN: 500 milligram(s) orally 3 times a day  multivitamin: 1 tab(s) orally once a day  pantoprazole 40 mg oral delayed release tablet: 1 tab(s) orally once a day (before a meal)  Toprol-XL: 100 milligram(s) orally once a day  Vascepa 1 g oral capsule: 2 cap(s) orally 2 times a day

## 2021-06-28 NOTE — DISCHARGE NOTE PROVIDER - HOSPITAL COURSE
45 year old gentleman with history of DM type II, HTN, GERD, HLD, and perforated diverticulitis s/p Isi's, s/p reversal, multiple prior SBO's s/p ex-lap, LINDA in 2014, 2016, s/p incisional hernia repair, known to the surgery service most recently admitted with SBO in 1/2021, resolved with conservative management, now presents to ED c/o abdominal pain. Patient reports 1 day of pain, epigastric/jacky-umbilical, cramping in nature and intermittent, associated with mild nausea. Pt denies any vomiting. Last BM was this afternoon, described as normal. However, pt states he has not noticed any flatus after the BM. Denies any other complaints including recent illness/sick contacts, fevers/chills, chest pain/shortness of breath, diarrhea/constipation.   CT scan performed and showed Small bowel obstruction with transition in the right lower quadrant. Large complex ventral hernia containing small and large bowel again noted.  Pt admitted to surgical service, NG placed with 500mL bilious output, and pt managed conservatively with decompression. When return bowel function underwent clamp trial and NGT removed. Pt's diet slowly advanced as tolerated. Pt transitioned to oral medications when tolerating diet  Per Attending pt now stable for discharge home. Pt tolerating diet, + bowel function, and pain well controlled. Pt to follow up with DR White as an outpatient, instructed to call to schedule appointment

## 2021-06-28 NOTE — PROGRESS NOTE ADULT - SUBJECTIVE AND OBJECTIVE BOX
HPI:  P/w adhesive SBO     24h Events:   - Advanced to LRD  - Overnight, no acute events    Subjective:   Patient examined at bedside this AM. Reports     Objective:  Vital Signs  T(C): 37.2 (06-28 @ 00:05), Max: 37.2 (06-28 @ 00:05)  HR: 71 (06-28 @ 00:05) (67 - 82)  BP: 138/69 (06-28 @ 00:05) (115/69 - 144/73)  RR: 18 (06-28 @ 00:05) (17 - 18)  SpO2: 98% (06-28 @ 00:05) (98% - 100%)  06-26-21 @ 07:01  -  06-27-21 @ 07:00  --------------------------------------------------------  IN:  Total IN: 0 mL    OUT:    Nasogastric/Oral tube (mL): 0 mL    Voided (mL): 625 mL  Total OUT: 625 mL    Total NET: -625 mL      06-27-21 @ 07:01  -  06-28-21 @ 04:43  --------------------------------------------------------  IN:  Total IN: 0 mL    OUT:    Voided (mL): 700 mL  Total OUT: 700 mL    Total NET: -700 mL          Physical Exam:  General: alert and oriented, NAD  Resp: airway patent, respirations unlabored  CVS: regular rate and rhythm  Abdomen: soft, obese, nontender, nondistended  Extremities: no edema  Skin: warm, dry, appropriate color    Labs:                        11.5   5.58  )-----------( 164      ( 27 Jun 2021 07:16 )             38.5   06-27    138  |  105  |  15  ----------------------------<  174<H>  3.6   |  21<L>  |  0.67    Ca    8.5      27 Jun 2021 07:16  Phos  2.1     06-27  Mg     2.3     06-27      CAPILLARY BLOOD GLUCOSE      POCT Blood Glucose.: 146 mg/dL (27 Jun 2021 21:07)  POCT Blood Glucose.: 136 mg/dL (27 Jun 2021 17:00)  POCT Blood Glucose.: 174 mg/dL (27 Jun 2021 11:36)  POCT Blood Glucose.: 193 mg/dL (27 Jun 2021 08:01)      Microbiology:      Medications:   MEDICATIONS  (STANDING):  atorvastatin 20 milliGRAM(s) Oral at bedtime  enoxaparin Injectable 40 milliGRAM(s) SubCutaneous every 24 hours  insulin lispro (ADMELOG) corrective regimen sliding scale   SubCutaneous three times a day before meals  insulin lispro (ADMELOG) corrective regimen sliding scale   SubCutaneous at bedtime  losartan 50 milliGRAM(s) Oral daily  metoprolol succinate  milliGRAM(s) Oral daily  pantoprazole    Tablet 40 milliGRAM(s) Oral before breakfast    MEDICATIONS  (PRN):  ondansetron Injectable 4 milliGRAM(s) IV Push every 6 hours PRN Nausea and/or Vomiting       Surgery Daily Progress Note  =====================================================  Interval / Overnight Events: No acute events overnight.      HPI:  Patient is a 45 year old male with a PMHx of DM2, HTN, GERD, HLD and perforated diverticulitis (S/P Isi's / S/P reversal), multiple prior SBOs (S/P ex-lap, LINDA in 2014 and 2016) and S/P incisional hernia repair - known to the surgery service most recently admitted with SBO in 1/21 - resolved with conservative management who presented to the ED with abdominal pain. (23 Jun 2021 21:32)      PAST MEDICAL & SURGICAL HISTORY:  Hypertension  Hyperlipidemia  Diabetes mellitus, type 2  SBO (small bowel obstruction)  Diverticulitis  DVT (deep venous thrombosis)  RLE 2016, post op, was on eliquis for 6 months  Open wound of abdominal wall  GERD (gastroesophageal reflux disease)  S/P Isi&#x27;s procedure  2009 with reversal 2009  H/O exploratory laparotomy  LINDA, x3  H/O hernia repair  xlap, ventral hernia repair 6/2018      ALLERGIES:  ceftriaxone (Rash)  cephalosporins (Rash)  Cipro (Unknown)  contrast media (iodine-based) (Rash)  fentanyl (Rash)  Flagyl (Unknown)  iodine containing compounds (Rash)  morphine (Rash)    --------------------------------------------------------------------------------------    MEDICATIONS:    Neurologic Medications  ondansetron Injectable 4 milliGRAM(s) IV Push every 6 hours PRN Nausea and/or Vomiting    Cardiovascular Medications  losartan 50 milliGRAM(s) Oral daily  metoprolol succinate  milliGRAM(s) Oral daily    Gastrointestinal Medications  pantoprazole    Tablet 40 milliGRAM(s) Oral before breakfast    Hematologic/Oncologic Medications  enoxaparin Injectable 40 milliGRAM(s) SubCutaneous every 24 hours    Endocrine/Metabolic Medications  atorvastatin 20 milliGRAM(s) Oral at bedtime  insulin lispro (ADMELOG) corrective regimen sliding scale   SubCutaneous three times a day before meals  insulin lispro (ADMELOG) corrective regimen sliding scale   SubCutaneous at bedtime    --------------------------------------------------------------------------------------    VITAL SIGNS:  T(C): 37.2 (28 Jun 2021 07:41), Max: 37.2 (28 Jun 2021 00:05)  T(F): 99 (28 Jun 2021 07:41), Max: 99 (28 Jun 2021 00:05)  HR: 79 (28 Jun 2021 07:41) (67 - 82)  BP: 133/75 (28 Jun 2021 07:41) (119/67 - 144/73)  RR: 18 (28 Jun 2021 07:41) (18 - 18)  SpO2: 99% (28 Jun 2021 07:41) (98% - 100%)    --------------------------------------------------------------------------------------    INS AND OUTS:    27 Jun 2021 07:01  -  28 Jun 2021 07:00  --------------------------------------------------------  IN:    dextrose 5% + sodium chloride 0.45% w/ Additives: 375 mL  Total IN: 375 mL    OUT:    Voided (mL): 700 mL  Total OUT: 700 mL    Total NET: -325 mL      28 Jun 2021 07:01  -  28 Jun 2021 08:44  --------------------------------------------------------  IN:  Total IN: 0 mL    OUT:    Voided (mL): 200 mL  Total OUT: 200 mL    Total NET: -200 mL    --------------------------------------------------------------------------------------    EXAM    NEUROLOGY  Exam: Normal, in no acute distress.    HEENT  Exam: Normocephalic, atraumatic.    RESPIRATORY  Exam: Normal expansion / effort.    CARDIOVASCULAR  Exam: S1, S2.  Regular rate and rhythm.    GI/NUTRITION  Exam: Abdomen soft, Non-tender, Non-distended.  Current Diet: Low fiber diet    MUSCULOSKELETAL  Exam: All extremities moving spontaneously without limitations.      HEMATOLOGIC  [x] VTE Prophylaxis: enoxaparin Injectable 40 milliGRAM(s) SubCutaneous every 24 hours    --------------------------------------------------------------------------------------

## 2021-06-28 NOTE — PROGRESS NOTE ADULT - ASSESSMENT
45 year old male with a PMHx of DM2, HTN, GERD, HLD and perforated diverticulitis (S/P Isi's / S/P reversal), multiple prior SBOs (S/P ex-lap, LINDA in 2014 and 2016) and S/P incisional hernia repair - known to the surgery service most recently admitted with SBO in 1/21 - resolved with conservative management who presented to the ED with abdominal pain.  CT Abdomen / Pelvis performed showing small bowel obstruction with transition in the right lower quadrant.  Large complex ventral hernia containing small and large bowel again noted.    PLAN:  - LRD  - Continue home meds  - Monitor bowel function  - Serial abdominal exams  - Out of bed  - Pain control  - VTE prophylaxis with Lovenox subcutaneous    #[id number]  A Team Surgery Patient is a 45 year old male with a PMHx of DM2, HTN, GERD, HLD and perforated diverticulitis (S/P Isi's / S/P reversal), multiple prior SBOs (S/P ex-lap, LINDA in 2014 and 2016) and S/P incisional hernia repair - known to the surgery service most recently admitted with SBO in 1/21 - resolved with conservative management who presented to the ED with abdominal pain.  CT Abdomen / Pelvis performed showing small bowel obstruction with transition in the right lower quadrant.  Large complex ventral hernia containing small and large bowel again noted.      PLAN:  - Low fiber diet  - Monitor bowel function  - Serial abdominal exams  - Out of bed  - Pain control  - VTE prophylaxis with Lovenox subcutaneous  - Discharge planning home today      #[id number]  A Team Surgery

## 2021-06-28 NOTE — DISCHARGE NOTE PROVIDER - NSDCFUADDINST_GEN_ALL_CORE_FT
DIET: low fiber diabetic diet   NOTIFY YOUR SURGEON IF YOU HAVE: any fever (over 100.4 F) persistent nausea/vomiting, stop passing gas/having bowel movements, or worsening abdominal pain  Please follow up with your primary care physician in one week regarding your hospitalization, bring copies of your discharge paperwork.  Please follow up with your surgeon, Dr. White as an outpatient, please call to schedule appointment

## 2021-06-29 ENCOUNTER — TRANSCRIPTION ENCOUNTER (OUTPATIENT)
Age: 46
End: 2021-06-29

## 2021-06-29 VITALS
RESPIRATION RATE: 16 BRPM | SYSTOLIC BLOOD PRESSURE: 140 MMHG | OXYGEN SATURATION: 97 % | TEMPERATURE: 98 F | DIASTOLIC BLOOD PRESSURE: 79 MMHG | HEART RATE: 74 BPM

## 2021-06-29 LAB
ANION GAP SERPL CALC-SCNC: 11 MMOL/L — SIGNIFICANT CHANGE UP (ref 7–14)
BUN SERPL-MCNC: 16 MG/DL — SIGNIFICANT CHANGE UP (ref 7–23)
CALCIUM SERPL-MCNC: 8.6 MG/DL — SIGNIFICANT CHANGE UP (ref 8.4–10.5)
CHLORIDE SERPL-SCNC: 104 MMOL/L — SIGNIFICANT CHANGE UP (ref 98–107)
CO2 SERPL-SCNC: 21 MMOL/L — LOW (ref 22–31)
CREAT SERPL-MCNC: 0.72 MG/DL — SIGNIFICANT CHANGE UP (ref 0.5–1.3)
GLUCOSE BLDC GLUCOMTR-MCNC: 214 MG/DL — HIGH (ref 70–99)
GLUCOSE BLDC GLUCOMTR-MCNC: 223 MG/DL — HIGH (ref 70–99)
GLUCOSE SERPL-MCNC: 233 MG/DL — HIGH (ref 70–99)
HCT VFR BLD CALC: 36.8 % — LOW (ref 39–50)
HGB BLD-MCNC: 11.4 G/DL — LOW (ref 13–17)
MAGNESIUM SERPL-MCNC: 1.8 MG/DL — SIGNIFICANT CHANGE UP (ref 1.6–2.6)
MCHC RBC-ENTMCNC: 27.4 PG — SIGNIFICANT CHANGE UP (ref 27–34)
MCHC RBC-ENTMCNC: 31 GM/DL — LOW (ref 32–36)
MCV RBC AUTO: 88.5 FL — SIGNIFICANT CHANGE UP (ref 80–100)
NRBC # BLD: 0 /100 WBCS — SIGNIFICANT CHANGE UP
NRBC # FLD: 0.02 K/UL — HIGH
PHOSPHATE SERPL-MCNC: 3.4 MG/DL — SIGNIFICANT CHANGE UP (ref 2.5–4.5)
PLATELET # BLD AUTO: 177 K/UL — SIGNIFICANT CHANGE UP (ref 150–400)
POTASSIUM SERPL-MCNC: 3.7 MMOL/L — SIGNIFICANT CHANGE UP (ref 3.5–5.3)
POTASSIUM SERPL-SCNC: 3.7 MMOL/L — SIGNIFICANT CHANGE UP (ref 3.5–5.3)
RBC # BLD: 4.16 M/UL — LOW (ref 4.2–5.8)
RBC # FLD: 15 % — HIGH (ref 10.3–14.5)
SODIUM SERPL-SCNC: 136 MMOL/L — SIGNIFICANT CHANGE UP (ref 135–145)
WBC # BLD: 7.26 K/UL — SIGNIFICANT CHANGE UP (ref 3.8–10.5)
WBC # FLD AUTO: 7.26 K/UL — SIGNIFICANT CHANGE UP (ref 3.8–10.5)

## 2021-06-29 PROCEDURE — 99238 HOSP IP/OBS DSCHRG MGMT 30/<: CPT

## 2021-06-29 RX ADMIN — PANTOPRAZOLE SODIUM 40 MILLIGRAM(S): 20 TABLET, DELAYED RELEASE ORAL at 05:39

## 2021-06-29 RX ADMIN — Medication 2: at 08:01

## 2021-06-29 RX ADMIN — Medication 100 MILLIGRAM(S): at 05:39

## 2021-06-29 RX ADMIN — Medication 2: at 11:17

## 2021-06-29 RX ADMIN — ENOXAPARIN SODIUM 40 MILLIGRAM(S): 100 INJECTION SUBCUTANEOUS at 05:38

## 2021-06-29 RX ADMIN — LOSARTAN POTASSIUM 50 MILLIGRAM(S): 100 TABLET, FILM COATED ORAL at 05:39

## 2021-06-29 NOTE — DISCHARGE NOTE NURSING/CASE MANAGEMENT/SOCIAL WORK - PATIENT PORTAL LINK FT
You can access the FollowMyHealth Patient Portal offered by [facility] by registering at the following website: [email or web address]. By joining [facility]’s FollowMyHealth portal, you will also be able to view your health information using other applications (apps) compatible with our system.

## 2021-06-29 NOTE — PROGRESS NOTE ADULT - SUBJECTIVE AND OBJECTIVE BOX
HPI:  P/w adhesive SBO    24h Events:   - Placed on mIVF  - Overnight, tolerated dinner, no n/v    Subjective:   Patient examined at bedside this AM. Reports     Objective:  Vital Signs  T(C): 36.7 (06-29 @ 00:51), Max: 37.4 (06-28 @ 15:50)  HR: 77 (06-29 @ 00:51) (70 - 83)  BP: 135/74 (06-29 @ 00:51) (130/78 - 140/79)  RR: 18 (06-29 @ 00:51) (18 - 18)  SpO2: 97% (06-29 @ 00:51) (97% - 99%)  06-27-21 @ 07:01  -  06-28-21 @ 07:00  --------------------------------------------------------  IN:  Total IN: 0 mL    OUT:    Voided (mL): 700 mL  Total OUT: 700 mL    Total NET: -700 mL      06-28-21 @ 07:01  -  06-29-21 @ 03:31  --------------------------------------------------------  IN:  Total IN: 0 mL    OUT:    Voided (mL): 750 mL  Total OUT: 750 mL    Total NET: -750 mL          Physical Exam:  General: alert and oriented, NAD  Resp: airway patent, respirations unlabored  CVS: regular rate and rhythm  Abdomen: soft, nontender, nondistended  Extremities: no edema  Skin: warm, dry, appropriate color    Labs:                        12.3   7.23  )-----------( 182      ( 28 Jun 2021 06:49 )             40.0   06-28    139  |  102  |  14  ----------------------------<  205<H>  3.9   |  20<L>  |  0.67    Ca    9.2      28 Jun 2021 10:00  Phos  2.6     06-28  Mg     2.1     06-28      CAPILLARY BLOOD GLUCOSE      POCT Blood Glucose.: 186 mg/dL (28 Jun 2021 21:54)  POCT Blood Glucose.: 156 mg/dL (28 Jun 2021 16:35)  POCT Blood Glucose.: 182 mg/dL (28 Jun 2021 11:37)  POCT Blood Glucose.: 197 mg/dL (28 Jun 2021 08:46)      Microbiology:      Medications:   MEDICATIONS  (STANDING):  atorvastatin 20 milliGRAM(s) Oral at bedtime  dextrose 5% + sodium chloride 0.45%. 1000 milliLiter(s) (50 mL/Hr) IV Continuous <Continuous>  enoxaparin Injectable 40 milliGRAM(s) SubCutaneous every 24 hours  insulin lispro (ADMELOG) corrective regimen sliding scale   SubCutaneous three times a day before meals  insulin lispro (ADMELOG) corrective regimen sliding scale   SubCutaneous at bedtime  losartan 50 milliGRAM(s) Oral daily  metoprolol succinate  milliGRAM(s) Oral daily  pantoprazole    Tablet 40 milliGRAM(s) Oral before breakfast    MEDICATIONS  (PRN):  ondansetron Injectable 4 milliGRAM(s) IV Push every 6 hours PRN Nausea and/or Vomiting       HPI:  P/w adhesive SBO    24h Events:   - Placed on mIVF  - Overnight, tolerated dinner, no n/v    Subjective:   Patient examined at bedside this AM by team. Pt with nausea yesterday now improved, denies emesis. Pt tolerated dinner, +flatus/BM    Objective:  Vital Signs  T(C): 36.7 (06-29 @ 00:51), Max: 37.4 (06-28 @ 15:50)  HR: 77 (06-29 @ 00:51) (70 - 83)  BP: 135/74 (06-29 @ 00:51) (130/78 - 140/79)  RR: 18 (06-29 @ 00:51) (18 - 18)  SpO2: 97% (06-29 @ 00:51) (97% - 99%)  06-27-21 @ 07:01  -  06-28-21 @ 07:00  --------------------------------------------------------  IN:  Total IN: 0 mL    OUT:    Voided (mL): 700 mL  Total OUT: 700 mL    Total NET: -700 mL      06-28-21 @ 07:01  -  06-29-21 @ 03:31  --------------------------------------------------------  IN:  Total IN: 0 mL    OUT:    Voided (mL): 750 mL  Total OUT: 750 mL    Total NET: -750 mL          Physical Exam:  General: alert and oriented, NAD  Resp: nonlabored breathing   CVS: S1S2  Abdomen: softly distended, nontender    Labs:                        12.3   7.23  )-----------( 182      ( 28 Jun 2021 06:49 )             40.0   06-28    139  |  102  |  14  ----------------------------<  205<H>  3.9   |  20<L>  |  0.67    Ca    9.2      28 Jun 2021 10:00  Phos  2.6     06-28  Mg     2.1     06-28      CAPILLARY BLOOD GLUCOSE      POCT Blood Glucose.: 186 mg/dL (28 Jun 2021 21:54)  POCT Blood Glucose.: 156 mg/dL (28 Jun 2021 16:35)  POCT Blood Glucose.: 182 mg/dL (28 Jun 2021 11:37)  POCT Blood Glucose.: 197 mg/dL (28 Jun 2021 08:46)      Microbiology:      Medications:   MEDICATIONS  (STANDING):  atorvastatin 20 milliGRAM(s) Oral at bedtime  dextrose 5% + sodium chloride 0.45%. 1000 milliLiter(s) (50 mL/Hr) IV Continuous <Continuous>  enoxaparin Injectable 40 milliGRAM(s) SubCutaneous every 24 hours  insulin lispro (ADMELOG) corrective regimen sliding scale   SubCutaneous three times a day before meals  insulin lispro (ADMELOG) corrective regimen sliding scale   SubCutaneous at bedtime  losartan 50 milliGRAM(s) Oral daily  metoprolol succinate  milliGRAM(s) Oral daily  pantoprazole    Tablet 40 milliGRAM(s) Oral before breakfast    MEDICATIONS  (PRN):  ondansetron Injectable 4 milliGRAM(s) IV Push every 6 hours PRN Nausea and/or Vomiting

## 2021-06-29 NOTE — DISCHARGE NOTE NURSING/CASE MANAGEMENT/SOCIAL WORK - NSDCVIVACCINE_GEN_ALL_CORE_FT
influenza, injectable, quadrivalent, preservative free; 11-Sep-2017 14:02; Tavia Waggoner (RN); [employer]; [id number]; IntraMuscular; Deltoid Left.; 0.5 milliLiter(s); VIS (VIS Published: 07-Aug-2015, VIS Presented: 11-Sep-2017);

## 2021-06-29 NOTE — PROGRESS NOTE ADULT - ASSESSMENT
45 year old male with a PMHx of DM2, HTN, GERD, HLD and perforated diverticulitis (S/P Isi's / S/P reversal), multiple prior SBOs (S/P ex-lap, LINDA in 2014 and 2016) and S/P incisional hernia repair - known to the surgery service most recently admitted with SBO in 1/21 - resolved with conservative management who presented to the ED with abdominal pain.  CT Abdomen / Pelvis performed showing small bowel obstruction with transition in the right lower quadrant.  Large complex ventral hernia containing small and large bowel again noted.    PLAN:  - Low fiber diet  - Monitor bowel function  - Serial abdominal exams  - Out of bed  - Pain control  - VTE prophylaxis with Lovenox subcutaneous  - Discharge planning home today    #[id number]  A Team Surgery   45 year old male with a PMHx of DM2, HTN, GERD, HLD and perforated diverticulitis (S/P Isi's / S/P reversal), multiple prior SBOs (S/P ex-lap, LINDA in 2014 and 2016) and S/P incisional hernia repair - known to the surgery service most recently admitted with SBO in 1/21 - resolved with conservative management who presented to the ED with abdominal pain.  CT Abdomen / Pelvis performed showing small bowel obstruction with transition in the right lower quadrant.  Large complex ventral hernia containing small and large bowel again noted, managed conservatively with gastric decompression    PLAN:  - Low fiber diet, self regulate diet  - Monitor bowel function  - Serial abdominal exams  - Out of bed, encourage ambulation  - Pain control  - VTE prophylaxis with Lovenox subcutaneous  - Discharge planning home today    #[id number]  A Team Surgery

## 2021-09-06 ENCOUNTER — INPATIENT (INPATIENT)
Facility: HOSPITAL | Age: 46
LOS: 3 days | Discharge: HOME | End: 2021-09-10
Attending: SURGERY | Admitting: SURGERY
Payer: COMMERCIAL

## 2021-09-06 VITALS
TEMPERATURE: 98 F | HEIGHT: 72 IN | HEART RATE: 122 BPM | OXYGEN SATURATION: 96 % | DIASTOLIC BLOOD PRESSURE: 86 MMHG | SYSTOLIC BLOOD PRESSURE: 126 MMHG | RESPIRATION RATE: 18 BRPM

## 2021-09-06 DIAGNOSIS — Z98.89 OTHER SPECIFIED POSTPROCEDURAL STATES: Chronic | ICD-10-CM

## 2021-09-06 DIAGNOSIS — R10.9 UNSPECIFIED ABDOMINAL PAIN: ICD-10-CM

## 2021-09-06 DIAGNOSIS — Z98.890 OTHER SPECIFIED POSTPROCEDURAL STATES: Chronic | ICD-10-CM

## 2021-09-06 LAB
ALBUMIN SERPL ELPH-MCNC: 5.2 G/DL — HIGH (ref 3.3–5)
ALP SERPL-CCNC: 41 U/L — SIGNIFICANT CHANGE UP (ref 40–120)
ALT FLD-CCNC: 30 U/L — SIGNIFICANT CHANGE UP (ref 4–41)
ANION GAP SERPL CALC-SCNC: 21 MMOL/L — HIGH (ref 7–14)
AST SERPL-CCNC: 18 U/L — SIGNIFICANT CHANGE UP (ref 4–40)
BASOPHILS # BLD AUTO: 0 K/UL — SIGNIFICANT CHANGE UP (ref 0–0.2)
BASOPHILS NFR BLD AUTO: 0 % — SIGNIFICANT CHANGE UP (ref 0–2)
BILIRUB SERPL-MCNC: 0.8 MG/DL — SIGNIFICANT CHANGE UP (ref 0.2–1.2)
BLD GP AB SCN SERPL QL: NEGATIVE — SIGNIFICANT CHANGE UP
BLOOD GAS VENOUS COMPREHENSIVE RESULT: SIGNIFICANT CHANGE UP
BUN SERPL-MCNC: 24 MG/DL — HIGH (ref 7–23)
CALCIUM SERPL-MCNC: 10.5 MG/DL — SIGNIFICANT CHANGE UP (ref 8.4–10.5)
CHLORIDE SERPL-SCNC: 102 MMOL/L — SIGNIFICANT CHANGE UP (ref 98–107)
CO2 SERPL-SCNC: 19 MMOL/L — LOW (ref 22–31)
CREAT SERPL-MCNC: 0.97 MG/DL — SIGNIFICANT CHANGE UP (ref 0.5–1.3)
EOSINOPHIL # BLD AUTO: 0 K/UL — SIGNIFICANT CHANGE UP (ref 0–0.5)
EOSINOPHIL NFR BLD AUTO: 0 % — SIGNIFICANT CHANGE UP (ref 0–6)
GLUCOSE BLDC GLUCOMTR-MCNC: 171 MG/DL — HIGH (ref 70–99)
GLUCOSE BLDC GLUCOMTR-MCNC: 175 MG/DL — HIGH (ref 70–99)
GLUCOSE SERPL-MCNC: 279 MG/DL — HIGH (ref 70–99)
HCT VFR BLD CALC: 47.4 % — SIGNIFICANT CHANGE UP (ref 39–50)
HGB BLD-MCNC: 15.3 G/DL — SIGNIFICANT CHANGE UP (ref 13–17)
IANC: 8.07 K/UL — SIGNIFICANT CHANGE UP (ref 1.5–8.5)
LACTATE SERPL-SCNC: 1.9 MMOL/L — SIGNIFICANT CHANGE UP (ref 0.5–2)
LIDOCAIN IGE QN: 26 U/L — SIGNIFICANT CHANGE UP (ref 7–60)
LYMPHOCYTES # BLD AUTO: 1.66 K/UL — SIGNIFICANT CHANGE UP (ref 1–3.3)
LYMPHOCYTES # BLD AUTO: 15.6 % — SIGNIFICANT CHANGE UP (ref 13–44)
MCHC RBC-ENTMCNC: 27.9 PG — SIGNIFICANT CHANGE UP (ref 27–34)
MCHC RBC-ENTMCNC: 32.3 GM/DL — SIGNIFICANT CHANGE UP (ref 32–36)
MCV RBC AUTO: 86.3 FL — SIGNIFICANT CHANGE UP (ref 80–100)
MONOCYTES # BLD AUTO: 0.68 K/UL — SIGNIFICANT CHANGE UP (ref 0–0.9)
MONOCYTES NFR BLD AUTO: 6.4 % — SIGNIFICANT CHANGE UP (ref 2–14)
NEUTROPHILS # BLD AUTO: 7.91 K/UL — HIGH (ref 1.8–7.4)
NEUTROPHILS NFR BLD AUTO: 45.9 % — SIGNIFICANT CHANGE UP (ref 43–77)
PLATELET # BLD AUTO: 319 K/UL — SIGNIFICANT CHANGE UP (ref 150–400)
POTASSIUM SERPL-MCNC: 4.2 MMOL/L — SIGNIFICANT CHANGE UP (ref 3.5–5.3)
POTASSIUM SERPL-SCNC: 4.2 MMOL/L — SIGNIFICANT CHANGE UP (ref 3.5–5.3)
PROT SERPL-MCNC: 8.7 G/DL — HIGH (ref 6–8.3)
RBC # BLD: 5.49 M/UL — SIGNIFICANT CHANGE UP (ref 4.2–5.8)
RBC # FLD: 15.1 % — HIGH (ref 10.3–14.5)
RH IG SCN BLD-IMP: POSITIVE — SIGNIFICANT CHANGE UP
SODIUM SERPL-SCNC: 142 MMOL/L — SIGNIFICANT CHANGE UP (ref 135–145)
WBC # BLD: 10.65 K/UL — HIGH (ref 3.8–10.5)
WBC # FLD AUTO: 10.65 K/UL — HIGH (ref 3.8–10.5)

## 2021-09-06 PROCEDURE — 99222 1ST HOSP IP/OBS MODERATE 55: CPT | Mod: GC

## 2021-09-06 PROCEDURE — 99285 EMERGENCY DEPT VISIT HI MDM: CPT | Mod: 25

## 2021-09-06 PROCEDURE — 74019 RADEX ABDOMEN 2 VIEWS: CPT | Mod: 26

## 2021-09-06 PROCEDURE — 43753 TX GASTRO INTUB W/ASP: CPT

## 2021-09-06 PROCEDURE — 74176 CT ABD & PELVIS W/O CONTRAST: CPT | Mod: 26

## 2021-09-06 PROCEDURE — 71045 X-RAY EXAM CHEST 1 VIEW: CPT | Mod: 26

## 2021-09-06 RX ORDER — SITAGLIPTIN 50 MG/1
1 TABLET, FILM COATED ORAL
Qty: 0 | Refills: 0 | DISCHARGE

## 2021-09-06 RX ORDER — GLUCAGON INJECTION, SOLUTION 0.5 MG/.1ML
1 INJECTION, SOLUTION SUBCUTANEOUS ONCE
Refills: 0 | Status: DISCONTINUED | OUTPATIENT
Start: 2021-09-06 | End: 2021-09-07

## 2021-09-06 RX ORDER — DEXTROSE 50 % IN WATER 50 %
15 SYRINGE (ML) INTRAVENOUS ONCE
Refills: 0 | Status: DISCONTINUED | OUTPATIENT
Start: 2021-09-06 | End: 2021-09-07

## 2021-09-06 RX ORDER — SODIUM CHLORIDE 9 MG/ML
1000 INJECTION, SOLUTION INTRAVENOUS
Refills: 0 | Status: DISCONTINUED | OUTPATIENT
Start: 2021-09-06 | End: 2021-09-07

## 2021-09-06 RX ORDER — DEXTROSE 50 % IN WATER 50 %
25 SYRINGE (ML) INTRAVENOUS ONCE
Refills: 0 | Status: DISCONTINUED | OUTPATIENT
Start: 2021-09-06 | End: 2021-09-10

## 2021-09-06 RX ORDER — LOSARTAN POTASSIUM 100 MG/1
50 TABLET, FILM COATED ORAL EVERY 24 HOURS
Refills: 0 | Status: DISCONTINUED | OUTPATIENT
Start: 2021-09-06 | End: 2021-09-06

## 2021-09-06 RX ORDER — METOPROLOL TARTRATE 50 MG
50 TABLET ORAL
Refills: 0 | Status: DISCONTINUED | OUTPATIENT
Start: 2021-09-06 | End: 2021-09-06

## 2021-09-06 RX ORDER — SODIUM CHLORIDE 9 MG/ML
1000 INJECTION, SOLUTION INTRAVENOUS
Refills: 0 | Status: DISCONTINUED | OUTPATIENT
Start: 2021-09-06 | End: 2021-09-08

## 2021-09-06 RX ORDER — ACETAMINOPHEN 500 MG
1000 TABLET ORAL ONCE
Refills: 0 | Status: COMPLETED | OUTPATIENT
Start: 2021-09-06 | End: 2021-09-06

## 2021-09-06 RX ORDER — SODIUM CHLORIDE 9 MG/ML
1000 INJECTION INTRAMUSCULAR; INTRAVENOUS; SUBCUTANEOUS ONCE
Refills: 0 | Status: COMPLETED | OUTPATIENT
Start: 2021-09-06 | End: 2021-09-06

## 2021-09-06 RX ORDER — FENOFIBRATE,MICRONIZED 130 MG
160 CAPSULE ORAL EVERY 24 HOURS
Refills: 0 | Status: DISCONTINUED | OUTPATIENT
Start: 2021-09-06 | End: 2021-09-06

## 2021-09-06 RX ORDER — DAPAGLIFLOZIN 10 MG/1
1 TABLET, FILM COATED ORAL
Qty: 0 | Refills: 0 | DISCHARGE

## 2021-09-06 RX ORDER — ROSUVASTATIN CALCIUM 5 MG/1
1 TABLET ORAL
Qty: 0 | Refills: 0 | DISCHARGE

## 2021-09-06 RX ORDER — LOSARTAN POTASSIUM 100 MG/1
1 TABLET, FILM COATED ORAL
Qty: 0 | Refills: 0 | DISCHARGE

## 2021-09-06 RX ORDER — ENOXAPARIN SODIUM 100 MG/ML
40 INJECTION SUBCUTANEOUS EVERY 24 HOURS
Refills: 0 | Status: DISCONTINUED | OUTPATIENT
Start: 2021-09-06 | End: 2021-09-10

## 2021-09-06 RX ORDER — ONDANSETRON 8 MG/1
4 TABLET, FILM COATED ORAL ONCE
Refills: 0 | Status: COMPLETED | OUTPATIENT
Start: 2021-09-06 | End: 2021-09-06

## 2021-09-06 RX ORDER — METOPROLOL TARTRATE 50 MG
5 TABLET ORAL EVERY 6 HOURS
Refills: 0 | Status: DISCONTINUED | OUTPATIENT
Start: 2021-09-06 | End: 2021-09-07

## 2021-09-06 RX ORDER — PIPERACILLIN AND TAZOBACTAM 4; .5 G/20ML; G/20ML
3.38 INJECTION, POWDER, LYOPHILIZED, FOR SOLUTION INTRAVENOUS ONCE
Refills: 0 | Status: COMPLETED | OUTPATIENT
Start: 2021-09-06 | End: 2021-09-06

## 2021-09-06 RX ORDER — ATORVASTATIN CALCIUM 80 MG/1
40 TABLET, FILM COATED ORAL AT BEDTIME
Refills: 0 | Status: DISCONTINUED | OUTPATIENT
Start: 2021-09-06 | End: 2021-09-06

## 2021-09-06 RX ORDER — PANTOPRAZOLE SODIUM 20 MG/1
40 TABLET, DELAYED RELEASE ORAL
Refills: 0 | Status: DISCONTINUED | OUTPATIENT
Start: 2021-09-06 | End: 2021-09-06

## 2021-09-06 RX ORDER — DEXTROSE 50 % IN WATER 50 %
12.5 SYRINGE (ML) INTRAVENOUS ONCE
Refills: 0 | Status: DISCONTINUED | OUTPATIENT
Start: 2021-09-06 | End: 2021-09-10

## 2021-09-06 RX ORDER — PANTOPRAZOLE SODIUM 20 MG/1
40 TABLET, DELAYED RELEASE ORAL DAILY
Refills: 0 | Status: DISCONTINUED | OUTPATIENT
Start: 2021-09-06 | End: 2021-09-09

## 2021-09-06 RX ORDER — INSULIN LISPRO 100/ML
VIAL (ML) SUBCUTANEOUS EVERY 6 HOURS
Refills: 0 | Status: DISCONTINUED | OUTPATIENT
Start: 2021-09-06 | End: 2021-09-09

## 2021-09-06 RX ADMIN — ONDANSETRON 4 MILLIGRAM(S): 8 TABLET, FILM COATED ORAL at 18:36

## 2021-09-06 RX ADMIN — ENOXAPARIN SODIUM 40 MILLIGRAM(S): 100 INJECTION SUBCUTANEOUS at 21:15

## 2021-09-06 RX ADMIN — Medication 400 MILLIGRAM(S): at 16:19

## 2021-09-06 RX ADMIN — Medication 1000 MILLIGRAM(S): at 18:29

## 2021-09-06 RX ADMIN — SODIUM CHLORIDE 1000 MILLILITER(S): 9 INJECTION INTRAMUSCULAR; INTRAVENOUS; SUBCUTANEOUS at 16:19

## 2021-09-06 RX ADMIN — PIPERACILLIN AND TAZOBACTAM 200 GRAM(S): 4; .5 INJECTION, POWDER, LYOPHILIZED, FOR SOLUTION INTRAVENOUS at 18:36

## 2021-09-06 RX ADMIN — ONDANSETRON 4 MILLIGRAM(S): 8 TABLET, FILM COATED ORAL at 16:19

## 2021-09-06 RX ADMIN — SODIUM CHLORIDE 125 MILLILITER(S): 9 INJECTION, SOLUTION INTRAVENOUS at 21:15

## 2021-09-06 RX ADMIN — SODIUM CHLORIDE 1000 MILLILITER(S): 9 INJECTION INTRAMUSCULAR; INTRAVENOUS; SUBCUTANEOUS at 16:20

## 2021-09-06 NOTE — ED ADULT NURSE REASSESSMENT NOTE - NS ED NURSE REASSESS COMMENT FT1
NG tube placed, pt in NAD, surgery at bedside assessing pt. Suction canister filled and replaced, will continue to monitor.

## 2021-09-06 NOTE — ED ADULT NURSE NOTE - ED STAT RN HANDOFF DETAILS
receiving RN made aware pt requesting pain meds. still no callback from md at time of transfer to unit

## 2021-09-06 NOTE — H&P ADULT - NSHPPHYSICALEXAM_GEN_ALL_CORE
VITALS:   T(C): 36.9 (09-06-21 @ 14:09), Max: 36.9 (09-06-21 @ 14:09)  HR: 102 (09-06-21 @ 19:13) (102 - 122)  BP: 145/95 (09-06-21 @ 19:13) (126/86 - 145/95)  RR: 18 (09-06-21 @ 19:13) (18 - 18)  SpO2: 98% (09-06-21 @ 19:13) (96% - 98%)    GENERAL: NAD, lying in bed comfortably  HEAD:  Atraumatic, normocephalic  EYES: EOMI, PERRL, conjunctiva and sclera clear  ENT: Moist mucous membranes  NECK: Supple  HEART: Regular rate and rhythm  LUNGS: Unlabored respirations  ABDOMEN: Softly distended, tenderness to left hemiabdomen, non-peritoneal, no rebound/guarding, multiple well-healing surgical scars  EXTREMITIES: 2+ peripheral pulses bilaterally. No clubbing, cyanosis, or edema  NERVOUS SYSTEM:  A&Ox3, no focal deficits   SKIN: No rashes or lesions

## 2021-09-06 NOTE — ED ADULT NURSE NOTE - NSIMPLEMENTINTERV_GEN_ALL_ED
Implemented All Universal Safety Interventions:  [place] to call system. Call bell, personal items and telephone within reach. Instruct patient to call for assistance. Room bathroom lighting operational. Non-slip footwear when patient is off stretcher. Physically safe environment: no spills, clutter or unnecessary equipment. Stretcher in lowest position, wheels locked, appropriate side rails in place.

## 2021-09-06 NOTE — ED PROVIDER NOTE - CARE PLAN
1 Principal Discharge DX:	Abdominal pain   Principal Discharge DX:	Abdominal pain  Secondary Diagnosis:	Small bowel obstruction

## 2021-09-06 NOTE — H&P ADULT - ASSESSMENT
45 year old gentleman with extensive surgical history and multiple prior SBO's presenting with SBO. NGT placed with 2L bilious output.     PLAN:  - Admit to A Team Surgery, Dr. White  - NGT to [facility]  - NPO, IVF  - Continue resuscitation   - Monitor bowel function, serial abdominal exams  - No standing pain medications  - Trend lactate  - DVT ppx/OOB/IS    Case discussed with surgical attending   A Team Surgery   [id number]

## 2021-09-06 NOTE — ED PROVIDER NOTE - PHYSICAL EXAMINATION
Gen: AAOx3, non-toxic  Head: NCAT  HEENT: EOMI, oral mucosa dry, normal conjunctiva  Lung: CTAB, no respiratory distress, no wheezes/rhonchi/rales B/L, speaking in full sentences  CV: RRR, no murmurs, rubs or gallops  Abd:  Diffuse ttp. no guarding  MSK: no visible deformities  Neuro: No focal sensory or motor deficits, normal CN exam   Skin: Old abd surgical scar.   Psych: normal affect.

## 2021-09-06 NOTE — ED ADULT NURSE NOTE - OBJECTIVE STATEMENT
Pt received to room 21 presents with abd pain rated as 7/10 beginning last night. pt a&ox3, ambulatory at baseline, skin intact, respirations even and unlabored, abd soft and non-distended. Pt reports hx of SBO, last one in June. Pt reports last BM was this morning. Pt endorsing nausea, denies vomiting, diarrhea, fever, chills, or any other symptoms. 20G IV placed in left arm, pt waiting to see MD, will await further orders and continue to monitor.

## 2021-09-06 NOTE — H&P ADULT - HISTORY OF PRESENT ILLNESS
45 year old gentleman with history of DM type II, HTN, GERD, HLD, and perforated diverticulitis s/p Isi's (2009), s/p reversal, multiple prior SBO's s/p ex-lap, LINDA in 2014, 2016, s/p incisional hernia repair, known to the surgery service most recently admitted with SBO in 7/2021, resolved with conservative management, now presents to ED c/o abdominal pain and nausea for 1 day. Patient reports pain began yesterday and gradually increased today, is similar to prior SBO's and came to ED when could no longer tolerate. Last passed flatus yesterday, normal BM this morning. Denies any episodes of vomiting. No change to bowel or urinary habits. Attempted to eat this morning but could not tolerate. Called Dr. White's office with symptoms and advised to present to ED. No major changes to health since last admission in July 2021. Denies any other complaints including recent illness/sick contacts, fevers/chills, chest pain/shortness of breath, diarrhea/constipation.     In ED, patient hemodynamically stable, HR 90, afebrile, WBC 10.6, lactate 3.9. S/p 2L bolus, Zosyn x 1, Zofran x 2.

## 2021-09-06 NOTE — H&P ADULT - NSHPLABSRESULTS_GEN_ALL_CORE
LABS:                        15.3   10.65 )-----------( 319      ( 06 Sep 2021 16:44 )             47.4     09-06    142  |  102  |  24<H>  ----------------------------<  279<H>  4.2   |  19<L>  |  0.97    Ca    10.5      06 Sep 2021 16:44    TPro  8.7<H>  /  Alb  5.2<H>  /  TBili  0.8  /  DBili  x   /  AST  18  /  ALT  30  /  AlkPhos  41  09-06      < from: CT Abdomen and Pelvis No Cont (09.06.21 @ 18:12) >    FINDINGS:  LOWER CHEST: Within normal limits.    LIVER: Hepatic steatosis.  BILE DUCTS: Normal caliber.  GALLBLADDER: Cholelithiasis.  SPLEEN: Within normal limits.  PANCREAS: Within normal limits.  ADRENALS: Within normal limits.  KIDNEYS/URETERS: Within normal limits.    BLADDER: Within normal limits.  REPRODUCTIVE ORGANS: Prostate is normal in size.    BOWEL: Small bowel obstruction with likely transition point in the right lower quadrant (2:82 and 601:60) distal to the bowel containing ventral hernia. No evidence of bowel wall pneumatosis or mesenteric edema. Colonic diverticulosis without diverticulitis. Patent rectal colonic anastomosis.  PERITONEUM: No ascites. No pneumoperitoneum.  VESSELS: Within normal limits.  RETROPERITONEUM/LYMPH NODES: No lymphadenopathy.  ABDOMINAL WALL: Large ventral hernia containing both small and large bowel.  BONES: Degenerative changes of the spine.    IMPRESSION:  Small bowel obstruction with likely transition point in the right lower quadrant of the abdomen distal to the bowel containing ventral hernia.    --- End of Report ---    < end of copied text >

## 2021-09-06 NOTE — ED PROVIDER NOTE - OBJECTIVE STATEMENT
46 yo M with hx of SBOx3, dm, htn, hld presents with abdominal pain and vomiting since this AM. Reports a diffuse cramping non-radaiting 7/10 abd pain similar to pain from prior SBO; last bm this AM, not passing gas. Multiple episodes of vomiting. No fevers chills, chest pain sob or urinary symptoms.

## 2021-09-06 NOTE — H&P ADULT - ATTENDING COMMENTS
Multiple admissions for sbo. Ct confirms RLQ, partial sbo not directly related to recurrent hernia. To proceed with NG decompression, Hydration . Observaton. DH

## 2021-09-06 NOTE — ED PROVIDER NOTE - CLINICAL SUMMARY MEDICAL DECISION MAKING FREE TEXT BOX
44 yo M with hx of SBOx3, dm, htn, hld presents with abdominal pain and vomiting since this AM. Tachycardic. Diffuse ttp. Ddx concerning for SBO vs less likely pancreatitis vs other intaabd surgical pathologies. Labs, CTabd, lipase, fluids, analgesia and reassess. 44 yo M with hx of SBOx3, dm, htn, hld presents with abdominal pain and vomiting since this AM. Tachycardic. Diffuse ttp. Ddx concerning for SBO vs less likely pancreatitis vs other intaabd surgical pathologies. Labs, CTabd, lipase, fluids, analgesia and reassess.    rohitramo: 44 yo man with hx 3 sbo surgeries following a perfed tic surgery.  pt with DM, htn, hx dvt.  pt here with abd pain, nausea and vomiting.  last bm this am, no longer passing gas.  says it feels like his sbo.  pt appears very uncomfortable, tachycardic, abd diffusely distended and tender.  would start with obs series given time, and would involve surgery ASAP.  can follow with ct, but if obstruction on plain film would have dx given hx.  signed out 4pm to Dr. bob.

## 2021-09-06 NOTE — ED PROVIDER NOTE - ATTENDING CONTRIBUTION TO CARE
leana: 44 yo man with hx 3 sbo surgeries following a perfed tic surgery.  pt with DM, htn, hx dvt.  pt here with abd pain, nausea and vomiting.  last bm this am, no longer passing gas.  says it feels like his sbo.  pt appears very uncomfortable, tachycardic, abd diffusely distended and tender.  would start with obs series given time, and would involve surgery ASAP.  can follow with ct, but if obstruction on plain film would have dx given hx.  signed out 4pm to Dr. bob.    I performed a history and physical exam of the patient and discussed their management with the resident and /or advanced care provider. I reviewed the resident and /or ACP's note and agree with the documented findings and plan of care. My medical decison making and observations are found above.

## 2021-09-06 NOTE — ED ADULT NURSE NOTE - SUICIDE SCREENING QUESTION 2
Follow up with your PCP in 3 - 4 days if worsening or not improving:    Wendy Song, DO  [address] [facility] [address]  [phone]      Patient Education     Allergic Reaction, Other [Local]  You are having an allergic reaction. This is due to exposure to something you have become sensitive to. This may be a household product, medicine, chemical, soap, cream, cosmetics or jewelry. A sting from an insect (that you were not aware of) can also cause this reaction. Sometimes it is difficult to know exactly what caused this reaction. There may be redness, itching, and swelling. The rash will fade over the next few days.  Home Care:  · If itching is a problem, avoid anything that heats up your skin (hot showers/baths, direct sunlight) since heat will make itching worse.  · An ice pack (ice cubes in a plastic bag, wrapped in a towel) will reduce local areas of redness and itching. Lanacaine cream or Solarcaine spray (or other product containing \"benzocaine\") will reduce the itching.  · Oral Benadryl (diphenhydramine) is an antihistamine available at drug and grocery stores. Unless a prescription antihistamine was given, Benadryl may be used to reduce itching if large areas of the skin are involved. Use lower doses during the daytime and higher doses at bedtime since the drug may make you sleepy. [NOTE: Do not use Benadryl if you have glaucoma or if you are a man with trouble urinating due to an enlarged prostate.] Claritin (loratadine) is an antihistamine that causes less drowsiness and is a good alternative for daytime use.  Follow Up  with your doctor or this facility in the next two days if your symptoms do not continue to improve.  Get Prompt Medical Attention  if any of the following occur:  · Spreading areas of itching, redness or swelling  · New or worse swelling in the face, eyelids, lips, mouth, throat or tongue  · Trouble swallowing or breathing  · Dizziness, weakness or fainting  · Signs  of infection:  ¨ Spreading redness  ¨ Increased pain or swelling  ¨ Fever of 100.4ºF (38ºC) or higher, or as directed by your healthcare provider  ¨ Colored fluid draining from the wound  © 5131-8893 [employer]. [address] [place] [address], [place] [address]. All rights reserved. This information is not intended as a substitute for professional medical care. Always follow your healthcare professional's instructions.            No

## 2021-09-06 NOTE — ED PROVIDER NOTE - PROGRESS NOTE DETAILS
Maeve CHRISTY: Received sign out from my colleague Dr. Zarate pt presents with a cc of abdominal pain c/w prior episodes of bowel obstruction, pending labs, imaging at time of sign out. Resident Isacc: pt endorsed to me by previous shift. S/P NG Tube placement iso SBO. Surgery with recommendations for admission to their service. Case endorsed to Dr. White.

## 2021-09-07 ENCOUNTER — TRANSCRIPTION ENCOUNTER (OUTPATIENT)
Age: 46
End: 2021-09-07

## 2021-09-07 LAB
A1C WITH ESTIMATED AVERAGE GLUCOSE RESULT: 8 % — HIGH (ref 4–5.6)
ANION GAP SERPL CALC-SCNC: 20 MMOL/L — HIGH (ref 7–14)
BASOPHILS # BLD AUTO: 0.01 K/UL — SIGNIFICANT CHANGE UP (ref 0–0.2)
BASOPHILS NFR BLD AUTO: 0.2 % — SIGNIFICANT CHANGE UP (ref 0–2)
BUN SERPL-MCNC: 31 MG/DL — HIGH (ref 7–23)
CALCIUM SERPL-MCNC: 9.9 MG/DL — SIGNIFICANT CHANGE UP (ref 8.4–10.5)
CHLORIDE SERPL-SCNC: 103 MMOL/L — SIGNIFICANT CHANGE UP (ref 98–107)
CO2 SERPL-SCNC: 22 MMOL/L — SIGNIFICANT CHANGE UP (ref 22–31)
COVID-19 SPIKE DOMAIN AB INTERP: POSITIVE
COVID-19 SPIKE DOMAIN ANTIBODY RESULT: >250 U/ML — HIGH
CREAT SERPL-MCNC: 0.98 MG/DL — SIGNIFICANT CHANGE UP (ref 0.5–1.3)
EOSINOPHIL # BLD AUTO: 0.02 K/UL — SIGNIFICANT CHANGE UP (ref 0–0.5)
EOSINOPHIL NFR BLD AUTO: 0.4 % — SIGNIFICANT CHANGE UP (ref 0–6)
ESTIMATED AVERAGE GLUCOSE: 183 — SIGNIFICANT CHANGE UP
GLUCOSE BLDC GLUCOMTR-MCNC: 109 MG/DL — HIGH (ref 70–99)
GLUCOSE BLDC GLUCOMTR-MCNC: 113 MG/DL — HIGH (ref 70–99)
GLUCOSE BLDC GLUCOMTR-MCNC: 132 MG/DL — HIGH (ref 70–99)
GLUCOSE BLDC GLUCOMTR-MCNC: 170 MG/DL — HIGH (ref 70–99)
GLUCOSE BLDC GLUCOMTR-MCNC: 174 MG/DL — HIGH (ref 70–99)
GLUCOSE SERPL-MCNC: 181 MG/DL — HIGH (ref 70–99)
HCT VFR BLD CALC: 45 % — SIGNIFICANT CHANGE UP (ref 39–50)
HGB BLD-MCNC: 14.5 G/DL — SIGNIFICANT CHANGE UP (ref 13–17)
IANC: 2.5 K/UL — SIGNIFICANT CHANGE UP (ref 1.5–8.5)
IMM GRANULOCYTES NFR BLD AUTO: 0.2 % — SIGNIFICANT CHANGE UP (ref 0–1.5)
LACTATE SERPL-SCNC: 2 MMOL/L — SIGNIFICANT CHANGE UP (ref 0.5–2)
LYMPHOCYTES # BLD AUTO: 1.31 K/UL — SIGNIFICANT CHANGE UP (ref 1–3.3)
LYMPHOCYTES # BLD AUTO: 28.5 % — SIGNIFICANT CHANGE UP (ref 13–44)
MAGNESIUM SERPL-MCNC: 2 MG/DL — SIGNIFICANT CHANGE UP (ref 1.6–2.6)
MCHC RBC-ENTMCNC: 28.1 PG — SIGNIFICANT CHANGE UP (ref 27–34)
MCHC RBC-ENTMCNC: 32.2 GM/DL — SIGNIFICANT CHANGE UP (ref 32–36)
MCV RBC AUTO: 87.2 FL — SIGNIFICANT CHANGE UP (ref 80–100)
MONOCYTES # BLD AUTO: 0.75 K/UL — SIGNIFICANT CHANGE UP (ref 0–0.9)
MONOCYTES NFR BLD AUTO: 16.3 % — HIGH (ref 2–14)
NEUTROPHILS # BLD AUTO: 2.5 K/UL — SIGNIFICANT CHANGE UP (ref 1.8–7.4)
NEUTROPHILS NFR BLD AUTO: 54.4 % — SIGNIFICANT CHANGE UP (ref 43–77)
NRBC # BLD: 0 /100 WBCS — SIGNIFICANT CHANGE UP
NRBC # FLD: 0 K/UL — SIGNIFICANT CHANGE UP
PHOSPHATE SERPL-MCNC: 4.4 MG/DL — SIGNIFICANT CHANGE UP (ref 2.5–4.5)
PLATELET # BLD AUTO: 243 K/UL — SIGNIFICANT CHANGE UP (ref 150–400)
POTASSIUM SERPL-MCNC: 3.4 MMOL/L — LOW (ref 3.5–5.3)
POTASSIUM SERPL-SCNC: 3.4 MMOL/L — LOW (ref 3.5–5.3)
RBC # BLD: 5.16 M/UL — SIGNIFICANT CHANGE UP (ref 4.2–5.8)
RBC # FLD: 15.4 % — HIGH (ref 10.3–14.5)
SARS-COV-2 IGG+IGM SERPL QL IA: >250 U/ML — HIGH
SARS-COV-2 IGG+IGM SERPL QL IA: POSITIVE
SARS-COV-2 RNA SPEC QL NAA+PROBE: SIGNIFICANT CHANGE UP
SODIUM SERPL-SCNC: 145 MMOL/L — SIGNIFICANT CHANGE UP (ref 135–145)
WBC # BLD: 4.6 K/UL — SIGNIFICANT CHANGE UP (ref 3.8–10.5)
WBC # FLD AUTO: 4.6 K/UL — SIGNIFICANT CHANGE UP (ref 3.8–10.5)

## 2021-09-07 PROCEDURE — 99231 SBSQ HOSP IP/OBS SF/LOW 25: CPT

## 2021-09-07 RX ORDER — ACETAMINOPHEN 500 MG
1000 TABLET ORAL ONCE
Refills: 0 | Status: DISCONTINUED | OUTPATIENT
Start: 2021-09-07 | End: 2021-09-07

## 2021-09-07 RX ORDER — SODIUM CHLORIDE 9 MG/ML
1000 INJECTION, SOLUTION INTRAVENOUS ONCE
Refills: 0 | Status: COMPLETED | OUTPATIENT
Start: 2021-09-07 | End: 2021-09-07

## 2021-09-07 RX ORDER — ACETAMINOPHEN 500 MG
1000 TABLET ORAL ONCE
Refills: 0 | Status: COMPLETED | OUTPATIENT
Start: 2021-09-07 | End: 2021-09-07

## 2021-09-07 RX ORDER — METOPROLOL TARTRATE 50 MG
10 TABLET ORAL EVERY 6 HOURS
Refills: 0 | Status: DISCONTINUED | OUTPATIENT
Start: 2021-09-07 | End: 2021-09-09

## 2021-09-07 RX ORDER — BENZOCAINE AND MENTHOL 5; 1 G/100ML; G/100ML
1 LIQUID ORAL
Refills: 0 | Status: DISCONTINUED | OUTPATIENT
Start: 2021-09-07 | End: 2021-09-08

## 2021-09-07 RX ORDER — INFLUENZA VIRUS VACCINE 15; 15; 15; 15 UG/.5ML; UG/.5ML; UG/.5ML; UG/.5ML
0.5 SUSPENSION INTRAMUSCULAR ONCE
Refills: 0 | Status: DISCONTINUED | OUTPATIENT
Start: 2021-09-07 | End: 2021-09-10

## 2021-09-07 RX ORDER — POTASSIUM CHLORIDE 20 MEQ
10 PACKET (EA) ORAL
Refills: 0 | Status: COMPLETED | OUTPATIENT
Start: 2021-09-07 | End: 2021-09-07

## 2021-09-07 RX ORDER — METOPROLOL TARTRATE 50 MG
10 TABLET ORAL EVERY 6 HOURS
Refills: 0 | Status: DISCONTINUED | OUTPATIENT
Start: 2021-09-07 | End: 2021-09-07

## 2021-09-07 RX ORDER — TETRACAINE/BENZOCAINE/BUTAMBEN 2%-14%-2%
1 OINTMENT (GRAM) TOPICAL DAILY
Refills: 0 | Status: DISCONTINUED | OUTPATIENT
Start: 2021-09-07 | End: 2021-09-08

## 2021-09-07 RX ADMIN — ENOXAPARIN SODIUM 40 MILLIGRAM(S): 100 INJECTION SUBCUTANEOUS at 21:33

## 2021-09-07 RX ADMIN — Medication 120 MILLIGRAM(S): at 23:25

## 2021-09-07 RX ADMIN — Medication 100 MILLIEQUIVALENT(S): at 07:37

## 2021-09-07 RX ADMIN — Medication 1000 MILLIGRAM(S): at 12:46

## 2021-09-07 RX ADMIN — PANTOPRAZOLE SODIUM 40 MILLIGRAM(S): 20 TABLET, DELAYED RELEASE ORAL at 12:27

## 2021-09-07 RX ADMIN — BENZOCAINE AND MENTHOL 1 LOZENGE: 5; 1 LIQUID ORAL at 23:25

## 2021-09-07 RX ADMIN — SODIUM CHLORIDE 2000 MILLILITER(S): 9 INJECTION, SOLUTION INTRAVENOUS at 05:45

## 2021-09-07 RX ADMIN — Medication 5 MILLIGRAM(S): at 00:25

## 2021-09-07 RX ADMIN — Medication 100 MILLIEQUIVALENT(S): at 09:51

## 2021-09-07 RX ADMIN — Medication 1: at 05:47

## 2021-09-07 RX ADMIN — SODIUM CHLORIDE 2000 MILLILITER(S): 9 INJECTION, SOLUTION INTRAVENOUS at 17:00

## 2021-09-07 RX ADMIN — Medication 400 MILLIGRAM(S): at 12:31

## 2021-09-07 RX ADMIN — Medication 5 MILLIGRAM(S): at 05:45

## 2021-09-07 RX ADMIN — Medication 100 MILLIEQUIVALENT(S): at 08:39

## 2021-09-07 RX ADMIN — Medication 1: at 00:26

## 2021-09-07 RX ADMIN — Medication 120 MILLIGRAM(S): at 12:30

## 2021-09-07 RX ADMIN — Medication 1: at 12:27

## 2021-09-07 RX ADMIN — Medication 1000 MILLIGRAM(S): at 02:20

## 2021-09-07 RX ADMIN — SODIUM CHLORIDE 1000 MILLILITER(S): 9 INJECTION, SOLUTION INTRAVENOUS at 20:56

## 2021-09-07 RX ADMIN — Medication 120 MILLIGRAM(S): at 18:18

## 2021-09-07 RX ADMIN — Medication 400 MILLIGRAM(S): at 01:50

## 2021-09-07 NOTE — DISCHARGE NOTE PROVIDER - NSDCCPCAREPLAN_GEN_ALL_CORE_FT
PRINCIPAL DISCHARGE DIAGNOSIS  Diagnosis: Bowel obstruction  Assessment and Plan of Treatment: You were admitted to [facility] for bowel obstruction.  When you left the hospital you were pain free and without any signs of active obstruction.  If you have any abdominal pain, fever, chills, nausea or vomiting, please call Dr. White's office.  Please call Dr. White's office to make an appointment in 1 week. You may slowly resume your pre-hospital physical activity as long as it doesn't cause you pain or dizziness.      SECONDARY DISCHARGE DIAGNOSES  Diagnosis: Small bowel obstruction  Assessment and Plan of Treatment:

## 2021-09-07 NOTE — PROGRESS NOTE ADULT - ASSESSMENT
45 year old gentleman with extensive surgical history and multiple prior SBO's presenting with SBO. NGT placed with 2L bilious output.     PLAN:  - No Gastrografin at this time secondary to allergy  - Cepacol lozenges to promote GI function  - NGT to LCWS  - NPO, IVF  - Continue resuscitation   - Monitor bowel function, serial abdominal exams  - No standing pain medications  - Trend lactate  - DVT ppx/OOB/IS    A Team Surgery   [id number]

## 2021-09-07 NOTE — PROGRESS NOTE ADULT - SUBJECTIVE AND OBJECTIVE BOX
A TEAM SURGERY DAILY PROGRESS NOTE    S: Patient seen and examined at bedside. Continues with abdominal pain but improved since admission. No nausea. Denies flatus or bowel movement.     O:   Vital Signs Last 24 Hrs  T(C): 36.6 (07 Sep 2021 05:27), Max: 37.2 (07 Sep 2021 00:25)  T(F): 97.9 (07 Sep 2021 05:27), Max: 99 (07 Sep 2021 00:25)  HR: 111 (07 Sep 2021 05:27) (102 - 122)  BP: 126/59 (07 Sep 2021 05:27) (126/59 - 145/95)  RR: 17 (07 Sep 2021 05:27) (17 - 18)  SpO2: 98% (07 Sep 2021 05:27) (96% - 98%)    I&O's Detail    06 Sep 2021 07:01  -  07 Sep 2021 07:00  --------------------------------------------------------  IN:    Lactated Ringers: 625 mL  Total IN: 625 mL    OUT:    Nasogastric/Oral tube (mL): 3400 mL    Voided (mL): 400 mL  Total OUT: 3800 mL    Total NET: -3175 mL      EXAM  General: alert and oriented, NAD  Resp: airway patent, respirations unlabored  CVS: regular rate and rhythm  Abdomen: soft, minimall tender upper quadrants, nondistended  Extremities: no edema  Skin: warm, dry, appropriate color      LABS                    14.5   4.60  )-----------( 243      ( 07 Sep 2021 06:30 )             45.0   09-07    145  |  103  |  31<H>  ----------------------------<  181<H>  3.4<L>   |  22  |  0.98    Ca    9.9      07 Sep 2021 06:30  Phos  4.4     09-07  Mg     2.00     09-07    TPro  8.7<H>  /  Alb  5.2<H>  /  TBili  0.8  /  DBili  x   /  AST  18  /  ALT  30  /  AlkPhos  41  09-06    MEDICATIONS  MEDICATIONS  (STANDING):  benzocaine 15 mG/menthol 3.6 mG (Sugar-Free) Lozenge 1 Lozenge Oral four times a day  dextrose 50% Injectable 25 Gram(s) IV Push once  dextrose 50% Injectable 12.5 Gram(s) IV Push once  dextrose 50% Injectable 25 Gram(s) IV Push once  enoxaparin Injectable 40 milliGRAM(s) SubCutaneous every 24 hours  influenza   Vaccine 0.5 milliLiter(s) IntraMuscular once  insulin lispro (ADMELOG) corrective regimen sliding scale   SubCutaneous every 6 hours  lactated ringers. 1000 milliLiter(s) (125 mL/Hr) IV Continuous <Continuous>  metoprolol tartrate Injectable 5 milliGRAM(s) IV Push every 6 hours  pantoprazole  Injectable 40 milliGRAM(s) IV Push daily  potassium chloride  10 mEq/100 mL IVPB 10 milliEquivalent(s) IV Intermittent every 1 hour    MEDICATIONS  (PRN):

## 2021-09-07 NOTE — DISCHARGE NOTE PROVIDER - CARE PROVIDER_API CALL
Musa White)  ColonRectal Surgery; Surgery  [address] [place] [address]  Phone: [phone]  Fax: [phone]  Follow Up Time:

## 2021-09-07 NOTE — DISCHARGE NOTE PROVIDER - NSDCMRMEDTOKEN_GEN_ALL_CORE_FT
Farxiga 10 mg oral tablet: 1 tab(s) orally once a day  fenofibrate 145 mg oral tablet: 1 tab(s) orally once a day  glimepiride 1 mg oral tablet: 1 tab(s) orally once a day  Januvia 100 mg oral tablet: 1 tab(s) orally once a day  metFORMIN: 500 milligram(s) orally 3 times a day  pantoprazole 40 mg oral delayed release tablet: 1 tab(s) orally once a day (before a meal)  rosuvastatin 10 mg oral tablet: 1 tab(s) orally once a day  telmisartan 40 mg oral tablet: 1 tab(s) orally once a day  Toprol-XL: 100 milligram(s) orally once a day  Vascepa 1 g oral capsule: 2 cap(s) orally 2 times a day

## 2021-09-07 NOTE — DISCHARGE NOTE PROVIDER - HOSPITAL COURSE
45 year old gentleman with history of DM type II, HTN, GERD, HLD, and perforated diverticulitis s/p Isi's (2009), s/p reversal, multiple prior SBO's s/p ex-lap, LINDA in 2014, 2016, s/p incisional hernia repair, known to the surgery service most recently admitted with SBO in 7/2021, resolved with conservative management, now presents to ED 9/6/21 complaining of abdominal pain and nausea for 1 day. Patient reports pain began yesterday and gradually increased today, is similar to prior SBO's and came to ED when could no longer tolerate. Last passed flatus yesterday, normal BM this morning. Denies any episodes of vomiting. No change to bowel or urinary habits. Attempted to eat this morning but could not tolerate. Called Dr. White's office with symptoms and advised to present to ED. No major changes to health since last admission in July 2021.     In ED, patient hemodynamically stable, HR 90, afebrile, WBC 10.6, lactate 3.9. S/p 2L bolus, Zosyn x 1, Zofran x 2.     CT Abdomen/Pelvis: Small bowel obstruction with likely transition point in the right lower quadrant of the abdomen distal to the bowel containing ventral hernia.    He was admitted to the surgical service, made NPO and NGT was placed. NGT placed with 2L bilious output.     9/7: Still with no return of GI function.   9/8: 45 year old gentleman with history of DM type II, HTN, GERD, HLD, and perforated diverticulitis s/p Isi's (2009), s/p reversal, multiple prior SBO's s/p ex-lap, LINDA in 2014, 2016, s/p incisional hernia repair, known to the surgery service most recently admitted with SBO in 7/2021, resolved with conservative management, now presents to ED 9/6/21 complaining of abdominal pain and nausea for 1 day. Patient reports pain began yesterday and gradually increased today, is similar to prior SBO's and came to ED when could no longer tolerate. Last passed flatus yesterday, normal BM this morning. Denies any episodes of vomiting. No change to bowel or urinary habits. Attempted to eat this morning but could not tolerate. Called Dr. White's office with symptoms and advised to present to ED. No major changes to health since last admission in July 2021.     In ED, patient hemodynamically stable, HR 90, afebrile, WBC 10.6, lactate 3.9. S/p 2L bolus, Zosyn x 1, Zofran x 2.     CT Abdomen/Pelvis: Small bowel obstruction with likely transition point in the right lower quadrant of the abdomen distal to the bowel containing ventral hernia.    He was admitted to the surgical service, made NPO and NGT was placed. NGT placed with 2L bilious output.     9/7: Still with no return of GI function.   9/8: Patient reports passing gas with return of bowel function. 45 year old gentleman with history of DM type II, HTN, GERD, HLD, and perforated diverticulitis s/p Isi's (2009), s/p reversal, multiple prior SBO's s/p ex-lap, LINDA in 2014, 2016, s/p incisional hernia repair, known to the surgery service most recently admitted with SBO in 7/2021, resolved with conservative management, now presents to ED 9/6/21 complaining of abdominal pain and nausea for 1 day. Patient reports pain began yesterday and gradually increased today, is similar to prior SBO's and came to ED when could no longer tolerate. Last passed flatus yesterday, normal BM this morning. Denies any episodes of vomiting. No change to bowel or urinary habits. Attempted to eat this morning but could not tolerate. Called Dr. White's office with symptoms and advised to present to ED. No major changes to health since last admission in July 2021.     In ED, patient hemodynamically stable, HR 90, afebrile, WBC 10.6, lactate 3.9. S/p 2L bolus, Zosyn x 1, Zofran x 2.     CT Abdomen/Pelvis: Small bowel obstruction with likely transition point in the right lower quadrant of the abdomen distal to the bowel containing ventral hernia.    He was admitted to the surgical service, made NPO and NGT was placed. NGT placed with 2L bilious output.     9/7: Still with no return of GI function. Passed NGT clamp with low output, subsequently removed.   9/8: Patient reports passing gas with return of bowel function.  9/9: Patient had a bowel movement and passing flatus. LR bolus was given for low UOP.  9/10: Patient was medically stable for discharge, passing flatus, having bowel movements, tolerating diet, voiding spontaneously, pain well controlled on oral pain medications, and ambulating independently.

## 2021-09-08 LAB
ANION GAP SERPL CALC-SCNC: 15 MMOL/L — HIGH (ref 7–14)
BUN SERPL-MCNC: 25 MG/DL — HIGH (ref 7–23)
CALCIUM SERPL-MCNC: 8.7 MG/DL — SIGNIFICANT CHANGE UP (ref 8.4–10.5)
CHLORIDE SERPL-SCNC: 103 MMOL/L — SIGNIFICANT CHANGE UP (ref 98–107)
CO2 SERPL-SCNC: 23 MMOL/L — SIGNIFICANT CHANGE UP (ref 22–31)
CREAT SERPL-MCNC: 0.82 MG/DL — SIGNIFICANT CHANGE UP (ref 0.5–1.3)
GLUCOSE BLDC GLUCOMTR-MCNC: 106 MG/DL — HIGH (ref 70–99)
GLUCOSE BLDC GLUCOMTR-MCNC: 114 MG/DL — HIGH (ref 70–99)
GLUCOSE BLDC GLUCOMTR-MCNC: 132 MG/DL — HIGH (ref 70–99)
GLUCOSE BLDC GLUCOMTR-MCNC: 138 MG/DL — HIGH (ref 70–99)
GLUCOSE SERPL-MCNC: 143 MG/DL — HIGH (ref 70–99)
HCT VFR BLD CALC: 36.7 % — LOW (ref 39–50)
HGB BLD-MCNC: 11.6 G/DL — LOW (ref 13–17)
MAGNESIUM SERPL-MCNC: 1.9 MG/DL — SIGNIFICANT CHANGE UP (ref 1.6–2.6)
MCHC RBC-ENTMCNC: 28.1 PG — SIGNIFICANT CHANGE UP (ref 27–34)
MCHC RBC-ENTMCNC: 31.6 GM/DL — LOW (ref 32–36)
MCV RBC AUTO: 88.9 FL — SIGNIFICANT CHANGE UP (ref 80–100)
NRBC # BLD: 0 /100 WBCS — SIGNIFICANT CHANGE UP
NRBC # FLD: 0 K/UL — SIGNIFICANT CHANGE UP
PHOSPHATE SERPL-MCNC: 2.2 MG/DL — LOW (ref 2.5–4.5)
PLATELET # BLD AUTO: 188 K/UL — SIGNIFICANT CHANGE UP (ref 150–400)
POTASSIUM SERPL-MCNC: 3.3 MMOL/L — LOW (ref 3.5–5.3)
POTASSIUM SERPL-SCNC: 3.3 MMOL/L — LOW (ref 3.5–5.3)
RBC # BLD: 4.13 M/UL — LOW (ref 4.2–5.8)
RBC # FLD: 15.4 % — HIGH (ref 10.3–14.5)
SODIUM SERPL-SCNC: 141 MMOL/L — SIGNIFICANT CHANGE UP (ref 135–145)
WBC # BLD: 6.06 K/UL — SIGNIFICANT CHANGE UP (ref 3.8–10.5)
WBC # FLD AUTO: 6.06 K/UL — SIGNIFICANT CHANGE UP (ref 3.8–10.5)

## 2021-09-08 PROCEDURE — 99232 SBSQ HOSP IP/OBS MODERATE 35: CPT

## 2021-09-08 RX ORDER — POTASSIUM PHOSPHATE, MONOBASIC POTASSIUM PHOSPHATE, DIBASIC 236; 224 MG/ML; MG/ML
15 INJECTION, SOLUTION INTRAVENOUS ONCE
Refills: 0 | Status: COMPLETED | OUTPATIENT
Start: 2021-09-08 | End: 2021-09-08

## 2021-09-08 RX ORDER — POTASSIUM CHLORIDE 20 MEQ
10 PACKET (EA) ORAL
Refills: 0 | Status: COMPLETED | OUTPATIENT
Start: 2021-09-08 | End: 2021-09-08

## 2021-09-08 RX ORDER — SODIUM CHLORIDE 9 MG/ML
1000 INJECTION, SOLUTION INTRAVENOUS ONCE
Refills: 0 | Status: COMPLETED | OUTPATIENT
Start: 2021-09-08 | End: 2021-09-08

## 2021-09-08 RX ORDER — DEXTROSE MONOHYDRATE, SODIUM CHLORIDE, AND POTASSIUM CHLORIDE 50; .745; 4.5 G/1000ML; G/1000ML; G/1000ML
1000 INJECTION, SOLUTION INTRAVENOUS
Refills: 0 | Status: DISCONTINUED | OUTPATIENT
Start: 2021-09-08 | End: 2021-09-10

## 2021-09-08 RX ADMIN — Medication 120 MILLIGRAM(S): at 12:20

## 2021-09-08 RX ADMIN — Medication 120 MILLIGRAM(S): at 18:57

## 2021-09-08 RX ADMIN — PANTOPRAZOLE SODIUM 40 MILLIGRAM(S): 20 TABLET, DELAYED RELEASE ORAL at 12:21

## 2021-09-08 RX ADMIN — BENZOCAINE AND MENTHOL 1 LOZENGE: 5; 1 LIQUID ORAL at 05:06

## 2021-09-08 RX ADMIN — ENOXAPARIN SODIUM 40 MILLIGRAM(S): 100 INJECTION SUBCUTANEOUS at 21:27

## 2021-09-08 RX ADMIN — DEXTROSE MONOHYDRATE, SODIUM CHLORIDE, AND POTASSIUM CHLORIDE 125 MILLILITER(S): 50; .745; 4.5 INJECTION, SOLUTION INTRAVENOUS at 17:09

## 2021-09-08 RX ADMIN — Medication 100 MILLIEQUIVALENT(S): at 17:06

## 2021-09-08 RX ADMIN — Medication 100 MILLIEQUIVALENT(S): at 14:25

## 2021-09-08 RX ADMIN — SODIUM CHLORIDE 1000 MILLILITER(S): 9 INJECTION, SOLUTION INTRAVENOUS at 14:25

## 2021-09-08 RX ADMIN — Medication 120 MILLIGRAM(S): at 05:06

## 2021-09-08 RX ADMIN — Medication 100 MILLIEQUIVALENT(S): at 12:21

## 2021-09-08 RX ADMIN — POTASSIUM PHOSPHATE, MONOBASIC POTASSIUM PHOSPHATE, DIBASIC 62.5 MILLIMOLE(S): 236; 224 INJECTION, SOLUTION INTRAVENOUS at 12:20

## 2021-09-08 RX ADMIN — SODIUM CHLORIDE 125 MILLILITER(S): 9 INJECTION, SOLUTION INTRAVENOUS at 12:20

## 2021-09-08 NOTE — PROGRESS NOTE ADULT - ASSESSMENT
45 year old gentleman with extensive surgical history and multiple prior SBO's presenting with SBO. NGT placed with 2L bilious output.     PLAN:  - No Gastrografin at this time secondary to allergy  - Cepacol lozenges to promote GI function  - NPO, IVF  - Continue resuscitation ; monitor UOP  - Monitor bowel function, serial abdominal exams  - No standing pain medications  - DVT ppx/OOB/IS    A Team Surgery   [id number]

## 2021-09-08 NOTE — PROGRESS NOTE ADULT - SUBJECTIVE AND OBJECTIVE BOX
A TEAM SURGERY DAILY PROGRESS NOTE    S: Patient seen and examined at bedside. Yesterday NGT clamp trial w/ low output; subsequently removed. Passing multiple loose bowel movements. Low UOP s/p 2L LR bolus. Denies flatus or nausea.     O:   Vital Signs Last 24 Hrs  T(C): 36.9 (09-08-21 @ 05:00), Max: 36.9 (09-07-21 @ 14:15)  HR: 86 (09-08-21 @ 05:00) (86 - 117)  BP: 133/76 (09-08-21 @ 05:00) (122/73 - 138/78)  RR: 17 (09-08-21 @ 05:00) (16 - 18)  SpO2: 96% (09-08-21 @ 05:00) (95% - 97%)      09-07 @ 07:01  -  09-08 @ 07:00  --------------------------------------------------------  IN:    Lactated Ringers: 1125 mL    Oral Fluid: 120 mL  Total IN: 1245 mL    OUT:    Nasogastric/Oral tube (mL): 700 mL    Voided (mL): 1000 mL  Total OUT: 1700 mL    Total NET: -455 mL      EXAM  General: alert and oriented, NAD  Resp: airway patent, respirations unlabored  CVS: regular rate and rhythm  Abdomen: soft, minimally tender upper quadrants, nondistended  Extremities: no edema  Skin: warm, dry, appropriate color      LABS         CBC (09-07 @ 06:30)                              14.5                           4.60    )----------------(  243        54.4  % Neutrophils, 28.5  % Lymphocytes, ANC: 2.50                                45.0                  BMP (09-07 @ 06:35)             --      |  --      |  --    		Ca++ --      Ca --                 ---------------------------------( --    		Mg 2.00               --      |  --      |  --    			Ph 4.4     BMP (09-07 @ 06:30)             145     |  103     |  31<H> 		Ca++ --      Ca 9.9                ---------------------------------( 181<H>		Mg --                 3.4<L>  |  22      |  0.98  			Ph --            ABG (09-07 @ 06:30)      /  /  /  /  / %     Lactate:  2.0   ABG (09-06 @ 22:58)      /  /  /  /  / %     Lactate:  1.9       -> .Blood Blood-Peripheral Culture (09-06 @ 22:49)     NG    NG    No growth to date.      MEDICATIONS  MEDICATIONS  (STANDING):  benzocaine 15 mG/menthol 3.6 mG (Sugar-Free) Lozenge 1 Lozenge Oral four times a day  dextrose 50% Injectable 25 Gram(s) IV Push once  dextrose 50% Injectable 12.5 Gram(s) IV Push once  dextrose 50% Injectable 25 Gram(s) IV Push once  enoxaparin Injectable 40 milliGRAM(s) SubCutaneous every 24 hours  influenza   Vaccine 0.5 milliLiter(s) IntraMuscular once  insulin lispro (ADMELOG) corrective regimen sliding scale   SubCutaneous every 6 hours  lactated ringers. 1000 milliLiter(s) (125 mL/Hr) IV Continuous <Continuous>  metoprolol tartrate Injectable 5 milliGRAM(s) IV Push every 6 hours  pantoprazole  Injectable 40 milliGRAM(s) IV Push daily  potassium chloride  10 mEq/100 mL IVPB 10 milliEquivalent(s) IV Intermittent every 1 hour    MEDICATIONS  (PRN):

## 2021-09-09 LAB
ANION GAP SERPL CALC-SCNC: 13 MMOL/L — SIGNIFICANT CHANGE UP (ref 7–14)
BUN SERPL-MCNC: 15 MG/DL — SIGNIFICANT CHANGE UP (ref 7–23)
CALCIUM SERPL-MCNC: 8.6 MG/DL — SIGNIFICANT CHANGE UP (ref 8.4–10.5)
CHLORIDE SERPL-SCNC: 103 MMOL/L — SIGNIFICANT CHANGE UP (ref 98–107)
CO2 SERPL-SCNC: 23 MMOL/L — SIGNIFICANT CHANGE UP (ref 22–31)
CREAT SERPL-MCNC: 0.63 MG/DL — SIGNIFICANT CHANGE UP (ref 0.5–1.3)
GLUCOSE BLDC GLUCOMTR-MCNC: 129 MG/DL — HIGH (ref 70–99)
GLUCOSE BLDC GLUCOMTR-MCNC: 132 MG/DL — HIGH (ref 70–99)
GLUCOSE BLDC GLUCOMTR-MCNC: 139 MG/DL — HIGH (ref 70–99)
GLUCOSE BLDC GLUCOMTR-MCNC: 153 MG/DL — HIGH (ref 70–99)
GLUCOSE SERPL-MCNC: 138 MG/DL — HIGH (ref 70–99)
HCT VFR BLD CALC: 36 % — LOW (ref 39–50)
HGB BLD-MCNC: 11.3 G/DL — LOW (ref 13–17)
MAGNESIUM SERPL-MCNC: 1.9 MG/DL — SIGNIFICANT CHANGE UP (ref 1.6–2.6)
MCHC RBC-ENTMCNC: 27.8 PG — SIGNIFICANT CHANGE UP (ref 27–34)
MCHC RBC-ENTMCNC: 31.4 GM/DL — LOW (ref 32–36)
MCV RBC AUTO: 88.5 FL — SIGNIFICANT CHANGE UP (ref 80–100)
NRBC # BLD: 0 /100 WBCS — SIGNIFICANT CHANGE UP
NRBC # FLD: 0 K/UL — SIGNIFICANT CHANGE UP
PHOSPHATE SERPL-MCNC: 2.2 MG/DL — LOW (ref 2.5–4.5)
PLATELET # BLD AUTO: 170 K/UL — SIGNIFICANT CHANGE UP (ref 150–400)
POTASSIUM SERPL-MCNC: 3.3 MMOL/L — LOW (ref 3.5–5.3)
POTASSIUM SERPL-SCNC: 3.3 MMOL/L — LOW (ref 3.5–5.3)
RBC # BLD: 4.07 M/UL — LOW (ref 4.2–5.8)
RBC # FLD: 14.7 % — HIGH (ref 10.3–14.5)
SODIUM SERPL-SCNC: 139 MMOL/L — SIGNIFICANT CHANGE UP (ref 135–145)
WBC # BLD: 5.5 K/UL — SIGNIFICANT CHANGE UP (ref 3.8–10.5)
WBC # FLD AUTO: 5.5 K/UL — SIGNIFICANT CHANGE UP (ref 3.8–10.5)

## 2021-09-09 PROCEDURE — 99232 SBSQ HOSP IP/OBS MODERATE 35: CPT

## 2021-09-09 PROCEDURE — 74018 RADEX ABDOMEN 1 VIEW: CPT | Mod: 26

## 2021-09-09 RX ORDER — INSULIN LISPRO 100/ML
VIAL (ML) SUBCUTANEOUS
Refills: 0 | Status: DISCONTINUED | OUTPATIENT
Start: 2021-09-09 | End: 2021-09-10

## 2021-09-09 RX ORDER — POTASSIUM CHLORIDE 20 MEQ
10 PACKET (EA) ORAL
Refills: 0 | Status: DISCONTINUED | OUTPATIENT
Start: 2021-09-09 | End: 2021-09-09

## 2021-09-09 RX ORDER — SODIUM,POTASSIUM PHOSPHATES 278-250MG
1 POWDER IN PACKET (EA) ORAL ONCE
Refills: 0 | Status: COMPLETED | OUTPATIENT
Start: 2021-09-09 | End: 2021-09-09

## 2021-09-09 RX ORDER — PANTOPRAZOLE SODIUM 20 MG/1
40 TABLET, DELAYED RELEASE ORAL
Refills: 0 | Status: DISCONTINUED | OUTPATIENT
Start: 2021-09-09 | End: 2021-09-10

## 2021-09-09 RX ORDER — ATORVASTATIN CALCIUM 80 MG/1
40 TABLET, FILM COATED ORAL AT BEDTIME
Refills: 0 | Status: DISCONTINUED | OUTPATIENT
Start: 2021-09-09 | End: 2021-09-10

## 2021-09-09 RX ORDER — POTASSIUM CHLORIDE 20 MEQ
40 PACKET (EA) ORAL EVERY 4 HOURS
Refills: 0 | Status: COMPLETED | OUTPATIENT
Start: 2021-09-09 | End: 2021-09-09

## 2021-09-09 RX ORDER — METOPROLOL TARTRATE 50 MG
100 TABLET ORAL DAILY
Refills: 0 | Status: DISCONTINUED | OUTPATIENT
Start: 2021-09-09 | End: 2021-09-10

## 2021-09-09 RX ORDER — POTASSIUM PHOSPHATE, MONOBASIC POTASSIUM PHOSPHATE, DIBASIC 236; 224 MG/ML; MG/ML
15 INJECTION, SOLUTION INTRAVENOUS ONCE
Refills: 0 | Status: DISCONTINUED | OUTPATIENT
Start: 2021-09-09 | End: 2021-09-09

## 2021-09-09 RX ADMIN — PANTOPRAZOLE SODIUM 40 MILLIGRAM(S): 20 TABLET, DELAYED RELEASE ORAL at 11:06

## 2021-09-09 RX ADMIN — Medication 40 MILLIEQUIVALENT(S): at 13:00

## 2021-09-09 RX ADMIN — Medication 120 MILLIGRAM(S): at 17:19

## 2021-09-09 RX ADMIN — ATORVASTATIN CALCIUM 40 MILLIGRAM(S): 80 TABLET, FILM COATED ORAL at 21:33

## 2021-09-09 RX ADMIN — Medication 120 MILLIGRAM(S): at 00:06

## 2021-09-09 RX ADMIN — DEXTROSE MONOHYDRATE, SODIUM CHLORIDE, AND POTASSIUM CHLORIDE 75 MILLILITER(S): 50; .745; 4.5 INJECTION, SOLUTION INTRAVENOUS at 18:07

## 2021-09-09 RX ADMIN — DEXTROSE MONOHYDRATE, SODIUM CHLORIDE, AND POTASSIUM CHLORIDE 75 MILLILITER(S): 50; .745; 4.5 INJECTION, SOLUTION INTRAVENOUS at 23:27

## 2021-09-09 RX ADMIN — DEXTROSE MONOHYDRATE, SODIUM CHLORIDE, AND POTASSIUM CHLORIDE 125 MILLILITER(S): 50; .745; 4.5 INJECTION, SOLUTION INTRAVENOUS at 13:01

## 2021-09-09 RX ADMIN — Medication 120 MILLIGRAM(S): at 06:00

## 2021-09-09 RX ADMIN — Medication 120 MILLIGRAM(S): at 11:06

## 2021-09-09 RX ADMIN — ENOXAPARIN SODIUM 40 MILLIGRAM(S): 100 INJECTION SUBCUTANEOUS at 21:33

## 2021-09-09 RX ADMIN — Medication 40 MILLIEQUIVALENT(S): at 17:20

## 2021-09-09 RX ADMIN — Medication 1: at 23:49

## 2021-09-09 RX ADMIN — Medication 1 TABLET(S): at 13:00

## 2021-09-09 NOTE — PROGRESS NOTE ADULT - ASSESSMENT
45 year old gentleman with extensive surgical history and multiple prior SBO's presenting with SBO. NGT placed with 2L bilious output.     PLAN:  - No Gastrografin at this time secondary to allergy  - Adv to CLD; c/w IVF for now  - Monitor UOP  - Monitor bowel function, serial abdominal exams  - No standing pain medications  - DVT ppx/OOB/IS    A Team Surgery   [id number]

## 2021-09-09 NOTE — PROGRESS NOTE ADULT - SUBJECTIVE AND OBJECTIVE BOX
Having bms , feels better, Minimal distention. Discussed possible surgery in the future regarding multiple recurrent  bowel obstructions .Dheld.

## 2021-09-09 NOTE — PROGRESS NOTE ADULT - SUBJECTIVE AND OBJECTIVE BOX
A TEAM SURGERY DAILY PROGRESS NOTE    S: Patient seen and examined at bedside. Yesterday episodes of bowel movements but no flatus. As of this morning passing flatus. Minimal tenderness. OOB. Received 1000ml  LR bolus for low UOP, improved thereafter. Denies nausea.     O:   Vital Signs Last 24 Hrs  T(C): 36.6 (09-09-21 @ 05:11), Max: 36.7 (09-08-21 @ 09:45)  HR: 76 (09-09-21 @ 05:11) (58 - 90)  BP: 133/71 (09-09-21 @ 05:11) (120/63 - 135/79)  RR: 18 (09-09-21 @ 05:11) (16 - 18)  SpO2: 99% (09-09-21 @ 05:11) (97% - 99%)      09-08 @ 07:01  -  09-09 @ 07:00  --------------------------------------------------------  IN:    dextrose 5% + sodium chloride 0.45% w/ Additives: 1500 mL    IV PiggyBack: 750 mL    Lactated Ringers: 1000 mL    Lactated Ringers Bolus: 999 mL  Total IN: 4249 mL    OUT:    Oral Fluid: 0 mL    Voided (mL): 1650 mL  Total OUT: 1650 mL    Total NET: 2599 mL      EXAM  General: alert and oriented, NAD  Resp: airway patent, respirations unlabored  CVS: regular rate and rhythm  Abdomen: soft, minimally tender upper quadrants, nondistended  Extremities: no edema  Skin: warm, dry, appropriate color      LABS         CBC (09-09 @ 07:09)                              11.3<L>                         5.50    )----------------(  170        --    % Neutrophils, --    % Lymphocytes, ANC: --                                  36.0<L>              CBC (09-08 @ 07:28)                              11.6<L>                         6.06    )----------------(  188        --    % Neutrophils, --    % Lymphocytes, ANC: --                                  36.7<L>                BMP (09-09 @ 07:09)             139     |  103     |  15    		Ca++ --      Ca 8.6                ---------------------------------( 138<H>		Mg 1.90               3.3<L>  |  23      |  0.63  			Ph 2.2<L>  BMP (09-08 @ 07:28)             141     |  103     |  25<H> 		Ca++ --      Ca 8.7                ---------------------------------( 143<H>		Mg 1.90               3.3<L>  |  23      |  0.82  			Ph 2.2<L>            -> .Blood Blood-Peripheral Culture (09-06 @ 22:49)     NG    NG    No growth to date.        MEDICATIONS  MEDICATIONS  (STANDING):  benzocaine 15 mG/menthol 3.6 mG (Sugar-Free) Lozenge 1 Lozenge Oral four times a day  dextrose 50% Injectable 25 Gram(s) IV Push once  dextrose 50% Injectable 12.5 Gram(s) IV Push once  dextrose 50% Injectable 25 Gram(s) IV Push once  enoxaparin Injectable 40 milliGRAM(s) SubCutaneous every 24 hours  influenza   Vaccine 0.5 milliLiter(s) IntraMuscular once  insulin lispro (ADMELOG) corrective regimen sliding scale   SubCutaneous every 6 hours  lactated ringers. 1000 milliLiter(s) (125 mL/Hr) IV Continuous <Continuous>  metoprolol tartrate Injectable 5 milliGRAM(s) IV Push every 6 hours  pantoprazole  Injectable 40 milliGRAM(s) IV Push daily  potassium chloride  10 mEq/100 mL IVPB 10 milliEquivalent(s) IV Intermittent every 1 hour    MEDICATIONS  (PRN):

## 2021-09-10 ENCOUNTER — TRANSCRIPTION ENCOUNTER (OUTPATIENT)
Age: 46
End: 2021-09-10

## 2021-09-10 VITALS
TEMPERATURE: 98 F | OXYGEN SATURATION: 99 % | HEART RATE: 65 BPM | DIASTOLIC BLOOD PRESSURE: 68 MMHG | SYSTOLIC BLOOD PRESSURE: 128 MMHG | RESPIRATION RATE: 18 BRPM

## 2021-09-10 LAB
ANION GAP SERPL CALC-SCNC: 19 MMOL/L — HIGH (ref 7–14)
BUN SERPL-MCNC: 11 MG/DL — SIGNIFICANT CHANGE UP (ref 7–23)
CALCIUM SERPL-MCNC: 8.8 MG/DL — SIGNIFICANT CHANGE UP (ref 8.4–10.5)
CHLORIDE SERPL-SCNC: 103 MMOL/L — SIGNIFICANT CHANGE UP (ref 98–107)
CO2 SERPL-SCNC: 21 MMOL/L — LOW (ref 22–31)
CREAT SERPL-MCNC: 0.62 MG/DL — SIGNIFICANT CHANGE UP (ref 0.5–1.3)
GLUCOSE BLDC GLUCOMTR-MCNC: 150 MG/DL — HIGH (ref 70–99)
GLUCOSE BLDC GLUCOMTR-MCNC: 164 MG/DL — HIGH (ref 70–99)
GLUCOSE SERPL-MCNC: 160 MG/DL — HIGH (ref 70–99)
HCT VFR BLD CALC: 34.8 % — LOW (ref 39–50)
HGB BLD-MCNC: 11.3 G/DL — LOW (ref 13–17)
MAGNESIUM SERPL-MCNC: 2 MG/DL — SIGNIFICANT CHANGE UP (ref 1.6–2.6)
MCHC RBC-ENTMCNC: 28 PG — SIGNIFICANT CHANGE UP (ref 27–34)
MCHC RBC-ENTMCNC: 32.5 GM/DL — SIGNIFICANT CHANGE UP (ref 32–36)
MCV RBC AUTO: 86.1 FL — SIGNIFICANT CHANGE UP (ref 80–100)
NRBC # BLD: 0 /100 WBCS — SIGNIFICANT CHANGE UP
NRBC # FLD: 0 K/UL — SIGNIFICANT CHANGE UP
PHOSPHATE SERPL-MCNC: 3 MG/DL — SIGNIFICANT CHANGE UP (ref 2.5–4.5)
PLATELET # BLD AUTO: 151 K/UL — SIGNIFICANT CHANGE UP (ref 150–400)
POTASSIUM SERPL-MCNC: 4 MMOL/L — SIGNIFICANT CHANGE UP (ref 3.5–5.3)
POTASSIUM SERPL-SCNC: 4 MMOL/L — SIGNIFICANT CHANGE UP (ref 3.5–5.3)
RBC # BLD: 4.04 M/UL — LOW (ref 4.2–5.8)
RBC # FLD: 14.6 % — HIGH (ref 10.3–14.5)
SODIUM SERPL-SCNC: 143 MMOL/L — SIGNIFICANT CHANGE UP (ref 135–145)
WBC # BLD: 6.11 K/UL — SIGNIFICANT CHANGE UP (ref 3.8–10.5)
WBC # FLD AUTO: 6.11 K/UL — SIGNIFICANT CHANGE UP (ref 3.8–10.5)

## 2021-09-10 PROCEDURE — 99238 HOSP IP/OBS DSCHRG MGMT 30/<: CPT

## 2021-09-10 RX ADMIN — Medication 100 MILLIGRAM(S): at 05:52

## 2021-09-10 RX ADMIN — PANTOPRAZOLE SODIUM 40 MILLIGRAM(S): 20 TABLET, DELAYED RELEASE ORAL at 06:03

## 2021-09-10 RX ADMIN — Medication 1: at 12:42

## 2021-09-10 NOTE — DISCHARGE NOTE NURSING/CASE MANAGEMENT/SOCIAL WORK - NSDCPEFALRISK_GEN_ALL_CORE
For information on Fall & injury Prevention, visit [email or web address]

## 2021-09-10 NOTE — PROGRESS NOTE ADULT - SUBJECTIVE AND OBJECTIVE BOX
[facility]  Medical Student, MS3      Patient is a 45y old  Male who presents with a chief complaint of nausea, abdominal pain x 1 day (09 Sep 2021 09:26)      SUBJECTIVE / OVERNIGHT EVENTS: Patient had no acute events overnight. Patient seen and examined at bedside this morning. Patient had 1 BM yesterday, that he described as "normal." He was also passing flatus intermittently throughout the day. He reports tolerating his diet well.      ROS: [ - ] Fever [ - ] Chills [ - ] Nausea/Vomiting    MEDICATIONS  (STANDING):  atorvastatin 40 milliGRAM(s) Oral at bedtime  dextrose 50% Injectable 25 Gram(s) IV Push once  dextrose 50% Injectable 12.5 Gram(s) IV Push once  dextrose 50% Injectable 25 Gram(s) IV Push once  enoxaparin Injectable 40 milliGRAM(s) SubCutaneous every 24 hours  influenza   Vaccine 0.5 milliLiter(s) IntraMuscular once  insulin lispro (ADMELOG) corrective regimen sliding scale   SubCutaneous Before meals and at bedtime  metoprolol succinate  milliGRAM(s) Oral daily  pantoprazole    Tablet 40 milliGRAM(s) Oral before breakfast    MEDICATIONS  (PRN):      Vital Signs Last 24 Hrs  T(C): 36.7 (10 Sep 2021 05:45), Max: 36.7 (09 Sep 2021 22:08)  T(F): 98.1 (10 Sep 2021 05:45), Max: 98.1 (10 Sep 2021 05:45)  HR: 66 (10 Sep 2021 05:45) (63 - 77)  BP: 134/82 (10 Sep 2021 05:45) (117/72 - 138/70)  BP(mean): --  RR: 18 (10 Sep 2021 05:45) (16 - 18)  SpO2: 97% (10 Sep 2021 05:45) (97% - 100%)  CAPILLARY BLOOD GLUCOSE      POCT Blood Glucose.: 150 mg/dL (10 Sep 2021 07:38)  POCT Blood Glucose.: 153 mg/dL (09 Sep 2021 23:43)  POCT Blood Glucose.: 139 mg/dL (09 Sep 2021 18:17)  POCT Blood Glucose.: 132 mg/dL (09 Sep 2021 12:19)    I&O's Summary    09 Sep 2021 07:01  -  10 Sep 2021 07:00  --------------------------------------------------------  IN: 1900 mL / OUT: 2100 mL / NET: -200 mL        PHYSICAL EXAM  GENERAL: NAD, lying comfortably in bed   HEENT:  Atraumatic, Normocephalic  ABDOMEN: Soft, Nondistended; Bowel sounds present; +RLQ mild ttp  NEURO: AAOx3, non-focal  SKIN: No rashes or lesions    LABS:                        11.3   5.50  )-----------( 170      ( 09 Sep 2021 07:09 )             36.0     09-09    139  |  103  |  15  ----------------------------<  138<H>  3.3<L>   |  23  |  0.63    Ca    8.6      09 Sep 2021 07:09  Phos  2.2     09-09  Mg     1.90     09-09                  RADIOLOGY & ADDITIONAL TESTS:    Imaging Personally Reviewed:  Consultant(s) Notes Reviewed:     Patient is a 45y old  Male who presents with a chief complaint of nausea, abdominal pain x 1 day (09 Sep 2021 09:26)      SUBJECTIVE / OVERNIGHT EVENTS: Patient had no acute events overnight. Patient seen and examined at bedside this morning. Patient had 1 BM yesterday, that he described as "normal." He was also passing flatus intermittently throughout the day. He reports tolerating his diet well.      ROS: [ - ] Fever [ - ] Chills [ - ] Nausea/Vomiting    MEDICATIONS  (STANDING):  atorvastatin 40 milliGRAM(s) Oral at bedtime  dextrose 50% Injectable 25 Gram(s) IV Push once  dextrose 50% Injectable 12.5 Gram(s) IV Push once  dextrose 50% Injectable 25 Gram(s) IV Push once  enoxaparin Injectable 40 milliGRAM(s) SubCutaneous every 24 hours  influenza   Vaccine 0.5 milliLiter(s) IntraMuscular once  insulin lispro (ADMELOG) corrective regimen sliding scale   SubCutaneous Before meals and at bedtime  metoprolol succinate  milliGRAM(s) Oral daily  pantoprazole    Tablet 40 milliGRAM(s) Oral before breakfast    MEDICATIONS  (PRN):      Vital Signs Last 24 Hrs  T(C): 36.7 (10 Sep 2021 05:45), Max: 36.7 (09 Sep 2021 22:08)  T(F): 98.1 (10 Sep 2021 05:45), Max: 98.1 (10 Sep 2021 05:45)  HR: 66 (10 Sep 2021 05:45) (63 - 77)  BP: 134/82 (10 Sep 2021 05:45) (117/72 - 138/70)  BP(mean): --  RR: 18 (10 Sep 2021 05:45) (16 - 18)  SpO2: 97% (10 Sep 2021 05:45) (97% - 100%)  CAPILLARY BLOOD GLUCOSE      POCT Blood Glucose.: 150 mg/dL (10 Sep 2021 07:38)  POCT Blood Glucose.: 153 mg/dL (09 Sep 2021 23:43)  POCT Blood Glucose.: 139 mg/dL (09 Sep 2021 18:17)  POCT Blood Glucose.: 132 mg/dL (09 Sep 2021 12:19)    I&O's Summary    09 Sep 2021 07:01  -  10 Sep 2021 07:00  --------------------------------------------------------  IN: 1900 mL / OUT: 2100 mL / NET: -200 mL        PHYSICAL EXAM  GENERAL: NAD, lying comfortably in bed   HEENT:  Atraumatic, Normocephalic  ABDOMEN: Soft, Nondistended; Bowel sounds present; +RLQ mild ttp  NEURO: AAOx3, non-focal  SKIN: No rashes or lesions    LABS:                        11.3   5.50  )-----------( 170      ( 09 Sep 2021 07:09 )             36.0     09-09    139  |  103  |  15  ----------------------------<  138<H>  3.3<L>   |  23  |  0.63    Ca    8.6      09 Sep 2021 07:09  Phos  2.2     09-09  Mg     1.90     09-09                  RADIOLOGY & ADDITIONAL TESTS:    Imaging Personally Reviewed:  Consultant(s) Notes Reviewed:

## 2021-09-10 NOTE — PROGRESS NOTE ADULT - REASON FOR ADMISSION
nausea, abdominal pain x 1 day

## 2021-09-10 NOTE — DISCHARGE NOTE NURSING/CASE MANAGEMENT/SOCIAL WORK - PATIENT PORTAL LINK FT
You can access the FollowMyHealth Patient Portal offered by [facility] by registering at the following website: [email or web address]. By joining [facility]’s FollowMyHealth portal, you will also be able to view your health information using other applications (apps) compatible with our system.

## 2021-09-10 NOTE — PROGRESS NOTE ADULT - ASSESSMENT
45 year old gentleman with extensive surgical history and multiple prior SBO's presenting with SBO. NGT placed with 2L bilious output.     PLAN:  - No Gastrografin at this time secondary to allergy  - Cepacol lozenges to promote GI function  - Diet: regular  - Monitor UOP  - Monitor bowel function, serial abdominal exams  - No standing pain medications  - DVT ppx/OOB/IS  - Plan to discharge this AM    A Team Surgery   [id number] 45 year old gentleman with extensive surgical history and multiple prior SBO's presenting with SBO. NGT placed with 2L bilious output.     PLAN:  - No Gastrografin at this time secondary to allergy  - Cepacol lozenges to promote GI function  - Diet: regular  - Monitor UOP  - Monitor bowel function  - No standing pain medications  - DVT ppx/OOB/IS  - Discharge home    A Team Surgery   [id number]

## 2021-12-03 ENCOUNTER — INPATIENT (INPATIENT)
Facility: HOSPITAL | Age: 46
LOS: 4 days | Discharge: HOME | End: 2021-12-08
Attending: SURGERY | Admitting: SURGERY
Payer: COMMERCIAL

## 2021-12-03 VITALS
DIASTOLIC BLOOD PRESSURE: 78 MMHG | HEIGHT: 72 IN | HEART RATE: 122 BPM | SYSTOLIC BLOOD PRESSURE: 133 MMHG | TEMPERATURE: 96 F | RESPIRATION RATE: 16 BRPM | OXYGEN SATURATION: 100 %

## 2021-12-03 DIAGNOSIS — Z98.89 OTHER SPECIFIED POSTPROCEDURAL STATES: Chronic | ICD-10-CM

## 2021-12-03 DIAGNOSIS — Z98.890 OTHER SPECIFIED POSTPROCEDURAL STATES: Chronic | ICD-10-CM

## 2021-12-03 LAB
ALBUMIN SERPL ELPH-MCNC: 4.9 G/DL — SIGNIFICANT CHANGE UP (ref 3.3–5)
ALP SERPL-CCNC: 37 U/L — LOW (ref 40–120)
ALT FLD-CCNC: 33 U/L — SIGNIFICANT CHANGE UP (ref 4–41)
ANION GAP SERPL CALC-SCNC: 23 MMOL/L — HIGH (ref 7–14)
APPEARANCE UR: CLEAR — SIGNIFICANT CHANGE UP
APTT BLD: 27.3 SEC — SIGNIFICANT CHANGE UP (ref 27–36.3)
AST SERPL-CCNC: 25 U/L — SIGNIFICANT CHANGE UP (ref 4–40)
B PERT DNA SPEC QL NAA+PROBE: SIGNIFICANT CHANGE UP
B PERT+PARAPERT DNA PNL SPEC NAA+PROBE: SIGNIFICANT CHANGE UP
BASOPHILS # BLD AUTO: 0 K/UL — SIGNIFICANT CHANGE UP (ref 0–0.2)
BASOPHILS NFR BLD AUTO: 0 % — SIGNIFICANT CHANGE UP (ref 0–2)
BILIRUB SERPL-MCNC: 0.9 MG/DL — SIGNIFICANT CHANGE UP (ref 0.2–1.2)
BILIRUB UR-MCNC: NEGATIVE — SIGNIFICANT CHANGE UP
BLD GP AB SCN SERPL QL: NEGATIVE — SIGNIFICANT CHANGE UP
BLOOD GAS VENOUS COMPREHENSIVE RESULT: SIGNIFICANT CHANGE UP
BORDETELLA PARAPERTUSSIS (RAPRVP): SIGNIFICANT CHANGE UP
BUN SERPL-MCNC: 29 MG/DL — HIGH (ref 7–23)
C PNEUM DNA SPEC QL NAA+PROBE: SIGNIFICANT CHANGE UP
CALCIUM SERPL-MCNC: 10.5 MG/DL — SIGNIFICANT CHANGE UP (ref 8.4–10.5)
CHLORIDE SERPL-SCNC: 101 MMOL/L — SIGNIFICANT CHANGE UP (ref 98–107)
CO2 SERPL-SCNC: 17 MMOL/L — LOW (ref 22–31)
COLOR SPEC: YELLOW — SIGNIFICANT CHANGE UP
CREAT SERPL-MCNC: 1.13 MG/DL — SIGNIFICANT CHANGE UP (ref 0.5–1.3)
DIFF PNL FLD: ABNORMAL
EOSINOPHIL # BLD AUTO: 0 K/UL — SIGNIFICANT CHANGE UP (ref 0–0.5)
EOSINOPHIL NFR BLD AUTO: 0 % — SIGNIFICANT CHANGE UP (ref 0–6)
FLUAV SUBTYP SPEC NAA+PROBE: SIGNIFICANT CHANGE UP
FLUBV RNA SPEC QL NAA+PROBE: SIGNIFICANT CHANGE UP
GLUCOSE SERPL-MCNC: 261 MG/DL — HIGH (ref 70–99)
GLUCOSE UR QL: ABNORMAL
HADV DNA SPEC QL NAA+PROBE: SIGNIFICANT CHANGE UP
HCOV 229E RNA SPEC QL NAA+PROBE: SIGNIFICANT CHANGE UP
HCOV HKU1 RNA SPEC QL NAA+PROBE: SIGNIFICANT CHANGE UP
HCOV NL63 RNA SPEC QL NAA+PROBE: SIGNIFICANT CHANGE UP
HCOV OC43 RNA SPEC QL NAA+PROBE: SIGNIFICANT CHANGE UP
HCT VFR BLD CALC: 50 % — SIGNIFICANT CHANGE UP (ref 39–50)
HGB BLD-MCNC: 15.2 G/DL — SIGNIFICANT CHANGE UP (ref 13–17)
HMPV RNA SPEC QL NAA+PROBE: SIGNIFICANT CHANGE UP
HPIV1 RNA SPEC QL NAA+PROBE: SIGNIFICANT CHANGE UP
HPIV2 RNA SPEC QL NAA+PROBE: SIGNIFICANT CHANGE UP
HPIV3 RNA SPEC QL NAA+PROBE: SIGNIFICANT CHANGE UP
HPIV4 RNA SPEC QL NAA+PROBE: SIGNIFICANT CHANGE UP
IANC: 4.51 K/UL — SIGNIFICANT CHANGE UP (ref 1.5–8.5)
IMM GRANULOCYTES NFR BLD AUTO: 0.4 % — SIGNIFICANT CHANGE UP (ref 0–1.5)
INR BLD: 1.13 RATIO — SIGNIFICANT CHANGE UP (ref 0.88–1.16)
KETONES UR-MCNC: ABNORMAL
LEUKOCYTE ESTERASE UR-ACNC: NEGATIVE — SIGNIFICANT CHANGE UP
LYMPHOCYTES # BLD AUTO: 0.49 K/UL — LOW (ref 1–3.3)
LYMPHOCYTES # BLD AUTO: 9.2 % — LOW (ref 13–44)
M PNEUMO DNA SPEC QL NAA+PROBE: SIGNIFICANT CHANGE UP
MCHC RBC-ENTMCNC: 27.1 PG — SIGNIFICANT CHANGE UP (ref 27–34)
MCHC RBC-ENTMCNC: 30.4 GM/DL — LOW (ref 32–36)
MCV RBC AUTO: 89.3 FL — SIGNIFICANT CHANGE UP (ref 80–100)
MONOCYTES # BLD AUTO: 0.28 K/UL — SIGNIFICANT CHANGE UP (ref 0–0.9)
MONOCYTES NFR BLD AUTO: 5.3 % — SIGNIFICANT CHANGE UP (ref 2–14)
NEUTROPHILS # BLD AUTO: 4.51 K/UL — SIGNIFICANT CHANGE UP (ref 1.8–7.4)
NEUTROPHILS NFR BLD AUTO: 85.1 % — HIGH (ref 43–77)
NITRITE UR-MCNC: NEGATIVE — SIGNIFICANT CHANGE UP
NRBC # BLD: 0 /100 WBCS — SIGNIFICANT CHANGE UP
NRBC # FLD: 0 K/UL — SIGNIFICANT CHANGE UP
PH UR: 6 — SIGNIFICANT CHANGE UP (ref 5–8)
PLATELET # BLD AUTO: 251 K/UL — SIGNIFICANT CHANGE UP (ref 150–400)
POTASSIUM SERPL-MCNC: 4.3 MMOL/L — SIGNIFICANT CHANGE UP (ref 3.5–5.3)
POTASSIUM SERPL-SCNC: 4.3 MMOL/L — SIGNIFICANT CHANGE UP (ref 3.5–5.3)
PROT SERPL-MCNC: 8.4 G/DL — HIGH (ref 6–8.3)
PROT UR-MCNC: ABNORMAL
PROTHROM AB SERPL-ACNC: 12.9 SEC — SIGNIFICANT CHANGE UP (ref 10.6–13.6)
RAPID RVP RESULT: SIGNIFICANT CHANGE UP
RBC # BLD: 5.6 M/UL — SIGNIFICANT CHANGE UP (ref 4.2–5.8)
RBC # FLD: 15.3 % — HIGH (ref 10.3–14.5)
RH IG SCN BLD-IMP: POSITIVE — SIGNIFICANT CHANGE UP
RSV RNA SPEC QL NAA+PROBE: SIGNIFICANT CHANGE UP
RV+EV RNA SPEC QL NAA+PROBE: SIGNIFICANT CHANGE UP
SARS-COV-2 RNA SPEC QL NAA+PROBE: SIGNIFICANT CHANGE UP
SODIUM SERPL-SCNC: 141 MMOL/L — SIGNIFICANT CHANGE UP (ref 135–145)
SP GR SPEC: 1.04 — SIGNIFICANT CHANGE UP (ref 1–1.05)
UROBILINOGEN FLD QL: SIGNIFICANT CHANGE UP
WBC # BLD: 5.3 K/UL — SIGNIFICANT CHANGE UP (ref 3.8–10.5)
WBC # FLD AUTO: 5.3 K/UL — SIGNIFICANT CHANGE UP (ref 3.8–10.5)

## 2021-12-03 PROCEDURE — 99285 EMERGENCY DEPT VISIT HI MDM: CPT

## 2021-12-03 PROCEDURE — 74019 RADEX ABDOMEN 2 VIEWS: CPT | Mod: 26

## 2021-12-03 RX ORDER — ONDANSETRON 8 MG/1
4 TABLET, FILM COATED ORAL ONCE
Refills: 0 | Status: COMPLETED | OUTPATIENT
Start: 2021-12-03 | End: 2021-12-03

## 2021-12-03 RX ORDER — ACETAMINOPHEN 500 MG
1000 TABLET ORAL ONCE
Refills: 0 | Status: COMPLETED | OUTPATIENT
Start: 2021-12-03 | End: 2021-12-03

## 2021-12-03 RX ORDER — METOCLOPRAMIDE HCL 10 MG
10 TABLET ORAL ONCE
Refills: 0 | Status: COMPLETED | OUTPATIENT
Start: 2021-12-03 | End: 2021-12-03

## 2021-12-03 RX ADMIN — Medication 400 MILLIGRAM(S): at 22:30

## 2021-12-03 RX ADMIN — ONDANSETRON 4 MILLIGRAM(S): 8 TABLET, FILM COATED ORAL at 23:01

## 2021-12-03 NOTE — ED PROVIDER NOTE - OBJECTIVE STATEMENT
46 Y M H/O Multiple abdominal surgeries, 46 Y M H/O Multiple abdominal surgeries, M type II, HTN, GERD, HLD, and perforated diverticulitis s/p Isi's (2009), s/p reversal, multiple prior SBO's s/p ex-lap, LINDA in 2014, 2016, s/p incisional hernia repair, presenting with acute onset abdominal pain, nausea, vomiting, inability to tolerate PO, Intermittent diarrhea, decreased flatus. Denies any fever, chills, chest pain.

## 2021-12-03 NOTE — ED ADULT NURSE NOTE - OBJECTIVE STATEMENT
RN facilitator  received pt in bed A and OX 3 in NAD cool clammy, c/o diffused abd pian reports had similar panis int he [past and is associated with SBO, denies vomiting, endorses nausea, abd soft non distended non tender, BS hypoactive I all 4 quadrants,  breathing is even and unlabored, lung sounds clear B/l IV initiate labs drawn and sent,

## 2021-12-03 NOTE — ED PROVIDER NOTE - ATTENDING CONTRIBUTION TO CARE
I performed a face-to-face evaluation of the patient and performed a history and physical examination. I agree with the history and physical examination.    H/o multiple SBO. P/w abd pain, distension, N. PE c/w SBO. Xrays. Pain control. IVF. Surg consult. Admit.

## 2021-12-03 NOTE — ED PROVIDER NOTE - NS ED ROS FT
GENERAL: No fever or chills  EYES: No change in vision  HEENT: No trouble swallowing or speaking  CARDIAC: No chest pain  PULMONARY: No cough or SOB  GI: + abdominal pain, + nausea or no vomiting, + diarrhea no constipation  : No changes in urination  SKIN: No rashes  NEURO: No headache, no numbness  MSK: No joint pain  Otherwise as HPI or negative.

## 2021-12-03 NOTE — ED PROVIDER NOTE - CLINICAL SUMMARY MEDICAL DECISION MAKING FREE TEXT BOX
Rober: H/o multiple SBO. P/w abd pain, distension, N. PE c/w SBO. Xrays. Pain control. IVF. Surg consult. Admit. Size Of Lesion: 2 mm Detail Level: Detailed

## 2021-12-03 NOTE — ED ADULT NURSE NOTE - CAS EDN DISCHARGE ASSESSMENT
respirations even and unlabored/Alert and oriented to person, place and time/Patient baseline mental status/Awake

## 2021-12-03 NOTE — ED PROVIDER NOTE - IV ALTEPLASE DOOR HIDDEN
----- Message from KHADIJAH Sapp sent at 5/29/2018  9:14 AM CDT -----  Let her know the thyroglobulin was elevated but this is post treatment; we need repeat labs in 6 weeks  
show

## 2021-12-03 NOTE — ED ADULT TRIAGE NOTE - CHIEF COMPLAINT QUOTE
c/o abdominal pain, distension, nausea, feeling dehydrated, clammy and palpitations. Not able to pass gas. Hx DM2. Pt had about 30 bowel obstructions with surgeries. States this feels like another obstruction.

## 2021-12-03 NOTE — ED PROVIDER NOTE - PROGRESS NOTE DETAILS
PO/IV contrast deferred due to significant allergy with reactions despite premedication, Ultrasound demonstrating 6 cm dilated LLQ bowel with to and fro motion. Discussed with surgery, will obtain CT non con.

## 2021-12-04 DIAGNOSIS — K92.2 GASTROINTESTINAL HEMORRHAGE, UNSPECIFIED: ICD-10-CM

## 2021-12-04 LAB
ANION GAP SERPL CALC-SCNC: 19 MMOL/L — HIGH (ref 7–14)
BUN SERPL-MCNC: 36 MG/DL — HIGH (ref 7–23)
CALCIUM SERPL-MCNC: 10 MG/DL — SIGNIFICANT CHANGE UP (ref 8.4–10.5)
CHLORIDE SERPL-SCNC: 102 MMOL/L — SIGNIFICANT CHANGE UP (ref 98–107)
CO2 SERPL-SCNC: 23 MMOL/L — SIGNIFICANT CHANGE UP (ref 22–31)
CREAT SERPL-MCNC: 1.24 MG/DL — SIGNIFICANT CHANGE UP (ref 0.5–1.3)
GLUCOSE BLDC GLUCOMTR-MCNC: 179 MG/DL — HIGH (ref 70–99)
GLUCOSE BLDC GLUCOMTR-MCNC: 217 MG/DL — HIGH (ref 70–99)
GLUCOSE BLDC GLUCOMTR-MCNC: 219 MG/DL — HIGH (ref 70–99)
GLUCOSE SERPL-MCNC: 208 MG/DL — HIGH (ref 70–99)
HCT VFR BLD CALC: 48.7 % — SIGNIFICANT CHANGE UP (ref 39–50)
HGB BLD-MCNC: 14.7 G/DL — SIGNIFICANT CHANGE UP (ref 13–17)
LACTATE SERPL-SCNC: 2.8 MMOL/L — HIGH (ref 0.5–2)
MAGNESIUM SERPL-MCNC: 2 MG/DL — SIGNIFICANT CHANGE UP (ref 1.6–2.6)
MCHC RBC-ENTMCNC: 27.2 PG — SIGNIFICANT CHANGE UP (ref 27–34)
MCHC RBC-ENTMCNC: 30.2 GM/DL — LOW (ref 32–36)
MCV RBC AUTO: 90 FL — SIGNIFICANT CHANGE UP (ref 80–100)
NRBC # BLD: 0 /100 WBCS — SIGNIFICANT CHANGE UP
NRBC # FLD: 0 K/UL — SIGNIFICANT CHANGE UP
PHOSPHATE SERPL-MCNC: 4.5 MG/DL — SIGNIFICANT CHANGE UP (ref 2.5–4.5)
PLATELET # BLD AUTO: 210 K/UL — SIGNIFICANT CHANGE UP (ref 150–400)
POTASSIUM SERPL-MCNC: 3.8 MMOL/L — SIGNIFICANT CHANGE UP (ref 3.5–5.3)
POTASSIUM SERPL-SCNC: 3.8 MMOL/L — SIGNIFICANT CHANGE UP (ref 3.5–5.3)
RBC # BLD: 5.41 M/UL — SIGNIFICANT CHANGE UP (ref 4.2–5.8)
RBC # FLD: 15.7 % — HIGH (ref 10.3–14.5)
SODIUM SERPL-SCNC: 144 MMOL/L — SIGNIFICANT CHANGE UP (ref 135–145)
WBC # BLD: 3.08 K/UL — LOW (ref 3.8–10.5)
WBC # FLD AUTO: 3.08 K/UL — LOW (ref 3.8–10.5)

## 2021-12-04 PROCEDURE — 99222 1ST HOSP IP/OBS MODERATE 55: CPT | Mod: GC

## 2021-12-04 PROCEDURE — 74176 CT ABD & PELVIS W/O CONTRAST: CPT | Mod: 26,MA

## 2021-12-04 PROCEDURE — 71045 X-RAY EXAM CHEST 1 VIEW: CPT | Mod: 26

## 2021-12-04 RX ORDER — DEXTROSE 50 % IN WATER 50 %
15 SYRINGE (ML) INTRAVENOUS ONCE
Refills: 0 | Status: DISCONTINUED | OUTPATIENT
Start: 2021-12-04 | End: 2021-12-08

## 2021-12-04 RX ORDER — SODIUM CHLORIDE 9 MG/ML
1000 INJECTION, SOLUTION INTRAVENOUS
Refills: 0 | Status: DISCONTINUED | OUTPATIENT
Start: 2021-12-04 | End: 2021-12-08

## 2021-12-04 RX ORDER — BENZOCAINE AND MENTHOL 5; 1 G/100ML; G/100ML
1 LIQUID ORAL
Refills: 0 | Status: DISCONTINUED | OUTPATIENT
Start: 2021-12-04 | End: 2021-12-05

## 2021-12-04 RX ORDER — METOPROLOL TARTRATE 50 MG
5 TABLET ORAL EVERY 6 HOURS
Refills: 0 | Status: DISCONTINUED | OUTPATIENT
Start: 2021-12-04 | End: 2021-12-05

## 2021-12-04 RX ORDER — HALOPERIDOL DECANOATE 100 MG/ML
2.5 INJECTION INTRAMUSCULAR ONCE
Refills: 0 | Status: DISCONTINUED | OUTPATIENT
Start: 2021-12-04 | End: 2021-12-04

## 2021-12-04 RX ORDER — HALOPERIDOL DECANOATE 100 MG/ML
2.5 INJECTION INTRAMUSCULAR ONCE
Refills: 0 | Status: COMPLETED | OUTPATIENT
Start: 2021-12-04 | End: 2021-12-04

## 2021-12-04 RX ORDER — INSULIN LISPRO 100/ML
VIAL (ML) SUBCUTANEOUS EVERY 6 HOURS
Refills: 0 | Status: DISCONTINUED | OUTPATIENT
Start: 2021-12-04 | End: 2021-12-04

## 2021-12-04 RX ORDER — DEXTROSE 50 % IN WATER 50 %
25 SYRINGE (ML) INTRAVENOUS ONCE
Refills: 0 | Status: DISCONTINUED | OUTPATIENT
Start: 2021-12-04 | End: 2021-12-08

## 2021-12-04 RX ORDER — PANTOPRAZOLE SODIUM 20 MG/1
40 TABLET, DELAYED RELEASE ORAL DAILY
Refills: 0 | Status: DISCONTINUED | OUTPATIENT
Start: 2021-12-04 | End: 2021-12-05

## 2021-12-04 RX ORDER — INSULIN LISPRO 100/ML
VIAL (ML) SUBCUTANEOUS EVERY 6 HOURS
Refills: 0 | Status: DISCONTINUED | OUTPATIENT
Start: 2021-12-04 | End: 2021-12-07

## 2021-12-04 RX ORDER — METOPROLOL TARTRATE 50 MG
5 TABLET ORAL EVERY 6 HOURS
Refills: 0 | Status: DISCONTINUED | OUTPATIENT
Start: 2021-12-04 | End: 2021-12-04

## 2021-12-04 RX ORDER — DEXTROSE 50 % IN WATER 50 %
12.5 SYRINGE (ML) INTRAVENOUS ONCE
Refills: 0 | Status: DISCONTINUED | OUTPATIENT
Start: 2021-12-04 | End: 2021-12-08

## 2021-12-04 RX ORDER — SODIUM CHLORIDE 9 MG/ML
1000 INJECTION, SOLUTION INTRAVENOUS ONCE
Refills: 0 | Status: COMPLETED | OUTPATIENT
Start: 2021-12-04 | End: 2021-12-04

## 2021-12-04 RX ORDER — LOSARTAN POTASSIUM 100 MG/1
50 TABLET, FILM COATED ORAL DAILY
Refills: 0 | Status: DISCONTINUED | OUTPATIENT
Start: 2021-12-04 | End: 2021-12-05

## 2021-12-04 RX ORDER — ACETAMINOPHEN 500 MG
1000 TABLET ORAL ONCE
Refills: 0 | Status: COMPLETED | OUTPATIENT
Start: 2021-12-04 | End: 2021-12-04

## 2021-12-04 RX ORDER — GLUCAGON INJECTION, SOLUTION 0.5 MG/.1ML
1 INJECTION, SOLUTION SUBCUTANEOUS ONCE
Refills: 0 | Status: DISCONTINUED | OUTPATIENT
Start: 2021-12-04 | End: 2021-12-08

## 2021-12-04 RX ORDER — KETOROLAC TROMETHAMINE 30 MG/ML
15 SYRINGE (ML) INJECTION ONCE
Refills: 0 | Status: DISCONTINUED | OUTPATIENT
Start: 2021-12-04 | End: 2021-12-04

## 2021-12-04 RX ORDER — ENOXAPARIN SODIUM 100 MG/ML
40 INJECTION SUBCUTANEOUS DAILY
Refills: 0 | Status: DISCONTINUED | OUTPATIENT
Start: 2021-12-04 | End: 2021-12-08

## 2021-12-04 RX ORDER — SODIUM CHLORIDE 9 MG/ML
1000 INJECTION, SOLUTION INTRAVENOUS
Refills: 0 | Status: DISCONTINUED | OUTPATIENT
Start: 2021-12-04 | End: 2021-12-05

## 2021-12-04 RX ORDER — LOSARTAN POTASSIUM 100 MG/1
50 TABLET, FILM COATED ORAL DAILY
Refills: 0 | Status: DISCONTINUED | OUTPATIENT
Start: 2021-12-04 | End: 2021-12-04

## 2021-12-04 RX ADMIN — Medication 1000 MILLIGRAM(S): at 10:38

## 2021-12-04 RX ADMIN — Medication 15 MILLIGRAM(S): at 00:47

## 2021-12-04 RX ADMIN — Medication 2: at 12:29

## 2021-12-04 RX ADMIN — PANTOPRAZOLE SODIUM 40 MILLIGRAM(S): 20 TABLET, DELAYED RELEASE ORAL at 12:28

## 2021-12-04 RX ADMIN — Medication 5 MILLIGRAM(S): at 18:02

## 2021-12-04 RX ADMIN — Medication 400 MILLIGRAM(S): at 10:08

## 2021-12-04 RX ADMIN — Medication 1: at 18:02

## 2021-12-04 RX ADMIN — Medication 1000 MILLIGRAM(S): at 22:41

## 2021-12-04 RX ADMIN — ENOXAPARIN SODIUM 40 MILLIGRAM(S): 100 INJECTION SUBCUTANEOUS at 12:28

## 2021-12-04 RX ADMIN — Medication 2: at 06:09

## 2021-12-04 RX ADMIN — HALOPERIDOL DECANOATE 2.5 MILLIGRAM(S): 100 INJECTION INTRAMUSCULAR at 00:49

## 2021-12-04 RX ADMIN — Medication 400 MILLIGRAM(S): at 22:11

## 2021-12-04 RX ADMIN — SODIUM CHLORIDE 1000 MILLILITER(S): 9 INJECTION, SOLUTION INTRAVENOUS at 05:41

## 2021-12-04 NOTE — CHART NOTE - NSCHARTNOTEFT_GEN_A_CORE
Patient home medication restarted, Toprolol XL and Telmisartan PO    Toprolol XL converted to IV Lopressor given NGT    Telmisartan not on formulary, equivalent dosage is 50mg Losartan PO    D/w Pharmacy    Emmanuel Shankar MD  #[id number]

## 2021-12-04 NOTE — PATIENT PROFILE ADULT - FALL HARM RISK - UNIVERSAL INTERVENTIONS
Bed in lowest position, wheels locked, appropriate side rails in place/Call bell, personal items and telephone in reach/Instruct patient to call for assistance before getting out of bed or chair/Non-slip footwear when patient is out of bed/[place] to call system/Physically safe environment - no spills, clutter or unnecessary equipment/Purposeful Proactive Rounding/Room/bathroom lighting operational, light cord in reach

## 2021-12-04 NOTE — PROGRESS NOTE ADULT - ASSESSMENT
45M w/ hx of DM type II, HTN, GERD, HLD, and perforated diverticulitis s/p Isi's (2009), s/p reversal, multiple prior SBO's s/p ex-lap in 2014, 2016, s/p incisional hernia repair, and multiple SBOs managed conservatively, presenting with adhesive SBO    - NPO, IV fluids  - C/w NGT  - Recheck lactate after fluid resuscitation  - Pain control, serial abdominal exams before pain meds    A team surgery  Pager [phone]   45M w/ hx of DM type II, HTN, GERD, HLD, and perforated diverticulitis s/p Isi's (2009), s/p reversal, multiple prior SBO's s/p ex-lap in 2014, 2016, s/p incisional hernia repair, and multiple SBOs managed conservatively, presenting with adhesive SBO    - NPO, IV fluids  - C/w NGT  - Recheck lactate after fluid resuscitation  - Pain control, serial abdominal exams before pain meds  - Restart home Lopressor IV form 5mg Q6    A team surgery  Pager [phone]   45M w/ hx of DM type II, HTN, GERD, HLD, and perforated diverticulitis s/p Isi's (2009), s/p reversal, multiple prior SBO's s/p ex-lap in 2014, 2016, s/p incisional hernia repair, and multiple SBOs managed conservatively, presenting with adhesive SBO    - NPO, IV fluids  - C/w NGT  - Recheck lactate after fluid resuscitation  - Pain control, serial abdominal exams before pain meds      Team A Surgery  #[id number]

## 2021-12-04 NOTE — PROGRESS NOTE ADULT - SUBJECTIVE AND OBJECTIVE BOX
Surgery Progress Note    Subjective:     Patient seen and examined at bedside. Tachy to 120 overnight    OBJECTIVE:     T(C): 36.9 (12-04-21 @ 09:56), Max: 37.2 (12-04-21 @ 01:00)  HR: 111 (12-04-21 @ 09:56) (110 - 122)  BP: 111/68 (12-04-21 @ 09:56) (111/68 - 133/78)  RR: 20 (12-04-21 @ 09:56) (16 - 35)  SpO2: 96% (12-04-21 @ 09:56) (96% - 100%)  Wt(kg): --    I&O's Detail    04 Dec 2021 07:01  -  04 Dec 2021 12:58  --------------------------------------------------------  IN:  Total IN: 0 mL    OUT:    Nasogastric/Oral tube (mL): 1000 mL    Voided (mL): 200 mL  Total OUT: 1200 mL    Total NET: -1200 mL          PHYSICAL EXAM:    GENERAL: NAD, lying in bed comfortably  HEENT- NGT in nares  Abdomen: Multiple abdominal incisions, c/d/i. Mildly distended, no pain    MEDICATIONS  (STANDING):  dextrose 40% Gel 15 Gram(s) Oral once  dextrose 5%. 1000 milliLiter(s) (50 mL/Hr) IV Continuous <Continuous>  dextrose 5%. 1000 milliLiter(s) (100 mL/Hr) IV Continuous <Continuous>  dextrose 50% Injectable 25 Gram(s) IV Push once  dextrose 50% Injectable 12.5 Gram(s) IV Push once  dextrose 50% Injectable 25 Gram(s) IV Push once  enoxaparin Injectable 40 milliGRAM(s) SubCutaneous daily  glucagon  Injectable 1 milliGRAM(s) IntraMuscular once  insulin lispro (ADMELOG) corrective regimen sliding scale   SubCutaneous every 6 hours  lactated ringers. 1000 milliLiter(s) (125 mL/Hr) IV Continuous <Continuous>  pantoprazole  Injectable 40 milliGRAM(s) IV Push daily    MEDICATIONS  (PRN):      LABS:                          14.7   3.08  )-----------( 210      ( 04 Dec 2021 05:51 )             48.7     12-04    144  |  102  |  36<H>  ----------------------------<  208<H>  3.8   |  23  |  1.24    Ca    10.0      04 Dec 2021 05:51  Phos  4.5     12-04  Mg     2.00     12-04    TPro  8.4<H>  /  Alb  4.9  /  TBili  0.9  /  DBili  x   /  AST  25  /  ALT  33  /  AlkPhos  37<L>  12-03    PT/INR - ( 03 Dec 2021 21:48 )   PT: 12.9 sec;   INR: 1.13 ratio         PTT - ( 03 Dec 2021 21:48 )  PTT:27.3 sec

## 2021-12-04 NOTE — H&P ADULT - HISTORY OF PRESENT ILLNESS
45M w/ hx of DM type II, HTN, GERD, HLD, and perforated diverticulitis s/p Isi's (2009), s/p reversal, multiple prior SBO's s/p ex-lap, LINDA in 2014, 2016, s/p incisional hernia repair, admitted with many SBOs most recently in Sept 2021, resolved with conservative management, now presents to ED c/o abdominal pain and nausea for 1 day. Patient reports pain began yesterday and is similar to prior SBOs. Last passed flatus and BM yesterday. Has also had nausea/emesis. Denies any other symptoms.    In the ED he is afebrile, , /72, WBC 5, Cr 1.13, lactate 3.2. CT without contrast (severe allergy) shows SBO w/ transition point in the RLQ.

## 2021-12-04 NOTE — H&P ADULT - NSHPLABSRESULTS_GEN_ALL_CORE
T(C): 37.2 (12-04-21 @ 01:00), Max: 37.2 (12-04-21 @ 01:00)  HR: 112 (12-04-21 @ 01:00) (112 - 122)  BP: 114/72 (12-04-21 @ 01:00) (114/72 - 133/78)  RR: 35 (12-04-21 @ 01:00) (16 - 35)  SpO2: 97% (12-04-21 @ 01:00) (97% - 100%)    LABS:                        15.2   5.30  )-----------( 251      ( 03 Dec 2021 21:48 )             50.0     03 Dec 2021 21:48    141    |  101    |  29     ----------------------------<  261    4.3     |  17     |  1.13     Ca    10.5       03 Dec 2021 21:48    TPro  8.4    /  Alb  4.9    /  TBili  0.9    /  DBili  x      /  AST  25     /  ALT  33     /  AlkPhos  37     03 Dec 2021 21:48    PT/INR - ( 03 Dec 2021 21:48 )   PT: 12.9 sec;   INR: 1.13 ratio         PTT - ( 03 Dec 2021 21:48 )  PTT:27.3 sec    CT Abdomen and Pelvis No Cont (12.04.21 @ 01:20)    FINDINGS:  LOWER CHEST: Within normal limits.    LIVER: Steatosis.  BILE DUCTS: Normal caliber.  GALLBLADDER: Cholelithiasis.  SPLEEN: Within normal limits.  PANCREAS: Within normal limits.  ADRENALS: Within normal limits.  KIDNEYS/URETERS: Within normal limits.    BLADDER: Minimally distended.  REPRODUCTIVE ORGANS: Prostate within normal limits.    BOWEL: Rectal anastomosis. Dilated loops of small bowel throughout the abdomen with transition point in the right lower quadrant. Distal loops of small bowel are collapsed. No significant bowel wall thickening or pneumatosis Colonic diverticulosis. Appendix is normal.  PERITONEUM: No ascites. No pneumoperitoneum.  VESSELS: Within normal limits.  RETROPERITONEUM/LYMPH NODES: No lymphadenopathy.  ABDOMINAL WALL: Ventral abdominal wall hernia containing partial loops of largeand small bowel.  BONES: Within normal limits.    IMPRESSION:    Small bowel obstruction with transition point in the right lower quadrant. No significant bowel wall thickening, pneumatosis or pneumoperitoneum.

## 2021-12-04 NOTE — PROGRESS NOTE ADULT - SUBJECTIVE AND OBJECTIVE BOX
Less pain this am. No gi function . Soft distention, non tender. Labs noted. Ct noted To continue NG and rehydration., DH

## 2021-12-04 NOTE — PATIENT PROFILE ADULT - FALL HARM RISK - ATTEMPT OOB
Called facility once more in order to communicate patient status and request additional information regarding return transport. No answer. Charge RN advised. No

## 2021-12-04 NOTE — H&P ADULT - ASSESSMENT
45M w/ hx of DM type II, HTN, GERD, HLD, and perforated diverticulitis s/p Isi's (2009), s/p reversal, multiple prior SBO's s/p ex-lap in 2014, 2016, s/p incisional hernia repair, and multiple SBOs managed conservatively, presenting with adhesive SBO    - Admit to surgery  - NPO, IV fluids  - NG tube placed in ED w/ ~1 liter output on insertion  - Recheck lactate after fluid resuscitation  - Discussed with Dr. White    A team surgery  Pager [phone]

## 2021-12-05 LAB
A1C WITH ESTIMATED AVERAGE GLUCOSE RESULT: 7.9 % — HIGH (ref 4–5.6)
ANION GAP SERPL CALC-SCNC: 13 MMOL/L — SIGNIFICANT CHANGE UP (ref 7–14)
ANION GAP SERPL CALC-SCNC: 13 MMOL/L — SIGNIFICANT CHANGE UP (ref 7–14)
BUN SERPL-MCNC: 30 MG/DL — HIGH (ref 7–23)
BUN SERPL-MCNC: 37 MG/DL — HIGH (ref 7–23)
CALCIUM SERPL-MCNC: 8.9 MG/DL — SIGNIFICANT CHANGE UP (ref 8.4–10.5)
CALCIUM SERPL-MCNC: 9.2 MG/DL — SIGNIFICANT CHANGE UP (ref 8.4–10.5)
CHLORIDE SERPL-SCNC: 106 MMOL/L — SIGNIFICANT CHANGE UP (ref 98–107)
CHLORIDE SERPL-SCNC: 110 MMOL/L — HIGH (ref 98–107)
CO2 SERPL-SCNC: 25 MMOL/L — SIGNIFICANT CHANGE UP (ref 22–31)
CO2 SERPL-SCNC: 27 MMOL/L — SIGNIFICANT CHANGE UP (ref 22–31)
CREAT SERPL-MCNC: 0.9 MG/DL — SIGNIFICANT CHANGE UP (ref 0.5–1.3)
CREAT SERPL-MCNC: 1.01 MG/DL — SIGNIFICANT CHANGE UP (ref 0.5–1.3)
CULTURE RESULTS: SIGNIFICANT CHANGE UP
ESTIMATED AVERAGE GLUCOSE: 180 — SIGNIFICANT CHANGE UP
GLUCOSE BLDC GLUCOMTR-MCNC: 162 MG/DL — HIGH (ref 70–99)
GLUCOSE BLDC GLUCOMTR-MCNC: 173 MG/DL — HIGH (ref 70–99)
GLUCOSE BLDC GLUCOMTR-MCNC: 179 MG/DL — HIGH (ref 70–99)
GLUCOSE BLDC GLUCOMTR-MCNC: 184 MG/DL — HIGH (ref 70–99)
GLUCOSE BLDC GLUCOMTR-MCNC: 189 MG/DL — HIGH (ref 70–99)
GLUCOSE BLDC GLUCOMTR-MCNC: 211 MG/DL — HIGH (ref 70–99)
GLUCOSE SERPL-MCNC: 201 MG/DL — HIGH (ref 70–99)
GLUCOSE SERPL-MCNC: 230 MG/DL — HIGH (ref 70–99)
HCT VFR BLD CALC: 42.8 % — SIGNIFICANT CHANGE UP (ref 39–50)
HGB BLD-MCNC: 13.2 G/DL — SIGNIFICANT CHANGE UP (ref 13–17)
MAGNESIUM SERPL-MCNC: 2.2 MG/DL — SIGNIFICANT CHANGE UP (ref 1.6–2.6)
MAGNESIUM SERPL-MCNC: 2.4 MG/DL — SIGNIFICANT CHANGE UP (ref 1.6–2.6)
MCHC RBC-ENTMCNC: 27 PG — SIGNIFICANT CHANGE UP (ref 27–34)
MCHC RBC-ENTMCNC: 30.8 GM/DL — LOW (ref 32–36)
MCV RBC AUTO: 87.5 FL — SIGNIFICANT CHANGE UP (ref 80–100)
NRBC # BLD: 0 /100 WBCS — SIGNIFICANT CHANGE UP
NRBC # FLD: 0 K/UL — SIGNIFICANT CHANGE UP
PHOSPHATE SERPL-MCNC: 1.9 MG/DL — LOW (ref 2.5–4.5)
PHOSPHATE SERPL-MCNC: 2.3 MG/DL — LOW (ref 2.5–4.5)
PLATELET # BLD AUTO: 199 K/UL — SIGNIFICANT CHANGE UP (ref 150–400)
POTASSIUM SERPL-MCNC: 3 MMOL/L — LOW (ref 3.5–5.3)
POTASSIUM SERPL-MCNC: 3.6 MMOL/L — SIGNIFICANT CHANGE UP (ref 3.5–5.3)
POTASSIUM SERPL-SCNC: 3 MMOL/L — LOW (ref 3.5–5.3)
POTASSIUM SERPL-SCNC: 3.6 MMOL/L — SIGNIFICANT CHANGE UP (ref 3.5–5.3)
RBC # BLD: 4.89 M/UL — SIGNIFICANT CHANGE UP (ref 4.2–5.8)
RBC # FLD: 15.8 % — HIGH (ref 10.3–14.5)
SODIUM SERPL-SCNC: 146 MMOL/L — HIGH (ref 135–145)
SODIUM SERPL-SCNC: 148 MMOL/L — HIGH (ref 135–145)
SPECIMEN SOURCE: SIGNIFICANT CHANGE UP
WBC # BLD: 4.91 K/UL — SIGNIFICANT CHANGE UP (ref 3.8–10.5)
WBC # FLD AUTO: 4.91 K/UL — SIGNIFICANT CHANGE UP (ref 3.8–10.5)

## 2021-12-05 PROCEDURE — 99232 SBSQ HOSP IP/OBS MODERATE 35: CPT | Mod: GC

## 2021-12-05 RX ORDER — POTASSIUM PHOSPHATE, MONOBASIC POTASSIUM PHOSPHATE, DIBASIC 236; 224 MG/ML; MG/ML
15 INJECTION, SOLUTION INTRAVENOUS ONCE
Refills: 0 | Status: COMPLETED | OUTPATIENT
Start: 2021-12-05 | End: 2021-12-05

## 2021-12-05 RX ORDER — METOPROLOL TARTRATE 50 MG
100 TABLET ORAL DAILY
Refills: 0 | Status: DISCONTINUED | OUTPATIENT
Start: 2021-12-05 | End: 2021-12-08

## 2021-12-05 RX ORDER — ATORVASTATIN CALCIUM 80 MG/1
40 TABLET, FILM COATED ORAL AT BEDTIME
Refills: 0 | Status: DISCONTINUED | OUTPATIENT
Start: 2021-12-05 | End: 2021-12-08

## 2021-12-05 RX ORDER — BENZOCAINE 10 %
1 GEL (GRAM) MUCOUS MEMBRANE ONCE
Refills: 0 | Status: DISCONTINUED | OUTPATIENT
Start: 2021-12-05 | End: 2021-12-05

## 2021-12-05 RX ORDER — POTASSIUM CHLORIDE 20 MEQ
10 PACKET (EA) ORAL
Refills: 0 | Status: COMPLETED | OUTPATIENT
Start: 2021-12-05 | End: 2021-12-05

## 2021-12-05 RX ORDER — TETRACAINE/BENZOCAINE/BUTAMBEN 2%-14%-2%
1 OINTMENT (GRAM) TOPICAL
Refills: 0 | Status: DISCONTINUED | OUTPATIENT
Start: 2021-12-05 | End: 2021-12-06

## 2021-12-05 RX ORDER — DEXTROSE MONOHYDRATE, SODIUM CHLORIDE, AND POTASSIUM CHLORIDE 50; .745; 4.5 G/1000ML; G/1000ML; G/1000ML
1000 INJECTION, SOLUTION INTRAVENOUS
Refills: 0 | Status: DISCONTINUED | OUTPATIENT
Start: 2021-12-05 | End: 2021-12-07

## 2021-12-05 RX ORDER — SODIUM,POTASSIUM PHOSPHATES 278-250MG
1 POWDER IN PACKET (EA) ORAL ONCE
Refills: 0 | Status: COMPLETED | OUTPATIENT
Start: 2021-12-05 | End: 2021-12-05

## 2021-12-05 RX ORDER — LOSARTAN POTASSIUM 100 MG/1
50 TABLET, FILM COATED ORAL DAILY
Refills: 0 | Status: DISCONTINUED | OUTPATIENT
Start: 2021-12-05 | End: 2021-12-08

## 2021-12-05 RX ORDER — FENOFIBRATE,MICRONIZED 130 MG
145 CAPSULE ORAL DAILY
Refills: 0 | Status: DISCONTINUED | OUTPATIENT
Start: 2021-12-05 | End: 2021-12-08

## 2021-12-05 RX ORDER — PANTOPRAZOLE SODIUM 20 MG/1
40 TABLET, DELAYED RELEASE ORAL
Refills: 0 | Status: DISCONTINUED | OUTPATIENT
Start: 2021-12-05 | End: 2021-12-08

## 2021-12-05 RX ORDER — BENZOCAINE AND MENTHOL 5; 1 G/100ML; G/100ML
1 LIQUID ORAL EVERY 6 HOURS
Refills: 0 | Status: DISCONTINUED | OUTPATIENT
Start: 2021-12-05 | End: 2021-12-08

## 2021-12-05 RX ADMIN — Medication 5 MILLIGRAM(S): at 05:27

## 2021-12-05 RX ADMIN — Medication 2: at 13:31

## 2021-12-05 RX ADMIN — DEXTROSE MONOHYDRATE, SODIUM CHLORIDE, AND POTASSIUM CHLORIDE 50 MILLILITER(S): 50; .745; 4.5 INJECTION, SOLUTION INTRAVENOUS at 21:28

## 2021-12-05 RX ADMIN — DEXTROSE MONOHYDRATE, SODIUM CHLORIDE, AND POTASSIUM CHLORIDE 50 MILLILITER(S): 50; .745; 4.5 INJECTION, SOLUTION INTRAVENOUS at 14:41

## 2021-12-05 RX ADMIN — ATORVASTATIN CALCIUM 40 MILLIGRAM(S): 80 TABLET, FILM COATED ORAL at 21:26

## 2021-12-05 RX ADMIN — Medication 100 MILLIEQUIVALENT(S): at 10:00

## 2021-12-05 RX ADMIN — POTASSIUM PHOSPHATE, MONOBASIC POTASSIUM PHOSPHATE, DIBASIC 62.5 MILLIMOLE(S): 236; 224 INJECTION, SOLUTION INTRAVENOUS at 21:25

## 2021-12-05 RX ADMIN — Medication 1 TABLET(S): at 12:35

## 2021-12-05 RX ADMIN — ENOXAPARIN SODIUM 40 MILLIGRAM(S): 100 INJECTION SUBCUTANEOUS at 12:35

## 2021-12-05 RX ADMIN — DEXTROSE MONOHYDRATE, SODIUM CHLORIDE, AND POTASSIUM CHLORIDE 125 MILLILITER(S): 50; .745; 4.5 INJECTION, SOLUTION INTRAVENOUS at 08:41

## 2021-12-05 RX ADMIN — Medication 5 MILLIGRAM(S): at 12:35

## 2021-12-05 RX ADMIN — Medication 100 MILLIEQUIVALENT(S): at 11:00

## 2021-12-05 RX ADMIN — Medication 1: at 18:08

## 2021-12-05 RX ADMIN — Medication 5 MILLIGRAM(S): at 00:20

## 2021-12-05 RX ADMIN — Medication 1: at 23:51

## 2021-12-05 RX ADMIN — Medication 1: at 05:43

## 2021-12-05 RX ADMIN — Medication 100 MILLIEQUIVALENT(S): at 12:34

## 2021-12-05 RX ADMIN — LOSARTAN POTASSIUM 50 MILLIGRAM(S): 100 TABLET, FILM COATED ORAL at 05:27

## 2021-12-05 RX ADMIN — PANTOPRAZOLE SODIUM 40 MILLIGRAM(S): 20 TABLET, DELAYED RELEASE ORAL at 12:35

## 2021-12-05 RX ADMIN — Medication 1: at 00:20

## 2021-12-05 RX ADMIN — Medication 1 SPRAY(S): at 05:27

## 2021-12-05 NOTE — PROGRESS NOTE ADULT - SUBJECTIVE AND OBJECTIVE BOX
Surgery Progress Note    Subjective:     Patient seen and examined at bedside. Patient without complaints this AM. Denies N/V/F/C.     OBJECTIVE:     T(C): 37.5 (12-04-21 @ 21:32), Max: 37.5 (12-04-21 @ 21:32)  HR: 116 (12-04-21 @ 21:32) (110 - 120)  BP: 114/69 (12-04-21 @ 21:32) (104/68 - 128/71)  RR: 19 (12-04-21 @ 21:32) (19 - 35)  SpO2: 95% (12-04-21 @ 21:32) (95% - 98%)  Wt(kg): --    I&O's Detail    04 Dec 2021 07:01  -  05 Dec 2021 00:12  --------------------------------------------------------  IN:    IV PiggyBack: 200 mL    Lactated Ringers: 1750 mL  Total IN: 1950 mL    OUT:    Nasogastric/Oral tube (mL): 2100 mL    Oral Fluid: 0 mL    Voided (mL): 1300 mL  Total OUT: 3400 mL    Total NET: -1450 mL          PHYSICAL EXAM:    GENERAL: NAD, lying in bed comfortably  HEAD:  Atraumatic, Normocephalic  ENT: Moist mucous membranes  CHEST/LUNG: Unlabored respirations  ABDOMEN: Soft, Nontender, Nondistended.   NERVOUS SYSTEM:  Alert & Oriented X3, speech clear.   SKIN: No rashes or lesions    MEDICATIONS  (STANDING):  benzocaine 20%/menthol 0.5% Spray 1 Spray(s) Topical once  dextrose 40% Gel 15 Gram(s) Oral once  dextrose 5%. 1000 milliLiter(s) (50 mL/Hr) IV Continuous <Continuous>  dextrose 5%. 1000 milliLiter(s) (100 mL/Hr) IV Continuous <Continuous>  dextrose 50% Injectable 25 Gram(s) IV Push once  dextrose 50% Injectable 12.5 Gram(s) IV Push once  dextrose 50% Injectable 25 Gram(s) IV Push once  enoxaparin Injectable 40 milliGRAM(s) SubCutaneous daily  glucagon  Injectable 1 milliGRAM(s) IntraMuscular once  insulin lispro (ADMELOG) corrective regimen sliding scale   SubCutaneous every 6 hours  lactated ringers. 1000 milliLiter(s) (125 mL/Hr) IV Continuous <Continuous>  losartan 50 milliGRAM(s) Oral daily  metoprolol tartrate Injectable 5 milliGRAM(s) IV Push every 6 hours  pantoprazole  Injectable 40 milliGRAM(s) IV Push daily    MEDICATIONS  (PRN):      LABS:                          14.7   3.08  )-----------( 210      ( 04 Dec 2021 05:51 )             48.7     12-04    144  |  102  |  36<H>  ----------------------------<  208<H>  3.8   |  23  |  1.24    Ca    10.0      04 Dec 2021 05:51  Phos  4.5     12-04  Mg     2.00     12-04    TPro  8.4<H>  /  Alb  4.9  /  TBili  0.9  /  DBili  x   /  AST  25  /  ALT  33  /  AlkPhos  37<L>  12-03    PT/INR - ( 03 Dec 2021 21:48 )   PT: 12.9 sec;   INR: 1.13 ratio         PTT - ( 03 Dec 2021 21:48 )  PTT:27.3 sec           Surgery Progress Note    INTERVAL EVENTS:    SUBJECTIVE: Patient seen and examined at bedside with surgical team. No acute events overnight. Passing gas and had 3 BM overnight. NGT clamped. No n/v.     OBJECTIVE:    Vital Signs Last 24 Hrs  T(C): 36.8 (05 Dec 2021 05:04), Max: 37.5 (04 Dec 2021 21:32)  T(F): 98.2 (05 Dec 2021 05:04), Max: 99.5 (04 Dec 2021 21:32)  HR: 98 (05 Dec 2021 05:04) (92 - 119)  BP: 132/72 (05 Dec 2021 05:04) (104/68 - 132/72)  BP(mean): --  RR: 17 (05 Dec 2021 05:04) (17 - 20)  SpO2: 94% (05 Dec 2021 05:04) (94% - 97%)I&O's Detail    04 Dec 2021 07:01  -  05 Dec 2021 07:00  --------------------------------------------------------  IN:    IV PiggyBack: 200 mL    Lactated Ringers: 2750 mL  Total IN: 2950 mL    OUT:    Nasogastric/Oral tube (mL): 2350 mL    Oral Fluid: 0 mL    Voided (mL): 1900 mL  Total OUT: 4250 mL    Total NET: -1300 mL      MEDICATIONS  (STANDING):  dextrose 40% Gel 15 Gram(s) Oral once  dextrose 5%. 1000 milliLiter(s) (50 mL/Hr) IV Continuous <Continuous>  dextrose 5%. 1000 milliLiter(s) (100 mL/Hr) IV Continuous <Continuous>  dextrose 50% Injectable 25 Gram(s) IV Push once  dextrose 50% Injectable 12.5 Gram(s) IV Push once  dextrose 50% Injectable 25 Gram(s) IV Push once  enoxaparin Injectable 40 milliGRAM(s) SubCutaneous daily  glucagon  Injectable 1 milliGRAM(s) IntraMuscular once  insulin lispro (ADMELOG) corrective regimen sliding scale   SubCutaneous every 6 hours  lactated ringers. 1000 milliLiter(s) (125 mL/Hr) IV Continuous <Continuous>  losartan 50 milliGRAM(s) Oral daily  metoprolol tartrate Injectable 5 milliGRAM(s) IV Push every 6 hours  pantoprazole  Injectable 40 milliGRAM(s) IV Push daily  tetracaine/benzocaine/butamben Spray 1 Spray(s) Topical four times a day    MEDICATIONS  (PRN):  benzocaine 15 mG/menthol 3.6 mG Lozenge 1 Lozenge Oral every 6 hours PRN Sore Throat      PHYSICAL EXAM:  Constitutional: A&Ox3, NAD. NGT clamped.   Respiratory: Unlabored breathing  Abdomen: Soft, mildly distended, NTTP. No rebound or guarding.  Extremities: WWP, COLIN spontaneously    LABS:                        14.7   3.08  )-----------( 210      ( 04 Dec 2021 05:51 )             48.7     12-    144  |  102  |  36<H>  ----------------------------<  208<H>  3.8   |  23  |  1.24    Ca    10.0      04 Dec 2021 05:51  Phos  4.5     12-  Mg     2.00     12-    TPro  8.4<H>  /  Alb  4.9  /  TBili  0.9  /  DBili  x   /  AST  25  /  ALT  33  /  AlkPhos  37<L>  12-03    PT/INR - ( 03 Dec 2021 21:48 )   PT: 12.9 sec;   INR: 1.13 ratio         PTT - ( 03 Dec 2021 21:48 )  PTT:27.3 sec  LIVER FUNCTIONS - ( 03 Dec 2021 21:48 )  Alb: 4.9 g/dL / Pro: 8.4 g/dL / ALK PHOS: 37 U/L / ALT: 33 U/L / AST: 25 U/L / GGT: x           Urinalysis Basic - ( 03 Dec 2021 21:48 )    Color: Yellow / Appearance: Clear / S.044 / pH: x  Gluc: x / Ketone: Trace  / Bili: Negative / Urobili: <2 mg/dL   Blood: x / Protein: 30 mg/dL / Nitrite: Negative   Leuk Esterase: Negative / RBC: 1 /HPF / WBC 1 /HPF   Sq Epi: x / Non Sq Epi: 0 /HPF / Bacteria: Negative

## 2021-12-05 NOTE — PROGRESS NOTE ADULT - ASSESSMENT
45M w/ hx of DM type II, HTN, GERD, HLD, and perforated diverticulitis s/p Isi's (2009), s/p reversal, multiple prior SBO's s/p ex-lap in 2014, 2016, s/p incisional hernia repair, and multiple SBOs managed conservatively, presenting with adhesive SBO    - NGT clamp trial  - NPO with mIVF  - change losartan to vasotect IV   - DVT ppx       [facility] Surgery  #[id number]

## 2021-12-06 LAB
ANION GAP SERPL CALC-SCNC: 10 MMOL/L — SIGNIFICANT CHANGE UP (ref 7–14)
BUN SERPL-MCNC: 24 MG/DL — HIGH (ref 7–23)
CALCIUM SERPL-MCNC: 8.6 MG/DL — SIGNIFICANT CHANGE UP (ref 8.4–10.5)
CHLORIDE SERPL-SCNC: 107 MMOL/L — SIGNIFICANT CHANGE UP (ref 98–107)
CO2 SERPL-SCNC: 25 MMOL/L — SIGNIFICANT CHANGE UP (ref 22–31)
CREAT SERPL-MCNC: 0.75 MG/DL — SIGNIFICANT CHANGE UP (ref 0.5–1.3)
GLUCOSE BLDC GLUCOMTR-MCNC: 192 MG/DL — HIGH (ref 70–99)
GLUCOSE BLDC GLUCOMTR-MCNC: 192 MG/DL — HIGH (ref 70–99)
GLUCOSE BLDC GLUCOMTR-MCNC: 194 MG/DL — HIGH (ref 70–99)
GLUCOSE BLDC GLUCOMTR-MCNC: 197 MG/DL — HIGH (ref 70–99)
GLUCOSE BLDC GLUCOMTR-MCNC: 217 MG/DL — HIGH (ref 70–99)
GLUCOSE BLDC GLUCOMTR-MCNC: 227 MG/DL — HIGH (ref 70–99)
GLUCOSE SERPL-MCNC: 194 MG/DL — HIGH (ref 70–99)
HCT VFR BLD CALC: 40.2 % — SIGNIFICANT CHANGE UP (ref 39–50)
HGB BLD-MCNC: 12.2 G/DL — LOW (ref 13–17)
MAGNESIUM SERPL-MCNC: 2.1 MG/DL — SIGNIFICANT CHANGE UP (ref 1.6–2.6)
MCHC RBC-ENTMCNC: 27.1 PG — SIGNIFICANT CHANGE UP (ref 27–34)
MCHC RBC-ENTMCNC: 30.3 GM/DL — LOW (ref 32–36)
MCV RBC AUTO: 89.1 FL — SIGNIFICANT CHANGE UP (ref 80–100)
NRBC # BLD: 0 /100 WBCS — SIGNIFICANT CHANGE UP
NRBC # FLD: 0 K/UL — SIGNIFICANT CHANGE UP
PHOSPHATE SERPL-MCNC: 2 MG/DL — LOW (ref 2.5–4.5)
PLATELET # BLD AUTO: 166 K/UL — SIGNIFICANT CHANGE UP (ref 150–400)
POTASSIUM SERPL-MCNC: 3.6 MMOL/L — SIGNIFICANT CHANGE UP (ref 3.5–5.3)
POTASSIUM SERPL-SCNC: 3.6 MMOL/L — SIGNIFICANT CHANGE UP (ref 3.5–5.3)
RBC # BLD: 4.51 M/UL — SIGNIFICANT CHANGE UP (ref 4.2–5.8)
RBC # FLD: 15.6 % — HIGH (ref 10.3–14.5)
SODIUM SERPL-SCNC: 142 MMOL/L — SIGNIFICANT CHANGE UP (ref 135–145)
WBC # BLD: 3.92 K/UL — SIGNIFICANT CHANGE UP (ref 3.8–10.5)
WBC # FLD AUTO: 3.92 K/UL — SIGNIFICANT CHANGE UP (ref 3.8–10.5)

## 2021-12-06 RX ORDER — POTASSIUM CHLORIDE 20 MEQ
40 PACKET (EA) ORAL ONCE
Refills: 0 | Status: COMPLETED | OUTPATIENT
Start: 2021-12-06 | End: 2021-12-06

## 2021-12-06 RX ORDER — POTASSIUM PHOSPHATE, MONOBASIC POTASSIUM PHOSPHATE, DIBASIC 236; 224 MG/ML; MG/ML
30 INJECTION, SOLUTION INTRAVENOUS ONCE
Refills: 0 | Status: COMPLETED | OUTPATIENT
Start: 2021-12-06 | End: 2021-12-06

## 2021-12-06 RX ORDER — SIMETHICONE 80 MG/1
80 TABLET, CHEWABLE ORAL ONCE
Refills: 0 | Status: COMPLETED | OUTPATIENT
Start: 2021-12-06 | End: 2021-12-06

## 2021-12-06 RX ADMIN — POTASSIUM PHOSPHATE, MONOBASIC POTASSIUM PHOSPHATE, DIBASIC 83.33 MILLIMOLE(S): 236; 224 INJECTION, SOLUTION INTRAVENOUS at 10:15

## 2021-12-06 RX ADMIN — ENOXAPARIN SODIUM 40 MILLIGRAM(S): 100 INJECTION SUBCUTANEOUS at 12:09

## 2021-12-06 RX ADMIN — Medication 2: at 17:18

## 2021-12-06 RX ADMIN — PANTOPRAZOLE SODIUM 40 MILLIGRAM(S): 20 TABLET, DELAYED RELEASE ORAL at 05:23

## 2021-12-06 RX ADMIN — SIMETHICONE 80 MILLIGRAM(S): 80 TABLET, CHEWABLE ORAL at 06:04

## 2021-12-06 RX ADMIN — Medication 2: at 12:10

## 2021-12-06 RX ADMIN — Medication 40 MILLIEQUIVALENT(S): at 07:58

## 2021-12-06 RX ADMIN — ATORVASTATIN CALCIUM 40 MILLIGRAM(S): 80 TABLET, FILM COATED ORAL at 21:23

## 2021-12-06 RX ADMIN — LOSARTAN POTASSIUM 50 MILLIGRAM(S): 100 TABLET, FILM COATED ORAL at 05:23

## 2021-12-06 RX ADMIN — Medication 100 MILLIGRAM(S): at 05:23

## 2021-12-06 RX ADMIN — Medication 1: at 05:24

## 2021-12-06 RX ADMIN — Medication 1: at 23:24

## 2021-12-06 RX ADMIN — Medication 145 MILLIGRAM(S): at 12:10

## 2021-12-06 NOTE — DISCHARGE NOTE PROVIDER - NSDCCPCAREPLAN_GEN_ALL_CORE_FT
PRINCIPAL DISCHARGE DIAGNOSIS  Diagnosis: Small bowel obstruction  Assessment and Plan of Treatment: DIET: Return to your usual diet.  NOTIFY YOUR SURGEON IF YOU HAVE: any fever (over 100.4 F) persistent nausea/vomiting, or if your pain is not controlled on your discharge pain medications, unable to urinate, unable to pass gas or stool.  Please follow up with your primary care physician in one week regarding your hospitalization, bring copies of your discharge paperwork.  Please follow up with your surgeon, Dr. White within 2 weeks of dishcarge.

## 2021-12-06 NOTE — DISCHARGE NOTE PROVIDER - CARE PROVIDER_API CALL
Musa White)  ColonRectal Surgery; Surgery  [address] [place] [address]  Phone: [phone]  Fax: [phone]  Follow Up Time: 2 weeks

## 2021-12-06 NOTE — DISCHARGE NOTE PROVIDER - HOSPITAL COURSE
45M w/ hx of DM type II, HTN, GERD, HLD, and perforated diverticulitis s/p Isi's (2009), s/p reversal, multiple prior SBO's s/p ex-lap in 2014, 2016, s/p incisional hernia repair, and multiple SBOs managed conservatively, presenting with adhesive SBO on early 12/4. Patient was admitted with a small bowel obstruction and treated conservatively with NGT decompression and bowel rest. In PM of 12/4, the patient had return of bowel function with BM and gas. On 12/5, the patient passed his NGT clamp trial s/p NGT removal and advanced to CLD. Their diet was then slowly advanced as tolerated. Once patient was tolerating regular diet, voiding and ambulating without difficulty, they were found to be stable for discharge to home.

## 2021-12-06 NOTE — PROGRESS NOTE ADULT - SUBJECTIVE AND OBJECTIVE BOX
A TEAM Surgery Progress Note  Patient is a 46y old  Male who presents with a chief complaint of     INTERVAL EVENTS: No acute events overnight.      SUBJECTIVE: Patient seen and examined at bedside with surgical team, patient without complaints. Denies fever, chills, CP, SOB nausea, vomiting, abdominal pain.    OBJECTIVE:    Vital Signs Last 24 Hrs  T(C): 36.8 (05 Dec 2021 21:33), Max: 37.1 (05 Dec 2021 00:25)  T(F): 98.2 (05 Dec 2021 21:33), Max: 98.8 (05 Dec 2021 00:25)  HR: 96 (05 Dec 2021 21:33) (92 - 108)  BP: 135/79 (05 Dec 2021 21:33) (111/62 - 135/79)  BP(mean): --  RR: 18 (05 Dec 2021 21:33) (17 - 19)  SpO2: 97% (05 Dec 2021 21:33) (94% - 97%)I&O's Detail    04 Dec 2021 07:01  -  05 Dec 2021 07:00  --------------------------------------------------------  IN:    IV PiggyBack: 500 mL    Lactated Ringers: 2750 mL  Total IN: 3250 mL    OUT:    Nasogastric/Oral tube (mL): 2350 mL    Oral Fluid: 0 mL    Voided (mL): 1900 mL  Total OUT: 4250 mL    Total NET: -1000 mL      05 Dec 2021 07:01  -  06 Dec 2021 00:22  --------------------------------------------------------  IN:    dextrose 5% + sodium chloride 0.45% w/ Additives: 100 mL    IV PiggyBack: 250 mL    Oral Fluid: 240 mL  Total IN: 590 mL    OUT:    Voided (mL): 900 mL  Total OUT: 900 mL    Total NET: -310 mL      MEDICATIONS  (STANDING):  atorvastatin 40 milliGRAM(s) Oral at bedtime  dextrose 40% Gel 15 Gram(s) Oral once  dextrose 5% + sodium chloride 0.45% with potassium chloride 20 mEq/L 1000 milliLiter(s) (50 mL/Hr) IV Continuous <Continuous>  dextrose 5%. 1000 milliLiter(s) (50 mL/Hr) IV Continuous <Continuous>  dextrose 5%. 1000 milliLiter(s) (100 mL/Hr) IV Continuous <Continuous>  dextrose 50% Injectable 25 Gram(s) IV Push once  dextrose 50% Injectable 12.5 Gram(s) IV Push once  dextrose 50% Injectable 25 Gram(s) IV Push once  enoxaparin Injectable 40 milliGRAM(s) SubCutaneous daily  fenofibrate Tablet 145 milliGRAM(s) Oral daily  glucagon  Injectable 1 milliGRAM(s) IntraMuscular once  insulin lispro (ADMELOG) corrective regimen sliding scale   SubCutaneous every 6 hours  losartan 50 milliGRAM(s) Oral daily  metoprolol succinate  milliGRAM(s) Oral daily  pantoprazole    Tablet 40 milliGRAM(s) Oral before breakfast  tetracaine/benzocaine/butamben Spray 1 Spray(s) Topical four times a day    MEDICATIONS  (PRN):  benzocaine 15 mG/menthol 3.6 mG Lozenge 1 Lozenge Oral every 6 hours PRN Sore Throat      PHYSICAL EXAM:  Constitutional: A&Ox3, NAD  Respiratory: Unlabored breathing  Abdomen: Soft, nondistended, NTTP. No rebound or guarding.  Extremities: WWP, COLIN spontaneously    LABS:                        13.2   4.91  )-----------( 199      ( 05 Dec 2021 08:49 )             42.8     12-05    148<H>  |  110<H>  |  30<H>  ----------------------------<  230<H>  3.6   |  25  |  0.90    Ca    8.9      05 Dec 2021 19:24  Phos  1.9     12-05  Mg     2.40     12-05                IMAGING:     A TEAM Surgery Progress Note  Patient is a 46y old  Male who presents with a chief complaint of     INTERVAL EVENTS: No acute events overnight.      SUBJECTIVE: Patient seen and examined at bedside with surgical team, patient without complaints. Denies fever, chills, CP, SOB nausea, vomiting. Tachycardic to 108 overnight, resolved. Likely dehydration. Tolerating CLD well, off NGT, passing gas and bowel movement. Has some pain related to gas, simethicone written    OBJECTIVE:    Vital Signs Last 24 Hrs  T(C): 36.8 (05 Dec 2021 21:33), Max: 37.1 (05 Dec 2021 00:25)  T(F): 98.2 (05 Dec 2021 21:33), Max: 98.8 (05 Dec 2021 00:25)  HR: 96 (05 Dec 2021 21:33) (92 - 108)  BP: 135/79 (05 Dec 2021 21:33) (111/62 - 135/79)  BP(mean): --  RR: 18 (05 Dec 2021 21:33) (17 - 19)  SpO2: 97% (05 Dec 2021 21:33) (94% - 97%)I&O's Detail    04 Dec 2021 07:01  -  05 Dec 2021 07:00  --------------------------------------------------------  IN:    IV PiggyBack: 500 mL    Lactated Ringers: 2750 mL  Total IN: 3250 mL    OUT:    Nasogastric/Oral tube (mL): 2350 mL    Oral Fluid: 0 mL    Voided (mL): 1900 mL  Total OUT: 4250 mL    Total NET: -1000 mL      05 Dec 2021 07:01  -  06 Dec 2021 00:22  --------------------------------------------------------  IN:    dextrose 5% + sodium chloride 0.45% w/ Additives: 100 mL    IV PiggyBack: 250 mL    Oral Fluid: 240 mL  Total IN: 590 mL    OUT:    Voided (mL): 900 mL  Total OUT: 900 mL    Total NET: -310 mL      MEDICATIONS  (STANDING):  atorvastatin 40 milliGRAM(s) Oral at bedtime  dextrose 40% Gel 15 Gram(s) Oral once  dextrose 5% + sodium chloride 0.45% with potassium chloride 20 mEq/L 1000 milliLiter(s) (50 mL/Hr) IV Continuous <Continuous>  dextrose 5%. 1000 milliLiter(s) (50 mL/Hr) IV Continuous <Continuous>  dextrose 5%. 1000 milliLiter(s) (100 mL/Hr) IV Continuous <Continuous>  dextrose 50% Injectable 25 Gram(s) IV Push once  dextrose 50% Injectable 12.5 Gram(s) IV Push once  dextrose 50% Injectable 25 Gram(s) IV Push once  enoxaparin Injectable 40 milliGRAM(s) SubCutaneous daily  fenofibrate Tablet 145 milliGRAM(s) Oral daily  glucagon  Injectable 1 milliGRAM(s) IntraMuscular once  insulin lispro (ADMELOG) corrective regimen sliding scale   SubCutaneous every 6 hours  losartan 50 milliGRAM(s) Oral daily  metoprolol succinate  milliGRAM(s) Oral daily  pantoprazole    Tablet 40 milliGRAM(s) Oral before breakfast  tetracaine/benzocaine/butamben Spray 1 Spray(s) Topical four times a day    MEDICATIONS  (PRN):  benzocaine 15 mG/menthol 3.6 mG Lozenge 1 Lozenge Oral every 6 hours PRN Sore Throat      PHYSICAL EXAM:  Constitutional: A&Ox3, NAD  Respiratory: Unlabored breathing  Abdomen: Soft, nondistended, NTTP. No rebound or guarding.  Extremities: WWP, COLIN spontaneously    LABS:                        13.2   4.91  )-----------( 199      ( 05 Dec 2021 08:49 )             42.8     12-05    148<H>  |  110<H>  |  30<H>  ----------------------------<  230<H>  3.6   |  25  |  0.90    Ca    8.9      05 Dec 2021 19:24  Phos  1.9     12-05  Mg     2.40     12-05                IMAGING:     A TEAM Surgery Progress Note  Patient is a 46y old  Male who presents with a chief complaint of     INTERVAL EVENTS: No acute events overnight.      SUBJECTIVE: Patient seen and examined at bedside with surgical team, patient without complaints. Denies fever, chills, CP, SOB nausea, vomiting. Tachycardic to 108 overnight, resolved. Likely dehydration. Tolerating CLD well, off NGT, passing gas and bowel movement. Has some pain related to gas, simethicone written. Endorses he would like his diet to be advanced slowly, CLD for now.    OBJECTIVE:    Vital Signs Last 24 Hrs  T(C): 36.8 (05 Dec 2021 21:33), Max: 37.1 (05 Dec 2021 00:25)  T(F): 98.2 (05 Dec 2021 21:33), Max: 98.8 (05 Dec 2021 00:25)  HR: 96 (05 Dec 2021 21:33) (92 - 108)  BP: 135/79 (05 Dec 2021 21:33) (111/62 - 135/79)  BP(mean): --  RR: 18 (05 Dec 2021 21:33) (17 - 19)  SpO2: 97% (05 Dec 2021 21:33) (94% - 97%)I&O's Detail    04 Dec 2021 07:01  -  05 Dec 2021 07:00  --------------------------------------------------------  IN:    IV PiggyBack: 500 mL    Lactated Ringers: 2750 mL  Total IN: 3250 mL    OUT:    Nasogastric/Oral tube (mL): 2350 mL    Oral Fluid: 0 mL    Voided (mL): 1900 mL  Total OUT: 4250 mL    Total NET: -1000 mL      05 Dec 2021 07:01  -  06 Dec 2021 00:22  --------------------------------------------------------  IN:    dextrose 5% + sodium chloride 0.45% w/ Additives: 100 mL    IV PiggyBack: 250 mL    Oral Fluid: 240 mL  Total IN: 590 mL    OUT:    Voided (mL): 900 mL  Total OUT: 900 mL    Total NET: -310 mL      MEDICATIONS  (STANDING):  atorvastatin 40 milliGRAM(s) Oral at bedtime  dextrose 40% Gel 15 Gram(s) Oral once  dextrose 5% + sodium chloride 0.45% with potassium chloride 20 mEq/L 1000 milliLiter(s) (50 mL/Hr) IV Continuous <Continuous>  dextrose 5%. 1000 milliLiter(s) (50 mL/Hr) IV Continuous <Continuous>  dextrose 5%. 1000 milliLiter(s) (100 mL/Hr) IV Continuous <Continuous>  dextrose 50% Injectable 25 Gram(s) IV Push once  dextrose 50% Injectable 12.5 Gram(s) IV Push once  dextrose 50% Injectable 25 Gram(s) IV Push once  enoxaparin Injectable 40 milliGRAM(s) SubCutaneous daily  fenofibrate Tablet 145 milliGRAM(s) Oral daily  glucagon  Injectable 1 milliGRAM(s) IntraMuscular once  insulin lispro (ADMELOG) corrective regimen sliding scale   SubCutaneous every 6 hours  losartan 50 milliGRAM(s) Oral daily  metoprolol succinate  milliGRAM(s) Oral daily  pantoprazole    Tablet 40 milliGRAM(s) Oral before breakfast  tetracaine/benzocaine/butamben Spray 1 Spray(s) Topical four times a day    MEDICATIONS  (PRN):  benzocaine 15 mG/menthol 3.6 mG Lozenge 1 Lozenge Oral every 6 hours PRN Sore Throat      PHYSICAL EXAM:  Constitutional: A&Ox3, NAD  Respiratory: Unlabored breathing  Abdomen: Soft, nondistended, NTTP. No rebound or guarding.  Extremities: WWP, COLIN spontaneously    LABS:                        13.2   4.91  )-----------( 199      ( 05 Dec 2021 08:49 )             42.8     12-05    148<H>  |  110<H>  |  30<H>  ----------------------------<  230<H>  3.6   |  25  |  0.90    Ca    8.9      05 Dec 2021 19:24  Phos  1.9     12-05  Mg     2.40     12-05                IMAGING:

## 2021-12-07 LAB
ANION GAP SERPL CALC-SCNC: 12 MMOL/L — SIGNIFICANT CHANGE UP (ref 7–14)
BUN SERPL-MCNC: 19 MG/DL — SIGNIFICANT CHANGE UP (ref 7–23)
CALCIUM SERPL-MCNC: 9.4 MG/DL — SIGNIFICANT CHANGE UP (ref 8.4–10.5)
CHLORIDE SERPL-SCNC: 106 MMOL/L — SIGNIFICANT CHANGE UP (ref 98–107)
CO2 SERPL-SCNC: 22 MMOL/L — SIGNIFICANT CHANGE UP (ref 22–31)
CREAT SERPL-MCNC: 0.69 MG/DL — SIGNIFICANT CHANGE UP (ref 0.5–1.3)
GLUCOSE BLDC GLUCOMTR-MCNC: 169 MG/DL — HIGH (ref 70–99)
GLUCOSE BLDC GLUCOMTR-MCNC: 185 MG/DL — HIGH (ref 70–99)
GLUCOSE BLDC GLUCOMTR-MCNC: 200 MG/DL — HIGH (ref 70–99)
GLUCOSE BLDC GLUCOMTR-MCNC: 206 MG/DL — HIGH (ref 70–99)
GLUCOSE BLDC GLUCOMTR-MCNC: 261 MG/DL — HIGH (ref 70–99)
GLUCOSE SERPL-MCNC: 237 MG/DL — HIGH (ref 70–99)
HCT VFR BLD CALC: 42.2 % — SIGNIFICANT CHANGE UP (ref 39–50)
HGB BLD-MCNC: 12.8 G/DL — LOW (ref 13–17)
MAGNESIUM SERPL-MCNC: 2.1 MG/DL — SIGNIFICANT CHANGE UP (ref 1.6–2.6)
MCHC RBC-ENTMCNC: 26.9 PG — LOW (ref 27–34)
MCHC RBC-ENTMCNC: 30.3 GM/DL — LOW (ref 32–36)
MCV RBC AUTO: 88.7 FL — SIGNIFICANT CHANGE UP (ref 80–100)
NRBC # BLD: 0 /100 WBCS — SIGNIFICANT CHANGE UP
NRBC # FLD: 0 K/UL — SIGNIFICANT CHANGE UP
PHOSPHATE SERPL-MCNC: 2.5 MG/DL — SIGNIFICANT CHANGE UP (ref 2.5–4.5)
PLATELET # BLD AUTO: 210 K/UL — SIGNIFICANT CHANGE UP (ref 150–400)
POTASSIUM SERPL-MCNC: 3.9 MMOL/L — SIGNIFICANT CHANGE UP (ref 3.5–5.3)
POTASSIUM SERPL-SCNC: 3.9 MMOL/L — SIGNIFICANT CHANGE UP (ref 3.5–5.3)
RBC # BLD: 4.76 M/UL — SIGNIFICANT CHANGE UP (ref 4.2–5.8)
RBC # FLD: 15.1 % — HIGH (ref 10.3–14.5)
SODIUM SERPL-SCNC: 140 MMOL/L — SIGNIFICANT CHANGE UP (ref 135–145)
WBC # BLD: 5.13 K/UL — SIGNIFICANT CHANGE UP (ref 3.8–10.5)
WBC # FLD AUTO: 5.13 K/UL — SIGNIFICANT CHANGE UP (ref 3.8–10.5)

## 2021-12-07 PROCEDURE — 99232 SBSQ HOSP IP/OBS MODERATE 35: CPT | Mod: GC

## 2021-12-07 RX ORDER — POTASSIUM PHOSPHATE, MONOBASIC POTASSIUM PHOSPHATE, DIBASIC 236; 224 MG/ML; MG/ML
15 INJECTION, SOLUTION INTRAVENOUS ONCE
Refills: 0 | Status: COMPLETED | OUTPATIENT
Start: 2021-12-07 | End: 2021-12-07

## 2021-12-07 RX ORDER — ONDANSETRON 8 MG/1
4 TABLET, FILM COATED ORAL ONCE
Refills: 0 | Status: COMPLETED | OUTPATIENT
Start: 2021-12-07 | End: 2021-12-07

## 2021-12-07 RX ORDER — CALCIUM CARBONATE 500(1250)
1 TABLET ORAL ONCE
Refills: 0 | Status: COMPLETED | OUTPATIENT
Start: 2021-12-07 | End: 2021-12-07

## 2021-12-07 RX ORDER — INSULIN LISPRO 100/ML
VIAL (ML) SUBCUTANEOUS
Refills: 0 | Status: DISCONTINUED | OUTPATIENT
Start: 2021-12-07 | End: 2021-12-08

## 2021-12-07 RX ORDER — ONDANSETRON 8 MG/1
4 TABLET, FILM COATED ORAL ONCE
Refills: 0 | Status: DISCONTINUED | OUTPATIENT
Start: 2021-12-07 | End: 2021-12-08

## 2021-12-07 RX ADMIN — Medication 1: at 05:30

## 2021-12-07 RX ADMIN — ONDANSETRON 4 MILLIGRAM(S): 8 TABLET, FILM COATED ORAL at 07:35

## 2021-12-07 RX ADMIN — Medication 2: at 17:23

## 2021-12-07 RX ADMIN — Medication 3: at 13:10

## 2021-12-07 RX ADMIN — Medication 100 MILLIGRAM(S): at 05:32

## 2021-12-07 RX ADMIN — ATORVASTATIN CALCIUM 40 MILLIGRAM(S): 80 TABLET, FILM COATED ORAL at 22:39

## 2021-12-07 RX ADMIN — POTASSIUM PHOSPHATE, MONOBASIC POTASSIUM PHOSPHATE, DIBASIC 62.5 MILLIMOLE(S): 236; 224 INJECTION, SOLUTION INTRAVENOUS at 11:48

## 2021-12-07 RX ADMIN — Medication 1: at 22:51

## 2021-12-07 RX ADMIN — LOSARTAN POTASSIUM 50 MILLIGRAM(S): 100 TABLET, FILM COATED ORAL at 05:31

## 2021-12-07 RX ADMIN — PANTOPRAZOLE SODIUM 40 MILLIGRAM(S): 20 TABLET, DELAYED RELEASE ORAL at 06:04

## 2021-12-07 RX ADMIN — ENOXAPARIN SODIUM 40 MILLIGRAM(S): 100 INJECTION SUBCUTANEOUS at 11:41

## 2021-12-07 RX ADMIN — Medication 145 MILLIGRAM(S): at 11:41

## 2021-12-07 RX ADMIN — Medication 1 TABLET(S): at 02:27

## 2021-12-07 NOTE — PROGRESS NOTE ADULT - SUBJECTIVE AND OBJECTIVE BOX
A TEAM Surgery Progress Note    INTERVAL EVENTS: No acute events overnight.      SUBJECTIVE: Patient seen and examined at bedside with surgical team, patient without complaints. Denies fever, chills, CP, SOB nausea, vomiting, abdominal pain.      OBJECTIVE:    Vital Signs Last 24 Hrs  T(C): 36.4 (06 Dec 2021 21:35), Max: 36.7 (06 Dec 2021 05:15)  T(F): 97.6 (06 Dec 2021 21:35), Max: 98.1 (06 Dec 2021 05:15)  HR: 84 (06 Dec 2021 21:35) (64 - 86)  BP: 131/85 (06 Dec 2021 21:35) (124/81 - 132/94)  BP(mean): --  RR: 18 (06 Dec 2021 21:35) (17 - 18)  SpO2: 98% (06 Dec 2021 21:35) (95% - 99%)I&O's Detail    05 Dec 2021 07:01  -  06 Dec 2021 07:00  --------------------------------------------------------  IN:    dextrose 5% + sodium chloride 0.45% w/ Additives: 500 mL    IV PiggyBack: 250 mL    Oral Fluid: 440 mL  Total IN: 1190 mL    OUT:    Voided (mL): 1320 mL  Total OUT: 1320 mL    Total NET: -130 mL      06 Dec 2021 07:01  -  07 Dec 2021 00:59  --------------------------------------------------------  IN:    dextrose 5% + sodium chloride 0.45% w/ Additives: 600 mL    IV PiggyBack: 500 mL    Oral Fluid: 1200 mL  Total IN: 2300 mL    OUT:    Voided (mL): 750 mL  Total OUT: 750 mL    Total NET: 1550 mL      MEDICATIONS  (STANDING):  atorvastatin 40 milliGRAM(s) Oral at bedtime  dextrose 40% Gel 15 Gram(s) Oral once  dextrose 5% + sodium chloride 0.45% with potassium chloride 20 mEq/L 1000 milliLiter(s) (50 mL/Hr) IV Continuous <Continuous>  dextrose 5%. 1000 milliLiter(s) (100 mL/Hr) IV Continuous <Continuous>  dextrose 5%. 1000 milliLiter(s) (50 mL/Hr) IV Continuous <Continuous>  dextrose 50% Injectable 25 Gram(s) IV Push once  dextrose 50% Injectable 12.5 Gram(s) IV Push once  dextrose 50% Injectable 25 Gram(s) IV Push once  enoxaparin Injectable 40 milliGRAM(s) SubCutaneous daily  fenofibrate Tablet 145 milliGRAM(s) Oral daily  glucagon  Injectable 1 milliGRAM(s) IntraMuscular once  insulin lispro (ADMELOG) corrective regimen sliding scale   SubCutaneous every 6 hours  losartan 50 milliGRAM(s) Oral daily  metoprolol succinate  milliGRAM(s) Oral daily  pantoprazole    Tablet 40 milliGRAM(s) Oral before breakfast    MEDICATIONS  (PRN):  benzocaine 15 mG/menthol 3.6 mG Lozenge 1 Lozenge Oral every 6 hours PRN Sore Throat      PHYSICAL EXAM:      LABS:                        12.2   3.92  )-----------( 166      ( 06 Dec 2021 05:40 )             40.2     12-06    142  |  107  |  24<H>  ----------------------------<  194<H>  3.6   |  25  |  0.75    Ca    8.6      06 Dec 2021 05:40  Phos  2.0     12-06  Mg     2.10     12-06                IMAGING:     A TEAM Surgery Progress Note    INTERVAL EVENTS: No acute events overnight.      SUBJECTIVE: Patient seen and examined at bedside with surgical team, patient endorsed mild nausea this AM. Denies fever, chills, CP, SOB, vomiting, abdominal pain.      OBJECTIVE:    Vital Signs Last 24 Hrs  T(C): 36.4 (06 Dec 2021 21:35), Max: 36.7 (06 Dec 2021 05:15)  T(F): 97.6 (06 Dec 2021 21:35), Max: 98.1 (06 Dec 2021 05:15)  HR: 84 (06 Dec 2021 21:35) (64 - 86)  BP: 131/85 (06 Dec 2021 21:35) (124/81 - 132/94)  BP(mean): --  RR: 18 (06 Dec 2021 21:35) (17 - 18)  SpO2: 98% (06 Dec 2021 21:35) (95% - 99%)I&O's Detail    05 Dec 2021 07:01  -  06 Dec 2021 07:00  --------------------------------------------------------  IN:    dextrose 5% + sodium chloride 0.45% w/ Additives: 500 mL    IV PiggyBack: 250 mL    Oral Fluid: 440 mL  Total IN: 1190 mL    OUT:    Voided (mL): 1320 mL  Total OUT: 1320 mL    Total NET: -130 mL      06 Dec 2021 07:01  -  07 Dec 2021 00:59  --------------------------------------------------------  IN:    dextrose 5% + sodium chloride 0.45% w/ Additives: 600 mL    IV PiggyBack: 500 mL    Oral Fluid: 1200 mL  Total IN: 2300 mL    OUT:    Voided (mL): 750 mL  Total OUT: 750 mL    Total NET: 1550 mL      MEDICATIONS  (STANDING):  atorvastatin 40 milliGRAM(s) Oral at bedtime  dextrose 40% Gel 15 Gram(s) Oral once  dextrose 5% + sodium chloride 0.45% with potassium chloride 20 mEq/L 1000 milliLiter(s) (50 mL/Hr) IV Continuous <Continuous>  dextrose 5%. 1000 milliLiter(s) (100 mL/Hr) IV Continuous <Continuous>  dextrose 5%. 1000 milliLiter(s) (50 mL/Hr) IV Continuous <Continuous>  dextrose 50% Injectable 25 Gram(s) IV Push once  dextrose 50% Injectable 12.5 Gram(s) IV Push once  dextrose 50% Injectable 25 Gram(s) IV Push once  enoxaparin Injectable 40 milliGRAM(s) SubCutaneous daily  fenofibrate Tablet 145 milliGRAM(s) Oral daily  glucagon  Injectable 1 milliGRAM(s) IntraMuscular once  insulin lispro (ADMELOG) corrective regimen sliding scale   SubCutaneous every 6 hours  losartan 50 milliGRAM(s) Oral daily  metoprolol succinate  milliGRAM(s) Oral daily  pantoprazole    Tablet 40 milliGRAM(s) Oral before breakfast    MEDICATIONS  (PRN):  benzocaine 15 mG/menthol 3.6 mG Lozenge 1 Lozenge Oral every 6 hours PRN Sore Throat      PHYSICAL EXAM:      LABS:                        12.2   3.92  )-----------( 166      ( 06 Dec 2021 05:40 )             40.2     12-06    142  |  107  |  24<H>  ----------------------------<  194<H>  3.6   |  25  |  0.75    Ca    8.6      06 Dec 2021 05:40  Phos  2.0     12-06  Mg     2.10     12-06                IMAGING:

## 2021-12-08 ENCOUNTER — TRANSCRIPTION ENCOUNTER (OUTPATIENT)
Age: 46
End: 2021-12-08

## 2021-12-08 VITALS
OXYGEN SATURATION: 98 % | TEMPERATURE: 98 F | RESPIRATION RATE: 16 BRPM | HEART RATE: 75 BPM | SYSTOLIC BLOOD PRESSURE: 118 MMHG | DIASTOLIC BLOOD PRESSURE: 77 MMHG

## 2021-12-08 LAB
ANION GAP SERPL CALC-SCNC: 8 MMOL/L — SIGNIFICANT CHANGE UP (ref 7–14)
BUN SERPL-MCNC: 19 MG/DL — SIGNIFICANT CHANGE UP (ref 7–23)
CALCIUM SERPL-MCNC: 9.3 MG/DL — SIGNIFICANT CHANGE UP (ref 8.4–10.5)
CHLORIDE SERPL-SCNC: 105 MMOL/L — SIGNIFICANT CHANGE UP (ref 98–107)
CO2 SERPL-SCNC: 23 MMOL/L — SIGNIFICANT CHANGE UP (ref 22–31)
CREAT SERPL-MCNC: 0.7 MG/DL — SIGNIFICANT CHANGE UP (ref 0.5–1.3)
GLUCOSE BLDC GLUCOMTR-MCNC: 172 MG/DL — HIGH (ref 70–99)
GLUCOSE BLDC GLUCOMTR-MCNC: 202 MG/DL — HIGH (ref 70–99)
GLUCOSE SERPL-MCNC: 176 MG/DL — HIGH (ref 70–99)
HCT VFR BLD CALC: 38.1 % — LOW (ref 39–50)
HGB BLD-MCNC: 11.7 G/DL — LOW (ref 13–17)
MAGNESIUM SERPL-MCNC: 2.1 MG/DL — SIGNIFICANT CHANGE UP (ref 1.6–2.6)
MCHC RBC-ENTMCNC: 27.3 PG — SIGNIFICANT CHANGE UP (ref 27–34)
MCHC RBC-ENTMCNC: 30.7 GM/DL — LOW (ref 32–36)
MCV RBC AUTO: 89 FL — SIGNIFICANT CHANGE UP (ref 80–100)
NRBC # BLD: 0 /100 WBCS — SIGNIFICANT CHANGE UP
NRBC # FLD: 0 K/UL — SIGNIFICANT CHANGE UP
PHOSPHATE SERPL-MCNC: 2.6 MG/DL — SIGNIFICANT CHANGE UP (ref 2.5–4.5)
PLATELET # BLD AUTO: 180 K/UL — SIGNIFICANT CHANGE UP (ref 150–400)
POTASSIUM SERPL-MCNC: 4 MMOL/L — SIGNIFICANT CHANGE UP (ref 3.5–5.3)
POTASSIUM SERPL-SCNC: 4 MMOL/L — SIGNIFICANT CHANGE UP (ref 3.5–5.3)
RBC # BLD: 4.28 M/UL — SIGNIFICANT CHANGE UP (ref 4.2–5.8)
RBC # FLD: 14.8 % — HIGH (ref 10.3–14.5)
SODIUM SERPL-SCNC: 136 MMOL/L — SIGNIFICANT CHANGE UP (ref 135–145)
WBC # BLD: 4.96 K/UL — SIGNIFICANT CHANGE UP (ref 3.8–10.5)
WBC # FLD AUTO: 4.96 K/UL — SIGNIFICANT CHANGE UP (ref 3.8–10.5)

## 2021-12-08 PROCEDURE — 99238 HOSP IP/OBS DSCHRG MGMT 30/<: CPT

## 2021-12-08 RX ADMIN — PANTOPRAZOLE SODIUM 40 MILLIGRAM(S): 20 TABLET, DELAYED RELEASE ORAL at 05:00

## 2021-12-08 RX ADMIN — Medication 145 MILLIGRAM(S): at 11:19

## 2021-12-08 RX ADMIN — Medication 1: at 08:52

## 2021-12-08 RX ADMIN — Medication 100 MILLIGRAM(S): at 05:00

## 2021-12-08 RX ADMIN — Medication 2: at 12:16

## 2021-12-08 RX ADMIN — ENOXAPARIN SODIUM 40 MILLIGRAM(S): 100 INJECTION SUBCUTANEOUS at 11:19

## 2021-12-08 RX ADMIN — LOSARTAN POTASSIUM 50 MILLIGRAM(S): 100 TABLET, FILM COATED ORAL at 05:00

## 2021-12-08 NOTE — PROGRESS NOTE ADULT - SUBJECTIVE AND OBJECTIVE BOX
TEAM Surgery Progress Note  Patient is a 46y old  Male who presents with a chief complaint of     INTERVAL EVENTS: No acute events overnight.      SUBJECTIVE: Patient seen and examined at bedside with surgical team, patient without complaints. Denies fever, chills, CP, SOB nausea, vomiting, abdominal pain.      OBJECTIVE:    Vital Signs Last 24 Hrs  T(C): 36.5 (07 Dec 2021 21:44), Max: 37 (07 Dec 2021 06:00)  T(F): 97.7 (07 Dec 2021 21:44), Max: 98.6 (07 Dec 2021 06:00)  HR: 71 (07 Dec 2021 21:44) (71 - 90)  BP: 131/80 (07 Dec 2021 21:44) (124/81 - 145/94)  BP(mean): --  RR: 18 (07 Dec 2021 21:44) (17 - 18)  SpO2: 100% (07 Dec 2021 21:44) (96% - 100%)I&O's Detail    06 Dec 2021 07:01  -  07 Dec 2021 07:00  --------------------------------------------------------  IN:    dextrose 5% + sodium chloride 0.45% w/ Additives: 1000 mL    IV PiggyBack: 500 mL    Oral Fluid: 1200 mL  Total IN: 2700 mL    OUT:    Voided (mL): 950 mL  Total OUT: 950 mL    Total NET: 1750 mL      07 Dec 2021 07:01  -  08 Dec 2021 00:29  --------------------------------------------------------  IN:    dextrose 5% + sodium chloride 0.45% w/ Additives: 300 mL    IV PiggyBack: 250 mL    Oral Fluid: 1260 mL  Total IN: 1810 mL    OUT:    Voided (mL): 1200 mL  Total OUT: 1200 mL    Total NET: 610 mL      MEDICATIONS  (STANDING):  atorvastatin 40 milliGRAM(s) Oral at bedtime  dextrose 40% Gel 15 Gram(s) Oral once  dextrose 5%. 1000 milliLiter(s) (100 mL/Hr) IV Continuous <Continuous>  dextrose 5%. 1000 milliLiter(s) (50 mL/Hr) IV Continuous <Continuous>  dextrose 50% Injectable 25 Gram(s) IV Push once  dextrose 50% Injectable 12.5 Gram(s) IV Push once  dextrose 50% Injectable 25 Gram(s) IV Push once  enoxaparin Injectable 40 milliGRAM(s) SubCutaneous daily  fenofibrate Tablet 145 milliGRAM(s) Oral daily  glucagon  Injectable 1 milliGRAM(s) IntraMuscular once  insulin lispro (ADMELOG) corrective regimen sliding scale   SubCutaneous Before meals and at bedtime  losartan 50 milliGRAM(s) Oral daily  metoprolol succinate  milliGRAM(s) Oral daily  pantoprazole    Tablet 40 milliGRAM(s) Oral before breakfast    MEDICATIONS  (PRN):  benzocaine 15 mG/menthol 3.6 mG Lozenge 1 Lozenge Oral every 6 hours PRN Sore Throat  ondansetron Injectable 4 milliGRAM(s) IV Push once PRN Nausea and/or Vomiting      PHYSICAL EXAM:      LABS:                        12.8   5.13  )-----------( 210      ( 07 Dec 2021 07:55 )             42.2     12-07    140  |  106  |  19  ----------------------------<  237<H>  3.9   |  22  |  0.69    Ca    9.4      07 Dec 2021 07:55  Phos  2.5     12-07  Mg     2.10     12-07                IMAGING:     A TEAM Surgery Progress Note      INTERVAL EVENTS: No acute events overnight.      SUBJECTIVE: Patient seen and examined at bedside with surgical team, patient without complaints. Denies fever, chills, CP, SOB nausea, vomiting, abdominal pain.      OBJECTIVE:    Vital Signs Last 24 Hrs  T(C): 36.5 (07 Dec 2021 21:44), Max: 37 (07 Dec 2021 06:00)  T(F): 97.7 (07 Dec 2021 21:44), Max: 98.6 (07 Dec 2021 06:00)  HR: 71 (07 Dec 2021 21:44) (71 - 90)  BP: 131/80 (07 Dec 2021 21:44) (124/81 - 145/94)  BP(mean): --  RR: 18 (07 Dec 2021 21:44) (17 - 18)  SpO2: 100% (07 Dec 2021 21:44) (96% - 100%)I&O's Detail    06 Dec 2021 07:01  -  07 Dec 2021 07:00  --------------------------------------------------------  IN:    dextrose 5% + sodium chloride 0.45% w/ Additives: 1000 mL    IV PiggyBack: 500 mL    Oral Fluid: 1200 mL  Total IN: 2700 mL    OUT:    Voided (mL): 950 mL  Total OUT: 950 mL    Total NET: 1750 mL      07 Dec 2021 07:01  -  08 Dec 2021 00:29  --------------------------------------------------------  IN:    dextrose 5% + sodium chloride 0.45% w/ Additives: 300 mL    IV PiggyBack: 250 mL    Oral Fluid: 1260 mL  Total IN: 1810 mL    OUT:    Voided (mL): 1200 mL  Total OUT: 1200 mL    Total NET: 610 mL      MEDICATIONS  (STANDING):  atorvastatin 40 milliGRAM(s) Oral at bedtime  dextrose 40% Gel 15 Gram(s) Oral once  dextrose 5%. 1000 milliLiter(s) (100 mL/Hr) IV Continuous <Continuous>  dextrose 5%. 1000 milliLiter(s) (50 mL/Hr) IV Continuous <Continuous>  dextrose 50% Injectable 25 Gram(s) IV Push once  dextrose 50% Injectable 12.5 Gram(s) IV Push once  dextrose 50% Injectable 25 Gram(s) IV Push once  enoxaparin Injectable 40 milliGRAM(s) SubCutaneous daily  fenofibrate Tablet 145 milliGRAM(s) Oral daily  glucagon  Injectable 1 milliGRAM(s) IntraMuscular once  insulin lispro (ADMELOG) corrective regimen sliding scale   SubCutaneous Before meals and at bedtime  losartan 50 milliGRAM(s) Oral daily  metoprolol succinate  milliGRAM(s) Oral daily  pantoprazole    Tablet 40 milliGRAM(s) Oral before breakfast    MEDICATIONS  (PRN):  benzocaine 15 mG/menthol 3.6 mG Lozenge 1 Lozenge Oral every 6 hours PRN Sore Throat  ondansetron Injectable 4 milliGRAM(s) IV Push once PRN Nausea and/or Vomiting      PHYSICAL EXAM:      LABS:                        12.8   5.13  )-----------( 210      ( 07 Dec 2021 07:55 )             42.2     12-07    140  |  106  |  19  ----------------------------<  237<H>  3.9   |  22  |  0.69    Ca    9.4      07 Dec 2021 07:55  Phos  2.5     12-07  Mg     2.10     12-07                IMAGING:     A TEAM Surgery Progress Note      INTERVAL EVENTS: No acute events overnight.      SUBJECTIVE: Patient seen and examined at bedside with surgical team, patient without complaints. Denies nausea, vomiting, abdominal pain.  Admitted to some abdominal cramping last night which is now gone.  Tolerating Diabetic Diet.  + OOB ambulating.  (+/+)  Voiding without difficulty.      Vital Signs Last 24 Hrs  T(C): 36.4 (08 Dec 2021 05:00), Max: 36.7 (07 Dec 2021 17:17)  T(F): 97.5 (08 Dec 2021 05:00), Max: 98 (07 Dec 2021 17:17)  HR: 81 (08 Dec 2021 05:00) (71 - 85)  BP: 131/81 (08 Dec 2021 05:00) (119/65 - 145/94)  BP(mean): --  RR: 17 (08 Dec 2021 05:00) (17 - 18)  SpO2: 97% (08 Dec 2021 05:00) (95% - 100%)    I&O's Summary    07 Dec 2021 07:01  -  08 Dec 2021 07:00  --------------------------------------------------------  IN: 2050 mL / OUT: 1200 mL / NET: 850 mL    08 Dec 2021 07:01  -  08 Dec 2021 12:02  --------------------------------------------------------  IN: 0 mL / OUT: 200 mL / NET: -200 mL      I&O's Detail    07 Dec 2021 07:01  -  08 Dec 2021 07:00  --------------------------------------------------------  IN:    dextrose 5% + sodium chloride 0.45% w/ Additives: 300 mL    IV PiggyBack: 250 mL    Oral Fluid: 1500 mL  Total IN: 2050 mL    OUT:    Voided (mL): 1200 mL  Total OUT: 1200 mL    Total NET: 850 mL      08 Dec 2021 07:01  -  08 Dec 2021 12:02  --------------------------------------------------------  IN:  Total IN: 0 mL    OUT:    Voided (mL): 200 mL  Total OUT: 200 mL    Total NET: -200 mL          General Appearance: Appears well, NAD  Chest: Breathing comfortably on RA.    CV: S1, S2 @ 81  Abdomen: Soft, nondistended.  Nontender.    Extremities: Moves all extremities.  .  MEDICATIONS  (STANDING):  atorvastatin 40 milliGRAM(s) Oral at bedtime  dextrose 40% Gel 15 Gram(s) Oral once  dextrose 5%. 1000 milliLiter(s) (100 mL/Hr) IV Continuous <Continuous>  dextrose 5%. 1000 milliLiter(s) (50 mL/Hr) IV Continuous <Continuous>  dextrose 50% Injectable 25 Gram(s) IV Push once  dextrose 50% Injectable 12.5 Gram(s) IV Push once  dextrose 50% Injectable 25 Gram(s) IV Push once  enoxaparin Injectable 40 milliGRAM(s) SubCutaneous daily  fenofibrate Tablet 145 milliGRAM(s) Oral daily  glucagon  Injectable 1 milliGRAM(s) IntraMuscular once  insulin lispro (ADMELOG) corrective regimen sliding scale   SubCutaneous Before meals and at bedtime  losartan 50 milliGRAM(s) Oral daily  metoprolol succinate  milliGRAM(s) Oral daily  pantoprazole    Tablet 40 milliGRAM(s) Oral before breakfast    MEDICATIONS  (PRN):  benzocaine 15 mG/menthol 3.6 mG Lozenge 1 Lozenge Oral every 6 hours PRN Sore Throat      LABS:                        11.7   4.96  )-----------( 180      ( 08 Dec 2021 07:38 )             38.1     12-08    136  |  105  |  19  ----------------------------<  176<H>  4.0   |  23  |  0.70    Ca    9.3      08 Dec 2021 07:38  Phos  2.6     12-08  Mg     2.10     12-08                      .  .  .  .

## 2021-12-08 NOTE — DISCHARGE NOTE NURSING/CASE MANAGEMENT/SOCIAL WORK - [id number]_GEN_ALL_CORE
For information on Fall & Injury Prevention, visit: [email or web address] OR  [email or web address] OR  https://www.cdc.gov/steadi/patient.html

## 2021-12-08 NOTE — PROGRESS NOTE ADULT - ASSESSMENT
45M w/ hx of DM type II, HTN, GERD, HLD, and perforated diverticulitis s/p Isi's (2009), s/p reversal, multiple prior SBO's s/p ex-lap in 2014, 2016, s/p incisional hernia repair, and multiple SBOs managed conservatively, presenting with adhesive SBO.     - C/w CLD  - Possibly advance in PM pending status  - change losartan to vasotect IV   - Serial abdominal exams, pain meds after  - DVT ppx   - Dispo: Planning    A-Team Surgery  [id number] 45M w/ hx of DM type II, HTN, GERD, HLD, and perforated diverticulitis s/p Isi's (2009), s/p reversal, multiple prior SBO's s/p ex-lap in 2014, 2016, s/p incisional hernia repair, and multiple SBOs managed conservatively, presenting with adhesive SBO.     - Continue Diabetic Diet/IVL  - If tolerates breakfast and lunch possible d/c home today.  - OOB ambulate  - DVT ppx - Lovenox    A-Team Surgery  [id number]

## 2021-12-08 NOTE — DISCHARGE NOTE NURSING/CASE MANAGEMENT/SOCIAL WORK - PATIENT PORTAL LINK FT
You can access the FollowMyHealth Patient Portal offered by [facility] by registering at the following website: [email or web address]. By joining [email or web address]’s FollowMyHealth portal, you will also be able to view your health information using other applications (apps) compatible with our system.

## 2021-12-08 NOTE — PROGRESS NOTE ADULT - SUBJECTIVE AND OBJECTIVE BOX
The patient presents for colon cancer screening. Last colonoscopy was done ---.  There is no family history of colon cancer.  She denies any abdominal pain, diarrhea, constipation, rectal bleeding, or weight loss. Progress note A team     Subjective: pt was seen and examined this morning. NGT still in place until the pt is ready to dc tube. Pt is passing gas and stool.       Vital Signs Last 24 Hrs  T(C): 36.6 (06 Feb 2019 05:53), Max: 36.9 (05 Feb 2019 12:25)  T(F): 97.9 (06 Feb 2019 05:53), Max: 98.5 (05 Feb 2019 12:25)  HR: 93 (06 Feb 2019 05:53) (82 - 96)  BP: 142/70 (06 Feb 2019 05:53) (113/68 - 142/70)  BP(mean): --  RR: 17 (06 Feb 2019 05:53) (16 - 18)  SpO2: 98% (06 Feb 2019 05:53) (97% - 99%)    I&O's Detail    05 Feb 2019 07:01  -  06 Feb 2019 07:00  --------------------------------------------------------  IN:    IV PiggyBack: 350 mL    lactated ringers.: 3000 mL  Total IN: 3350 mL    OUT:    Nasoenteral Tube: 160 mL    Voided: 1170 mL  Total OUT: 1330 mL    Total NET: 2020 mL          MEDICATIONS  (STANDING):  dextrose 5% + sodium chloride 0.45% with potassium chloride 20 mEq/L 1000 milliLiter(s) (125 mL/Hr) IV Continuous <Continuous>  dextrose 5%. 1000 milliLiter(s) (50 mL/Hr) IV Continuous <Continuous>  dextrose 50% Injectable 12.5 Gram(s) IV Push once  dextrose 50% Injectable 25 Gram(s) IV Push once  dextrose 50% Injectable 25 Gram(s) IV Push once  enoxaparin Injectable 40 milliGRAM(s) SubCutaneous daily  insulin lispro (HumaLOG) corrective regimen sliding scale   SubCutaneous every 6 hours  metoprolol tartrate Injectable 5 milliGRAM(s) IV Push every 6 hours  pantoprazole  Injectable 40 milliGRAM(s) IV Push daily  potassium chloride  10 mEq/100 mL IVPB 10 milliEquivalent(s) IV Intermittent every 1 hour    MEDICATIONS  (PRN):  benzocaine 15 mG/menthol 3.6 mG (Sugar-Free) Lozenge 1 Lozenge Oral four times a day PRN Sore Throat  dextrose 40% Gel 15 Gram(s) Oral once PRN Blood Glucose LESS THAN 70 milliGRAM(s)/deciliter  glucagon  Injectable 1 milliGRAM(s) IntraMuscular once PRN Glucose LESS THAN 70 milligrams/deciliter      LABS:                        11.4   5.04  )-----------( 194      ( 05 Feb 2019 05:30 )             37.4     02-06    138  |  102  |  16  ----------------------------<  141<H>  3.6   |  22  |  0.76    Ca    8.9      06 Feb 2019 07:55  Phos  2.4     02-06  Mg     1.9     02-06    TPro  8.1  /  Alb  4.8  /  TBili  0.8  /  DBili  x   /  AST  13  /  ALT  25  /  AlkPhos  41  02-04    PT/INR - ( 04 Feb 2019 17:40 )   PT: 12.1 SEC;   INR: 1.09          PTT - ( 04 Feb 2019 17:40 )  PTT:27.4 SEC    LIVER FUNCTIONS - ( 04 Feb 2019 12:45 )  Alb: 4.8 g/dL / Pro: 8.1 g/dL / ALK PHOS: 41 u/L / ALT: 25 u/L / AST: 13 u/L / GGT: x               Physical exam   General: no acute distress  Resp: non labored breathing   Abd. soft, non distended, NGT in place

## 2021-12-09 LAB
CULTURE RESULTS: SIGNIFICANT CHANGE UP
SPECIMEN SOURCE: SIGNIFICANT CHANGE UP

## 2022-01-18 NOTE — ED PROVIDER NOTE - CADM POA PRESS ULCER
TRANSFER - OUT REPORT:    Verbal report given to [place] on Katelynn Girard  being transferred to [facility] ICU 3108 for routine progression of care       Report consisted of patients Situation, Background, Assessment and   Recommendations(SBAR). Information from the following report(s) SBAR was reviewed with the receiving nurse. Lines:   Peripheral IV 01/17/22 Left Antecubital (Active)       Peripheral IV 01/17/22 Right Antecubital (Active)        Opportunity for questions and clarification was provided.       Patient transported with:   Lucia Doran No

## 2022-05-02 NOTE — ED ADULT NURSE NOTE - CINV DISCH TEACH PARTICIP
GENERAL ANESTHESIA/IVSEDATION/SPINAL POST SURGERY INSTRUCTIONS    1.  Rest at home (not necessarily in bed) for 24 hours following anesthesia.  You  may feel sleepy for the next 24 hours.  Do not drive an automobile, operate heavy machinery, or make important decisions.    2.  You may start with clear liquids increasing to a light diet on the day of surgery.  You may then resume your normal diet.  Do not drink alcoholic beverages for 24 hours.  If nausea occurs, limit diet to clear liquids (soda, tea, broth, Jell-O).  If nausea continues for more than 12 hours, call your doctor.    3.  The doctor prescribed (    n/a   ) for pain.  Take as directed.  If nothing was prescribed, you may take a non-prescription non-aspirin containing pain medicine such as Tylenol, Advil, etc.  If pain is not relieved, call your doctor.    4.  Call you doctor if there is excessive:                A. Bleeding or drainage                B. Fever:  101 deg F or higher                C. Swelling or redness    5. You should have a responsible adult with you for a minimum of 6 hours after arrival at home.  This is for your protection and safety.    6.  Avoid smoking for 24 hours following general anesthesia.    7.  Drink plenty of fluids.  If you are unable to urinate by (                                         ) or a feeling of pressure occurs, call your surgeon and/or go to the nearest emergency center.    8.  If you have any questions, please call your surgeon.  IF YOU ARE EXPERIENCING A MEDICAL EMERGENCY, PLEASE GO TO YOUR NEAREST EMERGENCY DEPARTMENT.                      Patient

## 2022-06-03 NOTE — PATIENT PROFILE ADULT - NSPROGENBLOODRESTRICT_GEN_A_NUR
Diclofenac Sodium (VOLTAREN) 1 % gel gel.  SHOULD PATIENT APPLY 2 GRAMS OR 4 GRAMS.      PLEASE CALL OR RESEND      
none

## 2022-08-29 NOTE — ED ADULT NURSE NOTE - CAS EDN DISCHARGE ASSESSMENT
INR = 4.4, not within goal range.  Patient findings are 4.4.  Current Coumadin dose verified - taking 7 mg daily.  See Anticoagulation flowsheet.  Please advise.  Per Dr. Hansen, patient instructed to start Coumadin 6 mg daily and follow up in 2 weeks.  Patient instructed to contact us with any bleeding, bruising, changes in medications, changes in diet or health status, or if there are any questions or concerns.  Patient verbalized understanding.  The patient should be called and told that the INR is very high and that he/she should decrease the dose by 1mg and return for the next lab draw in two weeks.   Anticoagulation Summary  As of 8/27/2022    INR goal:  2.5-3.5   TTR:  --   INR used for dosing:     Warfarin maintenance plan:  7 mg (5 mg x 1 and 1 mg x 2) every day   Weekly warfarin total:  49 mg   Plan last modified:  Munira Smith LPN (12/27/2021)   Next INR check:  9/10/2022   Priority:  Follow-Up - 2 Weeks   Target end date:      Indications    History of mitral valve replacement [Z95.2]             Anticoagulation Episode Summary     INR check location:      Preferred lab:      Send INR reminders to:  LOPEZ HANSEN NURSE MSG POOL    Comments:        Anticoagulation Care Providers     Provider Role Specialty Phone number    Lopez Hansen MD  Internal Medicine [phone]          
Please let him know that I was on vacation.   His INR is very much elevated.   He is on 7 mg daily now.     6 mg daily please and another iNR in 2 weeks.         
Alert and oriented to person, place and time

## 2022-11-28 NOTE — ED PROVIDER NOTE - CONTACT TIME
Patient calling for results done today.  Informed patient that one is still in process.  Patient did ask that provider Valente Ramachandran my chart him with results as soon as they are completed.  Will send to provider to update.  
21-Mar-2019 21:35

## 2022-11-29 ENCOUNTER — INPATIENT (INPATIENT)
Facility: HOSPITAL | Age: 47
LOS: 4 days | Discharge: HOME | End: 2022-12-04
Attending: SURGERY | Admitting: SURGERY

## 2022-11-29 ENCOUNTER — TRANSCRIPTION ENCOUNTER (OUTPATIENT)
Age: 47
End: 2022-11-29

## 2022-11-29 VITALS
TEMPERATURE: 97 F | OXYGEN SATURATION: 96 % | HEART RATE: 130 BPM | RESPIRATION RATE: 18 BRPM | SYSTOLIC BLOOD PRESSURE: 133 MMHG | DIASTOLIC BLOOD PRESSURE: 74 MMHG

## 2022-11-29 DIAGNOSIS — Z98.89 OTHER SPECIFIED POSTPROCEDURAL STATES: Chronic | ICD-10-CM

## 2022-11-29 DIAGNOSIS — Z98.890 OTHER SPECIFIED POSTPROCEDURAL STATES: Chronic | ICD-10-CM

## 2022-11-29 DIAGNOSIS — K56.609 UNSPECIFIED INTESTINAL OBSTRUCTION, UNSPECIFIED AS TO PARTIAL VERSUS COMPLETE OBSTRUCTION: ICD-10-CM

## 2022-11-29 LAB
ALBUMIN SERPL ELPH-MCNC: 4.8 G/DL — SIGNIFICANT CHANGE UP (ref 3.3–5)
ALP SERPL-CCNC: 41 U/L — SIGNIFICANT CHANGE UP (ref 40–120)
ALT FLD-CCNC: 29 U/L — SIGNIFICANT CHANGE UP (ref 4–41)
ANION GAP SERPL CALC-SCNC: 16 MMOL/L — HIGH (ref 7–14)
APPEARANCE UR: CLEAR — SIGNIFICANT CHANGE UP
APTT BLD: 27.3 SEC — SIGNIFICANT CHANGE UP (ref 27–36.3)
AST SERPL-CCNC: 20 U/L — SIGNIFICANT CHANGE UP (ref 4–40)
BACTERIA # UR AUTO: NEGATIVE — SIGNIFICANT CHANGE UP
BASE EXCESS BLDV CALC-SCNC: -1.2 MMOL/L — SIGNIFICANT CHANGE UP (ref -2–3)
BASOPHILS # BLD AUTO: 0 K/UL — SIGNIFICANT CHANGE UP (ref 0–0.2)
BASOPHILS NFR BLD AUTO: 0 % — SIGNIFICANT CHANGE UP (ref 0–2)
BILIRUB SERPL-MCNC: 1.1 MG/DL — SIGNIFICANT CHANGE UP (ref 0.2–1.2)
BILIRUB UR-MCNC: NEGATIVE — SIGNIFICANT CHANGE UP
BLD GP AB SCN SERPL QL: NEGATIVE — SIGNIFICANT CHANGE UP
BLOOD GAS VENOUS COMPREHENSIVE RESULT: SIGNIFICANT CHANGE UP
BUN SERPL-MCNC: 24 MG/DL — HIGH (ref 7–23)
CALCIUM SERPL-MCNC: 9.7 MG/DL — SIGNIFICANT CHANGE UP (ref 8.4–10.5)
CHLORIDE BLDV-SCNC: 103 MMOL/L — SIGNIFICANT CHANGE UP (ref 96–108)
CHLORIDE SERPL-SCNC: 101 MMOL/L — SIGNIFICANT CHANGE UP (ref 98–107)
CO2 BLDV-SCNC: 24.8 MMOL/L — SIGNIFICANT CHANGE UP (ref 22–26)
CO2 SERPL-SCNC: 22 MMOL/L — SIGNIFICANT CHANGE UP (ref 22–31)
COLOR SPEC: YELLOW — SIGNIFICANT CHANGE UP
CREAT SERPL-MCNC: 0.92 MG/DL — SIGNIFICANT CHANGE UP (ref 0.5–1.3)
DIFF PNL FLD: NEGATIVE — SIGNIFICANT CHANGE UP
EGFR: 102 ML/MIN/1.73M2 — SIGNIFICANT CHANGE UP
EOSINOPHIL # BLD AUTO: 0.2 K/UL — SIGNIFICANT CHANGE UP (ref 0–0.5)
EOSINOPHIL NFR BLD AUTO: 2.6 % — SIGNIFICANT CHANGE UP (ref 0–6)
EPI CELLS # UR: 0 /HPF — SIGNIFICANT CHANGE UP (ref 0–5)
FLUAV AG NPH QL: SIGNIFICANT CHANGE UP
FLUBV AG NPH QL: SIGNIFICANT CHANGE UP
GAS PNL BLDV: 136 MMOL/L — SIGNIFICANT CHANGE UP (ref 136–145)
GIANT PLATELETS BLD QL SMEAR: PRESENT — SIGNIFICANT CHANGE UP
GLUCOSE BLDC GLUCOMTR-MCNC: 187 MG/DL — HIGH (ref 70–99)
GLUCOSE BLDV-MCNC: 198 MG/DL — HIGH (ref 70–99)
GLUCOSE SERPL-MCNC: 244 MG/DL — HIGH (ref 70–99)
GLUCOSE UR QL: ABNORMAL
HCO3 BLDV-SCNC: 24 MMOL/L — SIGNIFICANT CHANGE UP (ref 22–29)
HCT VFR BLD CALC: 45.9 % — SIGNIFICANT CHANGE UP (ref 39–50)
HCT VFR BLDA CALC: 44 % — SIGNIFICANT CHANGE UP (ref 39–51)
HGB BLD CALC-MCNC: 14.5 G/DL — SIGNIFICANT CHANGE UP (ref 13–17)
HGB BLD-MCNC: 14.3 G/DL — SIGNIFICANT CHANGE UP (ref 13–17)
HYALINE CASTS # UR AUTO: 0 /LPF — SIGNIFICANT CHANGE UP (ref 0–7)
IANC: 5.41 K/UL — SIGNIFICANT CHANGE UP (ref 1.8–7.4)
INR BLD: 1.03 RATIO — SIGNIFICANT CHANGE UP (ref 0.88–1.16)
KETONES UR-MCNC: ABNORMAL
LACTATE BLDV-MCNC: 2.2 MMOL/L — HIGH (ref 0.5–2)
LACTATE BLDV-MCNC: 2.4 MMOL/L — HIGH (ref 0.5–2)
LEUKOCYTE ESTERASE UR-ACNC: NEGATIVE — SIGNIFICANT CHANGE UP
LIDOCAIN IGE QN: 16 U/L — SIGNIFICANT CHANGE UP (ref 7–60)
LYMPHOCYTES # BLD AUTO: 1.44 K/UL — SIGNIFICANT CHANGE UP (ref 1–3.3)
LYMPHOCYTES # BLD AUTO: 19.1 % — SIGNIFICANT CHANGE UP (ref 13–44)
MANUAL SMEAR VERIFICATION: SIGNIFICANT CHANGE UP
MCHC RBC-ENTMCNC: 27 PG — SIGNIFICANT CHANGE UP (ref 27–34)
MCHC RBC-ENTMCNC: 31.2 GM/DL — LOW (ref 32–36)
MCV RBC AUTO: 86.8 FL — SIGNIFICANT CHANGE UP (ref 80–100)
MONOCYTES # BLD AUTO: 1.24 K/UL — HIGH (ref 0–0.9)
MONOCYTES NFR BLD AUTO: 16.5 % — HIGH (ref 2–14)
NEUTROPHILS # BLD AUTO: 4.53 K/UL — SIGNIFICANT CHANGE UP (ref 1.8–7.4)
NEUTROPHILS NFR BLD AUTO: 35.7 % — LOW (ref 43–77)
NEUTS BAND # BLD: 24.4 % — CRITICAL HIGH (ref 0–6)
NITRITE UR-MCNC: NEGATIVE — SIGNIFICANT CHANGE UP
PCO2 BLDV: 39 MMHG — LOW (ref 42–55)
PH BLDV: 7.39 — SIGNIFICANT CHANGE UP (ref 7.32–7.43)
PH UR: 6 — SIGNIFICANT CHANGE UP (ref 5–8)
PLAT MORPH BLD: NORMAL — SIGNIFICANT CHANGE UP
PLATELET # BLD AUTO: 244 K/UL — SIGNIFICANT CHANGE UP (ref 150–400)
PLATELET COUNT - ESTIMATE: NORMAL — SIGNIFICANT CHANGE UP
PO2 BLDV: 53 MMHG — SIGNIFICANT CHANGE UP
POTASSIUM BLDV-SCNC: 4 MMOL/L — SIGNIFICANT CHANGE UP (ref 3.5–5.1)
POTASSIUM SERPL-MCNC: 4 MMOL/L — SIGNIFICANT CHANGE UP (ref 3.5–5.3)
POTASSIUM SERPL-SCNC: 4 MMOL/L — SIGNIFICANT CHANGE UP (ref 3.5–5.3)
PROT SERPL-MCNC: 8.4 G/DL — HIGH (ref 6–8.3)
PROT UR-MCNC: ABNORMAL
PROTHROM AB SERPL-ACNC: 11.9 SEC — SIGNIFICANT CHANGE UP (ref 10.5–13.4)
RBC # BLD: 5.29 M/UL — SIGNIFICANT CHANGE UP (ref 4.2–5.8)
RBC # FLD: 15.5 % — HIGH (ref 10.3–14.5)
RBC BLD AUTO: NORMAL — SIGNIFICANT CHANGE UP
RBC CASTS # UR COMP ASSIST: 3 /HPF — SIGNIFICANT CHANGE UP (ref 0–4)
RH IG SCN BLD-IMP: POSITIVE — SIGNIFICANT CHANGE UP
RSV RNA NPH QL NAA+NON-PROBE: SIGNIFICANT CHANGE UP
SAO2 % BLDV: 87.1 % — SIGNIFICANT CHANGE UP
SARS-COV-2 RNA SPEC QL NAA+PROBE: SIGNIFICANT CHANGE UP
SODIUM SERPL-SCNC: 139 MMOL/L — SIGNIFICANT CHANGE UP (ref 135–145)
SP GR SPEC: 1.04 — SIGNIFICANT CHANGE UP (ref 1.01–1.05)
UROBILINOGEN FLD QL: SIGNIFICANT CHANGE UP
VARIANT LYMPHS # BLD: 1.7 % — SIGNIFICANT CHANGE UP (ref 0–6)
WBC # BLD: 7.54 K/UL — SIGNIFICANT CHANGE UP (ref 3.8–10.5)
WBC # FLD AUTO: 7.54 K/UL — SIGNIFICANT CHANGE UP (ref 3.8–10.5)
WBC UR QL: 1 /HPF — SIGNIFICANT CHANGE UP (ref 0–5)

## 2022-11-29 PROCEDURE — 71045 X-RAY EXAM CHEST 1 VIEW: CPT | Mod: 26

## 2022-11-29 PROCEDURE — 99222 1ST HOSP IP/OBS MODERATE 55: CPT | Mod: GC

## 2022-11-29 PROCEDURE — 99285 EMERGENCY DEPT VISIT HI MDM: CPT | Mod: 25

## 2022-11-29 PROCEDURE — 74176 CT ABD & PELVIS W/O CONTRAST: CPT | Mod: 26,MA

## 2022-11-29 PROCEDURE — 43753 TX GASTRO INTUB W/ASP: CPT

## 2022-11-29 RX ORDER — METOPROLOL TARTRATE 50 MG
5 TABLET ORAL ONCE
Refills: 0 | Status: COMPLETED | OUTPATIENT
Start: 2022-11-29 | End: 2022-11-29

## 2022-11-29 RX ORDER — SODIUM CHLORIDE 9 MG/ML
1000 INJECTION, SOLUTION INTRAVENOUS
Refills: 0 | Status: DISCONTINUED | OUTPATIENT
Start: 2022-11-29 | End: 2022-11-30

## 2022-11-29 RX ORDER — ICOSAPENT ETHYL 500 MG/1
2 CAPSULE, LIQUID FILLED ORAL
Qty: 0 | Refills: 0 | DISCHARGE

## 2022-11-29 RX ORDER — SODIUM CHLORIDE 9 MG/ML
1000 INJECTION INTRAMUSCULAR; INTRAVENOUS; SUBCUTANEOUS
Refills: 0 | Status: DISCONTINUED | OUTPATIENT
Start: 2022-11-29 | End: 2022-11-29

## 2022-11-29 RX ORDER — SITAGLIPTIN 50 MG/1
1 TABLET, FILM COATED ORAL
Qty: 0 | Refills: 0 | DISCHARGE

## 2022-11-29 RX ORDER — ACETAMINOPHEN 500 MG
1000 TABLET ORAL ONCE
Refills: 0 | Status: COMPLETED | OUTPATIENT
Start: 2022-11-29 | End: 2022-11-29

## 2022-11-29 RX ORDER — SODIUM CHLORIDE 9 MG/ML
1000 INJECTION INTRAMUSCULAR; INTRAVENOUS; SUBCUTANEOUS ONCE
Refills: 0 | Status: COMPLETED | OUTPATIENT
Start: 2022-11-29 | End: 2022-11-29

## 2022-11-29 RX ORDER — ERTAPENEM SODIUM 1 G/1
1000 INJECTION, POWDER, LYOPHILIZED, FOR SOLUTION INTRAMUSCULAR; INTRAVENOUS ONCE
Refills: 0 | Status: COMPLETED | OUTPATIENT
Start: 2022-11-29 | End: 2022-11-29

## 2022-11-29 RX ORDER — ONDANSETRON 8 MG/1
4 TABLET, FILM COATED ORAL ONCE
Refills: 0 | Status: COMPLETED | OUTPATIENT
Start: 2022-11-29 | End: 2022-11-29

## 2022-11-29 RX ORDER — GLIMEPIRIDE 1 MG
1 TABLET ORAL
Qty: 0 | Refills: 0 | DISCHARGE

## 2022-11-29 RX ORDER — ENOXAPARIN SODIUM 100 MG/ML
40 INJECTION SUBCUTANEOUS EVERY 24 HOURS
Refills: 0 | Status: DISCONTINUED | OUTPATIENT
Start: 2022-11-29 | End: 2022-12-04

## 2022-11-29 RX ADMIN — SODIUM CHLORIDE 125 MILLILITER(S): 9 INJECTION, SOLUTION INTRAVENOUS at 20:45

## 2022-11-29 RX ADMIN — ONDANSETRON 4 MILLIGRAM(S): 8 TABLET, FILM COATED ORAL at 16:06

## 2022-11-29 RX ADMIN — ENOXAPARIN SODIUM 40 MILLIGRAM(S): 100 INJECTION SUBCUTANEOUS at 23:43

## 2022-11-29 RX ADMIN — SODIUM CHLORIDE 1000 MILLILITER(S): 9 INJECTION INTRAMUSCULAR; INTRAVENOUS; SUBCUTANEOUS at 16:06

## 2022-11-29 RX ADMIN — SODIUM CHLORIDE 1000 MILLILITER(S): 9 INJECTION INTRAMUSCULAR; INTRAVENOUS; SUBCUTANEOUS at 13:21

## 2022-11-29 RX ADMIN — Medication 400 MILLIGRAM(S): at 23:44

## 2022-11-29 RX ADMIN — Medication 400 MILLIGRAM(S): at 13:20

## 2022-11-29 RX ADMIN — Medication 5 MILLIGRAM(S): at 23:43

## 2022-11-29 RX ADMIN — ONDANSETRON 4 MILLIGRAM(S): 8 TABLET, FILM COATED ORAL at 13:21

## 2022-11-29 RX ADMIN — ERTAPENEM SODIUM 120 MILLIGRAM(S): 1 INJECTION, POWDER, LYOPHILIZED, FOR SOLUTION INTRAMUSCULAR; INTRAVENOUS at 16:25

## 2022-11-29 NOTE — H&P ADULT - NSHPREVIEWOFSYSTEMS_GEN_ALL_CORE
Gen: No fever, +decreased appetite  Eyes: No eye irritation or discharge  ENT: No ear pain, congestion, sore throat  Resp: No cough or trouble breathing  Cardiovascular: No chest pain or palpitation  Gastroenteric: No vomiting, rest as per hpi  :  No change in urine output; no dysuria  MS: No joint or muscle pain  Skin: No rashes  Neuro: + headache; no abnormal movements  Remainder negative, except as per the HPI Gen: No fever, +decreased appetite  Eyes: No eye irritation or discharge  ENT: No ear pain, congestion, sore throat  Resp: No cough or trouble breathing  Cardiovascular: No chest pain or palpitation  Gastroenteric: No vomiting  :  No change in urine output; no dysuria  MS: No joint or muscle pain  Skin: No rashes  Neuro: + headache; no abnormal movements  Remainder negative, except as per the HPI

## 2022-11-29 NOTE — H&P ADULT - NSHPPHYSICALEXAM_GEN_ALL_CORE
Abdominal exam: Distended and diffusely tender No focal neurologic deficits  Sclera nonicteric  NAD, awake and alert  Respirations nonlabored  Abdomen soft, mildly tender, distended  No guarding or rebound tenderness No focal neurologic deficits  Sclera nonicteric  NAD, awake and alert  Respirations nonlabored  Abdomen soft, mildly tender, distended, No guarding or rebound tenderness, not peritoneal

## 2022-11-29 NOTE — ED ADULT TRIAGE NOTE - CHIEF COMPLAINT QUOTE
Pt complaining of abdominal pain and N/V since sunday. Pt has a hx of SBO. Pt denies chest pain, sob.

## 2022-11-29 NOTE — H&P ADULT - ASSESSMENT
48 yo M improving s/p NG tube placement for SBO.    Plan:  - IV fluids  - Monitor NG tube output  - Serial abdominal exams 48 yo M w/ hx of multiple SBOs presenting w/ 3 days of nausea, abdominal pain, distention found to have SBO on CT and currently improving s/p NGT placement.    Plan:  -Admit to Dr. White  -NPO/IVF  -Monitor NGT output  -Serial abdominal exams  -vte ppx 46 yo M w/ pmh HTN, HLD, T2DM,  s/p Buchanan's in 2009 and subsequent multiple SBOs some requiring surgery (last time in 2018), hx of ventral hernias s/p repair in 2018, presenting w/ 3 days of nausea, bloating, and diffuse abdominal pain found to have SBO.     Plan:  -Admit to Dr. White  -NPO/IVF  -Monitor NGT output  -Serial abdominal exams, no standing pain meds   - C/w with Lopressor and PPI (home meds)  -vte ppx    D/w Dr. White      A team [id number]

## 2022-11-29 NOTE — H&P ADULT - HISTORY OF PRESENT ILLNESS
48 yo M w/ pmh HTN, HLD, T2DM, multiple previous abdominal surgeries and SBOs presenting w/ 3 days of nausea, bloating, and diffuse abdominal pain.  Pt reports yearly hospital admissions for SBO 2/2 to abdominal adhesions and endorses this feels exactly like those episodes.  Last abdominal surgery was incisional hernia repair in 2018.  Pt last past flatus on Monday and last stool was Tuesday morning.  Pt denies fever, chills, and dysuria.   46 yo M w/ pmh HTN, HLD, T2DM, multiple abdominal surgeries and SBOs presenting w/ 3 days of nausea, bloating, and diffuse abdominal pain.  Pt reports yearly hospital admissions for SBO 2/2 to abdominal adhesions and endorses this feels exactly like those episodes.  Last abdominal surgery was incisional hernia repair in 2018.  He has had poor appetite and not tolerating much PO since the pain started. Pt last past flatus on Monday and last stool was Tuesday morning.  Pt denies fever, chills, and dysuria.      In the ER the pt was tachycardic to 115 and afebrile. WBC was 7.54. CT demonstrated SBO @ transition point into ventral hernia.  Per radiology the hernia is likely not the cause of SBO 48 yo M w/ pmh HTN, HLD, T2DM, multiple abdominal surgeries and SBOs presenting w/ 3 days of nausea, bloating, and diffuse abdominal pain.  Pt reports yearly hospital admissions for SBO 2/2 to abdominal adhesions and endorses this feels exactly like those episodes.  Last abdominal surgery was incisional hernia repair in 2018.  He has had poor appetite and not tolerating much PO since the pain started. Pt last past flatus on Monday and last stool was Tuesday morning.  Pt denies fever, chills, and dysuria.      In the ED the pt was tachycardic to 115 and afebrile. WBC was 7.54. CT demonstrated SBO @ transition point into ventral hernia.  Per radiology the hernia is likely not the cause of SBO 48 yo M w/ pmh HTN, HLD, T2DM,  s/p Buchanan's in 2009 and subsequent multiple SBOs some requiring surgery (last time in 2018), hx of ventral hernias s/p repair in 2018, presenting w/ 3 days of nausea, bloating, and diffuse abdominal pain. Pt reports that abd pain started 3 days ago and was getting worse,  last  flatus was one day before the presentation and last stool was on the day of the presentation.  Pt denies fever, chills, and dysuria.   He has had poor appetite and not tolerating much PO since the pain started. Pt reports yearly hospital admissions for SBO 2/2 to abdominal adhesions and endorses this feels exactly like those episodes.  Last abdominal surgery was incisional hernia repair in 2018 and [facility]DA.     In the ED the pt was tachycardic to 115 and afebrile. WBC was 7.54. Lactate 2.4. CT demonstrated SBO @ transition point into ventral hernia.  Per radiology the hernia is likely not the cause of SBO

## 2022-11-29 NOTE — H&P ADULT - SOCIAL HISTORY
Advair Diskus Pending    Insurance response  Prescription Drug Insurance: [employer]  Notes: Prior authorization submitted via fax - will update provider when decision has been made by insurance.          No

## 2022-11-29 NOTE — ED PROVIDER NOTE - CLINICAL SUMMARY MEDICAL DECISION MAKING FREE TEXT BOX
Rober: H/o SBO. P/w LUQ pain, no flatus, diarrhea yest., N (nearly vomitted). Concern for SBO. Labs, CT (has oral and IV contrast allergy: will do non-contrast CT). Gen Surg consult (Dr. White's pt.). Admit.

## 2022-11-29 NOTE — H&P ADULT - NSHPLABSRESULTS_GEN_ALL_CORE
.  LABS:                         14.3   7.54  )-----------( 244      ( 2022 12:55 )             45.9         139  |  101  |  24<H>  ----------------------------<  244<H>  4.0   |  22  |  0.92    Ca    9.7      2022 12:55    TPro  8.4<H>  /  Alb  4.8  /  TBili  1.1  /  DBili  x   /  AST  20  /  ALT  29  /  AlkPhos  41      PT/INR - ( 2022 12:55 )   PT: 11.9 sec;   INR: 1.03 ratio         PTT - ( 2022 12:55 )  PTT:27.3 sec  Urinalysis Basic - ( 2022 14:45 )    Color: Yellow / Appearance: Clear / S.044 / pH: x  Gluc: x / Ketone: Trace  / Bili: Negative / Urobili: <2 mg/dL   Blood: x / Protein: Trace / Nitrite: Negative   Leuk Esterase: Negative / RBC: 3 /HPF / WBC 1 /HPF   Sq Epi: x / Non Sq Epi: 0 /HPF / Bacteria: Negative      < from: CT Abdomen and Pelvis No Cont (22 @ 18:09) >    FINDINGS:  LOWER CHEST: Within normal limits.    LIVER: Hepatic steatosis.  BILE DUCTS: Normal caliber.  GALLBLADDER: Cholelithiasis without CT evidence for cholecystitis.  SPLEEN: Within normal limits.  PANCREAS: Within normal limits.  ADRENALS: Within normal limits.  KIDNEYS/URETERS: No renal stones or hydronephrosis.    BLADDER: Within normal limits.  REPRODUCTIVE ORGANS: Prostate within normal limits.    BOWEL: Dilated loops of small bowel with air-fluid levels with transition   point in the right lower quadrant (2:90-97). Distal loops ofsmall bowel   are collapsed. Colonic diverticulosis without evidence of diverticulitis.   Normal appendix.  PERITONEUM: No ascites. No pneumoperitoneum.  VESSELS: Within normal limits.  RETROPERITONEUM/LYMPH NODES: No lymphadenopathy.  ABDOMINAL WALL: Ventral abdominal wall hernias with a supraumbilical   containing a large bowel loop without obstruction. Second more inferiorly   placed ventral hernia with small bowel loops. A third more inferiorly   positioned ventral hernia contains the cecum and terminal ileum and   appendix.  BONES: Degenerative changes.    IMPRESSION:  Small bowel obstruction with transition point in the right lower   quadrant. Hernias do not appear to be a source of obstruction.  Fatty liver.  Cholelithiasis with multiple gallstones  Diverticulosis without diverticulitis    < end of copied text >

## 2022-11-29 NOTE — H&P ADULT - ATTENDING COMMENTS
Recurrent SBO , Multiple previous admissions with successsful decompresion . Currently stable , CT noted. To place NG , Hydrate, serial abdominal exams. DH

## 2022-11-29 NOTE — ED PROVIDER NOTE - ATTENDING CONTRIBUTION TO CARE
I performed a face-to-face evaluation of the patient and performed a history and physical examination. I agree with the history and physical examination. If this was a PA visit, I personally saw the patient with the PA and performed a substantive portion of the visit including all aspects of the medical decision making.    H/o SBO. P/w LUQ pain, no flatus, diarrhea yest., N (nearly vomitted). Concern for SBO. Labs, CT (has oral and IV contrast allergy: will do non-contrast CT). Gen Surg consult (Dr. White's pt.). Admit.

## 2022-11-29 NOTE — ED ADULT NURSE REASSESSMENT NOTE - NS ED NURSE REASSESS COMMENT FT1
Pt resting im bed at this time. NG tube in place. Brown liquid draining to wall suction at this time. Pt states a little relief with the NG tube. Denies any other complaints. Will continue to monitor.
Pt resting in bed. Pending CT scan. Will continue to monitor.

## 2022-11-29 NOTE — ED PROVIDER NOTE - OBJECTIVE STATEMENT
47M Hypertension hernia repair Buchanan BNP 47M PMH HTN, HLD, hernia repair, Buchanan's procedure, SBO, DVT not on coagulation, DM, GERD, diverticulitis p/w abdominal pain and nausea. Patient says the abdominal pain started on Sunday. It has been intermittent, aching and worse in the left abdomen. Today the pain became worse and constant. He also started having nausea, no vomitin. Patient says the symptoms feel similar to his prior SBOs. Denies fever, chest pain, shortness of breath, urinary symptoms. Patient endorses having a normal bowel movement this morning. Endorses passing gas yesterday but does not think he has passed any gas today. Surgeon is Dr. White.

## 2022-11-29 NOTE — ED ADULT NURSE NOTE - OBJECTIVE STATEMENT
47 year old male received to room 12. Pt A&Ox4, ambulatory at baseline. Respirations equal and unlabored. Pt c/o abdominal pain, nausea and vomiting since Sunday. Hx of SBO. Pt denies CP, SOB, palpitations, dizziness, lethargy, blurry vision, any other medical complaints. Neuro intact. PERRLA. +2 pulses radial and pedal. Abdomen hard and distended. Skin dry and intact. Pending MD orders. Will continue to monitor.

## 2022-11-29 NOTE — ED PROVIDER NOTE - PHYSICAL EXAMINATION
PHYSICAL EXAM:  GENERAL: Sitting comfortable in bed, in no acute distress  HENMT: Atraumatic, moist mucous membranes EYES: Clear bilaterally, PERRL, EOMs intact b/l  HEART: RRR, S1/S2, no murmur  RESPIRATORY: Clear to auscultation bilaterally, no wheezes/rhonchi/rales  ABDOMEN: +BS, soft, moderate ttp in LUQ, no rebound or guarding  EXTREMITIES: Mild right LE non-pitting edema, +2 pulses b/l  NEURO: A&Ox4  SKIN: Skin normal color for race, warm, dry and intact

## 2022-11-29 NOTE — PATIENT PROFILE ADULT - FALL HARM RISK - HARM RISK INTERVENTIONS

## 2022-11-30 LAB
A1C WITH ESTIMATED AVERAGE GLUCOSE RESULT: 8.7 % — HIGH (ref 4–5.6)
ANION GAP SERPL CALC-SCNC: 14 MMOL/L — SIGNIFICANT CHANGE UP (ref 7–14)
BUN SERPL-MCNC: 25 MG/DL — HIGH (ref 7–23)
CALCIUM SERPL-MCNC: 8.7 MG/DL — SIGNIFICANT CHANGE UP (ref 8.4–10.5)
CHLORIDE SERPL-SCNC: 108 MMOL/L — HIGH (ref 98–107)
CO2 SERPL-SCNC: 23 MMOL/L — SIGNIFICANT CHANGE UP (ref 22–31)
CREAT SERPL-MCNC: 0.84 MG/DL — SIGNIFICANT CHANGE UP (ref 0.5–1.3)
CULTURE RESULTS: SIGNIFICANT CHANGE UP
EGFR: 107 ML/MIN/1.73M2 — SIGNIFICANT CHANGE UP
ESTIMATED AVERAGE GLUCOSE: 203 — SIGNIFICANT CHANGE UP
GLUCOSE BLDC GLUCOMTR-MCNC: 128 MG/DL — HIGH (ref 70–99)
GLUCOSE BLDC GLUCOMTR-MCNC: 138 MG/DL — HIGH (ref 70–99)
GLUCOSE BLDC GLUCOMTR-MCNC: 145 MG/DL — HIGH (ref 70–99)
GLUCOSE BLDC GLUCOMTR-MCNC: 148 MG/DL — HIGH (ref 70–99)
GLUCOSE SERPL-MCNC: 152 MG/DL — HIGH (ref 70–99)
HCT VFR BLD CALC: 41.4 % — SIGNIFICANT CHANGE UP (ref 39–50)
HGB BLD-MCNC: 12.7 G/DL — LOW (ref 13–17)
MAGNESIUM SERPL-MCNC: 1.8 MG/DL — SIGNIFICANT CHANGE UP (ref 1.6–2.6)
MCHC RBC-ENTMCNC: 27 PG — SIGNIFICANT CHANGE UP (ref 27–34)
MCHC RBC-ENTMCNC: 30.7 GM/DL — LOW (ref 32–36)
MCV RBC AUTO: 88.1 FL — SIGNIFICANT CHANGE UP (ref 80–100)
NRBC # BLD: 0 /100 WBCS — SIGNIFICANT CHANGE UP (ref 0–0)
NRBC # FLD: 0 K/UL — SIGNIFICANT CHANGE UP (ref 0–0)
PHOSPHATE SERPL-MCNC: 2.2 MG/DL — LOW (ref 2.5–4.5)
PLATELET # BLD AUTO: 214 K/UL — SIGNIFICANT CHANGE UP (ref 150–400)
POTASSIUM SERPL-MCNC: 3.6 MMOL/L — SIGNIFICANT CHANGE UP (ref 3.5–5.3)
POTASSIUM SERPL-SCNC: 3.6 MMOL/L — SIGNIFICANT CHANGE UP (ref 3.5–5.3)
RBC # BLD: 4.7 M/UL — SIGNIFICANT CHANGE UP (ref 4.2–5.8)
RBC # FLD: 15.6 % — HIGH (ref 10.3–14.5)
SODIUM SERPL-SCNC: 145 MMOL/L — SIGNIFICANT CHANGE UP (ref 135–145)
SPECIMEN SOURCE: SIGNIFICANT CHANGE UP
WBC # BLD: 6.45 K/UL — SIGNIFICANT CHANGE UP (ref 3.8–10.5)
WBC # FLD AUTO: 6.45 K/UL — SIGNIFICANT CHANGE UP (ref 3.8–10.5)

## 2022-11-30 RX ORDER — SODIUM CHLORIDE 9 MG/ML
1000 INJECTION, SOLUTION INTRAVENOUS
Refills: 0 | Status: DISCONTINUED | OUTPATIENT
Start: 2022-11-30 | End: 2022-12-04

## 2022-11-30 RX ORDER — METOPROLOL TARTRATE 50 MG
10 TABLET ORAL EVERY 6 HOURS
Refills: 0 | Status: DISCONTINUED | OUTPATIENT
Start: 2022-11-30 | End: 2022-12-03

## 2022-11-30 RX ORDER — DEXTROSE 50 % IN WATER 50 %
25 SYRINGE (ML) INTRAVENOUS ONCE
Refills: 0 | Status: DISCONTINUED | OUTPATIENT
Start: 2022-11-30 | End: 2022-12-04

## 2022-11-30 RX ORDER — METOPROLOL TARTRATE 50 MG
100 TABLET ORAL DAILY
Refills: 0 | Status: DISCONTINUED | OUTPATIENT
Start: 2022-11-30 | End: 2022-11-30

## 2022-11-30 RX ORDER — SODIUM CHLORIDE 9 MG/ML
1000 INJECTION, SOLUTION INTRAVENOUS
Refills: 0 | Status: DISCONTINUED | OUTPATIENT
Start: 2022-11-30 | End: 2022-12-03

## 2022-11-30 RX ORDER — INSULIN LISPRO 100/ML
VIAL (ML) SUBCUTANEOUS AT BEDTIME
Refills: 0 | Status: DISCONTINUED | OUTPATIENT
Start: 2022-11-30 | End: 2022-11-30

## 2022-11-30 RX ORDER — MAGNESIUM SULFATE 500 MG/ML
2 VIAL (ML) INJECTION ONCE
Refills: 0 | Status: COMPLETED | OUTPATIENT
Start: 2022-11-30 | End: 2022-11-30

## 2022-11-30 RX ORDER — PANTOPRAZOLE SODIUM 20 MG/1
40 TABLET, DELAYED RELEASE ORAL DAILY
Refills: 0 | Status: DISCONTINUED | OUTPATIENT
Start: 2022-11-30 | End: 2022-12-04

## 2022-11-30 RX ORDER — INSULIN LISPRO 100/ML
VIAL (ML) SUBCUTANEOUS
Refills: 0 | Status: DISCONTINUED | OUTPATIENT
Start: 2022-11-30 | End: 2022-11-30

## 2022-11-30 RX ORDER — DEXTROSE 50 % IN WATER 50 %
15 SYRINGE (ML) INTRAVENOUS ONCE
Refills: 0 | Status: DISCONTINUED | OUTPATIENT
Start: 2022-11-30 | End: 2022-12-04

## 2022-11-30 RX ORDER — GLUCAGON INJECTION, SOLUTION 0.5 MG/.1ML
1 INJECTION, SOLUTION SUBCUTANEOUS ONCE
Refills: 0 | Status: DISCONTINUED | OUTPATIENT
Start: 2022-11-30 | End: 2022-12-02

## 2022-11-30 RX ORDER — BENZOCAINE AND MENTHOL 5; 1 G/100ML; G/100ML
1 LIQUID ORAL ONCE
Refills: 0 | Status: COMPLETED | OUTPATIENT
Start: 2022-11-30 | End: 2022-11-30

## 2022-11-30 RX ORDER — POTASSIUM CHLORIDE 20 MEQ
10 PACKET (EA) ORAL
Refills: 0 | Status: COMPLETED | OUTPATIENT
Start: 2022-11-30 | End: 2022-11-30

## 2022-11-30 RX ORDER — POTASSIUM PHOSPHATE, MONOBASIC POTASSIUM PHOSPHATE, DIBASIC 236; 224 MG/ML; MG/ML
15 INJECTION, SOLUTION INTRAVENOUS ONCE
Refills: 0 | Status: COMPLETED | OUTPATIENT
Start: 2022-11-30 | End: 2022-11-30

## 2022-11-30 RX ORDER — ACETAMINOPHEN 500 MG
1000 TABLET ORAL ONCE
Refills: 0 | Status: COMPLETED | OUTPATIENT
Start: 2022-11-30 | End: 2022-11-30

## 2022-11-30 RX ORDER — METOPROLOL TARTRATE 50 MG
5 TABLET ORAL EVERY 6 HOURS
Refills: 0 | Status: DISCONTINUED | OUTPATIENT
Start: 2022-11-30 | End: 2022-11-30

## 2022-11-30 RX ORDER — INSULIN LISPRO 100/ML
VIAL (ML) SUBCUTANEOUS EVERY 6 HOURS
Refills: 0 | Status: DISCONTINUED | OUTPATIENT
Start: 2022-11-30 | End: 2022-12-02

## 2022-11-30 RX ORDER — DEXTROSE 50 % IN WATER 50 %
12.5 SYRINGE (ML) INTRAVENOUS ONCE
Refills: 0 | Status: DISCONTINUED | OUTPATIENT
Start: 2022-11-30 | End: 2022-12-04

## 2022-11-30 RX ADMIN — Medication 25 GRAM(S): at 17:13

## 2022-11-30 RX ADMIN — Medication 5 MILLIGRAM(S): at 05:01

## 2022-11-30 RX ADMIN — PANTOPRAZOLE SODIUM 40 MILLIGRAM(S): 20 TABLET, DELAYED RELEASE ORAL at 11:32

## 2022-11-30 RX ADMIN — POTASSIUM PHOSPHATE, MONOBASIC POTASSIUM PHOSPHATE, DIBASIC 62.5 MILLIMOLE(S): 236; 224 INJECTION, SOLUTION INTRAVENOUS at 18:48

## 2022-11-30 RX ADMIN — Medication 100 MILLIEQUIVALENT(S): at 13:12

## 2022-11-30 RX ADMIN — Medication 120 MILLIGRAM(S): at 12:11

## 2022-11-30 RX ADMIN — Medication 1000 MILLIGRAM(S): at 00:14

## 2022-11-30 RX ADMIN — SODIUM CHLORIDE 125 MILLILITER(S): 9 INJECTION, SOLUTION INTRAVENOUS at 17:13

## 2022-11-30 RX ADMIN — Medication 400 MILLIGRAM(S): at 12:49

## 2022-11-30 RX ADMIN — Medication 120 MILLIGRAM(S): at 17:13

## 2022-11-30 RX ADMIN — Medication 120 MILLIGRAM(S): at 23:33

## 2022-11-30 RX ADMIN — Medication 1000 MILLIGRAM(S): at 13:21

## 2022-11-30 RX ADMIN — BENZOCAINE AND MENTHOL 1 LOZENGE: 5; 1 LIQUID ORAL at 05:58

## 2022-11-30 RX ADMIN — Medication 100 MILLIEQUIVALENT(S): at 15:00

## 2022-11-30 RX ADMIN — Medication 100 MILLIEQUIVALENT(S): at 16:04

## 2022-11-30 RX ADMIN — ENOXAPARIN SODIUM 40 MILLIGRAM(S): 100 INJECTION SUBCUTANEOUS at 23:43

## 2022-11-30 NOTE — PROVIDER CONTACT NOTE (OTHER) - SITUATION
pt arrived from  tachycardiac HR in 120's. pt stated he missed his morning dose of 100mg lopressor.
pt tachycardiac 105 BPM

## 2022-11-30 NOTE — PROVIDER CONTACT NOTE (OTHER) - ACTION/TREATMENT ORDERED:
Provider made aware-spoke with pt normal for pt to be tachycardiac while having an SBO
provider made aware, will order lopressor

## 2022-11-30 NOTE — CHART NOTE - NSCHARTNOTEFT_GEN_A_CORE
Patient continues to be tachycardic, despite 5mg lopressor given about 1.5 hours ago. Spoke with patient who informed me he is tachycardic and hypertensive at baseline, generally ranging anywhere from 70 to the low 100s. However, patient reports that when he has previously had SBOs, he has routinely been tachycardic to the 120s, as he is now, effecting a higher baseline heart rate during SBO episodes.     On exam, patient's abdomen is soft, and non-tender, with reduced distension. Denies any chest pain. No tachypnea. Patient continues to be tachycardic, despite 5mg lopressor given about 1.5 hours ago. Spoke with patient who informed me he is tachycardic and hypertensive at baseline, generally ranging anywhere from 70 to the low 100s. However, patient reports that when he has previously had SBOs, he has routinely been tachycardic to the 120s, as he is now, effecting a higher baseline heart rate during SBO episodes.     On exam, patient's abdomen is soft, and non-tender, with reduced distension. Denies any chest pain. No tachypnea.    Update: Patient's most recent heart rate is 96. Patient continues to be tachycardic, despite 5mg lopressor given about 1.5 hours ago. Spoke with patient who informed me he is tachycardic and hypertensive at baseline, generally ranging anywhere from 70 to the low 100s. However, patient reports that when he has previously had SBOs, he has routinely been tachycardic to the 120s, as he is now, effecting a higher baseline heart rate during SBO episodes.     On exam, patient's abdomen is soft, and non-tender, with reduced distension. Denies any chest pain. No tachypnea.    Update: Patient's HR has come down to 96.

## 2022-11-30 NOTE — ED PROCEDURE NOTE - ATTENDING CONTRIBUTION TO CARE
Rober: I was present during the critical part of the procedure.
I have personally discussed the indications, risks and benefits of the above procedure with the resident and/or ACP. I was present for key portions of the procedure and assisted when required.

## 2022-12-01 LAB
ANION GAP SERPL CALC-SCNC: 9 MMOL/L — SIGNIFICANT CHANGE UP (ref 7–14)
BUN SERPL-MCNC: 20 MG/DL — SIGNIFICANT CHANGE UP (ref 7–23)
CALCIUM SERPL-MCNC: 8.4 MG/DL — SIGNIFICANT CHANGE UP (ref 8.4–10.5)
CHLORIDE SERPL-SCNC: 108 MMOL/L — HIGH (ref 98–107)
CO2 SERPL-SCNC: 23 MMOL/L — SIGNIFICANT CHANGE UP (ref 22–31)
CREAT SERPL-MCNC: 0.75 MG/DL — SIGNIFICANT CHANGE UP (ref 0.5–1.3)
EGFR: 111 ML/MIN/1.73M2 — SIGNIFICANT CHANGE UP
GLUCOSE BLDC GLUCOMTR-MCNC: 113 MG/DL — HIGH (ref 70–99)
GLUCOSE BLDC GLUCOMTR-MCNC: 138 MG/DL — HIGH (ref 70–99)
GLUCOSE BLDC GLUCOMTR-MCNC: 176 MG/DL — HIGH (ref 70–99)
GLUCOSE BLDC GLUCOMTR-MCNC: 182 MG/DL — HIGH (ref 70–99)
GLUCOSE SERPL-MCNC: 198 MG/DL — HIGH (ref 70–99)
HCT VFR BLD CALC: 40 % — SIGNIFICANT CHANGE UP (ref 39–50)
HGB BLD-MCNC: 12.1 G/DL — LOW (ref 13–17)
LACTATE BLDV-MCNC: 1.5 MMOL/L — SIGNIFICANT CHANGE UP (ref 0.5–2)
MAGNESIUM SERPL-MCNC: 2.1 MG/DL — SIGNIFICANT CHANGE UP (ref 1.6–2.6)
MCHC RBC-ENTMCNC: 27.4 PG — SIGNIFICANT CHANGE UP (ref 27–34)
MCHC RBC-ENTMCNC: 30.3 GM/DL — LOW (ref 32–36)
MCV RBC AUTO: 90.7 FL — SIGNIFICANT CHANGE UP (ref 80–100)
NRBC # BLD: 0 /100 WBCS — SIGNIFICANT CHANGE UP (ref 0–0)
NRBC # FLD: 0 K/UL — SIGNIFICANT CHANGE UP (ref 0–0)
PHOSPHATE SERPL-MCNC: 1.9 MG/DL — LOW (ref 2.5–4.5)
PLATELET # BLD AUTO: 188 K/UL — SIGNIFICANT CHANGE UP (ref 150–400)
POTASSIUM SERPL-MCNC: 3.5 MMOL/L — SIGNIFICANT CHANGE UP (ref 3.5–5.3)
POTASSIUM SERPL-SCNC: 3.5 MMOL/L — SIGNIFICANT CHANGE UP (ref 3.5–5.3)
RBC # BLD: 4.41 M/UL — SIGNIFICANT CHANGE UP (ref 4.2–5.8)
RBC # FLD: 15.5 % — HIGH (ref 10.3–14.5)
SODIUM SERPL-SCNC: 140 MMOL/L — SIGNIFICANT CHANGE UP (ref 135–145)
WBC # BLD: 6.79 K/UL — SIGNIFICANT CHANGE UP (ref 3.8–10.5)
WBC # FLD AUTO: 6.79 K/UL — SIGNIFICANT CHANGE UP (ref 3.8–10.5)

## 2022-12-01 PROCEDURE — 99232 SBSQ HOSP IP/OBS MODERATE 35: CPT

## 2022-12-01 RX ORDER — POTASSIUM CHLORIDE 20 MEQ
10 PACKET (EA) ORAL
Refills: 0 | Status: COMPLETED | OUTPATIENT
Start: 2022-12-01 | End: 2022-12-01

## 2022-12-01 RX ORDER — BENZOCAINE AND MENTHOL 5; 1 G/100ML; G/100ML
1 LIQUID ORAL ONCE
Refills: 0 | Status: COMPLETED | OUTPATIENT
Start: 2022-12-01 | End: 2022-12-01

## 2022-12-01 RX ORDER — POTASSIUM PHOSPHATE, MONOBASIC POTASSIUM PHOSPHATE, DIBASIC 236; 224 MG/ML; MG/ML
30 INJECTION, SOLUTION INTRAVENOUS ONCE
Refills: 0 | Status: COMPLETED | OUTPATIENT
Start: 2022-12-01 | End: 2022-12-01

## 2022-12-01 RX ADMIN — ENOXAPARIN SODIUM 40 MILLIGRAM(S): 100 INJECTION SUBCUTANEOUS at 23:13

## 2022-12-01 RX ADMIN — Medication 100 MILLIEQUIVALENT(S): at 16:21

## 2022-12-01 RX ADMIN — Medication 1: at 13:26

## 2022-12-01 RX ADMIN — Medication 100 MILLIEQUIVALENT(S): at 18:06

## 2022-12-01 RX ADMIN — Medication 120 MILLIGRAM(S): at 05:37

## 2022-12-01 RX ADMIN — BENZOCAINE AND MENTHOL 1 LOZENGE: 5; 1 LIQUID ORAL at 01:58

## 2022-12-01 RX ADMIN — POTASSIUM PHOSPHATE, MONOBASIC POTASSIUM PHOSPHATE, DIBASIC 83.33 MILLIMOLE(S): 236; 224 INJECTION, SOLUTION INTRAVENOUS at 18:06

## 2022-12-01 RX ADMIN — Medication 100 MILLIEQUIVALENT(S): at 13:28

## 2022-12-01 RX ADMIN — Medication 1: at 05:40

## 2022-12-01 RX ADMIN — PANTOPRAZOLE SODIUM 40 MILLIGRAM(S): 20 TABLET, DELAYED RELEASE ORAL at 13:26

## 2022-12-01 RX ADMIN — Medication 120 MILLIGRAM(S): at 13:27

## 2022-12-02 ENCOUNTER — TRANSCRIPTION ENCOUNTER (OUTPATIENT)
Age: 47
End: 2022-12-02

## 2022-12-02 LAB
ANION GAP SERPL CALC-SCNC: 10 MMOL/L — SIGNIFICANT CHANGE UP (ref 7–14)
BUN SERPL-MCNC: 16 MG/DL — SIGNIFICANT CHANGE UP (ref 7–23)
CALCIUM SERPL-MCNC: 8.6 MG/DL — SIGNIFICANT CHANGE UP (ref 8.4–10.5)
CHLORIDE SERPL-SCNC: 104 MMOL/L — SIGNIFICANT CHANGE UP (ref 98–107)
CO2 SERPL-SCNC: 22 MMOL/L — SIGNIFICANT CHANGE UP (ref 22–31)
CREAT SERPL-MCNC: 0.62 MG/DL — SIGNIFICANT CHANGE UP (ref 0.5–1.3)
EGFR: 117 ML/MIN/1.73M2 — SIGNIFICANT CHANGE UP
GLUCOSE BLDC GLUCOMTR-MCNC: 149 MG/DL — HIGH (ref 70–99)
GLUCOSE BLDC GLUCOMTR-MCNC: 151 MG/DL — HIGH (ref 70–99)
GLUCOSE BLDC GLUCOMTR-MCNC: 164 MG/DL — HIGH (ref 70–99)
GLUCOSE BLDC GLUCOMTR-MCNC: 164 MG/DL — HIGH (ref 70–99)
GLUCOSE SERPL-MCNC: 163 MG/DL — HIGH (ref 70–99)
HCT VFR BLD CALC: 38.3 % — LOW (ref 39–50)
HGB BLD-MCNC: 11.8 G/DL — LOW (ref 13–17)
MAGNESIUM SERPL-MCNC: 2.1 MG/DL — SIGNIFICANT CHANGE UP (ref 1.6–2.6)
MCHC RBC-ENTMCNC: 27.1 PG — SIGNIFICANT CHANGE UP (ref 27–34)
MCHC RBC-ENTMCNC: 30.8 GM/DL — LOW (ref 32–36)
MCV RBC AUTO: 87.8 FL — SIGNIFICANT CHANGE UP (ref 80–100)
NRBC # BLD: 0 /100 WBCS — SIGNIFICANT CHANGE UP (ref 0–0)
NRBC # FLD: 0 K/UL — SIGNIFICANT CHANGE UP (ref 0–0)
PHOSPHATE SERPL-MCNC: 2.4 MG/DL — LOW (ref 2.5–4.5)
PLATELET # BLD AUTO: 191 K/UL — SIGNIFICANT CHANGE UP (ref 150–400)
POTASSIUM SERPL-MCNC: 3.6 MMOL/L — SIGNIFICANT CHANGE UP (ref 3.5–5.3)
POTASSIUM SERPL-SCNC: 3.6 MMOL/L — SIGNIFICANT CHANGE UP (ref 3.5–5.3)
RBC # BLD: 4.36 M/UL — SIGNIFICANT CHANGE UP (ref 4.2–5.8)
RBC # FLD: 14.9 % — HIGH (ref 10.3–14.5)
SODIUM SERPL-SCNC: 136 MMOL/L — SIGNIFICANT CHANGE UP (ref 135–145)
WBC # BLD: 8.23 K/UL — SIGNIFICANT CHANGE UP (ref 3.8–10.5)
WBC # FLD AUTO: 8.23 K/UL — SIGNIFICANT CHANGE UP (ref 3.8–10.5)

## 2022-12-02 RX ORDER — GLUCAGON INJECTION, SOLUTION 0.5 MG/.1ML
1 INJECTION, SOLUTION SUBCUTANEOUS ONCE
Refills: 0 | Status: DISCONTINUED | OUTPATIENT
Start: 2022-12-02 | End: 2022-12-04

## 2022-12-02 RX ORDER — INSULIN LISPRO 100/ML
VIAL (ML) SUBCUTANEOUS
Refills: 0 | Status: DISCONTINUED | OUTPATIENT
Start: 2022-12-02 | End: 2022-12-04

## 2022-12-02 RX ORDER — POTASSIUM PHOSPHATE, MONOBASIC POTASSIUM PHOSPHATE, DIBASIC 236; 224 MG/ML; MG/ML
15 INJECTION, SOLUTION INTRAVENOUS ONCE
Refills: 0 | Status: COMPLETED | OUTPATIENT
Start: 2022-12-02 | End: 2022-12-02

## 2022-12-02 RX ORDER — POTASSIUM CHLORIDE 20 MEQ
10 PACKET (EA) ORAL
Refills: 0 | Status: COMPLETED | OUTPATIENT
Start: 2022-12-02 | End: 2022-12-02

## 2022-12-02 RX ADMIN — Medication 120 MILLIGRAM(S): at 00:14

## 2022-12-02 RX ADMIN — Medication 1: at 21:48

## 2022-12-02 RX ADMIN — Medication 100 MILLIEQUIVALENT(S): at 13:31

## 2022-12-02 RX ADMIN — PANTOPRAZOLE SODIUM 40 MILLIGRAM(S): 20 TABLET, DELAYED RELEASE ORAL at 12:02

## 2022-12-02 RX ADMIN — Medication 100 MILLIEQUIVALENT(S): at 14:57

## 2022-12-02 RX ADMIN — Medication 120 MILLIGRAM(S): at 13:32

## 2022-12-02 RX ADMIN — Medication 100 MILLIEQUIVALENT(S): at 12:13

## 2022-12-02 RX ADMIN — POTASSIUM PHOSPHATE, MONOBASIC POTASSIUM PHOSPHATE, DIBASIC 62.5 MILLIMOLE(S): 236; 224 INJECTION, SOLUTION INTRAVENOUS at 14:57

## 2022-12-02 RX ADMIN — Medication 120 MILLIGRAM(S): at 18:14

## 2022-12-02 RX ADMIN — SODIUM CHLORIDE 125 MILLILITER(S): 9 INJECTION, SOLUTION INTRAVENOUS at 03:24

## 2022-12-02 RX ADMIN — SODIUM CHLORIDE 75 MILLILITER(S): 9 INJECTION, SOLUTION INTRAVENOUS at 16:15

## 2022-12-02 RX ADMIN — ENOXAPARIN SODIUM 40 MILLIGRAM(S): 100 INJECTION SUBCUTANEOUS at 23:03

## 2022-12-02 RX ADMIN — Medication 120 MILLIGRAM(S): at 07:04

## 2022-12-02 RX ADMIN — Medication 120 MILLIGRAM(S): at 23:03

## 2022-12-02 NOTE — DISCHARGE NOTE PROVIDER - NSDCCPCAREPLAN_GEN_ALL_CORE_FT
PRINCIPAL DISCHARGE DIAGNOSIS  Diagnosis: SBO (small bowel obstruction)  Assessment and Plan of Treatment: DIET: Return to your usual diet.  NOTIFY YOUR SURGEON IF YOU HAVE: any bleeding that does not stop, any pus draining from your wound(s), any fever (over 100.4 F) persistent nausea/vomiting, or if your pain is not controlled on your discharge pain medications, unable to urinate.  Please follow up with your primary care physician in one week regarding your hospitalization, bring copies of your discharge paperwork.  Please follow up with your surgeon, Dr. White within 2 weeks of discharge. Please call to schedule an appointment.

## 2022-12-02 NOTE — DIETITIAN INITIAL EVALUATION ADULT - NSFNSGIIOFT_GEN_A_CORE
12-01-22 @ 07:01  -  12-02-22 @ 07:00  --------------------------------------------------------  OUT:    Nasogastric/Oral tube (mL): 550 mL  Total OUT: 550 mL    Total NET: -550 mL      12-02-22 @ 07:01  -  12-02-22 @ 12:12  --------------------------------------------------------  OUT:    Nasogastric/Oral tube (mL): 0 mL  Total OUT: 0 mL    Total NET: 0 mL

## 2022-12-02 NOTE — DIETITIAN INITIAL EVALUATION ADULT - ADD RECOMMEND
1) When medically feasible and as  1) When medically feasible advance diet as tolerated to low fiber  2) Monitor weights, labs, BM's, skin integrity, p.o. intake when PO diet resumed  3) RD available prn

## 2022-12-02 NOTE — DIETITIAN INITIAL EVALUATION ADULT - PERTINENT LABORATORY DATA
12-02    136  |  104  |  16  ----------------------------<  163<H>  3.6   |  22  |  0.62    Ca    8.6      02 Dec 2022 05:45  Phos  2.4     12-02  Mg     2.10     12-02    POCT Blood Glucose.: 149 mg/dL (12-02-22 @ 05:42)  A1C with Estimated Average Glucose Result: 8.7 % (11-30-22 @ 06:31)  A1C with Estimated Average Glucose Result: 7.9 % (12-05-21 @ 08:49)   12-02 Na 136 mmol/L Glu 163 mg/dL<H> K+ 3.6 mmol/L Cr 0.62 mg/dL BUN 16 mg/dL Phos 2.4 mg/dL<L>  12-02 @ 12:24 POCT 151 mg/dL  A1C with Estimated Average Glucose Result: 8.7 % (11-30-22 @ 06:31)  A1C with Estimated Average Glucose Result: 7.9 % (12-05-21 @ 08:49)

## 2022-12-02 NOTE — DIETITIAN INITIAL EVALUATION ADULT - PERTINENT MEDS FT
MEDICATIONS  (STANDING):  dextrose 5% + sodium chloride 0.45%. 1000 milliLiter(s) (125 mL/Hr) IV Continuous <Continuous>  dextrose 5%. 1000 milliLiter(s) (100 mL/Hr) IV Continuous <Continuous>  dextrose 5%. 1000 milliLiter(s) (50 mL/Hr) IV Continuous <Continuous>  dextrose 50% Injectable 25 Gram(s) IV Push once  dextrose 50% Injectable 12.5 Gram(s) IV Push once  dextrose 50% Injectable 25 Gram(s) IV Push once  enoxaparin Injectable 40 milliGRAM(s) SubCutaneous every 24 hours  insulin lispro (ADMELOG) corrective regimen sliding scale   SubCutaneous every 6 hours  metoprolol tartrate IVPB 10 milliGRAM(s) IV Intermittent every 6 hours  pantoprazole  Injectable 40 milliGRAM(s) IV Push daily  potassium chloride  10 mEq/100 mL IVPB 10 milliEquivalent(s) IV Intermittent every 1 hour  potassium phosphate IVPB 15 milliMole(s) IV Intermittent once    MEDICATIONS  (PRN):  dextrose Oral Gel 15 Gram(s) Oral once PRN Blood Glucose LESS THAN 70 milliGRAM(s)/deciliter

## 2022-12-02 NOTE — DIETITIAN INITIAL EVALUATION ADULT - ORAL INTAKE PTA/DIET HISTORY
PTA pt was eating 3 meals a day. Per chart pt was not tolerating PO diet right before admission 2/2 SBO. Pt taking multivitamin and vitamin D3 PTA.

## 2022-12-02 NOTE — DISCHARGE NOTE PROVIDER - NSDCMRMEDTOKEN_GEN_ALL_CORE_FT
Farxiga 10 mg oral tablet: 1 tab(s) orally once a day  fenofibrate 160 mg oral tablet: 1 tab(s) orally once a day  glimepiride 4 mg oral tablet: 1 tab(s) orally once a day  metFORMIN: 500 milligram(s) orally 3 times a day  pantoprazole 40 mg oral delayed release tablet: 1 tab(s) orally once a day (before a meal)  rosuvastatin 10 mg oral tablet: 1 tab(s) orally once a day  Rybelsus 7 mg oral tablet: 1 tab(s) orally once a day  telmisartan 40 mg oral tablet: 1 tab(s) orally once a day  Toprol-XL: 100 milligram(s) orally once a day

## 2022-12-02 NOTE — DISCHARGE NOTE PROVIDER - CARE PROVIDER_API CALL
Musa White)  ColonRectal Surgery; Surgery  [address] [place] [address]  Phone: [phone]  Fax: [phone]  Follow Up Time: 2 weeks

## 2022-12-02 NOTE — DIETITIAN INITIAL EVALUATION ADULT - OTHER INFO
Medical course: Per chart 47 year old male w/ pmh HTN, HLD, T2DM,  s/p Buchanan's in 2009 and subsequent multiple SBOs some requiring surgery (last time in 2018), hx of ventral hernias s/p repair in 2018, presenting w/ 3 days of nausea, bloating, and diffuse abdominal pain found to have SBO.     Nutrition interview: Pt A&Ox4, sitting up in bed during time of visit, NGT in place for suction. No recent episodes of nausea, vomiting, diarrhea or constipation, last BM noted on 11/29 per RN flowsheets. Irregular BMs likely due to NPO status. Denies any chewing/swallowing difficulties PTA. No food allergies. Stated UBW: 270#, denies any weight changes. Pt NPO x4 days 2/2 SBO, receiving IVF for hydration.

## 2022-12-03 LAB
ANION GAP SERPL CALC-SCNC: 10 MMOL/L — SIGNIFICANT CHANGE UP (ref 7–14)
BUN SERPL-MCNC: 15 MG/DL — SIGNIFICANT CHANGE UP (ref 7–23)
CALCIUM SERPL-MCNC: 9.1 MG/DL — SIGNIFICANT CHANGE UP (ref 8.4–10.5)
CHLORIDE SERPL-SCNC: 105 MMOL/L — SIGNIFICANT CHANGE UP (ref 98–107)
CO2 SERPL-SCNC: 22 MMOL/L — SIGNIFICANT CHANGE UP (ref 22–31)
CREAT SERPL-MCNC: 0.65 MG/DL — SIGNIFICANT CHANGE UP (ref 0.5–1.3)
EGFR: 116 ML/MIN/1.73M2 — SIGNIFICANT CHANGE UP
GLUCOSE BLDC GLUCOMTR-MCNC: 160 MG/DL — HIGH (ref 70–99)
GLUCOSE BLDC GLUCOMTR-MCNC: 160 MG/DL — HIGH (ref 70–99)
GLUCOSE BLDC GLUCOMTR-MCNC: 169 MG/DL — HIGH (ref 70–99)
GLUCOSE BLDC GLUCOMTR-MCNC: 182 MG/DL — HIGH (ref 70–99)
GLUCOSE SERPL-MCNC: 161 MG/DL — HIGH (ref 70–99)
HCT VFR BLD CALC: 40.2 % — SIGNIFICANT CHANGE UP (ref 39–50)
HGB BLD-MCNC: 12.6 G/DL — LOW (ref 13–17)
MAGNESIUM SERPL-MCNC: 2 MG/DL — SIGNIFICANT CHANGE UP (ref 1.6–2.6)
MCHC RBC-ENTMCNC: 27.2 PG — SIGNIFICANT CHANGE UP (ref 27–34)
MCHC RBC-ENTMCNC: 31.3 GM/DL — LOW (ref 32–36)
MCV RBC AUTO: 86.6 FL — SIGNIFICANT CHANGE UP (ref 80–100)
NRBC # BLD: 0 /100 WBCS — SIGNIFICANT CHANGE UP (ref 0–0)
NRBC # FLD: 0 K/UL — SIGNIFICANT CHANGE UP (ref 0–0)
PHOSPHATE SERPL-MCNC: 3.2 MG/DL — SIGNIFICANT CHANGE UP (ref 2.5–4.5)
PLATELET # BLD AUTO: 199 K/UL — SIGNIFICANT CHANGE UP (ref 150–400)
POTASSIUM SERPL-MCNC: 3.6 MMOL/L — SIGNIFICANT CHANGE UP (ref 3.5–5.3)
POTASSIUM SERPL-SCNC: 3.6 MMOL/L — SIGNIFICANT CHANGE UP (ref 3.5–5.3)
RBC # BLD: 4.64 M/UL — SIGNIFICANT CHANGE UP (ref 4.2–5.8)
RBC # FLD: 14.9 % — HIGH (ref 10.3–14.5)
SODIUM SERPL-SCNC: 137 MMOL/L — SIGNIFICANT CHANGE UP (ref 135–145)
WBC # BLD: 7.28 K/UL — SIGNIFICANT CHANGE UP (ref 3.8–10.5)
WBC # FLD AUTO: 7.28 K/UL — SIGNIFICANT CHANGE UP (ref 3.8–10.5)

## 2022-12-03 RX ORDER — METOPROLOL TARTRATE 50 MG
100 TABLET ORAL DAILY
Refills: 0 | Status: DISCONTINUED | OUTPATIENT
Start: 2022-12-03 | End: 2022-12-04

## 2022-12-03 RX ORDER — POTASSIUM CHLORIDE 20 MEQ
20 PACKET (EA) ORAL
Refills: 0 | Status: COMPLETED | OUTPATIENT
Start: 2022-12-03 | End: 2022-12-03

## 2022-12-03 RX ADMIN — Medication 120 MILLIGRAM(S): at 17:17

## 2022-12-03 RX ADMIN — ENOXAPARIN SODIUM 40 MILLIGRAM(S): 100 INJECTION SUBCUTANEOUS at 22:53

## 2022-12-03 RX ADMIN — Medication 20 MILLIEQUIVALENT(S): at 12:02

## 2022-12-03 RX ADMIN — Medication 1: at 08:59

## 2022-12-03 RX ADMIN — Medication 120 MILLIGRAM(S): at 12:00

## 2022-12-03 RX ADMIN — Medication 20 MILLIEQUIVALENT(S): at 17:16

## 2022-12-03 RX ADMIN — Medication 120 MILLIGRAM(S): at 07:00

## 2022-12-03 RX ADMIN — Medication 1: at 17:17

## 2022-12-03 RX ADMIN — Medication 1: at 12:03

## 2022-12-03 RX ADMIN — PANTOPRAZOLE SODIUM 40 MILLIGRAM(S): 20 TABLET, DELAYED RELEASE ORAL at 12:02

## 2022-12-04 ENCOUNTER — TRANSCRIPTION ENCOUNTER (OUTPATIENT)
Age: 47
End: 2022-12-04

## 2022-12-04 VITALS
RESPIRATION RATE: 18 BRPM | SYSTOLIC BLOOD PRESSURE: 144 MMHG | HEART RATE: 84 BPM | OXYGEN SATURATION: 97 % | TEMPERATURE: 98 F | DIASTOLIC BLOOD PRESSURE: 87 MMHG

## 2022-12-04 LAB
ANION GAP SERPL CALC-SCNC: 12 MMOL/L — SIGNIFICANT CHANGE UP (ref 7–14)
BUN SERPL-MCNC: 15 MG/DL — SIGNIFICANT CHANGE UP (ref 7–23)
CALCIUM SERPL-MCNC: 9 MG/DL — SIGNIFICANT CHANGE UP (ref 8.4–10.5)
CHLORIDE SERPL-SCNC: 104 MMOL/L — SIGNIFICANT CHANGE UP (ref 98–107)
CO2 SERPL-SCNC: 21 MMOL/L — LOW (ref 22–31)
CREAT SERPL-MCNC: 0.68 MG/DL — SIGNIFICANT CHANGE UP (ref 0.5–1.3)
EGFR: 114 ML/MIN/1.73M2 — SIGNIFICANT CHANGE UP
GLUCOSE BLDC GLUCOMTR-MCNC: 157 MG/DL — HIGH (ref 70–99)
GLUCOSE SERPL-MCNC: 174 MG/DL — HIGH (ref 70–99)
HCT VFR BLD CALC: 37.9 % — LOW (ref 39–50)
HGB BLD-MCNC: 12.1 G/DL — LOW (ref 13–17)
MAGNESIUM SERPL-MCNC: 1.9 MG/DL — SIGNIFICANT CHANGE UP (ref 1.6–2.6)
MCHC RBC-ENTMCNC: 27.4 PG — SIGNIFICANT CHANGE UP (ref 27–34)
MCHC RBC-ENTMCNC: 31.9 GM/DL — LOW (ref 32–36)
MCV RBC AUTO: 85.7 FL — SIGNIFICANT CHANGE UP (ref 80–100)
NRBC # BLD: 0 /100 WBCS — SIGNIFICANT CHANGE UP (ref 0–0)
NRBC # FLD: 0 K/UL — SIGNIFICANT CHANGE UP (ref 0–0)
PHOSPHATE SERPL-MCNC: 3.5 MG/DL — SIGNIFICANT CHANGE UP (ref 2.5–4.5)
PLATELET # BLD AUTO: 192 K/UL — SIGNIFICANT CHANGE UP (ref 150–400)
POTASSIUM SERPL-MCNC: 3.8 MMOL/L — SIGNIFICANT CHANGE UP (ref 3.5–5.3)
POTASSIUM SERPL-SCNC: 3.8 MMOL/L — SIGNIFICANT CHANGE UP (ref 3.5–5.3)
RBC # BLD: 4.42 M/UL — SIGNIFICANT CHANGE UP (ref 4.2–5.8)
RBC # FLD: 14.6 % — HIGH (ref 10.3–14.5)
SODIUM SERPL-SCNC: 137 MMOL/L — SIGNIFICANT CHANGE UP (ref 135–145)
WBC # BLD: 7.45 K/UL — SIGNIFICANT CHANGE UP (ref 3.8–10.5)
WBC # FLD AUTO: 7.45 K/UL — SIGNIFICANT CHANGE UP (ref 3.8–10.5)

## 2022-12-04 PROCEDURE — 99238 HOSP IP/OBS DSCHRG MGMT 30/<: CPT

## 2022-12-04 RX ORDER — ACETAMINOPHEN 500 MG
1000 TABLET ORAL ONCE
Refills: 0 | Status: COMPLETED | OUTPATIENT
Start: 2022-12-04 | End: 2022-12-04

## 2022-12-04 RX ADMIN — Medication 1000 MILLIGRAM(S): at 02:59

## 2022-12-04 RX ADMIN — Medication 100 MILLIGRAM(S): at 05:34

## 2022-12-04 RX ADMIN — Medication 1: at 08:59

## 2022-12-04 RX ADMIN — Medication 1000 MILLIGRAM(S): at 03:29

## 2022-12-04 NOTE — PROGRESS NOTE ADULT - ASSESSMENT
46 yo M w/ pmh HTN, HLD, T2DM,  s/p Buchanan's in 2009 and subsequent multiple SBOs some requiring surgery (last time in 2018), hx of ventral hernias s/p repair in 2018, presenting w/ 3 days of nausea, bloating, and diffuse abdominal pain found to have SBO.     Plan:  - Clamp NGT  -NPO/IVF   -IV tylenol  -Monitor NGT output  -Serial abdominal exams, no standing pain meds   -C/w with Lopressor and PPI (home meds)  -vte ppx     A team [id number]
48 yo M w/ pmh HTN, HLD, T2DM,  s/p Buchanan's in 2009 and subsequent multiple SBOs some requiring surgery (last time in 2018), hx of ventral hernias s/p repair in 2018, presenting w/ 3 days of nausea, bloating, and diffuse abdominal pain found to have SBO.     Plan:    -NPO/IVF   - IV tylenol  -Monitor NGT output  -Serial abdominal exams, no standing pain meds   - C/w with Lopressor and PPI (home meds)  -vte ppx        A team [id number]
46 yo M w/ pmh HTN, HLD, T2DM,  s/p Buchanan's in 2009 and subsequent multiple SBOs some requiring surgery (last time in 2018), hx of ventral hernias s/p repair in 2018, presenting w/ 3 days of nausea, bloating, and diffuse abdominal pain found to have SBO.     Plan:    -NPO/IVF   -IV tylenol  -Monitor NGT output  -Serial abdominal exams, no standing pain meds   -C/w with Lopressor and PPI (home meds)  -vte ppx        A team [id number]
46 yo M w/ pmh HTN, HLD, T2DM,  s/p Buchanan's in 2009 and subsequent multiple SBOs some requiring surgery (last time in 2018), hx of ventral hernias s/p repair in 2018, presenting w/ 3 days of nausea, bloating, and diffuse abdominal pain found to have SBO. Patient is now resolving with return of bowel function and is stable for discharge home.      Plan:  - tolerating LRD  -IV tylenol  -Monitor NGT output  -Serial abdominal exams, no standing pain meds   -C/w with Lopressor and PPI (home meds)  -vte ppx  - discharge home today     A team [id number]
48 yo M w/ pmh HTN, HLD, T2DM,  s/p Buchanan's in 2009 and subsequent multiple SBOs some requiring surgery (last time in 2018), hx of ventral hernias s/p repair in 2018, presenting w/ 3 days of nausea, bloating, and diffuse abdominal pain found to have SBO.     Plan:  - Clamp NGT  - CLD/IVF  - advance diet and IVL if feeling well on PM rounds  -IV tylenol  -Monitor NGT output  -Serial abdominal exams, no standing pain meds   -C/w with Lopressor and PPI (home meds)  -vte ppx     A team [id number]

## 2022-12-04 NOTE — PROGRESS NOTE ADULT - SUBJECTIVE AND OBJECTIVE BOX
TEAM [ A ] Surgery Daily Progress Note  =====================================================    SUBJECTIVE: Patient seen and examined at bedside on AM rounds. Patient reports that they're feeling well. NPO denies nausea, vomiting.    -Flatus/  -  BM. OOB/Amublating as tolerated. Denies fever, chills.    ALLERGIES:  ceftriaxone (Rash)  cephalosporins (Rash)  Cipro (Unknown)  contrast media (iodine-based) (Rash)  fentanyl (Rash)  Flagyl (Unknown)  iodine containing compounds (Rash)  morphine (Rash)  --------------------------------------------------------------------------------------    MEDICATIONS:    Cardiovascular Medications  metoprolol tartrate IVPB 10 milliGRAM(s) IV Intermittent every 6 hours    Gastrointestinal Medications  dextrose 5% + sodium chloride 0.45%. 1000 milliLiter(s) IV Continuous <Continuous>  dextrose 5%. 1000 milliLiter(s) IV Continuous <Continuous>  dextrose 5%. 1000 milliLiter(s) IV Continuous <Continuous>  pantoprazole  Injectable 40 milliGRAM(s) IV Push daily    Hematologic/Oncologic Medications  enoxaparin Injectable 40 milliGRAM(s) SubCutaneous every 24 hours    Endocrine/Metabolic Medications  dextrose 50% Injectable 25 Gram(s) IV Push once  dextrose 50% Injectable 12.5 Gram(s) IV Push once  dextrose 50% Injectable 25 Gram(s) IV Push once  dextrose Oral Gel 15 Gram(s) Oral once PRN Blood Glucose LESS THAN 70 milliGRAM(s)/deciliter  glucagon  Injectable 1 milliGRAM(s) IntraMuscular once  insulin lispro (ADMELOG) corrective regimen sliding scale   SubCutaneous every 6 hours  --------------------------------------------------------------------------------------    VITAL SIGNS:  T(C): 36.8 (12-01-22 @ 06:25), Max: 37.2 (12-01-22 @ 02:00)  HR: 91 (12-01-22 @ 06:25) (89 - 112)  BP: 145/93 (12-01-22 @ 06:25) (133/74 - 145/93)  RR: 19 (12-01-22 @ 06:25) (17 - 19)  SpO2: 96% (12-01-22 @ 06:25) (94% - 100%)  --------------------------------------------------------------------------------------    EXAM    General: NAD, resting in bed comfortably.  Cardiac: regular rate, warm and well perfused  Respiratory: Nonlabored respirations, normal cw expansion.  Abdomen: soft, nontender, nondistended. NGT to ws   Extremities: normal strength, FROM, no deformities    --------------------------------------------------------------------------------------    LABS    --------------------------------------------------------------------------------------    INS AND OUTS:    11-30-22 @ 07:01  -  12-01-22 @ 07:00  --------------------------------------------------------  IN: 1500 mL / OUT: 1690 mL / NET: -190 mL      --------------------------------------------------------------------------------------
  SURGERY DAILY PROGRESS NOTE    --------------- OVERNIGHT/INTERVAL EVENTS ---------------  - No acute events overnight     --------------- SUBJECTIVE ---------------  - Patient describes no acute issues or concerns at this time  - +/- BM +void. Denies CP, SOB, n/v, abdominal pain.   - Patient seen and examined at bedside with surgical team    --------------- OBJECTIVE ---------------    -----VITALS-----  T(C): 36.9, Max: 37.2 (12-01)  HR: 88 (52 - 95)  BP: 151/96 (130/79 - 151/96)  RR: 18 (18 - 19)  SpO2: 97% (97% - 99%) room air        -----PHYSICAL EXAM-----  GENERAL: NAD, lying in bed   NEURO: AOx3, awake alert appropriate  HEENT: NCAT, trachea midline  PULM: Respirations non-labored  ABD: Soft, non-tender, non-distended, no peritonitis/rebound tenderness  EXT: Warm, well perfused    -----DRAINS-----  NG Tube:   12-01-22 @ 07:01  -  12-02-22 @ 07:00  --------------------------------------------------------  OUT: 550 mL        -----INs & OUTs-----    12-01 - 12-02  --------------------------------------------------------  IN:  Total IN: 0 mL    OUT:    Nasogastric/Oral tube (mL): 550 mL    Oral Fluid: 0 mL    Voided (mL): 900 mL  Total OUT: 1450 mL          -----MEDICATIONS-----  STANDING  dextrose 5% + sodium chloride 0.45%. 1000 milliLiter(s) (125 mL/Hr) IV Continuous <Continuous>  dextrose 5%. 1000 milliLiter(s) (100 mL/Hr) IV Continuous <Continuous>  dextrose 5%. 1000 milliLiter(s) (50 mL/Hr) IV Continuous <Continuous>  dextrose 50% Injectable 25 Gram(s) IV Push once  dextrose 50% Injectable 12.5 Gram(s) IV Push once  dextrose 50% Injectable 25 Gram(s) IV Push once  enoxaparin Injectable 40 milliGRAM(s) SubCutaneous every 24 hours  glucagon  Injectable 1 milliGRAM(s) IntraMuscular once  insulin lispro (ADMELOG) corrective regimen sliding scale   SubCutaneous every 6 hours  metoprolol tartrate IVPB 10 milliGRAM(s) IV Intermittent every 6 hours  pantoprazole  Injectable 40 milliGRAM(s) IV Push daily    PRN  dextrose Oral Gel 15 Gram(s) Oral once PRN Blood Glucose LESS THAN 70 milliGRAM(s)/deciliter      -----LABS-----  136  |  104  |  16  ----------------------------<  163<H>    (12-02)  3.6   |  22  |  0.62            Ca    8.6      12-02  Mg    2.10  Phos  2.4<L>              
  SURGERY DAILY PROGRESS NOTE    --------------- OVERNIGHT/INTERVAL EVENTS ---------------  - No acute events overnight     --------------- SUBJECTIVE ---------------  - Patient describes no acute issues or concerns at this time  - +/+ BM +void. Denies CP, SOB, n/v, abdominal pain.   - Patient seen and examined at bedside with surgical team    --------------- OBJECTIVE ---------------    -----VITALS-----  ICU Vital Signs Last 24 Hrs  T(C): 36.6 (04 Dec 2022 09:56), Max: 36.9 (04 Dec 2022 02:36)  T(F): 97.9 (04 Dec 2022 09:56), Max: 98.4 (04 Dec 2022 02:36)  HR: 84 (04 Dec 2022 09:56) (78 - 84)  BP: 144/87 (04 Dec 2022 09:56) (125/76 - 146/75)  BP(mean): --  ABP: --  ABP(mean): --  RR: 18 (04 Dec 2022 09:56) (16 - 18)  SpO2: 97% (04 Dec 2022 09:56) (96% - 99%)    O2 Parameters below as of 04 Dec 2022 09:56  Patient On (Oxygen Delivery Method): room air        -----PHYSICAL EXAM-----  GENERAL: NAD, lying in bed   NEURO: AOx3, awake alert appropriate  HEENT: NCAT, trachea midline  PULM: Respirations non-labored  ABD: Soft, non-tender, non-distended, no peritonitis/rebound tenderness  EXT: Warm, well perfused    -----DRAINS-----  NG Tube:   12-01-22 @ 07:01  -  12-02-22 @ 07:00  --------------------------------------------------------  OUT: 550 mL        -----INs & OUTs-----    12-01  -  12-02  --------------------------------------------------------  IN:  Total IN: 0 mL    OUT:    Nasogastric/Oral tube (mL): 550 mL    Oral Fluid: 0 mL    Voided (mL): 900 mL  Total OUT: 1450 mL          -----MEDICATIONS-----  STANDING  dextrose 5% + sodium chloride 0.45%. 1000 milliLiter(s) (125 mL/Hr) IV Continuous <Continuous>  dextrose 5%. 1000 milliLiter(s) (100 mL/Hr) IV Continuous <Continuous>  dextrose 5%. 1000 milliLiter(s) (50 mL/Hr) IV Continuous <Continuous>  dextrose 50% Injectable 25 Gram(s) IV Push once  dextrose 50% Injectable 12.5 Gram(s) IV Push once  dextrose 50% Injectable 25 Gram(s) IV Push once  enoxaparin Injectable 40 milliGRAM(s) SubCutaneous every 24 hours  glucagon  Injectable 1 milliGRAM(s) IntraMuscular once  insulin lispro (ADMELOG) corrective regimen sliding scale   SubCutaneous every 6 hours  metoprolol tartrate IVPB 10 milliGRAM(s) IV Intermittent every 6 hours  pantoprazole  Injectable 40 milliGRAM(s) IV Push daily    PRN  dextrose Oral Gel 15 Gram(s) Oral once PRN Blood Glucose LESS THAN 70 milliGRAM(s)/deciliter      -----LABS-----  136  |  104  |  16  ----------------------------<  163<H>    (12-02)  3.6   |  22  |  0.62            Ca    8.6      12-02  Mg    2.10  Phos  2.4<L>              
  SURGERY DAILY PROGRESS NOTE    --------------- OVERNIGHT/INTERVAL EVENTS ---------------  - No acute events overnight     --------------- SUBJECTIVE ---------------  - Patient describes no acute issues or concerns at this time  - +/- BM +void. Denies CP, SOB, n/v, abdominal pain.   - Patient seen and examined at bedside with surgical team    --------------- OBJECTIVE ---------------    -----VITALS-----  ICU Vital Signs Last 24 Hrs  T(C): 36.6 (03 Dec 2022 09:00), Max: 36.7 (02 Dec 2022 14:00)  T(F): 97.8 (03 Dec 2022 09:00), Max: 98.1 (02 Dec 2022 17:59)  HR: 78 (03 Dec 2022 09:00) (57 - 78)  BP: 142/82 (03 Dec 2022 09:00) (130/88 - 142/82)  BP(mean): --  ABP: --  ABP(mean): --  RR: 17 (03 Dec 2022 09:00) (17 - 18)  SpO2: 98% (03 Dec 2022 09:00) (97% - 99%)    O2 Parameters below as of 03 Dec 2022 09:00  Patient On (Oxygen Delivery Method): room air        -----PHYSICAL EXAM-----  GENERAL: NAD, lying in bed   NEURO: AOx3, awake alert appropriate  HEENT: NCAT, trachea midline  PULM: Respirations non-labored  ABD: Soft, non-tender, non-distended, no peritonitis/rebound tenderness  EXT: Warm, well perfused    -----DRAINS-----  NG Tube:   12-01-22 @ 07:01  -  12-02-22 @ 07:00  --------------------------------------------------------  OUT: 550 mL        -----INs & OUTs-----    12-01  -  12-02  --------------------------------------------------------  IN:  Total IN: 0 mL    OUT:    Nasogastric/Oral tube (mL): 550 mL    Oral Fluid: 0 mL    Voided (mL): 900 mL  Total OUT: 1450 mL          -----MEDICATIONS-----  STANDING  dextrose 5% + sodium chloride 0.45%. 1000 milliLiter(s) (125 mL/Hr) IV Continuous <Continuous>  dextrose 5%. 1000 milliLiter(s) (100 mL/Hr) IV Continuous <Continuous>  dextrose 5%. 1000 milliLiter(s) (50 mL/Hr) IV Continuous <Continuous>  dextrose 50% Injectable 25 Gram(s) IV Push once  dextrose 50% Injectable 12.5 Gram(s) IV Push once  dextrose 50% Injectable 25 Gram(s) IV Push once  enoxaparin Injectable 40 milliGRAM(s) SubCutaneous every 24 hours  glucagon  Injectable 1 milliGRAM(s) IntraMuscular once  insulin lispro (ADMELOG) corrective regimen sliding scale   SubCutaneous every 6 hours  metoprolol tartrate IVPB 10 milliGRAM(s) IV Intermittent every 6 hours  pantoprazole  Injectable 40 milliGRAM(s) IV Push daily    PRN  dextrose Oral Gel 15 Gram(s) Oral once PRN Blood Glucose LESS THAN 70 milliGRAM(s)/deciliter      -----LABS-----  136  |  104  |  16  ----------------------------<  163<H>    (12-02)  3.6   |  22  |  0.62            Ca    8.6      12-02  Mg    2.10  Phos  2.4<L>

## 2022-12-04 NOTE — DISCHARGE NOTE NURSING/CASE MANAGEMENT/SOCIAL WORK - NSDCPEFALRISK_GEN_ALL_CORE
For information on Fall & Injury Prevention, visit: [email or web address]  [email or web address] OR  https://www.cdc.gov/steadi/patient.html

## 2022-12-04 NOTE — DISCHARGE NOTE NURSING/CASE MANAGEMENT/SOCIAL WORK - PATIENT PORTAL LINK FT
You can access the FollowMyHealth Patient Portal offered by [facility] by registering at the following website: [email or web address]. By joining [employer]’s FollowMyHealth portal, you will also be able to view your health information using other applications (apps) compatible with our system.

## 2022-12-22 NOTE — PATIENT PROFILE ADULT - NSPROCHRONICPAIN_GEN_A_NUR
Anesthesia Post Evaluation    Patient: Glenn Medel    Procedure(s) Performed: Procedure(s) (LRB):  COLONOSCOPY (N/A)    Final Anesthesia Type: general      Patient location during evaluation: PACU  Patient participation: Yes- Able to Participate  Level of consciousness: awake and alert  Post-procedure vital signs: reviewed and stable  Pain management: adequate  Airway patency: patent    PONV status at discharge: No PONV  Anesthetic complications: no      Cardiovascular status: blood pressure returned to baseline  Respiratory status: unassisted and room air  Hydration status: euvolemic  Follow-up not needed.          Vitals Value Taken Time   /70 12/21/22 1035   Temp  12/22/22 0752   Pulse 66 12/21/22 1035   Resp 13 12/21/22 1035   SpO2 98 % 12/21/22 1035         Event Time   Out of Recovery 10:48:00         Pain/Ezequiel Score: Ezequiel Score: 10 (12/21/2022 10:28 AM)         no

## 2023-01-19 NOTE — H&P ADULT - NS ABD PE RECTAL EXAM
Received call from patient requesting refill(s) of Methocarbamol, Oxycodone and Butrans Patch    Last dispensed from pharmacy on Oxycodone-12/30 for 14 days  Butrans Patch-12/13 for 28 days  Methocarbamol-1/12 for 15 days    Patient's last office/virtual visit by prescribing provider on 11/28 Next office/virtual appointment scheduled for 2/28        UDT/CSA = 07/11/2022-    Pending Prescriptions:                       Disp   Refills    buprenorphine (BUTRANS) 20 MCG/HR WK patch4 patch0            Sig: Place 1 patch onto the skin every 7 days May           fill/start 1/19/23    oxyCODONE IR (ROXICODONE) 15 MG tablet    112 ta*0            Sig: Take 1 tablet (15 mg) by mouth 8 times daily One           tablet every three hours 8 am, 11 am, 2pm, 5 pm,           8 pm, 11 pm, 2 am, 5 am, dispense 12/30/22, start           12/31/22    methocarbamol (ROBAXIN) 500 MG tablet     120 ta*1            Sig: Take 2 tablets (1,000 mg) by mouth 4 times daily           For use beginning 1/26/23  [facility]  
not examined

## 2023-03-20 ENCOUNTER — INPATIENT (INPATIENT)
Facility: HOSPITAL | Age: 48
LOS: 6 days | Discharge: HOME | End: 2023-03-27
Attending: SURGERY | Admitting: SURGERY
Payer: COMMERCIAL

## 2023-03-20 VITALS
DIASTOLIC BLOOD PRESSURE: 74 MMHG | RESPIRATION RATE: 18 BRPM | SYSTOLIC BLOOD PRESSURE: 109 MMHG | TEMPERATURE: 98 F | HEART RATE: 115 BPM | OXYGEN SATURATION: 98 %

## 2023-03-20 DIAGNOSIS — Z98.890 OTHER SPECIFIED POSTPROCEDURAL STATES: Chronic | ICD-10-CM

## 2023-03-20 DIAGNOSIS — Z98.89 OTHER SPECIFIED POSTPROCEDURAL STATES: Chronic | ICD-10-CM

## 2023-03-20 PROCEDURE — 99285 EMERGENCY DEPT VISIT HI MDM: CPT

## 2023-03-20 NOTE — CONSULT NOTE ADULT - ASSESSMENT
ASSESSMENT  47M w/ pmh HTN, HLD, T2DM,  s/p Buchanan's in 2009 and subsequent multiple SBOs s/p ex lap LINDA in 2014, 2016, and ex lap, LINDA, ventral hernia repair in 2018, presenting with several hours of diffuse abdominal pain and nausea and retching.    PLAN  - Pending CT AP

## 2023-03-20 NOTE — ED ADULT TRIAGE NOTE - CHIEF COMPLAINT QUOTE
abd pain assoc with n/v since this afternoon, appears pale/diaphoretic, actively vomiting in triage, - hx stomach extractions, SBO's abd pain assoc with n/v since this afternoon, appears pale/diaphoretic, actively vomiting in triage, - hx "stomach extractions", SBO's

## 2023-03-20 NOTE — CONSULT NOTE ADULT - SUBJECTIVE AND OBJECTIVE BOX
GENERAL SURGERY CONSULT NOTE    HPI:  47M w/ pmh HTN, HLD, T2DM,  s/p Buchanan's in 2009 and subsequent multiple SBOs s/p ex lap LINDA in 2014, 2016, and ex lap, LINDA, ventral hernia repair in 2018, presenting with several hours of diffuse abdominal pain and nausea and retching. States that he was feeling well up until 5PM this afternoon. He had a full day of work, tolerated breakfast and lunch without issues, and then when he got home from work, he started to feel "off". He developed nausea with retching and cramping in his upper abdomen. He has been burping and retching intermittently since 5PM but has not vomited. States that his last BM was here in the ED, which was diarrhea. His nausea and retching has improved since this bowel movement. Last flatus this morning. Denies fevers or chills, denies dysuria, recent travel or recent antibiotic use.    PMHx: Hypertension    Hyperlipidemia    Personal History of DVT (Deep    Brain abscess    Diabetes mellitus, type 2    SBO (small bowel obstruction)    Diverticulitis    DVT (deep venous thrombosis)    Open wound of abdominal wall    GERD (gastroesophageal reflux disease)    PSHx: Diverticulitis of Sigmoid Colon    S/P Isi&#x27;s procedure    H/O exploratory laparotomy    H/O hernia repair    Allergies: ceftriaxone (Rash)  cephalosporins (Rash)  Cipro (Unknown)  contrast media (iodine-based) (Rash)  fentanyl (Rash)  Flagyl (Unknown)  iodine containing compounds (Rash)  morphine (Rash)  (Intolerances: )  Social Hx: Lives at home with is parents, no smoking history or alcohol use  Family Hx: Nothing pertinent    Physical Exam  T(C): 36.9  HR: 113 (113 - 117)  BP: 140/84 (109/74 - 144/80)  RR: 17 (17 - 18)  SpO2: 98% (96% - 98%)  Tmax: T(C): , Max: 37 (03-20-23 @ 23:34)    General: well developed, well nourished, NAD  Neuro: alert and oriented, no focal deficits, moves all extremities spontaneously  HEENT: NCAT, EOMI, anicteric, mucosa moist  Respiratory: airway patent, respirations unlabored on RA  CVS: regular rate and rhythm  Abdomen: softly distended, mildly tender in the epigastrium, no rebound tenderness or guarding  Extremities: no edema, sensation and movement grossly intact  Skin: warm, dry, appropriate color    Labs:                        14.8   13.16 )-----------( 252      ( 21 Mar 2023 01:33 )             46.2     PT/INR - ( 21 Mar 2023 01:33 )   PT: 11.7 sec;   INR: 1.01 ratio         PTT - ( 21 Mar 2023 01:33 )  PTT:26.8 sec  03-21    140  |  101  |  26<H>  ----------------------------<  281<H>  4.3   |  19<L>  |  1.02    Ca    10.3      21 Mar 2023 01:33  Phos  4.4     03-21  Mg     2.00     03-21    TPro  8.0  /  Alb  4.7  /  TBili  0.8  /  DBili  x   /  AST  26  /  ALT  35  /  AlkPhos  38<L>  03-21      Imaging and other studies:  x< from: Xray Chest 1 View- PORTABLE-Urgent (03.21.23 @ 02:33) >    ACC: [id number] EXAM:  XR CHEST PORTABLE URGENT 1V   ORDERED BY: MITCH HINOJOSA     PROCEDURE DATE:  03/21/2023    ******PRELIMINARY REPORT******      ******PRELIMINARY REPORT******           INTERPRETATION:  EXAMINATION: XR CHEST URGENT    CLINICAL INDICATION: Preoperative exam.    TECHNIQUE: Single frontal, portable view of the chest was obtained.    COMPARISON: Chest x-ray 11/29/2022.    FINDINGS:  The heart is normal in size.  The lungs are clear.  There is no pneumothorax or pleural effusion.    IMPRESSION:  Clear lungs.    < end of copied text >

## 2023-03-21 DIAGNOSIS — K56.609 UNSPECIFIED INTESTINAL OBSTRUCTION, UNSPECIFIED AS TO PARTIAL VERSUS COMPLETE OBSTRUCTION: ICD-10-CM

## 2023-03-21 LAB
ALBUMIN SERPL ELPH-MCNC: 4.7 G/DL — SIGNIFICANT CHANGE UP (ref 3.3–5)
ALP SERPL-CCNC: 38 U/L — LOW (ref 40–120)
ALT FLD-CCNC: 35 U/L — SIGNIFICANT CHANGE UP (ref 4–41)
ANION GAP SERPL CALC-SCNC: 18 MMOL/L — HIGH (ref 7–14)
ANION GAP SERPL CALC-SCNC: 20 MMOL/L — HIGH (ref 7–14)
APTT BLD: 26.8 SEC — LOW (ref 27–36.3)
AST SERPL-CCNC: 26 U/L — SIGNIFICANT CHANGE UP (ref 4–40)
BASE EXCESS BLDV CALC-SCNC: -1 MMOL/L — SIGNIFICANT CHANGE UP (ref -2–3)
BASOPHILS # BLD AUTO: 0.03 K/UL — SIGNIFICANT CHANGE UP (ref 0–0.2)
BASOPHILS NFR BLD AUTO: 0.2 % — SIGNIFICANT CHANGE UP (ref 0–2)
BILIRUB SERPL-MCNC: 0.8 MG/DL — SIGNIFICANT CHANGE UP (ref 0.2–1.2)
BLD GP AB SCN SERPL QL: NEGATIVE — SIGNIFICANT CHANGE UP
BLOOD GAS VENOUS COMPREHENSIVE RESULT: SIGNIFICANT CHANGE UP
BUN SERPL-MCNC: 26 MG/DL — HIGH (ref 7–23)
BUN SERPL-MCNC: 28 MG/DL — HIGH (ref 7–23)
CALCIUM SERPL-MCNC: 10.3 MG/DL — SIGNIFICANT CHANGE UP (ref 8.4–10.5)
CALCIUM SERPL-MCNC: 9.8 MG/DL — SIGNIFICANT CHANGE UP (ref 8.4–10.5)
CHLORIDE BLDV-SCNC: 103 MMOL/L — SIGNIFICANT CHANGE UP (ref 96–108)
CHLORIDE SERPL-SCNC: 101 MMOL/L — SIGNIFICANT CHANGE UP (ref 98–107)
CHLORIDE SERPL-SCNC: 102 MMOL/L — SIGNIFICANT CHANGE UP (ref 98–107)
CO2 BLDV-SCNC: 24.7 MMOL/L — SIGNIFICANT CHANGE UP (ref 22–26)
CO2 SERPL-SCNC: 19 MMOL/L — LOW (ref 22–31)
CO2 SERPL-SCNC: 21 MMOL/L — LOW (ref 22–31)
CREAT SERPL-MCNC: 0.82 MG/DL — SIGNIFICANT CHANGE UP (ref 0.5–1.3)
CREAT SERPL-MCNC: 1.02 MG/DL — SIGNIFICANT CHANGE UP (ref 0.5–1.3)
EGFR: 108 ML/MIN/1.73M2 — SIGNIFICANT CHANGE UP
EGFR: 90 ML/MIN/1.73M2 — SIGNIFICANT CHANGE UP
EOSINOPHIL # BLD AUTO: 0.03 K/UL — SIGNIFICANT CHANGE UP (ref 0–0.5)
EOSINOPHIL NFR BLD AUTO: 0.2 % — SIGNIFICANT CHANGE UP (ref 0–6)
FLUAV AG NPH QL: SIGNIFICANT CHANGE UP
FLUBV AG NPH QL: SIGNIFICANT CHANGE UP
GAS PNL BLDV: 138 MMOL/L — SIGNIFICANT CHANGE UP (ref 136–145)
GLUCOSE BLDC GLUCOMTR-MCNC: 210 MG/DL — HIGH (ref 70–99)
GLUCOSE BLDC GLUCOMTR-MCNC: 214 MG/DL — HIGH (ref 70–99)
GLUCOSE BLDC GLUCOMTR-MCNC: 219 MG/DL — HIGH (ref 70–99)
GLUCOSE BLDV-MCNC: 300 MG/DL — HIGH (ref 70–99)
GLUCOSE SERPL-MCNC: 230 MG/DL — HIGH (ref 70–99)
GLUCOSE SERPL-MCNC: 281 MG/DL — HIGH (ref 70–99)
HCO3 BLDV-SCNC: 24 MMOL/L — SIGNIFICANT CHANGE UP (ref 22–29)
HCT VFR BLD CALC: 46 % — SIGNIFICANT CHANGE UP (ref 39–50)
HCT VFR BLD CALC: 46.2 % — SIGNIFICANT CHANGE UP (ref 39–50)
HCT VFR BLDA CALC: 45 % — SIGNIFICANT CHANGE UP (ref 39–51)
HGB BLD CALC-MCNC: 15 G/DL — SIGNIFICANT CHANGE UP (ref 12.6–17.4)
HGB BLD-MCNC: 14.3 G/DL — SIGNIFICANT CHANGE UP (ref 13–17)
HGB BLD-MCNC: 14.8 G/DL — SIGNIFICANT CHANGE UP (ref 13–17)
IANC: 10.91 K/UL — HIGH (ref 1.8–7.4)
IMM GRANULOCYTES NFR BLD AUTO: 0.5 % — SIGNIFICANT CHANGE UP (ref 0–0.9)
INR BLD: 1.01 RATIO — SIGNIFICANT CHANGE UP (ref 0.88–1.16)
LACTATE BLDV-MCNC: 3.2 MMOL/L — HIGH (ref 0.5–2)
LACTATE SERPL-SCNC: 2.7 MMOL/L — HIGH (ref 0.5–2)
LIDOCAIN IGE QN: 17 U/L — SIGNIFICANT CHANGE UP (ref 7–60)
LYMPHOCYTES # BLD AUTO: 1.32 K/UL — SIGNIFICANT CHANGE UP (ref 1–3.3)
LYMPHOCYTES # BLD AUTO: 10 % — LOW (ref 13–44)
MAGNESIUM SERPL-MCNC: 2 MG/DL — SIGNIFICANT CHANGE UP (ref 1.6–2.6)
MCHC RBC-ENTMCNC: 27.1 PG — SIGNIFICANT CHANGE UP (ref 27–34)
MCHC RBC-ENTMCNC: 27.6 PG — SIGNIFICANT CHANGE UP (ref 27–34)
MCHC RBC-ENTMCNC: 31.1 GM/DL — LOW (ref 32–36)
MCHC RBC-ENTMCNC: 32 GM/DL — SIGNIFICANT CHANGE UP (ref 32–36)
MCV RBC AUTO: 86.2 FL — SIGNIFICANT CHANGE UP (ref 80–100)
MCV RBC AUTO: 87.3 FL — SIGNIFICANT CHANGE UP (ref 80–100)
MONOCYTES # BLD AUTO: 0.81 K/UL — SIGNIFICANT CHANGE UP (ref 0–0.9)
MONOCYTES NFR BLD AUTO: 6.2 % — SIGNIFICANT CHANGE UP (ref 2–14)
NEUTROPHILS # BLD AUTO: 10.91 K/UL — HIGH (ref 1.8–7.4)
NEUTROPHILS NFR BLD AUTO: 82.9 % — HIGH (ref 43–77)
NRBC # BLD: 0 /100 WBCS — SIGNIFICANT CHANGE UP (ref 0–0)
NRBC # BLD: 0 /100 WBCS — SIGNIFICANT CHANGE UP (ref 0–0)
NRBC # FLD: 0 K/UL — SIGNIFICANT CHANGE UP (ref 0–0)
NRBC # FLD: 0 K/UL — SIGNIFICANT CHANGE UP (ref 0–0)
PCO2 BLDV: 38 MMHG — LOW (ref 42–55)
PH BLDV: 7.4 — SIGNIFICANT CHANGE UP (ref 7.32–7.43)
PHOSPHATE SERPL-MCNC: 4.4 MG/DL — SIGNIFICANT CHANGE UP (ref 2.5–4.5)
PLATELET # BLD AUTO: 252 K/UL — SIGNIFICANT CHANGE UP (ref 150–400)
PLATELET # BLD AUTO: 291 K/UL — SIGNIFICANT CHANGE UP (ref 150–400)
PO2 BLDV: 51 MMHG — HIGH (ref 25–45)
POTASSIUM BLDV-SCNC: 4.2 MMOL/L — SIGNIFICANT CHANGE UP (ref 3.5–5.1)
POTASSIUM SERPL-MCNC: 4.3 MMOL/L — SIGNIFICANT CHANGE UP (ref 3.5–5.3)
POTASSIUM SERPL-MCNC: 4.6 MMOL/L — SIGNIFICANT CHANGE UP (ref 3.5–5.3)
POTASSIUM SERPL-SCNC: 4.3 MMOL/L — SIGNIFICANT CHANGE UP (ref 3.5–5.3)
POTASSIUM SERPL-SCNC: 4.6 MMOL/L — SIGNIFICANT CHANGE UP (ref 3.5–5.3)
PROT SERPL-MCNC: 8 G/DL — SIGNIFICANT CHANGE UP (ref 6–8.3)
PROTHROM AB SERPL-ACNC: 11.7 SEC — SIGNIFICANT CHANGE UP (ref 10.5–13.4)
RBC # BLD: 5.27 M/UL — SIGNIFICANT CHANGE UP (ref 4.2–5.8)
RBC # BLD: 5.36 M/UL — SIGNIFICANT CHANGE UP (ref 4.2–5.8)
RBC # FLD: 15.7 % — HIGH (ref 10.3–14.5)
RBC # FLD: 16 % — HIGH (ref 10.3–14.5)
RH IG SCN BLD-IMP: POSITIVE — SIGNIFICANT CHANGE UP
RSV RNA NPH QL NAA+NON-PROBE: SIGNIFICANT CHANGE UP
SAO2 % BLDV: 83.2 % — SIGNIFICANT CHANGE UP (ref 67–88)
SARS-COV-2 RNA SPEC QL NAA+PROBE: SIGNIFICANT CHANGE UP
SODIUM SERPL-SCNC: 140 MMOL/L — SIGNIFICANT CHANGE UP (ref 135–145)
SODIUM SERPL-SCNC: 141 MMOL/L — SIGNIFICANT CHANGE UP (ref 135–145)
WBC # BLD: 13.16 K/UL — HIGH (ref 3.8–10.5)
WBC # BLD: 4.22 K/UL — SIGNIFICANT CHANGE UP (ref 3.8–10.5)
WBC # FLD AUTO: 13.16 K/UL — HIGH (ref 3.8–10.5)
WBC # FLD AUTO: 4.22 K/UL — SIGNIFICANT CHANGE UP (ref 3.8–10.5)

## 2023-03-21 PROCEDURE — 99222 1ST HOSP IP/OBS MODERATE 55: CPT | Mod: GC

## 2023-03-21 PROCEDURE — 74176 CT ABD & PELVIS W/O CONTRAST: CPT | Mod: 26,MA

## 2023-03-21 PROCEDURE — 71045 X-RAY EXAM CHEST 1 VIEW: CPT | Mod: 26

## 2023-03-21 RX ORDER — METOPROLOL TARTRATE 50 MG
10 TABLET ORAL EVERY 6 HOURS
Refills: 0 | Status: DISCONTINUED | OUTPATIENT
Start: 2023-03-21 | End: 2023-03-21

## 2023-03-21 RX ORDER — LOSARTAN POTASSIUM 100 MG/1
50 TABLET, FILM COATED ORAL DAILY
Refills: 0 | Status: DISCONTINUED | OUTPATIENT
Start: 2023-03-21 | End: 2023-03-21

## 2023-03-21 RX ORDER — SODIUM CHLORIDE 9 MG/ML
1000 INJECTION, SOLUTION INTRAVENOUS ONCE
Refills: 0 | Status: COMPLETED | OUTPATIENT
Start: 2023-03-21 | End: 2023-03-21

## 2023-03-21 RX ORDER — ACETAMINOPHEN 500 MG
1000 TABLET ORAL ONCE
Refills: 0 | Status: COMPLETED | OUTPATIENT
Start: 2023-03-21 | End: 2023-03-21

## 2023-03-21 RX ORDER — DAPAGLIFLOZIN 10 MG/1
10 TABLET, FILM COATED ORAL ONCE
Refills: 0 | Status: DISCONTINUED | OUTPATIENT
Start: 2023-03-21 | End: 2023-03-21

## 2023-03-21 RX ORDER — HEPARIN SODIUM 5000 [USP'U]/ML
5000 INJECTION INTRAVENOUS; SUBCUTANEOUS EVERY 8 HOURS
Refills: 0 | Status: DISCONTINUED | OUTPATIENT
Start: 2023-03-21 | End: 2023-03-27

## 2023-03-21 RX ORDER — METFORMIN HYDROCHLORIDE 850 MG/1
500 TABLET ORAL ONCE
Refills: 0 | Status: DISCONTINUED | OUTPATIENT
Start: 2023-03-21 | End: 2023-03-21

## 2023-03-21 RX ORDER — INSULIN LISPRO 100/ML
VIAL (ML) SUBCUTANEOUS EVERY 6 HOURS
Refills: 0 | Status: DISCONTINUED | OUTPATIENT
Start: 2023-03-21 | End: 2023-03-24

## 2023-03-21 RX ORDER — ONDANSETRON 8 MG/1
4 TABLET, FILM COATED ORAL ONCE
Refills: 0 | Status: COMPLETED | OUTPATIENT
Start: 2023-03-21 | End: 2023-03-21

## 2023-03-21 RX ORDER — HYDROMORPHONE HYDROCHLORIDE 2 MG/ML
1 INJECTION INTRAMUSCULAR; INTRAVENOUS; SUBCUTANEOUS ONCE
Refills: 0 | Status: DISCONTINUED | OUTPATIENT
Start: 2023-03-21 | End: 2023-03-21

## 2023-03-21 RX ORDER — METOPROLOL TARTRATE 50 MG
10 TABLET ORAL EVERY 6 HOURS
Refills: 0 | Status: DISCONTINUED | OUTPATIENT
Start: 2023-03-21 | End: 2023-03-24

## 2023-03-21 RX ORDER — DIPHENHYDRAMINE HCL 50 MG
25 CAPSULE ORAL ONCE
Refills: 0 | Status: COMPLETED | OUTPATIENT
Start: 2023-03-21 | End: 2023-03-21

## 2023-03-21 RX ORDER — SODIUM CHLORIDE 9 MG/ML
1000 INJECTION, SOLUTION INTRAVENOUS
Refills: 0 | Status: DISCONTINUED | OUTPATIENT
Start: 2023-03-21 | End: 2023-03-25

## 2023-03-21 RX ORDER — SODIUM CHLORIDE 9 MG/ML
500 INJECTION, SOLUTION INTRAVENOUS ONCE
Refills: 0 | Status: COMPLETED | OUTPATIENT
Start: 2023-03-21 | End: 2023-03-21

## 2023-03-21 RX ADMIN — Medication 400 MILLIGRAM(S): at 17:38

## 2023-03-21 RX ADMIN — HYDROMORPHONE HYDROCHLORIDE 1 MILLIGRAM(S): 2 INJECTION INTRAMUSCULAR; INTRAVENOUS; SUBCUTANEOUS at 10:30

## 2023-03-21 RX ADMIN — SODIUM CHLORIDE 500 MILLILITER(S): 9 INJECTION, SOLUTION INTRAVENOUS at 05:39

## 2023-03-21 RX ADMIN — SODIUM CHLORIDE 1000 MILLILITER(S): 9 INJECTION, SOLUTION INTRAVENOUS at 10:30

## 2023-03-21 RX ADMIN — HEPARIN SODIUM 5000 UNIT(S): 5000 INJECTION INTRAVENOUS; SUBCUTANEOUS at 17:38

## 2023-03-21 RX ADMIN — SODIUM CHLORIDE 125 MILLILITER(S): 9 INJECTION, SOLUTION INTRAVENOUS at 12:40

## 2023-03-21 RX ADMIN — Medication 25 MILLIGRAM(S): at 03:08

## 2023-03-21 RX ADMIN — SODIUM CHLORIDE 125 MILLILITER(S): 9 INJECTION, SOLUTION INTRAVENOUS at 21:45

## 2023-03-21 RX ADMIN — Medication 400 MILLIGRAM(S): at 01:44

## 2023-03-21 RX ADMIN — Medication 25 MILLIGRAM(S): at 10:30

## 2023-03-21 RX ADMIN — Medication 2: at 21:46

## 2023-03-21 RX ADMIN — ONDANSETRON 4 MILLIGRAM(S): 8 TABLET, FILM COATED ORAL at 21:46

## 2023-03-21 RX ADMIN — HYDROMORPHONE HYDROCHLORIDE 1 MILLIGRAM(S): 2 INJECTION INTRAMUSCULAR; INTRAVENOUS; SUBCUTANEOUS at 06:20

## 2023-03-21 RX ADMIN — ONDANSETRON 4 MILLIGRAM(S): 8 TABLET, FILM COATED ORAL at 01:44

## 2023-03-21 RX ADMIN — Medication 120 MILLIGRAM(S): at 21:45

## 2023-03-21 RX ADMIN — Medication 1000 MILLIGRAM(S): at 02:00

## 2023-03-21 RX ADMIN — ONDANSETRON 4 MILLIGRAM(S): 8 TABLET, FILM COATED ORAL at 05:57

## 2023-03-21 RX ADMIN — SODIUM CHLORIDE 500 MILLILITER(S): 9 INJECTION, SOLUTION INTRAVENOUS at 08:00

## 2023-03-21 RX ADMIN — SODIUM CHLORIDE 1000 MILLILITER(S): 9 INJECTION, SOLUTION INTRAVENOUS at 02:17

## 2023-03-21 RX ADMIN — HYDROMORPHONE HYDROCHLORIDE 1 MILLIGRAM(S): 2 INJECTION INTRAMUSCULAR; INTRAVENOUS; SUBCUTANEOUS at 08:00

## 2023-03-21 RX ADMIN — HYDROMORPHONE HYDROCHLORIDE 1 MILLIGRAM(S): 2 INJECTION INTRAMUSCULAR; INTRAVENOUS; SUBCUTANEOUS at 03:23

## 2023-03-21 RX ADMIN — HYDROMORPHONE HYDROCHLORIDE 1 MILLIGRAM(S): 2 INJECTION INTRAMUSCULAR; INTRAVENOUS; SUBCUTANEOUS at 03:08

## 2023-03-21 RX ADMIN — HYDROMORPHONE HYDROCHLORIDE 1 MILLIGRAM(S): 2 INJECTION INTRAMUSCULAR; INTRAVENOUS; SUBCUTANEOUS at 11:00

## 2023-03-21 RX ADMIN — Medication 1000 MILLIGRAM(S): at 17:53

## 2023-03-21 NOTE — ED ADULT NURSE NOTE - NSIMPLEMENTINTERV_GEN_ALL_ED
Implemented All Universal Safety Interventions:  [place] to call system. Call bell, personal items and telephone within reach. Instruct patient to call for assistance. Room bathroom lighting operational. Non-slip footwear when patient is off stretcher. Physically safe environment: no spills, clutter or unnecessary equipment. Stretcher in lowest position, wheels locked, appropriate side rails in place.

## 2023-03-21 NOTE — H&P ADULT - HISTORY OF PRESENT ILLNESS
47M w/ pmh HTN, HLD, T2DM,  s/p Buchanan's in 2009 and subsequent multiple SBOs s/p ex lap LINDA in 2014, 2016, and ex lap, LINDA, ventral hernia repair in 2018, presenting with several hours of diffuse abdominal pain and nausea and retching. States that he was feeling well up until 5PM this afternoon. He had a full day of work, tolerated breakfast and lunch without issues, and then when he got home from work, he started to feel "off". He developed nausea with retching and cramping in his upper abdomen. He has been burping and retching intermittently since 5PM but has not vomited. States that his last BM was here in the ED, which was diarrhea. His nausea and retching has improved since this bowel movement. Last flatus this morning. Denies fevers or chills, denies dysuria, recent travel or recent antibiotic use.

## 2023-03-21 NOTE — H&P ADULT - ASSESSMENT
ASSESSMENT  47M w/ pmh HTN, HLD, T2DM,  s/p Buchanan's in 2009 and subsequent multiple SBOs s/p ex lap LINDA in 2014, 2016, and ex lap, LINDA, ventral hernia repair in 2018, presenting with several hours of diffuse abdominal pain and nausea and retching.    -Admit to A team surgery   -LR 125cc/hr   -NPO/NGT   -DVT ppx   -frequent abdominal exams   -pain control as needed  -case discussed with PGY-5 resident

## 2023-03-21 NOTE — ED PROVIDER NOTE - CLINICAL SUMMARY MEDICAL DECISION MAKING FREE TEXT BOX
47-year-old male with complex surgical history presenting with acute onset diffuse abdominal pain.  Vital signs show tachycardia and no other signs of hemodynamic instability.  Patient uncomfortable appearing, with diffuse abdominal tenderness.  Not frankly peritonitic at this time.  Will obtain chest x-ray to assess for free air under the diaphragm to indicate emergent surgical consultation.  We will rule out perforation versus recurrent obstruction in a patient with a history of same, CT abdomen pelvis oral contrast.  Held off on IV contrast given history of severe iodine allergy and success in the past using oral contrast only.  Assess for metabolic derangements, obtain lactate to trend resuscitation, obtain preop work-up.Plan will be to obtain CMP, CBC, type and screen, coags, CT abdomen pelvis oral contrast, chest x-ray, EKG.  Lactated Ringer's and pain medication.  Patient requesting Tylenol instead of opioids.

## 2023-03-21 NOTE — ED ADULT NURSE REASSESSMENT NOTE - NS ED NURSE REASSESS COMMENT FT1
pt to Ct at this time, noted NGT to CWS, irrigated with improved flow noted. vss, plan for IVF and pain meds upon r/t from CT

## 2023-03-21 NOTE — ED ADULT NURSE REASSESSMENT NOTE - NS ED NURSE REASSESS COMMENT FT1
pt resting in stretcher, remains on ng tube suctioning. attempted to page team for his routine meds. pt due for toprol for heart rate, and pain meds- no success. will try again.

## 2023-03-21 NOTE — ED PROVIDER NOTE - NS ED ROS FT
GENERAL: no fever  EYES: no eye pain  HEENT: no neck pain  CARDIAC: no chest pain  PULMONARY: no cough  GI: + abdominal pain  : no dysuria  SKIN: no rashes  NEURO: no headache  MSK: no new joint pain

## 2023-03-21 NOTE — ED PROVIDER NOTE - PROGRESS NOTE DETAILS
Reynaldo Campos MD (PGY-2): Patient requiring additional pain meds.  Holding off on Toradol given that he is a potential surgical candidate. Discussed pain management strategies with patient.  He does have a rash reaction to morphine and fentanyl.  He says he is taken Dilaudid with Benadryl in the past without problems.  We will order this now for pain control. Francesco Mayfield, PGY-3: Pt signed out to me, I agree with the plan. Pt is currently comfortable and hemodynamically stable. Labs/imaging reviewed. 46yo M pmh HTN, T2DM, HLD, divertic + perf s/p hartmanns c/b sbos now with recurrent abd pain. Pending CT. Surgery aware. Likely SBO, will need admission. s/p dilaudid + benadryl. NG tube placed without complication, confirmation xray pending.

## 2023-03-21 NOTE — ED PROVIDER NOTE - PHYSICAL EXAMINATION
Gen:   Appears in pain  Head: normal appearing  HEENT: normal conjunctiva  Lung: no respiratory distress, speaking in full sentences, clear to auscultation bilaterally as noted over the anterior lung fields     CV: regular rate and rhythm, no murmurs  Abd: soft,  distended, voluntary guarding diffusely, diffuse tenderness throughout all quadrants.  There is a periumbilical surgical scar, well-healed.  MSK: no visible deformities  Neuro: alert and grossly oriented, no gross motor deficits  Skin: No dominic rashes

## 2023-03-21 NOTE — ED ADULT NURSE REASSESSMENT NOTE - NS ED NURSE REASSESS COMMENT FT1
pt aox4, resting in stretcher, on moderate suctioning to ng tube to left nare, approx 100cc gastric contents noted. breathing even and unlabored.  pt aware of pending bed assignments. stretcher in lowest position, call bell within reach. will continue to monitor.

## 2023-03-21 NOTE — ED ADULT NURSE NOTE - OBJECTIVE STATEMENT
A&Ox4, respirations equal and unlabored. Received to room 8. Pt c/o abdominal pain that started around 1230pm. PMH multiple small bowel obstructions. Pt states it feels similar. Last BM 2 hours ago. Pt unable to keep anything down since 1230pm. Pt not passing gas. Pt denies CP, SOB, palpitations, dizziness, blurry vision, headache, numbness, tingling, any other complaints. 20G IV placed to LAC. Labs drawn and sent. No acute distress noted. Pending CT.

## 2023-03-21 NOTE — PATIENT PROFILE ADULT - FALL HARM RISK - HARM RISK INTERVENTIONS

## 2023-03-21 NOTE — ED PROVIDER NOTE - ATTENDING CONTRIBUTION TO CARE
47-year-old male with history of hypertension, hyperlipidemia, diabetes, diverticulitis complicated by perforation requiring Buchanan procedure, previous hernia repairs and SBO's requiring LINDA (surgeon Dr. White) presents with abdominal pain nausea and vomiting x1 day.  Patient reports diffuse pain since 12p.m. Patient is not passing flatus patient is well, but reports small episode of loose stool upon ED arrival.  No fever, chest pain, back pain, urinary symptoms.  Patient states symptoms similar to previous SBO.    Uncomfortable appearing, sitting in stretcher, awake and alert, nontoxic.  AF/VSS.  Lungs cta bl.  Cards nl S1/S2, RRR, no MRG.  Abd soft distended diffusely tender no rebound or guarding.  No pedal edema or calf tenderness.    High suspicion for SBO.  Also consider other intraabd pathology, such as pancreatitis, appendicitis.  Plan for labs, EKG, CT, pain meds and antiemetics, likely surgery evaluation and admission.

## 2023-03-21 NOTE — ED ADULT NURSE NOTE - CHIEF COMPLAINT QUOTE
abd pain assoc with n/v since this afternoon, appears pale/diaphoretic, actively vomiting in triage, - hx "stomach extractions", SBO's

## 2023-03-21 NOTE — ED PROVIDER NOTE - HIV OFFER
History & Physical    Subjective:   Patient here to follow-up after imaging, she has had interval resolution and ecchymosis, she still has some pain to the area but has improved.  A diagnostic left mammogram and ultrasound was performed which shows no mammographic or sonographic evidence of malignancy.      Pt referred by PCP for evaluation of left breast change.   She describes itching painful left nipple/areola for a few weeks with interval worsening over the past 3 days. No history of trauma. No abnormal discharge. Pain is sharp and 6/10 intensity.     She has a personal history of cervical cancer, stage I and uterine cancer.  Menarche occurred at age 10.  Menopause at age 37.  She still is her uterus and ovaries in place.  She is  with age of first pregnancy 17.  She's been on estrogen therapy for 5 years.  She has a paternal grandfather with bladder and prostate cancer.  His mother with inflammatory breast cancer diagnosed in age 72, still alive at age 77.    She had a digital screening mammogram 2017 which showed no mammographic evidence of malignancy, BI-RADS 1.  Her complaints were not present at the time of this exam.      Chief Complaint   Patient presents with    Left breast mass and change       Review of patient's allergies indicates:   Allergen Reactions    Keflex [cephalexin] Diarrhea and Nausea Only    Sulfa (sulfonamide antibiotics) Itching            Current Outpatient Prescriptions   Medication Sig Dispense Refill     Current Outpatient Prescriptions on File Prior to Visit   Medication Sig Dispense Refill    tolterodine (DETROL LA) 4 MG 24 hr capsule Take 1 capsule (4 mg total) by mouth once daily. (Patient taking differently: Take 4 mg by mouth every evening. ) 30 capsule 11    gabapentin (NEURONTIN) 300 MG capsule Take 1 capsule (300 mg total) by mouth every evening. 30 capsule 11    omeprazole (PRILOSEC) 40 MG capsule Take 1 capsule (40 mg total) by mouth every morning. 30  capsule 3    polyethylene glycol (GLYCOLAX) 17 gram PwPk Take 17 g by mouth once daily. 30 packet 0    [DISCONTINUED] diazepam (VALIUM) 5 MG tablet Take 1 tablet (5 mg total) by mouth every 8 (eight) hours as needed for Anxiety. 90 tablet 2     No current facility-administered medications on file prior to visit.          Past Medical History     Past Medical History:   Diagnosis Date    Abnormal Pap smear     Allergy     Anemia     Anxiety     Broken nose     Cervical cancer 5-09    stage 1-b treated with chemoradiation    Chronic pelvic pain in female     Depression     Fatigue     Fracture of left hand     History of psychiatric care     Numbness of foot     rt after nerve block    Pain in joint of right hip     PTSD (post-traumatic stress disorder) 9/26/2013    PUD (peptic ulcer disease) 4/20/2015    Right leg pain     STD (sexually transmitted disease)     hpv    Suicide attempt     Urinary tract infection        Past Surgical History     Past Surgical History:   Procedure Laterality Date    NASAL SEPTUM SURGERY  09/2010    ORIF WRIST FRACTURE Left 1996         RADIOACTIVE PLAQUE INSERTION  2009    cervical cancer    RADIOACTIVE PLAQUE REMOVAL  2009            Family History     Family History   Problem Relation Age of Onset    Hypertension Mother     Heart disease Mother     Breast cancer Mother 72    Cancer Mother     Diabetes Father     Heart disease Father     Osteoporosis Father     Alcohol abuse Father     Cancer Father     Breast cancer Paternal Aunt 60    Cancer Paternal Aunt     Lung cancer Maternal Grandmother     Cancer Paternal Grandfather     Cancer Brother     Ovarian cancer Neg Hx     Uterine cancer Neg Hx     Colon cancer Neg Hx     Kidney disease Neg Hx          Review of Systems  Review of Systems   Constitutional: Negative for chills and fever.   HENT: Negative for congestion.    Eyes: Negative for blurred vision and double vision.   Respiratory:  "Negative for cough and shortness of breath.    Cardiovascular: Negative for chest pain and palpitations.   Gastrointestinal: Negative for abdominal pain, nausea and vomiting.   Genitourinary: Negative for dysuria and frequency.   Musculoskeletal: Negative for back pain and neck pain.   Skin: Negative for itching and rash.   Neurological: Negative for dizziness, seizures and headaches.   Endo/Heme/Allergies: Does not bruise/bleed easily.   Psychiatric/Behavioral: Negative for depression and substance abuse.         OBJECTIVE:   BP (!) 131/95 (BP Location: Left arm, Patient Position: Sitting)   Pulse 92   Temp 97.4 °F (36.3 °C) (Oral)   Ht 5' 2" (1.575 m)   Wt 85.4 kg (188 lb 4.4 oz)   LMP 06/28/2009 (Exact Date)   BMI 34.44 kg/m²       Physical Exam   Constitutional: She is oriented to person, place, and time and well-developed, well-nourished, and in no distress. No distress.   HENT:   Head: Normocephalic and atraumatic.   Eyes: Conjunctivae are normal. No scleral icterus.   Neck: No JVD present.   Cardiovascular: Normal rate and regular rhythm.    Pulmonary/Chest: Effort normal and breath sounds normal. No stridor. Right breast exhibits no inverted nipple, no mass, no nipple discharge, no skin change and no tenderness. Left breast exhibits mass, skin change and tenderness. Left breast exhibits no inverted nipple and no nipple discharge. Breasts are symmetrical.       Abdominal: Soft. She exhibits no distension. There is no tenderness.   Musculoskeletal: She exhibits no edema or tenderness.   Neurological: She is alert and oriented to person, place, and time.   Skin: No rash noted. She is not diaphoretic. No pallor.   Psychiatric: Affect and judgment normal.         Laboratory  n/a    Diagnostic Results:  mmg reviewed      Assessment:     45 female with resolving left breast mass and skin change, tyrer grayson 13 %     Plan:      We discussed the nature of this pathology and potential benign or malignant " causes.  We'll observe for now and repeat clinical evaluation 3 months   Previously Declined (within the last year)

## 2023-03-21 NOTE — H&P ADULT - ATTENDING COMMENTS
Multiple admissions for sbo. Similar history and ct appearance this time. Plan to hydrate with NG decompression . Potential surgery discussed. ALEJANDRO

## 2023-03-21 NOTE — PROGRESS NOTE ADULT - SUBJECTIVE AND OBJECTIVE BOX
SURGERY  Pager: #[phone]    INTERVAL EVENTS/SUBJECTIVE: Patient seen and examined at bedside. Reports passing gas, no bowel movements since yesterday. No emesis, though continues to be nauseous.     ______________________________________________  OBJECTIVE:   T(C): 36.2 (03-21-23 @ 09:27), Max: 37 (03-20-23 @ 23:34)  HR: 128 (03-21-23 @ 09:27) (113 - 128)  BP: 141/84 (03-21-23 @ 09:27) (109/74 - 144/80)  RR: 19 (03-21-23 @ 09:27) (17 - 19)  SpO2: 97% (03-21-23 @ 09:27) (96% - 98%)  Wt(kg): --  CAPILLARY BLOOD GLUCOSE      POCT Blood Glucose.: 214 mg/dL (21 Mar 2023 09:07)  POCT Blood Glucose.: 208 mg/dL (20 Mar 2023 20:23)    I&O's Detail      Physical exam:  Gen: resting in bed comfortably in NAD  Chest: no increased WOB, regular inspiratory effort   Abdomen: Soft, distended, TTP in LLQ, well healed surgical scars   Vascular: WWP, COLIN x4  NEURO: awake, alert  ______________________________________________  LABS:  CBC Full  -  ( 21 Mar 2023 01:33 )  WBC Count : 13.16 K/uL  RBC Count : 5.36 M/uL  Hemoglobin : 14.8 g/dL  Hematocrit : 46.2 %  Platelet Count - Automated : 252 K/uL  Mean Cell Volume : 86.2 fL  Mean Cell Hemoglobin : 27.6 pg  Mean Cell Hemoglobin Concentration : 32.0 gm/dL  Auto Neutrophil # : 10.91 K/uL  Auto Lymphocyte # : 1.32 K/uL  Auto Monocyte # : 0.81 K/uL  Auto Eosinophil # : 0.03 K/uL  Auto Basophil # : 0.03 K/uL  Auto Neutrophil % : 82.9 %  Auto Lymphocyte % : 10.0 %  Auto Monocyte % : 6.2 %  Auto Eosinophil % : 0.2 %  Auto Basophil % : 0.2 %    03-21    140  |  101  |  26<H>  ----------------------------<  281<H>  4.3   |  19<L>  |  1.02    Ca    10.3      21 Mar 2023 01:33  Phos  4.4     03-21  Mg     2.00     03-21    TPro  8.0  /  Alb  4.7  /  TBili  0.8  /  DBili  x   /  AST  26  /  ALT  35  /  AlkPhos  38<L>  03-21    _____________________________________________  RADIOLOGY:

## 2023-03-21 NOTE — H&P ADULT - NSHPPHYSICALEXAM_GEN_ALL_CORE
PE   General: well developed, well nourished, NAD  Neuro: alert and oriented, no focal deficits, moves all extremities spontaneously  HEENT: NCAT, EOMI, anicteric, mucosa moist  Respiratory: airway patent, respirations unlabored on RA  CVS: regular rate and rhythm  Abdomen: softly distended, mildly tender in the epigastrium, no rebound tenderness or guarding  Extremities: no edema, sensation and movement grossly intact  Skin: warm, dry, appropriate color

## 2023-03-21 NOTE — ED PROVIDER NOTE - OBJECTIVE STATEMENT
47-year-old male, history of complex diverticulitis complicated by perforation status post Buchanan's procedure, ventral hernia status postrepair, hypertension, diabetes, hyperlipidemia, presenting with a chief complaint of abdominal pain.  Reminiscent of prior recent admission for small bowel obstruction, managed conservatively via NG tube.  Patient reports that his pain today started this afternoon, no remarkable preceding events, sharp, diffuse, nonradiating.  No associated fevers, chills, chest pain or testicular pain with this.  No pain with urination.  Nausea without vomiting.   Of note patient has allergy to IV contrast, has received CT oral contrast in the past, no IV contrast given.  Intolerance to morphine, request Tylenol for pain management..  His surgeon is Dr. Musa abbott.  No recent changes to meds.

## 2023-03-21 NOTE — PATIENT PROFILE ADULT - HAS THE PATIENT RECEIVED THE INFLUENZA VACCINE THIS SEASON?
Consent: Written consent was obtained and risks were reviewed including but not limited to scarring, infection, bleeding, scabbing, incomplete removal, nerve damage and allergy to anesthesia. Cryotherapy Text: The wound bed was treated with cryotherapy after the biopsy was performed. Size Of Lesion In Cm: 0 Detail Level: Detailed Notification Instructions: Patient will be notified of biopsy results. However, patient instructed to call the office if not contacted within 2 weeks. Hide Additional Size Dimension?: No Dressing: pressure dressing with telfa Anesthesia Type: 1% lidocaine without epinephrine Information: Selecting Yes will display possible errors in your note based on the variables you have selected. This validation is only offered as a suggestion for you. PLEASE NOTE THAT THE VALIDATION TEXT WILL BE REMOVED WHEN YOU FINALIZE YOUR NOTE. IF YOU WANT TO FAX A PRELIMINARY NOTE YOU WILL NEED TO TOGGLE THIS TO 'NO' IF YOU DO NOT WANT IT IN YOUR FAXED NOTE. Path Notes (To The Dermatopathologist): . Post-Care Instructions: I reviewed with the patient in detail post-care instructions. Patient is to keep the biopsy site dry overnight, and then apply bacitracin twice daily until healed. Patient may apply hydrogen peroxide soaks to remove any crusting. Biopsy Method: 15 blade Validate Note Data (See Information Below): Yes Depth Of Biopsy: dermis Hemostasis: Electrocautery Anesthesia Volume In Cc: 0.5 Silver Nitrate Text: The wound bed was treated with silver nitrate after the biopsy was performed. Billing Type: Third-Party Bill Outside Pathology Billing: outside pathology read only Electrodesiccation And Curettage Text: The wound bed was treated with electrodesiccation and curettage after the biopsy was performed. Biopsy Type: H and E Wound Care: Vaseline Accession #: [id number] Type Of Destruction Used: Curettage Electrodesiccation Text: The wound bed was treated with electrodesiccation after the biopsy was performed. yes...

## 2023-03-22 LAB
ANION GAP SERPL CALC-SCNC: 18 MMOL/L — HIGH (ref 7–14)
BUN SERPL-MCNC: 32 MG/DL — HIGH (ref 7–23)
CALCIUM SERPL-MCNC: 9.1 MG/DL — SIGNIFICANT CHANGE UP (ref 8.4–10.5)
CHLORIDE SERPL-SCNC: 104 MMOL/L — SIGNIFICANT CHANGE UP (ref 98–107)
CO2 SERPL-SCNC: 21 MMOL/L — LOW (ref 22–31)
CREAT SERPL-MCNC: 0.85 MG/DL — SIGNIFICANT CHANGE UP (ref 0.5–1.3)
EGFR: 107 ML/MIN/1.73M2 — SIGNIFICANT CHANGE UP
GLUCOSE BLDC GLUCOMTR-MCNC: 142 MG/DL — HIGH (ref 70–99)
GLUCOSE BLDC GLUCOMTR-MCNC: 156 MG/DL — HIGH (ref 70–99)
GLUCOSE BLDC GLUCOMTR-MCNC: 164 MG/DL — HIGH (ref 70–99)
GLUCOSE BLDC GLUCOMTR-MCNC: 168 MG/DL — HIGH (ref 70–99)
GLUCOSE BLDC GLUCOMTR-MCNC: 206 MG/DL — HIGH (ref 70–99)
GLUCOSE BLDC GLUCOMTR-MCNC: 210 MG/DL — HIGH (ref 70–99)
GLUCOSE SERPL-MCNC: 211 MG/DL — HIGH (ref 70–99)
HCT VFR BLD CALC: 44.4 % — SIGNIFICANT CHANGE UP (ref 39–50)
HGB BLD-MCNC: 13.5 G/DL — SIGNIFICANT CHANGE UP (ref 13–17)
MAGNESIUM SERPL-MCNC: 2 MG/DL — SIGNIFICANT CHANGE UP (ref 1.6–2.6)
MCHC RBC-ENTMCNC: 27.7 PG — SIGNIFICANT CHANGE UP (ref 27–34)
MCHC RBC-ENTMCNC: 30.4 GM/DL — LOW (ref 32–36)
MCV RBC AUTO: 91 FL — SIGNIFICANT CHANGE UP (ref 80–100)
NRBC # BLD: 0 /100 WBCS — SIGNIFICANT CHANGE UP (ref 0–0)
NRBC # FLD: 0 K/UL — SIGNIFICANT CHANGE UP (ref 0–0)
PHOSPHATE SERPL-MCNC: 3.1 MG/DL — SIGNIFICANT CHANGE UP (ref 2.5–4.5)
PLATELET # BLD AUTO: SIGNIFICANT CHANGE UP K/UL (ref 150–400)
POTASSIUM SERPL-MCNC: 4.9 MMOL/L — SIGNIFICANT CHANGE UP (ref 3.5–5.3)
POTASSIUM SERPL-SCNC: 4.9 MMOL/L — SIGNIFICANT CHANGE UP (ref 3.5–5.3)
RBC # BLD: 4.88 M/UL — SIGNIFICANT CHANGE UP (ref 4.2–5.8)
RBC # FLD: 16.1 % — HIGH (ref 10.3–14.5)
SODIUM SERPL-SCNC: 143 MMOL/L — SIGNIFICANT CHANGE UP (ref 135–145)
WBC # BLD: 6.24 K/UL — SIGNIFICANT CHANGE UP (ref 3.8–10.5)
WBC # FLD AUTO: 6.24 K/UL — SIGNIFICANT CHANGE UP (ref 3.8–10.5)

## 2023-03-22 PROCEDURE — 93010 ELECTROCARDIOGRAM REPORT: CPT

## 2023-03-22 RX ORDER — SODIUM CHLORIDE 9 MG/ML
1000 INJECTION, SOLUTION INTRAVENOUS ONCE
Refills: 0 | Status: COMPLETED | OUTPATIENT
Start: 2023-03-22 | End: 2023-03-22

## 2023-03-22 RX ORDER — ACETAMINOPHEN 500 MG
1000 TABLET ORAL ONCE
Refills: 0 | Status: COMPLETED | OUTPATIENT
Start: 2023-03-22 | End: 2023-03-22

## 2023-03-22 RX ADMIN — Medication 1: at 15:17

## 2023-03-22 RX ADMIN — HEPARIN SODIUM 5000 UNIT(S): 5000 INJECTION INTRAVENOUS; SUBCUTANEOUS at 15:17

## 2023-03-22 RX ADMIN — HEPARIN SODIUM 5000 UNIT(S): 5000 INJECTION INTRAVENOUS; SUBCUTANEOUS at 05:23

## 2023-03-22 RX ADMIN — HEPARIN SODIUM 5000 UNIT(S): 5000 INJECTION INTRAVENOUS; SUBCUTANEOUS at 23:00

## 2023-03-22 RX ADMIN — SODIUM CHLORIDE 125 MILLILITER(S): 9 INJECTION, SOLUTION INTRAVENOUS at 08:02

## 2023-03-22 RX ADMIN — SODIUM CHLORIDE 125 MILLILITER(S): 9 INJECTION, SOLUTION INTRAVENOUS at 15:18

## 2023-03-22 RX ADMIN — Medication 120 MILLIGRAM(S): at 16:20

## 2023-03-22 RX ADMIN — Medication 120 MILLIGRAM(S): at 10:40

## 2023-03-22 RX ADMIN — Medication 120 MILLIGRAM(S): at 04:54

## 2023-03-22 RX ADMIN — Medication 1000 MILLIGRAM(S): at 15:47

## 2023-03-22 RX ADMIN — SODIUM CHLORIDE 1000 MILLILITER(S): 9 INJECTION, SOLUTION INTRAVENOUS at 06:12

## 2023-03-22 RX ADMIN — SODIUM CHLORIDE 6000 MILLILITER(S): 9 INJECTION, SOLUTION INTRAVENOUS at 08:25

## 2023-03-22 RX ADMIN — Medication 1000 MILLIGRAM(S): at 03:00

## 2023-03-22 RX ADMIN — Medication 2: at 03:51

## 2023-03-22 RX ADMIN — Medication 400 MILLIGRAM(S): at 02:13

## 2023-03-22 RX ADMIN — Medication 400 MILLIGRAM(S): at 15:17

## 2023-03-22 RX ADMIN — Medication 120 MILLIGRAM(S): at 23:00

## 2023-03-22 RX ADMIN — Medication 1: at 10:00

## 2023-03-22 NOTE — PROVIDER CONTACT NOTE (OTHER) - SITUATION
Pt arrived on unit , takes metoprolol-ER 100mg daily. Has not had any today due to NPO status. Please advise
We are not able to administer IV metoprolol on this unit, pt must be on tele unit for that order.
patient is tachycardic

## 2023-03-22 NOTE — PROGRESS NOTE ADULT - SUBJECTIVE AND OBJECTIVE BOX
Less discomfort. Remains distended minimal tenderness . Had BM , no flatus. Less tachycardic after hydration. Continue present RX. DH

## 2023-03-22 NOTE — PROGRESS NOTE ADULT - SUBJECTIVE AND OBJECTIVE BOX
Surgery Progress Note    INTERVAL EVENTS:   No acute events overnight. nausea overnight     SUBJECTIVE: Patient seen and examined at bedside with surgical team. Nauseous, NGT output 2.8L. Getting bolus. Denies pain.     OBJECTIVE:    Vital Signs Last 24 Hrs  T(C): 37.3 (22 Mar 2023 04:50), Max: 37.4 (21 Mar 2023 17:46)  T(F): 99.1 (22 Mar 2023 04:50), Max: 99.3 (21 Mar 2023 17:46)  HR: 107 (22 Mar 2023 05:10) (102 - 128)  BP: 141/85 (22 Mar 2023 05:10) (128/76 - 158/74)  BP(mean): --  RR: 17 (22 Mar 2023 04:50) (17 - 19)  SpO2: 94% (22 Mar 2023 04:50) (94% - 98%)    Parameters below as of 22 Mar 2023 04:50  Patient On (Oxygen Delivery Method): room air    I&O's Detail    21 Mar 2023 07:01  -  22 Mar 2023 07:00  --------------------------------------------------------  IN:    IV PiggyBack: 60 mL    Lactated Ringers: 1375 mL    Lactated Ringers Bolus: 1000 mL  Total IN: 2435 mL    OUT:    Nasogastric/Oral tube (mL): 2850 mL    Voided (mL): 750 mL  Total OUT: 3600 mL    Total NET: -1165 mL      MEDICATIONS  (STANDING):  heparin   Injectable 5000 Unit(s) SubCutaneous every 8 hours  insulin lispro (ADMELOG) corrective regimen sliding scale   SubCutaneous every 6 hours  lactated ringers. 1000 milliLiter(s) (125 mL/Hr) IV Continuous <Continuous>  metoprolol tartrate IVPB 10 milliGRAM(s) IV Intermittent every 6 hours    MEDICATIONS  (PRN):      PHYSICAL EXAM:  Constitutional: NAD, NGT in please, thick output.   Respiratory: Unlabored breathing  Abdomen: Softly distended, NTTP. No rebound or guarding.  Extremities: WWP, COLIN spontaneously    LABS:                        14.3   4.22  )-----------( 291      ( 21 Mar 2023 14:00 )             46.0     03-21    141  |  102  |  28<H>  ----------------------------<  230<H>  4.6   |  21<L>  |  0.82    Ca    9.8      21 Mar 2023 14:00  Phos  4.4     03-21  Mg     2.00     03-21    TPro  8.0  /  Alb  4.7  /  TBili  0.8  /  DBili  x   /  AST  26  /  ALT  35  /  AlkPhos  38<L>  03-21    PT/INR - ( 21 Mar 2023 01:33 )   PT: 11.7 sec;   INR: 1.01 ratio         PTT - ( 21 Mar 2023 01:33 )  PTT:26.8 sec  LIVER FUNCTIONS - ( 21 Mar 2023 01:33 )  Alb: 4.7 g/dL / Pro: 8.0 g/dL / ALK PHOS: 38 U/L / ALT: 35 U/L / AST: 26 U/L / GGT: x                 IMAGING:

## 2023-03-22 NOTE — PROVIDER CONTACT NOTE (OTHER) - BACKGROUND
47M w/ pmh HTN, HLD, T2DM,  s/p Buchanan's in 2009 and subsequent multiple SBOs s/p ex lap LINDA in 2014, 2016, and ex lap, LINDA, ventral hernia repair in 2018

## 2023-03-23 LAB
ANION GAP SERPL CALC-SCNC: 16 MMOL/L — HIGH (ref 7–14)
BUN SERPL-MCNC: 26 MG/DL — HIGH (ref 7–23)
CALCIUM SERPL-MCNC: 8.7 MG/DL — SIGNIFICANT CHANGE UP (ref 8.4–10.5)
CHLORIDE SERPL-SCNC: 105 MMOL/L — SIGNIFICANT CHANGE UP (ref 98–107)
CO2 SERPL-SCNC: 23 MMOL/L — SIGNIFICANT CHANGE UP (ref 22–31)
CREAT SERPL-MCNC: 0.78 MG/DL — SIGNIFICANT CHANGE UP (ref 0.5–1.3)
EGFR: 109 ML/MIN/1.73M2 — SIGNIFICANT CHANGE UP
GLUCOSE BLDC GLUCOMTR-MCNC: 128 MG/DL — HIGH (ref 70–99)
GLUCOSE BLDC GLUCOMTR-MCNC: 142 MG/DL — HIGH (ref 70–99)
GLUCOSE BLDC GLUCOMTR-MCNC: 146 MG/DL — HIGH (ref 70–99)
GLUCOSE BLDC GLUCOMTR-MCNC: 147 MG/DL — HIGH (ref 70–99)
GLUCOSE SERPL-MCNC: 168 MG/DL — HIGH (ref 70–99)
HCT VFR BLD CALC: 41.1 % — SIGNIFICANT CHANGE UP (ref 39–50)
HGB BLD-MCNC: 12.4 G/DL — LOW (ref 13–17)
MAGNESIUM SERPL-MCNC: 2.2 MG/DL — SIGNIFICANT CHANGE UP (ref 1.6–2.6)
MCHC RBC-ENTMCNC: 27 PG — SIGNIFICANT CHANGE UP (ref 27–34)
MCHC RBC-ENTMCNC: 30.2 GM/DL — LOW (ref 32–36)
MCV RBC AUTO: 89.5 FL — SIGNIFICANT CHANGE UP (ref 80–100)
NRBC # BLD: 0 /100 WBCS — SIGNIFICANT CHANGE UP (ref 0–0)
NRBC # FLD: 0 K/UL — SIGNIFICANT CHANGE UP (ref 0–0)
PHOSPHATE SERPL-MCNC: 2.5 MG/DL — SIGNIFICANT CHANGE UP (ref 2.5–4.5)
PLATELET # BLD AUTO: 224 K/UL — SIGNIFICANT CHANGE UP (ref 150–400)
POTASSIUM SERPL-MCNC: 3.6 MMOL/L — SIGNIFICANT CHANGE UP (ref 3.5–5.3)
POTASSIUM SERPL-SCNC: 3.6 MMOL/L — SIGNIFICANT CHANGE UP (ref 3.5–5.3)
RBC # BLD: 4.59 M/UL — SIGNIFICANT CHANGE UP (ref 4.2–5.8)
RBC # FLD: 15.5 % — HIGH (ref 10.3–14.5)
SODIUM SERPL-SCNC: 144 MMOL/L — SIGNIFICANT CHANGE UP (ref 135–145)
WBC # BLD: 7.11 K/UL — SIGNIFICANT CHANGE UP (ref 3.8–10.5)
WBC # FLD AUTO: 7.11 K/UL — SIGNIFICANT CHANGE UP (ref 3.8–10.5)

## 2023-03-23 PROCEDURE — 99232 SBSQ HOSP IP/OBS MODERATE 35: CPT | Mod: GC

## 2023-03-23 RX ORDER — SODIUM CHLORIDE 9 MG/ML
500 INJECTION, SOLUTION INTRAVENOUS ONCE
Refills: 0 | Status: COMPLETED | OUTPATIENT
Start: 2023-03-23 | End: 2023-03-23

## 2023-03-23 RX ORDER — POTASSIUM CHLORIDE 20 MEQ
10 PACKET (EA) ORAL
Refills: 0 | Status: COMPLETED | OUTPATIENT
Start: 2023-03-23 | End: 2023-03-23

## 2023-03-23 RX ORDER — ACETAMINOPHEN 500 MG
1000 TABLET ORAL ONCE
Refills: 0 | Status: COMPLETED | OUTPATIENT
Start: 2023-03-23 | End: 2023-03-23

## 2023-03-23 RX ORDER — PANTOPRAZOLE SODIUM 20 MG/1
40 TABLET, DELAYED RELEASE ORAL DAILY
Refills: 0 | Status: DISCONTINUED | OUTPATIENT
Start: 2023-03-23 | End: 2023-03-24

## 2023-03-23 RX ADMIN — HEPARIN SODIUM 5000 UNIT(S): 5000 INJECTION INTRAVENOUS; SUBCUTANEOUS at 05:51

## 2023-03-23 RX ADMIN — HEPARIN SODIUM 5000 UNIT(S): 5000 INJECTION INTRAVENOUS; SUBCUTANEOUS at 23:10

## 2023-03-23 RX ADMIN — Medication 100 MILLIEQUIVALENT(S): at 10:53

## 2023-03-23 RX ADMIN — Medication 120 MILLIGRAM(S): at 17:10

## 2023-03-23 RX ADMIN — Medication 100 MILLIEQUIVALENT(S): at 12:02

## 2023-03-23 RX ADMIN — Medication 120 MILLIGRAM(S): at 05:47

## 2023-03-23 RX ADMIN — SODIUM CHLORIDE 125 MILLILITER(S): 9 INJECTION, SOLUTION INTRAVENOUS at 01:53

## 2023-03-23 RX ADMIN — Medication 120 MILLIGRAM(S): at 23:10

## 2023-03-23 RX ADMIN — SODIUM CHLORIDE 500 MILLILITER(S): 9 INJECTION, SOLUTION INTRAVENOUS at 05:47

## 2023-03-23 RX ADMIN — Medication 1000 MILLIGRAM(S): at 02:18

## 2023-03-23 RX ADMIN — PANTOPRAZOLE SODIUM 40 MILLIGRAM(S): 20 TABLET, DELAYED RELEASE ORAL at 17:09

## 2023-03-23 RX ADMIN — Medication 120 MILLIGRAM(S): at 10:54

## 2023-03-23 RX ADMIN — Medication 100 MILLIEQUIVALENT(S): at 09:55

## 2023-03-23 RX ADMIN — Medication 400 MILLIGRAM(S): at 01:48

## 2023-03-23 RX ADMIN — HEPARIN SODIUM 5000 UNIT(S): 5000 INJECTION INTRAVENOUS; SUBCUTANEOUS at 14:31

## 2023-03-23 NOTE — PROGRESS NOTE ADULT - SUBJECTIVE AND OBJECTIVE BOX
Team A Surgery Daily Progress Note    Subjective:   Patient seen at bedside this AM. Denies acute onset abdominal pain, N/V/D, fevers, chills, SOB, CP, lightheadedness    24h Events:   - Overnight, no acute events    Objective:  Vital Signs  T(C): 36.9 (03-23 @ 01:38), Max: 36.9 (03-23 @ 01:38)  HR: 102 (03-23 @ 01:38) (98 - 111)  BP: 154/95 (03-23 @ 01:38) (117/79 - 154/95)  RR: 18 (03-23 @ 01:38) (16 - 18)  SpO2: 95% (03-23 @ 01:38) (94% - 97%)  03-22-23 @ 07:01  -  03-23-23 @ 07:00  --------------------------------------------------------  IN:  Total IN: 0 mL    OUT:    Nasogastric/Oral tube (mL): 1500 mL    Voided (mL): 1800 mL  Total OUT: 3300 mL    Total NET: -3300 mL          Physical Exam:  GEN: resting in bed comfortably in NAD  RESP: no increased WOB  ABD: soft, non-distended, non-tender without rebound tenderness or guarding  EXTR: warm, well-perfused without gross deformities; spontaneous movement in b/l U/L extrem  NEURO: AAOx4    Labs:                        12.4   7.11  )-----------( 224      ( 23 Mar 2023 06:20 )             41.1   03-22    143  |  104  |  32<H>  ----------------------------<  211<H>  4.9   |  21<L>  |  0.85    Ca    9.1      22 Mar 2023 06:59  Phos  3.1     03-22  Mg     2.00     03-22      CAPILLARY BLOOD GLUCOSE      POCT Blood Glucose.: 142 mg/dL (23 Mar 2023 05:38)  POCT Blood Glucose.: 142 mg/dL (22 Mar 2023 22:31)  POCT Blood Glucose.: 156 mg/dL (22 Mar 2023 18:22)  POCT Blood Glucose.: 164 mg/dL (22 Mar 2023 15:14)  POCT Blood Glucose.: 168 mg/dL (22 Mar 2023 09:47)      Medications:   MEDICATIONS  (STANDING):  heparin   Injectable 5000 Unit(s) SubCutaneous every 8 hours  insulin lispro (ADMELOG) corrective regimen sliding scale   SubCutaneous every 6 hours  lactated ringers. 1000 milliLiter(s) (125 mL/Hr) IV Continuous <Continuous>  metoprolol tartrate IVPB 10 milliGRAM(s) IV Intermittent every 6 hours    MEDICATIONS  (PRN):      Imaging:       Team A Surgery Daily Progress Note    Subjective:   Patient seen at bedside this AM. Denies acute onset abdominal pain, N/V/D, fevers, chills, SOB, CP, lightheadedness    24h Events:   - Overnight, no acute events. Given 500 ml bolus.     Objective:  Vital Signs  T(C): 36.9 (03-23 @ 01:38), Max: 36.9 (03-23 @ 01:38)  HR: 102 (03-23 @ 01:38) (98 - 111)  BP: 154/95 (03-23 @ 01:38) (117/79 - 154/95)  RR: 18 (03-23 @ 01:38) (16 - 18)  SpO2: 95% (03-23 @ 01:38) (94% - 97%)  03-22-23 @ 07:01  -  03-23-23 @ 07:00  --------------------------------------------------------  IN:  Total IN: 0 mL    OUT:    Nasogastric/Oral tube (mL): 1500 mL    Voided (mL): 1800 mL  Total OUT: 3300 mL    Total NET: -3300 mL      Physical Exam:  Constitutional: NAD, NGT with thick output   Respiratory: Unlabored breathing  Abdomen: Softly distended, NTTP. No rebound or guarding.  Extremities: WWP, COLIN spontaneously    Labs:                        12.4   7.11  )-----------( 224      ( 23 Mar 2023 06:20 )             41.1   03-22    143  |  104  |  32<H>  ----------------------------<  211<H>  4.9   |  21<L>  |  0.85    Ca    9.1      22 Mar 2023 06:59  Phos  3.1     03-22  Mg     2.00     03-22      CAPILLARY BLOOD GLUCOSE      POCT Blood Glucose.: 142 mg/dL (23 Mar 2023 05:38)  POCT Blood Glucose.: 142 mg/dL (22 Mar 2023 22:31)  POCT Blood Glucose.: 156 mg/dL (22 Mar 2023 18:22)  POCT Blood Glucose.: 164 mg/dL (22 Mar 2023 15:14)  POCT Blood Glucose.: 168 mg/dL (22 Mar 2023 09:47)      Medications:   MEDICATIONS  (STANDING):  heparin   Injectable 5000 Unit(s) SubCutaneous every 8 hours  insulin lispro (ADMELOG) corrective regimen sliding scale   SubCutaneous every 6 hours  lactated ringers. 1000 milliLiter(s) (125 mL/Hr) IV Continuous <Continuous>  metoprolol tartrate IVPB 10 milliGRAM(s) IV Intermittent every 6 hours    MEDICATIONS  (PRN):      Imaging:

## 2023-03-24 LAB
ANION GAP SERPL CALC-SCNC: 14 MMOL/L — SIGNIFICANT CHANGE UP (ref 7–14)
BUN SERPL-MCNC: 21 MG/DL — SIGNIFICANT CHANGE UP (ref 7–23)
CALCIUM SERPL-MCNC: 8.5 MG/DL — SIGNIFICANT CHANGE UP (ref 8.4–10.5)
CHLORIDE SERPL-SCNC: 107 MMOL/L — SIGNIFICANT CHANGE UP (ref 98–107)
CO2 SERPL-SCNC: 21 MMOL/L — LOW (ref 22–31)
CREAT SERPL-MCNC: 0.65 MG/DL — SIGNIFICANT CHANGE UP (ref 0.5–1.3)
EGFR: 116 ML/MIN/1.73M2 — SIGNIFICANT CHANGE UP
GLUCOSE BLDC GLUCOMTR-MCNC: 132 MG/DL — HIGH (ref 70–99)
GLUCOSE BLDC GLUCOMTR-MCNC: 138 MG/DL — HIGH (ref 70–99)
GLUCOSE BLDC GLUCOMTR-MCNC: 154 MG/DL — HIGH (ref 70–99)
GLUCOSE BLDC GLUCOMTR-MCNC: 171 MG/DL — HIGH (ref 70–99)
GLUCOSE BLDC GLUCOMTR-MCNC: 173 MG/DL — HIGH (ref 70–99)
GLUCOSE SERPL-MCNC: 146 MG/DL — HIGH (ref 70–99)
HCT VFR BLD CALC: 38.7 % — LOW (ref 39–50)
HGB BLD-MCNC: 11.7 G/DL — LOW (ref 13–17)
MAGNESIUM SERPL-MCNC: 2.2 MG/DL — SIGNIFICANT CHANGE UP (ref 1.6–2.6)
MCHC RBC-ENTMCNC: 27.2 PG — SIGNIFICANT CHANGE UP (ref 27–34)
MCHC RBC-ENTMCNC: 30.2 GM/DL — LOW (ref 32–36)
MCV RBC AUTO: 90 FL — SIGNIFICANT CHANGE UP (ref 80–100)
NRBC # BLD: 0 /100 WBCS — SIGNIFICANT CHANGE UP (ref 0–0)
NRBC # FLD: 0 K/UL — SIGNIFICANT CHANGE UP (ref 0–0)
PHOSPHATE SERPL-MCNC: 2.3 MG/DL — LOW (ref 2.5–4.5)
PLATELET # BLD AUTO: 187 K/UL — SIGNIFICANT CHANGE UP (ref 150–400)
POTASSIUM SERPL-MCNC: 3.7 MMOL/L — SIGNIFICANT CHANGE UP (ref 3.5–5.3)
POTASSIUM SERPL-SCNC: 3.7 MMOL/L — SIGNIFICANT CHANGE UP (ref 3.5–5.3)
RBC # BLD: 4.3 M/UL — SIGNIFICANT CHANGE UP (ref 4.2–5.8)
RBC # FLD: 15.3 % — HIGH (ref 10.3–14.5)
SODIUM SERPL-SCNC: 142 MMOL/L — SIGNIFICANT CHANGE UP (ref 135–145)
WBC # BLD: 7.29 K/UL — SIGNIFICANT CHANGE UP (ref 3.8–10.5)
WBC # FLD AUTO: 7.29 K/UL — SIGNIFICANT CHANGE UP (ref 3.8–10.5)

## 2023-03-24 RX ORDER — METOPROLOL TARTRATE 50 MG
100 TABLET ORAL DAILY
Refills: 0 | Status: DISCONTINUED | OUTPATIENT
Start: 2023-03-24 | End: 2023-03-27

## 2023-03-24 RX ORDER — LOSARTAN POTASSIUM 100 MG/1
50 TABLET, FILM COATED ORAL DAILY
Refills: 0 | Status: DISCONTINUED | OUTPATIENT
Start: 2023-03-24 | End: 2023-03-27

## 2023-03-24 RX ORDER — PANTOPRAZOLE SODIUM 20 MG/1
40 TABLET, DELAYED RELEASE ORAL
Refills: 0 | Status: DISCONTINUED | OUTPATIENT
Start: 2023-03-24 | End: 2023-03-27

## 2023-03-24 RX ORDER — ATORVASTATIN CALCIUM 80 MG/1
40 TABLET, FILM COATED ORAL AT BEDTIME
Refills: 0 | Status: DISCONTINUED | OUTPATIENT
Start: 2023-03-24 | End: 2023-03-27

## 2023-03-24 RX ORDER — LOSARTAN POTASSIUM 100 MG/1
50 TABLET, FILM COATED ORAL DAILY
Refills: 0 | Status: DISCONTINUED | OUTPATIENT
Start: 2023-03-24 | End: 2023-03-24

## 2023-03-24 RX ORDER — METOPROLOL TARTRATE 50 MG
100 TABLET ORAL DAILY
Refills: 0 | Status: DISCONTINUED | OUTPATIENT
Start: 2023-03-24 | End: 2023-03-24

## 2023-03-24 RX ORDER — INSULIN LISPRO 100/ML
VIAL (ML) SUBCUTANEOUS
Refills: 0 | Status: DISCONTINUED | OUTPATIENT
Start: 2023-03-24 | End: 2023-03-27

## 2023-03-24 RX ORDER — INSULIN LISPRO 100/ML
VIAL (ML) SUBCUTANEOUS AT BEDTIME
Refills: 0 | Status: DISCONTINUED | OUTPATIENT
Start: 2023-03-24 | End: 2023-03-27

## 2023-03-24 RX ORDER — ACETAMINOPHEN 500 MG
650 TABLET ORAL ONCE
Refills: 0 | Status: COMPLETED | OUTPATIENT
Start: 2023-03-24 | End: 2023-03-24

## 2023-03-24 RX ORDER — POTASSIUM PHOSPHATE, MONOBASIC POTASSIUM PHOSPHATE, DIBASIC 236; 224 MG/ML; MG/ML
15 INJECTION, SOLUTION INTRAVENOUS ONCE
Refills: 0 | Status: COMPLETED | OUTPATIENT
Start: 2023-03-24 | End: 2023-03-24

## 2023-03-24 RX ADMIN — Medication 650 MILLIGRAM(S): at 20:16

## 2023-03-24 RX ADMIN — ATORVASTATIN CALCIUM 40 MILLIGRAM(S): 80 TABLET, FILM COATED ORAL at 21:10

## 2023-03-24 RX ADMIN — Medication 1: at 13:14

## 2023-03-24 RX ADMIN — Medication 120 MILLIGRAM(S): at 12:02

## 2023-03-24 RX ADMIN — Medication 120 MILLIGRAM(S): at 06:20

## 2023-03-24 RX ADMIN — POTASSIUM PHOSPHATE, MONOBASIC POTASSIUM PHOSPHATE, DIBASIC 62.5 MILLIMOLE(S): 236; 224 INJECTION, SOLUTION INTRAVENOUS at 12:03

## 2023-03-24 RX ADMIN — Medication 650 MILLIGRAM(S): at 20:46

## 2023-03-24 RX ADMIN — HEPARIN SODIUM 5000 UNIT(S): 5000 INJECTION INTRAVENOUS; SUBCUTANEOUS at 13:16

## 2023-03-24 RX ADMIN — SODIUM CHLORIDE 75 MILLILITER(S): 9 INJECTION, SOLUTION INTRAVENOUS at 20:01

## 2023-03-24 RX ADMIN — Medication 1: at 09:12

## 2023-03-24 RX ADMIN — PANTOPRAZOLE SODIUM 40 MILLIGRAM(S): 20 TABLET, DELAYED RELEASE ORAL at 12:37

## 2023-03-24 RX ADMIN — HEPARIN SODIUM 5000 UNIT(S): 5000 INJECTION INTRAVENOUS; SUBCUTANEOUS at 21:10

## 2023-03-24 RX ADMIN — Medication 1: at 18:23

## 2023-03-24 RX ADMIN — HEPARIN SODIUM 5000 UNIT(S): 5000 INJECTION INTRAVENOUS; SUBCUTANEOUS at 06:21

## 2023-03-24 NOTE — PROGRESS NOTE ADULT - SUBJECTIVE AND OBJECTIVE BOX
Team A Surgery Daily Progress Note    Subjective:   Patient seen at bedside this AM. Denies acute onset abdominal pain, N/V/D, fevers, chills, SOB, CP, lightheadedness    24h Events:   - Overnight, no acute events    Objective:  Vital Signs  T(C): 36.7 (03-24 @ 06:18), Max: 37.4 (03-23 @ 09:56)  HR: 87 (03-24 @ 06:18) (87 - 102)  BP: 146/84 (03-24 @ 06:18) (135/79 - 152/96)  RR: 16 (03-24 @ 06:18) (16 - 18)  SpO2: 95% (03-24 @ 06:18) (95% - 96%)  03-23-23 @ 07:01  -  03-24-23 @ 07:00  --------------------------------------------------------  IN:  Total IN: 0 mL    OUT:    Nasogastric/Oral tube (mL): 600 mL    Voided (mL): 1250 mL  Total OUT: 1850 mL    Total NET: -1850 mL          Physical Exam:  GEN: resting in bed comfortably in NAD  RESP: no increased WOB  ABD: soft, non-distended, non-tender without rebound tenderness or guarding  EXTR: warm, well-perfused without gross deformities; spontaneous movement in b/l U/L extrem  NEURO: AAOx4    Labs:                        11.7   7.29  )-----------( 187      ( 24 Mar 2023 05:27 )             38.7   03-24    142  |  107  |  21  ----------------------------<  146<H>  3.7   |  21<L>  |  0.65    Ca    8.5      24 Mar 2023 05:27  Phos  2.3     03-24  Mg     2.20     03-24      CAPILLARY BLOOD GLUCOSE      POCT Blood Glucose.: 132 mg/dL (24 Mar 2023 05:26)  POCT Blood Glucose.: 128 mg/dL (23 Mar 2023 22:19)  POCT Blood Glucose.: 146 mg/dL (23 Mar 2023 17:54)  POCT Blood Glucose.: 147 mg/dL (23 Mar 2023 12:04)      Medications:   MEDICATIONS  (STANDING):  heparin   Injectable 5000 Unit(s) SubCutaneous every 8 hours  insulin lispro (ADMELOG) corrective regimen sliding scale   SubCutaneous every 6 hours  lactated ringers. 1000 milliLiter(s) (125 mL/Hr) IV Continuous <Continuous>  metoprolol tartrate IVPB 10 milliGRAM(s) IV Intermittent every 6 hours  pantoprazole  Injectable 40 milliGRAM(s) IV Push daily  potassium phosphate IVPB 15 milliMole(s) IV Intermittent once    MEDICATIONS  (PRN):      Imaging:       [facility] Surgery Daily Progress Note    Subjective:   Patient seen at bedside this AM. Denies acute onset abdominal pain, N/V/D, fevers, chills, SOB, CP, lightheadedness    24h Events:   - Overnight, no acute events    Objective:  Vital Signs  T(C): 36.7 (03-24 @ 06:18), Max: 37.4 (03-23 @ 09:56)  HR: 87 (03-24 @ 06:18) (87 - 102)  BP: 146/84 (03-24 @ 06:18) (135/79 - 152/96)  RR: 16 (03-24 @ 06:18) (16 - 18)  SpO2: 95% (03-24 @ 06:18) (95% - 96%)  03-23-23 @ 07:01  -  03-24-23 @ 07:00  --------------------------------------------------------  IN:  Total IN: 0 mL    OUT:    Nasogastric/Oral tube (mL): 600 mL    Voided (mL): 1250 mL  Total OUT: 1850 mL    Total NET: -1850 mL      Physical Exam:  Physical Exam:  Constitutional: NAD, NGT with thick output   Respiratory: Unlabored breathing  Abdomen: Softly distended, NTTP. No rebound or guarding.  Extremities: WWP, COLIN spontaneously      Labs:                        11.7   7.29  )-----------( 187      ( 24 Mar 2023 05:27 )             38.7   03-24    142  |  107  |  21  ----------------------------<  146<H>  3.7   |  21<L>  |  0.65    Ca    8.5      24 Mar 2023 05:27  Phos  2.3     03-24  Mg     2.20     03-24      CAPILLARY BLOOD GLUCOSE      POCT Blood Glucose.: 132 mg/dL (24 Mar 2023 05:26)  POCT Blood Glucose.: 128 mg/dL (23 Mar 2023 22:19)  POCT Blood Glucose.: 146 mg/dL (23 Mar 2023 17:54)  POCT Blood Glucose.: 147 mg/dL (23 Mar 2023 12:04)      Medications:   MEDICATIONS  (STANDING):  heparin   Injectable 5000 Unit(s) SubCutaneous every 8 hours  insulin lispro (ADMELOG) corrective regimen sliding scale   SubCutaneous every 6 hours  lactated ringers. 1000 milliLiter(s) (125 mL/Hr) IV Continuous <Continuous>  metoprolol tartrate IVPB 10 milliGRAM(s) IV Intermittent every 6 hours  pantoprazole  Injectable 40 milliGRAM(s) IV Push daily  potassium phosphate IVPB 15 milliMole(s) IV Intermittent once    MEDICATIONS  (PRN):      Imaging:

## 2023-03-25 ENCOUNTER — TRANSCRIPTION ENCOUNTER (OUTPATIENT)
Age: 48
End: 2023-03-25

## 2023-03-25 LAB
ANION GAP SERPL CALC-SCNC: 12 MMOL/L — SIGNIFICANT CHANGE UP (ref 7–14)
BUN SERPL-MCNC: 18 MG/DL — SIGNIFICANT CHANGE UP (ref 7–23)
CALCIUM SERPL-MCNC: 8.6 MG/DL — SIGNIFICANT CHANGE UP (ref 8.4–10.5)
CHLORIDE SERPL-SCNC: 103 MMOL/L — SIGNIFICANT CHANGE UP (ref 98–107)
CO2 SERPL-SCNC: 21 MMOL/L — LOW (ref 22–31)
CREAT SERPL-MCNC: 0.68 MG/DL — SIGNIFICANT CHANGE UP (ref 0.5–1.3)
EGFR: 114 ML/MIN/1.73M2 — SIGNIFICANT CHANGE UP
GLUCOSE BLDC GLUCOMTR-MCNC: 160 MG/DL — HIGH (ref 70–99)
GLUCOSE BLDC GLUCOMTR-MCNC: 165 MG/DL — HIGH (ref 70–99)
GLUCOSE BLDC GLUCOMTR-MCNC: 182 MG/DL — HIGH (ref 70–99)
GLUCOSE BLDC GLUCOMTR-MCNC: 194 MG/DL — HIGH (ref 70–99)
GLUCOSE SERPL-MCNC: 166 MG/DL — HIGH (ref 70–99)
HCT VFR BLD CALC: 37.6 % — LOW (ref 39–50)
HGB BLD-MCNC: 11.4 G/DL — LOW (ref 13–17)
MAGNESIUM SERPL-MCNC: 2.1 MG/DL — SIGNIFICANT CHANGE UP (ref 1.6–2.6)
MCHC RBC-ENTMCNC: 27 PG — SIGNIFICANT CHANGE UP (ref 27–34)
MCHC RBC-ENTMCNC: 30.3 GM/DL — LOW (ref 32–36)
MCV RBC AUTO: 88.9 FL — SIGNIFICANT CHANGE UP (ref 80–100)
NRBC # BLD: 0 /100 WBCS — SIGNIFICANT CHANGE UP (ref 0–0)
NRBC # FLD: 0 K/UL — SIGNIFICANT CHANGE UP (ref 0–0)
PHOSPHATE SERPL-MCNC: 3.5 MG/DL — SIGNIFICANT CHANGE UP (ref 2.5–4.5)
PLATELET # BLD AUTO: 170 K/UL — SIGNIFICANT CHANGE UP (ref 150–400)
POTASSIUM SERPL-MCNC: 3.7 MMOL/L — SIGNIFICANT CHANGE UP (ref 3.5–5.3)
POTASSIUM SERPL-SCNC: 3.7 MMOL/L — SIGNIFICANT CHANGE UP (ref 3.5–5.3)
RBC # BLD: 4.23 M/UL — SIGNIFICANT CHANGE UP (ref 4.2–5.8)
RBC # FLD: 14.8 % — HIGH (ref 10.3–14.5)
SODIUM SERPL-SCNC: 136 MMOL/L — SIGNIFICANT CHANGE UP (ref 135–145)
WBC # BLD: 7.75 K/UL — SIGNIFICANT CHANGE UP (ref 3.8–10.5)
WBC # FLD AUTO: 7.75 K/UL — SIGNIFICANT CHANGE UP (ref 3.8–10.5)

## 2023-03-25 RX ORDER — ONDANSETRON 8 MG/1
4 TABLET, FILM COATED ORAL ONCE
Refills: 0 | Status: COMPLETED | OUTPATIENT
Start: 2023-03-25 | End: 2023-03-25

## 2023-03-25 RX ORDER — POTASSIUM CHLORIDE 20 MEQ
20 PACKET (EA) ORAL ONCE
Refills: 0 | Status: COMPLETED | OUTPATIENT
Start: 2023-03-25 | End: 2023-03-25

## 2023-03-25 RX ORDER — SIMETHICONE 80 MG/1
80 TABLET, CHEWABLE ORAL THREE TIMES A DAY
Refills: 0 | Status: DISCONTINUED | OUTPATIENT
Start: 2023-03-25 | End: 2023-03-27

## 2023-03-25 RX ORDER — SODIUM CHLORIDE 9 MG/ML
1000 INJECTION, SOLUTION INTRAVENOUS
Refills: 0 | Status: DISCONTINUED | OUTPATIENT
Start: 2023-03-25 | End: 2023-03-27

## 2023-03-25 RX ADMIN — Medication 20 MILLIEQUIVALENT(S): at 13:00

## 2023-03-25 RX ADMIN — Medication 100 MILLIGRAM(S): at 06:25

## 2023-03-25 RX ADMIN — PANTOPRAZOLE SODIUM 40 MILLIGRAM(S): 20 TABLET, DELAYED RELEASE ORAL at 06:25

## 2023-03-25 RX ADMIN — SODIUM CHLORIDE 75 MILLILITER(S): 9 INJECTION, SOLUTION INTRAVENOUS at 12:52

## 2023-03-25 RX ADMIN — Medication 1: at 09:14

## 2023-03-25 RX ADMIN — HEPARIN SODIUM 5000 UNIT(S): 5000 INJECTION INTRAVENOUS; SUBCUTANEOUS at 06:25

## 2023-03-25 RX ADMIN — Medication 1: at 17:33

## 2023-03-25 RX ADMIN — Medication 1: at 12:51

## 2023-03-25 RX ADMIN — ONDANSETRON 4 MILLIGRAM(S): 8 TABLET, FILM COATED ORAL at 15:44

## 2023-03-25 RX ADMIN — ATORVASTATIN CALCIUM 40 MILLIGRAM(S): 80 TABLET, FILM COATED ORAL at 22:00

## 2023-03-25 RX ADMIN — HEPARIN SODIUM 5000 UNIT(S): 5000 INJECTION INTRAVENOUS; SUBCUTANEOUS at 22:00

## 2023-03-25 RX ADMIN — LOSARTAN POTASSIUM 50 MILLIGRAM(S): 100 TABLET, FILM COATED ORAL at 06:25

## 2023-03-25 RX ADMIN — HEPARIN SODIUM 5000 UNIT(S): 5000 INJECTION INTRAVENOUS; SUBCUTANEOUS at 15:44

## 2023-03-25 RX ADMIN — SIMETHICONE 80 MILLIGRAM(S): 80 TABLET, CHEWABLE ORAL at 18:29

## 2023-03-25 NOTE — DISCHARGE NOTE PROVIDER - NSDCFUADDINST_GEN_ALL_CORE_FT
You may resume your regular diet, home meds and baseline level of activity. Return to ED if symptoms return or persist. Please call the surgeon's office to schedule an appointment as needed.

## 2023-03-25 NOTE — DISCHARGE NOTE NURSING/CASE MANAGEMENT/SOCIAL WORK - NSDCPEFALRISK_GEN_ALL_CORE
For information on Fall & Injury Prevention, visit: [email or web address] OR  [email or web address] OR  https://www.cdc.gov/steadi/patient.html

## 2023-03-25 NOTE — DISCHARGE NOTE PROVIDER - CARE PROVIDERS DIRECT ADDRESSES
[email or web address]

## 2023-03-25 NOTE — DISCHARGE NOTE PROVIDER - DISCHARGE SERVICE FOR PATIENT
Alert/Awake
on the discharge service for the patient. I have reviewed and made amendments to the documentation where necessary.

## 2023-03-25 NOTE — DISCHARGE NOTE PROVIDER - HOSPITAL COURSE
Patient with a history of multiple abdominal surgeries who presented with a small bowel obstruction, likely due to adhesions. Patient had a nasogastric tube placed to decompress the bowel. Patient started passing gas and NGT was removed. Diet was advanced slowly to clears and then solid food which the patient tolerated without issue. Patient was voiding adequately and was at his baseline level of activity. He was discharged home in stable condition. Patient with a history of multiple abdominal surgeries who presented with a small bowel obstruction, likely due to adhesions. Patient had a nasogastric tube placed to decompress the bowel. Patient started passing gas and NGT was removed. Diet was advanced slowly to clears and then solid food. Initially patient did not tolerate the solid food as he started to experience nausea and had one episode of emesis overnight. He was downgraded to clears and advanced to low residue diet the next day which he tolerated without any issue. Patient was voiding adequately and was at his baseline level of activity. He was discharged home in stable condition.

## 2023-03-25 NOTE — DISCHARGE NOTE NURSING/CASE MANAGEMENT/SOCIAL WORK - PATIENT PORTAL LINK FT
You can access the FollowMyHealth Patient Portal offered by [facility] by registering at the following website: [email or web address]. By joining [facility]’s FollowMyHealth portal, you will also be able to view your health information using other applications (apps) compatible with our system.

## 2023-03-25 NOTE — PROGRESS NOTE ADULT - SUBJECTIVE AND OBJECTIVE BOX
Green Team Surgery Daily Progress Note    Subjective:   Patient seen at bedside this AM. Denies acute onset abdominal pain, N/V/D, fevers, chills, SOB, CP, lightheadedness    24h Events:   - Overnight, no acute events. NGT removed yesterday and tolerated clears.     Objective:  Vital Signs  T(C): 37.1 (03-24 @ 20:39), Max: 37.1 (03-24 @ 20:39)  HR: 71 (03-24 @ 20:39) (65 - 79)  BP: 140/81 (03-24 @ 20:39) (115/67 - 140/81)  RR: 17 (03-24 @ 20:39) (17 - 17)  SpO2: 98% (03-24 @ 20:39) (95% - 99%)  03-24-23 @ 07:01  -  03-25-23 @ 07:00  --------------------------------------------------------  IN:  Total IN: 0 mL    OUT:    Nasogastric/Oral tube (mL): 500 mL    Voided (mL): 1275 mL  Total OUT: 1775 mL    Total NET: -1775 mL      Physical Exam:  GEN: resting in bed comfortably in NAD  RESP: no increased WOB  ABD: soft, non-distended, nontender, nonperitoneal  EXTR: warm, well-perfused without gross deformities; spontaneous movement in b/l U/L extrem  NEURO: AAOx4    Labs:                        11.4   7.75  )-----------( 170      ( 25 Mar 2023 07:00 )             37.6   03-25    136  |  103  |  18  ----------------------------<  166<H>  3.7   |  21<L>  |  0.68    Ca    8.6      25 Mar 2023 07:00  Phos  3.5     03-25  Mg     2.10     03-25      CAPILLARY BLOOD GLUCOSE      POCT Blood Glucose.: 165 mg/dL (25 Mar 2023 09:01)  POCT Blood Glucose.: 138 mg/dL (24 Mar 2023 21:46)  POCT Blood Glucose.: 173 mg/dL (24 Mar 2023 18:08)  POCT Blood Glucose.: 171 mg/dL (24 Mar 2023 12:30)      Medications:   MEDICATIONS  (STANDING):  atorvastatin 40 milliGRAM(s) Oral at bedtime  heparin   Injectable 5000 Unit(s) SubCutaneous every 8 hours  insulin lispro (ADMELOG) corrective regimen sliding scale   SubCutaneous three times a day before meals  insulin lispro (ADMELOG) corrective regimen sliding scale   SubCutaneous at bedtime  lactated ringers. 1000 milliLiter(s) (75 mL/Hr) IV Continuous <Continuous>  losartan 50 milliGRAM(s) Oral daily  metoprolol succinate  milliGRAM(s) Oral daily  pantoprazole    Tablet 40 milliGRAM(s) Oral before breakfast    MEDICATIONS  (PRN):      Imaging:

## 2023-03-25 NOTE — DISCHARGE NOTE PROVIDER - CARE PROVIDER_API CALL
Musa White)  ColonRectal Surgery; Surgery  [address] [place] [address]  Phone: [phone]  Fax: [phone]  Follow Up Time: Routine

## 2023-03-26 LAB
GLUCOSE BLDC GLUCOMTR-MCNC: 160 MG/DL — HIGH (ref 70–99)
GLUCOSE BLDC GLUCOMTR-MCNC: 162 MG/DL — HIGH (ref 70–99)
GLUCOSE BLDC GLUCOMTR-MCNC: 176 MG/DL — HIGH (ref 70–99)
GLUCOSE BLDC GLUCOMTR-MCNC: 185 MG/DL — HIGH (ref 70–99)

## 2023-03-26 PROCEDURE — 99232 SBSQ HOSP IP/OBS MODERATE 35: CPT | Mod: GC

## 2023-03-26 RX ADMIN — Medication 1: at 18:22

## 2023-03-26 RX ADMIN — PANTOPRAZOLE SODIUM 40 MILLIGRAM(S): 20 TABLET, DELAYED RELEASE ORAL at 05:15

## 2023-03-26 RX ADMIN — SODIUM CHLORIDE 75 MILLILITER(S): 9 INJECTION, SOLUTION INTRAVENOUS at 21:27

## 2023-03-26 RX ADMIN — HEPARIN SODIUM 5000 UNIT(S): 5000 INJECTION INTRAVENOUS; SUBCUTANEOUS at 05:14

## 2023-03-26 RX ADMIN — Medication 100 MILLIGRAM(S): at 05:14

## 2023-03-26 RX ADMIN — ATORVASTATIN CALCIUM 40 MILLIGRAM(S): 80 TABLET, FILM COATED ORAL at 21:27

## 2023-03-26 RX ADMIN — LOSARTAN POTASSIUM 50 MILLIGRAM(S): 100 TABLET, FILM COATED ORAL at 05:14

## 2023-03-26 RX ADMIN — Medication 1: at 12:50

## 2023-03-26 RX ADMIN — HEPARIN SODIUM 5000 UNIT(S): 5000 INJECTION INTRAVENOUS; SUBCUTANEOUS at 15:12

## 2023-03-26 RX ADMIN — Medication 1: at 09:36

## 2023-03-26 RX ADMIN — HEPARIN SODIUM 5000 UNIT(S): 5000 INJECTION INTRAVENOUS; SUBCUTANEOUS at 21:27

## 2023-03-26 RX ADMIN — SIMETHICONE 80 MILLIGRAM(S): 80 TABLET, CHEWABLE ORAL at 18:24

## 2023-03-26 NOTE — PROGRESS NOTE ADULT - SUBJECTIVE AND OBJECTIVE BOX
Team A Surgery Daily Progress Note    Subjective:   Patient seen at bedside this AM. Denies acute onset abdominal pain, N/V/D, fevers, chills, SOB, CP, lightheadedness    24h Events:   - Overnight, no acute events    Objective:  Vital Signs  T(C): 36.7 (03-26 @ 09:14), Max: 37.2 (03-26 @ 05:15)  HR: 94 (03-26 @ 09:14) (82 - 100)  BP: 132/69 (03-26 @ 09:14) (132/69 - 156/75)  RR: 19 (03-26 @ 09:14) (16 - 19)  SpO2: 97% (03-26 @ 09:14) (96% - 99%)  03-25-23 @ 07:01  -  03-26-23 @ 07:00  --------------------------------------------------------  IN:  Total IN: 0 mL    OUT:    Voided (mL): 1050 mL  Total OUT: 1050 mL    Total NET: -1050 mL      Physical Exam:  GEN: resting in bed comfortably in NAD  RESP: no increased WOB  ABD: soft, non-distended, non-tender  EXTR: warm, well-perfused without gross deformities; spontaneous movement in b/l U/L extrem  NEURO: AAOx4    Labs:                        11.4   7.75  )-----------( 170      ( 25 Mar 2023 07:00 )             37.6   03-25    136  |  103  |  18  ----------------------------<  166<H>  3.7   |  21<L>  |  0.68    Ca    8.6      25 Mar 2023 07:00  Phos  3.5     03-25  Mg     2.10     03-25      CAPILLARY BLOOD GLUCOSE      POCT Blood Glucose.: 160 mg/dL (26 Mar 2023 09:07)  POCT Blood Glucose.: 160 mg/dL (25 Mar 2023 21:40)  POCT Blood Glucose.: 194 mg/dL (25 Mar 2023 16:36)  POCT Blood Glucose.: 182 mg/dL (25 Mar 2023 12:25)      Medications:   MEDICATIONS  (STANDING):  atorvastatin 40 milliGRAM(s) Oral at bedtime  heparin   Injectable 5000 Unit(s) SubCutaneous every 8 hours  insulin lispro (ADMELOG) corrective regimen sliding scale   SubCutaneous three times a day before meals  insulin lispro (ADMELOG) corrective regimen sliding scale   SubCutaneous at bedtime  lactated ringers. 1000 milliLiter(s) (75 mL/Hr) IV Continuous <Continuous>  losartan 50 milliGRAM(s) Oral daily  metoprolol succinate  milliGRAM(s) Oral daily  pantoprazole    Tablet 40 milliGRAM(s) Oral before breakfast    MEDICATIONS  (PRN):  simethicone 80 milliGRAM(s) Chew three times a day PRN Gas      Imaging:

## 2023-03-27 VITALS
SYSTOLIC BLOOD PRESSURE: 145 MMHG | OXYGEN SATURATION: 100 % | RESPIRATION RATE: 16 BRPM | HEART RATE: 73 BPM | TEMPERATURE: 98 F | DIASTOLIC BLOOD PRESSURE: 96 MMHG

## 2023-03-27 LAB
ANION GAP SERPL CALC-SCNC: 13 MMOL/L — SIGNIFICANT CHANGE UP (ref 7–14)
BUN SERPL-MCNC: 15 MG/DL — SIGNIFICANT CHANGE UP (ref 7–23)
CALCIUM SERPL-MCNC: 8.5 MG/DL — SIGNIFICANT CHANGE UP (ref 8.4–10.5)
CHLORIDE SERPL-SCNC: 105 MMOL/L — SIGNIFICANT CHANGE UP (ref 98–107)
CO2 SERPL-SCNC: 19 MMOL/L — LOW (ref 22–31)
CREAT SERPL-MCNC: 0.69 MG/DL — SIGNIFICANT CHANGE UP (ref 0.5–1.3)
EGFR: 113 ML/MIN/1.73M2 — SIGNIFICANT CHANGE UP
GLUCOSE BLDC GLUCOMTR-MCNC: 181 MG/DL — HIGH (ref 70–99)
GLUCOSE BLDC GLUCOMTR-MCNC: 213 MG/DL — HIGH (ref 70–99)
GLUCOSE SERPL-MCNC: 182 MG/DL — HIGH (ref 70–99)
HCT VFR BLD CALC: 36.3 % — LOW (ref 39–50)
HGB BLD-MCNC: 11.3 G/DL — LOW (ref 13–17)
MAGNESIUM SERPL-MCNC: 1.7 MG/DL — SIGNIFICANT CHANGE UP (ref 1.6–2.6)
MCHC RBC-ENTMCNC: 26.8 PG — LOW (ref 27–34)
MCHC RBC-ENTMCNC: 31.1 GM/DL — LOW (ref 32–36)
MCV RBC AUTO: 86.2 FL — SIGNIFICANT CHANGE UP (ref 80–100)
NRBC # BLD: 0 /100 WBCS — SIGNIFICANT CHANGE UP (ref 0–0)
NRBC # FLD: 0 K/UL — SIGNIFICANT CHANGE UP (ref 0–0)
PHOSPHATE SERPL-MCNC: 2.6 MG/DL — SIGNIFICANT CHANGE UP (ref 2.5–4.5)
PLATELET # BLD AUTO: 152 K/UL — SIGNIFICANT CHANGE UP (ref 150–400)
POTASSIUM SERPL-MCNC: 3.5 MMOL/L — SIGNIFICANT CHANGE UP (ref 3.5–5.3)
POTASSIUM SERPL-SCNC: 3.5 MMOL/L — SIGNIFICANT CHANGE UP (ref 3.5–5.3)
RBC # BLD: 4.21 M/UL — SIGNIFICANT CHANGE UP (ref 4.2–5.8)
RBC # FLD: 14.9 % — HIGH (ref 10.3–14.5)
SODIUM SERPL-SCNC: 137 MMOL/L — SIGNIFICANT CHANGE UP (ref 135–145)
WBC # BLD: 6.69 K/UL — SIGNIFICANT CHANGE UP (ref 3.8–10.5)
WBC # FLD AUTO: 6.69 K/UL — SIGNIFICANT CHANGE UP (ref 3.8–10.5)

## 2023-03-27 PROCEDURE — 99238 HOSP IP/OBS DSCHRG MGMT 30/<: CPT

## 2023-03-27 RX ORDER — MAGNESIUM SULFATE 500 MG/ML
2 VIAL (ML) INJECTION ONCE
Refills: 0 | Status: DISCONTINUED | OUTPATIENT
Start: 2023-03-27 | End: 2023-03-27

## 2023-03-27 RX ORDER — SODIUM,POTASSIUM PHOSPHATES 278-250MG
1 POWDER IN PACKET (EA) ORAL ONCE
Refills: 0 | Status: DISCONTINUED | OUTPATIENT
Start: 2023-03-27 | End: 2023-03-27

## 2023-03-27 RX ORDER — POTASSIUM CHLORIDE 20 MEQ
40 PACKET (EA) ORAL ONCE
Refills: 0 | Status: DISCONTINUED | OUTPATIENT
Start: 2023-03-27 | End: 2023-03-27

## 2023-03-27 RX ADMIN — LOSARTAN POTASSIUM 50 MILLIGRAM(S): 100 TABLET, FILM COATED ORAL at 05:49

## 2023-03-27 RX ADMIN — PANTOPRAZOLE SODIUM 40 MILLIGRAM(S): 20 TABLET, DELAYED RELEASE ORAL at 05:49

## 2023-03-27 RX ADMIN — Medication 100 MILLIGRAM(S): at 05:49

## 2023-03-27 RX ADMIN — HEPARIN SODIUM 5000 UNIT(S): 5000 INJECTION INTRAVENOUS; SUBCUTANEOUS at 13:15

## 2023-03-27 RX ADMIN — SIMETHICONE 80 MILLIGRAM(S): 80 TABLET, CHEWABLE ORAL at 01:46

## 2023-03-27 RX ADMIN — Medication 1: at 08:55

## 2023-03-27 RX ADMIN — HEPARIN SODIUM 5000 UNIT(S): 5000 INJECTION INTRAVENOUS; SUBCUTANEOUS at 05:49

## 2023-03-27 RX ADMIN — Medication 2: at 13:12

## 2023-03-27 NOTE — PROGRESS NOTE ADULT - PROVIDER SPECIALTY LIST ADULT
Colorectal Surgery
Surgery
Colorectal Surgery
Surgery

## 2023-03-27 NOTE — PROGRESS NOTE ADULT - ASSESSMENT
47M w/ pmh HTN, HLD, T2DM,  s/p Buchanan's in 2009 and subsequent multiple SBOs s/p ex lap LINDA in 2014, 2016, and ex lap, LINDA, ventral hernia repair in 2018, presenting with several hours of diffuse abdominal pain and nausea and retching due to SBO which is now resolved.     PLAN:  - keep patient on CLD as he was nauseous yesterday  - DVT ppx   - monitor vital signs, I&Os  - f/u am labs  - potential d/c today    Team A  [id number]  
47M w/ pmh HTN, HLD, T2DM,  s/p Buchanan's in 2009 and subsequent multiple SBOs s/p ex lap LINDA in 2014, 2016, and ex lap, LINDA, ventral hernia repair in 2018, presenting with several hours of diffuse abdominal pain and nausea and retching due to SBO.     PLAN:  - keep NGT given high output  - IVF/NPO  - DVT ppx   - frequent abdominal exams   - pain control as needed, after exam  - monitor vital signs, EKG shows sinus tachycardia  - f/u am labs  - allergic to PO gastrograffin, causes swelling     Team A  [id number]
47M w/ pmh HTN, HLD, T2DM,  s/p Buchanan's in 2009 and subsequent multiple SBOs s/p ex lap LINDA in 2014, 2016, and ex lap, LINDA, ventral hernia repair in 2018, presenting with several hours of diffuse abdominal pain and nausea and retching due to SBO which is now resolved.     PLAN:  - IV lock  - advance to regular diet  - DVT ppx   - monitor vital signs  - f/u am labs  - potential d/c today    Team A  [id number]
47M w/ pmh HTN, HLD, T2DM,  s/p Buchanan's in 2009 and subsequent multiple SBOs s/p ex lap LINDA in 2014, 2016, and ex lap, LINDA, ventral hernia repair in 2018, presenting with several hours of diffuse abdominal pain and nausea and retching due to SBO which is now resolved.     PLAN:  - regular diet   - DVT ppx   - monitor vital signs, I&Os  - f/u am labs  - potential d/c today    Team A  [id number]  
47M w/ pmh HTN, HLD, T2DM,  s/p Buchanan's in 2009 and subsequent multiple SBOs s/p ex lap LINDA in 2014, 2016, and ex lap, LINDA, ventral hernia repair in 2018, presenting with several hours of diffuse abdominal pain and nausea and retching.    PLAN  - Final recs pending CTAP  - Admit to Dr. White's service if SBO; will plan for conservative management with NPO/NGT/IVF     A Team Surgery  #[id number] 
47M w/ pmh HTN, HLD, T2DM,  s/p Buchanan's in 2009 and subsequent multiple SBOs s/p ex lap LINDA in 2014, 2016, and ex lap, LINDA, ventral hernia repair in 2018, presenting with several hours of diffuse abdominal pain and nausea and retching due to SBO.     PLAN:    - IVF/NPO/NGT  - DVT ppx   - frequent abdominal exams   - pain control as needed, after exam  - monitor vital signs, EKG shows sinus tachycardia  - f/u am labs  - allergic to PO gastrograffin, causes swelling       [id number]  
47M w/ pmh HTN, HLD, T2DM,  s/p Buchanan's in 2009 and subsequent multiple SBOs s/p ex lap LINDA in 2014, 2016, and ex lap, LINDA, ventral hernia repair in 2018, presenting with several hours of diffuse abdominal pain and nausea and retching.    PLAN:  - 1L bolus  - EKG  - LR 125cc/hr   - NPO/NGT   - DVT ppx   - frequent abdominal exams   - pain control as needed      [id number]

## 2023-03-27 NOTE — PROGRESS NOTE ADULT - SUBJECTIVE AND OBJECTIVE BOX
Subjective:  No acute overnight events.   Patient seen and examined at bedside with surgical team during morning rounds.  Patient is passing gas and having multiple watery bowel movements. Denies nausea/vomiting.     Physical Exam:  GEN: resting in bed comfortably in NAD  RESP: no increased WOB  ABD: soft, non-distended, non-tender  EXTR: warm, well-perfused without gross deformities; spontaneous movement in b/l U/L extrem        T(C): 36.8 (03-27-23 @ 05:48), Max: 37.1 (03-26-23 @ 21:35)  HR: 90 (03-27-23 @ 05:48) (89 - 94)  BP: 159/91 (03-27-23 @ 05:48) (132/69 - 159/91)  RR: 16 (03-27-23 @ 05:48) (16 - 19)  SpO2: 97% (03-27-23 @ 05:48) (97% - 98%)        LABS:  cret                atorvastatin 40 milliGRAM(s) Oral at bedtime  heparin   Injectable 5000 Unit(s) SubCutaneous every 8 hours  insulin lispro (ADMELOG) corrective regimen sliding scale   SubCutaneous three times a day before meals  insulin lispro (ADMELOG) corrective regimen sliding scale   SubCutaneous at bedtime  losartan 50 milliGRAM(s) Oral daily  metoprolol succinate  milliGRAM(s) Oral daily  pantoprazole    Tablet 40 milliGRAM(s) Oral before breakfast  simethicone 80 milliGRAM(s) Chew three times a day PRN

## 2023-06-29 ENCOUNTER — INPATIENT (INPATIENT)
Facility: HOSPITAL | Age: 48
LOS: 3 days | Discharge: HOME | End: 2023-07-03
Attending: SURGERY | Admitting: SURGERY
Payer: COMMERCIAL

## 2023-06-29 VITALS
DIASTOLIC BLOOD PRESSURE: 87 MMHG | RESPIRATION RATE: 22 BRPM | SYSTOLIC BLOOD PRESSURE: 149 MMHG | TEMPERATURE: 98 F | OXYGEN SATURATION: 98 % | HEART RATE: 122 BPM

## 2023-06-29 DIAGNOSIS — Z98.890 OTHER SPECIFIED POSTPROCEDURAL STATES: Chronic | ICD-10-CM

## 2023-06-29 DIAGNOSIS — K56.609 UNSPECIFIED INTESTINAL OBSTRUCTION, UNSPECIFIED AS TO PARTIAL VERSUS COMPLETE OBSTRUCTION: ICD-10-CM

## 2023-06-29 DIAGNOSIS — Z98.89 OTHER SPECIFIED POSTPROCEDURAL STATES: Chronic | ICD-10-CM

## 2023-06-29 LAB
ALBUMIN SERPL ELPH-MCNC: 4.7 G/DL — SIGNIFICANT CHANGE UP (ref 3.3–5)
ALP SERPL-CCNC: 38 U/L — LOW (ref 40–120)
ALT FLD-CCNC: 29 U/L — SIGNIFICANT CHANGE UP (ref 4–41)
ANION GAP SERPL CALC-SCNC: 18 MMOL/L — HIGH (ref 7–14)
ANISOCYTOSIS BLD QL: SIGNIFICANT CHANGE UP
APTT BLD: 27.2 SEC — SIGNIFICANT CHANGE UP (ref 27–36.3)
AST SERPL-CCNC: 16 U/L — SIGNIFICANT CHANGE UP (ref 4–40)
BASE EXCESS BLDV CALC-SCNC: -2.7 MMOL/L — LOW (ref -2–3)
BASOPHILS # BLD AUTO: 0 K/UL — SIGNIFICANT CHANGE UP (ref 0–0.2)
BASOPHILS NFR BLD AUTO: 0 % — SIGNIFICANT CHANGE UP (ref 0–2)
BILIRUB SERPL-MCNC: 1 MG/DL — SIGNIFICANT CHANGE UP (ref 0.2–1.2)
BUN SERPL-MCNC: 26 MG/DL — HIGH (ref 7–23)
CA-I SERPL-SCNC: 1.24 MMOL/L — SIGNIFICANT CHANGE UP (ref 1.15–1.33)
CALCIUM SERPL-MCNC: 9.8 MG/DL — SIGNIFICANT CHANGE UP (ref 8.4–10.5)
CHLORIDE BLDV-SCNC: 105 MMOL/L — SIGNIFICANT CHANGE UP (ref 96–108)
CHLORIDE SERPL-SCNC: 101 MMOL/L — SIGNIFICANT CHANGE UP (ref 98–107)
CO2 BLDV-SCNC: 21.8 MMOL/L — LOW (ref 22–26)
CO2 SERPL-SCNC: 19 MMOL/L — LOW (ref 22–31)
CREAT SERPL-MCNC: 0.89 MG/DL — SIGNIFICANT CHANGE UP (ref 0.5–1.3)
EGFR: 105 ML/MIN/1.73M2 — SIGNIFICANT CHANGE UP
EOSINOPHIL # BLD AUTO: 0 K/UL — SIGNIFICANT CHANGE UP (ref 0–0.5)
EOSINOPHIL NFR BLD AUTO: 0 % — SIGNIFICANT CHANGE UP (ref 0–6)
GAS PNL BLDV: 139 MMOL/L — SIGNIFICANT CHANGE UP (ref 136–145)
GAS PNL BLDV: SIGNIFICANT CHANGE UP
GLUCOSE BLDC GLUCOMTR-MCNC: 145 MG/DL — HIGH (ref 70–99)
GLUCOSE BLDC GLUCOMTR-MCNC: 172 MG/DL — HIGH (ref 70–99)
GLUCOSE BLDV-MCNC: 306 MG/DL — HIGH (ref 70–99)
GLUCOSE SERPL-MCNC: 275 MG/DL — HIGH (ref 70–99)
HCO3 BLDV-SCNC: 21 MMOL/L — LOW (ref 22–29)
HCT VFR BLD CALC: 45.3 % — SIGNIFICANT CHANGE UP (ref 39–50)
HCT VFR BLDA CALC: 44 % — SIGNIFICANT CHANGE UP (ref 39–51)
HGB BLD CALC-MCNC: 14.6 G/DL — SIGNIFICANT CHANGE UP (ref 12.6–17.4)
HGB BLD-MCNC: 14.4 G/DL — SIGNIFICANT CHANGE UP (ref 13–17)
IANC: 3.47 K/UL — SIGNIFICANT CHANGE UP (ref 1.8–7.4)
INR BLD: 1.01 RATIO — SIGNIFICANT CHANGE UP (ref 0.88–1.16)
LACTATE BLDV-MCNC: 2.7 MMOL/L — HIGH (ref 0.5–2)
LYMPHOCYTES # BLD AUTO: 0.97 K/UL — LOW (ref 1–3.3)
LYMPHOCYTES # BLD AUTO: 19.1 % — SIGNIFICANT CHANGE UP (ref 13–44)
MACROCYTES BLD QL: SLIGHT — SIGNIFICANT CHANGE UP
MAGNESIUM SERPL-MCNC: 2.1 MG/DL — SIGNIFICANT CHANGE UP (ref 1.6–2.6)
MANUAL SMEAR VERIFICATION: SIGNIFICANT CHANGE UP
MCHC RBC-ENTMCNC: 27.3 PG — SIGNIFICANT CHANGE UP (ref 27–34)
MCHC RBC-ENTMCNC: 31.8 GM/DL — LOW (ref 32–36)
MCV RBC AUTO: 86 FL — SIGNIFICANT CHANGE UP (ref 80–100)
METAMYELOCYTES # FLD: 0.9 % — SIGNIFICANT CHANGE UP (ref 0–1)
MICROCYTES BLD QL: SIGNIFICANT CHANGE UP
MONOCYTES # BLD AUTO: 0.88 K/UL — SIGNIFICANT CHANGE UP (ref 0–0.9)
MONOCYTES NFR BLD AUTO: 17.4 % — HIGH (ref 2–14)
NEUTROPHILS # BLD AUTO: 2.6 K/UL — SIGNIFICANT CHANGE UP (ref 1.8–7.4)
NEUTROPHILS NFR BLD AUTO: 27 % — LOW (ref 43–77)
NEUTS BAND # BLD: 24.3 % — CRITICAL HIGH (ref 0–6)
OVALOCYTES BLD QL SMEAR: SLIGHT — SIGNIFICANT CHANGE UP
PCO2 BLDV: 32 MMHG — LOW (ref 42–55)
PH BLDV: 7.42 — SIGNIFICANT CHANGE UP (ref 7.32–7.43)
PLAT MORPH BLD: NORMAL — SIGNIFICANT CHANGE UP
PLATELET # BLD AUTO: 264 K/UL — SIGNIFICANT CHANGE UP (ref 150–400)
PLATELET COUNT - ESTIMATE: NORMAL — SIGNIFICANT CHANGE UP
PO2 BLDV: 58 MMHG — HIGH (ref 25–45)
POIKILOCYTOSIS BLD QL AUTO: SIGNIFICANT CHANGE UP
POLYCHROMASIA BLD QL SMEAR: SLIGHT — SIGNIFICANT CHANGE UP
POTASSIUM BLDV-SCNC: 4 MMOL/L — SIGNIFICANT CHANGE UP (ref 3.5–5.1)
POTASSIUM SERPL-MCNC: 3.8 MMOL/L — SIGNIFICANT CHANGE UP (ref 3.5–5.3)
POTASSIUM SERPL-SCNC: 3.8 MMOL/L — SIGNIFICANT CHANGE UP (ref 3.5–5.3)
PROT SERPL-MCNC: 7.9 G/DL — SIGNIFICANT CHANGE UP (ref 6–8.3)
PROTHROM AB SERPL-ACNC: 11.7 SEC — SIGNIFICANT CHANGE UP (ref 10.5–13.4)
RBC # BLD: 5.27 M/UL — SIGNIFICANT CHANGE UP (ref 4.2–5.8)
RBC # FLD: 15.2 % — HIGH (ref 10.3–14.5)
RBC BLD AUTO: NORMAL — SIGNIFICANT CHANGE UP
SAO2 % BLDV: 88.5 % — HIGH (ref 67–88)
SODIUM SERPL-SCNC: 138 MMOL/L — SIGNIFICANT CHANGE UP (ref 135–145)
SPHEROCYTES BLD QL SMEAR: SLIGHT — SIGNIFICANT CHANGE UP
VARIANT LYMPHS # BLD: 11.3 % — HIGH (ref 0–6)
WBC # BLD: 5.07 K/UL — SIGNIFICANT CHANGE UP (ref 3.8–10.5)
WBC # FLD AUTO: 5.07 K/UL — SIGNIFICANT CHANGE UP (ref 3.8–10.5)

## 2023-06-29 PROCEDURE — 99222 1ST HOSP IP/OBS MODERATE 55: CPT | Mod: GC

## 2023-06-29 PROCEDURE — 93010 ELECTROCARDIOGRAM REPORT: CPT

## 2023-06-29 PROCEDURE — 74176 CT ABD & PELVIS W/O CONTRAST: CPT | Mod: 26,MA

## 2023-06-29 PROCEDURE — 74018 RADEX ABDOMEN 1 VIEW: CPT | Mod: 26

## 2023-06-29 PROCEDURE — 99291 CRITICAL CARE FIRST HOUR: CPT

## 2023-06-29 PROCEDURE — 71045 X-RAY EXAM CHEST 1 VIEW: CPT | Mod: 26

## 2023-06-29 RX ORDER — DEXTROSE 50 % IN WATER 50 %
12.5 SYRINGE (ML) INTRAVENOUS ONCE
Refills: 0 | Status: DISCONTINUED | OUTPATIENT
Start: 2023-06-29 | End: 2023-07-03

## 2023-06-29 RX ORDER — ONDANSETRON 8 MG/1
4 TABLET, FILM COATED ORAL ONCE
Refills: 0 | Status: COMPLETED | OUTPATIENT
Start: 2023-06-29 | End: 2023-06-29

## 2023-06-29 RX ORDER — SODIUM CHLORIDE 9 MG/ML
1000 INJECTION, SOLUTION INTRAVENOUS
Refills: 0 | Status: DISCONTINUED | OUTPATIENT
Start: 2023-06-29 | End: 2023-07-02

## 2023-06-29 RX ORDER — INSULIN LISPRO 100/ML
VIAL (ML) SUBCUTANEOUS EVERY 6 HOURS
Refills: 0 | Status: DISCONTINUED | OUTPATIENT
Start: 2023-06-29 | End: 2023-07-01

## 2023-06-29 RX ORDER — LIDOCAINE 4 G/100G
10 CREAM TOPICAL ONCE
Refills: 0 | Status: COMPLETED | OUTPATIENT
Start: 2023-06-29 | End: 2023-06-29

## 2023-06-29 RX ORDER — SODIUM CHLORIDE 9 MG/ML
1000 INJECTION INTRAMUSCULAR; INTRAVENOUS; SUBCUTANEOUS ONCE
Refills: 0 | Status: COMPLETED | OUTPATIENT
Start: 2023-06-29 | End: 2023-06-29

## 2023-06-29 RX ORDER — METOPROLOL TARTRATE 50 MG
5 TABLET ORAL EVERY 6 HOURS
Refills: 0 | Status: DISCONTINUED | OUTPATIENT
Start: 2023-06-29 | End: 2023-07-01

## 2023-06-29 RX ORDER — PANTOPRAZOLE SODIUM 20 MG/1
40 TABLET, DELAYED RELEASE ORAL DAILY
Refills: 0 | Status: DISCONTINUED | OUTPATIENT
Start: 2023-06-29 | End: 2023-07-01

## 2023-06-29 RX ORDER — DEXTROSE 50 % IN WATER 50 %
25 SYRINGE (ML) INTRAVENOUS ONCE
Refills: 0 | Status: DISCONTINUED | OUTPATIENT
Start: 2023-06-29 | End: 2023-07-03

## 2023-06-29 RX ORDER — LIDOCAINE HCL 20 MG/ML
4 VIAL (ML) INJECTION ONCE
Refills: 0 | Status: COMPLETED | OUTPATIENT
Start: 2023-06-29 | End: 2023-06-29

## 2023-06-29 RX ORDER — ACETAMINOPHEN 500 MG
1000 TABLET ORAL ONCE
Refills: 0 | Status: COMPLETED | OUTPATIENT
Start: 2023-06-29 | End: 2023-06-29

## 2023-06-29 RX ORDER — BENZOCAINE 10 %
1 GEL (GRAM) MUCOUS MEMBRANE ONCE
Refills: 0 | Status: DISCONTINUED | OUTPATIENT
Start: 2023-06-29 | End: 2023-06-29

## 2023-06-29 RX ADMIN — SODIUM CHLORIDE 1000 MILLILITER(S): 9 INJECTION INTRAMUSCULAR; INTRAVENOUS; SUBCUTANEOUS at 12:03

## 2023-06-29 RX ADMIN — Medication 1000 MILLIGRAM(S): at 21:45

## 2023-06-29 RX ADMIN — Medication 400 MILLIGRAM(S): at 20:54

## 2023-06-29 RX ADMIN — Medication 4 MILLILITER(S): at 12:34

## 2023-06-29 RX ADMIN — Medication 5 MILLIGRAM(S): at 18:15

## 2023-06-29 RX ADMIN — Medication 1: at 18:21

## 2023-06-29 RX ADMIN — SODIUM CHLORIDE 1000 MILLILITER(S): 9 INJECTION INTRAMUSCULAR; INTRAVENOUS; SUBCUTANEOUS at 11:03

## 2023-06-29 RX ADMIN — Medication 400 MILLIGRAM(S): at 11:04

## 2023-06-29 RX ADMIN — PANTOPRAZOLE SODIUM 40 MILLIGRAM(S): 20 TABLET, DELAYED RELEASE ORAL at 18:15

## 2023-06-29 RX ADMIN — LIDOCAINE 10 MILLILITER(S): 4 CREAM TOPICAL at 11:04

## 2023-06-29 RX ADMIN — Medication 1000 MILLIGRAM(S): at 11:19

## 2023-06-29 RX ADMIN — LIDOCAINE 10 MILLILITER(S): 4 CREAM TOPICAL at 12:34

## 2023-06-29 RX ADMIN — ONDANSETRON 4 MILLIGRAM(S): 8 TABLET, FILM COATED ORAL at 11:04

## 2023-06-29 RX ADMIN — Medication 1000 MILLIGRAM(S): at 11:34

## 2023-06-29 RX ADMIN — SODIUM CHLORIDE 125 MILLILITER(S): 9 INJECTION, SOLUTION INTRAVENOUS at 17:41

## 2023-06-29 NOTE — ED PROCEDURE NOTE - ATTENDING CONTRIBUTION TO CARE
Procedure performed as described above. Pt tolerated the procedure well without complaints or complications. Tube placement confirmed by ausculation and xray/contrast

## 2023-06-29 NOTE — ED ADULT TRIAGE NOTE - CHIEF COMPLAINT QUOTE
abdominal pain with nausea since last night, LBM this AM, appears uncomfortable/pale, hx of SBO, DM, HTN abdominal pain with nausea since last night, LBM this AM, appears uncomfortable/pale, hx of SBO, DM, HTN, actively vomiting in triage

## 2023-06-29 NOTE — H&P ADULT - NSHPPHYSICALEXAM_GEN_ALL_CORE
Gen: NAD  CV: Regular rate when evaluated  Resp: Nonlabored breathing on room air  Abd: Soft, mildly distended, nontender  Ext: Warm

## 2023-06-29 NOTE — PATIENT PROFILE ADULT - FALL HARM RISK - RISK INTERVENTIONS

## 2023-06-29 NOTE — H&P ADULT - NSHPLABSRESULTS_GEN_ALL_CORE
14.4   5.07  )-----------( 264      ( 29 Jun 2023 10:59 )             45.3     06-29    138  |  101  |  26<H>  ----------------------------<  275<H>  3.8   |  19<L>  |  0.89    Ca    9.8      29 Jun 2023 10:59  Mg     2.10     06-29    TPro  7.9  /  Alb  4.7  /  TBili  1.0  /  DBili  x   /  AST  16  /  ALT  29  /  AlkPhos  38<L>  06-29    PT/INR - ( 29 Jun 2023 10:59 )   PT: 11.7 sec;   INR: 1.01 ratio         PTT - ( 29 Jun 2023 10:59 )  PTT:27.2 sec        IMAGING:  < from: CT Abdomen and Pelvis No Cont (06.29.23 @ 11:54) >      ACC: [id number] EXAM:  CT ABDOMEN AND PELVIS   ORDERED BY: SHENG AGUILAR     PROCEDURE DATE:  06/29/2023          INTERPRETATION:  CLINICAL INFORMATION: Abdominal pain. History of small   bowel obstruction.    COMPARISON: CT abdomen pelvis 3/21/2023.    CONTRAST/COMPLICATIONS:  IV Contrast: NONE  Oral Contrast: NONE  Complications: None reported at time of study completion    PROCEDURE:  CT of the Abdomen and Pelvis was performed.  Sagittal and coronal reformats were performed.    FINDINGS:  Evaluation of the solid organs and vasculature is limited without   intravenous contrast.    LOWER CHEST: Within normal limits.    LIVER: Steatosis.  BILE DUCTS: Normal caliber.  GALLBLADDER: Cholelithiasis.  SPLEEN: Within normal limits.  PANCREAS: Within normal limits.  ADRENALS: Within normal limits.  KIDNEYS/URETERS: Within normal limits.    BLADDER: Within normal limits.  REPRODUCTIVE ORGANS: Prostate within normal limits.    BOWEL: Multiple dilated loops of small bowel with gradual transition to   normal caliber loops in the right lower quadrant. Appendix is normal.   Rectosigmoid anastomosis.  PERITONEUM: No ascites.  VESSELS: Normal caliber aorta.  RETROPERITONEUM/LYMPH NODES: No lymphadenopathy.  ABDOMINAL WALL: Multiple ventral abdominal wall hernias containing bowel.  BONES: Degenerative changes.    IMPRESSION:  Multiple dilated loops of small bowel with gradual transition to normal   caliber loops in the right lower quadrant, suggesting low-grade/partial   small bowel obstruction.        --- End of Report ---            JUANCARLOS VILLAVICENCIO MD; Attending Radiologist  This document has been electronically signed. Jun 29 2023 12:07PM    < end of copied text >

## 2023-06-29 NOTE — ED PROVIDER NOTE - OBJECTIVE STATEMENT
47-year-old male, history of complex diverticulitis complicated by perforation status post Buchanan's procedure, ventral hernia status postrepair, hypertension, diabetes, hyperlipidemia, presenting with a chief complaint of abdominal pain, nausea, vomiting. Pt has hx of recurrent SBOs and states that this feels like prior episodes. Notes onset of n and abd pain last night, states was unable to sleep due to pain. Pt states he has been wretching frequently, has not brought up vomitus. Pt states that his last BM was this morning- was liquid. Pt has not passed flatus since yesterday am. Denies fever, sob, cp, urinary sx. 47-year-old male, history of complex diverticulitis complicated by perforation status post Buchanan's procedure, ventral hernia status postrepair, hypertension, diabetes, hyperlipidemia, presenting with a chief complaint of abdominal pain, nausea, vomiting. Pt has hx of recurrent SBOs and states that this feels like prior episodes. Notes onset of n and abd pain last night, states was unable to sleep due to pain. Pt states he has been wretching frequently, has not brought up vomitus. Pt states that his last BM was this morning- was liquid. Pt has not passed flatus since yesterday am. Denies fever, sob, cp, urinary sx.    Attending/Antony: 47 M h/o HTN, HLD, NIDDM,  s/p Buchanan's in 2009 and subsequent multiple SBOs s/p ex lap LINDA in 2014, 2016, and ex lap, LINDA, ventral hernia repair in 2018, p/w reucrrent symptoms of abd distention, abd pain, nausea/vomiting. Pt states /- flatus. Denies fever/chills, CP or SOB. Pt's surgeon is Dr. SARAH White.

## 2023-06-29 NOTE — ED PROVIDER NOTE - PHYSICAL EXAMINATION
PHYSICAL EXAM:  GEN: uncomfortable appearing but in NAD   HEAD: Atraumatic  EYES: Clear bilaterally, pupils equal, round and reactive to light.  ENMT: Airway patent, Nasal mucosa clear. Mouth with normal mucosa. Uvula is midline.   CARDIAC: tachycardia without appreciable murmur.  RESPIRATORY: Breathing unlabored. Breath sounds clear and equal bilaterally.  ABDOMEN:  soft, distended, generalized tenderness without rebound or guarding +BS   NEUROLOGICAL: Alert and oriented, grossly nonfocal PHYSICAL EXAM:  GEN: uncomfortable appearing but in NAD   HEAD: Atraumatic  EYES: Clear bilaterally, pupils equal, round and reactive to light.  ENMT: Airway patent, Nasal mucosa clear. Mouth with normal mucosa. Uvula is midline.   CARDIAC: tachycardia without appreciable murmur.  RESPIRATORY: Breathing unlabored. Breath sounds clear and equal bilaterally.  ABDOMEN:  soft, distended, generalized tenderness without rebound or guarding +BS   NEUROLOGICAL: Alert and oriented, grossly nonfocal    Attending/Antony: +Vomiting; PERRL/EOMI, non-icterus, supple, no ED, no JVD, RRR, CTAB; Abd-soft, +distended, mild PT, no HSM; no LE edema, A&Ox3, nonfocal; Skin-warm/dry

## 2023-06-29 NOTE — ED ADULT NURSE NOTE - NSFALLUNIVINTERV_ED_ALL_ED
Bed/Stretcher in lowest position, wheels locked, appropriate side rails in place/Call bell, personal items and telephone in reach/Instruct patient to call for assistance before getting out of bed/chair/stretcher/Non-slip footwear applied when patient is off stretcher/[place] to call system/Physically safe environment - no spills, clutter or unnecessary equipment/Purposeful proactive rounding/Room/bathroom lighting operational, light cord in reach

## 2023-06-29 NOTE — H&P ADULT - ASSESSMENT
47 year old male with recurrent SBO.    Plan:  - Admit to A Team Surgery, Dr. White  - NPO  - IV fluid resuscitation  - NGT  - Serial abdominal exams    Discussed with surgery attending, Dr. White.      A Team Surgery  [id number]

## 2023-06-29 NOTE — H&P ADULT - HISTORY OF PRESENT ILLNESS
Patient is a 47 year old male with PMHx significant for HTN, HLD, DM, s/p Buchanan's in 2009 and subsequent multiple SBOs s/p ex lap LINDA in 2014, 2016, and ex lap, LINDA, ventral hernia repair in 2018, presenting with several hours of diffuse abdominal pain and nausea and retching. States that he was feeling well up yesterday evening. Feels similar to previous SBO episodes. Last BM this morning (diarrhea), last flatus yesterday. Denies fevers and chills.    In ED, CT imaging demonstrating SBO.

## 2023-06-29 NOTE — ED PROVIDER NOTE - CLINICAL SUMMARY MEDICAL DECISION MAKING FREE TEXT BOX
47-year-old male, history of complex diverticulitis complicated by perforation status post Buchanan's procedure, ventral hernia status postrepair, hypertension, diabetes, hyperlipidemia, presenting with a chief complaint of abdominal pain, nausea, vomiting. Pt has hx of recurrent SBOs and states that this feels like prior episodes. Pex with distended tender abdomen,+BS. VS with tachycardia. Plan to cbc cmp ekg vbg coags abd xr, ct abd, ivf, ondansetron, pain control, NGT, surg consult 47-year-old male, history of complex diverticulitis complicated by perforation status post Buchanan's procedure, ventral hernia status postrepair, hypertension, diabetes, hyperlipidemia, presenting with a chief complaint of abdominal pain, nausea, vomiting. Pt has hx of recurrent SBOs and states that this feels like prior episodes. Pex with distended tender abdomen,+BS. VS with tachycardia. Plan to cbc cmp ekg vbg coags abd xr, ct abd, ivf, ondansetron, pain control, NGT, surg consult  Relevant EMR reviewed

## 2023-06-29 NOTE — ED ADULT NURSE NOTE - OBJECTIVE STATEMENT
Received patient in room 23 c/o abdominal pain, nausea, vomiting x 1 day, PMHX HTN, HLD, DM, Diverticulitis. Patient is A&OX4, airway patent, breathing unlabored and even, abdomen soft, tender. Labs obtained, 20G IV placed on left arm, medications given as ordered, IV fluid bolus infusing. Side rails up and safety maintained. Call bells within reach. Family at bedside.

## 2023-06-29 NOTE — ED ADULT NURSE NOTE - CHIEF COMPLAINT QUOTE
abdominal pain with nausea since last night, LBM this AM, appears uncomfortable/pale, hx of SBO, DM, HTN, actively vomiting in triage

## 2023-06-30 LAB
A1C WITH ESTIMATED AVERAGE GLUCOSE RESULT: 8.5 % — HIGH (ref 4–5.6)
ANION GAP SERPL CALC-SCNC: 17 MMOL/L — HIGH (ref 7–14)
BUN SERPL-MCNC: 29 MG/DL — HIGH (ref 7–23)
CALCIUM SERPL-MCNC: 8.9 MG/DL — SIGNIFICANT CHANGE UP (ref 8.4–10.5)
CHLORIDE SERPL-SCNC: 108 MMOL/L — HIGH (ref 98–107)
CO2 SERPL-SCNC: 20 MMOL/L — LOW (ref 22–31)
CREAT SERPL-MCNC: 0.88 MG/DL — SIGNIFICANT CHANGE UP (ref 0.5–1.3)
EGFR: 105 ML/MIN/1.73M2 — SIGNIFICANT CHANGE UP
ESTIMATED AVERAGE GLUCOSE: 197 — SIGNIFICANT CHANGE UP
GLUCOSE BLDC GLUCOMTR-MCNC: 105 MG/DL — HIGH (ref 70–99)
GLUCOSE BLDC GLUCOMTR-MCNC: 128 MG/DL — HIGH (ref 70–99)
GLUCOSE BLDC GLUCOMTR-MCNC: 144 MG/DL — HIGH (ref 70–99)
GLUCOSE BLDC GLUCOMTR-MCNC: 155 MG/DL — HIGH (ref 70–99)
GLUCOSE SERPL-MCNC: 158 MG/DL — HIGH (ref 70–99)
HCT VFR BLD CALC: 40.2 % — SIGNIFICANT CHANGE UP (ref 39–50)
HGB BLD-MCNC: 12.7 G/DL — LOW (ref 13–17)
LACTATE SERPL-SCNC: 1.2 MMOL/L — SIGNIFICANT CHANGE UP (ref 0.5–2)
MAGNESIUM SERPL-MCNC: 2 MG/DL — SIGNIFICANT CHANGE UP (ref 1.6–2.6)
MCHC RBC-ENTMCNC: 27.7 PG — SIGNIFICANT CHANGE UP (ref 27–34)
MCHC RBC-ENTMCNC: 31.6 GM/DL — LOW (ref 32–36)
MCV RBC AUTO: 87.8 FL — SIGNIFICANT CHANGE UP (ref 80–100)
NRBC # BLD: 0 /100 WBCS — SIGNIFICANT CHANGE UP (ref 0–0)
NRBC # FLD: 0 K/UL — SIGNIFICANT CHANGE UP (ref 0–0)
PHOSPHATE SERPL-MCNC: 2.3 MG/DL — LOW (ref 2.5–4.5)
PLATELET # BLD AUTO: 206 K/UL — SIGNIFICANT CHANGE UP (ref 150–400)
POTASSIUM SERPL-MCNC: 3.5 MMOL/L — SIGNIFICANT CHANGE UP (ref 3.5–5.3)
POTASSIUM SERPL-SCNC: 3.5 MMOL/L — SIGNIFICANT CHANGE UP (ref 3.5–5.3)
RBC # BLD: 4.58 M/UL — SIGNIFICANT CHANGE UP (ref 4.2–5.8)
RBC # FLD: 15.6 % — HIGH (ref 10.3–14.5)
SODIUM SERPL-SCNC: 145 MMOL/L — SIGNIFICANT CHANGE UP (ref 135–145)
WBC # BLD: 5.7 K/UL — SIGNIFICANT CHANGE UP (ref 3.8–10.5)
WBC # FLD AUTO: 5.7 K/UL — SIGNIFICANT CHANGE UP (ref 3.8–10.5)

## 2023-06-30 PROCEDURE — 99232 SBSQ HOSP IP/OBS MODERATE 35: CPT | Mod: GC

## 2023-06-30 PROCEDURE — 74018 RADEX ABDOMEN 1 VIEW: CPT | Mod: 26,76

## 2023-06-30 RX ORDER — POTASSIUM CHLORIDE 20 MEQ
10 PACKET (EA) ORAL
Refills: 0 | Status: COMPLETED | OUTPATIENT
Start: 2023-06-30 | End: 2023-06-30

## 2023-06-30 RX ORDER — TETRACAINE/BENZOCAINE/BUTAMBEN 2%-14%-2%
1 OINTMENT (GRAM) TOPICAL DAILY
Refills: 0 | Status: DISCONTINUED | OUTPATIENT
Start: 2023-06-30 | End: 2023-07-02

## 2023-06-30 RX ORDER — POTASSIUM PHOSPHATE, MONOBASIC POTASSIUM PHOSPHATE, DIBASIC 236; 224 MG/ML; MG/ML
15 INJECTION, SOLUTION INTRAVENOUS ONCE
Refills: 0 | Status: COMPLETED | OUTPATIENT
Start: 2023-06-30 | End: 2023-06-30

## 2023-06-30 RX ORDER — SODIUM CHLORIDE 9 MG/ML
500 INJECTION, SOLUTION INTRAVENOUS ONCE
Refills: 0 | Status: COMPLETED | OUTPATIENT
Start: 2023-06-30 | End: 2023-06-30

## 2023-06-30 RX ORDER — TETRACAINE/BENZOCAINE/BUTAMBEN 2%-14%-2%
1 OINTMENT (GRAM) TOPICAL DAILY
Refills: 0 | Status: DISCONTINUED | OUTPATIENT
Start: 2023-06-30 | End: 2023-06-30

## 2023-06-30 RX ORDER — DIPHENHYDRAMINE HCL 50 MG
50 CAPSULE ORAL ONCE
Refills: 0 | Status: COMPLETED | OUTPATIENT
Start: 2023-06-30 | End: 2023-06-30

## 2023-06-30 RX ORDER — SODIUM CHLORIDE 9 MG/ML
1000 INJECTION, SOLUTION INTRAVENOUS ONCE
Refills: 0 | Status: COMPLETED | OUTPATIENT
Start: 2023-06-30 | End: 2023-06-30

## 2023-06-30 RX ORDER — ENOXAPARIN SODIUM 100 MG/ML
40 INJECTION SUBCUTANEOUS EVERY 24 HOURS
Refills: 0 | Status: DISCONTINUED | OUTPATIENT
Start: 2023-06-30 | End: 2023-07-03

## 2023-06-30 RX ORDER — DIATRIZOATE MEGLUMINE 180 MG/ML
120 INJECTION, SOLUTION INTRAVESICAL ONCE
Refills: 0 | Status: COMPLETED | OUTPATIENT
Start: 2023-06-30 | End: 2023-06-30

## 2023-06-30 RX ADMIN — ENOXAPARIN SODIUM 40 MILLIGRAM(S): 100 INJECTION SUBCUTANEOUS at 14:33

## 2023-06-30 RX ADMIN — SODIUM CHLORIDE 1000 MILLILITER(S): 9 INJECTION, SOLUTION INTRAVENOUS at 06:20

## 2023-06-30 RX ADMIN — Medication 1 SPRAY(S): at 20:55

## 2023-06-30 RX ADMIN — Medication 5 MILLIGRAM(S): at 06:11

## 2023-06-30 RX ADMIN — PANTOPRAZOLE SODIUM 40 MILLIGRAM(S): 20 TABLET, DELAYED RELEASE ORAL at 12:04

## 2023-06-30 RX ADMIN — SODIUM CHLORIDE 1000 MILLILITER(S): 9 INJECTION, SOLUTION INTRAVENOUS at 14:33

## 2023-06-30 RX ADMIN — POTASSIUM PHOSPHATE, MONOBASIC POTASSIUM PHOSPHATE, DIBASIC 62.5 MILLIMOLE(S): 236; 224 INJECTION, SOLUTION INTRAVENOUS at 08:59

## 2023-06-30 RX ADMIN — Medication 100 MILLIEQUIVALENT(S): at 08:36

## 2023-06-30 RX ADMIN — Medication 5 MILLIGRAM(S): at 12:04

## 2023-06-30 RX ADMIN — Medication 100 MILLIEQUIVALENT(S): at 09:39

## 2023-06-30 RX ADMIN — Medication 5 MILLIGRAM(S): at 17:36

## 2023-06-30 RX ADMIN — Medication 50 MILLIGRAM(S): at 09:04

## 2023-06-30 RX ADMIN — Medication 1: at 11:12

## 2023-06-30 RX ADMIN — DIATRIZOATE MEGLUMINE 120 MILLILITER(S): 180 INJECTION, SOLUTION INTRAVESICAL at 09:21

## 2023-06-30 RX ADMIN — Medication 100 MILLIEQUIVALENT(S): at 10:59

## 2023-06-30 RX ADMIN — SODIUM CHLORIDE 150 MILLILITER(S): 9 INJECTION, SOLUTION INTRAVENOUS at 12:03

## 2023-06-30 RX ADMIN — Medication 5 MILLIGRAM(S): at 00:05

## 2023-06-30 NOTE — PROGRESS NOTE ADULT - ASSESSMENT
47 year old male with recurrent SBO.    Plan:  - NPO/ IVF/ NGT  - AROBF  ?gastrograffin trial  - Serial abdominal exams    A Team Surgery  [id number]

## 2023-06-30 NOTE — PROGRESS NOTE ADULT - SUBJECTIVE AND OBJECTIVE BOX
TEAM [ A  ] Surgery Daily Progress Note  =====================================================    SUBJECTIVE: Patient seen and examined at bedside on AM rounds. Patient reports that they're feeling well. NPO, denies nausea, vomiting.   - Flatus/   + (diarrhea) BM. OOB/Amublating as tolerated. Denies fever, chills.    ALLERGIES:  ceftriaxone (Rash)  Cipro (Unknown)  fentanyl (Rash)  Flagyl (Unknown)  iodine containing compounds (Rash)  morphine (Rash)  contrast media (iodine-based) (Rash)  cephalosporins (Rash)      --------------------------------------------------------------------------------------    MEDICATIONS:    Cardiovascular Medications  metoprolol tartrate Injectable 5 milliGRAM(s) IV Push every 6 hours    Gastrointestinal Medications  lactated ringers. 1000 milliLiter(s) IV Continuous <Continuous>  pantoprazole  Injectable 40 milliGRAM(s) IV Push daily    Endocrine/Metabolic Medications  dextrose 50% Injectable 25 Gram(s) IV Push once  dextrose 50% Injectable 12.5 Gram(s) IV Push once  insulin lispro (ADMELOG) corrective regimen sliding scale   SubCutaneous every 6 hours    Topical/Other Medications    --------------------------------------------------------------------------------------    VITAL SIGNS:  T(C): 36.9 (06-30-23 @ 06:02), Max: 37.2 (06-29-23 @ 16:50)  HR: 106 (06-30-23 @ 06:02) (102 - 122)  BP: 131/70 (06-30-23 @ 06:02) (122/63 - 149/87)  RR: 18 (06-30-23 @ 06:02) (17 - 22)  SpO2: 95% (06-30-23 @ 06:02) (95% - 98%)  --------------------------------------------------------------------------------------    EXAM    General: NAD, resting in bed comfortably.  Cardiac: regular rate, warm and well perfused  Respiratory: Nonlabored respirations, normal cw expansion.  Abdomen: soft, nontender, nondistended, NGT to LCWS  Extremities: normal strength, FROM, no deformities    --------------------------------------------------------------------------------------    --------------------------------------------------------------------------------------    INS AND OUTS:    06-29-23 @ 07:01  -  06-30-23 @ 07:00  --------------------------------------------------------  IN: 250 mL / OUT: 1500 mL / NET: -1250 mL      --------------------------------------------------------------------------------------

## 2023-07-01 LAB
ANION GAP SERPL CALC-SCNC: 19 MMOL/L — HIGH (ref 7–14)
BUN SERPL-MCNC: 23 MG/DL — SIGNIFICANT CHANGE UP (ref 7–23)
CALCIUM SERPL-MCNC: 8.7 MG/DL — SIGNIFICANT CHANGE UP (ref 8.4–10.5)
CHLORIDE SERPL-SCNC: 107 MMOL/L — SIGNIFICANT CHANGE UP (ref 98–107)
CO2 SERPL-SCNC: 21 MMOL/L — LOW (ref 22–31)
CREAT SERPL-MCNC: 0.77 MG/DL — SIGNIFICANT CHANGE UP (ref 0.5–1.3)
EGFR: 110 ML/MIN/1.73M2 — SIGNIFICANT CHANGE UP
GLUCOSE BLDC GLUCOMTR-MCNC: 114 MG/DL — HIGH (ref 70–99)
GLUCOSE BLDC GLUCOMTR-MCNC: 120 MG/DL — HIGH (ref 70–99)
GLUCOSE BLDC GLUCOMTR-MCNC: 131 MG/DL — HIGH (ref 70–99)
GLUCOSE BLDC GLUCOMTR-MCNC: 146 MG/DL — HIGH (ref 70–99)
GLUCOSE SERPL-MCNC: 136 MG/DL — HIGH (ref 70–99)
HCT VFR BLD CALC: 37.3 % — LOW (ref 39–50)
HGB BLD-MCNC: 11.3 G/DL — LOW (ref 13–17)
MAGNESIUM SERPL-MCNC: 2 MG/DL — SIGNIFICANT CHANGE UP (ref 1.6–2.6)
MCHC RBC-ENTMCNC: 27 PG — SIGNIFICANT CHANGE UP (ref 27–34)
MCHC RBC-ENTMCNC: 30.3 GM/DL — LOW (ref 32–36)
MCV RBC AUTO: 89.2 FL — SIGNIFICANT CHANGE UP (ref 80–100)
NRBC # BLD: 0 /100 WBCS — SIGNIFICANT CHANGE UP (ref 0–0)
NRBC # FLD: 0 K/UL — SIGNIFICANT CHANGE UP (ref 0–0)
PHOSPHATE SERPL-MCNC: 1.9 MG/DL — LOW (ref 2.5–4.5)
PLATELET # BLD AUTO: 185 K/UL — SIGNIFICANT CHANGE UP (ref 150–400)
POTASSIUM SERPL-MCNC: 3.6 MMOL/L — SIGNIFICANT CHANGE UP (ref 3.5–5.3)
POTASSIUM SERPL-SCNC: 3.6 MMOL/L — SIGNIFICANT CHANGE UP (ref 3.5–5.3)
RBC # BLD: 4.18 M/UL — LOW (ref 4.2–5.8)
RBC # FLD: 15.3 % — HIGH (ref 10.3–14.5)
SODIUM SERPL-SCNC: 147 MMOL/L — HIGH (ref 135–145)
WBC # BLD: 6.2 K/UL — SIGNIFICANT CHANGE UP (ref 3.8–10.5)
WBC # FLD AUTO: 6.2 K/UL — SIGNIFICANT CHANGE UP (ref 3.8–10.5)

## 2023-07-01 PROCEDURE — 74018 RADEX ABDOMEN 1 VIEW: CPT | Mod: 26

## 2023-07-01 RX ORDER — INSULIN LISPRO 100/ML
VIAL (ML) SUBCUTANEOUS
Refills: 0 | Status: DISCONTINUED | OUTPATIENT
Start: 2023-07-01 | End: 2023-07-03

## 2023-07-01 RX ORDER — FENOFIBRATE,MICRONIZED 130 MG
145 CAPSULE ORAL DAILY
Refills: 0 | Status: DISCONTINUED | OUTPATIENT
Start: 2023-07-01 | End: 2023-07-03

## 2023-07-01 RX ORDER — METOPROLOL TARTRATE 50 MG
50 TABLET ORAL
Refills: 0 | Status: DISCONTINUED | OUTPATIENT
Start: 2023-07-01 | End: 2023-07-03

## 2023-07-01 RX ORDER — POTASSIUM PHOSPHATE, MONOBASIC POTASSIUM PHOSPHATE, DIBASIC 236; 224 MG/ML; MG/ML
15 INJECTION, SOLUTION INTRAVENOUS ONCE
Refills: 0 | Status: COMPLETED | OUTPATIENT
Start: 2023-07-01 | End: 2023-07-01

## 2023-07-01 RX ORDER — ACETAMINOPHEN 500 MG
1000 TABLET ORAL ONCE
Refills: 0 | Status: COMPLETED | OUTPATIENT
Start: 2023-07-01 | End: 2023-07-01

## 2023-07-01 RX ORDER — ATORVASTATIN CALCIUM 80 MG/1
40 TABLET, FILM COATED ORAL AT BEDTIME
Refills: 0 | Status: DISCONTINUED | OUTPATIENT
Start: 2023-07-01 | End: 2023-07-03

## 2023-07-01 RX ORDER — LOSARTAN POTASSIUM 100 MG/1
50 TABLET, FILM COATED ORAL DAILY
Refills: 0 | Status: DISCONTINUED | OUTPATIENT
Start: 2023-07-01 | End: 2023-07-03

## 2023-07-01 RX ORDER — INSULIN LISPRO 100/ML
VIAL (ML) SUBCUTANEOUS AT BEDTIME
Refills: 0 | Status: DISCONTINUED | OUTPATIENT
Start: 2023-07-01 | End: 2023-07-03

## 2023-07-01 RX ADMIN — Medication 400 MILLIGRAM(S): at 01:03

## 2023-07-01 RX ADMIN — Medication 5 MILLIGRAM(S): at 06:16

## 2023-07-01 RX ADMIN — Medication 5 MILLIGRAM(S): at 00:04

## 2023-07-01 RX ADMIN — Medication 50 MILLIGRAM(S): at 17:53

## 2023-07-01 RX ADMIN — SODIUM CHLORIDE 75 MILLILITER(S): 9 INJECTION, SOLUTION INTRAVENOUS at 08:55

## 2023-07-01 RX ADMIN — ATORVASTATIN CALCIUM 40 MILLIGRAM(S): 80 TABLET, FILM COATED ORAL at 21:08

## 2023-07-01 RX ADMIN — Medication 145 MILLIGRAM(S): at 12:05

## 2023-07-01 RX ADMIN — POTASSIUM PHOSPHATE, MONOBASIC POTASSIUM PHOSPHATE, DIBASIC 62.5 MILLIMOLE(S): 236; 224 INJECTION, SOLUTION INTRAVENOUS at 11:27

## 2023-07-01 RX ADMIN — Medication 1000 MILLIGRAM(S): at 02:00

## 2023-07-01 RX ADMIN — ENOXAPARIN SODIUM 40 MILLIGRAM(S): 100 INJECTION SUBCUTANEOUS at 14:50

## 2023-07-01 NOTE — PROGRESS NOTE ADULT - SUBJECTIVE AND OBJECTIVE BOX
General Surgery Progress Note    Subjective: Patient resting in bed comfortably NGT in place. No events o/n, patient had episode of diarrhea o/n +flatus and feels his obstruction is beginning to open up. Gastrogaffin challenge 6/30 showed contrast in colon on f/u XR. Patient denies fever, CP, SOB, n/v, abdominal pain. Pain well controlled.     Objective:  Vitals:  T(C): 37.4 (07-01-23 @ 06:17), Max: 37.4 (07-01-23 @ 06:17)  HR: 98 (07-01-23 @ 06:17) (93 - 105)  BP: 135/66 (07-01-23 @ 06:17) (124/60 - 146/79)  RR: 18 (07-01-23 @ 06:17) (17 - 18)  SpO2: 95% (07-01-23 @ 06:17) (95% - 98%)  Wt(kg): --    06-30 @ 07:01  -  07-01 @ 07:00  --------------------------------------------------------  IN:  Total IN: 0 mL    OUT:    Nasogastric/Oral tube (mL): 250 mL    Voided (mL): 1250 mL  Total OUT: 1500 mL    Total NET: -1500 mL          Physical Exam:  General: WN/WD NAD  Neurology: A&Ox3, nonfocal, follows commands  Respiratory: CTA B/L, No wheezing, rales, rhonchi  CV: Normal rate regular rhythm, S1S2, no murmurs, rubs or gallops  Abdominal: Soft, NT, ND +BS,   Tubes: NGT in place to suction      Labs:                        11.3   6.20  )-----------( 185      ( 01 Jul 2023 06:00 )             37.3     07-01    147<H>  |  107  |  23  ----------------------------<  136<H>  3.6   |  21<L>  |  0.77    Ca    8.7      01 Jul 2023 06:00  Phos  1.9     07-01  Mg     2.00     07-01    TPro  7.9  /  Alb  4.7  /  TBili  1.0  /  DBili  x   /  AST  16  /  ALT  29  /  AlkPhos  38<L>  06-29    LIVER FUNCTIONS - ( 29 Jun 2023 10:59 )  Alb: 4.7 g/dL / Pro: 7.9 g/dL / ALK PHOS: 38 U/L / ALT: 29 U/L / AST: 16 U/L / GGT: x           PT/INR - ( 29 Jun 2023 10:59 )   PT: 11.7 sec;   INR: 1.01 ratio         PTT - ( 29 Jun 2023 10:59 )  PTT:27.2 sec  Urinalysis Basic - ( 01 Jul 2023 06:00 )    Color: x / Appearance: x / SG: x / pH: x  Gluc: 136 mg/dL / Ketone: x  / Bili: x / Urobili: x   Blood: x / Protein: x / Nitrite: x   Leuk Esterase: x / RBC: x / WBC x   Sq Epi: x / Non Sq Epi: x / Bacteria: x              A/P: 47yMale   - Monitor GI function  - encourage OOB/IS  - SCD/ DVT PPX  - monitor labs, replete electrolytes as needed  - Pain control   General Surgery Progress Note    Subjective: Patient resting in bed comfortably NGT in place. No events o/n, patient had episode of diarrhea o/n +flatus and feels his obstruction is beginning to open up. Gastrogaffin challenge 6/30 showed contrast in colon on f/u XR. Patient denies fever, CP, SOB, n/v, abdominal pain. Pain well controlled.     Objective:  Vitals:  T(C): 37.4 (07-01-23 @ 06:17), Max: 37.4 (07-01-23 @ 06:17)  HR: 98 (07-01-23 @ 06:17) (93 - 105)  BP: 135/66 (07-01-23 @ 06:17) (124/60 - 146/79)  RR: 18 (07-01-23 @ 06:17) (17 - 18)  SpO2: 95% (07-01-23 @ 06:17) (95% - 98%)  Wt(kg): --    06-30 @ 07:01  -  07-01 @ 07:00  --------------------------------------------------------  IN:  Total IN: 0 mL    OUT:    Nasogastric/Oral tube (mL): 250 mL    Voided (mL): 1250 mL  Total OUT: 1500 mL    Total NET: -1500 mL          Physical Exam:  General: WN/WD NAD  Neurology: A&Ox3, nonfocal, follows commands  Respiratory: CTA B/L, No wheezing, rales, rhonchi  CV: Normal rate regular rhythm, S1S2, no murmurs, rubs or gallops  Abdominal: Soft, NT, ND +BS,   Tubes: NGT in place to suction      Labs:                        11.3   6.20  )-----------( 185      ( 01 Jul 2023 06:00 )             37.3     07-01    147<H>  |  107  |  23  ----------------------------<  136<H>  3.6   |  21<L>  |  0.77    Ca    8.7      01 Jul 2023 06:00  Phos  1.9     07-01  Mg     2.00     07-01    TPro  7.9  /  Alb  4.7  /  TBili  1.0  /  DBili  x   /  AST  16  /  ALT  29  /  AlkPhos  38<L>  06-29    LIVER FUNCTIONS - ( 29 Jun 2023 10:59 )  Alb: 4.7 g/dL / Pro: 7.9 g/dL / ALK PHOS: 38 U/L / ALT: 29 U/L / AST: 16 U/L / GGT: x           PT/INR - ( 29 Jun 2023 10:59 )   PT: 11.7 sec;   INR: 1.01 ratio         PTT - ( 29 Jun 2023 10:59 )  PTT:27.2 sec  Urinalysis Basic - ( 01 Jul 2023 06:00 )    Color: x / Appearance: x / SG: x / pH: x  Gluc: 136 mg/dL / Ketone: x  / Bili: x / Urobili: x   Blood: x / Protein: x / Nitrite: x   Leuk Esterase: x / RBC: x / WBC x   Sq Epi: x / Non Sq Epi: x / Bacteria: x

## 2023-07-01 NOTE — PROGRESS NOTE ADULT - ASSESSMENT
A/P: 47 year old male with recurrent SBO presented with abdominal pain and n/v.     Plan:  - Plan to d/c NGT (250cc over 24hrs) and restart home PO meds  - S/P Gastrograffin challenge 6/30, am XR +contrast in colon.   - Start CLD      A Team Surgery  [id number] normal (ped)...

## 2023-07-02 LAB
ANION GAP SERPL CALC-SCNC: 15 MMOL/L — HIGH (ref 7–14)
BUN SERPL-MCNC: 17 MG/DL — SIGNIFICANT CHANGE UP (ref 7–23)
CALCIUM SERPL-MCNC: 9.1 MG/DL — SIGNIFICANT CHANGE UP (ref 8.4–10.5)
CHLORIDE SERPL-SCNC: 103 MMOL/L — SIGNIFICANT CHANGE UP (ref 98–107)
CO2 SERPL-SCNC: 22 MMOL/L — SIGNIFICANT CHANGE UP (ref 22–31)
CREAT SERPL-MCNC: 0.67 MG/DL — SIGNIFICANT CHANGE UP (ref 0.5–1.3)
EGFR: 114 ML/MIN/1.73M2 — SIGNIFICANT CHANGE UP
GLUCOSE BLDC GLUCOMTR-MCNC: 134 MG/DL — HIGH (ref 70–99)
GLUCOSE BLDC GLUCOMTR-MCNC: 138 MG/DL — HIGH (ref 70–99)
GLUCOSE BLDC GLUCOMTR-MCNC: 176 MG/DL — HIGH (ref 70–99)
GLUCOSE BLDC GLUCOMTR-MCNC: 188 MG/DL — HIGH (ref 70–99)
GLUCOSE SERPL-MCNC: 138 MG/DL — HIGH (ref 70–99)
HCT VFR BLD CALC: 41.8 % — SIGNIFICANT CHANGE UP (ref 39–50)
HGB BLD-MCNC: 12.5 G/DL — LOW (ref 13–17)
MAGNESIUM SERPL-MCNC: 2.1 MG/DL — SIGNIFICANT CHANGE UP (ref 1.6–2.6)
MCHC RBC-ENTMCNC: 27.3 PG — SIGNIFICANT CHANGE UP (ref 27–34)
MCHC RBC-ENTMCNC: 29.9 GM/DL — LOW (ref 32–36)
MCV RBC AUTO: 91.3 FL — SIGNIFICANT CHANGE UP (ref 80–100)
NRBC # BLD: 0 /100 WBCS — SIGNIFICANT CHANGE UP (ref 0–0)
NRBC # FLD: 0 K/UL — SIGNIFICANT CHANGE UP (ref 0–0)
PHOSPHATE SERPL-MCNC: 3 MG/DL — SIGNIFICANT CHANGE UP (ref 2.5–4.5)
PLATELET # BLD AUTO: 176 K/UL — SIGNIFICANT CHANGE UP (ref 150–400)
POTASSIUM SERPL-MCNC: 3.5 MMOL/L — SIGNIFICANT CHANGE UP (ref 3.5–5.3)
POTASSIUM SERPL-SCNC: 3.5 MMOL/L — SIGNIFICANT CHANGE UP (ref 3.5–5.3)
RBC # BLD: 4.58 M/UL — SIGNIFICANT CHANGE UP (ref 4.2–5.8)
RBC # FLD: 15 % — HIGH (ref 10.3–14.5)
SODIUM SERPL-SCNC: 140 MMOL/L — SIGNIFICANT CHANGE UP (ref 135–145)
WBC # BLD: 7.4 K/UL — SIGNIFICANT CHANGE UP (ref 3.8–10.5)
WBC # FLD AUTO: 7.4 K/UL — SIGNIFICANT CHANGE UP (ref 3.8–10.5)

## 2023-07-02 RX ADMIN — Medication 50 MILLIGRAM(S): at 18:38

## 2023-07-02 RX ADMIN — Medication 1: at 17:28

## 2023-07-02 RX ADMIN — ENOXAPARIN SODIUM 40 MILLIGRAM(S): 100 INJECTION SUBCUTANEOUS at 13:55

## 2023-07-02 RX ADMIN — Medication 145 MILLIGRAM(S): at 13:54

## 2023-07-02 RX ADMIN — ATORVASTATIN CALCIUM 40 MILLIGRAM(S): 80 TABLET, FILM COATED ORAL at 21:53

## 2023-07-02 RX ADMIN — Medication 50 MILLIGRAM(S): at 05:08

## 2023-07-02 RX ADMIN — LOSARTAN POTASSIUM 50 MILLIGRAM(S): 100 TABLET, FILM COATED ORAL at 05:08

## 2023-07-02 NOTE — PROGRESS NOTE ADULT - SUBJECTIVE AND OBJECTIVE BOX
General Surgery Progress Note    Subjective: Patient resting in bed. States he is still having some cramping pain and "gurgling" but it is improved. Passing flatus with BMs. Tolerating clears with no belching, nausea, vomiting.     Objective:  Vitals:  T(C): 36.4 (07-02-23 @ 09:42), Max: 36.7 (07-01-23 @ 21:31)  HR: 66 (07-02-23 @ 09:42) (66 - 81)  BP: 128/68 (07-02-23 @ 09:42) (120/87 - 133/72)  RR: 17 (07-02-23 @ 09:42) (16 - 18)  SpO2: 98% (07-02-23 @ 09:42) (97% - 99%)  Wt(kg): --    07-01 @ 07:01  -  07-02 @ 07:00  --------------------------------------------------------  IN:  Total IN: 0 mL    OUT:    Voided (mL): 400 mL  Total OUT: 400 mL    Total NET: -400 mL          Physical Exam:  General: WN/WD NAD  Neurology: A&Ox3, nonfocal  Eyes: PERRLA/ EOMI  ENT/Neck: Neck supple, trachea midline, No JVD  Respiratory: nonlabored breathing on RA  CV: appears well-perfused  Abdominal: Soft, NT, ND  Skin: No Rashes, Hematoma, Ecchymosis    Labs:                        12.5   7.40  )-----------( 176      ( 02 Jul 2023 05:37 )             41.8     07-02    140  |  103  |  17  ----------------------------<  138<H>  3.5   |  22  |  0.67    Ca    9.1      02 Jul 2023 05:37  Phos  3.0     07-02  Mg     2.10     07-02          Urinalysis Basic - ( 02 Jul 2023 05:37 )    Color: x / Appearance: x / SG: x / pH: x  Gluc: 138 mg/dL / Ketone: x  / Bili: x / Urobili: x   Blood: x / Protein: x / Nitrite: x   Leuk Esterase: x / RBC: x / WBC x   Sq Epi: x / Non Sq Epi: x / Bacteria: x

## 2023-07-02 NOTE — PROGRESS NOTE ADULT - ASSESSMENT
A/P: 47y male with recurrent SBO presenting with abdominal pain and N/V. Tolerating clear liquids, continues to deny N/V or increasing abdominal pain. Passing stool and gas.    Plan  - Monitor GI function  - encourage OOB/IS  - SCD/ DVT PPX  - monitor labs, replete electrolytes as needed  - Pain control  - Advance to consistent carb diet  - d/c IV fluids

## 2023-07-03 ENCOUNTER — TRANSCRIPTION ENCOUNTER (OUTPATIENT)
Age: 48
End: 2023-07-03

## 2023-07-03 VITALS
SYSTOLIC BLOOD PRESSURE: 135 MMHG | TEMPERATURE: 98 F | OXYGEN SATURATION: 97 % | DIASTOLIC BLOOD PRESSURE: 66 MMHG | RESPIRATION RATE: 18 BRPM | HEART RATE: 65 BPM

## 2023-07-03 LAB
ANION GAP SERPL CALC-SCNC: 14 MMOL/L — SIGNIFICANT CHANGE UP (ref 7–14)
BUN SERPL-MCNC: 17 MG/DL — SIGNIFICANT CHANGE UP (ref 7–23)
CALCIUM SERPL-MCNC: 8.7 MG/DL — SIGNIFICANT CHANGE UP (ref 8.4–10.5)
CHLORIDE SERPL-SCNC: 102 MMOL/L — SIGNIFICANT CHANGE UP (ref 98–107)
CO2 SERPL-SCNC: 19 MMOL/L — LOW (ref 22–31)
CREAT SERPL-MCNC: 0.69 MG/DL — SIGNIFICANT CHANGE UP (ref 0.5–1.3)
EGFR: 113 ML/MIN/1.73M2 — SIGNIFICANT CHANGE UP
GLUCOSE BLDC GLUCOMTR-MCNC: 183 MG/DL — HIGH (ref 70–99)
GLUCOSE SERPL-MCNC: 201 MG/DL — HIGH (ref 70–99)
HCT VFR BLD CALC: 36.5 % — LOW (ref 39–50)
HGB BLD-MCNC: 11.6 G/DL — LOW (ref 13–17)
MAGNESIUM SERPL-MCNC: 2.1 MG/DL — SIGNIFICANT CHANGE UP (ref 1.6–2.6)
MCHC RBC-ENTMCNC: 27.3 PG — SIGNIFICANT CHANGE UP (ref 27–34)
MCHC RBC-ENTMCNC: 31.8 GM/DL — LOW (ref 32–36)
MCV RBC AUTO: 85.9 FL — SIGNIFICANT CHANGE UP (ref 80–100)
NRBC # BLD: 0 /100 WBCS — SIGNIFICANT CHANGE UP (ref 0–0)
NRBC # FLD: 0 K/UL — SIGNIFICANT CHANGE UP (ref 0–0)
PHOSPHATE SERPL-MCNC: 3.8 MG/DL — SIGNIFICANT CHANGE UP (ref 2.5–4.5)
PLATELET # BLD AUTO: 161 K/UL — SIGNIFICANT CHANGE UP (ref 150–400)
POTASSIUM SERPL-MCNC: 3.4 MMOL/L — LOW (ref 3.5–5.3)
POTASSIUM SERPL-SCNC: 3.4 MMOL/L — LOW (ref 3.5–5.3)
RBC # BLD: 4.25 M/UL — SIGNIFICANT CHANGE UP (ref 4.2–5.8)
RBC # FLD: 14.4 % — SIGNIFICANT CHANGE UP (ref 10.3–14.5)
SODIUM SERPL-SCNC: 135 MMOL/L — SIGNIFICANT CHANGE UP (ref 135–145)
WBC # BLD: 7.11 K/UL — SIGNIFICANT CHANGE UP (ref 3.8–10.5)
WBC # FLD AUTO: 7.11 K/UL — SIGNIFICANT CHANGE UP (ref 3.8–10.5)

## 2023-07-03 PROCEDURE — 99238 HOSP IP/OBS DSCHRG MGMT 30/<: CPT

## 2023-07-03 RX ORDER — POTASSIUM CHLORIDE 20 MEQ
40 PACKET (EA) ORAL ONCE
Refills: 0 | Status: COMPLETED | OUTPATIENT
Start: 2023-07-03 | End: 2023-07-03

## 2023-07-03 RX ORDER — POTASSIUM CHLORIDE 20 MEQ
40 PACKET (EA) ORAL EVERY 4 HOURS
Refills: 0 | Status: DISCONTINUED | OUTPATIENT
Start: 2023-07-03 | End: 2023-07-03

## 2023-07-03 RX ADMIN — Medication 145 MILLIGRAM(S): at 13:06

## 2023-07-03 RX ADMIN — Medication 40 MILLIEQUIVALENT(S): at 10:14

## 2023-07-03 RX ADMIN — ENOXAPARIN SODIUM 40 MILLIGRAM(S): 100 INJECTION SUBCUTANEOUS at 13:10

## 2023-07-03 RX ADMIN — Medication 50 MILLIGRAM(S): at 06:45

## 2023-07-03 RX ADMIN — LOSARTAN POTASSIUM 50 MILLIGRAM(S): 100 TABLET, FILM COATED ORAL at 06:45

## 2023-07-03 RX ADMIN — Medication 1: at 07:37

## 2023-07-03 RX ADMIN — Medication 2: at 11:46

## 2023-07-03 NOTE — DISCHARGE NOTE NURSING/CASE MANAGEMENT/SOCIAL WORK - NSDCPEFALRISK_GEN_ALL_CORE
For information on Fall & Injury Prevention, visit: [email or web address] OR  [email or web address] OR  https://www.cdc.gov/steadi/patient.html

## 2023-07-03 NOTE — PROGRESS NOTE ADULT - ASSESSMENT
A/P: 47y male with recurrent SBO presenting with abdominal pain and N/V. Tolerating clear liquids, continues to deny N/V or increasing abdominal pain. Passing stool and gas.    Plan  - Continue to monitor GI function (BM/flatus)  - encourage OOB/IS  - SCD/ DVT PPX  - monitor labs, replete electrolytes as needed  - Pain control  - C/w consistent carb diet and PO fluids   A/P: 47y male with recurrent SBO presenting with abdominal pain and N/V. Tolerating consistent carb diet, continues to deny N/V or increasing abdominal pain. Passing stool and gas.    Plan  - Continue to monitor GI function (BM/flatus)  - encourage OOB/IS  - SCD/ DVT PPX  - monitor labs, replete electrolytes as needed  - Pain control  - C/w consistent carb diet and PO fluids   A/P: 47y male with recurrent SBO presenting with abdominal pain and N/V. Tolerating consistent carb diet, continues to deny N/V or increasing abdominal pain. Passing stool and gas.    Plan  - Continue to monitor GI function (BM/flatus). If he continues to tolerate diet and cramping improves possible d/c in PM.   - encourage OOB/IS  - SCD/ DVT PPX  - monitor labs, replete electrolytes as needed  - Pain control  - C/w consistent carb diet and PO fluids

## 2023-07-03 NOTE — DISCHARGE NOTE PROVIDER - CARE PROVIDER_API CALL
Musa White  Surgery  [address] [place] [address]  Phone: [phone]  Fax: [phone]  Follow Up Time:

## 2023-07-03 NOTE — DISCHARGE NOTE PROVIDER - NSDCFUADDAPPT_GEN_ALL_CORE_FT
Please call for a follow up appointment with your primary care medical doctor upon discharge regarding your recent hospitalization.

## 2023-07-03 NOTE — DISCHARGE NOTE NURSING/CASE MANAGEMENT/SOCIAL WORK - PATIENT PORTAL LINK FT
You can access the FollowMyHealth Patient Portal offered by [facility] by registering at the following website: [email or web address]. By joining [facility]’s FollowMyHealth portal, you will also be able to view your health information using other applications (apps) compatible with our system.

## 2023-07-03 NOTE — ED PROVIDER NOTE - NS ED ATTENDING STATEMENT MOD
Attending with

## 2023-07-03 NOTE — DISCHARGE NOTE PROVIDER - NSDCCPCAREPLAN_GEN_ALL_CORE_FT
PRINCIPAL DISCHARGE DIAGNOSIS  Diagnosis: SBO (small bowel obstruction)  Assessment and Plan of Treatment:      PRINCIPAL DISCHARGE DIAGNOSIS  Diagnosis: SBO (small bowel obstruction)  Assessment and Plan of Treatment: You were admitted with a small bowel obstruction and treated conservatively with NGT decompression and bowel rest. You were given oral contrast and serial abdominal Xrays were taken, revealing contrast moving throughout the bowel and colon. Your diet was then slowly advanced as tolerated. Once you were tolerating regular diet, voiding and ambulating without difficulty, you were found to be stable for discharge to home.

## 2023-07-03 NOTE — PROGRESS NOTE ADULT - SUBJECTIVE AND OBJECTIVE BOX
General Surgery Progress Note    Subjective: Patient resting in bed no issues o/n. Patient endorsed mild abdominal cramping but denies fever/chills, n/v, SOB, CP. He is passing gas and having bowel movements. Pain well controlled.    Objective:  Vitals:  T(C): 36.9 (07-03-23 @ 06:00), Max: 36.9 (07-03-23 @ 06:00)  HR: 65 (07-03-23 @ 06:00) (65 - 78)  BP: 127/69 (07-03-23 @ 06:00) (109/73 - 128/68)  RR: 17 (07-03-23 @ 06:00) (17 - 18)  SpO2: 97% (07-03-23 @ 06:00) (97% - 98%)  Wt(kg): --    07-02 @ 07:01  -  07-03 @ 07:00  --------------------------------------------------------  IN:  Total IN: 0 mL    OUT:    Voided (mL): 625 mL  Total OUT: 625 mL    Total NET: -625 mL          Physical Exam:  General: WN/WD NAD  Respiratory: CTA B/L, No wheezing, rales, rhonchi  CV: Normal rate regular rhythm  Abdominal: Soft, NTND +BS,   Extremities: No edema, + peripheral pulses    Labs:                        12.5   7.40  )-----------( 176      ( 02 Jul 2023 05:37 )             41.8     07-02    140  |  103  |  17  ----------------------------<  138<H>  3.5   |  22  |  0.67    Ca    9.1      02 Jul 2023 05:37  Phos  3.0     07-02  Mg     2.10     07-02          Urinalysis Basic - ( 02 Jul 2023 05:37 )    Color: x / Appearance: x / SG: x / pH: x  Gluc: 138 mg/dL / Ketone: x  / Bili: x / Urobili: x   Blood: x / Protein: x / Nitrite: x   Leuk Esterase: x / RBC: x / WBC x   Sq Epi: x / Non Sq Epi: x / Bacteria: x

## 2023-07-05 NOTE — DIETITIAN INITIAL EVALUATION ADULT. - PERTINENT MEDS FT
Name: Mendel Patrick      : 1966      MRN: [id number]  Encounter Provider: Lazara Melendez DO  Encounter Date: 2023   Encounter department: [address] [facility]     1. Motion sickness, initial encounter  Assessment & Plan:  Patient presents today for motion sickness patches as he is going on vacation shortly and will be on a 10-day cruise. Plan:   Scopolamine patches prescribed  Zofran also sent to the pharmacy for nausea  Counseled patient to not take scopolamine patches with Dramamine or as needed Valium. Orders:  -     scopolamine (TRANSDERM-SCOP) 1 mg/3 days TD 72 hr patch; Place 1 patch on the skin over 72 hours every third day  -     ondansetron (ZOFRAN-ODT) 4 mg disintegrating tablet; Take 1 tablet (4 mg total) by mouth every 8 (eight) hours as needed for nausea or vomiting    2. Screening PSA (prostate specific antigen)  -     PSA, Total Screen; Future    BMI Counseling: Body mass index is 33.61 kg/m². The BMI is above normal. Nutrition recommendations include moderation in carbohydrate intake. Exercise recommendations include exercising 3-5 times per week. No pharmacotherapy was ordered. Rationale for BMI follow-up plan is due to patient being overweight or obese. Depression Screening and Follow-up Plan: Patient was screened for depression during today's encounter. They screened negative with a PHQ-2 score of 0. Subjective      HPI   61 yo M presents today for motion sickness patches. States that he is going on a 10-day cruise to the [facility] and has been motion sickness in the past.  He has previously tried Dramamine but would like to try the patches this time. He denies any current questions or concerns about his regimen. Review of Systems   Constitutional: Negative for chills and fever. HENT: Negative for sore throat. Respiratory: Negative for cough and shortness of breath.     Cardiovascular: Negative for chest pain and palpitations. Gastrointestinal: Negative for abdominal pain, blood in stool, constipation, diarrhea, nausea and vomiting. Genitourinary: Negative for dysuria and hematuria. Musculoskeletal: Negative for arthralgias and back pain. Skin: Negative for color change and rash. Neurological: Negative for syncope and headaches. Psychiatric/Behavioral: Negative for agitation and behavioral problems. All other systems reviewed and are negative. Current Outpatient Medications on File Prior to Visit   Medication Sig   • albuterol (2.5 mg/3 mL) 0.083 % nebulizer solution Take 2.5 mg by nebulization every 6 (six) hours as needed for wheezing or shortness of breath   • albuterol (PROVENTIL HFA,VENTOLIN HFA) 90 mcg/act inhaler Inhale 2 puffs every 6 (six) hours as needed for wheezing   • Azelastine HCl 137 MCG/SPRAY SOLN PLEASE SEE ATTACHED FOR DETAILED DIRECTIONS   • budesonide (PULMICORT) 0.25 mg/2 mL nebulizer solution "NOT FOR NEBULIZATION, FOR IRRIGATION ONLY-Mix 1 ampule in 8oz of NaCI, irrigate each nostril with 4oz, twice a day"   • cetirizine (ZyrTEC) 10 mg tablet Take 10 mg by mouth daily   • diazepam (VALIUM) 5 mg tablet Take 1 tablet (5 mg total) by mouth every 12 (twelve) hours as needed for muscle spasms for up to 10 days   • EPINEPHrine (EPIPEN) 0.3 mg/0.3 mL SOAJ Inject 0.3 mL (0.3 mg total) into a muscle once for 1 dose Do not start before February 16, 2023.    • famotidine (PEPCID) 40 MG tablet Take 80 mg by mouth daily   • Hypertonic Nasal Wash (Sinus Rinse Refill) PACK    • loratadine (CLARITIN) 10 mg tablet Take 10 mg by mouth daily   • montelukast (SINGULAIR) 10 mg tablet Take 10 mg by mouth daily at bedtime   • omeprazole (PriLOSEC) 40 MG capsule Take 40 mg by mouth daily   • sildenafil (VIAGRA) 100 mg tablet Take 1 tablet (100 mg total) by mouth daily as needed for erectile dysfunction   • Trelegy Ellipta 100-62.5-25 MCG/INH inhaler Inhale 1 puff daily     • [DISCONTINUED] ondansetron (ZOFRAN-ODT) 4 mg disintegrating tablet Take 1 tablet (4 mg total) by mouth every 8 (eight) hours as needed for nausea or vomiting   • fluticasone (FLONASE) 50 mcg/act nasal spray 1 spray into each nostril daily (Patient not taking: Reported on 7/5/2023)   • fluticasone-salmeterol (ADVAIR) 250-50 mcg/dose inhaler Inhale 1 puff every 12 (twelve) hours. (Patient not taking: Reported on 1/14/2022)       Objective     /78 (BP Location: Left arm, Patient Position: Sitting, Cuff Size: Standard)   Pulse 66   Temp 98.4 °F (36.9 °C) (Temporal)   Ht 5' 9" (1.753 m) Comment: per patient  Wt 103 kg (227 lb 9.6 oz)   SpO2 97%   BMI 33.61 kg/m²     Physical Exam  Vitals reviewed. Constitutional:       General: He is not in acute distress. Appearance: Normal appearance. He is normal weight. He is not ill-appearing. HENT:      Head: Normocephalic and atraumatic. Mouth/Throat:      Mouth: Mucous membranes are moist.      Pharynx: Oropharynx is clear. Eyes:      Conjunctiva/sclera: Conjunctivae normal.   Cardiovascular:      Rate and Rhythm: Normal rate and regular rhythm. Heart sounds: No murmur heard. No friction rub. No gallop. Pulmonary:      Effort: Pulmonary effort is normal. No respiratory distress. Breath sounds: Normal breath sounds. No wheezing, rhonchi or rales. Abdominal:      General: Abdomen is flat. Bowel sounds are normal.      Palpations: Abdomen is soft. Musculoskeletal:      Cervical back: Normal range of motion. Skin:     General: Skin is warm and dry. Neurological:      General: No focal deficit present. Mental Status: He is alert.    Psychiatric:         Mood and Affect: Mood normal.         Behavior: Behavior normal.       Osmany Walsh DO Lopressor, Humalog sliding scale, Protonix, Zofran

## 2023-07-26 NOTE — ED ADULT NURSE NOTE - OBJECTIVE STATEMENT
RN Facilitator: PT received into room 27 A&Ox4, Ambulatory C/O Lower ABD pain, N/V/D x 1 day. PT states he has PMH: perforated diverticula with Colostomy bag with reversal, multiple SBO's (last month most recent), HTN, HLD, DM, GERD. States ABD has been slowly getting distended, symptoms started today: vomited 2 times, has had multiple small episodes loose stool today. Denies CP, SOB, fevers, chills, urinary symptoms. Awaiting MD evaluation at this time, VS as noted. 20 G IV placed to R AC, labs sent. Call bell within reach, comfort measures provided. Report given to primary area RN. Cyclophosphamide Counseling:  I discussed with the patient the risks of cyclophosphamide including but not limited to hair loss, hormonal abnormalities, decreased fertility, abdominal pain, diarrhea, nausea and vomiting, bone marrow suppression and infection. The patient understands that monitoring is required while taking this medication.

## 2023-08-12 NOTE — ED ADULT NURSE NOTE - MUSCULOSKELETAL ASSESSMENT
Patient with Paroxysmal (<7 days) atrial fibrillation which is controlled currently with Beta Blocker. Patient is currently in sinus rhythm.[id number] Score: 5.  Anticoagulation not indicated due to due to risk of bleeding, hx of GIB..    Continue BB and ASA   WDL

## 2023-08-15 NOTE — ED ADULT NURSE NOTE - NSFALLRSKASSESSDT_ED_ALL_ED
21-Mar-2019 16:24 [place] Counseling: I discussed with the patient the risks of tildrakizumab including but not limited to immunosuppression, malignancy, posterior leukoencephalopathy syndrome, and serious infections.  The patient understands that monitoring is required including a PPD at baseline and must alert us or the primary physician if symptoms of infection or other concerning signs are noted.

## 2023-08-24 NOTE — DISCHARGE NOTE NURSING/CASE MANAGEMENT/SOCIAL WORK - NURSING SECTION COMPLETE
8/25  <431; ALT:476 <536 normal alkaline phosphatase and normal total bilirubin  Question of shock liver secondary to hypotension prior to admission?  Hold atorvastatin until downtrend in LFTs.  Discussed with patient  Abdominal US no obvious evidence of cholecysistitis  CTAP unremarkable for acute process  Negative hepatitis panel  Negative Monospot test  Normal BNP  I have ordered orthostatic blood pressures and they are normal  Pending echocardiogram.   Patient/Caregiver provided printed discharge information.

## 2023-10-10 NOTE — ED ADULT TRIAGE NOTE - TEMPERATURE IN FAHRENHEIT (DEGREES F)
No care due was identified.  [facility] Embedded Care Due Messages. Reference number: [id number].   10/10/2023 11:15:09 AM CDT   98.4

## 2023-10-24 NOTE — PATIENT PROFILE ADULT. - TEACHING/LEARNING FACTORS IMPACT ABILITY TO LEARN
Colorectal Cancer (never had ,here to schedule)  Allergies    No Known Allergies Allergy (Verified 09/05/23 13:25)      Safety Concerns: Feels Safe At This Time    [address]  Medical History (Reviewed 09/05/23 @ 13:27 by Claude Nino RN)    Back pain  Chronic pain of both knees  Essential hypertension  Foot drop, right  Lumbar degenerative disc disease  Lumbar radiculopathy      Surgical History (Reviewed 09/05/23 @ 13:27 by Claude Nino RN)    History of back surgery  3/27/23-lumbar fusion-Dr. Jv Stover  History of orthopedic surgery  femur      Family History (Reviewed 09/05/23 @ 13:27 by Claude Nino RN)  Coronary arteriosclerosis in native artery  Father  Diabetes mellitus  Mother      Social History (Reviewed 09/05/23 @ 13:27 by Claude Nino RN)  Have You Traveled Out of the [place] [facility] States in the Last Month:  No   Smoking Status:  Never Smoker         HPI  History of Present Illness: The patient is a 22-year-old white male who presents for a screening colonoscopy. The patient is asymptomatic. The patient's sister was diagnosed with colorectal cancer. The patient denies melena and hematochezia. The patient has never been treated for diverticular disease or inflammatory bowel disease. The patient has never had a colonoscopy.     ROS  Const'l  Constitutional: Denies Anorexia, Denies Fatigue, Denies Night Sweats, Denies Weight Gain, Denies Weight Loss and Denies Other (Anxiety.)  Eyes  Eyes: Denies Other (Eye symptoms.)  ENT  ENT: Denies Other (Ear symptoms, nasal symptoms, mouth or throat symptoms.)  Cardio  Cardiovascular: Denies Other (Hypertension and other cardiovascular symptoms.)  Respiratory  Respiratory: Denies Other (Respiratory symptoms.)  GI  Gastrointestinal: Denies Other (Hepatitis, liver disease and other gastrointestinal symptoms.)  MS  Musculoskeletal: Denies Other (Musculoskeletal symptoms.)  Skin  Skin: Denies Other (Skin, hair and nail symptoms.)  Breast  Breast: Denies Other (Breast
none

## 2023-10-25 NOTE — ED ADULT NURSE NOTE - CAS DISCH ACCOMP BY
EDT/Self Adbry Pregnancy And Lactation Text: It is unknown if this medication will adversely affect pregnancy or breast feeding.

## 2023-11-12 NOTE — DISCHARGE NOTE PROVIDER - NSDCQMPCI_CARD_ALL_CORE
Subjective  Offers no complaints.     Past Medical History    PAST MEDICAL & SURGICAL HISTORY:  CVA (cerebrovascular accident)      T2DM (type 2 diabetes mellitus)      HTN (hypertension)      HLD (hyperlipidemia)      S/P cystoscopy      S/P hernia repair          MEDICATIONS:  aMIOdarone    Tablet   Oral   aMIOdarone    Tablet 400 milliGRAM(s) Oral every 8 hours  aspirin enteric coated 81 milliGRAM(s) Oral daily  hydrALAZINE 75 milliGRAM(s) Oral every 8 hours  isosorbide   dinitrate Tablet (ISORDIL) 30 milliGRAM(s) Oral three times a day  metoprolol succinate ER 25 milliGRAM(s) Oral daily  warfarin 5 milliGRAM(s) Oral once          pantoprazole    Tablet 40 milliGRAM(s) Oral before breakfast  polyethylene glycol 3350 17 Gram(s) Oral daily PRN  senna 2 Tablet(s) Oral at bedtime    atorvastatin 80 milliGRAM(s) Oral at bedtime  dapagliflozin 10 milliGRAM(s) Oral every 24 hours  insulin glargine Injectable (LANTUS) 15 Unit(s) SubCutaneous at bedtime  insulin lispro (ADMELOG) corrective regimen sliding scale   SubCutaneous three times a day before meals  insulin lispro (ADMELOG) corrective regimen sliding scale   SubCutaneous at bedtime  insulin lispro Injectable (ADMELOG) 6 Unit(s) SubCutaneous three times a day before meals    chlorhexidine 2% Cloths 1 Application(s) Topical <User Schedule>  tamsulosin 0.4 milliGRAM(s) Oral at bedtime        Allergies    No Known Allergies      VITAL SIGNS  T(C): 36.6 (11-12-23 @ 16:39), Max: 36.9 (11-11-23 @ 20:53)  HR: 75 (11-12-23 @ 16:39) (51 - 80)  BP: 96/61 (11-12-23 @ 16:39) (95/51 - 106/64)  RR: 18 (11-12-23 @ 16:39) (18 - 18)  SpO2: 96% (11-12-23 @ 16:39) (94% - 97%)  Wt(kg): --    Appearance: NAD, no distress  HEENT: Moist Mucous Membranes, Anicteric, PERRL, EOMI  Cardiovascular: Regular rate and rhythm, Normal S1 S2, No JVD, No murmurs  Respiratory: Lungs decreased breath sounds; occasional basilar rales.   Gastrointestinal:  Soft, Non-tender, + BS  Psychiatry: Mood & affect appropriate    I&O's Summary    11 Nov 2023 07:01  -  12 Nov 2023 07:00  --------------------------------------------------------  IN: 738 mL / OUT: 640 mL / NET: 98 mL    12 Nov 2023 07:01  -  12 Nov 2023 16:41  --------------------------------------------------------  IN: 480 mL / OUT: 250 mL / NET: 230 mL        LABORATORY VALUES	 	                          10.7   7.57  )-----------( 279      ( 11 Nov 2023 16:07 )             35.4       11-12    136  |  101  |  53<H>  ----------------------------<  164<H>  4.3   |  23  |  2.91<H>  11-11    137  |  101  |  49<H>  ----------------------------<  182<H>  4.7   |  23  |  2.63<H>    Ca    8.5      12 Nov 2023 06:52  Ca    9.0      11 Nov 2023 06:37    TPro  5.6<L>  /  Alb  3.3  /  TBili  0.4  /  DBili  x   /  AST  13  /  ALT  29  /  AlkPhos  90  11-12    LIVER FUNCTIONS - ( 12 Nov 2023 06:52 )  Alb: 3.3 g/dL / Pro: 5.6 g/dL / ALK PHOS: 90 U/L / ALT: 29 U/L / AST: 13 U/L / GGT: x           Activated Partial Thromboplastin Time: 62.1 sec (11-12 @ 06:52)    10-17 @ 08:16  Cholesterol, Serum - 167  Direct LDL- --  HDL Cholesterol, Serum- 52  Triglycerides, Serum- 80      Thyroid Stimulating Hormone, Serum: 0.34 uIU/mL (11-10 @ 06:32)      Urinalysis Basic - ( 12 Nov 2023 06:52 )    Color: x / Appearance: x / SG: x / pH: x  Gluc: 164 mg/dL / Ketone: x  / Bili: x / Urobili: x   Blood: x / Protein: x / Nitrite: x   Leuk Esterase: x / RBC: x / WBC x   Sq Epi: x / Non Sq Epi: x / Bacteria: x        POCT Blood Glucose.: 156 mg/dL (12 Nov 2023 11:25)          TELEMETRY: 	  Normal sinus rhythm 70-80   No

## 2023-12-06 NOTE — DISCHARGE NOTE PROVIDER - REASON FOR ADMISSION
SBO Alert-The patient is alert, awake and responds to voice. The patient is oriented to time, place, and person. The triage nurse is able to obtain subjective information.

## 2023-12-26 ENCOUNTER — INPATIENT (INPATIENT)
Facility: HOSPITAL | Age: 48
LOS: 4 days | Discharge: HOME | End: 2023-12-31
Attending: SURGERY | Admitting: SURGERY
Payer: COMMERCIAL

## 2023-12-26 VITALS
OXYGEN SATURATION: 100 % | HEART RATE: 130 BPM | TEMPERATURE: 98 F | DIASTOLIC BLOOD PRESSURE: 69 MMHG | SYSTOLIC BLOOD PRESSURE: 113 MMHG | RESPIRATION RATE: 19 BRPM

## 2023-12-26 DIAGNOSIS — Z98.89 OTHER SPECIFIED POSTPROCEDURAL STATES: Chronic | ICD-10-CM

## 2023-12-26 DIAGNOSIS — Z98.890 OTHER SPECIFIED POSTPROCEDURAL STATES: Chronic | ICD-10-CM

## 2023-12-26 PROCEDURE — 99285 EMERGENCY DEPT VISIT HI MDM: CPT | Mod: 25

## 2023-12-26 PROCEDURE — 43753 TX GASTRO INTUB W/ASP: CPT

## 2023-12-26 RX ORDER — SODIUM CHLORIDE 9 MG/ML
1000 INJECTION, SOLUTION INTRAVENOUS ONCE
Refills: 0 | Status: COMPLETED | OUTPATIENT
Start: 2023-12-26 | End: 2023-12-26

## 2023-12-26 RX ORDER — ONDANSETRON 8 MG/1
4 TABLET, FILM COATED ORAL ONCE
Refills: 0 | Status: COMPLETED | OUTPATIENT
Start: 2023-12-26 | End: 2023-12-26

## 2023-12-26 RX ORDER — ACETAMINOPHEN 500 MG
1000 TABLET ORAL ONCE
Refills: 0 | Status: COMPLETED | OUTPATIENT
Start: 2023-12-26 | End: 2023-12-27

## 2023-12-26 RX ORDER — ONDANSETRON 8 MG/1
8 TABLET, FILM COATED ORAL ONCE
Refills: 0 | Status: DISCONTINUED | OUTPATIENT
Start: 2023-12-26 | End: 2023-12-26

## 2023-12-26 NOTE — ED PROVIDER NOTE - CLINICAL SUMMARY MEDICAL DECISION MAKING FREE TEXT BOX
48-year-old male with past medical history of hypertension, hyperlipidemia, DM2 non-insulin-dependent, recurrent SBO's status post numerous lysis of adhesions, ventral hernia repair, Buchanan's procedure with reversal now presenting with 6 complaints similar to previous episodes of obstruction with nausea, vomiting and diffuse distention that started this evening.  High suspicion clinically for small bowel obstruction.  Will obtain CT abdomen pelvis without contrast given patient allergy.  Will get screening labs, lipase, VBG.  Symptomatic management.  Likely anticipate surgery consult pending CT results and NGT placement.

## 2023-12-26 NOTE — ED PROVIDER NOTE - ATTENDING CONTRIBUTION TO CARE
48year old male with PMHx significant for HTN, HLD, DM, s/p Buchanan's in 2009 and subsequent multiple SBOs s/p ex lap LINDA in 2014, 2016, and ex lap, LINDA, ventral hernia repair in 2018, presenting with several hours of diffuse abdominal pain and nausea and retching feels like all prior SBOs, already made Dr abbott aware that he was coming in  pt appears uncomfortable, tachy,   labs, CT, NGT, surg eval

## 2023-12-26 NOTE — ED ADULT TRIAGE NOTE - GLASGOW COMA SCALE: BEST MOTOR RESPONSE, MLM
Please review; protocol failed/ no protocol.     Requested Prescriptions   Pending Prescriptions Disp Refills    FINASTERIDE 5 MG Oral Tab [Pharmacy Med Name: Finasteride 5 Mg Tab Auro] 90 tablet 0     Sig: Take 1 tablet (5 mg total) by mouth once daily        There is no refill protocol information for this order           Recent Outpatient Visits              2 months ago Essential hypertension    [facility], [facility] [address] [facility], APRN    Office Visit    7 months ago Prostate cancer screening    [facility] [address] [facility], APRN    Office Visit    8 months ago Essential hypertension    [facility] [address] [facility], APRN    Office Visit    1 year ago [facility], [facility] [facility], APRN    Office Visit    1 year ago [facility], [facility] [address] [facility], APRN    Office Visit (M6) obeys commands

## 2023-12-26 NOTE — ED PROVIDER NOTE - ATTENDING WITH...
no trouble since last year with last colonscopy.   he has no changes in bowel movements, bleeding, abdominal pain , weight loss no new heart or lung issues at all. no trouble with the last procedure, anesthesia Resident

## 2023-12-26 NOTE — ED PROVIDER NOTE - PROGRESS NOTE DETAILS
Jatin Lyons MD PGY1: Patient reassessed, stable. CT demonstrating SBO, NGT placed, surgery consulted and will admit under Dr. White.

## 2023-12-26 NOTE — ED ADULT NURSE NOTE - NSFALLUNIVINTERV_ED_ALL_ED
Bed/Stretcher in lowest position, wheels locked, appropriate side rails in place/Call bell, personal items and telephone in reach/Instruct patient to call for assistance before getting out of bed/chair/stretcher/Non-slip footwear applied when patient is off stretcher/[place] to call system/Physically safe environment - no spills, clutter or unnecessary equipment/Purposeful proactive rounding/Room/bathroom lighting operational, light cord in reach Bed/Stretcher in lowest position, wheels locked, appropriate side rails in place/Call bell, personal items and telephone in reach/Instruct patient to call for assistance before getting out of bed/chair/stretcher/Non-slip footwear applied when patient is off stretcher/[place] to call system/Physically safe environment - no spills, clutter or unnecessary equipment/Purposeful proactive rounding/Room/bathroom lighting operational, light cord in reach

## 2023-12-26 NOTE — ED ADULT TRIAGE NOTE - CHIEF COMPLAINT QUOTE
Pt c/o nausea/vomiting x1 day last BM this AM. Appears diaphoretic, uncomfortable in triage. Hx SBO Dr. Ortiz patient, T2DM, HLD, HTN.

## 2023-12-26 NOTE — ED PROVIDER NOTE - OBJECTIVE STATEMENT
48-year-old male past medical history of hypertension, hyperlipidemia, DM2 non-insulin-dependent, recurrent SBO's in the past complicated by Buchanan's procedure in 2009 with reversal, numerous lysis of adhesions in 2014, 2016, ventral hernia repair in 2018 presenting with several hours of abdominal pain with associated nausea and vomiting similar to previous SBO presentation.  Patient is a patient of Dr. White and is aware.  Patient denies chest pain or shortness of breath.  No fever/chills.  No urinary symptoms.  Patient reports last bowel movement this morning while vomiting.  Last flatus approximately in the last 24 hours.

## 2023-12-26 NOTE — ED ADULT NURSE NOTE - OBJECTIVE STATEMENT
pt complains of abdominal pain, nausea, vomiting -4 episodes onset one day hx of small bowel obstruction, DM-type 2, HLD, HTN.

## 2023-12-26 NOTE — ED PROVIDER NOTE - PHYSICAL EXAMINATION
GEN: Patient awake and alert. No acute distress, non-toxic. Uncomfortable appearing, vomiting.   Head: Normocephalic, atraumatic.  Neck: Nontender, full ROM.   Eyes: PERRLA b/l. EOMI, no scleral icterus, no conjunctival injection. Moist mucous membranes.  CARDIAC: tachycardic, regular. Normal S1, S2. No murmur, rubs, or gallops. No peripheral edema noted.  PULM: Speaking in full sentences. CTA B/L no wheeze, rales or rhonchi. No signs of respiratory distress, no accessory muscle usage or nasal flaring.  ABD: Soft, diffusely TTP, +distended. No rebound, no involuntary guarding.   MSK: Moving all extremities spontaneously. Full ROM in extremities. No obvious deformity.  NEURO: A&Ox3, no focal neurological deficits, CN 2-12 grossly intact. Following simple commands.  SKIN: Warm, dry, no rash, no lesions, no open wounds. No urticaria. No jaundice.

## 2023-12-27 DIAGNOSIS — K56.609 UNSPECIFIED INTESTINAL OBSTRUCTION, UNSPECIFIED AS TO PARTIAL VERSUS COMPLETE OBSTRUCTION: ICD-10-CM

## 2023-12-27 LAB
ALBUMIN SERPL ELPH-MCNC: 4.2 G/DL — SIGNIFICANT CHANGE UP (ref 3.3–5)
ALBUMIN SERPL ELPH-MCNC: 4.2 G/DL — SIGNIFICANT CHANGE UP (ref 3.3–5)
ALBUMIN SERPL ELPH-MCNC: 4.5 G/DL — SIGNIFICANT CHANGE UP (ref 3.3–5)
ALBUMIN SERPL ELPH-MCNC: 4.5 G/DL — SIGNIFICANT CHANGE UP (ref 3.3–5)
ALBUMIN SERPL ELPH-MCNC: 4.7 G/DL — SIGNIFICANT CHANGE UP (ref 3.3–5)
ALBUMIN SERPL ELPH-MCNC: 4.7 G/DL — SIGNIFICANT CHANGE UP (ref 3.3–5)
ALP SERPL-CCNC: 37 U/L — LOW (ref 40–120)
ALP SERPL-CCNC: 37 U/L — LOW (ref 40–120)
ALP SERPL-CCNC: 41 U/L — SIGNIFICANT CHANGE UP (ref 40–120)
ALP SERPL-CCNC: 41 U/L — SIGNIFICANT CHANGE UP (ref 40–120)
ALP SERPL-CCNC: 47 U/L — SIGNIFICANT CHANGE UP (ref 40–120)
ALP SERPL-CCNC: 47 U/L — SIGNIFICANT CHANGE UP (ref 40–120)
ALT FLD-CCNC: 31 U/L — SIGNIFICANT CHANGE UP (ref 4–41)
ALT FLD-CCNC: 31 U/L — SIGNIFICANT CHANGE UP (ref 4–41)
ALT FLD-CCNC: 34 U/L — SIGNIFICANT CHANGE UP (ref 4–41)
ALT FLD-CCNC: 34 U/L — SIGNIFICANT CHANGE UP (ref 4–41)
ALT FLD-CCNC: <5 U/L — SIGNIFICANT CHANGE UP (ref 4–41)
ALT FLD-CCNC: <5 U/L — SIGNIFICANT CHANGE UP (ref 4–41)
ANION GAP SERPL CALC-SCNC: 16 MMOL/L — HIGH (ref 7–14)
ANION GAP SERPL CALC-SCNC: 16 MMOL/L — HIGH (ref 7–14)
ANION GAP SERPL CALC-SCNC: 17 MMOL/L — HIGH (ref 7–14)
ANION GAP SERPL CALC-SCNC: 17 MMOL/L — HIGH (ref 7–14)
ANION GAP SERPL CALC-SCNC: 18 MMOL/L — HIGH (ref 7–14)
ANION GAP SERPL CALC-SCNC: 18 MMOL/L — HIGH (ref 7–14)
ANION GAP SERPL CALC-SCNC: 23 MMOL/L — HIGH (ref 7–14)
ANION GAP SERPL CALC-SCNC: 23 MMOL/L — HIGH (ref 7–14)
ANISOCYTOSIS BLD QL: SLIGHT — SIGNIFICANT CHANGE UP
ANISOCYTOSIS BLD QL: SLIGHT — SIGNIFICANT CHANGE UP
APTT BLD: 26.3 SEC — SIGNIFICANT CHANGE UP (ref 24.5–35.6)
APTT BLD: 26.3 SEC — SIGNIFICANT CHANGE UP (ref 24.5–35.6)
AST SERPL-CCNC: 130 U/L — HIGH (ref 4–40)
AST SERPL-CCNC: 130 U/L — HIGH (ref 4–40)
AST SERPL-CCNC: 18 U/L — SIGNIFICANT CHANGE UP (ref 4–40)
AST SERPL-CCNC: 18 U/L — SIGNIFICANT CHANGE UP (ref 4–40)
AST SERPL-CCNC: 22 U/L — SIGNIFICANT CHANGE UP (ref 4–40)
AST SERPL-CCNC: 22 U/L — SIGNIFICANT CHANGE UP (ref 4–40)
BASE EXCESS BLDV CALC-SCNC: -3.4 MMOL/L — LOW (ref -2–3)
BASE EXCESS BLDV CALC-SCNC: -3.4 MMOL/L — LOW (ref -2–3)
BASE EXCESS BLDV CALC-SCNC: 1.7 MMOL/L — SIGNIFICANT CHANGE UP (ref -2–3)
BASE EXCESS BLDV CALC-SCNC: 1.7 MMOL/L — SIGNIFICANT CHANGE UP (ref -2–3)
BASOPHILS # BLD AUTO: 0 K/UL — SIGNIFICANT CHANGE UP (ref 0–0.2)
BASOPHILS # BLD AUTO: 0 K/UL — SIGNIFICANT CHANGE UP (ref 0–0.2)
BASOPHILS # BLD AUTO: 0.01 K/UL — SIGNIFICANT CHANGE UP (ref 0–0.2)
BASOPHILS # BLD AUTO: 0.01 K/UL — SIGNIFICANT CHANGE UP (ref 0–0.2)
BASOPHILS NFR BLD AUTO: 0 % — SIGNIFICANT CHANGE UP (ref 0–2)
BASOPHILS NFR BLD AUTO: 0 % — SIGNIFICANT CHANGE UP (ref 0–2)
BASOPHILS NFR BLD AUTO: 0.1 % — SIGNIFICANT CHANGE UP (ref 0–2)
BASOPHILS NFR BLD AUTO: 0.1 % — SIGNIFICANT CHANGE UP (ref 0–2)
BILIRUB SERPL-MCNC: 0.5 MG/DL — SIGNIFICANT CHANGE UP (ref 0.2–1.2)
BILIRUB SERPL-MCNC: 0.5 MG/DL — SIGNIFICANT CHANGE UP (ref 0.2–1.2)
BILIRUB SERPL-MCNC: 0.7 MG/DL — SIGNIFICANT CHANGE UP (ref 0.2–1.2)
BILIRUB SERPL-MCNC: 0.7 MG/DL — SIGNIFICANT CHANGE UP (ref 0.2–1.2)
BILIRUB SERPL-MCNC: 0.9 MG/DL — SIGNIFICANT CHANGE UP (ref 0.2–1.2)
BILIRUB SERPL-MCNC: 0.9 MG/DL — SIGNIFICANT CHANGE UP (ref 0.2–1.2)
BLD GP AB SCN SERPL QL: NEGATIVE — SIGNIFICANT CHANGE UP
BLD GP AB SCN SERPL QL: NEGATIVE — SIGNIFICANT CHANGE UP
BLOOD GAS VENOUS COMPREHENSIVE RESULT: SIGNIFICANT CHANGE UP
BLOOD GAS VENOUS COMPREHENSIVE RESULT: SIGNIFICANT CHANGE UP
BUN SERPL-MCNC: 26 MG/DL — HIGH (ref 7–23)
BUN SERPL-MCNC: 26 MG/DL — HIGH (ref 7–23)
BUN SERPL-MCNC: 27 MG/DL — HIGH (ref 7–23)
BUN SERPL-MCNC: 27 MG/DL — HIGH (ref 7–23)
BUN SERPL-MCNC: 29 MG/DL — HIGH (ref 7–23)
CA-I SERPL-SCNC: 1.32 MMOL/L — SIGNIFICANT CHANGE UP (ref 1.15–1.33)
CA-I SERPL-SCNC: 1.32 MMOL/L — SIGNIFICANT CHANGE UP (ref 1.15–1.33)
CALCIUM SERPL-MCNC: 10.3 MG/DL — SIGNIFICANT CHANGE UP (ref 8.4–10.5)
CALCIUM SERPL-MCNC: 10.3 MG/DL — SIGNIFICANT CHANGE UP (ref 8.4–10.5)
CALCIUM SERPL-MCNC: 10.4 MG/DL — SIGNIFICANT CHANGE UP (ref 8.4–10.5)
CALCIUM SERPL-MCNC: 10.4 MG/DL — SIGNIFICANT CHANGE UP (ref 8.4–10.5)
CALCIUM SERPL-MCNC: 10.7 MG/DL — HIGH (ref 8.4–10.5)
CALCIUM SERPL-MCNC: 10.7 MG/DL — HIGH (ref 8.4–10.5)
CALCIUM SERPL-MCNC: 9.4 MG/DL — SIGNIFICANT CHANGE UP (ref 8.4–10.5)
CALCIUM SERPL-MCNC: 9.4 MG/DL — SIGNIFICANT CHANGE UP (ref 8.4–10.5)
CHLORIDE BLDV-SCNC: 103 MMOL/L — SIGNIFICANT CHANGE UP (ref 96–108)
CHLORIDE BLDV-SCNC: 103 MMOL/L — SIGNIFICANT CHANGE UP (ref 96–108)
CHLORIDE BLDV-SCNC: 99 MMOL/L — SIGNIFICANT CHANGE UP (ref 96–108)
CHLORIDE BLDV-SCNC: 99 MMOL/L — SIGNIFICANT CHANGE UP (ref 96–108)
CHLORIDE SERPL-SCNC: 102 MMOL/L — SIGNIFICANT CHANGE UP (ref 98–107)
CHLORIDE SERPL-SCNC: 106 MMOL/L — SIGNIFICANT CHANGE UP (ref 98–107)
CHLORIDE SERPL-SCNC: 106 MMOL/L — SIGNIFICANT CHANGE UP (ref 98–107)
CHLORIDE SERPL-SCNC: 97 MMOL/L — LOW (ref 98–107)
CHLORIDE SERPL-SCNC: 97 MMOL/L — LOW (ref 98–107)
CO2 BLDV-SCNC: 24.6 MMOL/L — SIGNIFICANT CHANGE UP (ref 22–26)
CO2 BLDV-SCNC: 24.6 MMOL/L — SIGNIFICANT CHANGE UP (ref 22–26)
CO2 BLDV-SCNC: 27.9 MMOL/L — HIGH (ref 22–26)
CO2 BLDV-SCNC: 27.9 MMOL/L — HIGH (ref 22–26)
CO2 SERPL-SCNC: 15 MMOL/L — LOW (ref 22–31)
CO2 SERPL-SCNC: 15 MMOL/L — LOW (ref 22–31)
CO2 SERPL-SCNC: 22 MMOL/L — SIGNIFICANT CHANGE UP (ref 22–31)
CO2 SERPL-SCNC: 22 MMOL/L — SIGNIFICANT CHANGE UP (ref 22–31)
CO2 SERPL-SCNC: 23 MMOL/L — SIGNIFICANT CHANGE UP (ref 22–31)
CO2 SERPL-SCNC: 23 MMOL/L — SIGNIFICANT CHANGE UP (ref 22–31)
CO2 SERPL-SCNC: 24 MMOL/L — SIGNIFICANT CHANGE UP (ref 22–31)
CO2 SERPL-SCNC: 24 MMOL/L — SIGNIFICANT CHANGE UP (ref 22–31)
CREAT SERPL-MCNC: 0.84 MG/DL — SIGNIFICANT CHANGE UP (ref 0.5–1.3)
CREAT SERPL-MCNC: 0.84 MG/DL — SIGNIFICANT CHANGE UP (ref 0.5–1.3)
CREAT SERPL-MCNC: 1.03 MG/DL — SIGNIFICANT CHANGE UP (ref 0.5–1.3)
CREAT SERPL-MCNC: 1.03 MG/DL — SIGNIFICANT CHANGE UP (ref 0.5–1.3)
CREAT SERPL-MCNC: 1.08 MG/DL — SIGNIFICANT CHANGE UP (ref 0.5–1.3)
CREAT SERPL-MCNC: 1.08 MG/DL — SIGNIFICANT CHANGE UP (ref 0.5–1.3)
CREAT SERPL-MCNC: 1.14 MG/DL — SIGNIFICANT CHANGE UP (ref 0.5–1.3)
CREAT SERPL-MCNC: 1.14 MG/DL — SIGNIFICANT CHANGE UP (ref 0.5–1.3)
EGFR: 106 ML/MIN/1.73M2 — SIGNIFICANT CHANGE UP
EGFR: 106 ML/MIN/1.73M2 — SIGNIFICANT CHANGE UP
EGFR: 78 ML/MIN/1.73M2 — SIGNIFICANT CHANGE UP
EGFR: 78 ML/MIN/1.73M2 — SIGNIFICANT CHANGE UP
EGFR: 84 ML/MIN/1.73M2 — SIGNIFICANT CHANGE UP
EGFR: 84 ML/MIN/1.73M2 — SIGNIFICANT CHANGE UP
EGFR: 88 ML/MIN/1.73M2 — SIGNIFICANT CHANGE UP
EGFR: 88 ML/MIN/1.73M2 — SIGNIFICANT CHANGE UP
EOSINOPHIL # BLD AUTO: 0 K/UL — SIGNIFICANT CHANGE UP (ref 0–0.5)
EOSINOPHIL # BLD AUTO: 0 K/UL — SIGNIFICANT CHANGE UP (ref 0–0.5)
EOSINOPHIL # BLD AUTO: 0.03 K/UL — SIGNIFICANT CHANGE UP (ref 0–0.5)
EOSINOPHIL # BLD AUTO: 0.03 K/UL — SIGNIFICANT CHANGE UP (ref 0–0.5)
EOSINOPHIL NFR BLD AUTO: 0 % — SIGNIFICANT CHANGE UP (ref 0–6)
EOSINOPHIL NFR BLD AUTO: 0 % — SIGNIFICANT CHANGE UP (ref 0–6)
EOSINOPHIL NFR BLD AUTO: 0.3 % — SIGNIFICANT CHANGE UP (ref 0–6)
EOSINOPHIL NFR BLD AUTO: 0.3 % — SIGNIFICANT CHANGE UP (ref 0–6)
GAS PNL BLDV: 133 MMOL/L — LOW (ref 136–145)
GAS PNL BLDV: 133 MMOL/L — LOW (ref 136–145)
GAS PNL BLDV: 140 MMOL/L — SIGNIFICANT CHANGE UP (ref 136–145)
GAS PNL BLDV: 140 MMOL/L — SIGNIFICANT CHANGE UP (ref 136–145)
GAS PNL BLDV: SIGNIFICANT CHANGE UP
GLUCOSE BLDC GLUCOMTR-MCNC: 135 MG/DL — HIGH (ref 70–99)
GLUCOSE BLDC GLUCOMTR-MCNC: 135 MG/DL — HIGH (ref 70–99)
GLUCOSE BLDC GLUCOMTR-MCNC: 153 MG/DL — HIGH (ref 70–99)
GLUCOSE BLDC GLUCOMTR-MCNC: 153 MG/DL — HIGH (ref 70–99)
GLUCOSE BLDC GLUCOMTR-MCNC: 171 MG/DL — HIGH (ref 70–99)
GLUCOSE BLDC GLUCOMTR-MCNC: 171 MG/DL — HIGH (ref 70–99)
GLUCOSE BLDC GLUCOMTR-MCNC: 182 MG/DL — HIGH (ref 70–99)
GLUCOSE BLDC GLUCOMTR-MCNC: 182 MG/DL — HIGH (ref 70–99)
GLUCOSE BLDC GLUCOMTR-MCNC: 196 MG/DL — HIGH (ref 70–99)
GLUCOSE BLDC GLUCOMTR-MCNC: 196 MG/DL — HIGH (ref 70–99)
GLUCOSE BLDV-MCNC: 259 MG/DL — HIGH (ref 70–99)
GLUCOSE BLDV-MCNC: 259 MG/DL — HIGH (ref 70–99)
GLUCOSE BLDV-MCNC: 438 MG/DL — HIGH (ref 70–99)
GLUCOSE BLDV-MCNC: 438 MG/DL — HIGH (ref 70–99)
GLUCOSE SERPL-MCNC: 167 MG/DL — HIGH (ref 70–99)
GLUCOSE SERPL-MCNC: 167 MG/DL — HIGH (ref 70–99)
GLUCOSE SERPL-MCNC: 212 MG/DL — HIGH (ref 70–99)
GLUCOSE SERPL-MCNC: 212 MG/DL — HIGH (ref 70–99)
GLUCOSE SERPL-MCNC: 263 MG/DL — HIGH (ref 70–99)
GLUCOSE SERPL-MCNC: 263 MG/DL — HIGH (ref 70–99)
GLUCOSE SERPL-MCNC: 372 MG/DL — HIGH (ref 70–99)
GLUCOSE SERPL-MCNC: 372 MG/DL — HIGH (ref 70–99)
HCO3 BLDV-SCNC: 23 MMOL/L — SIGNIFICANT CHANGE UP (ref 22–29)
HCO3 BLDV-SCNC: 23 MMOL/L — SIGNIFICANT CHANGE UP (ref 22–29)
HCO3 BLDV-SCNC: 27 MMOL/L — SIGNIFICANT CHANGE UP (ref 22–29)
HCO3 BLDV-SCNC: 27 MMOL/L — SIGNIFICANT CHANGE UP (ref 22–29)
HCT VFR BLD CALC: 41.6 % — SIGNIFICANT CHANGE UP (ref 39–50)
HCT VFR BLD CALC: 41.6 % — SIGNIFICANT CHANGE UP (ref 39–50)
HCT VFR BLD CALC: 44.3 % — SIGNIFICANT CHANGE UP (ref 39–50)
HCT VFR BLD CALC: 53 % — HIGH (ref 39–50)
HCT VFR BLD CALC: 53 % — HIGH (ref 39–50)
HCT VFR BLDA CALC: 45 % — SIGNIFICANT CHANGE UP (ref 39–51)
HCT VFR BLDA CALC: 45 % — SIGNIFICANT CHANGE UP (ref 39–51)
HCT VFR BLDA CALC: 50 % — SIGNIFICANT CHANGE UP (ref 39–51)
HCT VFR BLDA CALC: 50 % — SIGNIFICANT CHANGE UP (ref 39–51)
HGB BLD CALC-MCNC: 14.9 G/DL — SIGNIFICANT CHANGE UP (ref 12.6–17.4)
HGB BLD CALC-MCNC: 14.9 G/DL — SIGNIFICANT CHANGE UP (ref 12.6–17.4)
HGB BLD CALC-MCNC: 16.8 G/DL — SIGNIFICANT CHANGE UP (ref 12.6–17.4)
HGB BLD CALC-MCNC: 16.8 G/DL — SIGNIFICANT CHANGE UP (ref 12.6–17.4)
HGB BLD-MCNC: 13.2 G/DL — SIGNIFICANT CHANGE UP (ref 13–17)
HGB BLD-MCNC: 13.2 G/DL — SIGNIFICANT CHANGE UP (ref 13–17)
HGB BLD-MCNC: 14.2 G/DL — SIGNIFICANT CHANGE UP (ref 13–17)
HGB BLD-MCNC: 16.1 G/DL — SIGNIFICANT CHANGE UP (ref 13–17)
HGB BLD-MCNC: 16.1 G/DL — SIGNIFICANT CHANGE UP (ref 13–17)
IANC: 12.22 K/UL — HIGH (ref 1.8–7.4)
IANC: 12.22 K/UL — HIGH (ref 1.8–7.4)
IANC: 7.35 K/UL — SIGNIFICANT CHANGE UP (ref 1.8–7.4)
IANC: 7.35 K/UL — SIGNIFICANT CHANGE UP (ref 1.8–7.4)
IMM GRANULOCYTES NFR BLD AUTO: 0.5 % — SIGNIFICANT CHANGE UP (ref 0–0.9)
IMM GRANULOCYTES NFR BLD AUTO: 0.5 % — SIGNIFICANT CHANGE UP (ref 0–0.9)
INR BLD: 1 RATIO — SIGNIFICANT CHANGE UP (ref 0.85–1.18)
INR BLD: 1 RATIO — SIGNIFICANT CHANGE UP (ref 0.85–1.18)
LACTATE BLDV-MCNC: 2.1 MMOL/L — HIGH (ref 0.5–2)
LACTATE BLDV-MCNC: 2.1 MMOL/L — HIGH (ref 0.5–2)
LACTATE BLDV-MCNC: 3.2 MMOL/L — HIGH (ref 0.5–2)
LACTATE BLDV-MCNC: 3.2 MMOL/L — HIGH (ref 0.5–2)
LACTATE BLDV-MCNC: 3.9 MMOL/L — HIGH (ref 0.5–2)
LACTATE BLDV-MCNC: 3.9 MMOL/L — HIGH (ref 0.5–2)
LACTATE BLDV-MCNC: 7.2 MMOL/L — CRITICAL HIGH (ref 0.5–2)
LACTATE BLDV-MCNC: 7.2 MMOL/L — CRITICAL HIGH (ref 0.5–2)
LACTATE SERPL-SCNC: 2.2 MMOL/L — HIGH (ref 0.5–2)
LACTATE SERPL-SCNC: 2.2 MMOL/L — HIGH (ref 0.5–2)
LACTATE SERPL-SCNC: 2.9 MMOL/L — HIGH (ref 0.5–2)
LACTATE SERPL-SCNC: 2.9 MMOL/L — HIGH (ref 0.5–2)
LIDOCAIN IGE QN: 53 U/L — SIGNIFICANT CHANGE UP (ref 7–60)
LIDOCAIN IGE QN: 53 U/L — SIGNIFICANT CHANGE UP (ref 7–60)
LYMPHOCYTES # BLD AUTO: 0.86 K/UL — LOW (ref 1–3.3)
LYMPHOCYTES # BLD AUTO: 0.86 K/UL — LOW (ref 1–3.3)
LYMPHOCYTES # BLD AUTO: 1.17 K/UL — SIGNIFICANT CHANGE UP (ref 1–3.3)
LYMPHOCYTES # BLD AUTO: 1.17 K/UL — SIGNIFICANT CHANGE UP (ref 1–3.3)
LYMPHOCYTES # BLD AUTO: 8 % — LOW (ref 13–44)
LYMPHOCYTES # BLD AUTO: 8 % — LOW (ref 13–44)
LYMPHOCYTES # BLD AUTO: 9.4 % — LOW (ref 13–44)
LYMPHOCYTES # BLD AUTO: 9.4 % — LOW (ref 13–44)
MAGNESIUM SERPL-MCNC: 1.8 MG/DL — SIGNIFICANT CHANGE UP (ref 1.6–2.6)
MAGNESIUM SERPL-MCNC: 1.8 MG/DL — SIGNIFICANT CHANGE UP (ref 1.6–2.6)
MAGNESIUM SERPL-MCNC: 2 MG/DL — SIGNIFICANT CHANGE UP (ref 1.6–2.6)
MAGNESIUM SERPL-MCNC: 2 MG/DL — SIGNIFICANT CHANGE UP (ref 1.6–2.6)
MCHC RBC-ENTMCNC: 27.1 PG — SIGNIFICANT CHANGE UP (ref 27–34)
MCHC RBC-ENTMCNC: 27.1 PG — SIGNIFICANT CHANGE UP (ref 27–34)
MCHC RBC-ENTMCNC: 27.2 PG — SIGNIFICANT CHANGE UP (ref 27–34)
MCHC RBC-ENTMCNC: 27.5 PG — SIGNIFICANT CHANGE UP (ref 27–34)
MCHC RBC-ENTMCNC: 27.5 PG — SIGNIFICANT CHANGE UP (ref 27–34)
MCHC RBC-ENTMCNC: 30.4 GM/DL — LOW (ref 32–36)
MCHC RBC-ENTMCNC: 30.4 GM/DL — LOW (ref 32–36)
MCHC RBC-ENTMCNC: 31.7 GM/DL — LOW (ref 32–36)
MCHC RBC-ENTMCNC: 31.7 GM/DL — LOW (ref 32–36)
MCHC RBC-ENTMCNC: 32.1 GM/DL — SIGNIFICANT CHANGE UP (ref 32–36)
MCV RBC AUTO: 84.9 FL — SIGNIFICANT CHANGE UP (ref 80–100)
MCV RBC AUTO: 84.9 FL — SIGNIFICANT CHANGE UP (ref 80–100)
MCV RBC AUTO: 85.4 FL — SIGNIFICANT CHANGE UP (ref 80–100)
MCV RBC AUTO: 85.4 FL — SIGNIFICANT CHANGE UP (ref 80–100)
MCV RBC AUTO: 85.9 FL — SIGNIFICANT CHANGE UP (ref 80–100)
MCV RBC AUTO: 85.9 FL — SIGNIFICANT CHANGE UP (ref 80–100)
MCV RBC AUTO: 89.4 FL — SIGNIFICANT CHANGE UP (ref 80–100)
MCV RBC AUTO: 89.4 FL — SIGNIFICANT CHANGE UP (ref 80–100)
METAMYELOCYTES # FLD: 6.2 % — HIGH (ref 0–1)
METAMYELOCYTES # FLD: 6.2 % — HIGH (ref 0–1)
MICROCYTES BLD QL: SLIGHT — SIGNIFICANT CHANGE UP
MICROCYTES BLD QL: SLIGHT — SIGNIFICANT CHANGE UP
MONOCYTES # BLD AUTO: 0.77 K/UL — SIGNIFICANT CHANGE UP (ref 0–0.9)
MONOCYTES # BLD AUTO: 0.77 K/UL — SIGNIFICANT CHANGE UP (ref 0–0.9)
MONOCYTES # BLD AUTO: 0.83 K/UL — SIGNIFICANT CHANGE UP (ref 0–0.9)
MONOCYTES # BLD AUTO: 0.83 K/UL — SIGNIFICANT CHANGE UP (ref 0–0.9)
MONOCYTES NFR BLD AUTO: 5.3 % — SIGNIFICANT CHANGE UP (ref 2–14)
MONOCYTES NFR BLD AUTO: 5.3 % — SIGNIFICANT CHANGE UP (ref 2–14)
MONOCYTES NFR BLD AUTO: 9.1 % — SIGNIFICANT CHANGE UP (ref 2–14)
MONOCYTES NFR BLD AUTO: 9.1 % — SIGNIFICANT CHANGE UP (ref 2–14)
NEUTROPHILS # BLD AUTO: 11.34 K/UL — HIGH (ref 1.8–7.4)
NEUTROPHILS # BLD AUTO: 11.34 K/UL — HIGH (ref 1.8–7.4)
NEUTROPHILS # BLD AUTO: 7.35 K/UL — SIGNIFICANT CHANGE UP (ref 1.8–7.4)
NEUTROPHILS # BLD AUTO: 7.35 K/UL — SIGNIFICANT CHANGE UP (ref 1.8–7.4)
NEUTROPHILS NFR BLD AUTO: 69.8 % — SIGNIFICANT CHANGE UP (ref 43–77)
NEUTROPHILS NFR BLD AUTO: 69.8 % — SIGNIFICANT CHANGE UP (ref 43–77)
NEUTROPHILS NFR BLD AUTO: 80.6 % — HIGH (ref 43–77)
NEUTROPHILS NFR BLD AUTO: 80.6 % — HIGH (ref 43–77)
NEUTS BAND # BLD: 8 % — HIGH (ref 0–6)
NEUTS BAND # BLD: 8 % — HIGH (ref 0–6)
NRBC # BLD: 0 /100 WBCS — SIGNIFICANT CHANGE UP (ref 0–0)
NRBC # FLD: 0 K/UL — SIGNIFICANT CHANGE UP (ref 0–0)
OVALOCYTES BLD QL SMEAR: SLIGHT — SIGNIFICANT CHANGE UP
OVALOCYTES BLD QL SMEAR: SLIGHT — SIGNIFICANT CHANGE UP
PCO2 BLDV: 42 MMHG — SIGNIFICANT CHANGE UP (ref 42–55)
PCO2 BLDV: 42 MMHG — SIGNIFICANT CHANGE UP (ref 42–55)
PCO2 BLDV: 46 MMHG — SIGNIFICANT CHANGE UP (ref 42–55)
PCO2 BLDV: 46 MMHG — SIGNIFICANT CHANGE UP (ref 42–55)
PH BLDV: 7.31 — LOW (ref 7.32–7.43)
PH BLDV: 7.31 — LOW (ref 7.32–7.43)
PH BLDV: 7.41 — SIGNIFICANT CHANGE UP (ref 7.32–7.43)
PH BLDV: 7.41 — SIGNIFICANT CHANGE UP (ref 7.32–7.43)
PHOSPHATE SERPL-MCNC: 3.1 MG/DL — SIGNIFICANT CHANGE UP (ref 2.5–4.5)
PHOSPHATE SERPL-MCNC: 3.1 MG/DL — SIGNIFICANT CHANGE UP (ref 2.5–4.5)
PHOSPHATE SERPL-MCNC: 3.5 MG/DL — SIGNIFICANT CHANGE UP (ref 2.5–4.5)
PHOSPHATE SERPL-MCNC: 3.5 MG/DL — SIGNIFICANT CHANGE UP (ref 2.5–4.5)
PLAT MORPH BLD: ABNORMAL
PLAT MORPH BLD: ABNORMAL
PLATELET # BLD AUTO: 229 K/UL — SIGNIFICANT CHANGE UP (ref 150–400)
PLATELET # BLD AUTO: 229 K/UL — SIGNIFICANT CHANGE UP (ref 150–400)
PLATELET # BLD AUTO: 236 K/UL — SIGNIFICANT CHANGE UP (ref 150–400)
PLATELET # BLD AUTO: 236 K/UL — SIGNIFICANT CHANGE UP (ref 150–400)
PLATELET # BLD AUTO: 247 K/UL — SIGNIFICANT CHANGE UP (ref 150–400)
PLATELET # BLD AUTO: 247 K/UL — SIGNIFICANT CHANGE UP (ref 150–400)
PLATELET # BLD AUTO: 307 K/UL — SIGNIFICANT CHANGE UP (ref 150–400)
PLATELET # BLD AUTO: 307 K/UL — SIGNIFICANT CHANGE UP (ref 150–400)
PLATELET COUNT - ESTIMATE: NORMAL — SIGNIFICANT CHANGE UP
PLATELET COUNT - ESTIMATE: NORMAL — SIGNIFICANT CHANGE UP
PO2 BLDV: 28 MMHG — SIGNIFICANT CHANGE UP (ref 25–45)
PO2 BLDV: 28 MMHG — SIGNIFICANT CHANGE UP (ref 25–45)
PO2 BLDV: 43 MMHG — SIGNIFICANT CHANGE UP (ref 25–45)
PO2 BLDV: 43 MMHG — SIGNIFICANT CHANGE UP (ref 25–45)
POIKILOCYTOSIS BLD QL AUTO: SLIGHT — SIGNIFICANT CHANGE UP
POIKILOCYTOSIS BLD QL AUTO: SLIGHT — SIGNIFICANT CHANGE UP
POTASSIUM BLDV-SCNC: 4.7 MMOL/L — SIGNIFICANT CHANGE UP (ref 3.5–5.1)
POTASSIUM BLDV-SCNC: 4.7 MMOL/L — SIGNIFICANT CHANGE UP (ref 3.5–5.1)
POTASSIUM BLDV-SCNC: 5.5 MMOL/L — HIGH (ref 3.5–5.1)
POTASSIUM BLDV-SCNC: 5.5 MMOL/L — HIGH (ref 3.5–5.1)
POTASSIUM SERPL-MCNC: 3.8 MMOL/L — SIGNIFICANT CHANGE UP (ref 3.5–5.3)
POTASSIUM SERPL-MCNC: 3.8 MMOL/L — SIGNIFICANT CHANGE UP (ref 3.5–5.3)
POTASSIUM SERPL-MCNC: 4.1 MMOL/L — SIGNIFICANT CHANGE UP (ref 3.5–5.3)
POTASSIUM SERPL-MCNC: 4.1 MMOL/L — SIGNIFICANT CHANGE UP (ref 3.5–5.3)
POTASSIUM SERPL-MCNC: 4.5 MMOL/L — SIGNIFICANT CHANGE UP (ref 3.5–5.3)
POTASSIUM SERPL-MCNC: 4.5 MMOL/L — SIGNIFICANT CHANGE UP (ref 3.5–5.3)
POTASSIUM SERPL-MCNC: SIGNIFICANT CHANGE UP MMOL/L (ref 3.5–5.3)
POTASSIUM SERPL-MCNC: SIGNIFICANT CHANGE UP MMOL/L (ref 3.5–5.3)
POTASSIUM SERPL-SCNC: 3.8 MMOL/L — SIGNIFICANT CHANGE UP (ref 3.5–5.3)
POTASSIUM SERPL-SCNC: 3.8 MMOL/L — SIGNIFICANT CHANGE UP (ref 3.5–5.3)
POTASSIUM SERPL-SCNC: 4.1 MMOL/L — SIGNIFICANT CHANGE UP (ref 3.5–5.3)
POTASSIUM SERPL-SCNC: 4.1 MMOL/L — SIGNIFICANT CHANGE UP (ref 3.5–5.3)
POTASSIUM SERPL-SCNC: 4.5 MMOL/L — SIGNIFICANT CHANGE UP (ref 3.5–5.3)
POTASSIUM SERPL-SCNC: 4.5 MMOL/L — SIGNIFICANT CHANGE UP (ref 3.5–5.3)
POTASSIUM SERPL-SCNC: SIGNIFICANT CHANGE UP MMOL/L (ref 3.5–5.3)
POTASSIUM SERPL-SCNC: SIGNIFICANT CHANGE UP MMOL/L (ref 3.5–5.3)
PROT SERPL-MCNC: 7.5 G/DL — SIGNIFICANT CHANGE UP (ref 6–8.3)
PROT SERPL-MCNC: 7.5 G/DL — SIGNIFICANT CHANGE UP (ref 6–8.3)
PROT SERPL-MCNC: 7.7 G/DL — SIGNIFICANT CHANGE UP (ref 6–8.3)
PROT SERPL-MCNC: 7.7 G/DL — SIGNIFICANT CHANGE UP (ref 6–8.3)
PROT SERPL-MCNC: SIGNIFICANT CHANGE UP G/DL (ref 6–8.3)
PROT SERPL-MCNC: SIGNIFICANT CHANGE UP G/DL (ref 6–8.3)
PROTHROM AB SERPL-ACNC: 11.3 SEC — SIGNIFICANT CHANGE UP (ref 9.5–13)
PROTHROM AB SERPL-ACNC: 11.3 SEC — SIGNIFICANT CHANGE UP (ref 9.5–13)
RBC # BLD: 4.87 M/UL — SIGNIFICANT CHANGE UP (ref 4.2–5.8)
RBC # BLD: 4.87 M/UL — SIGNIFICANT CHANGE UP (ref 4.2–5.8)
RBC # BLD: 5.16 M/UL — SIGNIFICANT CHANGE UP (ref 4.2–5.8)
RBC # BLD: 5.16 M/UL — SIGNIFICANT CHANGE UP (ref 4.2–5.8)
RBC # BLD: 5.22 M/UL — SIGNIFICANT CHANGE UP (ref 4.2–5.8)
RBC # BLD: 5.22 M/UL — SIGNIFICANT CHANGE UP (ref 4.2–5.8)
RBC # BLD: 5.93 M/UL — HIGH (ref 4.2–5.8)
RBC # BLD: 5.93 M/UL — HIGH (ref 4.2–5.8)
RBC # FLD: 15.6 % — HIGH (ref 10.3–14.5)
RBC # FLD: 15.6 % — HIGH (ref 10.3–14.5)
RBC # FLD: 15.8 % — HIGH (ref 10.3–14.5)
RBC # FLD: 15.8 % — HIGH (ref 10.3–14.5)
RBC # FLD: 16.1 % — HIGH (ref 10.3–14.5)
RBC # FLD: 16.1 % — HIGH (ref 10.3–14.5)
RBC # FLD: 16.4 % — HIGH (ref 10.3–14.5)
RBC # FLD: 16.4 % — HIGH (ref 10.3–14.5)
RBC BLD AUTO: ABNORMAL
RBC BLD AUTO: ABNORMAL
RH IG SCN BLD-IMP: POSITIVE — SIGNIFICANT CHANGE UP
RH IG SCN BLD-IMP: POSITIVE — SIGNIFICANT CHANGE UP
SAO2 % BLDV: 29.5 % — LOW (ref 67–88)
SAO2 % BLDV: 29.5 % — LOW (ref 67–88)
SAO2 % BLDV: 72.7 % — SIGNIFICANT CHANGE UP (ref 67–88)
SAO2 % BLDV: 72.7 % — SIGNIFICANT CHANGE UP (ref 67–88)
SODIUM SERPL-SCNC: 135 MMOL/L — SIGNIFICANT CHANGE UP (ref 135–145)
SODIUM SERPL-SCNC: 135 MMOL/L — SIGNIFICANT CHANGE UP (ref 135–145)
SODIUM SERPL-SCNC: 142 MMOL/L — SIGNIFICANT CHANGE UP (ref 135–145)
SODIUM SERPL-SCNC: 146 MMOL/L — HIGH (ref 135–145)
SODIUM SERPL-SCNC: 146 MMOL/L — HIGH (ref 135–145)
VARIANT LYMPHS # BLD: 2.7 % — SIGNIFICANT CHANGE UP (ref 0–6)
VARIANT LYMPHS # BLD: 2.7 % — SIGNIFICANT CHANGE UP (ref 0–6)
WBC # BLD: 13.55 K/UL — HIGH (ref 3.8–10.5)
WBC # BLD: 13.55 K/UL — HIGH (ref 3.8–10.5)
WBC # BLD: 14.58 K/UL — HIGH (ref 3.8–10.5)
WBC # BLD: 14.58 K/UL — HIGH (ref 3.8–10.5)
WBC # BLD: 5.68 K/UL — SIGNIFICANT CHANGE UP (ref 3.8–10.5)
WBC # BLD: 5.68 K/UL — SIGNIFICANT CHANGE UP (ref 3.8–10.5)
WBC # BLD: 9.13 K/UL — SIGNIFICANT CHANGE UP (ref 3.8–10.5)
WBC # BLD: 9.13 K/UL — SIGNIFICANT CHANGE UP (ref 3.8–10.5)
WBC # FLD AUTO: 13.55 K/UL — HIGH (ref 3.8–10.5)
WBC # FLD AUTO: 13.55 K/UL — HIGH (ref 3.8–10.5)
WBC # FLD AUTO: 14.58 K/UL — HIGH (ref 3.8–10.5)
WBC # FLD AUTO: 14.58 K/UL — HIGH (ref 3.8–10.5)
WBC # FLD AUTO: 5.68 K/UL — SIGNIFICANT CHANGE UP (ref 3.8–10.5)
WBC # FLD AUTO: 5.68 K/UL — SIGNIFICANT CHANGE UP (ref 3.8–10.5)
WBC # FLD AUTO: 9.13 K/UL — SIGNIFICANT CHANGE UP (ref 3.8–10.5)
WBC # FLD AUTO: 9.13 K/UL — SIGNIFICANT CHANGE UP (ref 3.8–10.5)

## 2023-12-27 PROCEDURE — 74176 CT ABD & PELVIS W/O CONTRAST: CPT | Mod: 26,MA

## 2023-12-27 PROCEDURE — 99222 1ST HOSP IP/OBS MODERATE 55: CPT | Mod: GC

## 2023-12-27 PROCEDURE — 71045 X-RAY EXAM CHEST 1 VIEW: CPT | Mod: 26

## 2023-12-27 RX ORDER — POTASSIUM CHLORIDE 20 MEQ
10 PACKET (EA) ORAL
Refills: 0 | Status: COMPLETED | OUTPATIENT
Start: 2023-12-27 | End: 2023-12-27

## 2023-12-27 RX ORDER — DEXTROSE 50 % IN WATER 50 %
15 SYRINGE (ML) INTRAVENOUS ONCE
Refills: 0 | Status: DISCONTINUED | OUTPATIENT
Start: 2023-12-27 | End: 2023-12-29

## 2023-12-27 RX ORDER — SODIUM CHLORIDE 9 MG/ML
1000 INJECTION, SOLUTION INTRAVENOUS ONCE
Refills: 0 | Status: COMPLETED | OUTPATIENT
Start: 2023-12-27 | End: 2023-12-27

## 2023-12-27 RX ORDER — ONDANSETRON 8 MG/1
4 TABLET, FILM COATED ORAL EVERY 6 HOURS
Refills: 0 | Status: DISCONTINUED | OUTPATIENT
Start: 2023-12-27 | End: 2023-12-31

## 2023-12-27 RX ORDER — PANTOPRAZOLE SODIUM 20 MG/1
40 TABLET, DELAYED RELEASE ORAL
Refills: 0 | Status: DISCONTINUED | OUTPATIENT
Start: 2023-12-27 | End: 2023-12-31

## 2023-12-27 RX ORDER — HEPARIN SODIUM 5000 [USP'U]/ML
5000 INJECTION INTRAVENOUS; SUBCUTANEOUS EVERY 8 HOURS
Refills: 0 | Status: DISCONTINUED | OUTPATIENT
Start: 2023-12-27 | End: 2023-12-31

## 2023-12-27 RX ORDER — DEXTROSE 50 % IN WATER 50 %
25 SYRINGE (ML) INTRAVENOUS ONCE
Refills: 0 | Status: DISCONTINUED | OUTPATIENT
Start: 2023-12-27 | End: 2023-12-29

## 2023-12-27 RX ORDER — DEXTROSE 50 % IN WATER 50 %
12.5 SYRINGE (ML) INTRAVENOUS ONCE
Refills: 0 | Status: DISCONTINUED | OUTPATIENT
Start: 2023-12-27 | End: 2023-12-29

## 2023-12-27 RX ORDER — METOPROLOL TARTRATE 50 MG
5 TABLET ORAL EVERY 6 HOURS
Refills: 0 | Status: DISCONTINUED | OUTPATIENT
Start: 2023-12-27 | End: 2023-12-31

## 2023-12-27 RX ORDER — SODIUM CHLORIDE 9 MG/ML
1000 INJECTION, SOLUTION INTRAVENOUS
Refills: 0 | Status: DISCONTINUED | OUTPATIENT
Start: 2023-12-27 | End: 2023-12-29

## 2023-12-27 RX ORDER — ACETAMINOPHEN 500 MG
1000 TABLET ORAL ONCE
Refills: 0 | Status: COMPLETED | OUTPATIENT
Start: 2023-12-27 | End: 2023-12-27

## 2023-12-27 RX ORDER — SODIUM CHLORIDE 9 MG/ML
1000 INJECTION, SOLUTION INTRAVENOUS
Refills: 0 | Status: ACTIVE | OUTPATIENT
Start: 2023-12-27 | End: 2024-11-24

## 2023-12-27 RX ORDER — MAGNESIUM SULFATE 500 MG/ML
2 VIAL (ML) INJECTION ONCE
Refills: 0 | Status: COMPLETED | OUTPATIENT
Start: 2023-12-27 | End: 2023-12-27

## 2023-12-27 RX ORDER — INSULIN LISPRO 100/ML
VIAL (ML) SUBCUTANEOUS EVERY 6 HOURS
Refills: 0 | Status: DISCONTINUED | OUTPATIENT
Start: 2023-12-27 | End: 2023-12-29

## 2023-12-27 RX ORDER — GLUCAGON INJECTION, SOLUTION 0.5 MG/.1ML
1 INJECTION, SOLUTION SUBCUTANEOUS ONCE
Refills: 0 | Status: DISCONTINUED | OUTPATIENT
Start: 2023-12-27 | End: 2023-12-29

## 2023-12-27 RX ORDER — LIDOCAINE HCL 20 MG/ML
5 VIAL (ML) INJECTION ONCE
Refills: 0 | Status: COMPLETED | OUTPATIENT
Start: 2023-12-27 | End: 2023-12-27

## 2023-12-27 RX ADMIN — SODIUM CHLORIDE 125 MILLILITER(S): 9 INJECTION, SOLUTION INTRAVENOUS at 03:44

## 2023-12-27 RX ADMIN — Medication 5 MILLIGRAM(S): at 18:48

## 2023-12-27 RX ADMIN — Medication 400 MILLIGRAM(S): at 00:15

## 2023-12-27 RX ADMIN — HEPARIN SODIUM 5000 UNIT(S): 5000 INJECTION INTRAVENOUS; SUBCUTANEOUS at 07:34

## 2023-12-27 RX ADMIN — Medication 400 MILLIGRAM(S): at 07:45

## 2023-12-27 RX ADMIN — Medication 5 MILLIGRAM(S): at 07:34

## 2023-12-27 RX ADMIN — SODIUM CHLORIDE 125 MILLILITER(S): 9 INJECTION, SOLUTION INTRAVENOUS at 18:48

## 2023-12-27 RX ADMIN — SODIUM CHLORIDE 1000 MILLILITER(S): 9 INJECTION, SOLUTION INTRAVENOUS at 14:35

## 2023-12-27 RX ADMIN — ONDANSETRON 4 MILLIGRAM(S): 8 TABLET, FILM COATED ORAL at 00:14

## 2023-12-27 RX ADMIN — SODIUM CHLORIDE 1000 MILLILITER(S): 9 INJECTION, SOLUTION INTRAVENOUS at 00:14

## 2023-12-27 RX ADMIN — SODIUM CHLORIDE 125 MILLILITER(S): 9 INJECTION, SOLUTION INTRAVENOUS at 16:45

## 2023-12-27 RX ADMIN — Medication 5 MILLIGRAM(S): at 12:53

## 2023-12-27 RX ADMIN — Medication 100 MILLIEQUIVALENT(S): at 21:49

## 2023-12-27 RX ADMIN — Medication 2: at 12:52

## 2023-12-27 RX ADMIN — HEPARIN SODIUM 5000 UNIT(S): 5000 INJECTION INTRAVENOUS; SUBCUTANEOUS at 21:49

## 2023-12-27 RX ADMIN — SODIUM CHLORIDE 125 MILLILITER(S): 9 INJECTION, SOLUTION INTRAVENOUS at 03:30

## 2023-12-27 RX ADMIN — Medication 2: at 08:44

## 2023-12-27 RX ADMIN — Medication 100 MILLIEQUIVALENT(S): at 23:57

## 2023-12-27 RX ADMIN — SODIUM CHLORIDE 1000 MILLILITER(S): 9 INJECTION, SOLUTION INTRAVENOUS at 07:45

## 2023-12-27 RX ADMIN — ONDANSETRON 4 MILLIGRAM(S): 8 TABLET, FILM COATED ORAL at 11:28

## 2023-12-27 RX ADMIN — Medication 2: at 18:08

## 2023-12-27 RX ADMIN — SODIUM CHLORIDE 125 MILLILITER(S): 9 INJECTION, SOLUTION INTRAVENOUS at 07:35

## 2023-12-27 RX ADMIN — Medication 5 MILLILITER(S): at 01:24

## 2023-12-27 RX ADMIN — SODIUM CHLORIDE 1000 MILLILITER(S): 9 INJECTION, SOLUTION INTRAVENOUS at 06:08

## 2023-12-27 RX ADMIN — SODIUM CHLORIDE 1000 MILLILITER(S): 9 INJECTION, SOLUTION INTRAVENOUS at 01:22

## 2023-12-27 RX ADMIN — HEPARIN SODIUM 5000 UNIT(S): 5000 INJECTION INTRAVENOUS; SUBCUTANEOUS at 14:35

## 2023-12-27 RX ADMIN — Medication 5 MILLIGRAM(S): at 23:56

## 2023-12-27 RX ADMIN — PANTOPRAZOLE SODIUM 40 MILLIGRAM(S): 20 TABLET, DELAYED RELEASE ORAL at 09:29

## 2023-12-27 RX ADMIN — Medication 25 GRAM(S): at 06:08

## 2023-12-27 NOTE — ED PROCEDURE NOTE - PROCEDURE SUPERVISED BY
76-year-old male presenting with sharp stabbing chest pain that began suddenly around 6:40 PM.  He was at work and had about 10 to go back out of lunch to the work floor. Works at [employer] here locally. No history of heart problems or lung problems, takes medication for anxiety and depression. Had surgery couple months ago to remove screws from his ankle. No hypoxia or shortness of breath, no respiratory distress, began suddenly its been persistent, sharp stabbing, mild to moderate severity, about 1 hour duration, associated with his anxiety. History reviewed. No pertinent family history. Past Surgical History:   Procedure Laterality Date    ANKLE SURGERY Left     APPENDECTOMY         Review of Systems   Constitutional: Negative for appetite change and fever. Respiratory: Negative for chest tightness and shortness of breath. Cardiovascular: Positive for chest pain. Negative for palpitations. Gastrointestinal: Negative for abdominal pain. Psychiatric/Behavioral: The patient is nervous/anxious. All other systems reviewed and are negative. Physical Exam  Constitutional:       General: He is not in acute distress. Appearance: He is well-developed. HENT:      Head: Normocephalic and atraumatic. Eyes:      Pupils: Pupils are equal, round, and reactive to light. Neck:      Musculoskeletal: Normal range of motion and neck supple. Thyroid: No thyromegaly. Cardiovascular:      Rate and Rhythm: Normal rate and regular rhythm. Pulmonary:      Effort: Pulmonary effort is normal. No respiratory distress. Breath sounds: Normal breath sounds. No wheezing. Abdominal:      General: There is no distension. Palpations: Abdomen is soft. There is no mass. Tenderness: There is no abdominal tenderness. There is no guarding or rebound. Musculoskeletal: Normal range of motion. General: No tenderness. Skin:     General: Skin is warm and dry.       Findings: No erythema. Neurological:      Mental Status: He is alert and oriented to person, place, and time. Cranial Nerves: No cranial nerve deficit. Psychiatric:         Mood and Affect: Mood is anxious. Procedures     [facility]                   --------------------------------------------- PAST HISTORY ---------------------------------------------  Past Medical History:  has no past medical history on file. Past Surgical History:  has a past surgical history that includes Ankle surgery (Left) and Appendectomy. Social History:  reports that he quit smoking about 23 months ago. His smoking use included e-cigarettes. He has never used smokeless tobacco. He reports current alcohol use. He reports that he does not use drugs. Family History: family history is not on file. The patients home medications have been reviewed. Allergies: Patient has no known allergies.     -------------------------------------------------- RESULTS -------------------------------------------------  Labs:  Results for orders placed or performed during the hospital encounter of 01/07/21   CBC auto differential   Result Value Ref Range    WBC 7.1 4.5 - 11.5 E9/L    RBC 4.37 3.80 - 5.80 E12/L    Hemoglobin 13.8 12.5 - 16.5 g/dL    Hematocrit 40.6 37.0 - 54.0 %    MCV 92.9 80.0 - 99.9 fL    MCH 31.6 26.0 - 35.0 pg    MCHC 34.0 32.0 - 34.5 %    RDW 11.8 11.5 - 15.0 fL    Platelets 553 754 - 144 E9/L    MPV 10.0 7.0 - 12.0 fL    Neutrophils % 63.5 43.0 - 80.0 %    Immature Granulocytes % 0.4 0.0 - 5.0 %    Lymphocytes % 24.7 20.0 - 42.0 %    Monocytes % 10.0 2.0 - 12.0 %    Eosinophils % 0.8 0.0 - 6.0 %    Basophils % 0.6 0.0 - 2.0 %    Neutrophils Absolute 4.49 1.80 - 7.30 E9/L    Immature Granulocytes # 0.03 E9/L    Lymphocytes Absolute 1.75 1.50 - 4.00 E9/L    Monocytes Absolute 0.71 0.10 - 0.95 E9/L    Eosinophils Absolute 0.06 0.05 - 0.50 E9/L    Basophils Absolute 0.04 0.00 - 0.20 P7/B   Basic metabolic panel   Result Value Ref Range    Sodium 143 132 - 146 mmol/L    Potassium 2.8 (L) 3.5 - 5.0 mmol/L    Chloride 112 (H) 98 - 107 mmol/L    CO2 21 (L) 22 - 29 mmol/L    Anion Gap 10 7 - 16 mmol/L    Glucose 97 74 - 99 mg/dL    BUN 14 6 - 20 mg/dL    CREATININE 0.7 0.7 - 1.2 mg/dL    GFR Non-African American >60 >=60 mL/min/1.73    GFR African American >60     Calcium 7.8 (L) 8.6 - 10.2 mg/dL   Troponin   Result Value Ref Range    Troponin <0.01 0.00 - 0.03 ng/mL   D-Dimer, Quantitative   Result Value Ref Range    D-Dimer, Quant <200 ng/mL DDU   Magnesium   Result Value Ref Range    Magnesium 1.6 1.6 - 2.6 mg/dL   EKG 12 Lead   Result Value Ref Range    Ventricular Rate 84 BPM    Atrial Rate 84 BPM    P-R Interval 206 ms    QRS Duration 94 ms    Q-T Interval 358 ms    QTc Calculation (Bazett) 423 ms    P Axis -4 degrees    R Axis 71 degrees    T Axis 49 degrees       Radiology:  No orders to display       ------------------------- NURSING NOTES AND VITALS REVIEWED ---------------------------  Date / Time Roomed:  1/7/2021  7:30 PM  ED Bed Assignment:  11/11    The nursing notes within the ED encounter and vital signs as below have been reviewed. /74   Pulse 83   Temp 97.4 °F (36.3 °C)   Resp 22   Ht 6' 2\" (1.88 m)   Wt 190 lb (86.2 kg)   SpO2 99%   BMI 24.39 kg/m²   Oxygen Saturation Interpretation: Normal      ------------------------------------------ PROGRESS NOTES ------------------------------------------  I have spoken with the patient and discussed todays results, in addition to providing specific details for the plan of care and counseling regarding the diagnosis and prognosis. Their questions are answered at this time and they are agreeable with the plan. I discussed at length with them reasons for immediate return here for re evaluation. They will followup with primary care by calling their office tomorrow.       --------------------------------- ADDITIONAL PROVIDER NOTES ---------------------------------  At this time the patient is without objective evidence of an acute process requiring hospitalization or inpatient management. They have remained hemodynamically stable throughout their entire ED visit and are stable for discharge with outpatient follow-up. The plan has been discussed in detail and they are aware of the specific conditions for emergent return, as well as the importance of follow-up. Discharge Medication List as of 1/7/2021  9:28 PM          Diagnosis:  1. Hypokalemia    2. Pleurodynia    3. Anxiety state        Disposition:  Patient's disposition: Discharge to home  Patient's condition is stable.          Gin Cochran,   01/07/21 [id number]

## 2023-12-27 NOTE — CHART NOTE - NSCHARTNOTEFT_GEN_A_CORE
Came to pt's bedside in the ED to evaluate. Pt resting in bed comfortably in no acute distress. Pt reports resolution of pain since the NGT was placed, just some residual abdominal soreness. NGT outputed  1.5 L + an additional +800cc of orange liquid content. Pt is receiving IVF and is urinating. Pt is -/- for gas and bowel function. Denies N/V.     On exam, pt's abdomen is moderately distended, and mildly tender to deep palpation (improved from initial presentation), without rebound. NGT is in place to wall suction and appears to be functioning appropriately. Urinal present at the patients bedside with clear yellow urine output.     Plan to monitor vitals,  f/u labs, trend lactate, f/u NGT output, F/u urine output, continue NPO/NGT, IVF@125, serial abdominal exams, exam before pain meds.     A team surgery   [id number] Came to pt's bedside in the ED to evaluate. Pt resting in bed comfortably in no acute distress. Pt reports resolution of pain since the NGT was placed, just some residual abdominal soreness. NGT outputed  1.5 L + an additional +800cc of orange liquid content. Pt is receiving IVF and is urinating. Pt is -/- for gas and bowel function. Denies N/V.     On exam, pt's abdomen is moderately distended, and mildly tender to deep palpation (improved from initial presentation), without rebound. NGT is in place to wall suction and appears to be functioning appropriately. Urinal present at the patients bedside with clear yellow urine output.     Plan to monitor vitals,  f/u labs, trend lactate, f/u NGT output, F/u urine output, continue NPO/NGT, IVF@125, serial abdominal exams, exam before pain meds.     A team surgery   [id number] Came to pt's bedside in the ED to evaluate. Pt resting in bed comfortably in no acute distress. Pt reports resolution of pain since the NGT was placed, just some residual abdominal soreness. NGT outputed  1.5 L + an additional +800cc of orange liquid content. Pt is receiving IVF and is urinating. Pt is -/- for gas and bowel function. Denies N/V.     On exam, pt's abdomen is moderately distended, and mildly tender to deep palpation (improved from initial presentation), without rebound. NGT is in place to wall suction and appears to be functioning appropriately. Urinal present at the patients bedside with clear yellow urine output.     Plan to monitor vitals,  f/u labs, trend lactate, f/u XR for NGT placement, monitor NGT output, F/u urine output, continue NPO/NGT, IVF@125, serial abdominal exams, exam before pain meds.     A team surgery   [id number] Came to pt's bedside in the ED to evaluate. Pt resting in bed comfortably in no acute distress. Pt reports resolution of pain since the NGT was placed, just some residual abdominal soreness. NGT outputed  1.5 L + an additional +800cc of orange liquid content. Pt is receiving IVF and is urinating. Pt is -/- for gas and bowel function. Denies N/V.     On exam, pt's abdomen is moderately distended, and mildly tender to deep palpation (improved from initial presentation), without rebound. NGT is in place to wall suction and appears to be functioning appropriately. Urinal present at the patients bedside with clear yellow urine output.     Plan to monitor vitals,  f/u labs, trend lactate, f/u XR for NGT placement, monitor NGT output, F/u urine output, continue NPO/NGT, IVF@125, serial abdominal exams, exam before pain meds.     A team surgery   [id number]

## 2023-12-27 NOTE — H&P ADULT - HISTORY OF PRESENT ILLNESS
Sathya Em is a 48 y.o. man with history of HTN, HLD, DM, Buchanan and reversal (2009) c/b multiple SBOs s/p multiple ex lap + LINDA (2014, 2016) and ventral hernia repair (2018) who presented to the ED on 12/27 for 1 day of nausea, vomiting, and right-sided abdominal pain. The patient states that his current symptoms are consistent with previous episodes of SBO.

## 2023-12-27 NOTE — ED ADULT NURSE REASSESSMENT NOTE - NS ED NURSE REASSESS COMMENT FT1
Pt awake and alert, A&OX4, resp even and unlabored. pt Ng tube in placed tolerating well. pt denies any N/V at this time. Denies CP, SOB, HA, dizziness, palpitations, blurry vision. Resting comfortably.

## 2023-12-27 NOTE — ED PROCEDURE NOTE - CPROC ED TIME OUT STATEMENT1
“Patient's name, , procedure and correct site were confirmed during the [place] Timeout.” “Patient's name, , procedure and correct site were confirmed during the [place] Timeout.”

## 2023-12-27 NOTE — H&P ADULT - ASSESSMENT
Sathya Em is a 48 y.o. man with history of HTN, HLD, DM, Buchanan and reversal (2009) c/b multiple SBOs s/p multiple ex lap + LINDA (2014, 2016) and ventral hernia repair (2018) who presented to the ED on 12/27 for 1 day of nausea, vomiting, and right-sided abdominal pain. CT demonstrating early vs evolving SBO with transition in the right hemiabdomen.     PLAN:  - NGT  - IV fluid  - Trend lactate  - Serial abdominal exams Sathya Em is a 48 y.o. man with history of HTN, HLD, DM, Buchanan and reversal (2009) c/b multiple SBOs s/p multiple ex lap + LINDA (2014, 2016) and ventral hernia repair (2018) who presented to the ED on 12/27 for 1 day of nausea, vomiting, and right-sided abdominal pain. CT demonstrating early vs evolving SBO with transition in the right hemiabdomen.     PLAN:  - NGT  - IV fluid  - Trend lactate  - Serial abdominal exams    Surgery Team A  [id number]   Sathya Em is a 48 y.o. man with history of HTN, HLD, DM, Buchanan and reversal (2009) c/b multiple SBOs s/p multiple ex lap + LINDA (2014, 2016) and ventral hernia repair (2018) who presented to the ED on 12/27 for 1 day of nausea, vomiting, and right-sided abdominal pain. CT demonstrating early vs evolving SBO with transition in the right hemiabdomen.     PLAN:  - NGT  - IV fluid  - Trend lactate  - Serial abdominal exams    Surgery Team A  [id number]

## 2023-12-27 NOTE — H&P ADULT - ATTENDING COMMENTS
Multiple admissions for sbo. Again with high grade sbo and dehydration.. For admission , Ng decompression , Hydration  serial exams. DH

## 2023-12-27 NOTE — ED ADULT NURSE REASSESSMENT NOTE - NS ED NURSE REASSESS COMMENT FT1
Pt awake and alert, A&OX4, resp even and unlabored. pt NG tube in place. connected to intermittent suction. pt tolerating well. 1800ml of drainage in total.  Denies CP, SOB, HA, dizziness, palpitations, blurry vision. Resting comfortably.

## 2023-12-27 NOTE — PATIENT PROFILE ADULT - FALL HARM RISK - HARM RISK INTERVENTIONS
Communicate Risk of Fall with Harm to all staff/Reinforce activity limits and safety measures with patient and family/Tailored Fall Risk Interventions/Visual Cue: Yellow wristband and red socks/Bed in lowest position, wheels locked, appropriate side rails in place/Call bell, personal items and telephone in reach/Instruct patient to call for assistance before getting out of bed or chair/Non-slip footwear when patient is out of bed/[place] to call system/Physically safe environment - no spills, clutter or unnecessary equipment/Purposeful Proactive Rounding/Room/bathroom lighting operational, light cord in reach Communicate Risk of Fall with Harm to all staff/Reinforce activity limits and safety measures with patient and family/Tailored Fall Risk Interventions/Visual Cue: Yellow wristband and red socks/Bed in lowest position, wheels locked, appropriate side rails in place/Call bell, personal items and telephone in reach/Instruct patient to call for assistance before getting out of bed or chair/Non-slip footwear when patient is out of bed/[place] to call system/Physically safe environment - no spills, clutter or unnecessary equipment/Purposeful Proactive Rounding/Room/bathroom lighting operational, light cord in reach

## 2023-12-27 NOTE — H&P ADULT - PATIENT'S SEXUAL ORIENTATION
Anesthesia ROS/Med Hx        Neuro/Psych Review:    Pt. positive for psychiatric history (ADHD)  Overall Review of Systems Comments:  Hearing loss  Arnold-chiari malformation, type I        Anesthesia Plan     ASA Status: 2  Anesthesia Type: General  Induction: Intravenous  Reviewed: Nursing Notes, Medications, Consultations, Problem List, Pre-Induction Reassessment, NPO Status, Past Med History, Allergies and Patient Summary  The proposed anesthetic plan, including its risks and benefits, have been discussed with the Patient - along with the risks and benefits of alternatives.  Questions were encouraged and answered and the patient and/or representative agrees to proceed.  Blood Products: Not Anticipated      Physical Exam  Mallampati: II  TM Distance: >3 FB  Neck ROM: Full  cardiovascular exam normal  pulmonary exam normal  abdominal exam normal      Legend: C=Chipped  M=Missing  L=Loosedental exam normal                  
Heterosexual

## 2023-12-27 NOTE — H&P ADULT - NSHPPHYSICALEXAM_GEN_ALL_CORE
Constitutional: AOx3, NAD  Respiratory: nonlabored on room air  Cardiovascular: normotensive, tachycardic   Gastrointestinal: soft, moderately distended, mild right abdominal tenderness without rebound or guarding  Extremities: warm  Neurological: motor and sensation intact and symmetric  Skin: no rash   Musculoskeletal: no deformity   Psychiatric: normal affect

## 2023-12-28 DIAGNOSIS — I10 ESSENTIAL (PRIMARY) HYPERTENSION: ICD-10-CM

## 2023-12-28 DIAGNOSIS — E78.5 HYPERLIPIDEMIA, UNSPECIFIED: ICD-10-CM

## 2023-12-28 DIAGNOSIS — E11.65 TYPE 2 DIABETES MELLITUS WITH HYPERGLYCEMIA: ICD-10-CM

## 2023-12-28 LAB
A1C WITH ESTIMATED AVERAGE GLUCOSE RESULT: 8.2 % — HIGH (ref 4–5.6)
A1C WITH ESTIMATED AVERAGE GLUCOSE RESULT: 8.2 % — HIGH (ref 4–5.6)
ANION GAP SERPL CALC-SCNC: 13 MMOL/L — SIGNIFICANT CHANGE UP (ref 7–14)
ANION GAP SERPL CALC-SCNC: 13 MMOL/L — SIGNIFICANT CHANGE UP (ref 7–14)
BUN SERPL-MCNC: 28 MG/DL — HIGH (ref 7–23)
BUN SERPL-MCNC: 28 MG/DL — HIGH (ref 7–23)
CALCIUM SERPL-MCNC: 9 MG/DL — SIGNIFICANT CHANGE UP (ref 8.4–10.5)
CALCIUM SERPL-MCNC: 9 MG/DL — SIGNIFICANT CHANGE UP (ref 8.4–10.5)
CHLORIDE SERPL-SCNC: 105 MMOL/L — SIGNIFICANT CHANGE UP (ref 98–107)
CHLORIDE SERPL-SCNC: 105 MMOL/L — SIGNIFICANT CHANGE UP (ref 98–107)
CO2 SERPL-SCNC: 25 MMOL/L — SIGNIFICANT CHANGE UP (ref 22–31)
CO2 SERPL-SCNC: 25 MMOL/L — SIGNIFICANT CHANGE UP (ref 22–31)
CREAT SERPL-MCNC: 0.9 MG/DL — SIGNIFICANT CHANGE UP (ref 0.5–1.3)
CREAT SERPL-MCNC: 0.9 MG/DL — SIGNIFICANT CHANGE UP (ref 0.5–1.3)
EGFR: 104 ML/MIN/1.73M2 — SIGNIFICANT CHANGE UP
EGFR: 104 ML/MIN/1.73M2 — SIGNIFICANT CHANGE UP
ESTIMATED AVERAGE GLUCOSE: 189 — SIGNIFICANT CHANGE UP
ESTIMATED AVERAGE GLUCOSE: 189 — SIGNIFICANT CHANGE UP
GLUCOSE BLDC GLUCOMTR-MCNC: 122 MG/DL — HIGH (ref 70–99)
GLUCOSE BLDC GLUCOMTR-MCNC: 122 MG/DL — HIGH (ref 70–99)
GLUCOSE BLDC GLUCOMTR-MCNC: 135 MG/DL — HIGH (ref 70–99)
GLUCOSE BLDC GLUCOMTR-MCNC: 135 MG/DL — HIGH (ref 70–99)
GLUCOSE BLDC GLUCOMTR-MCNC: 142 MG/DL — HIGH (ref 70–99)
GLUCOSE BLDC GLUCOMTR-MCNC: 142 MG/DL — HIGH (ref 70–99)
GLUCOSE BLDC GLUCOMTR-MCNC: 156 MG/DL — HIGH (ref 70–99)
GLUCOSE BLDC GLUCOMTR-MCNC: 156 MG/DL — HIGH (ref 70–99)
GLUCOSE SERPL-MCNC: 123 MG/DL — HIGH (ref 70–99)
GLUCOSE SERPL-MCNC: 123 MG/DL — HIGH (ref 70–99)
HCT VFR BLD CALC: 39.7 % — SIGNIFICANT CHANGE UP (ref 39–50)
HCT VFR BLD CALC: 39.7 % — SIGNIFICANT CHANGE UP (ref 39–50)
HGB BLD-MCNC: 12.2 G/DL — LOW (ref 13–17)
HGB BLD-MCNC: 12.2 G/DL — LOW (ref 13–17)
LACTATE BLDV-MCNC: 1.6 MMOL/L — SIGNIFICANT CHANGE UP (ref 0.5–2)
LACTATE BLDV-MCNC: 1.6 MMOL/L — SIGNIFICANT CHANGE UP (ref 0.5–2)
LACTATE SERPL-SCNC: 1.1 MMOL/L — SIGNIFICANT CHANGE UP (ref 0.5–2)
LACTATE SERPL-SCNC: 1.1 MMOL/L — SIGNIFICANT CHANGE UP (ref 0.5–2)
MAGNESIUM SERPL-MCNC: 2.1 MG/DL — SIGNIFICANT CHANGE UP (ref 1.6–2.6)
MAGNESIUM SERPL-MCNC: 2.1 MG/DL — SIGNIFICANT CHANGE UP (ref 1.6–2.6)
MCHC RBC-ENTMCNC: 27.2 PG — SIGNIFICANT CHANGE UP (ref 27–34)
MCHC RBC-ENTMCNC: 27.2 PG — SIGNIFICANT CHANGE UP (ref 27–34)
MCHC RBC-ENTMCNC: 30.7 GM/DL — LOW (ref 32–36)
MCHC RBC-ENTMCNC: 30.7 GM/DL — LOW (ref 32–36)
MCV RBC AUTO: 88.6 FL — SIGNIFICANT CHANGE UP (ref 80–100)
MCV RBC AUTO: 88.6 FL — SIGNIFICANT CHANGE UP (ref 80–100)
NRBC # BLD: 0 /100 WBCS — SIGNIFICANT CHANGE UP (ref 0–0)
NRBC # BLD: 0 /100 WBCS — SIGNIFICANT CHANGE UP (ref 0–0)
NRBC # FLD: 0 K/UL — SIGNIFICANT CHANGE UP (ref 0–0)
NRBC # FLD: 0 K/UL — SIGNIFICANT CHANGE UP (ref 0–0)
PHOSPHATE SERPL-MCNC: 2.6 MG/DL — SIGNIFICANT CHANGE UP (ref 2.5–4.5)
PHOSPHATE SERPL-MCNC: 2.6 MG/DL — SIGNIFICANT CHANGE UP (ref 2.5–4.5)
PLATELET # BLD AUTO: 211 K/UL — SIGNIFICANT CHANGE UP (ref 150–400)
PLATELET # BLD AUTO: 211 K/UL — SIGNIFICANT CHANGE UP (ref 150–400)
POTASSIUM SERPL-MCNC: 3.6 MMOL/L — SIGNIFICANT CHANGE UP (ref 3.5–5.3)
POTASSIUM SERPL-MCNC: 3.6 MMOL/L — SIGNIFICANT CHANGE UP (ref 3.5–5.3)
POTASSIUM SERPL-SCNC: 3.6 MMOL/L — SIGNIFICANT CHANGE UP (ref 3.5–5.3)
POTASSIUM SERPL-SCNC: 3.6 MMOL/L — SIGNIFICANT CHANGE UP (ref 3.5–5.3)
RBC # BLD: 4.48 M/UL — SIGNIFICANT CHANGE UP (ref 4.2–5.8)
RBC # BLD: 4.48 M/UL — SIGNIFICANT CHANGE UP (ref 4.2–5.8)
RBC # FLD: 16 % — HIGH (ref 10.3–14.5)
RBC # FLD: 16 % — HIGH (ref 10.3–14.5)
SODIUM SERPL-SCNC: 143 MMOL/L — SIGNIFICANT CHANGE UP (ref 135–145)
SODIUM SERPL-SCNC: 143 MMOL/L — SIGNIFICANT CHANGE UP (ref 135–145)
WBC # BLD: 7.65 K/UL — SIGNIFICANT CHANGE UP (ref 3.8–10.5)
WBC # BLD: 7.65 K/UL — SIGNIFICANT CHANGE UP (ref 3.8–10.5)
WBC # FLD AUTO: 7.65 K/UL — SIGNIFICANT CHANGE UP (ref 3.8–10.5)
WBC # FLD AUTO: 7.65 K/UL — SIGNIFICANT CHANGE UP (ref 3.8–10.5)

## 2023-12-28 PROCEDURE — 99231 SBSQ HOSP IP/OBS SF/LOW 25: CPT

## 2023-12-28 PROCEDURE — 99222 1ST HOSP IP/OBS MODERATE 55: CPT

## 2023-12-28 RX ORDER — DEXTROSE MONOHYDRATE, SODIUM CHLORIDE, AND POTASSIUM CHLORIDE 50; .745; 4.5 G/1000ML; G/1000ML; G/1000ML
1000 INJECTION, SOLUTION INTRAVENOUS
Refills: 0 | Status: DISCONTINUED | OUTPATIENT
Start: 2023-12-28 | End: 2023-12-30

## 2023-12-28 RX ORDER — SODIUM CHLORIDE 9 MG/ML
1000 INJECTION, SOLUTION INTRAVENOUS ONCE
Refills: 0 | Status: COMPLETED | OUTPATIENT
Start: 2023-12-28 | End: 2023-12-28

## 2023-12-28 RX ORDER — BENZOCAINE AND MENTHOL 5; 1 G/100ML; G/100ML
1 LIQUID ORAL
Refills: 0 | Status: DISCONTINUED | OUTPATIENT
Start: 2023-12-28 | End: 2023-12-31

## 2023-12-28 RX ORDER — ACETAMINOPHEN 500 MG
1000 TABLET ORAL ONCE
Refills: 0 | Status: COMPLETED | OUTPATIENT
Start: 2023-12-28 | End: 2023-12-28

## 2023-12-28 RX ORDER — POTASSIUM CHLORIDE 20 MEQ
10 PACKET (EA) ORAL
Refills: 0 | Status: COMPLETED | OUTPATIENT
Start: 2023-12-28 | End: 2023-12-28

## 2023-12-28 RX ADMIN — BENZOCAINE AND MENTHOL 1 LOZENGE: 5; 1 LIQUID ORAL at 21:01

## 2023-12-28 RX ADMIN — Medication 100 MILLIEQUIVALENT(S): at 10:26

## 2023-12-28 RX ADMIN — BENZOCAINE AND MENTHOL 1 LOZENGE: 5; 1 LIQUID ORAL at 01:32

## 2023-12-28 RX ADMIN — Medication 1000 MILLIGRAM(S): at 00:55

## 2023-12-28 RX ADMIN — HEPARIN SODIUM 5000 UNIT(S): 5000 INJECTION INTRAVENOUS; SUBCUTANEOUS at 23:00

## 2023-12-28 RX ADMIN — Medication 100 MILLIEQUIVALENT(S): at 12:53

## 2023-12-28 RX ADMIN — Medication 400 MILLIGRAM(S): at 00:23

## 2023-12-28 RX ADMIN — PANTOPRAZOLE SODIUM 40 MILLIGRAM(S): 20 TABLET, DELAYED RELEASE ORAL at 10:26

## 2023-12-28 RX ADMIN — BENZOCAINE AND MENTHOL 1 LOZENGE: 5; 1 LIQUID ORAL at 17:19

## 2023-12-28 RX ADMIN — Medication 2: at 23:33

## 2023-12-28 RX ADMIN — Medication 5 MILLIGRAM(S): at 22:59

## 2023-12-28 RX ADMIN — HEPARIN SODIUM 5000 UNIT(S): 5000 INJECTION INTRAVENOUS; SUBCUTANEOUS at 06:02

## 2023-12-28 RX ADMIN — SODIUM CHLORIDE 1000 MILLILITER(S): 9 INJECTION, SOLUTION INTRAVENOUS at 13:59

## 2023-12-28 RX ADMIN — Medication 100 MILLIEQUIVALENT(S): at 13:50

## 2023-12-28 RX ADMIN — Medication 5 MILLIGRAM(S): at 06:00

## 2023-12-28 NOTE — CONSULT NOTE ADULT - SUBJECTIVE AND OBJECTIVE BOX
HPI:  Sathya Em is a 48 y.o. man with history of HTN, HLD, DM, Buchanan and reversal (2009) c/b multiple SBOs s/p multiple ex lap + LINDA (2014, 2016) and ventral hernia repair (2018) who presented to the ED on 12/27 for 1 day of nausea, vomiting, and right-sided abdominal pain. The patient states that his current symptoms are consistent with previous episodes of SBO.  (27 Dec 2023 02:19)      Endocrinology HPI    Diabetes Mellitus Type 2  Diagnosis: 10 years ago   Symptoms: Denies any polyuria, polydipsia or blurry vision   Outpatient endocrinologist: follows with PCP   Last HgbA1c: 8.2% on admission   Outpatient regimen: farxiga 10mg daily + Glimepiride 4mg daily + Rybelsus 14mg daily   Compliance: Good  Inpatient regimen: ISS   FH: mother and grandparents with DM2  Tobacco, etoh, drug use: Denies  Diet: Doesn't follow a diabetic diet  Exercise: minimal  History of CAD/MI/Stroke: Denies      Review of Systems:  Constitutional: No fever, good appetite/po intake  Eyes: No blurry vision, diplopia  Neuro: No tremors  HEENT: No pain  Cardiovascular: No chest pain, palpitations  Respiratory: No SOB, no cough  GI: No nausea, vomiting,   : No dysuria, hematuria  Skin: no rash  Psych: no depression  Endocrine: no polyuria, polydipsia  Hem/lymph: no swelling  Osteoporosis: no fractures    ALL OTHER SYSTEMS REVIEWED AND NEGATIVE    PHYSICAL EXAM:  VITALS: T(C): 37 (12-28-23 @ 14:00)  T(F): 98.6 (12-28-23 @ 14:00), Max: 99.8 (12-27-23 @ 21:37)  HR: 112 (12-28-23 @ 14:00) (104 - 117)  BP: 132/83 (12-28-23 @ 14:00) (119/70 - 147/78)  RR:  (16 - 20)  SpO2:  (95% - 97%)  Wt(kg): --  GENERAL: NAD, well-groomed, well-developed  EYES: No proptosis, extraocular movements intact,  no lid lag, anicteric  HEENT:  Atraumatic, Normocephalic, moist mucous membranes  THYROID: Normal size, no palpable nodules, no thyromegaly  RESPIRATORY: Clear to auscultation bilaterally; No rales, rhonchi, wheezing, or rubs  CARDIOVASCULAR: Regular rate and rhythm; No murmurs; no peripheral edema  GI: Soft, nontender, non distended, normal bowel sounds  SKIN: Dry, intact, No rashes or lesions  EXTREMITIES: No foot ulcers, distal pedal pulses intact bilaterally  NEURO: sensation intact, no tremors  PSYCH: reactive affect, euthymic mood  CUSHING'S SIGNS: no striae or visible bruising                              12.2   7.65  )-----------( 211      ( 28 Dec 2023 06:07 )             39.7       12-28    143  |  105  |  28<H>  ----------------------------<  123<H>  3.6   |  25  |  0.90    eGFR: 104    Ca    9.0      12-28  Mg     2.10     12-28  Phos  2.6     12-28    TPro  7.5  /  Alb  4.2  /  TBili  0.7  /  DBili  x   /  AST  22  /  ALT  31  /  AlkPhos  37<L>  12-27      Thyroid Function Tests:          Radiology:

## 2023-12-28 NOTE — CONSULT NOTE ADULT - ASSESSMENT
A/P: Sathya Em is a 48 y.o. man with history of HTN, HLD, DM, Buchanan and reversal (2009) c/b multiple SBOs s/p multiple ex lap + LINDA (2014, 2016) and ventral hernia repair (2018) who presented to the ED on 12/27 for 1 day of nausea, vomiting, and right-sided abdominal pain.  Endocrinology was consulted for management of diabetes mellitus.    #Type 2 Diabetes Mellitus  - HbA1c 8.2%   ; home regimen: farxiga 10mg daily + Glimepiride 4mg daily + Rybelsus 14mg daily   -egfr: 104  - BG current running stable as patient is NPO     Plan:   Blood sugars are current stable- recommend to keep patient on low dose ISS   If blood sugars are consistently >180, will consider starting patient on a low dose basal regimen   Hold standing insulin in the setting of patient being NPO- can consider starting standing meal time insulin once patient is eating   - Please check FSG before meals and QHS, or q6h while NPO  - Inpatient glucose goal 100-180   - Please keep patient on a diabetic, carb controlled diet     - Discharge planning: Patient's A1c is above goal. Patient prefers to not do injectable GLP-1 agonist. Consider increasing MFM to 1000mg BID at discharge, continue Farxiga 10mg daily, Rybelsus 14mg daily and Glimepiride 4mg daily. Final recs closer to discharge based on patient's in patient glucose trend.     #Hypertension  - BP goal <130/80  - Management as per primary team    #Hyperlipidemia  - check fasting lipid panel    Opal Smith,   Attending Physician   Department of Endocrinology, Diabetes and Metabolism     If before 9AM or after 5PM, or on weekends/holidays, please call the Endocrine answering service for assistance ([phone]).  For nonurgent matters, please email [email or web address] for assistance.     Please note that a different provider may be following this patient each day.          A/P: Sathya Em is a 48 y.o. man with history of HTN, HLD, DM, Buchanan and reversal (2009) c/b multiple SBOs s/p multiple ex lap + LINDA (2014, 2016) and ventral hernia repair (2018) who presented to the ED on 12/27 for 1 day of nausea, vomiting, and right-sided abdominal pain.  Endocrinology was consulted for management of diabetes mellitus.    #Type 2 Diabetes Mellitus  - HbA1c 8.2%   ; home regimen: farxiga 10mg daily + Glimepiride 4mg daily + Rybelsus 14mg daily   -egfr: 104  - BG current running stable as patient is NPO     Plan:   Blood sugars are current stable- recommend to keep patient on low dose ISS   If blood sugars are consistently >180, will consider starting patient on a low dose basal regimen   Hold standing insulin in the setting of patient being NPO- can consider starting standing meal time insulin once patient is eating   - Please check FSG before meals and QHS, or q6h while NPO  - Inpatient glucose goal 100-180   - Please keep patient on a diabetic, carb controlled diet     - Discharge planning: Patient's A1c is above goal. Patient prefers to not do injectable GLP-1 agonist. Consider increasing MFM to 1000mg BID at discharge, continue Farxiga 10mg daily, Rybelsus 14mg daily and Glimepiride 4mg daily. Final recs closer to discharge based on patient's in patient glucose trend.     #Hypertension  - BP goal <130/80  - Management as per primary team    #Hyperlipidemia  - check fasting lipid panel    Opal Smith,   Attending Physician   Department of Endocrinology, Diabetes and Metabolism     If before 9AM or after 5PM, or on weekends/holidays, please call the Endocrine answering service for assistance ([phone]).  For nonurgent matters, please email [email or web address] for assistance.     Please note that a different provider may be following this patient each day.

## 2023-12-28 NOTE — PROGRESS NOTE ADULT - ASSESSMENT
48 y.o. man with history of HTN, HLD, DM, Buchanan and reversal (2009) c/b multiple SBOs s/p multiple ex lap + LINDA (2014, 2016) and ventral hernia repair (2018) who presented to the ED on 12/27 for 1 day of nausea, vomiting, and right-sided abdominal pain. CT demonstrating early vs evolving SBO with transition in the right hemiabdomen.     PLAN:  - NPO  - NGT  - IV fluid  - Protonix daily  - Lopressor 5 mg q6  - Trend lactate, now downtrending 2.1  - Serial abdominal exams  - DVT prophylaxis    A team surgery [id number] 48 y.o. man with history of HTN, HLD, DM, Buchanan and reversal (2009) c/b multiple SBOs s/p multiple ex lap + LINDA (2014, 2016) and ventral hernia repair (2018) who presented to the ED on 12/27 for 1 day of nausea, vomiting, and right-sided abdominal pain. CT demonstrating early vs evolving SBO with transition in the right hemiabdomen.     PLAN:  - NPO  - NGT  - IV fluid  - Protonix daily  - Lopressor 5 mg q6  - Trend lactate, now downtrending 2.1  - Serial abdominal exams  - DVT prophylaxis    A team surgery [id number] 48 y.o. man with history of HTN, HLD, DM, Buchanan and reversal (2009) c/b multiple SBOs s/p multiple ex lap + LINDA (2014, 2016) and ventral hernia repair (2018) who presented to the ED on 12/27 for 1 day of nausea, vomiting, and right-sided abdominal pain. CT demonstrating early vs evolving SBO with transition in the right hemiabdomen.     PLAN:  - Endocrine consult for elevated A1C  - NPO  - NGT  - IV fluid  - Protonix daily  - Lopressor 5 mg q6  - Trend lactate, now downtrending 1.1  - Serial abdominal exams  - DVT prophylaxis    A team surgery [id number] 48 y.o. man with history of HTN, HLD, DM, Buchanan and reversal (2009) c/b multiple SBOs s/p multiple ex lap + LINDA (2014, 2016) and ventral hernia repair (2018) who presented to the ED on 12/27 for 1 day of nausea, vomiting, and right-sided abdominal pain. CT demonstrating early vs evolving SBO with transition in the right hemiabdomen.     PLAN:  - Endocrine consult for elevated A1C  - NPO  - NGT  - IV fluid  - Protonix daily  - Lopressor 5 mg q6  - Trend lactate, now downtrending 1.1  - Serial abdominal exams  - DVT prophylaxis    A team surgery [id number]

## 2023-12-29 LAB
ANION GAP SERPL CALC-SCNC: 10 MMOL/L — SIGNIFICANT CHANGE UP (ref 7–14)
ANION GAP SERPL CALC-SCNC: 10 MMOL/L — SIGNIFICANT CHANGE UP (ref 7–14)
BUN SERPL-MCNC: 17 MG/DL — SIGNIFICANT CHANGE UP (ref 7–23)
BUN SERPL-MCNC: 17 MG/DL — SIGNIFICANT CHANGE UP (ref 7–23)
CALCIUM SERPL-MCNC: 8.2 MG/DL — LOW (ref 8.4–10.5)
CALCIUM SERPL-MCNC: 8.2 MG/DL — LOW (ref 8.4–10.5)
CHLORIDE SERPL-SCNC: 106 MMOL/L — SIGNIFICANT CHANGE UP (ref 98–107)
CHLORIDE SERPL-SCNC: 106 MMOL/L — SIGNIFICANT CHANGE UP (ref 98–107)
CO2 SERPL-SCNC: 23 MMOL/L — SIGNIFICANT CHANGE UP (ref 22–31)
CO2 SERPL-SCNC: 23 MMOL/L — SIGNIFICANT CHANGE UP (ref 22–31)
CREAT SERPL-MCNC: 0.69 MG/DL — SIGNIFICANT CHANGE UP (ref 0.5–1.3)
CREAT SERPL-MCNC: 0.69 MG/DL — SIGNIFICANT CHANGE UP (ref 0.5–1.3)
EGFR: 113 ML/MIN/1.73M2 — SIGNIFICANT CHANGE UP
EGFR: 113 ML/MIN/1.73M2 — SIGNIFICANT CHANGE UP
GLUCOSE BLDC GLUCOMTR-MCNC: 160 MG/DL — HIGH (ref 70–99)
GLUCOSE BLDC GLUCOMTR-MCNC: 160 MG/DL — HIGH (ref 70–99)
GLUCOSE BLDC GLUCOMTR-MCNC: 167 MG/DL — HIGH (ref 70–99)
GLUCOSE BLDC GLUCOMTR-MCNC: 167 MG/DL — HIGH (ref 70–99)
GLUCOSE BLDC GLUCOMTR-MCNC: 171 MG/DL — HIGH (ref 70–99)
GLUCOSE BLDC GLUCOMTR-MCNC: 171 MG/DL — HIGH (ref 70–99)
GLUCOSE BLDC GLUCOMTR-MCNC: 176 MG/DL — HIGH (ref 70–99)
GLUCOSE BLDC GLUCOMTR-MCNC: 176 MG/DL — HIGH (ref 70–99)
GLUCOSE SERPL-MCNC: 176 MG/DL — HIGH (ref 70–99)
GLUCOSE SERPL-MCNC: 176 MG/DL — HIGH (ref 70–99)
HCT VFR BLD CALC: 36.6 % — LOW (ref 39–50)
HCT VFR BLD CALC: 36.6 % — LOW (ref 39–50)
HGB BLD-MCNC: 11.3 G/DL — LOW (ref 13–17)
HGB BLD-MCNC: 11.3 G/DL — LOW (ref 13–17)
LACTATE SERPL-SCNC: 0.8 MMOL/L — SIGNIFICANT CHANGE UP (ref 0.5–2)
LACTATE SERPL-SCNC: 0.8 MMOL/L — SIGNIFICANT CHANGE UP (ref 0.5–2)
MAGNESIUM SERPL-MCNC: 2 MG/DL — SIGNIFICANT CHANGE UP (ref 1.6–2.6)
MAGNESIUM SERPL-MCNC: 2 MG/DL — SIGNIFICANT CHANGE UP (ref 1.6–2.6)
MCHC RBC-ENTMCNC: 27.2 PG — SIGNIFICANT CHANGE UP (ref 27–34)
MCHC RBC-ENTMCNC: 27.2 PG — SIGNIFICANT CHANGE UP (ref 27–34)
MCHC RBC-ENTMCNC: 30.9 GM/DL — LOW (ref 32–36)
MCHC RBC-ENTMCNC: 30.9 GM/DL — LOW (ref 32–36)
MCV RBC AUTO: 88 FL — SIGNIFICANT CHANGE UP (ref 80–100)
MCV RBC AUTO: 88 FL — SIGNIFICANT CHANGE UP (ref 80–100)
NRBC # BLD: 0 /100 WBCS — SIGNIFICANT CHANGE UP (ref 0–0)
NRBC # BLD: 0 /100 WBCS — SIGNIFICANT CHANGE UP (ref 0–0)
NRBC # FLD: 0 K/UL — SIGNIFICANT CHANGE UP (ref 0–0)
NRBC # FLD: 0 K/UL — SIGNIFICANT CHANGE UP (ref 0–0)
PHOSPHATE SERPL-MCNC: 2.1 MG/DL — LOW (ref 2.5–4.5)
PHOSPHATE SERPL-MCNC: 2.1 MG/DL — LOW (ref 2.5–4.5)
PLATELET # BLD AUTO: 193 K/UL — SIGNIFICANT CHANGE UP (ref 150–400)
PLATELET # BLD AUTO: 193 K/UL — SIGNIFICANT CHANGE UP (ref 150–400)
POTASSIUM SERPL-MCNC: 3.7 MMOL/L — SIGNIFICANT CHANGE UP (ref 3.5–5.3)
POTASSIUM SERPL-MCNC: 3.7 MMOL/L — SIGNIFICANT CHANGE UP (ref 3.5–5.3)
POTASSIUM SERPL-SCNC: 3.7 MMOL/L — SIGNIFICANT CHANGE UP (ref 3.5–5.3)
POTASSIUM SERPL-SCNC: 3.7 MMOL/L — SIGNIFICANT CHANGE UP (ref 3.5–5.3)
RBC # BLD: 4.16 M/UL — LOW (ref 4.2–5.8)
RBC # BLD: 4.16 M/UL — LOW (ref 4.2–5.8)
RBC # FLD: 15.4 % — HIGH (ref 10.3–14.5)
RBC # FLD: 15.4 % — HIGH (ref 10.3–14.5)
SODIUM SERPL-SCNC: 139 MMOL/L — SIGNIFICANT CHANGE UP (ref 135–145)
SODIUM SERPL-SCNC: 139 MMOL/L — SIGNIFICANT CHANGE UP (ref 135–145)
WBC # BLD: 7.37 K/UL — SIGNIFICANT CHANGE UP (ref 3.8–10.5)
WBC # BLD: 7.37 K/UL — SIGNIFICANT CHANGE UP (ref 3.8–10.5)
WBC # FLD AUTO: 7.37 K/UL — SIGNIFICANT CHANGE UP (ref 3.8–10.5)
WBC # FLD AUTO: 7.37 K/UL — SIGNIFICANT CHANGE UP (ref 3.8–10.5)

## 2023-12-29 PROCEDURE — 99232 SBSQ HOSP IP/OBS MODERATE 35: CPT

## 2023-12-29 RX ORDER — GLUCAGON INJECTION, SOLUTION 0.5 MG/.1ML
1 INJECTION, SOLUTION SUBCUTANEOUS ONCE
Refills: 0 | Status: DISCONTINUED | OUTPATIENT
Start: 2023-12-29 | End: 2023-12-30

## 2023-12-29 RX ORDER — DEXTROSE 50 % IN WATER 50 %
12.5 SYRINGE (ML) INTRAVENOUS ONCE
Refills: 0 | Status: DISCONTINUED | OUTPATIENT
Start: 2023-12-29 | End: 2023-12-30

## 2023-12-29 RX ORDER — POTASSIUM CHLORIDE 20 MEQ
10 PACKET (EA) ORAL
Refills: 0 | Status: DISCONTINUED | OUTPATIENT
Start: 2023-12-29 | End: 2023-12-29

## 2023-12-29 RX ORDER — DEXTROSE 50 % IN WATER 50 %
25 SYRINGE (ML) INTRAVENOUS ONCE
Refills: 0 | Status: DISCONTINUED | OUTPATIENT
Start: 2023-12-29 | End: 2023-12-30

## 2023-12-29 RX ORDER — SODIUM CHLORIDE 9 MG/ML
1000 INJECTION, SOLUTION INTRAVENOUS
Refills: 0 | Status: DISCONTINUED | OUTPATIENT
Start: 2023-12-29 | End: 2023-12-30

## 2023-12-29 RX ORDER — ACETAMINOPHEN 500 MG
1000 TABLET ORAL ONCE
Refills: 0 | Status: COMPLETED | OUTPATIENT
Start: 2023-12-29 | End: 2023-12-29

## 2023-12-29 RX ORDER — INSULIN LISPRO 100/ML
VIAL (ML) SUBCUTANEOUS
Refills: 0 | Status: DISCONTINUED | OUTPATIENT
Start: 2023-12-29 | End: 2023-12-30

## 2023-12-29 RX ORDER — POTASSIUM PHOSPHATE, MONOBASIC POTASSIUM PHOSPHATE, DIBASIC 236; 224 MG/ML; MG/ML
30 INJECTION, SOLUTION INTRAVENOUS ONCE
Refills: 0 | Status: COMPLETED | OUTPATIENT
Start: 2023-12-29 | End: 2023-12-29

## 2023-12-29 RX ORDER — INSULIN LISPRO 100/ML
VIAL (ML) SUBCUTANEOUS AT BEDTIME
Refills: 0 | Status: DISCONTINUED | OUTPATIENT
Start: 2023-12-29 | End: 2023-12-30

## 2023-12-29 RX ORDER — DEXTROSE 50 % IN WATER 50 %
15 SYRINGE (ML) INTRAVENOUS ONCE
Refills: 0 | Status: DISCONTINUED | OUTPATIENT
Start: 2023-12-29 | End: 2023-12-30

## 2023-12-29 RX ADMIN — Medication 5 MILLIGRAM(S): at 11:14

## 2023-12-29 RX ADMIN — Medication 1000 MILLIGRAM(S): at 04:50

## 2023-12-29 RX ADMIN — PANTOPRAZOLE SODIUM 40 MILLIGRAM(S): 20 TABLET, DELAYED RELEASE ORAL at 07:29

## 2023-12-29 RX ADMIN — HEPARIN SODIUM 5000 UNIT(S): 5000 INJECTION INTRAVENOUS; SUBCUTANEOUS at 21:53

## 2023-12-29 RX ADMIN — Medication 5 MILLIGRAM(S): at 17:54

## 2023-12-29 RX ADMIN — BENZOCAINE AND MENTHOL 1 LOZENGE: 5; 1 LIQUID ORAL at 02:49

## 2023-12-29 RX ADMIN — Medication 2: at 12:16

## 2023-12-29 RX ADMIN — Medication 400 MILLIGRAM(S): at 04:17

## 2023-12-29 RX ADMIN — HEPARIN SODIUM 5000 UNIT(S): 5000 INJECTION INTRAVENOUS; SUBCUTANEOUS at 13:05

## 2023-12-29 RX ADMIN — HEPARIN SODIUM 5000 UNIT(S): 5000 INJECTION INTRAVENOUS; SUBCUTANEOUS at 05:49

## 2023-12-29 RX ADMIN — Medication 5 MILLIGRAM(S): at 05:49

## 2023-12-29 RX ADMIN — DEXTROSE MONOHYDRATE, SODIUM CHLORIDE, AND POTASSIUM CHLORIDE 125 MILLILITER(S): 50; .745; 4.5 INJECTION, SOLUTION INTRAVENOUS at 09:42

## 2023-12-29 RX ADMIN — Medication 100 MILLIEQUIVALENT(S): at 09:42

## 2023-12-29 RX ADMIN — POTASSIUM PHOSPHATE, MONOBASIC POTASSIUM PHOSPHATE, DIBASIC 83.33 MILLIMOLE(S): 236; 224 INJECTION, SOLUTION INTRAVENOUS at 09:42

## 2023-12-29 RX ADMIN — DEXTROSE MONOHYDRATE, SODIUM CHLORIDE, AND POTASSIUM CHLORIDE 125 MILLILITER(S): 50; .745; 4.5 INJECTION, SOLUTION INTRAVENOUS at 17:59

## 2023-12-29 RX ADMIN — Medication 2: at 17:59

## 2023-12-29 RX ADMIN — Medication 2: at 05:50

## 2023-12-29 RX ADMIN — Medication 5 MILLIGRAM(S): at 23:57

## 2023-12-29 RX ADMIN — Medication 100 MILLIEQUIVALENT(S): at 10:39

## 2023-12-29 NOTE — PROGRESS NOTE ADULT - ASSESSMENT
A/P: Sathya Em is a 48 y.o. man with history of HTN, HLD, DM, Buchanan and reversal (2009) c/b multiple SBOs s/p multiple ex lap + LINDA (2014, 2016) and ventral hernia repair (2018) who presented to the ED on 12/27 for 1 day of nausea, vomiting, and right-sided abdominal pain.  Endocrinology was consulted for management of diabetes mellitus.    #Type 2 Diabetes Mellitus  - HbA1c 8.2%   ; home regimen: farxiga 10mg daily + Glimepiride 4mg daily + Rybelsus 14mg daily   -egfr: 104  - BG current running stable as patient is still NPO     Plan:   Blood sugars are current stable- recommend to continue with low dose ISS   If blood sugars are consistently >180, will consider starting patient on a low dose basal regimen   Hold standing insulin in the setting of patient being NPO- can consider starting standing meal time insulin once patient is eating   - Please check FSG before meals and QHS, or q6h while NPO  - Inpatient glucose goal 100-180   - Please keep patient on a diabetic, carb controlled diet     - Discharge planning: Patient's A1c is above goal. Patient prefers to not do injectable GLP-1 agonist. Consider increasing MFM to 1000mg BID at discharge, continue Farxiga 10mg daily, Rybelsus 14mg daily and Glimepiride 4mg daily. Final recs closer to discharge based on patient's in patient glucose trend.     #Hypertension  - BP goal <130/80  - Management as per primary team    #Hyperlipidemia  - check fasting lipid panel    Opal Smith,   Attending Physician   Department of Endocrinology, Diabetes and Metabolism     If before 9AM or after 5PM, or on weekends/holidays, please call the Endocrine answering service for assistance ([phone]).  For nonurgent matters, please email [email or web address] for assistance.     Please note that a different provider may be following this patient each day.          A/P: Sathya Em is a 48 y.o. man with history of HTN, HLD, DM, Buchanan and reversal (2009) c/b multiple SBOs s/p multiple ex lap + LINDA (2014, 2016) and ventral hernia repair (2018) who presented to the ED on 12/27 for 1 day of nausea, vomiting, and right-sided abdominal pain.  Endocrinology was consulted for management of diabetes mellitus.    #Type 2 Diabetes Mellitus  - HbA1c 8.2%   ; home regimen: farxiga 10mg daily + Glimepiride 4mg daily + Rybelsus 14mg daily   -egfr: 104  - BG current running stable as patient is still NPO     Plan:   Blood sugars are current stable- recommend to continue with low dose ISS   If blood sugars are consistently >180, will consider starting patient on a low dose basal regimen   Hold standing insulin in the setting of patient being NPO- can consider starting standing meal time insulin once patient is eating   - Please check FSG before meals and QHS, or q6h while NPO  - Inpatient glucose goal 100-180   - Please keep patient on a diabetic, carb controlled diet     - Discharge planning: Patient's A1c is above goal. Patient prefers to not do injectable GLP-1 agonist. Consider increasing MFM to 1000mg BID at discharge, continue Farxiga 10mg daily, Rybelsus 14mg daily and Glimepiride 4mg daily. Final recs closer to discharge based on patient's in patient glucose trend.     #Hypertension  - BP goal <130/80  - Management as per primary team    #Hyperlipidemia  - check fasting lipid panel    Opal Smith,   Attending Physician   Department of Endocrinology, Diabetes and Metabolism     If before 9AM or after 5PM, or on weekends/holidays, please call the Endocrine answering service for assistance ([phone]).  For nonurgent matters, please email [email or web address] for assistance.     Please note that a different provider may be following this patient each day.

## 2023-12-29 NOTE — PROGRESS NOTE ADULT - ASSESSMENT
48 y.o. man with history of HTN, HLD, DM, Buchanan and reversal (2009) c/b multiple SBOs s/p multiple ex lap + LINDA (2014, 2016) and ventral hernia repair (2018) who presented to the ED on 12/27 for 1 day of nausea, vomiting, and right-sided abdominal pain. CT demonstrating early vs evolving SBO with transition in the right hemiabdomen.     PLAN:  - Endocrine consult for elevated A1C  - NPO  - NGT to clamp  - IV fluid  - Protonix daily  - Lopressor 5 mg q6  - Trend lactate, now downtrending 1.1  - Serial abdominal exams  - DVT prophylaxis    A team surgery [id number] 48 y.o. man with history of HTN, HLD, DM, Buchanan and reversal (2009) c/b multiple SBOs s/p multiple ex lap + LINDA (2014, 2016) and ventral hernia repair (2018) who presented to the ED on 12/27 for 1 day of nausea, vomiting, and right-sided abdominal pain. CT demonstrating early vs evolving SBO with transition in the right hemiabdomen.     PLAN:  - Endocrine consult for elevated A1C  - NPO  - NGT to clamp  - IV fluid  - Protonix daily  - Lopressor 5 mg q6  - Trend lactate, now downtrending 1.1  - Serial abdominal exams  - DVT prophylaxis    A team surgery [id number]

## 2023-12-29 NOTE — PROGRESS NOTE ADULT - SUBJECTIVE AND OBJECTIVE BOX
Much improved. Abdomen less distended, non tender, had BM. Less tachy secondary to rehydration. . To clamp NG . DH

## 2023-12-29 NOTE — PROGRESS NOTE ADULT - SUBJECTIVE AND OBJECTIVE BOX
A TEAM Surgery Progress Note  48 y.o. man with history of HTN, HLD, DM, Buchanan and reversal (2009) c/b multiple SBOs s/p multiple ex lap + LINDA (2014, 2016) and ventral hernia repair (2018) who presented to the ED on 12/27 for 1 day of nausea, vomiting, and right-sided abdominal pain. CT demonstrating early vs evolving SBO with transition in the right hemiabdomen.     INTERVAL EVENTS: No acute events overnight.  SUBJECTIVE: Patient seen and examined at bedside with surgical team, patient without complaints. Denies fever, chills, CP, SOB nausea, vomiting, abdominal pain.    OBJECTIVE:    Vital Signs Last 24 Hrs  T(C): 36.8 (29 Dec 2023 06:00), Max: 37.1 (29 Dec 2023 02:00)  T(F): 98.3 (29 Dec 2023 06:00), Max: 98.8 (29 Dec 2023 02:00)  HR: 91 (29 Dec 2023 06:00) (91 - 112)  BP: 141/83 (29 Dec 2023 06:00) (132/83 - 152/79)  BP(mean): --  RR: 18 (29 Dec 2023 06:00) (17 - 18)  SpO2: 96% (29 Dec 2023 06:00) (96% - 98%)    Parameters below as of 29 Dec 2023 06:00  Patient On (Oxygen Delivery Method): room air    I&O's Detail    28 Dec 2023 07:01  -  29 Dec 2023 07:00  --------------------------------------------------------  IN:    dextrose 5% + sodium chloride 0.45% w/ Additives: 1600 mL    IV PiggyBack: 400 mL    Lactated Ringers: 500 mL    Lactated Ringers Bolus: 1000 mL  Total IN: 3500 mL    OUT:    Nasogastric/Oral tube (mL): 875 mL    Oral Fluid: 0 mL    Voided (mL): 1750 mL  Total OUT: 2625 mL    Total NET: 875 mL      MEDICATIONS  (STANDING):  dextrose 5% + sodium chloride 0.45% with potassium chloride 20 mEq/L 1000 milliLiter(s) (125 mL/Hr) IV Continuous <Continuous>  dextrose 5%. 1000 milliLiter(s) (100 mL/Hr) IV Continuous <Continuous>  dextrose 5%. 1000 milliLiter(s) (50 mL/Hr) IV Continuous <Continuous>  dextrose 50% Injectable 25 Gram(s) IV Push once  dextrose 50% Injectable 25 Gram(s) IV Push once  dextrose 50% Injectable 12.5 Gram(s) IV Push once  glucagon  Injectable 1 milliGRAM(s) IntraMuscular once  heparin   Injectable 5000 Unit(s) SubCutaneous every 8 hours  insulin lispro (ADMELOG) corrective regimen sliding scale   SubCutaneous every 6 hours  metoprolol tartrate Injectable 5 milliGRAM(s) IV Push every 6 hours  pantoprazole  Injectable 40 milliGRAM(s) IV Push with breakfast  potassium chloride  10 mEq/100 mL IVPB 10 milliEquivalent(s) IV Intermittent every 1 hour  potassium phosphate IVPB 30 milliMole(s) IV Intermittent once    MEDICATIONS  (PRN):  benzocaine/menthol Lozenge 1 Lozenge Oral every 2 hours PRN Sore Throat  dextrose Oral Gel 15 Gram(s) Oral once PRN Blood Glucose LESS THAN 70 milliGRAM(s)/deciliter  ondansetron Injectable 4 milliGRAM(s) IV Push every 6 hours PRN Nausea and/or Vomiting      PHYSICAL EXAM:  Constitutional: A&Ox3, NAD  Respiratory: Unlabored breathing  Abdomen: Soft, nondistended, NTTP. No rebound or guarding. NGT   Extremities: WWP, COLIN spontaneously    LABS:                        11.3   7.37  )-----------( 193      ( 29 Dec 2023 05:38 )             36.6     12-29    139  |  106  |  17  ----------------------------<  176<H>  3.7   |  23  |  0.69    Ca    8.2<L>      29 Dec 2023 05:38  Phos  2.1     12-29  Mg     2.00     12-29          Urinalysis Basic - ( 29 Dec 2023 05:38 )    Color: x / Appearance: x / SG: x / pH: x  Gluc: 176 mg/dL / Ketone: x  / Bili: x / Urobili: x   Blood: x / Protein: x / Nitrite: x   Leuk Esterase: x / RBC: x / WBC x   Sq Epi: x / Non Sq Epi: x / Bacteria: x

## 2023-12-29 NOTE — PROGRESS NOTE ADULT - SUBJECTIVE AND OBJECTIVE BOX
Interval history:  NG tube now clamped  Still NPO  Blood glucose stable over last 24 hours     MEDICATIONS  (STANDING):  dextrose 5% + sodium chloride 0.45% with potassium chloride 20 mEq/L 1000 milliLiter(s) (125 mL/Hr) IV Continuous <Continuous>  dextrose 5%. 1000 milliLiter(s) (100 mL/Hr) IV Continuous <Continuous>  dextrose 5%. 1000 milliLiter(s) (50 mL/Hr) IV Continuous <Continuous>  dextrose 50% Injectable 25 Gram(s) IV Push once  dextrose 50% Injectable 12.5 Gram(s) IV Push once  dextrose 50% Injectable 25 Gram(s) IV Push once  glucagon  Injectable 1 milliGRAM(s) IntraMuscular once  heparin   Injectable 5000 Unit(s) SubCutaneous every 8 hours  insulin lispro (ADMELOG) corrective regimen sliding scale   SubCutaneous every 6 hours  metoprolol tartrate Injectable 5 milliGRAM(s) IV Push every 6 hours  pantoprazole  Injectable 40 milliGRAM(s) IV Push with breakfast    MEDICATIONS  (PRN):  benzocaine/menthol Lozenge 1 Lozenge Oral every 2 hours PRN Sore Throat  dextrose Oral Gel 15 Gram(s) Oral once PRN Blood Glucose LESS THAN 70 milliGRAM(s)/deciliter  ondansetron Injectable 4 milliGRAM(s) IV Push every 6 hours PRN Nausea and/or Vomiting      Allergies    ceftriaxone (Rash)  morphine (Rash)  cephalosporins (Rash)  fentanyl (Rash)  contrast media (iodine-based) (Rash)  Flagyl (Unknown)  Cipro (Unknown)  iodine containing compounds (Rash)    Intolerances      Review of Systems:  Constitutional: No fever  Eyes: No blurry vision  Neuro: No tremors  HEENT: No pain  Cardiovascular: No chest pain, palpitations  Respiratory: No SOB, no cough  GI: No nausea, vomiting, abdominal pain  : No dysuria  Skin: no rash  Psych: no depression  Endocrine: no polyuria, polydipsia  Hem/lymph: no swelling  Osteoporosis: no fractures    ALL OTHER SYSTEMS REVIEWED AND NEGATIVE    UNABLE TO OBTAIN    PHYSICAL EXAM:  VITALS: T(C): 36.6 (12-29-23 @ 09:34)  T(F): 97.8 (12-29-23 @ 09:34), Max: 98.8 (12-29-23 @ 02:00)  HR: 95 (12-29-23 @ 09:34) (91 - 112)  BP: 159/92 (12-29-23 @ 09:34) (132/83 - 159/92)  RR:  (17 - 18)  SpO2:  (95% - 98%)  Wt(kg): --  GENERAL: NAD, well-groomed, well-developed  EYES: No proptosis, no lid lag, anicteric  HEENT:  Atraumatic, Normocephalic, moist mucous membranes  THYROID: Normal size, no palpable nodules  RESPIRATORY: Clear to auscultation bilaterally; No rales, rhonchi, wheezing, or rubs  CARDIOVASCULAR: Regular rate and rhythm; No murmurs; no peripheral edema  GI: Soft, nontender, non distended, normal bowel sounds  SKIN: Dry, intact, No rashes or lesions  MUSCULOSKELETAL: Full range of motion, normal strength  NEURO: sensation intact, extraocular movements intact, no tremor, normal reflexes  PSYCH: Alert and oriented x 3, normal affect, normal mood  CUSHING'S SIGNS: no striae    POCT Blood Glucose.: 160 mg/dL (12-29-23 @ 12:03)  POCT Blood Glucose.: 176 mg/dL (12-29-23 @ 05:36)  POCT Blood Glucose.: 156 mg/dL (12-28-23 @ 23:16)  POCT Blood Glucose.: 142 mg/dL (12-28-23 @ 17:52)  POCT Blood Glucose.: 135 mg/dL (12-28-23 @ 12:45)  POCT Blood Glucose.: 122 mg/dL (12-28-23 @ 06:06)  POCT Blood Glucose.: 135 mg/dL (12-27-23 @ 23:28)  POCT Blood Glucose.: 153 mg/dL (12-27-23 @ 18:07)  POCT Blood Glucose.: 171 mg/dL (12-27-23 @ 12:15)  POCT Blood Glucose.: 182 mg/dL (12-27-23 @ 08:26)  POCT Blood Glucose.: 196 mg/dL (12-27-23 @ 07:01)  POCT Blood Glucose.: 303 mg/dL (12-26-23 @ 22:09)      12-29    139  |  106  |  17  ----------------------------<  176<H>  3.7   |  23  |  0.69    eGFR: 113    Ca    8.2<L>      12-29  Mg     2.00     12-29  Phos  2.1     12-29    TPro  7.5  /  Alb  4.2  /  TBili  0.7  /  DBili  x   /  AST  22  /  ALT  31  /  AlkPhos  37<L>  12-27          Thyroid Function Tests:

## 2023-12-30 LAB
ANION GAP SERPL CALC-SCNC: 12 MMOL/L — SIGNIFICANT CHANGE UP (ref 7–14)
ANION GAP SERPL CALC-SCNC: 12 MMOL/L — SIGNIFICANT CHANGE UP (ref 7–14)
BUN SERPL-MCNC: 12 MG/DL — SIGNIFICANT CHANGE UP (ref 7–23)
BUN SERPL-MCNC: 12 MG/DL — SIGNIFICANT CHANGE UP (ref 7–23)
CALCIUM SERPL-MCNC: 8.5 MG/DL — SIGNIFICANT CHANGE UP (ref 8.4–10.5)
CALCIUM SERPL-MCNC: 8.5 MG/DL — SIGNIFICANT CHANGE UP (ref 8.4–10.5)
CHLORIDE SERPL-SCNC: 107 MMOL/L — SIGNIFICANT CHANGE UP (ref 98–107)
CHLORIDE SERPL-SCNC: 107 MMOL/L — SIGNIFICANT CHANGE UP (ref 98–107)
CO2 SERPL-SCNC: 21 MMOL/L — LOW (ref 22–31)
CO2 SERPL-SCNC: 21 MMOL/L — LOW (ref 22–31)
CREAT SERPL-MCNC: 0.59 MG/DL — SIGNIFICANT CHANGE UP (ref 0.5–1.3)
CREAT SERPL-MCNC: 0.59 MG/DL — SIGNIFICANT CHANGE UP (ref 0.5–1.3)
EGFR: 118 ML/MIN/1.73M2 — SIGNIFICANT CHANGE UP
EGFR: 118 ML/MIN/1.73M2 — SIGNIFICANT CHANGE UP
GLUCOSE BLDC GLUCOMTR-MCNC: 172 MG/DL — HIGH (ref 70–99)
GLUCOSE BLDC GLUCOMTR-MCNC: 184 MG/DL — HIGH (ref 70–99)
GLUCOSE BLDC GLUCOMTR-MCNC: 184 MG/DL — HIGH (ref 70–99)
GLUCOSE BLDC GLUCOMTR-MCNC: 203 MG/DL — HIGH (ref 70–99)
GLUCOSE BLDC GLUCOMTR-MCNC: 203 MG/DL — HIGH (ref 70–99)
GLUCOSE SERPL-MCNC: 159 MG/DL — HIGH (ref 70–99)
GLUCOSE SERPL-MCNC: 159 MG/DL — HIGH (ref 70–99)
HCT VFR BLD CALC: 36 % — LOW (ref 39–50)
HCT VFR BLD CALC: 36 % — LOW (ref 39–50)
HGB BLD-MCNC: 11.3 G/DL — LOW (ref 13–17)
HGB BLD-MCNC: 11.3 G/DL — LOW (ref 13–17)
MAGNESIUM SERPL-MCNC: 2.1 MG/DL — SIGNIFICANT CHANGE UP (ref 1.6–2.6)
MAGNESIUM SERPL-MCNC: 2.1 MG/DL — SIGNIFICANT CHANGE UP (ref 1.6–2.6)
MCHC RBC-ENTMCNC: 27.2 PG — SIGNIFICANT CHANGE UP (ref 27–34)
MCHC RBC-ENTMCNC: 27.2 PG — SIGNIFICANT CHANGE UP (ref 27–34)
MCHC RBC-ENTMCNC: 31.4 GM/DL — LOW (ref 32–36)
MCHC RBC-ENTMCNC: 31.4 GM/DL — LOW (ref 32–36)
MCV RBC AUTO: 86.5 FL — SIGNIFICANT CHANGE UP (ref 80–100)
MCV RBC AUTO: 86.5 FL — SIGNIFICANT CHANGE UP (ref 80–100)
NRBC # BLD: 0 /100 WBCS — SIGNIFICANT CHANGE UP (ref 0–0)
NRBC # BLD: 0 /100 WBCS — SIGNIFICANT CHANGE UP (ref 0–0)
NRBC # FLD: 0 K/UL — SIGNIFICANT CHANGE UP (ref 0–0)
NRBC # FLD: 0 K/UL — SIGNIFICANT CHANGE UP (ref 0–0)
PHOSPHATE SERPL-MCNC: 2.6 MG/DL — SIGNIFICANT CHANGE UP (ref 2.5–4.5)
PHOSPHATE SERPL-MCNC: 2.6 MG/DL — SIGNIFICANT CHANGE UP (ref 2.5–4.5)
PLATELET # BLD AUTO: 171 K/UL — SIGNIFICANT CHANGE UP (ref 150–400)
PLATELET # BLD AUTO: 171 K/UL — SIGNIFICANT CHANGE UP (ref 150–400)
POTASSIUM SERPL-MCNC: 3.7 MMOL/L — SIGNIFICANT CHANGE UP (ref 3.5–5.3)
POTASSIUM SERPL-MCNC: 3.7 MMOL/L — SIGNIFICANT CHANGE UP (ref 3.5–5.3)
POTASSIUM SERPL-SCNC: 3.7 MMOL/L — SIGNIFICANT CHANGE UP (ref 3.5–5.3)
POTASSIUM SERPL-SCNC: 3.7 MMOL/L — SIGNIFICANT CHANGE UP (ref 3.5–5.3)
RBC # BLD: 4.16 M/UL — LOW (ref 4.2–5.8)
RBC # BLD: 4.16 M/UL — LOW (ref 4.2–5.8)
RBC # FLD: 15.2 % — HIGH (ref 10.3–14.5)
RBC # FLD: 15.2 % — HIGH (ref 10.3–14.5)
SODIUM SERPL-SCNC: 140 MMOL/L — SIGNIFICANT CHANGE UP (ref 135–145)
SODIUM SERPL-SCNC: 140 MMOL/L — SIGNIFICANT CHANGE UP (ref 135–145)
WBC # BLD: 7.38 K/UL — SIGNIFICANT CHANGE UP (ref 3.8–10.5)
WBC # BLD: 7.38 K/UL — SIGNIFICANT CHANGE UP (ref 3.8–10.5)
WBC # FLD AUTO: 7.38 K/UL — SIGNIFICANT CHANGE UP (ref 3.8–10.5)
WBC # FLD AUTO: 7.38 K/UL — SIGNIFICANT CHANGE UP (ref 3.8–10.5)

## 2023-12-30 RX ORDER — DEXTROSE 50 % IN WATER 50 %
12.5 SYRINGE (ML) INTRAVENOUS ONCE
Refills: 0 | Status: DISCONTINUED | OUTPATIENT
Start: 2023-12-30 | End: 2023-12-31

## 2023-12-30 RX ORDER — SODIUM CHLORIDE 9 MG/ML
1000 INJECTION, SOLUTION INTRAVENOUS
Refills: 0 | Status: DISCONTINUED | OUTPATIENT
Start: 2023-12-30 | End: 2023-12-31

## 2023-12-30 RX ORDER — DEXTROSE 50 % IN WATER 50 %
25 SYRINGE (ML) INTRAVENOUS ONCE
Refills: 0 | Status: DISCONTINUED | OUTPATIENT
Start: 2023-12-30 | End: 2023-12-31

## 2023-12-30 RX ORDER — INSULIN LISPRO 100/ML
VIAL (ML) SUBCUTANEOUS AT BEDTIME
Refills: 0 | Status: DISCONTINUED | OUTPATIENT
Start: 2023-12-30 | End: 2023-12-31

## 2023-12-30 RX ORDER — POTASSIUM PHOSPHATE, MONOBASIC POTASSIUM PHOSPHATE, DIBASIC 236; 224 MG/ML; MG/ML
15 INJECTION, SOLUTION INTRAVENOUS ONCE
Refills: 0 | Status: COMPLETED | OUTPATIENT
Start: 2023-12-30 | End: 2023-12-30

## 2023-12-30 RX ORDER — INSULIN GLARGINE 100 [IU]/ML
10 INJECTION, SOLUTION SUBCUTANEOUS AT BEDTIME
Refills: 0 | Status: DISCONTINUED | OUTPATIENT
Start: 2023-12-30 | End: 2023-12-31

## 2023-12-30 RX ORDER — DEXTROSE 50 % IN WATER 50 %
15 SYRINGE (ML) INTRAVENOUS ONCE
Refills: 0 | Status: DISCONTINUED | OUTPATIENT
Start: 2023-12-30 | End: 2023-12-31

## 2023-12-30 RX ORDER — INSULIN LISPRO 100/ML
VIAL (ML) SUBCUTANEOUS
Refills: 0 | Status: DISCONTINUED | OUTPATIENT
Start: 2023-12-30 | End: 2023-12-31

## 2023-12-30 RX ORDER — GLUCAGON INJECTION, SOLUTION 0.5 MG/.1ML
1 INJECTION, SOLUTION SUBCUTANEOUS ONCE
Refills: 0 | Status: DISCONTINUED | OUTPATIENT
Start: 2023-12-30 | End: 2023-12-31

## 2023-12-30 RX ADMIN — Medication 5 MILLIGRAM(S): at 18:28

## 2023-12-30 RX ADMIN — POTASSIUM PHOSPHATE, MONOBASIC POTASSIUM PHOSPHATE, DIBASIC 62.5 MILLIMOLE(S): 236; 224 INJECTION, SOLUTION INTRAVENOUS at 12:36

## 2023-12-30 RX ADMIN — Medication 5 MILLIGRAM(S): at 23:52

## 2023-12-30 RX ADMIN — HEPARIN SODIUM 5000 UNIT(S): 5000 INJECTION INTRAVENOUS; SUBCUTANEOUS at 05:49

## 2023-12-30 RX ADMIN — PANTOPRAZOLE SODIUM 40 MILLIGRAM(S): 20 TABLET, DELAYED RELEASE ORAL at 08:33

## 2023-12-30 RX ADMIN — HEPARIN SODIUM 5000 UNIT(S): 5000 INJECTION INTRAVENOUS; SUBCUTANEOUS at 21:33

## 2023-12-30 RX ADMIN — Medication 5 MILLIGRAM(S): at 11:27

## 2023-12-30 RX ADMIN — HEPARIN SODIUM 5000 UNIT(S): 5000 INJECTION INTRAVENOUS; SUBCUTANEOUS at 13:38

## 2023-12-30 RX ADMIN — Medication 5 MILLIGRAM(S): at 05:48

## 2023-12-30 RX ADMIN — Medication 1: at 17:39

## 2023-12-30 RX ADMIN — Medication 2: at 12:37

## 2023-12-30 RX ADMIN — Medication 2: at 08:32

## 2023-12-30 RX ADMIN — INSULIN GLARGINE 10 UNIT(S): 100 INJECTION, SOLUTION SUBCUTANEOUS at 21:32

## 2023-12-30 NOTE — CHART NOTE - NSCHARTNOTEFT_GEN_A_CORE
A/P: Sathya Em is a 48 y.o. man with history of HTN, HLD, DM, Buchanan and reversal (2009) c/b multiple SBOs s/p multiple ex lap + LINDA (2014, 2016) and ventral hernia repair (2018) who presented to the ED on 12/27 for 1 day of nausea, vomiting, and right-sided abdominal pain.  Endocrinology was consulted for management of diabetes mellitus.    #Type 2 Diabetes Mellitus  - HbA1c 8.2%   ; home regimen: farxiga 10mg daily + Glimepiride 4mg daily + Rybelsus 14mg daily   -egfr: 104    Blood sugars are now starting to trend up to >180 since patient started on carb consistent diet     Plan:   - Please start patient on Lantus 10 units tonight.   - continue to keep patient on low dose sliding scale insulin   - Please check FSG before meals and QHS, or q6h while NPO  - Inpatient glucose goal 100-180   - Please keep patient on a diabetic, carb controlled diet     Opal mSith DO   Attending Physician   Department of Endocrinology, Diabetes and Metabolism     If before 9AM or after 5PM, or on weekends/holidays, please call the Endocrine answering service for assistance ([phone]).  For nonurgent matters, please email [email or web address] for assistance. A/P: Sathya Em is a 48 y.o. man with history of HTN, HLD, DM, Buchanan and reversal (2009) c/b multiple SBOs s/p multiple ex lap + LINDA (2014, 2016) and ventral hernia repair (2018) who presented to the ED on 12/27 for 1 day of nausea, vomiting, and right-sided abdominal pain.  Endocrinology was consulted for management of diabetes mellitus.    #Type 2 Diabetes Mellitus  - HbA1c 8.2%   ; home regimen: farxiga 10mg daily + Glimepiride 4mg daily + Rybelsus 14mg daily   -egfr: 104    Blood sugars are now starting to trend up to >180 since patient started on carb consistent diet     Plan:   - Please start patient on Lantus 10 units tonight.   - continue to keep patient on low dose sliding scale insulin   - Please check FSG before meals and QHS, or q6h while NPO  - Inpatient glucose goal 100-180   - Please keep patient on a diabetic, carb controlled diet     Opal Smith DO   Attending Physician   Department of Endocrinology, Diabetes and Metabolism     If before 9AM or after 5PM, or on weekends/holidays, please call the Endocrine answering service for assistance ([phone]).  For nonurgent matters, please email [email or web address] for assistance.

## 2023-12-30 NOTE — PROGRESS NOTE ADULT - SUBJECTIVE AND OBJECTIVE BOX
TEAM [ A ] Surgery Daily Progress Note  =====================================================  INTERVAL: Patient has a clamp trial yesterday. Passed. NG tube removed. Patient was advanced to CLD.     SUBJECTIVE: Patient seen and examined at bedside on AM rounds. Patient reports that they're feeling well. Passing gas and having bowel movements. Tolerated clear liquid diet without nausea or vomiting. Ambulating.     PMH:  PAST MEDICAL & SURGICAL HISTORY:  Hypertension      Hyperlipidemia      Diabetes mellitus, type 2      SBO (small bowel obstruction)      Diverticulitis      DVT (deep venous thrombosis)  RLE 2016, post op, was on eliquis for 6 months      Open wound of abdominal wall      GERD (gastroesophageal reflux disease)      S/P Isi's procedure  2009 with reversal 2009      H/O exploratory laparotomy  LINDA, x3      H/O hernia repair  xlap, ventral hernia repair 6/2018      ALLERGIES:  ceftriaxone (Rash)  morphine (Rash)  cephalosporins (Rash)  fentanyl (Rash)  contrast media (iodine-based) (Rash)  Flagyl (Unknown)  Cipro (Unknown)  iodine containing compounds (Rash)    --------------------------------------------------------------------------------------    VITAL SIGNS:  Vital Signs Last 24 Hrs  T(C): 36.8 (30 Dec 2023 06:00), Max: 37.2 (29 Dec 2023 13:14)  T(F): 98.2 (30 Dec 2023 06:00), Max: 98.9 (29 Dec 2023 13:14)  HR: 81 (30 Dec 2023 06:00) (80 - 96)  BP: 130/83 (30 Dec 2023 06:00) (122/82 - 159/92)  BP(mean): --  RR: 16 (30 Dec 2023 06:00) (16 - 18)  SpO2: 98% (30 Dec 2023 06:00) (95% - 98%)    Parameters below as of 30 Dec 2023 06:00  Patient On (Oxygen Delivery Method): room air      --------------------------------------------------------------------------------------    PHYSICAL EXAM:  Constitutional: A&Ox3, NAD  Respiratory: Unlabored breathing  Abdomen: Soft, nondistended, NTTP. No rebound or guarding.   Extremities: WWP, COLIN spontaneously    --------------------------------------------------------------------------------------    LABS                        11.3   7.38  )-----------( 171      ( 30 Dec 2023 06:50 )             36.0   12-30    140  |  107  |  12  ----------------------------<  159<H>  3.7   |  21<L>  |  0.59    Ca    8.5      30 Dec 2023 06:50  Phos  2.6     12-30  Mg     2.10     12-30        --------------------------------------------------------------------------------------    INS AND OUTS:    I&O's Detail    29 Dec 2023 07:01  -  30 Dec 2023 07:00  --------------------------------------------------------  IN:    dextrose 5% + sodium chloride 0.45% w/ Additives: 3000 mL    IV PiggyBack: 700 mL    Oral Fluid: 800 mL  Total IN: 4500 mL    OUT:    Nasogastric/Oral tube (mL): 0 mL    Voided (mL): 1800 mL  Total OUT: 1800 mL    Total NET: 2700 mL        --------------------------------------------------------------------------------------

## 2023-12-31 ENCOUNTER — TRANSCRIPTION ENCOUNTER (OUTPATIENT)
Age: 48
End: 2023-12-31

## 2023-12-31 VITALS
RESPIRATION RATE: 18 BRPM | TEMPERATURE: 98 F | DIASTOLIC BLOOD PRESSURE: 79 MMHG | OXYGEN SATURATION: 98 % | HEART RATE: 92 BPM | SYSTOLIC BLOOD PRESSURE: 133 MMHG

## 2023-12-31 LAB
ANION GAP SERPL CALC-SCNC: 11 MMOL/L — SIGNIFICANT CHANGE UP (ref 7–14)
ANION GAP SERPL CALC-SCNC: 11 MMOL/L — SIGNIFICANT CHANGE UP (ref 7–14)
BUN SERPL-MCNC: 16 MG/DL — SIGNIFICANT CHANGE UP (ref 7–23)
BUN SERPL-MCNC: 16 MG/DL — SIGNIFICANT CHANGE UP (ref 7–23)
CALCIUM SERPL-MCNC: 8.9 MG/DL — SIGNIFICANT CHANGE UP (ref 8.4–10.5)
CALCIUM SERPL-MCNC: 8.9 MG/DL — SIGNIFICANT CHANGE UP (ref 8.4–10.5)
CHLORIDE SERPL-SCNC: 105 MMOL/L — SIGNIFICANT CHANGE UP (ref 98–107)
CHLORIDE SERPL-SCNC: 105 MMOL/L — SIGNIFICANT CHANGE UP (ref 98–107)
CO2 SERPL-SCNC: 21 MMOL/L — LOW (ref 22–31)
CO2 SERPL-SCNC: 21 MMOL/L — LOW (ref 22–31)
CREAT SERPL-MCNC: 0.6 MG/DL — SIGNIFICANT CHANGE UP (ref 0.5–1.3)
CREAT SERPL-MCNC: 0.6 MG/DL — SIGNIFICANT CHANGE UP (ref 0.5–1.3)
EGFR: 118 ML/MIN/1.73M2 — SIGNIFICANT CHANGE UP
EGFR: 118 ML/MIN/1.73M2 — SIGNIFICANT CHANGE UP
GLUCOSE BLDC GLUCOMTR-MCNC: 187 MG/DL — HIGH (ref 70–99)
GLUCOSE BLDC GLUCOMTR-MCNC: 187 MG/DL — HIGH (ref 70–99)
GLUCOSE BLDC GLUCOMTR-MCNC: 204 MG/DL — HIGH (ref 70–99)
GLUCOSE BLDC GLUCOMTR-MCNC: 204 MG/DL — HIGH (ref 70–99)
GLUCOSE SERPL-MCNC: 175 MG/DL — HIGH (ref 70–99)
GLUCOSE SERPL-MCNC: 175 MG/DL — HIGH (ref 70–99)
HCT VFR BLD CALC: 36.6 % — LOW (ref 39–50)
HCT VFR BLD CALC: 36.6 % — LOW (ref 39–50)
HGB BLD-MCNC: 12 G/DL — LOW (ref 13–17)
HGB BLD-MCNC: 12 G/DL — LOW (ref 13–17)
MAGNESIUM SERPL-MCNC: 2.1 MG/DL — SIGNIFICANT CHANGE UP (ref 1.6–2.6)
MAGNESIUM SERPL-MCNC: 2.1 MG/DL — SIGNIFICANT CHANGE UP (ref 1.6–2.6)
MCHC RBC-ENTMCNC: 27.8 PG — SIGNIFICANT CHANGE UP (ref 27–34)
MCHC RBC-ENTMCNC: 27.8 PG — SIGNIFICANT CHANGE UP (ref 27–34)
MCHC RBC-ENTMCNC: 32.8 GM/DL — SIGNIFICANT CHANGE UP (ref 32–36)
MCHC RBC-ENTMCNC: 32.8 GM/DL — SIGNIFICANT CHANGE UP (ref 32–36)
MCV RBC AUTO: 84.9 FL — SIGNIFICANT CHANGE UP (ref 80–100)
MCV RBC AUTO: 84.9 FL — SIGNIFICANT CHANGE UP (ref 80–100)
NRBC # BLD: 0 /100 WBCS — SIGNIFICANT CHANGE UP (ref 0–0)
NRBC # BLD: 0 /100 WBCS — SIGNIFICANT CHANGE UP (ref 0–0)
NRBC # FLD: 0 K/UL — SIGNIFICANT CHANGE UP (ref 0–0)
NRBC # FLD: 0 K/UL — SIGNIFICANT CHANGE UP (ref 0–0)
PHOSPHATE SERPL-MCNC: 3.1 MG/DL — SIGNIFICANT CHANGE UP (ref 2.5–4.5)
PHOSPHATE SERPL-MCNC: 3.1 MG/DL — SIGNIFICANT CHANGE UP (ref 2.5–4.5)
PLATELET # BLD AUTO: 181 K/UL — SIGNIFICANT CHANGE UP (ref 150–400)
PLATELET # BLD AUTO: 181 K/UL — SIGNIFICANT CHANGE UP (ref 150–400)
POTASSIUM SERPL-MCNC: 3.7 MMOL/L — SIGNIFICANT CHANGE UP (ref 3.5–5.3)
POTASSIUM SERPL-MCNC: 3.7 MMOL/L — SIGNIFICANT CHANGE UP (ref 3.5–5.3)
POTASSIUM SERPL-SCNC: 3.7 MMOL/L — SIGNIFICANT CHANGE UP (ref 3.5–5.3)
POTASSIUM SERPL-SCNC: 3.7 MMOL/L — SIGNIFICANT CHANGE UP (ref 3.5–5.3)
RBC # BLD: 4.31 M/UL — SIGNIFICANT CHANGE UP (ref 4.2–5.8)
RBC # BLD: 4.31 M/UL — SIGNIFICANT CHANGE UP (ref 4.2–5.8)
RBC # FLD: 14.8 % — HIGH (ref 10.3–14.5)
RBC # FLD: 14.8 % — HIGH (ref 10.3–14.5)
SODIUM SERPL-SCNC: 137 MMOL/L — SIGNIFICANT CHANGE UP (ref 135–145)
SODIUM SERPL-SCNC: 137 MMOL/L — SIGNIFICANT CHANGE UP (ref 135–145)
WBC # BLD: 7.72 K/UL — SIGNIFICANT CHANGE UP (ref 3.8–10.5)
WBC # BLD: 7.72 K/UL — SIGNIFICANT CHANGE UP (ref 3.8–10.5)
WBC # FLD AUTO: 7.72 K/UL — SIGNIFICANT CHANGE UP (ref 3.8–10.5)
WBC # FLD AUTO: 7.72 K/UL — SIGNIFICANT CHANGE UP (ref 3.8–10.5)

## 2023-12-31 RX ORDER — POTASSIUM CHLORIDE 20 MEQ
20 PACKET (EA) ORAL
Refills: 0 | Status: COMPLETED | OUTPATIENT
Start: 2023-12-31 | End: 2023-12-31

## 2023-12-31 RX ADMIN — PANTOPRAZOLE SODIUM 40 MILLIGRAM(S): 20 TABLET, DELAYED RELEASE ORAL at 09:18

## 2023-12-31 RX ADMIN — Medication 5 MILLIGRAM(S): at 12:19

## 2023-12-31 RX ADMIN — Medication 20 MILLIEQUIVALENT(S): at 09:18

## 2023-12-31 RX ADMIN — HEPARIN SODIUM 5000 UNIT(S): 5000 INJECTION INTRAVENOUS; SUBCUTANEOUS at 05:32

## 2023-12-31 RX ADMIN — Medication 2: at 12:22

## 2023-12-31 RX ADMIN — Medication 5 MILLIGRAM(S): at 05:33

## 2023-12-31 RX ADMIN — Medication 1: at 08:39

## 2023-12-31 RX ADMIN — Medication 20 MILLIEQUIVALENT(S): at 12:18

## 2023-12-31 NOTE — DISCHARGE NOTE PROVIDER - NSDCCPCAREPLAN_GEN_ALL_CORE_FT
PRINCIPAL DISCHARGE DIAGNOSIS  Diagnosis: Small bowel obstruction  Assessment and Plan of Treatment: PAIN CONTROL: Take over the counter medications as directed on bottle for pain. Alternating between Tylenol (acetaminophen) and Motrin (ibuprofen) every 3 hours may help with pain control.  DIET: Return to your usual diet.  NOTIFY YOUR SURGEON IF: Any fever (over 100.4 F) or chills, persistent nausea/vomiting, or your pain returns and is uncontrolled.

## 2023-12-31 NOTE — DISCHARGE NOTE PROVIDER - ATTENDING ATTESTATION STATEMENT
[address] [place] [address] Drive  [address] [place] [address]  Phone: [phone]  Fax: [phone] [facility] Lydia Ramirez MD        June 27, 2022     Patient: Adelso Dove   YOB: 1964   Date of Visit: 6/27/2022       To Whom it May Concern:    Adelso Dove was seen in my clinic on 6/27/2022. I recommend that she have a reduced work schedule due to a medical condition over the next month. I recommend that she work no more than 50 hours weekly. If you have any questions or concerns, please don't hesitate to call.     Sincerely,         Gwenith Oppenheim, MD I have personally seen and examined the patient. I have collaborated with and supervised the

## 2023-12-31 NOTE — DISCHARGE NOTE PROVIDER - PROVIDER TOKENS
FREE:[LAST:[Care],FIRST:[Primary],PHONE:[(   )    -],FAX:[(   )    -],ADDRESS:[Follow up with your primary care provider in 1-2 weeks]]

## 2023-12-31 NOTE — DISCHARGE NOTE NURSING/CASE MANAGEMENT/SOCIAL WORK - NSDCPEFALRISK_GEN_ALL_CORE
For information on Fall & Injury Prevention, visit: [email or web address] OR  [email or web address] OR  https://www.cdc.gov/steadi/patient.html For information on Fall & Injury Prevention, visit: [email or web address] OR  [email or web address] OR  https://www.cdc.gov/steadi/patient.html

## 2023-12-31 NOTE — PROGRESS NOTE ADULT - ASSESSMENT
48M with history of HTN, HLD, DM, Buchanan and reversal (2009) c/b multiple SBOs s/p multiple ex lap + LINDA (2014, 2016) and ventral hernia repair (2018) who presented to the ED on 12/27 for 1 day of nausea, vomiting, and right-sided abdominal pain. CT demonstrating early vs evolving SBO with transition in the right hemiabdomen, now resolved. Stable for discharge    PLAN:  - Diet: Regular  - Protonix daily  - Home meds  - DVT prophylaxis  - Dispo: Discharge to home 12/31. Follow up with PCP only, not necessary to see Dr. White in office for non-op SBO.    A team surgery [id number]   48M with history of HTN, HLD, DM, Buchanan and reversal (2009) c/b multiple SBOs s/p multiple ex lap + LINDA (2014, 2016) and ventral hernia repair (2018) who presented to the ED on 12/27 for 1 day of nausea, vomiting, and right-sided abdominal pain. CT demonstrating early vs evolving SBO with transition in the right hemiabdomen, now resolved. Stable for discharge    PLAN:  - Diet: Regular  - Protonix daily  - Home meds  - DVT prophylaxis  - Dispo: Discharge to home 12/31. Follow up with PCP only, not necessary to see Dr. White in office for non-op SBO.    A team surgery [id number]

## 2023-12-31 NOTE — DISCHARGE NOTE NURSING/CASE MANAGEMENT/SOCIAL WORK - PATIENT PORTAL LINK FT
You can access the FollowMyHealth Patient Portal offered by [facility] by registering at the following website: [email or web address]. By joining [employer]’s FollowMyHealth portal, you will also be able to view your health information using other applications (apps) compatible with our system. You can access the FollowMyHealth Patient Portal offered by [facility] by registering at the following website: [email or web address]. By joining [employer]’s FollowMyHealth portal, you will also be able to view your health information using other applications (apps) compatible with our system.

## 2023-12-31 NOTE — DISCHARGE NOTE PROVIDER - CARE PROVIDER_API CALL
Care, Primary  Follow up with your primary care provider in 1-2 weeks  Phone: (   )    -  Fax: (   )    -  Follow Up Time:

## 2023-12-31 NOTE — PROGRESS NOTE ADULT - SUBJECTIVE AND OBJECTIVE BOX
TEAM [ A ] Surgery Daily Progress Note  =====================================================    SUBJECTIVE: Patient seen and examined at bedside on AM rounds. Patient reports that they're feeling well. Tolerating diet, denies nausea, vomiting. Passing gas, having recent BMs (12/30). OOB/Amublating as tolerated. Denies fever, chills.    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      H/O exploratory laparotomy  LINDA, x3      H/O hernia repair  xlap, ventral hernia repair 6/2018          ALLERGIES:  ceftriaxone (Rash)  morphine (Rash)  cephalosporins (Rash)  fentanyl (Rash)  contrast media (iodine-based) (Rash)  Flagyl (Unknown)  Cipro (Unknown)  iodine containing compounds (Rash)      --------------------------------------------------------------------------------------    MEDICATIONS:    Neurologic Medications  ondansetron Injectable 4 milliGRAM(s) IV Push every 6 hours PRN Nausea and/or Vomiting    Respiratory Medications    Cardiovascular Medications  metoprolol tartrate Injectable 5 milliGRAM(s) IV Push every 6 hours    Gastrointestinal Medications  dextrose 5%. 1000 milliLiter(s) IV Continuous <Continuous>  dextrose 5%. 1000 milliLiter(s) IV Continuous <Continuous>  pantoprazole  Injectable 40 milliGRAM(s) IV Push with breakfast  potassium chloride    Tablet ER 20 milliEquivalent(s) Oral every 2 hours    Genitourinary Medications    Hematologic/Oncologic Medications  heparin   Injectable 5000 Unit(s) SubCutaneous every 8 hours    Antimicrobial/Immunologic Medications    Endocrine/Metabolic Medications  dextrose 50% Injectable 25 Gram(s) IV Push once  dextrose 50% Injectable 25 Gram(s) IV Push once  dextrose 50% Injectable 12.5 Gram(s) IV Push once  dextrose Oral Gel 15 Gram(s) Oral once PRN Blood Glucose LESS THAN 70 milliGRAM(s)/deciliter  glucagon  Injectable 1 milliGRAM(s) IntraMuscular once  insulin glargine Injectable (LANTUS) 10 Unit(s) SubCutaneous at bedtime  insulin lispro (ADMELOG) corrective regimen sliding scale   SubCutaneous three times a day before meals  insulin lispro (ADMELOG) corrective regimen sliding scale   SubCutaneous at bedtime    Topical/Other Medications  benzocaine/menthol Lozenge 1 Lozenge Oral every 2 hours PRN Sore Throat    --------------------------------------------------------------------------------------    VITAL SIGNS:    T(C): 36.6 (12-31-23 @ 09:45), Max: 36.7 (12-30-23 @ 22:00)  HR: 92 (12-31-23 @ 09:45) (79 - 92)  BP: 133/79 (12-31-23 @ 09:45) (123/71 - 137/85)  RR: 18 (12-31-23 @ 09:45) (17 - 18)  SpO2: 98% (12-31-23 @ 09:45) (97% - 100%)    --------------------------------------------------------------------------------------    EXAM    PHYSICAL EXAM:  Constitutional: A&Ox3, NAD  Respiratory: Unlabored breathing  Abdomen: Soft, nondistended, NTTP. No rebound or guarding.   Extremities: WWP, COLIN spontaneously    --------------------------------------------------------------------------------------    LABS                          12.0   7.72  )-----------( 181      ( 31 Dec 2023 06:21 )             36.6   12-31    137  |  105  |  16  ----------------------------<  175<H>  3.7   |  21<L>  |  0.60    Ca    8.9      31 Dec 2023 06:21  Phos  3.1     12-31  Mg     2.10     12-31          --------------------------------------------------------------------------------------    INS AND OUTS:    12-30-23 @ 07:01  -  12-31-23 @ 07:00  --------------------------------------------------------  IN: 1110 mL / OUT: 1125 mL / NET: -15 mL        --------------------------------------------------------------------------------------

## 2023-12-31 NOTE — DISCHARGE NOTE PROVIDER - HOSPITAL COURSE
Sathya Em is a 48 y.o. man with history of HTN, HLD, DM, Buchanan and reversal (2009) c/b multiple SBOs s/p multiple ex lap + LINDA (2014, 2016) and ventral hernia repair (2018) who presented to the ED on 12/27 for 1 day of nausea, vomiting, and right-sided abdominal pain. CT demonstrated early vs evolving SBO with transition in the right hemiabdomen. Pt was managed non-operatively with NGT. He is now tolerating regular diet, having bowel function, ambulating at his baseline, and without nausea, vomiting, or pain. He is stable for discharge with follow up with his primary care doctor in 1-2 weeks.

## 2024-01-21 NOTE — H&P ADULT. - HISTORY OF PRESENT ILLNESS
42 yo M with hx of Isi's and reversal 2009 c/b recurrent SBOs (>15, most managed conservatively, laparotomy/LINDA x2) now presents with 1 day hx of abd pain and distention.  Pt reports no flatus/BM since yesterday AM, with increasing abd distention and associated nausea/emesis.  Feels same as prior episodes of SBOs.      [Of note, pt was previously on A/C for RLE DVT, recently discontinued by vascular surgeon ]
declines

## 2024-02-27 NOTE — PATIENT PROFILE ADULT - ARE ANY OF THE ITEMS ON THE CHART
[de-identified] : The pt is a 80 year old female who is followed by [facility]. She has experienced worsening gait for the last 10 years to the point she uses a walker to ambulate. The pt denies: n/v, HA, falling or loss of bowel or bladder, no numbness or tingling or pain to legs. no muscle wasting reported Reports: + dizziness + vertigo worsening control of her legs  no

## 2024-02-29 NOTE — BRIEF OPERATIVE NOTE - ANTIBIOTIC PROTOCOL
Followed protocol Detail Level: Generalized General Sunscreen Counseling: I recommended a broad spectrum sunscreen with a SPF of 30 or higher.  I explained that SPF 30 sunscreens block approximately 97 percent of the sun's harmful rays.  Sunscreens should be applied at least 15 minutes prior to expected sun exposure and then every 2 hours after that as long as sun exposure continues. If swimming or exercising sunscreen should be reapplied every 45 minutes to an hour after getting wet or sweating.  One ounce, or the equivalent of a shot glass full of sunscreen, is adequate to protect the skin not covered by a bathing suit. I also recommended a lip balm with a sunscreen as well. Sun protective clothing can be used in lieu of sunscreen but must be worn the entire time you are exposed to the sun's rays. \\n\\nI counseled the patient regarding the following:\\nThe ABCDEs of melanoma were reviewed with the patient, and the importance of monthly self-examination of moles was emphasized.  Should any moles change in shape or color, or itch, bleed or burn, pt will contact our office for evaluation sooner then their interval appointment.\\nPatient with multiple nevi. Recommend self exam and for patient to return if any mole changes or symptomatic. FBSE on annual basis recommend.

## 2024-03-07 NOTE — PATIENT PROFILE ADULT - SAFE PLACE TO LIVE
History  Chief Complaint   Patient presents with    Shortness of Breath     Pt SOB and wheezing x 2 days. Pt using at home neb with no relief.  PMHX Asthma     Patient is a 28-year-old male with a history of asthma presents the emergency department complaining of shortness of breath and wheezing cough started 2 days ago still present worsening.  No fevers no chest pain.      History provided by:  Patient  Shortness of Breath  Severity:  Moderate  Onset quality:  Gradual  Duration:  2 days  Timing:  Constant  Progression:  Worsening  Chronicity:  New  Associated symptoms: cough and wheezing    Associated symptoms: no abdominal pain, no chest pain, no ear pain, no fever, no headaches, no rash, no sore throat and no vomiting        Prior to Admission Medications   Prescriptions Last Dose Informant Patient Reported? Taking?   albuterol (2.5 mg/3 mL) 0.083 % nebulizer solution   No No   Sig: Take 3 mL (2.5 mg total) by nebulization every 6 (six) hours as needed for wheezing or shortness of breath   albuterol (Proventil HFA) 90 mcg/act inhaler   No No   Sig: Inhale 2 puffs every 4 (four) hours as needed for wheezing or shortness of breath   albuterol (Proventil HFA) 90 mcg/act inhaler   No No   Sig: Inhale 2 puffs every 6 (six) hours as needed for wheezing   fluticasone (FLONASE) 50 mcg/act nasal spray   No No   Si sprays into each nostril daily   Patient not taking: Reported on 11/10/2022   ipratropium-albuterol (DUO-NEB) 0.5-2.5 mg/3 mL  Self Yes No   Sig: Take 3 mL by nebulization every 6 (six) hours   predniSONE 20 mg tablet   No No   Sig: Take 1 tab TID x 3 days 1 tab BID x 3 days 1 tab QD x 3 days PO with food   Patient not taking: Reported on 11/10/2022      Facility-Administered Medications: None       Past Medical History:   Diagnosis Date    Anxiety     Asthma     Bipolar disorder (HCC)     Depression        Past Surgical History:   Procedure Laterality Date    NO PAST SURGERIES         Family History    Problem Relation Age of Onset    No Known Problems Mother     No Known Problems Father      I have reviewed and agree with the history as documented.    E-Cigarette/Vaping    E-Cigarette Use Never User      E-Cigarette/Vaping Substances     Social History     Tobacco Use    Smoking status: Former     Current packs/day: 0.00     Types: Cigarettes     Quit date: 3/6/2024    Smokeless tobacco: Never   Vaping Use    Vaping status: Never Used   Substance Use Topics    Alcohol use: Yes     Comment: socially     Drug use: Yes     Types: Marijuana     Comment: mony       Review of Systems   Constitutional:  Negative for activity change, appetite change, chills, fatigue and fever.   HENT:  Negative for congestion, ear pain, rhinorrhea and sore throat.    Eyes:  Negative for discharge, redness and visual disturbance.   Respiratory:  Positive for cough, shortness of breath and wheezing. Negative for chest tightness.    Cardiovascular:  Negative for chest pain and palpitations.   Gastrointestinal:  Negative for abdominal pain, constipation, diarrhea, nausea and vomiting.   Endocrine: Negative for polydipsia and polyuria.   Genitourinary:  Negative for difficulty urinating, dysuria, frequency, hematuria and urgency.   Musculoskeletal:  Negative for arthralgias and myalgias.   Skin:  Negative for color change, pallor and rash.   Neurological:  Negative for dizziness, weakness, light-headedness, numbness and headaches.   Hematological:  Negative for adenopathy. Does not bruise/bleed easily.   All other systems reviewed and are negative.      Physical Exam  Physical Exam  Vitals and nursing note reviewed.   Constitutional:       Appearance: He is well-developed.   HENT:      Head: Normocephalic and atraumatic.      Right Ear: External ear normal.      Left Ear: External ear normal.      Nose: Nose normal.   Eyes:      Conjunctiva/sclera: Conjunctivae normal.      Pupils: Pupils are equal, round, and reactive to light.    Cardiovascular:      Rate and Rhythm: Normal rate and regular rhythm.      Heart sounds: Normal heart sounds.   Pulmonary:      Effort: Pulmonary effort is normal. Prolonged expiration present. No respiratory distress.      Breath sounds: Decreased air movement present. Wheezing present. No rales.   Chest:      Chest wall: No tenderness.   Abdominal:      General: Bowel sounds are normal. There is no distension.      Palpations: Abdomen is soft.      Tenderness: There is no abdominal tenderness. There is no guarding.   Musculoskeletal:         General: Normal range of motion.      Cervical back: Normal range of motion and neck supple.   Skin:     General: Skin is warm and dry.   Neurological:      Mental Status: He is alert and oriented to person, place, and time.      Cranial Nerves: No cranial nerve deficit.      Sensory: No sensory deficit.         Vital Signs  ED Triage Vitals [03/07/24 0243]   Temperature Pulse Respirations Blood Pressure SpO2   97.9 °F (36.6 °C) 81 21 135/86 94 %      Temp Source Heart Rate Source Patient Position - Orthostatic VS BP Location FiO2 (%)   Temporal Monitor Lying Left arm --      Pain Score       --           Vitals:    03/07/24 0243 03/07/24 0315   BP: 135/86 135/86   Pulse: 81 83   Patient Position - Orthostatic VS: Lying          Visual Acuity      ED Medications  Medications   albuterol (PROVENTIL HFA,VENTOLIN HFA) inhaler 2 puff (has no administration in time range)   dexamethasone (PF) (DECADRON) injection 10 mg (10 mg Intravenous Given 3/7/24 0254)   albuterol inhalation solution 10 mg (10 mg Nebulization Given 3/7/24 0302)   ipratropium (ATROVENT) 0.02 % inhalation solution 1 mg (1 mg Nebulization Given 3/7/24 0302)   sodium chloride 0.9 % inhalation solution 12 mL (12 mL Nebulization Given 3/7/24 0301)       Diagnostic Studies  Results Reviewed       Procedure Component Value Units Date/Time    Comprehensive metabolic panel [[id number]] Collected: 03/07/24 0253    Lab  Status: Final result Specimen: Blood from Arm, Left Updated: 03/07/24 0317     Sodium 139 mmol/L      Potassium 3.6 mmol/L      Chloride 106 mmol/L      CO2 25 mmol/L      ANION GAP 8 mmol/L      BUN 15 mg/dL      Creatinine 0.85 mg/dL      Glucose 85 mg/dL      Calcium 9.1 mg/dL      AST 21 U/L      ALT 22 U/L      Alkaline Phosphatase 53 U/L      Total Protein 7.5 g/dL      Albumin 4.7 g/dL      Total Bilirubin 0.34 mg/dL      eGFR 116 ml/min/1.73sq m     Narrative:      National Kidney Disease Foundation guidelines for Chronic Kidney Disease (CKD):     Stage 1 with normal or high GFR (GFR > 90 mL/min/1.73 square meters)    Stage 2 Mild CKD (GFR = 60-89 mL/min/1.73 square meters)    Stage 3A Moderate CKD (GFR = 45-59 mL/min/1.73 square meters)    Stage 3B Moderate CKD (GFR = 30-44 mL/min/1.73 square meters)    Stage 4 Severe CKD (GFR = 15-29 mL/min/1.73 square meters)    Stage 5 End Stage CKD (GFR <15 mL/min/1.73 square meters)  Note: GFR calculation is accurate only with a steady state creatinine    CBC and differential [[id number]]  (Abnormal) Collected: 03/07/24 0253    Lab Status: Final result Specimen: Blood from Arm, Left Updated: 03/07/24 0303     WBC 10.16 Thousand/uL      RBC 5.56 Million/uL      Hemoglobin 14.8 g/dL      Hematocrit 46.5 %      MCV 84 fL      MCH 26.6 pg      MCHC 31.8 g/dL      RDW 13.8 %      MPV 9.9 fL      Platelets 247 Thousands/uL      nRBC 0 /100 WBCs      Neutrophils Relative 53 %      Immat GRANS % 0 %      Lymphocytes Relative 29 %      Monocytes Relative 7 %      Eosinophils Relative 10 %      Basophils Relative 1 %      Neutrophils Absolute 5.48 Thousands/µL      Immature Grans Absolute 0.03 Thousand/uL      Lymphocytes Absolute 2.96 Thousands/µL      Monocytes Absolute 0.67 Thousand/µL      Eosinophils Absolute 0.97 Thousand/µL      Basophils Absolute 0.05 Thousands/µL     FLU/RSV/COVID - if FLU/RSV clinically relevant [[id number]] Collected: 03/07/24 0253    Lab Status: In  process Specimen: Nares from Nose Updated: 03/07/24 0259                   XR chest 1 view portable   ED Interpretation by Jesús Zamorano DO (03/07 0303)   Hyperinflation of lungs no acute cardiopulmonary disease no infiltrate                 Procedures  Procedures         ED Course                                             Medical Decision Making  Differential diagnosis included but not limited to pneumonia upper respiratory infection asthma exacerbation.  Patient remained clinically and hemodynamically stable in the emergency department normal O2 saturation on room air no respiratory distress improved greatly with breathing treatment and steroids given in the ED. patient felt well and wished to return home requesting refill of inhaler and nebulizer solution for now we will recommend steroid course and advised nebulizer and metered-dose inhaler as needed and prompt follow-up with primary physician for further evaluation and treatment.  return precautions and anticipatory guidance discussed.      Problems Addressed:  Asthma exacerbation: acute illness or injury    Amount and/or Complexity of Data Reviewed  Labs: ordered. Decision-making details documented in ED Course.  Radiology: ordered and independent interpretation performed. Decision-making details documented in ED Course.    Risk  Prescription drug management.             Disposition  Final diagnoses:   Asthma exacerbation     Time reflects when diagnosis was documented in both MDM as applicable and the Disposition within this note       Time User Action Codes Description Comment    3/7/2024  3:16 AM Jesús Zamorano Add [J45.901] Asthma exacerbation           ED Disposition       ED Disposition   Discharge    Condition   Stable    Date/Time   Thu Mar 7, 2024  3:25 AM    Comment   Deshawn Emerson discharge to home/self care.                   Follow-up Information       Follow up With Specialties Details Why Contact Info      Schedule an appointment as soon as  possible for a visit in 3 days  Your primary care physician            Patient's Medications   Discharge Prescriptions    ALBUTEROL (2.5 MG/3 ML) 0.083 % NEBULIZER SOLUTION    Take 3 mL (2.5 mg total) by nebulization every 4 (four) hours as needed for shortness of breath or wheezing       Start Date: 3/7/2024  End Date: --       Order Dose: 2.5 mg       Quantity: 75 mL    Refills: 0    ALBUTEROL (PROVENTIL HFA) 90 MCG/ACT INHALER    Inhale 2 puffs every 6 (six) hours as needed for wheezing       Start Date: 3/7/2024  End Date: --       Order Dose: 2 puffs       Quantity: 6.7 g    Refills: 0    PREDNISONE 20 MG TABLET    Take 3 tablets (60 mg total) by mouth daily for 5 days       Start Date: 3/7/2024  End Date: 3/12/2024       Order Dose: 60 mg       Quantity: 15 tablet    Refills: 0       No discharge procedures on file.    PDMP Review       None            ED Provider  Electronically Signed by             Jesús Zamorano DO  03/07/24 032     no

## 2024-04-22 ENCOUNTER — INPATIENT (INPATIENT)
Facility: HOSPITAL | Age: 49
LOS: 5 days | Discharge: HOME | End: 2024-04-28
Attending: SURGERY | Admitting: SURGERY
Payer: COMMERCIAL

## 2024-04-22 VITALS
RESPIRATION RATE: 25 BRPM | SYSTOLIC BLOOD PRESSURE: 79 MMHG | TEMPERATURE: 98 F | HEART RATE: 120 BPM | DIASTOLIC BLOOD PRESSURE: 42 MMHG | OXYGEN SATURATION: 98 %

## 2024-04-22 DIAGNOSIS — Z98.89 OTHER SPECIFIED POSTPROCEDURAL STATES: Chronic | ICD-10-CM

## 2024-04-22 DIAGNOSIS — Z98.890 OTHER SPECIFIED POSTPROCEDURAL STATES: Chronic | ICD-10-CM

## 2024-04-22 DIAGNOSIS — R10.9 UNSPECIFIED ABDOMINAL PAIN: ICD-10-CM

## 2024-04-22 LAB
ALBUMIN SERPL ELPH-MCNC: 5 G/DL — SIGNIFICANT CHANGE UP (ref 3.3–5)
ALP SERPL-CCNC: 50 U/L — SIGNIFICANT CHANGE UP (ref 40–120)
ALT FLD-CCNC: 32 U/L — SIGNIFICANT CHANGE UP (ref 4–41)
ANION GAP SERPL CALC-SCNC: 20 MMOL/L — HIGH (ref 7–14)
APPEARANCE UR: CLEAR — SIGNIFICANT CHANGE UP
APTT BLD: 24 SEC — LOW (ref 24.5–35.6)
AST SERPL-CCNC: 23 U/L — SIGNIFICANT CHANGE UP (ref 4–40)
BACTERIA # UR AUTO: NEGATIVE /HPF — SIGNIFICANT CHANGE UP
BASE EXCESS BLDV CALC-SCNC: -2.5 MMOL/L — LOW (ref -2–3)
BASOPHILS # BLD AUTO: 0.03 K/UL — SIGNIFICANT CHANGE UP (ref 0–0.2)
BASOPHILS NFR BLD AUTO: 0.2 % — SIGNIFICANT CHANGE UP (ref 0–2)
BILIRUB SERPL-MCNC: 0.5 MG/DL — SIGNIFICANT CHANGE UP (ref 0.2–1.2)
BILIRUB UR-MCNC: NEGATIVE — SIGNIFICANT CHANGE UP
BLD GP AB SCN SERPL QL: NEGATIVE — SIGNIFICANT CHANGE UP
BLOOD GAS VENOUS COMPREHENSIVE RESULT: SIGNIFICANT CHANGE UP
BUN SERPL-MCNC: 23 MG/DL — SIGNIFICANT CHANGE UP (ref 7–23)
CALCIUM SERPL-MCNC: 11.1 MG/DL — HIGH (ref 8.4–10.5)
CAST: 2 /LPF — SIGNIFICANT CHANGE UP (ref 0–4)
CHLORIDE BLDV-SCNC: 103 MMOL/L — SIGNIFICANT CHANGE UP (ref 96–108)
CHLORIDE SERPL-SCNC: 98 MMOL/L — SIGNIFICANT CHANGE UP (ref 98–107)
CO2 BLDV-SCNC: 25.1 MMOL/L — SIGNIFICANT CHANGE UP (ref 22–26)
CO2 SERPL-SCNC: 22 MMOL/L — SIGNIFICANT CHANGE UP (ref 22–31)
COLOR SPEC: YELLOW — SIGNIFICANT CHANGE UP
CREAT SERPL-MCNC: 1.35 MG/DL — HIGH (ref 0.5–1.3)
DIFF PNL FLD: NEGATIVE — SIGNIFICANT CHANGE UP
EGFR: 64 ML/MIN/1.73M2 — SIGNIFICANT CHANGE UP
EOSINOPHIL # BLD AUTO: 0.01 K/UL — SIGNIFICANT CHANGE UP (ref 0–0.5)
EOSINOPHIL NFR BLD AUTO: 0.1 % — SIGNIFICANT CHANGE UP (ref 0–6)
FLUAV AG NPH QL: SIGNIFICANT CHANGE UP
FLUBV AG NPH QL: SIGNIFICANT CHANGE UP
GAS PNL BLDV: 136 MMOL/L — SIGNIFICANT CHANGE UP (ref 136–145)
GLUCOSE BLDV-MCNC: 258 MG/DL — HIGH (ref 70–99)
GLUCOSE SERPL-MCNC: 265 MG/DL — HIGH (ref 70–99)
GLUCOSE UR QL: >=1000 MG/DL
HCO3 BLDV-SCNC: 24 MMOL/L — SIGNIFICANT CHANGE UP (ref 22–29)
HCT VFR BLD CALC: 47.9 % — SIGNIFICANT CHANGE UP (ref 39–50)
HCT VFR BLDA CALC: 43 % — SIGNIFICANT CHANGE UP (ref 39–51)
HGB BLD CALC-MCNC: 14.2 G/DL — SIGNIFICANT CHANGE UP (ref 12.6–17.4)
HGB BLD-MCNC: 14.9 G/DL — SIGNIFICANT CHANGE UP (ref 13–17)
IANC: 9.68 K/UL — HIGH (ref 1.8–7.4)
IMM GRANULOCYTES NFR BLD AUTO: 0.4 % — SIGNIFICANT CHANGE UP (ref 0–0.9)
INR BLD: 0.93 RATIO — SIGNIFICANT CHANGE UP (ref 0.85–1.18)
KETONES UR-MCNC: ABNORMAL MG/DL
LACTATE BLDV-MCNC: 3.4 MMOL/L — HIGH (ref 0.5–2)
LEUKOCYTE ESTERASE UR-ACNC: NEGATIVE — SIGNIFICANT CHANGE UP
LIDOCAIN IGE QN: 29 U/L — SIGNIFICANT CHANGE UP (ref 7–60)
LYMPHOCYTES # BLD AUTO: 2.83 K/UL — SIGNIFICANT CHANGE UP (ref 1–3.3)
LYMPHOCYTES # BLD AUTO: 22 % — SIGNIFICANT CHANGE UP (ref 13–44)
MAGNESIUM SERPL-MCNC: 1.9 MG/DL — SIGNIFICANT CHANGE UP (ref 1.6–2.6)
MCHC RBC-ENTMCNC: 26.6 PG — LOW (ref 27–34)
MCHC RBC-ENTMCNC: 31.1 GM/DL — LOW (ref 32–36)
MCV RBC AUTO: 85.5 FL — SIGNIFICANT CHANGE UP (ref 80–100)
MONOCYTES # BLD AUTO: 0.27 K/UL — SIGNIFICANT CHANGE UP (ref 0–0.9)
MONOCYTES NFR BLD AUTO: 2.1 % — SIGNIFICANT CHANGE UP (ref 2–14)
NEUTROPHILS # BLD AUTO: 9.68 K/UL — HIGH (ref 1.8–7.4)
NEUTROPHILS NFR BLD AUTO: 75.2 % — SIGNIFICANT CHANGE UP (ref 43–77)
NITRITE UR-MCNC: NEGATIVE — SIGNIFICANT CHANGE UP
NRBC # BLD: 0 /100 WBCS — SIGNIFICANT CHANGE UP (ref 0–0)
NRBC # FLD: 0 K/UL — SIGNIFICANT CHANGE UP (ref 0–0)
NT-PROBNP SERPL-SCNC: 51 PG/ML — SIGNIFICANT CHANGE UP
PCO2 BLDV: 45 MMHG — SIGNIFICANT CHANGE UP (ref 42–55)
PH BLDV: 7.33 — SIGNIFICANT CHANGE UP (ref 7.32–7.43)
PH UR: 6 — SIGNIFICANT CHANGE UP (ref 5–8)
PLATELET # BLD AUTO: 298 K/UL — SIGNIFICANT CHANGE UP (ref 150–400)
PO2 BLDV: 26 MMHG — SIGNIFICANT CHANGE UP (ref 25–45)
POTASSIUM BLDV-SCNC: 4.4 MMOL/L — SIGNIFICANT CHANGE UP (ref 3.5–5.1)
POTASSIUM SERPL-MCNC: 3.7 MMOL/L — SIGNIFICANT CHANGE UP (ref 3.5–5.3)
POTASSIUM SERPL-SCNC: 3.7 MMOL/L — SIGNIFICANT CHANGE UP (ref 3.5–5.3)
PROT SERPL-MCNC: 8.4 G/DL — HIGH (ref 6–8.3)
PROT UR-MCNC: 30 MG/DL
PROTHROM AB SERPL-ACNC: 10.5 SEC — SIGNIFICANT CHANGE UP (ref 9.5–13)
RBC # BLD: 5.6 M/UL — SIGNIFICANT CHANGE UP (ref 4.2–5.8)
RBC # FLD: 15.5 % — HIGH (ref 10.3–14.5)
RBC CASTS # UR COMP ASSIST: 1 /HPF — SIGNIFICANT CHANGE UP (ref 0–4)
RH IG SCN BLD-IMP: POSITIVE — SIGNIFICANT CHANGE UP
RSV RNA NPH QL NAA+NON-PROBE: SIGNIFICANT CHANGE UP
SAO2 % BLDV: 34.6 % — LOW (ref 67–88)
SARS-COV-2 RNA SPEC QL NAA+PROBE: SIGNIFICANT CHANGE UP
SODIUM SERPL-SCNC: 140 MMOL/L — SIGNIFICANT CHANGE UP (ref 135–145)
SP GR SPEC: 1.03 — SIGNIFICANT CHANGE UP (ref 1–1.03)
SQUAMOUS # UR AUTO: 1 /HPF — SIGNIFICANT CHANGE UP (ref 0–5)
TROPONIN T, HIGH SENSITIVITY RESULT: 18 NG/L — SIGNIFICANT CHANGE UP
TROPONIN T, HIGH SENSITIVITY RESULT: 21 NG/L — SIGNIFICANT CHANGE UP
UROBILINOGEN FLD QL: 0.2 MG/DL — SIGNIFICANT CHANGE UP (ref 0.2–1)
WBC # BLD: 12.87 K/UL — HIGH (ref 3.8–10.5)
WBC # FLD AUTO: 12.87 K/UL — HIGH (ref 3.8–10.5)
WBC UR QL: 1 /HPF — SIGNIFICANT CHANGE UP (ref 0–5)

## 2024-04-22 PROCEDURE — 99222 1ST HOSP IP/OBS MODERATE 55: CPT | Mod: GC

## 2024-04-22 PROCEDURE — 99291 CRITICAL CARE FIRST HOUR: CPT

## 2024-04-22 PROCEDURE — 74176 CT ABD & PELVIS W/O CONTRAST: CPT | Mod: 26,MC

## 2024-04-22 PROCEDURE — 71045 X-RAY EXAM CHEST 1 VIEW: CPT | Mod: 26

## 2024-04-22 PROCEDURE — 71250 CT THORAX DX C-: CPT | Mod: 26,MC

## 2024-04-22 RX ORDER — DEXTROSE 50 % IN WATER 50 %
25 SYRINGE (ML) INTRAVENOUS ONCE
Refills: 0 | Status: DISCONTINUED | OUTPATIENT
Start: 2024-04-22 | End: 2024-04-28

## 2024-04-22 RX ORDER — SODIUM CHLORIDE 9 MG/ML
1000 INJECTION, SOLUTION INTRAVENOUS
Refills: 0 | Status: DISCONTINUED | OUTPATIENT
Start: 2024-04-22 | End: 2024-04-28

## 2024-04-22 RX ORDER — INSULIN LISPRO 100/ML
VIAL (ML) SUBCUTANEOUS
Refills: 0 | Status: DISCONTINUED | OUTPATIENT
Start: 2024-04-22 | End: 2024-04-23

## 2024-04-22 RX ORDER — HEPARIN SODIUM 5000 [USP'U]/ML
5000 INJECTION INTRAVENOUS; SUBCUTANEOUS EVERY 8 HOURS
Refills: 0 | Status: DISCONTINUED | OUTPATIENT
Start: 2024-04-22 | End: 2024-04-28

## 2024-04-22 RX ORDER — DEXTROSE 10 % IN WATER 10 %
125 INTRAVENOUS SOLUTION INTRAVENOUS ONCE
Refills: 0 | Status: DISCONTINUED | OUTPATIENT
Start: 2024-04-22 | End: 2024-04-28

## 2024-04-22 RX ORDER — DEXTROSE 50 % IN WATER 50 %
12.5 SYRINGE (ML) INTRAVENOUS ONCE
Refills: 0 | Status: DISCONTINUED | OUTPATIENT
Start: 2024-04-22 | End: 2024-04-28

## 2024-04-22 RX ORDER — SODIUM CHLORIDE 9 MG/ML
500 INJECTION, SOLUTION INTRAVENOUS ONCE
Refills: 0 | Status: COMPLETED | OUTPATIENT
Start: 2024-04-22 | End: 2024-04-22

## 2024-04-22 RX ORDER — GLUCAGON INJECTION, SOLUTION 0.5 MG/.1ML
1 INJECTION, SOLUTION SUBCUTANEOUS ONCE
Refills: 0 | Status: DISCONTINUED | OUTPATIENT
Start: 2024-04-22 | End: 2024-04-28

## 2024-04-22 RX ORDER — SODIUM CHLORIDE 9 MG/ML
1000 INJECTION INTRAMUSCULAR; INTRAVENOUS; SUBCUTANEOUS ONCE
Refills: 0 | Status: COMPLETED | OUTPATIENT
Start: 2024-04-22 | End: 2024-04-22

## 2024-04-22 RX ORDER — SODIUM CHLORIDE 9 MG/ML
1000 INJECTION, SOLUTION INTRAVENOUS
Refills: 0 | Status: DISCONTINUED | OUTPATIENT
Start: 2024-04-22 | End: 2024-04-25

## 2024-04-22 RX ORDER — BENZOCAINE AND MENTHOL 5; 1 G/100ML; G/100ML
1 LIQUID ORAL
Refills: 0 | Status: DISCONTINUED | OUTPATIENT
Start: 2024-04-22 | End: 2024-04-28

## 2024-04-22 RX ORDER — DEXTROSE 50 % IN WATER 50 %
15 SYRINGE (ML) INTRAVENOUS ONCE
Refills: 0 | Status: DISCONTINUED | OUTPATIENT
Start: 2024-04-22 | End: 2024-04-28

## 2024-04-22 RX ORDER — ONDANSETRON 8 MG/1
4 TABLET, FILM COATED ORAL ONCE
Refills: 0 | Status: COMPLETED | OUTPATIENT
Start: 2024-04-22 | End: 2024-04-23

## 2024-04-22 RX ORDER — ACETAMINOPHEN 500 MG
1000 TABLET ORAL ONCE
Refills: 0 | Status: COMPLETED | OUTPATIENT
Start: 2024-04-22 | End: 2024-04-22

## 2024-04-22 RX ORDER — PIPERACILLIN AND TAZOBACTAM 4; .5 G/20ML; G/20ML
3.38 INJECTION, POWDER, LYOPHILIZED, FOR SOLUTION INTRAVENOUS ONCE
Refills: 0 | Status: COMPLETED | OUTPATIENT
Start: 2024-04-22 | End: 2024-04-22

## 2024-04-22 RX ORDER — ONDANSETRON 8 MG/1
4 TABLET, FILM COATED ORAL ONCE
Refills: 0 | Status: COMPLETED | OUTPATIENT
Start: 2024-04-22 | End: 2024-04-22

## 2024-04-22 RX ORDER — VANCOMYCIN HCL 1 G
1000 VIAL (EA) INTRAVENOUS ONCE
Refills: 0 | Status: COMPLETED | OUTPATIENT
Start: 2024-04-22 | End: 2024-04-22

## 2024-04-22 RX ORDER — METOPROLOL TARTRATE 50 MG
5 TABLET ORAL EVERY 6 HOURS
Refills: 0 | Status: DISCONTINUED | OUTPATIENT
Start: 2024-04-22 | End: 2024-04-27

## 2024-04-22 RX ADMIN — ONDANSETRON 4 MILLIGRAM(S): 8 TABLET, FILM COATED ORAL at 19:57

## 2024-04-22 RX ADMIN — Medication 400 MILLIGRAM(S): at 20:20

## 2024-04-22 RX ADMIN — SODIUM CHLORIDE 125 MILLILITER(S): 9 INJECTION, SOLUTION INTRAVENOUS at 22:42

## 2024-04-22 RX ADMIN — Medication 250 MILLIGRAM(S): at 22:40

## 2024-04-22 RX ADMIN — SODIUM CHLORIDE 1000 MILLILITER(S): 9 INJECTION INTRAMUSCULAR; INTRAVENOUS; SUBCUTANEOUS at 20:19

## 2024-04-22 RX ADMIN — SODIUM CHLORIDE 1000 MILLILITER(S): 9 INJECTION INTRAMUSCULAR; INTRAVENOUS; SUBCUTANEOUS at 19:57

## 2024-04-22 RX ADMIN — PIPERACILLIN AND TAZOBACTAM 200 GRAM(S): 4; .5 INJECTION, POWDER, LYOPHILIZED, FOR SOLUTION INTRAVENOUS at 20:20

## 2024-04-22 RX ADMIN — HEPARIN SODIUM 5000 UNIT(S): 5000 INJECTION INTRAVENOUS; SUBCUTANEOUS at 22:40

## 2024-04-22 NOTE — ED PROVIDER NOTE - CLINICAL SUMMARY MEDICAL DECISION MAKING FREE TEXT BOX
48-year-old male pmhx of hypertension, hyperlipidemia, diabetes, Buchanan and reversal in 2009 status post multiple SBO's status post multiple ex lap's comes to ED w/ nausea, vomiting, abdominal pain, lightheadedness, dizziness, palpitations.  Patient reports that symptoms are similar to prior SBO.  Started in the past couple of hours. Their pain/symptom is moderate, constant, non mediating with rest.    General: uncomfortable in room  HEENT: NCAT, PERRL   Cardiac: RRR, no murmurs  Chest: CTAB  Abdomen: soft, distended. Bowel sounds present, no ttp, no rebound or guarding   Extremities: no peripheral edema, calf tenderness, or leg size discrepancies   Skin: no rashes   Neuro: AAOx4, 5+motor, sensory grossly intact   Psych: mood and affect appropriate    Impression: 48-year-old male pmhx of hypertension, hyperlipidemia, diabetes, Buchanan and reversal in 2009 status post multiple SBO's status post multiple ex lap's comes to ED w/ nausea, vomiting, abdominal pain, lightheadedness, dizziness, palpitations.  Their symptoms and exam findings are concerning for viral illness, metabolic abnormality, infection, given similar to prior presentations concerning for SBO.    Ordered labs, imaging, medications for diagnosis, management, and treatment.

## 2024-04-22 NOTE — ED ADULT TRIAGE NOTE - CHIEF COMPLAINT QUOTE
pt hypotensive 70's/40s in triage. endorsing feeling lightheaded, palpitations, and says "I feel I am going to pass out". pt appears cool, pale, and diaphoretic. Hx of DMII, SBO, DVT, HTN, HLD. .    charge aware and brought directly to [facility] A.

## 2024-04-22 NOTE — ED PROVIDER NOTE - ATTENDING CONTRIBUTION TO CARE
I have personally performed a face to face medical and diagnostic evaluation of the patient. I have discussed with and reviewed the Resident's note and agree with the History, ROS, Physical Exam and MDM unless otherwise indicated. A brief summary of my personal evaluation and impression can be found below.    Maeve CHRISTY: 48-year-old male history of diverticulitis prior bowel obstructions, diabetes hypertension hyperlipidemia prior Buchanan, presents with a chief complaint of upper abdominal discomfort that feels consistent with repeated episodes of bowel obstruction.  Patient reports he had a bowel movement 5 PM earlier today with passing gas.  Is nauseous but not vomiting.  Is not having any chest pain or trouble breathing no fevers no urinary complaints no diarrhea.  He can move everything and feel everything.  No new back pain.  Patient is reporting feeling lightheaded and dizzy, he acknowledges that he feels like his blood pressure is low and this is never happened to him before.    All other ROS negative, except as above and as per HPI and ROS section.    VITALS: Initial triage and subsequent vitals have been reviewed by me.  GEN APPEARANCE: Alert, non-toxic,Ill-appearing pale  HEAD: Atraumatic.  EYES: PERRLa, EOMI, vision grossly intact.   NECK: Supple  CV: RRR, S1S2, no c/r/m/g. Cap refill <2 seconds. No bruits.   LUNGS: CTAB. No abnormal breath sounds.  ABDOMEN: Soft, NTND. No guarding or rebound.   MSK/EXT: No spinal or extremity point tenderness. No CVA ttp. Pelvis stable. No peripheral edema.  NEURO: Alert, follows commands. Weight bearing normal. Speech normal. Sensation and motor normal x4 extremities.   SKIN: Warm, dry and intact. No rash.  PSYCH: Appropriate    Plan/MDM: Exam patient arrives tachycardic hypotensive ill and pale appearing with physical exam as above, DDx concern for possible bowel obstruction possibly high-grade versus acute infectious etiology though less likely no fever consider possible cardiac etiology though EKG is reassuring, patient has a severe IV contrast allergy reports he cannot get IV contrast and has had prior bowel obstructions diagnosed before on CT Noncon.  Given his overall presentation patient is at a high risk for acute deterioration, we will arrange for him to get a stat CT chest abdomen pelvis Noncon, we will discuss him with surgery, will give him fluids check labs cardiac's EKG get preop labs, monitor closely, consider starting pressors/intensivist evaluation as indicated patient will require admission.

## 2024-04-22 NOTE — H&P ADULT - ATTENDING COMMENTS
Multiple previous admisssions for SBO . Similar events as before. Evidence of dehydration. . Plan NG, Fluids and observation. DH

## 2024-04-22 NOTE — ED ADULT NURSE NOTE - OBJECTIVE STATEMENT
Break coverage note : Pt arrives into TRB area awake and alert x 4 ,pale with co nausea no vomiting. pt placed on cardiac monitor ,tachycardic and hypotensive. Two 20g iv placed blood  collected , fluids hung. Pt denies chest pain but co dizziness and sob.  pt placed on zoll and brought over to ct scan with attending. Pt now awaiting dipo. Pt endorsed over to area Rn.

## 2024-04-22 NOTE — H&P ADULT - ASSESSMENT
48M with adhesive SBO recent gas and BM hypotensive but responded to IVF now hemodynamically normal with 1.5L ngt output in ED.     Recs:  - admit, Dr. White, floor  - IVF   - NPO  - NGT LCS  - gastrografin in AM if output decreased   - home beta blocker to IV, med rec completed  - home antidiabetics to insulin sliding scale  - scds and hsq (ANDERSON) for vte ppx   - AM labs   - d/w Dr. Christopher Barbosa MD, PGY3  A Team Surgery   [id number] 48M with adhesive SBO recent gas and BM hypotensive but responded to IVF now hemodynamically normal with 1.5L ngt output in ED.     Recs:  - admit, Dr. White, floor  - IVF   - NPO  - NGT LCS  - examine before pain meds  - gastrografin in AM if output decreased   - home beta blocker to IV, med rec completed  - home antidiabetics to insulin sliding scale  - scds and hsq (ANDERSON) for vte ppx   - AM labs   - d/w Dr. Christopher Barbosa MD, PGY3  A Team Surgery   [id number]

## 2024-04-22 NOTE — ED PROVIDER NOTE - PROGRESS NOTE DETAILS
Maeve CHRISTY: pt met sirs criteria upon ed arrival however low c/f sepsis at this time, c/f more likely bowel obstruction  / ileus

## 2024-04-22 NOTE — H&P ADULT - NSHPLABSRESULTS_GEN_ALL_CORE
< from: CT Abdomen and Pelvis No Cont (04.22.24 @ 20:03) >    INTERPRETATION:  CLINICAL INFORMATION: Abdominal pain    COMPARISON: CT abdomen pelvis 12/27/2023.    CONTRAST/COMPLICATIONS:  IV Contrast: None  Oral Contrast: None  Complications: None reported at time of exam    PROCEDURE:  CT of the Chest, Abdomen and Pelvis was performed.  Sagittal and coronal reformats were performed.    FINDINGS:  CHEST:  LUNGS AND LARGE AIRWAYS: Patent central airways. No pulmonary nodules.  PLEURA: No pleural effusion.  VESSELS: Within normal limits.  HEART: Heart size is normal. No pericardial effusion.  MEDIASTINUM AND MARISOL: No lymphadenopathy.  CHEST WALL AND LOWER NECK: Within normal limits.    ABDOMEN AND PELVIS:  LIVER: Within normal limits.  BILE DUCTS: Normal caliber.  GALLBLADDER: Cholelithiasis.  SPLEEN: Within normal limits.  PANCREAS: Within normal limits.  ADRENALS:Within normal limits.  KIDNEYS/URETERS: Within normal limits.    BLADDER: Within normal limits.  REPRODUCTIVE ORGANS: Prostate within normal limits.    BOWEL: Dilated loops of small bowel measuring up to 4.5 cm. No definitive   transition point. Multiple loops of large and small bowel bowel within   ventral hernia. Appendix is not visualized. No evidence of inflammation   in the pericecal region. Diverticulosis, without evidence of acute   diverticulitis.  PERITONEUM: No ascites. No pneumoperitoneum.  VESSELS: Within normal limits.  RETROPERITONEUM/LYMPH NODES: No lymphadenopathy.  ABDOMINAL WALL: Bowel containing ventral hernia and postsurgical changes.  BONES: Degenerative changes.    IMPRESSION:  Dilated loops of small bowel without evidence of discrete transition   point, suggesting ileus.    No evidence of cholelithiasis discrete transition point to suggest   obstruction.      < end of copied text >

## 2024-04-22 NOTE — H&P ADULT - HISTORY OF PRESENT ILLNESS
GENERAL SURGERY ADMISSION NOTE  --------------------------------------------------------------------------------------------    Patient is a 48y old  Male who presents with a chief complaint of     HPI: 48M with multiple prior SBO, with Isi's for perf divertic, hernia repair, mutliple ex lap LINDA, last admitted in December 2023 for SBO, presents with hypotension, tachycardia, nausea, no vomiting.  Says he thinks he needs ngt and feels like he has an obstruction again.  Abdominal discomfort in belt distribution across upper abdomen, mild in severity.  Passed gas earlier today, had normal BM at 5pm.  BP 70s/50s, tachy 120s, improved with 2L bolus to normal BP 110s SBP.  Voiding normally, no dysuria no cough.  NGT placed in ED with 1.5L bilious/feculent output.       ROS: 10-system review is otherwise negative except HPI above.      PAST MEDICAL & SURGICAL HISTORY:  Hypertension      Hyperlipidemia      Diabetes mellitus, type 2      SBO (small bowel obstruction)      Diverticulitis      DVT (deep venous thrombosis)  RLE 2016, post op, was on eliquis for 6 months      Open wound of abdominal wall      GERD (gastroesophageal reflux disease)      S/P Isi's procedure  2009 with reversal 2009      H/O exploratory laparotomy  LINDA, x3      H/O hernia repair  xlap, ventral hernia repair 6/2018        FAMILY HISTORY:      SOCIAL HISTORY:      ALLERGIES: morphine (Rash)  fentanyl (Rash)  ceftriaxone (Rash)  contrast media (iodine-based) (Rash)  cephalosporins (Rash)  Flagyl (Unknown)  Cipro (Unknown)  iodine containing compounds (Rash)      HOME MEDICATIONS:     CURRENT MEDICATIONS  MEDICATIONS (STANDING): heparin   Injectable 5000 Unit(s) SubCutaneous every 8 hours  vancomycin  IVPB. 1000 milliGRAM(s) IV Intermittent once    MEDICATIONS (PRN):  --------------------------------------------------------------------------------------------    Vitals:   T(C): 36.4 (04-22-24 @ 19:36), Max: 36.4 (04-22-24 @ 19:36)  HR: 110 (04-22-24 @ 20:35) (110 - 120)  BP: 105/60 (04-22-24 @ 20:35) (74/62 - 105/60)  RR: 21 (04-22-24 @ 20:35) (20 - 25)  SpO2: 99% (04-22-24 @ 20:35) (98% - 100%)  CAPILLARY BLOOD GLUCOSE      POCT Blood Glucose.: 249 mg/dL (22 Apr 2024 19:42)  POCT Blood Glucose.: 234 mg/dL (22 Apr 2024 19:30)    04-22 @ 07:01  -  04-22 @ 21:31  --------------------------------------------------------  IN:  Total IN: 0 mL    OUT:    Nasogastric/Oral tube (mL): 1500 mL  Total OUT: 1500 mL    Total NET: -1500 mL            PHYSICAL EXAM:   General: NAD, Lying in bed comfortably  Neuro: A+Ox3  HEENT: NC/AT, EOMI, anicteric, ngt bilious/light brown  Cardio: sinus tach on monitor   Resp: Good effort, CTA b/l  GI/Abd: Softly distended, min tenderness in epigastrium, multiple scars, ventral hernia reducible   Musculoskeletal: All 4 extremities moving spontaneously, no limitations.  --------------------------------------------------------------------------------------------    LABS  CBC (04-22 @ 20:01)                              14.9                           12.87<H>  )----------------(  298        75.2  % Neutrophils, 22.0  % Lymphocytes, ANC: 9.68<H>                              47.9      BMP (04-22 @ 20:01)             140     |  98      |  23    		Ca++ --      Ca 11.1<H>             ---------------------------------( 265<H>		Mg 1.90               3.7     |  22      |  1.35<H>			Ph --        LFTs (04-22 @ 20:01)      TPro 8.4<H> / Alb 5.0 / TBili 0.5 / DBili -- / AST 23 / ALT 32 / AlkPhos 50    Coags (04-22 @ 20:01)  aPTT 24.0<L> / INR 0.93 / PT 10.5        VBG (04-22 @ 20:45)     7.33 / 45 / 26 / 24 / -2.5<L> / 34.6<L>%     Lactate: 3.4<H>    --------------------------------------------------------------------------------------------    MICROBIOLOGY  Urinalysis (04-22 @ 20:01):     Color:  / Appearance:  / SG:  / pH:  / Gluc: 265<H> / Ketones:  / Bili:  / Urobili:  / Protein : / Nitrites:  / Leuk.Est:  / RBC:  / WBC:  / Sq Epi:  / Non Sq Epi:  / Bacteria          --------------------------------------------------------------------------------------------

## 2024-04-22 NOTE — ED ADULT NURSE REASSESSMENT NOTE - NS ED NURSE REASSESS COMMENT FT1
Pt had reaction to vancomycin (became hot, flushed, diaphoretic and nauseous) IV infusion stopped and patient felt better. Pt now back to baseline. Provider from A/B surgery team made aware and coming to assess patient. VS documented.

## 2024-04-23 LAB
A1C WITH ESTIMATED AVERAGE GLUCOSE RESULT: 8.5 % — HIGH (ref 4–5.6)
ANION GAP SERPL CALC-SCNC: 15 MMOL/L — HIGH (ref 7–14)
BUN SERPL-MCNC: 28 MG/DL — HIGH (ref 7–23)
CALCIUM SERPL-MCNC: 8.8 MG/DL — SIGNIFICANT CHANGE UP (ref 8.4–10.5)
CHLORIDE SERPL-SCNC: 106 MMOL/L — SIGNIFICANT CHANGE UP (ref 98–107)
CO2 SERPL-SCNC: 19 MMOL/L — LOW (ref 22–31)
CREAT SERPL-MCNC: 1.07 MG/DL — SIGNIFICANT CHANGE UP (ref 0.5–1.3)
CULTURE RESULTS: NO GROWTH — SIGNIFICANT CHANGE UP
EGFR: 85 ML/MIN/1.73M2 — SIGNIFICANT CHANGE UP
ESTIMATED AVERAGE GLUCOSE: 197 — SIGNIFICANT CHANGE UP
GLUCOSE BLDC GLUCOMTR-MCNC: 116 MG/DL — HIGH (ref 70–99)
GLUCOSE BLDC GLUCOMTR-MCNC: 126 MG/DL — HIGH (ref 70–99)
GLUCOSE BLDC GLUCOMTR-MCNC: 164 MG/DL — HIGH (ref 70–99)
GLUCOSE BLDC GLUCOMTR-MCNC: 169 MG/DL — HIGH (ref 70–99)
GLUCOSE SERPL-MCNC: 218 MG/DL — HIGH (ref 70–99)
HCT VFR BLD CALC: 39.8 % — SIGNIFICANT CHANGE UP (ref 39–50)
HGB BLD-MCNC: 12.9 G/DL — LOW (ref 13–17)
LACTATE BLDV-MCNC: 6.1 MMOL/L — CRITICAL HIGH (ref 0.5–2)
LACTATE SERPL-SCNC: 3.2 MMOL/L — HIGH (ref 0.5–2)
MAGNESIUM SERPL-MCNC: 1.5 MG/DL — LOW (ref 1.6–2.6)
MCHC RBC-ENTMCNC: 27.7 PG — SIGNIFICANT CHANGE UP (ref 27–34)
MCHC RBC-ENTMCNC: 32.4 GM/DL — SIGNIFICANT CHANGE UP (ref 32–36)
MCV RBC AUTO: 85.4 FL — SIGNIFICANT CHANGE UP (ref 80–100)
NRBC # BLD: 0 /100 WBCS — SIGNIFICANT CHANGE UP (ref 0–0)
NRBC # FLD: 0 K/UL — SIGNIFICANT CHANGE UP (ref 0–0)
PHOSPHATE SERPL-MCNC: 3.5 MG/DL — SIGNIFICANT CHANGE UP (ref 2.5–4.5)
PLATELET # BLD AUTO: 195 K/UL — SIGNIFICANT CHANGE UP (ref 150–400)
POTASSIUM SERPL-MCNC: 4 MMOL/L — SIGNIFICANT CHANGE UP (ref 3.5–5.3)
POTASSIUM SERPL-SCNC: 4 MMOL/L — SIGNIFICANT CHANGE UP (ref 3.5–5.3)
RBC # BLD: 4.66 M/UL — SIGNIFICANT CHANGE UP (ref 4.2–5.8)
RBC # FLD: 15.6 % — HIGH (ref 10.3–14.5)
SODIUM SERPL-SCNC: 140 MMOL/L — SIGNIFICANT CHANGE UP (ref 135–145)
SPECIMEN SOURCE: SIGNIFICANT CHANGE UP
WBC # BLD: 11.82 K/UL — HIGH (ref 3.8–10.5)
WBC # FLD AUTO: 11.82 K/UL — HIGH (ref 3.8–10.5)

## 2024-04-23 PROCEDURE — 71045 X-RAY EXAM CHEST 1 VIEW: CPT | Mod: 26

## 2024-04-23 PROCEDURE — 99232 SBSQ HOSP IP/OBS MODERATE 35: CPT

## 2024-04-23 RX ORDER — DIPHENHYDRAMINE HCL 50 MG
25 CAPSULE ORAL ONCE
Refills: 0 | Status: COMPLETED | OUTPATIENT
Start: 2024-04-23 | End: 2024-04-23

## 2024-04-23 RX ORDER — SODIUM CHLORIDE 9 MG/ML
1000 INJECTION, SOLUTION INTRAVENOUS ONCE
Refills: 0 | Status: COMPLETED | OUTPATIENT
Start: 2024-04-23 | End: 2024-04-23

## 2024-04-23 RX ORDER — MAGNESIUM SULFATE 500 MG/ML
2 VIAL (ML) INJECTION ONCE
Refills: 0 | Status: COMPLETED | OUTPATIENT
Start: 2024-04-23 | End: 2024-04-23

## 2024-04-23 RX ORDER — DIATRIZOATE MEGLUMINE 180 MG/ML
120 INJECTION, SOLUTION INTRAVESICAL ONCE
Refills: 0 | Status: DISCONTINUED | OUTPATIENT
Start: 2024-04-23 | End: 2024-04-23

## 2024-04-23 RX ORDER — INSULIN LISPRO 100/ML
VIAL (ML) SUBCUTANEOUS EVERY 6 HOURS
Refills: 0 | Status: DISCONTINUED | OUTPATIENT
Start: 2024-04-23 | End: 2024-04-27

## 2024-04-23 RX ADMIN — BENZOCAINE AND MENTHOL 1 LOZENGE: 5; 1 LIQUID ORAL at 05:07

## 2024-04-23 RX ADMIN — SODIUM CHLORIDE 1000 MILLILITER(S): 9 INJECTION, SOLUTION INTRAVENOUS at 01:33

## 2024-04-23 RX ADMIN — Medication 1: at 23:15

## 2024-04-23 RX ADMIN — BENZOCAINE AND MENTHOL 1 LOZENGE: 5; 1 LIQUID ORAL at 01:39

## 2024-04-23 RX ADMIN — Medication 5 MILLIGRAM(S): at 18:10

## 2024-04-23 RX ADMIN — Medication 25 MILLIGRAM(S): at 14:41

## 2024-04-23 RX ADMIN — HEPARIN SODIUM 5000 UNIT(S): 5000 INJECTION INTRAVENOUS; SUBCUTANEOUS at 21:34

## 2024-04-23 RX ADMIN — SODIUM CHLORIDE 1000 MILLILITER(S): 9 INJECTION, SOLUTION INTRAVENOUS at 23:42

## 2024-04-23 RX ADMIN — Medication 25 MILLIGRAM(S): at 08:03

## 2024-04-23 RX ADMIN — Medication 40 MILLIGRAM(S): at 20:33

## 2024-04-23 RX ADMIN — HEPARIN SODIUM 5000 UNIT(S): 5000 INJECTION INTRAVENOUS; SUBCUTANEOUS at 14:41

## 2024-04-23 RX ADMIN — Medication 25 GRAM(S): at 06:31

## 2024-04-23 RX ADMIN — Medication 5 MILLIGRAM(S): at 23:15

## 2024-04-23 RX ADMIN — Medication 5 MILLIGRAM(S): at 11:21

## 2024-04-23 RX ADMIN — Medication 25 MILLIGRAM(S): at 11:00

## 2024-04-23 RX ADMIN — SODIUM CHLORIDE 500 MILLILITER(S): 9 INJECTION, SOLUTION INTRAVENOUS at 00:14

## 2024-04-23 RX ADMIN — HEPARIN SODIUM 5000 UNIT(S): 5000 INJECTION INTRAVENOUS; SUBCUTANEOUS at 05:07

## 2024-04-23 RX ADMIN — ONDANSETRON 4 MILLIGRAM(S): 8 TABLET, FILM COATED ORAL at 00:18

## 2024-04-23 NOTE — PROVIDER CONTACT NOTE (CRITICAL VALUE NOTIFICATION) - ASSESSMENT
Instructions: This plan will send the code FBSE to the PM system.  DO NOT or CHANGE the price. Price (Do Not Change): 0.00 SBO with NGT Detail Level: Simple

## 2024-04-23 NOTE — PATIENT PROFILE ADULT - FUNCTIONAL ASSESSMENT - BASIC MOBILITY 4.
4 = No assist / stand by assistance TSH= 0.82  Fasting lipids  Cholesterol = 137  TG=98  XgY3W=3.7  COVID PCR NOT DETECTED 8/11/23  Urine tox screen negative for substances of potential abuse  Head CT WNL  8/11/23   No evidence of acute bleed or mass. defect  UA + Ketones, + glucose  BAL<10   BMI = 19  EKG  HM=944  QT /QTc= 352/462  sinus tachycardia  Hepatitis panel negative  Depakote level = 70 ug/ml ( 8/21/23)  Platelets= 243 K  No studies are pending

## 2024-04-23 NOTE — PATIENT PROFILE ADULT - FUNCTIONAL SCREEN CURRENT LEVEL: SWALLOWING (IF SCORE 2 OR MORE FOR ANY ITEM, CONSULT REHAB SERVICES), MLM)
[facility]    [address] [address]    Phone:  [phone]    Fax:  [phone]       Thank You for choosing us for your health care visit. We are glad to serve you and happy to provide you with this summary of your visit. Please help us to ensure we have accurate records. If you find anything that needs to be changed, please let our staff know as soon as possible.          Your Demographic Information     Patient Name Sex Margaret Hawthorne Female 1956       Ethnic Group Patient Race    Not of  or  Origin White      Your Visit Details     Date & Time Provider Department    3/28/2017 4:15 PM Cisco Mclean MD [facility]      Your Upcoming Appointment*(Max 10)       2:40 PM CDT   MAMM SCREENING with [facility] MAMMO 1   [facility] ([facility])    [address] [place] [address]   [phone]              Your To Do List     Follow-Up    Return in about 1 year (around 3/28/2018).      Your Vitals Were     BP Pulse Temp Resp Height    128/78 (BP Location: E, Patient Position: Sitting, Cuff Size: Regular) 64 96.6 °F (35.9 °C) (Tympanic) 14 5' 5.75\" (1.67 m)    Weight SpO2 BMI Smoking Status       153 lb 1.6 oz (69.4 kg) 98% 24.9 kg/m2 Never Smoker       Medications Prescribed or Re-Ordered Today     None      Allergies     No Known Allergies      Immunizations History as of 3/28/2017     Name Date    HERPES ZOSTER SHINGLES 3/15/2016    Hepatitis A - Adult 2010    Influenza 2016, 2015, 10/24/2013, 2010, 10/8/2009, 10/8/2008, 10/31/2007, 10/25/2006, 11/10/2005, 2004    Influenza A novel H1N1 2009    Td:Adult type tetanus/diphtheria 10/8/2008, 2005    Tdap 2016, 10/8/2008            Patient Instructions     None       0 = swallows foods/liquids without difficulty

## 2024-04-24 LAB
ANION GAP SERPL CALC-SCNC: 17 MMOL/L — HIGH (ref 7–14)
BUN SERPL-MCNC: 22 MG/DL — SIGNIFICANT CHANGE UP (ref 7–23)
CALCIUM SERPL-MCNC: 8.8 MG/DL — SIGNIFICANT CHANGE UP (ref 8.4–10.5)
CHLORIDE SERPL-SCNC: 106 MMOL/L — SIGNIFICANT CHANGE UP (ref 98–107)
CO2 SERPL-SCNC: 19 MMOL/L — LOW (ref 22–31)
CREAT SERPL-MCNC: 0.77 MG/DL — SIGNIFICANT CHANGE UP (ref 0.5–1.3)
EGFR: 109 ML/MIN/1.73M2 — SIGNIFICANT CHANGE UP
GLUCOSE BLDC GLUCOMTR-MCNC: 144 MG/DL — HIGH (ref 70–99)
GLUCOSE BLDC GLUCOMTR-MCNC: 161 MG/DL — HIGH (ref 70–99)
GLUCOSE BLDC GLUCOMTR-MCNC: 194 MG/DL — HIGH (ref 70–99)
GLUCOSE BLDC GLUCOMTR-MCNC: 200 MG/DL — HIGH (ref 70–99)
GLUCOSE BLDC GLUCOMTR-MCNC: 208 MG/DL — HIGH (ref 70–99)
GLUCOSE SERPL-MCNC: 162 MG/DL — HIGH (ref 70–99)
HCT VFR BLD CALC: 38.5 % — LOW (ref 39–50)
HGB BLD-MCNC: 12.3 G/DL — LOW (ref 13–17)
MAGNESIUM SERPL-MCNC: 2.1 MG/DL — SIGNIFICANT CHANGE UP (ref 1.6–2.6)
MCHC RBC-ENTMCNC: 28.1 PG — SIGNIFICANT CHANGE UP (ref 27–34)
MCHC RBC-ENTMCNC: 31.9 GM/DL — LOW (ref 32–36)
MCV RBC AUTO: 88.1 FL — SIGNIFICANT CHANGE UP (ref 80–100)
NRBC # BLD: 0 /100 WBCS — SIGNIFICANT CHANGE UP (ref 0–0)
NRBC # FLD: 0 K/UL — SIGNIFICANT CHANGE UP (ref 0–0)
PHOSPHATE SERPL-MCNC: 3.3 MG/DL — SIGNIFICANT CHANGE UP (ref 2.5–4.5)
PLATELET # BLD AUTO: 216 K/UL — SIGNIFICANT CHANGE UP (ref 150–400)
POTASSIUM SERPL-MCNC: 4 MMOL/L — SIGNIFICANT CHANGE UP (ref 3.5–5.3)
POTASSIUM SERPL-SCNC: 4 MMOL/L — SIGNIFICANT CHANGE UP (ref 3.5–5.3)
RBC # BLD: 4.37 M/UL — SIGNIFICANT CHANGE UP (ref 4.2–5.8)
RBC # FLD: 15.7 % — HIGH (ref 10.3–14.5)
SODIUM SERPL-SCNC: 142 MMOL/L — SIGNIFICANT CHANGE UP (ref 135–145)
WBC # BLD: 14.25 K/UL — HIGH (ref 3.8–10.5)
WBC # FLD AUTO: 14.25 K/UL — HIGH (ref 3.8–10.5)

## 2024-04-24 PROCEDURE — 99223 1ST HOSP IP/OBS HIGH 75: CPT

## 2024-04-24 RX ORDER — DIPHENHYDRAMINE HCL 50 MG
25 CAPSULE ORAL ONCE
Refills: 0 | Status: COMPLETED | OUTPATIENT
Start: 2024-04-24 | End: 2024-04-24

## 2024-04-24 RX ORDER — FAMOTIDINE 10 MG/ML
20 INJECTION INTRAVENOUS ONCE
Refills: 0 | Status: DISCONTINUED | OUTPATIENT
Start: 2024-04-24 | End: 2024-04-24

## 2024-04-24 RX ORDER — FAMOTIDINE 10 MG/ML
20 INJECTION INTRAVENOUS ONCE
Refills: 0 | Status: COMPLETED | OUTPATIENT
Start: 2024-04-24 | End: 2024-04-24

## 2024-04-24 RX ADMIN — Medication 40 MILLIGRAM(S): at 15:16

## 2024-04-24 RX ADMIN — Medication 25 MILLIGRAM(S): at 15:15

## 2024-04-24 RX ADMIN — HEPARIN SODIUM 5000 UNIT(S): 5000 INJECTION INTRAVENOUS; SUBCUTANEOUS at 14:11

## 2024-04-24 RX ADMIN — Medication 5 MILLIGRAM(S): at 05:38

## 2024-04-24 RX ADMIN — Medication 1: at 17:57

## 2024-04-24 RX ADMIN — SODIUM CHLORIDE 125 MILLILITER(S): 9 INJECTION, SOLUTION INTRAVENOUS at 10:05

## 2024-04-24 RX ADMIN — FAMOTIDINE 20 MILLIGRAM(S): 10 INJECTION INTRAVENOUS at 04:17

## 2024-04-24 RX ADMIN — Medication 5 MILLIGRAM(S): at 23:35

## 2024-04-24 RX ADMIN — Medication 1 APPLICATION(S): at 22:24

## 2024-04-24 RX ADMIN — Medication 40 MILLIGRAM(S): at 10:05

## 2024-04-24 RX ADMIN — Medication 5 MILLIGRAM(S): at 17:32

## 2024-04-24 RX ADMIN — Medication 5 MILLIGRAM(S): at 12:48

## 2024-04-24 RX ADMIN — HEPARIN SODIUM 5000 UNIT(S): 5000 INJECTION INTRAVENOUS; SUBCUTANEOUS at 05:38

## 2024-04-24 RX ADMIN — Medication 1: at 06:09

## 2024-04-24 RX ADMIN — HEPARIN SODIUM 5000 UNIT(S): 5000 INJECTION INTRAVENOUS; SUBCUTANEOUS at 22:23

## 2024-04-24 RX ADMIN — Medication 25 MILLIGRAM(S): at 22:39

## 2024-04-24 RX ADMIN — Medication 1: at 23:35

## 2024-04-24 RX ADMIN — Medication 25 MILLIGRAM(S): at 10:05

## 2024-04-24 NOTE — CONSULT NOTE ADULT - ASSESSMENT
1. Acute Generalized Exanthematous Pustulosis    Acute generalized exanthematous pustulosis (AGEP) is a relatively rare reaction pattern consisting of an acute febrile pustular eruption following medications or viral infection The syndrome occurs within 2 weeks of starting the medication and may occur as soon as 48 hours after initial drug ingestion. AGEP has been reported in infants, children, and adults. Fever, typically 39°C (102.2°F), is a near constant feature and persists about 1 week.  The natural course of AGEP is that it typically presents 1-4 days after drug exposure but can occur sooner if previously sensitized or up to 2 weeks out in some cases. By far, the most common culprit medications are antibiotics, particularly B-lactam antibiotics. When the medication is stopped the rash will resolve in less than 2 weeks and will then desquamate 2 weeks after resolution. Though this is not considered a dangerous drug rash, 17% have systemic findings: Liver (cholestatic pattern)>Renal>ARDS. They can also feel unwell with fever and leukocytosis. Unclear how much of LFT elevation and possible pulmonary syndrome are from this drug eruption vs aspiration.    - Suspect to Zosyn >> Vancomycin, both administered on 4/22   - Patient should be considered allergic to this medication and avoid it in the future except for life threatening infections.   - Can apply clobetasol Ointment 0.05% BID to affected areas, request 5 tubes from pharmacy nurse needs to request 5 tubes through pharmacy interface form.   - Trend CBC w/ diff and CMP (w/ LFTs) daily to r/o evolving DRESS (low suspicion).     Patient was seen at bedside and staffed in person with the dermatology attending Dr. Brush   Recommendations were communicated with the primary team.  Please page [phone] for further related questions.    Lilliana Burger MD  Resident Physician, PGY3  [facility]  Pager: [phone]  Office: [phone].

## 2024-04-24 NOTE — CONSULT NOTE ADULT - SUBJECTIVE AND OBJECTIVE BOX
HPI:  GENERAL SURGERY ADMISSION NOTE  --------------------------------------------------------------------------------------------    Patient is a 48y old  Male who presents with a chief complaint of     HPI: 48M with multiple prior SBO, with Isi's for perf divertic, hernia repair, mutliple ex lap LINDA, last admitted in December 2023 for SBO, presents with hypotension, tachycardia, nausea, no vomiting.  Says he thinks he needs ngt and feels like he has an obstruction again.  Abdominal discomfort in belt distribution across upper abdomen, mild in severity.  Passed gas earlier today, had normal BM at 5pm.  BP 70s/50s, tachy 120s, improved with 2L bolus to normal BP 110s SBP.  Voiding normally, no dysuria no cough.  NGT placed in ED with 1.5L bilious/feculent output.     Dermatology consulted for itchy rash on face, chest, and abdomen for the past two days.     ROS: 10-system review is otherwise negative except HPI above.      PAST MEDICAL & SURGICAL HISTORY:  Hypertension      Hyperlipidemia      Diabetes mellitus, type 2      SBO (small bowel obstruction)      Diverticulitis      DVT (deep venous thrombosis)  RLE 2016, post op, was on eliquis for 6 months      Open wound of abdominal wall      GERD (gastroesophageal reflux disease)      S/P Isi's procedure  2009 with reversal 2009      H/O exploratory laparotomy  LINDA, x3      H/O hernia repair  xlap, ventral hernia repair 6/2018        FAMILY HISTORY:      SOCIAL HISTORY:      ALLERGIES: morphine (Rash)  fentanyl (Rash)  ceftriaxone (Rash)  contrast media (iodine-based) (Rash)  cephalosporins (Rash)  Flagyl (Unknown)  Cipro (Unknown)  iodine containing compounds (Rash)      HOME MEDICATIONS:     CURRENT MEDICATIONS  MEDICATIONS (STANDING): heparin   Injectable 5000 Unit(s) SubCutaneous every 8 hours  vancomycin  IVPB. 1000 milliGRAM(s) IV Intermittent once    MEDICATIONS (PRN):  --------------------------------------------------------------------------------------------    Vitals:   T(C): 36.4 (04-22-24 @ 19:36), Max: 36.4 (04-22-24 @ 19:36)  HR: 110 (04-22-24 @ 20:35) (110 - 120)  BP: 105/60 (04-22-24 @ 20:35) (74/62 - 105/60)  RR: 21 (04-22-24 @ 20:35) (20 - 25)  SpO2: 99% (04-22-24 @ 20:35) (98% - 100%)  CAPILLARY BLOOD GLUCOSE      POCT Blood Glucose.: 249 mg/dL (22 Apr 2024 19:42)  POCT Blood Glucose.: 234 mg/dL (22 Apr 2024 19:30)    04-22 @ 07:01  -  04-22 @ 21:31  --------------------------------------------------------  IN:  Total IN: 0 mL    OUT:    Nasogastric/Oral tube (mL): 1500 mL  Total OUT: 1500 mL    Total NET: -1500 mL            PHYSICAL EXAM:   General: NAD, Lying in bed comfortably  Neuro: A+Ox3  HEENT: NC/AT, EOMI, anicteric, ngt bilious/light brown  Cardio: sinus tach on monitor   Resp: Good effort, CTA b/l  GI/Abd: Softly distended, min tenderness in epigastrium, multiple scars, ventral hernia reducible   Musculoskeletal: All 4 extremities moving spontaneously, no limitations.    On skin exam:   pink patches on face, neck, chest, abdomen, and back  small pustules on neck folds   --------------------------------------------------------------------------------------------    LABS  CBC (04-22 @ 20:01)                              14.9                           12.87<H>  )----------------(  298        75.2  % Neutrophils, 22.0  % Lymphocytes, ANC: 9.68<H>                              47.9      BMP (04-22 @ 20:01)             140     |  98      |  23    		Ca++ --      Ca 11.1<H>             ---------------------------------( 265<H>		Mg 1.90               3.7     |  22      |  1.35<H>			Ph --        LFTs (04-22 @ 20:01)      TPro 8.4<H> / Alb 5.0 / TBili 0.5 / DBili -- / AST 23 / ALT 32 / AlkPhos 50    Coags (04-22 @ 20:01)  aPTT 24.0<L> / INR 0.93 / PT 10.5        VBG (04-22 @ 20:45)     7.33 / 45 / 26 / 24 / -2.5<L> / 34.6<L>%     Lactate: 3.4<H>    --------------------------------------------------------------------------------------------    MICROBIOLOGY  Urinalysis (04-22 @ 20:01):     Color:  / Appearance:  / SG:  / pH:  / Gluc: 265<H> / Ketones:  / Bili:  / Urobili:  / Protein : / Nitrites:  / Leuk.Est:  / RBC:  / WBC:  / Sq Epi:  / Non Sq Epi:  / Bacteria          -------------------------------------------------------------------------------------------- (22 Apr 2024 21:30)        PAST MEDICAL & SURGICAL HISTORY:  Hypertension      Hyperlipidemia      Diabetes mellitus, type 2      SBO (small bowel obstruction)      Diverticulitis      DVT (deep venous thrombosis)  RLE 2016, post op, was on eliquis for 6 months      Open wound of abdominal wall      GERD (gastroesophageal reflux disease)      S/P Isi's procedure  2009 with reversal 2009      H/O exploratory laparotomy  LINDA, x3      H/O hernia repair  xlap, ventral hernia repair 6/2018          REVIEW OF SYSTEMS      General: no fevers/chills, no lethary	    Skin/Breast: see HPI  	  Ophthalmologic: no eye pain or change in vision  	  ENMT: no dysphagia or change in hearing    Respiratory and Thorax: no SOB or cough  	  Cardiovascular: no palpitations or chest pain    Gastrointestinal: no abdomenal pain or blood in stool     Genitourinary: no dysuria or frequency    Musculoskeletal: no joint pains or weakness	    Neurological:no weakness, numbness , or tingling    MEDICATIONS  (STANDING):  clobetasol 0.05% Ointment 1 Application(s) Topical every 12 hours  dextrose 10% Bolus 125 milliLiter(s) IV Bolus once  dextrose 5%. 1000 milliLiter(s) IV Continuous <Continuous>  dextrose 5%. 1000 milliLiter(s) IV Continuous <Continuous>  dextrose 50% Injectable 25 Gram(s) IV Push once  dextrose 50% Injectable 12.5 Gram(s) IV Push once  glucagon  Injectable 1 milliGRAM(s) IntraMuscular once  heparin   Injectable 5000 Unit(s) SubCutaneous every 8 hours  insulin lispro (ADMELOG) corrective regimen sliding scale   SubCutaneous every 6 hours  lactated ringers. 1000 milliLiter(s) IV Continuous <Continuous>  metoprolol tartrate Injectable 5 milliGRAM(s) IV Push every 6 hours    ALLERGIES: vancomycin  morphine  fentanyl  ceftriaxone  contrast media (iodine-based)  cephalosporins  Flagyl  Cipro  iodine containing compounds      Social History:  Denies smoking    FAMILY HISTORY:        VITAL SIGNS LAST 24 HOURS:  T(F): 98.2 (04-24 @ 18:04), Max: 100.2 (04-23 @ 22:20)  HR: 106 (04-24 @ 18:04) (98 - 115)  BP: 135/86 (04-24 @ 18:04) (114/70 - 158/87)  RR: 15 (04-24 @ 18:04) (15 - 17)    ___________________________________  Physical Exam:     The patient was alert and in no apparent distress.  OP showed no ulcerations  There was no visible lymphadenopathy.  Conjunctiva were non injected  There was no clubbing or edema of extremities.  The scalp, hair, face, eyebrows, lips, OP, neck, chest, back,   extremities X 4, nails were examined.  There was no hyperhidrosis or bromhidrosis.    Of note on skin exam:   ____________________________________    LABS:                        12.3   14.25 )-----------( 216      ( 24 Apr 2024 05:47 )             38.5     04-24    142  |  106  |  22  ----------------------------<  162<H>  4.0   |  19<L>  |  0.77    Ca    8.8      24 Apr 2024 05:47  Phos  3.3     04-24  Mg     2.10     04-24        Urinalysis Basic - ( 24 Apr 2024 05:47 )    Color: x / Appearance: x / SG: x / pH: x  Gluc: 162 mg/dL / Ketone: x  / Bili: x / Urobili: x   Blood: x / Protein: x / Nitrite: x   Leuk Esterase: x / RBC: x / WBC x   Sq Epi: x / Non Sq Epi: x / Bacteria: x

## 2024-04-25 LAB
ANION GAP SERPL CALC-SCNC: 14 MMOL/L — SIGNIFICANT CHANGE UP (ref 7–14)
BASOPHILS # BLD AUTO: 0.02 K/UL — SIGNIFICANT CHANGE UP (ref 0–0.2)
BASOPHILS NFR BLD AUTO: 0.1 % — SIGNIFICANT CHANGE UP (ref 0–2)
BUN SERPL-MCNC: 26 MG/DL — HIGH (ref 7–23)
CALCIUM SERPL-MCNC: 9.5 MG/DL — SIGNIFICANT CHANGE UP (ref 8.4–10.5)
CHLORIDE SERPL-SCNC: 105 MMOL/L — SIGNIFICANT CHANGE UP (ref 98–107)
CO2 SERPL-SCNC: 23 MMOL/L — SIGNIFICANT CHANGE UP (ref 22–31)
CREAT SERPL-MCNC: 0.72 MG/DL — SIGNIFICANT CHANGE UP (ref 0.5–1.3)
EGFR: 111 ML/MIN/1.73M2 — SIGNIFICANT CHANGE UP
EOSINOPHIL # BLD AUTO: 0.27 K/UL — SIGNIFICANT CHANGE UP (ref 0–0.5)
EOSINOPHIL NFR BLD AUTO: 1.8 % — SIGNIFICANT CHANGE UP (ref 0–6)
GLUCOSE BLDC GLUCOMTR-MCNC: 136 MG/DL — HIGH (ref 70–99)
GLUCOSE BLDC GLUCOMTR-MCNC: 144 MG/DL — HIGH (ref 70–99)
GLUCOSE BLDC GLUCOMTR-MCNC: 181 MG/DL — HIGH (ref 70–99)
GLUCOSE BLDC GLUCOMTR-MCNC: 193 MG/DL — HIGH (ref 70–99)
GLUCOSE SERPL-MCNC: 144 MG/DL — HIGH (ref 70–99)
HCT VFR BLD CALC: 37.9 % — LOW (ref 39–50)
HGB BLD-MCNC: 12 G/DL — LOW (ref 13–17)
IANC: 12.12 K/UL — HIGH (ref 1.8–7.4)
IMM GRANULOCYTES NFR BLD AUTO: 0.7 % — SIGNIFICANT CHANGE UP (ref 0–0.9)
LYMPHOCYTES # BLD AUTO: 1.75 K/UL — SIGNIFICANT CHANGE UP (ref 1–3.3)
LYMPHOCYTES # BLD AUTO: 12 % — LOW (ref 13–44)
MAGNESIUM SERPL-MCNC: 2 MG/DL — SIGNIFICANT CHANGE UP (ref 1.6–2.6)
MCHC RBC-ENTMCNC: 28 PG — SIGNIFICANT CHANGE UP (ref 27–34)
MCHC RBC-ENTMCNC: 31.7 GM/DL — LOW (ref 32–36)
MCV RBC AUTO: 88.3 FL — SIGNIFICANT CHANGE UP (ref 80–100)
MONOCYTES # BLD AUTO: 0.37 K/UL — SIGNIFICANT CHANGE UP (ref 0–0.9)
MONOCYTES NFR BLD AUTO: 2.5 % — SIGNIFICANT CHANGE UP (ref 2–14)
NEUTROPHILS # BLD AUTO: 12.12 K/UL — HIGH (ref 1.8–7.4)
NEUTROPHILS NFR BLD AUTO: 82.9 % — HIGH (ref 43–77)
NRBC # BLD: 0 /100 WBCS — SIGNIFICANT CHANGE UP (ref 0–0)
NRBC # FLD: 0 K/UL — SIGNIFICANT CHANGE UP (ref 0–0)
PHOSPHATE SERPL-MCNC: 3.1 MG/DL — SIGNIFICANT CHANGE UP (ref 2.5–4.5)
PLATELET # BLD AUTO: 207 K/UL — SIGNIFICANT CHANGE UP (ref 150–400)
POTASSIUM SERPL-MCNC: 3.9 MMOL/L — SIGNIFICANT CHANGE UP (ref 3.5–5.3)
POTASSIUM SERPL-SCNC: 3.9 MMOL/L — SIGNIFICANT CHANGE UP (ref 3.5–5.3)
RBC # BLD: 4.29 M/UL — SIGNIFICANT CHANGE UP (ref 4.2–5.8)
RBC # FLD: 15.5 % — HIGH (ref 10.3–14.5)
SODIUM SERPL-SCNC: 142 MMOL/L — SIGNIFICANT CHANGE UP (ref 135–145)
WBC # BLD: 14.63 K/UL — HIGH (ref 3.8–10.5)
WBC # FLD AUTO: 14.63 K/UL — HIGH (ref 3.8–10.5)

## 2024-04-25 PROCEDURE — 99233 SBSQ HOSP IP/OBS HIGH 50: CPT

## 2024-04-25 PROCEDURE — 93010 ELECTROCARDIOGRAM REPORT: CPT

## 2024-04-25 RX ORDER — DIPHENHYDRAMINE HCL 50 MG
25 CAPSULE ORAL EVERY 6 HOURS
Refills: 0 | Status: DISCONTINUED | OUTPATIENT
Start: 2024-04-25 | End: 2024-04-27

## 2024-04-25 RX ORDER — PANTOPRAZOLE SODIUM 20 MG/1
40 TABLET, DELAYED RELEASE ORAL DAILY
Refills: 0 | Status: DISCONTINUED | OUTPATIENT
Start: 2024-04-25 | End: 2024-04-27

## 2024-04-25 RX ORDER — DIPHENHYDRAMINE HCL 50 MG
25 CAPSULE ORAL ONCE
Refills: 0 | Status: COMPLETED | OUTPATIENT
Start: 2024-04-25 | End: 2024-04-25

## 2024-04-25 RX ORDER — DEXTROSE MONOHYDRATE, SODIUM CHLORIDE, AND POTASSIUM CHLORIDE 50; .745; 4.5 G/1000ML; G/1000ML; G/1000ML
1000 INJECTION, SOLUTION INTRAVENOUS
Refills: 0 | Status: DISCONTINUED | OUTPATIENT
Start: 2024-04-25 | End: 2024-04-27

## 2024-04-25 RX ORDER — POTASSIUM CHLORIDE 20 MEQ
10 PACKET (EA) ORAL ONCE
Refills: 0 | Status: COMPLETED | OUTPATIENT
Start: 2024-04-25 | End: 2024-04-25

## 2024-04-25 RX ORDER — ACETAMINOPHEN 500 MG
1000 TABLET ORAL ONCE
Refills: 0 | Status: COMPLETED | OUTPATIENT
Start: 2024-04-25 | End: 2024-04-25

## 2024-04-25 RX ADMIN — HEPARIN SODIUM 5000 UNIT(S): 5000 INJECTION INTRAVENOUS; SUBCUTANEOUS at 13:55

## 2024-04-25 RX ADMIN — Medication 5 MILLIGRAM(S): at 12:05

## 2024-04-25 RX ADMIN — BENZOCAINE AND MENTHOL 1 LOZENGE: 5; 1 LIQUID ORAL at 12:04

## 2024-04-25 RX ADMIN — Medication 1: at 23:48

## 2024-04-25 RX ADMIN — Medication 5 MILLIGRAM(S): at 23:48

## 2024-04-25 RX ADMIN — Medication 5 MILLIGRAM(S): at 17:38

## 2024-04-25 RX ADMIN — HEPARIN SODIUM 5000 UNIT(S): 5000 INJECTION INTRAVENOUS; SUBCUTANEOUS at 05:41

## 2024-04-25 RX ADMIN — Medication 1 APPLICATION(S): at 22:40

## 2024-04-25 RX ADMIN — Medication 100 MILLIEQUIVALENT(S): at 11:04

## 2024-04-25 RX ADMIN — BENZOCAINE AND MENTHOL 1 LOZENGE: 5; 1 LIQUID ORAL at 17:38

## 2024-04-25 RX ADMIN — PANTOPRAZOLE SODIUM 40 MILLIGRAM(S): 20 TABLET, DELAYED RELEASE ORAL at 22:38

## 2024-04-25 RX ADMIN — Medication 5 MILLIGRAM(S): at 05:40

## 2024-04-25 RX ADMIN — Medication 25 MILLIGRAM(S): at 22:38

## 2024-04-25 RX ADMIN — BENZOCAINE AND MENTHOL 1 LOZENGE: 5; 1 LIQUID ORAL at 22:38

## 2024-04-25 RX ADMIN — DEXTROSE MONOHYDRATE, SODIUM CHLORIDE, AND POTASSIUM CHLORIDE 125 MILLILITER(S): 50; .745; 4.5 INJECTION, SOLUTION INTRAVENOUS at 15:37

## 2024-04-25 RX ADMIN — HEPARIN SODIUM 5000 UNIT(S): 5000 INJECTION INTRAVENOUS; SUBCUTANEOUS at 22:13

## 2024-04-25 RX ADMIN — Medication 1 APPLICATION(S): at 11:05

## 2024-04-25 RX ADMIN — Medication 1000 MILLIGRAM(S): at 23:08

## 2024-04-25 RX ADMIN — Medication 400 MILLIGRAM(S): at 22:38

## 2024-04-25 RX ADMIN — Medication 1: at 17:38

## 2024-04-25 RX ADMIN — SODIUM CHLORIDE 125 MILLILITER(S): 9 INJECTION, SOLUTION INTRAVENOUS at 12:03

## 2024-04-25 RX ADMIN — Medication 25 MILLIGRAM(S): at 12:04

## 2024-04-26 LAB
ADD ON TEST-SPECIMEN IN LAB: SIGNIFICANT CHANGE UP
ADD ON TEST-SPECIMEN IN LAB: SIGNIFICANT CHANGE UP
ALBUMIN SERPL ELPH-MCNC: 3.5 G/DL — SIGNIFICANT CHANGE UP (ref 3.3–5)
ALP SERPL-CCNC: 47 U/L — SIGNIFICANT CHANGE UP (ref 40–120)
ALT FLD-CCNC: 17 U/L — SIGNIFICANT CHANGE UP (ref 4–41)
ANION GAP SERPL CALC-SCNC: 11 MMOL/L — SIGNIFICANT CHANGE UP (ref 7–14)
AST SERPL-CCNC: 20 U/L — SIGNIFICANT CHANGE UP (ref 4–40)
BASOPHILS # BLD AUTO: 0.02 K/UL — SIGNIFICANT CHANGE UP (ref 0–0.2)
BASOPHILS NFR BLD AUTO: 0.3 % — SIGNIFICANT CHANGE UP (ref 0–2)
BILIRUB DIRECT SERPL-MCNC: <0.2 MG/DL — SIGNIFICANT CHANGE UP (ref 0–0.3)
BILIRUB INDIRECT FLD-MCNC: >0.3 MG/DL — SIGNIFICANT CHANGE UP (ref 0–1)
BILIRUB SERPL-MCNC: 0.5 MG/DL — SIGNIFICANT CHANGE UP (ref 0.2–1.2)
BUN SERPL-MCNC: 23 MG/DL — SIGNIFICANT CHANGE UP (ref 7–23)
CALCIUM SERPL-MCNC: 8.4 MG/DL — SIGNIFICANT CHANGE UP (ref 8.4–10.5)
CHLORIDE SERPL-SCNC: 104 MMOL/L — SIGNIFICANT CHANGE UP (ref 98–107)
CO2 SERPL-SCNC: 23 MMOL/L — SIGNIFICANT CHANGE UP (ref 22–31)
CREAT SERPL-MCNC: 0.67 MG/DL — SIGNIFICANT CHANGE UP (ref 0.5–1.3)
EGFR: 114 ML/MIN/1.73M2 — SIGNIFICANT CHANGE UP
EOSINOPHIL # BLD AUTO: 0.74 K/UL — HIGH (ref 0–0.5)
EOSINOPHIL NFR BLD AUTO: 9.4 % — HIGH (ref 0–6)
GLUCOSE BLDC GLUCOMTR-MCNC: 159 MG/DL — HIGH (ref 70–99)
GLUCOSE BLDC GLUCOMTR-MCNC: 166 MG/DL — HIGH (ref 70–99)
GLUCOSE BLDC GLUCOMTR-MCNC: 171 MG/DL — HIGH (ref 70–99)
GLUCOSE BLDC GLUCOMTR-MCNC: 177 MG/DL — HIGH (ref 70–99)
GLUCOSE SERPL-MCNC: 178 MG/DL — HIGH (ref 70–99)
HCT VFR BLD CALC: 40.2 % — SIGNIFICANT CHANGE UP (ref 39–50)
HGB BLD-MCNC: 12.5 G/DL — LOW (ref 13–17)
IANC: 5.06 K/UL — SIGNIFICANT CHANGE UP (ref 1.8–7.4)
IMM GRANULOCYTES NFR BLD AUTO: 0.6 % — SIGNIFICANT CHANGE UP (ref 0–0.9)
LYMPHOCYTES # BLD AUTO: 1.55 K/UL — SIGNIFICANT CHANGE UP (ref 1–3.3)
LYMPHOCYTES # BLD AUTO: 19.7 % — SIGNIFICANT CHANGE UP (ref 13–44)
MAGNESIUM SERPL-MCNC: 2 MG/DL — SIGNIFICANT CHANGE UP (ref 1.6–2.6)
MCHC RBC-ENTMCNC: 27.4 PG — SIGNIFICANT CHANGE UP (ref 27–34)
MCHC RBC-ENTMCNC: 31.1 GM/DL — LOW (ref 32–36)
MCV RBC AUTO: 88.2 FL — SIGNIFICANT CHANGE UP (ref 80–100)
MONOCYTES # BLD AUTO: 0.46 K/UL — SIGNIFICANT CHANGE UP (ref 0–0.9)
MONOCYTES NFR BLD AUTO: 5.8 % — SIGNIFICANT CHANGE UP (ref 2–14)
NEUTROPHILS # BLD AUTO: 5.06 K/UL — SIGNIFICANT CHANGE UP (ref 1.8–7.4)
NEUTROPHILS NFR BLD AUTO: 64.2 % — SIGNIFICANT CHANGE UP (ref 43–77)
NRBC # BLD: 0 /100 WBCS — SIGNIFICANT CHANGE UP (ref 0–0)
NRBC # FLD: 0 K/UL — SIGNIFICANT CHANGE UP (ref 0–0)
PHOSPHATE SERPL-MCNC: 3 MG/DL — SIGNIFICANT CHANGE UP (ref 2.5–4.5)
PLATELET # BLD AUTO: 188 K/UL — SIGNIFICANT CHANGE UP (ref 150–400)
POTASSIUM SERPL-MCNC: 3.7 MMOL/L — SIGNIFICANT CHANGE UP (ref 3.5–5.3)
POTASSIUM SERPL-SCNC: 3.7 MMOL/L — SIGNIFICANT CHANGE UP (ref 3.5–5.3)
PROT SERPL-MCNC: 6.2 G/DL — SIGNIFICANT CHANGE UP (ref 6–8.3)
RBC # BLD: 4.56 M/UL — SIGNIFICANT CHANGE UP (ref 4.2–5.8)
RBC # FLD: 15.3 % — HIGH (ref 10.3–14.5)
SODIUM SERPL-SCNC: 138 MMOL/L — SIGNIFICANT CHANGE UP (ref 135–145)
WBC # BLD: 7.9 K/UL — SIGNIFICANT CHANGE UP (ref 3.8–10.5)
WBC # FLD AUTO: 7.9 K/UL — SIGNIFICANT CHANGE UP (ref 3.8–10.5)

## 2024-04-26 PROCEDURE — 99232 SBSQ HOSP IP/OBS MODERATE 35: CPT

## 2024-04-26 RX ORDER — POTASSIUM CHLORIDE 20 MEQ
10 PACKET (EA) ORAL
Refills: 0 | Status: COMPLETED | OUTPATIENT
Start: 2024-04-26 | End: 2024-04-26

## 2024-04-26 RX ADMIN — Medication 1: at 18:10

## 2024-04-26 RX ADMIN — Medication 5 MILLIGRAM(S): at 18:10

## 2024-04-26 RX ADMIN — DEXTROSE MONOHYDRATE, SODIUM CHLORIDE, AND POTASSIUM CHLORIDE 125 MILLILITER(S): 50; .745; 4.5 INJECTION, SOLUTION INTRAVENOUS at 11:06

## 2024-04-26 RX ADMIN — HEPARIN SODIUM 5000 UNIT(S): 5000 INJECTION INTRAVENOUS; SUBCUTANEOUS at 13:23

## 2024-04-26 RX ADMIN — Medication 100 MILLIEQUIVALENT(S): at 12:10

## 2024-04-26 RX ADMIN — PANTOPRAZOLE SODIUM 40 MILLIGRAM(S): 20 TABLET, DELAYED RELEASE ORAL at 11:06

## 2024-04-26 RX ADMIN — Medication 100 MILLIEQUIVALENT(S): at 09:38

## 2024-04-26 RX ADMIN — Medication 1: at 07:13

## 2024-04-26 RX ADMIN — Medication 25 MILLIGRAM(S): at 22:19

## 2024-04-26 RX ADMIN — Medication 1 APPLICATION(S): at 10:56

## 2024-04-26 RX ADMIN — HEPARIN SODIUM 5000 UNIT(S): 5000 INJECTION INTRAVENOUS; SUBCUTANEOUS at 22:16

## 2024-04-26 RX ADMIN — HEPARIN SODIUM 5000 UNIT(S): 5000 INJECTION INTRAVENOUS; SUBCUTANEOUS at 05:45

## 2024-04-26 RX ADMIN — Medication 1 APPLICATION(S): at 22:19

## 2024-04-26 RX ADMIN — Medication 1: at 12:10

## 2024-04-26 RX ADMIN — Medication 5 APPLICATION(S): at 18:10

## 2024-04-26 RX ADMIN — Medication 25 MILLIGRAM(S): at 11:04

## 2024-04-26 RX ADMIN — Medication 5 APPLICATION(S): at 06:18

## 2024-04-26 RX ADMIN — Medication 5 MILLIGRAM(S): at 11:06

## 2024-04-26 RX ADMIN — Medication 100 MILLIEQUIVALENT(S): at 10:56

## 2024-04-26 RX ADMIN — Medication 5 MILLIGRAM(S): at 05:43

## 2024-04-27 LAB
ADD ON TEST-SPECIMEN IN LAB: SIGNIFICANT CHANGE UP
ADD ON TEST-SPECIMEN IN LAB: SIGNIFICANT CHANGE UP
ALBUMIN SERPL ELPH-MCNC: 3.5 G/DL — SIGNIFICANT CHANGE UP (ref 3.3–5)
ALP SERPL-CCNC: 38 U/L — LOW (ref 40–120)
ALT FLD-CCNC: 19 U/L — SIGNIFICANT CHANGE UP (ref 4–41)
ANION GAP SERPL CALC-SCNC: 11 MMOL/L — SIGNIFICANT CHANGE UP (ref 7–14)
AST SERPL-CCNC: 18 U/L — SIGNIFICANT CHANGE UP (ref 4–40)
BASOPHILS # BLD AUTO: 0.02 K/UL — SIGNIFICANT CHANGE UP (ref 0–0.2)
BASOPHILS NFR BLD AUTO: 0.3 % — SIGNIFICANT CHANGE UP (ref 0–2)
BILIRUB DIRECT SERPL-MCNC: <0.2 MG/DL — SIGNIFICANT CHANGE UP (ref 0–0.3)
BILIRUB INDIRECT FLD-MCNC: >0.2 MG/DL — SIGNIFICANT CHANGE UP (ref 0–1)
BILIRUB SERPL-MCNC: 0.4 MG/DL — SIGNIFICANT CHANGE UP (ref 0.2–1.2)
BUN SERPL-MCNC: 17 MG/DL — SIGNIFICANT CHANGE UP (ref 7–23)
CALCIUM SERPL-MCNC: 8.5 MG/DL — SIGNIFICANT CHANGE UP (ref 8.4–10.5)
CHLORIDE SERPL-SCNC: 104 MMOL/L — SIGNIFICANT CHANGE UP (ref 98–107)
CO2 SERPL-SCNC: 22 MMOL/L — SIGNIFICANT CHANGE UP (ref 22–31)
CREAT SERPL-MCNC: 0.58 MG/DL — SIGNIFICANT CHANGE UP (ref 0.5–1.3)
EGFR: 119 ML/MIN/1.73M2 — SIGNIFICANT CHANGE UP
EOSINOPHIL # BLD AUTO: 0.51 K/UL — HIGH (ref 0–0.5)
EOSINOPHIL NFR BLD AUTO: 6.9 % — HIGH (ref 0–6)
GLUCOSE BLDC GLUCOMTR-MCNC: 142 MG/DL — HIGH (ref 70–99)
GLUCOSE BLDC GLUCOMTR-MCNC: 148 MG/DL — HIGH (ref 70–99)
GLUCOSE BLDC GLUCOMTR-MCNC: 154 MG/DL — HIGH (ref 70–99)
GLUCOSE BLDC GLUCOMTR-MCNC: 189 MG/DL — HIGH (ref 70–99)
GLUCOSE SERPL-MCNC: 152 MG/DL — HIGH (ref 70–99)
HCT VFR BLD CALC: 37.6 % — LOW (ref 39–50)
HGB BLD-MCNC: 12.1 G/DL — LOW (ref 13–17)
IANC: 4.46 K/UL — SIGNIFICANT CHANGE UP (ref 1.8–7.4)
IMM GRANULOCYTES NFR BLD AUTO: 1.5 % — HIGH (ref 0–0.9)
LYMPHOCYTES # BLD AUTO: 1.85 K/UL — SIGNIFICANT CHANGE UP (ref 1–3.3)
LYMPHOCYTES # BLD AUTO: 25.1 % — SIGNIFICANT CHANGE UP (ref 13–44)
MAGNESIUM SERPL-MCNC: 2 MG/DL — SIGNIFICANT CHANGE UP (ref 1.6–2.6)
MCHC RBC-ENTMCNC: 27.7 PG — SIGNIFICANT CHANGE UP (ref 27–34)
MCHC RBC-ENTMCNC: 32.2 GM/DL — SIGNIFICANT CHANGE UP (ref 32–36)
MCV RBC AUTO: 86 FL — SIGNIFICANT CHANGE UP (ref 80–100)
MONOCYTES # BLD AUTO: 0.42 K/UL — SIGNIFICANT CHANGE UP (ref 0–0.9)
MONOCYTES NFR BLD AUTO: 5.7 % — SIGNIFICANT CHANGE UP (ref 2–14)
NEUTROPHILS # BLD AUTO: 4.46 K/UL — SIGNIFICANT CHANGE UP (ref 1.8–7.4)
NEUTROPHILS NFR BLD AUTO: 60.5 % — SIGNIFICANT CHANGE UP (ref 43–77)
NRBC # BLD: 0 /100 WBCS — SIGNIFICANT CHANGE UP (ref 0–0)
NRBC # FLD: 0 K/UL — SIGNIFICANT CHANGE UP (ref 0–0)
PHOSPHATE SERPL-MCNC: 3.1 MG/DL — SIGNIFICANT CHANGE UP (ref 2.5–4.5)
PLATELET # BLD AUTO: 180 K/UL — SIGNIFICANT CHANGE UP (ref 150–400)
POTASSIUM SERPL-MCNC: 3.8 MMOL/L — SIGNIFICANT CHANGE UP (ref 3.5–5.3)
POTASSIUM SERPL-SCNC: 3.8 MMOL/L — SIGNIFICANT CHANGE UP (ref 3.5–5.3)
PROT SERPL-MCNC: 6.3 G/DL — SIGNIFICANT CHANGE UP (ref 6–8.3)
RBC # BLD: 4.37 M/UL — SIGNIFICANT CHANGE UP (ref 4.2–5.8)
RBC # FLD: 15 % — HIGH (ref 10.3–14.5)
SODIUM SERPL-SCNC: 137 MMOL/L — SIGNIFICANT CHANGE UP (ref 135–145)
WBC # BLD: 7.44 K/UL — SIGNIFICANT CHANGE UP (ref 3.8–10.5)
WBC # FLD AUTO: 7.44 K/UL — SIGNIFICANT CHANGE UP (ref 3.8–10.5)

## 2024-04-27 RX ORDER — ATORVASTATIN CALCIUM 80 MG/1
40 TABLET, FILM COATED ORAL AT BEDTIME
Refills: 0 | Status: DISCONTINUED | OUTPATIENT
Start: 2024-04-27 | End: 2024-04-28

## 2024-04-27 RX ORDER — DIPHENHYDRAMINE HCL 50 MG
25 CAPSULE ORAL EVERY 6 HOURS
Refills: 0 | Status: DISCONTINUED | OUTPATIENT
Start: 2024-04-27 | End: 2024-04-28

## 2024-04-27 RX ORDER — PANTOPRAZOLE SODIUM 20 MG/1
40 TABLET, DELAYED RELEASE ORAL DAILY
Refills: 0 | Status: DISCONTINUED | OUTPATIENT
Start: 2024-04-27 | End: 2024-04-28

## 2024-04-27 RX ORDER — LOSARTAN POTASSIUM 100 MG/1
50 TABLET, FILM COATED ORAL DAILY
Refills: 0 | Status: DISCONTINUED | OUTPATIENT
Start: 2024-04-27 | End: 2024-04-28

## 2024-04-27 RX ORDER — INSULIN LISPRO 100/ML
VIAL (ML) SUBCUTANEOUS
Refills: 0 | Status: DISCONTINUED | OUTPATIENT
Start: 2024-04-27 | End: 2024-04-28

## 2024-04-27 RX ORDER — METOPROLOL TARTRATE 50 MG
100 TABLET ORAL DAILY
Refills: 0 | Status: DISCONTINUED | OUTPATIENT
Start: 2024-04-27 | End: 2024-04-28

## 2024-04-27 RX ADMIN — Medication 1: at 12:32

## 2024-04-27 RX ADMIN — HEPARIN SODIUM 5000 UNIT(S): 5000 INJECTION INTRAVENOUS; SUBCUTANEOUS at 21:40

## 2024-04-27 RX ADMIN — Medication 5 APPLICATION(S): at 06:50

## 2024-04-27 RX ADMIN — LOSARTAN POTASSIUM 50 MILLIGRAM(S): 100 TABLET, FILM COATED ORAL at 18:05

## 2024-04-27 RX ADMIN — Medication 5 MILLIGRAM(S): at 05:42

## 2024-04-27 RX ADMIN — Medication 25 MILLIGRAM(S): at 05:41

## 2024-04-27 RX ADMIN — HEPARIN SODIUM 5000 UNIT(S): 5000 INJECTION INTRAVENOUS; SUBCUTANEOUS at 13:26

## 2024-04-27 RX ADMIN — Medication 1 APPLICATION(S): at 11:15

## 2024-04-27 RX ADMIN — Medication 1: at 06:49

## 2024-04-27 RX ADMIN — Medication 1: at 00:11

## 2024-04-27 RX ADMIN — Medication 25 MILLIGRAM(S): at 21:52

## 2024-04-27 RX ADMIN — Medication 1 APPLICATION(S): at 21:40

## 2024-04-27 RX ADMIN — ATORVASTATIN CALCIUM 40 MILLIGRAM(S): 80 TABLET, FILM COATED ORAL at 21:40

## 2024-04-27 RX ADMIN — PANTOPRAZOLE SODIUM 40 MILLIGRAM(S): 20 TABLET, DELAYED RELEASE ORAL at 14:33

## 2024-04-27 RX ADMIN — Medication 100 MILLIGRAM(S): at 14:33

## 2024-04-27 RX ADMIN — HEPARIN SODIUM 5000 UNIT(S): 5000 INJECTION INTRAVENOUS; SUBCUTANEOUS at 05:42

## 2024-04-27 RX ADMIN — Medication 5 APPLICATION(S): at 18:05

## 2024-04-27 RX ADMIN — Medication 5 MILLIGRAM(S): at 00:11

## 2024-04-27 NOTE — PROVIDER CONTACT NOTE (OTHER) - ACTION/TREATMENT ORDERED:
provider aware, will come to bedside to attempt IV, will speak to team regarding medication routes
provider notified, LR IVF bolus ordered, provider will come to see pt, no further orders at this time
provider aware patient has no access, IV removed

## 2024-04-27 NOTE — PROVIDER CONTACT NOTE (OTHER) - REASON
patient's  right arm IV removed, IV infiltrated, site swollen, unable to obtain IV access
pt tachy to high 110s and T 100.2
spoke to ADN, unable to obtain IV access, needs to be escalated to team

## 2024-04-27 NOTE — PROVIDER CONTACT NOTE (OTHER) - ASSESSMENT
pt tachy to high 110s and T 100.2, pt not c/o pain, pt voiding, NGT with moderate output
patient's  right arm IV removed, IV infiltrated, site swollen, unable to obtain IV access
spoke to ADN, unable to obtain IV access, needs to be escalated to team

## 2024-04-27 NOTE — PROVIDER CONTACT NOTE (OTHER) - SITUATION
patient's  right arm IV removed, IV infiltrated, site swollen, unable to obtain IV access
pt tachy to high 110s and T 100.2, pt not c/o pain, pt voiding, NGT with moderate output
spoke to ADN, unable to obtain IV access, needs to be escalated to team

## 2024-04-27 NOTE — PROVIDER CONTACT NOTE (OTHER) - BACKGROUND
last admit in Dec 2023 for SBO now p/w hypotension, tachycardia, nausea

## 2024-04-28 ENCOUNTER — TRANSCRIPTION ENCOUNTER (OUTPATIENT)
Age: 49
End: 2024-04-28

## 2024-04-28 VITALS
TEMPERATURE: 98 F | OXYGEN SATURATION: 99 % | HEART RATE: 87 BPM | DIASTOLIC BLOOD PRESSURE: 84 MMHG | RESPIRATION RATE: 18 BRPM | SYSTOLIC BLOOD PRESSURE: 141 MMHG

## 2024-04-28 LAB
ADD ON TEST-SPECIMEN IN LAB: SIGNIFICANT CHANGE UP
ALBUMIN SERPL ELPH-MCNC: 3.4 G/DL — SIGNIFICANT CHANGE UP (ref 3.3–5)
ALP SERPL-CCNC: 38 U/L — LOW (ref 40–120)
ALT FLD-CCNC: 23 U/L — SIGNIFICANT CHANGE UP (ref 4–41)
ANION GAP SERPL CALC-SCNC: 13 MMOL/L — SIGNIFICANT CHANGE UP (ref 7–14)
ANISOCYTOSIS BLD QL: SLIGHT — SIGNIFICANT CHANGE UP
AST SERPL-CCNC: 24 U/L — SIGNIFICANT CHANGE UP (ref 4–40)
BASOPHILS # BLD AUTO: 0 K/UL — SIGNIFICANT CHANGE UP (ref 0–0.2)
BASOPHILS NFR BLD AUTO: 0 % — SIGNIFICANT CHANGE UP (ref 0–2)
BILIRUB DIRECT SERPL-MCNC: <0.2 MG/DL — SIGNIFICANT CHANGE UP (ref 0–0.3)
BILIRUB INDIRECT FLD-MCNC: >0.2 MG/DL — SIGNIFICANT CHANGE UP (ref 0–1)
BILIRUB SERPL-MCNC: 0.4 MG/DL — SIGNIFICANT CHANGE UP (ref 0.2–1.2)
BUN SERPL-MCNC: 15 MG/DL — SIGNIFICANT CHANGE UP (ref 7–23)
BURR CELLS BLD QL SMEAR: PRESENT — SIGNIFICANT CHANGE UP
BURR CELLS BLD QL SMEAR: SLIGHT — SIGNIFICANT CHANGE UP
CALCIUM SERPL-MCNC: 8.5 MG/DL — SIGNIFICANT CHANGE UP (ref 8.4–10.5)
CHLORIDE SERPL-SCNC: 103 MMOL/L — SIGNIFICANT CHANGE UP (ref 98–107)
CO2 SERPL-SCNC: 20 MMOL/L — LOW (ref 22–31)
CREAT SERPL-MCNC: 0.58 MG/DL — SIGNIFICANT CHANGE UP (ref 0.5–1.3)
CULTURE RESULTS: SIGNIFICANT CHANGE UP
CULTURE RESULTS: SIGNIFICANT CHANGE UP
EGFR: 119 ML/MIN/1.73M2 — SIGNIFICANT CHANGE UP
EOSINOPHIL # BLD AUTO: 0.28 K/UL — SIGNIFICANT CHANGE UP (ref 0–0.5)
EOSINOPHIL NFR BLD AUTO: 3.6 % — SIGNIFICANT CHANGE UP (ref 0–6)
GIANT PLATELETS BLD QL SMEAR: PRESENT — SIGNIFICANT CHANGE UP
GLUCOSE BLDC GLUCOMTR-MCNC: 165 MG/DL — HIGH (ref 70–99)
GLUCOSE BLDC GLUCOMTR-MCNC: 224 MG/DL — HIGH (ref 70–99)
GLUCOSE SERPL-MCNC: 135 MG/DL — HIGH (ref 70–99)
HCT VFR BLD CALC: 38.2 % — LOW (ref 39–50)
HGB BLD-MCNC: 12.3 G/DL — LOW (ref 13–17)
IANC: 4.01 K/UL — SIGNIFICANT CHANGE UP (ref 1.8–7.4)
LYMPHOCYTES # BLD AUTO: 2.6 K/UL — SIGNIFICANT CHANGE UP (ref 1–3.3)
LYMPHOCYTES # BLD AUTO: 33.9 % — SIGNIFICANT CHANGE UP (ref 13–44)
MACROCYTES BLD QL: SLIGHT — SIGNIFICANT CHANGE UP
MAGNESIUM SERPL-MCNC: 2 MG/DL — SIGNIFICANT CHANGE UP (ref 1.6–2.6)
MANUAL SMEAR VERIFICATION: SIGNIFICANT CHANGE UP
MCHC RBC-ENTMCNC: 27.5 PG — SIGNIFICANT CHANGE UP (ref 27–34)
MCHC RBC-ENTMCNC: 32.2 GM/DL — SIGNIFICANT CHANGE UP (ref 32–36)
MCV RBC AUTO: 85.5 FL — SIGNIFICANT CHANGE UP (ref 80–100)
METAMYELOCYTES # FLD: 1.8 % — HIGH (ref 0–1)
MICROCYTES BLD QL: SLIGHT — SIGNIFICANT CHANGE UP
MONOCYTES # BLD AUTO: 0.28 K/UL — SIGNIFICANT CHANGE UP (ref 0–0.9)
MONOCYTES NFR BLD AUTO: 3.6 % — SIGNIFICANT CHANGE UP (ref 2–14)
MYELOCYTES NFR BLD: 0.9 % — HIGH (ref 0–0)
NEUTROPHILS # BLD AUTO: 4.25 K/UL — SIGNIFICANT CHANGE UP (ref 1.8–7.4)
NEUTROPHILS NFR BLD AUTO: 50.9 % — SIGNIFICANT CHANGE UP (ref 43–77)
NEUTS BAND # BLD: 4.4 % — SIGNIFICANT CHANGE UP (ref 0–6)
NRBC # BLD: 0 /100 WBCS — SIGNIFICANT CHANGE UP (ref 0–0)
NRBC # FLD: 0 K/UL — SIGNIFICANT CHANGE UP (ref 0–0)
OVALOCYTES BLD QL SMEAR: SLIGHT — SIGNIFICANT CHANGE UP
PHOSPHATE SERPL-MCNC: 3.5 MG/DL — SIGNIFICANT CHANGE UP (ref 2.5–4.5)
PLAT MORPH BLD: NORMAL — SIGNIFICANT CHANGE UP
PLATELET # BLD AUTO: 201 K/UL — SIGNIFICANT CHANGE UP (ref 150–400)
PLATELET COUNT - ESTIMATE: NORMAL — SIGNIFICANT CHANGE UP
POIKILOCYTOSIS BLD QL AUTO: SIGNIFICANT CHANGE UP
POLYCHROMASIA BLD QL SMEAR: SLIGHT — SIGNIFICANT CHANGE UP
POTASSIUM SERPL-MCNC: 3.8 MMOL/L — SIGNIFICANT CHANGE UP (ref 3.5–5.3)
POTASSIUM SERPL-SCNC: 3.8 MMOL/L — SIGNIFICANT CHANGE UP (ref 3.5–5.3)
PROT SERPL-MCNC: 6.2 G/DL — SIGNIFICANT CHANGE UP (ref 6–8.3)
RBC # BLD: 4.47 M/UL — SIGNIFICANT CHANGE UP (ref 4.2–5.8)
RBC # FLD: 14.8 % — HIGH (ref 10.3–14.5)
RBC BLD AUTO: ABNORMAL
SODIUM SERPL-SCNC: 136 MMOL/L — SIGNIFICANT CHANGE UP (ref 135–145)
SPECIMEN SOURCE: SIGNIFICANT CHANGE UP
SPECIMEN SOURCE: SIGNIFICANT CHANGE UP
VARIANT LYMPHS # BLD: 0.9 % — SIGNIFICANT CHANGE UP (ref 0–6)
WBC # BLD: 7.68 K/UL — SIGNIFICANT CHANGE UP (ref 3.8–10.5)
WBC # FLD AUTO: 7.68 K/UL — SIGNIFICANT CHANGE UP (ref 3.8–10.5)

## 2024-04-28 PROCEDURE — 99238 HOSP IP/OBS DSCHRG MGMT 30/<: CPT

## 2024-04-28 RX ORDER — ROSUVASTATIN CALCIUM 5 MG/1
10 TABLET ORAL
Qty: 0 | Refills: 0 | DISCHARGE
Start: 2024-04-28

## 2024-04-28 RX ORDER — ROSUVASTATIN CALCIUM 5 MG/1
1 TABLET ORAL
Qty: 0 | Refills: 0 | DISCHARGE

## 2024-04-28 RX ORDER — FENOFIBRATE,MICRONIZED 130 MG
160 CAPSULE ORAL
Qty: 0 | Refills: 0 | DISCHARGE
Start: 2024-04-28

## 2024-04-28 RX ORDER — FENOFIBRATE,MICRONIZED 130 MG
1 CAPSULE ORAL
Qty: 0 | Refills: 0 | DISCHARGE

## 2024-04-28 RX ADMIN — HEPARIN SODIUM 5000 UNIT(S): 5000 INJECTION INTRAVENOUS; SUBCUTANEOUS at 06:08

## 2024-04-28 RX ADMIN — Medication 1 APPLICATION(S): at 11:08

## 2024-04-28 RX ADMIN — Medication 100 MILLIGRAM(S): at 06:08

## 2024-04-28 RX ADMIN — Medication 2: at 12:29

## 2024-04-28 RX ADMIN — Medication 1: at 08:46

## 2024-04-28 RX ADMIN — PANTOPRAZOLE SODIUM 40 MILLIGRAM(S): 20 TABLET, DELAYED RELEASE ORAL at 11:07

## 2024-04-28 RX ADMIN — LOSARTAN POTASSIUM 50 MILLIGRAM(S): 100 TABLET, FILM COATED ORAL at 06:08

## 2024-04-28 RX ADMIN — Medication 5 APPLICATION(S): at 06:08

## 2024-04-28 NOTE — DISCHARGE NOTE PROVIDER - CARE PROVIDERS DIRECT ADDRESSES
[email or web address]

## 2024-04-28 NOTE — DISCHARGE NOTE NURSING/CASE MANAGEMENT/SOCIAL WORK - PATIENT PORTAL LINK FT
You can access the FollowMyHealth Patient Portal offered by [facility] by registering at the following website: [email or web address]. By joining [employer]’s FollowMyHealth portal, you will also be able to view your health information using other applications (apps) compatible with our system.

## 2024-04-28 NOTE — CHART NOTE - NSCHARTNOTEFT_GEN_A_CORE
Primary reaching out for discharge follow-up information, leaving chart note with clinic information  Patient can follow up with us in the [facility] located at [address] [place] [address]. [address], [place] [address] upon discharge, please instruct patient to call our office. Office phone number is [phone].  - can discharge with 2 weeks of clobetasol 0.05% ointment BID PRN itch, then stop  - treatment of AGEP is symptomatic as rash self-resolves upon discontinuation of offending agent    Nancy Gooden MD  Resident Physician, PGY2  [facility]  Pager: [phone]  Office: [phone].

## 2024-04-28 NOTE — PROGRESS NOTE ADULT - SUBJECTIVE AND OBJECTIVE BOX
Passing stool. Feels better, mild distention.NG 1650. Still hypotensive and tachycardic. Plan to rehydrate , Cont Ng etc. DH
SURGERY DAILY PROGRESS NOTE    SUBJECTIVE:     Overnight: no acute events     Patient seen and evaluated on AM rounds. Reports no flatus or BM  Patient otherwise denies nausea, vomiting, chest pain, shortness of breath     OBJECTIVE:  Vital Signs Last 24 Hrs  T(C): 36.6 (25 Apr 2024 05:40), Max: 37.2 (24 Apr 2024 22:35)  T(F): 97.9 (25 Apr 2024 05:40), Max: 99 (24 Apr 2024 22:35)  HR: 92 (25 Apr 2024 05:40) (92 - 107)  BP: 119/63 (25 Apr 2024 05:40) (113/63 - 135/86)  BP(mean): --  RR: 17 (25 Apr 2024 05:40) (15 - 17)  SpO2: 97% (25 Apr 2024 05:40) (95% - 98%)    Parameters below as of 25 Apr 2024 05:40  Patient On (Oxygen Delivery Method): room air      Daily     Daily   BHUMI:      Chest Tube:      NG Tube:   04-24-24 @ 07:01  -  04-25-24 @ 07:00  --------------------------------------------------------  OUT: 800 mL            STANDING  clobetasol 0.05% Ointment 1 Application(s) Topical every 12 hours  dextrose 10% Bolus 125 milliLiter(s) IV Bolus once  dextrose 5%. 1000 milliLiter(s) (100 mL/Hr) IV Continuous <Continuous>  dextrose 5%. 1000 milliLiter(s) (50 mL/Hr) IV Continuous <Continuous>  dextrose 50% Injectable 25 Gram(s) IV Push once  dextrose 50% Injectable 12.5 Gram(s) IV Push once  glucagon  Injectable 1 milliGRAM(s) IntraMuscular once  heparin   Injectable 5000 Unit(s) SubCutaneous every 8 hours  insulin lispro (ADMELOG) corrective regimen sliding scale   SubCutaneous every 6 hours  lactated ringers. 1000 milliLiter(s) (125 mL/Hr) IV Continuous <Continuous>  metoprolol tartrate Injectable 5 milliGRAM(s) IV Push every 6 hours    PRN  benzocaine/menthol Lozenge 1 Lozenge Oral every 2 hours PRN Sore Throat  dextrose Oral Gel 15 Gram(s) Oral once PRN Blood Glucose LESS THAN 70 milliGRAM(s)/deciliter      Labs:  142  |  106  |  22  ----------------------------<  162<H>    (04-24)  4.0   |  19<L>  |  0.77          Ca    8.8      04-24  Mg    2.10  Phos  3.3                  Physical Exam:  General: NAD  Respiratory: respirations non labored  Abdominal: soft, nontender, nondistended  Skin: pink patches on face, neck, chest, abdomen, and back. 
Tachycardia improved. Non distended non tender. Labs normal. NG output decreasing. No GI function  . Continue present RX. DH
INTERVAL HPI/OVERNIGHT EVENTS: No acute overnight events    MEDICATIONS  (STANDING):  clobetasol 0.05% Ointment 1 Application(s) Topical every 12 hours  clobetasol 0.05% Ointment 5 Application(s) Topical every 12 hours  dextrose 10% Bolus 125 milliLiter(s) IV Bolus once  dextrose 5% + sodium chloride 0.45% with potassium chloride 20 mEq/L 1000 milliLiter(s) (125 mL/Hr) IV Continuous <Continuous>  dextrose 5%. 1000 milliLiter(s) (100 mL/Hr) IV Continuous <Continuous>  dextrose 5%. 1000 milliLiter(s) (50 mL/Hr) IV Continuous <Continuous>  dextrose 50% Injectable 25 Gram(s) IV Push once  dextrose 50% Injectable 12.5 Gram(s) IV Push once  glucagon  Injectable 1 milliGRAM(s) IntraMuscular once  heparin   Injectable 5000 Unit(s) SubCutaneous every 8 hours  insulin lispro (ADMELOG) corrective regimen sliding scale   SubCutaneous every 6 hours  metoprolol tartrate Injectable 5 milliGRAM(s) IV Push every 6 hours    MEDICATIONS  (PRN):  benzocaine/menthol Lozenge 1 Lozenge Oral every 2 hours PRN Sore Throat  dextrose Oral Gel 15 Gram(s) Oral once PRN Blood Glucose LESS THAN 70 milliGRAM(s)/deciliter  diphenhydrAMINE Injectable 25 milliGRAM(s) IV Push every 6 hours PRN Rash and/or Itching      Allergies    vancomycin (Pruritus; Rash)  morphine (Rash)  fentanyl (Rash)  ceftriaxone (Rash)  contrast media (iodine-based) (Rash)  cephalosporins (Rash)  Flagyl (Unknown)  Cipro (Unknown)  iodine containing compounds (Rash)    Intolerances        REVIEW OF SYSTEMS      General: no fevers/chills, no NS	    Skin: see HPI  	  Ophthalmologic: no eye pain or change in vision    Genitourinary: no dysuria or hematuria    Musculoskeletal: no joint pains or weakness	    Neurological:no weakness or tingling          Vital Signs Last 24 Hrs  T(C): 36.4 (25 Apr 2024 14:06), Max: 37.2 (24 Apr 2024 22:35)  T(F): 97.5 (25 Apr 2024 14:06), Max: 99 (24 Apr 2024 22:35)  HR: 88 (25 Apr 2024 14:06) (88 - 106)  BP: 128/79 (25 Apr 2024 14:06) (113/63 - 135/86)  BP(mean): --  RR: 16 (25 Apr 2024 14:06) (15 - 18)  SpO2: 99% (25 Apr 2024 14:06) (95% - 99%)    Parameters below as of 25 Apr 2024 14:06  Patient On (Oxygen Delivery Method): room air        Physical Exam:     The patient was alert and in no apparent distress.  OP showed no ulcerations  There was no visible lymphadenopathy.  Conjunctiva were non injected  There was no clubbing or edema of extremities.  The scalp, hair, face, eyebrows, lips, OP, neck, chest, back,   extremities X 4, nails were examined.  There was no hyperhidrosis or bromhidrosis.    Of note on skin exam:     pink patches on face, neck, chest, abdomen, and back. Now extending down the thighs.   small pustules on neck folds     LABS:                        12.0   14.63 )-----------( 207      ( 25 Apr 2024 06:00 )             37.9     04-25    142  |  105  |  26<H>  ----------------------------<  144<H>  3.9   |  23  |  0.72    Ca    9.5      25 Apr 2024 06:52  Phos  3.1     04-25  Mg     2.00     04-25        Urinalysis Basic - ( 25 Apr 2024 06:52 )    Color: x / Appearance: x / SG: x / pH: x  Gluc: 144 mg/dL / Ketone: x  / Bili: x / Urobili: x   Blood: x / Protein: x / Nitrite: x   Leuk Esterase: x / RBC: x / WBC x   Sq Epi: x / Non Sq Epi: x / Bacteria: x        RADIOLOGY & ADDITIONAL TESTS:      
SURGERY DAILY PROGRESS NOTE    SUBJECTIVE:     Overnight: no acute events     Patient seen and evaluated on AM rounds.   Patient otherwise denies nausea, vomiting, chest pain, shortness of breath     OBJECTIVE:  Vital Signs Last 24 Hrs  T(C): 36.6 (28 Apr 2024 09:41), Max: 36.9 (27 Apr 2024 18:00)  T(F): 97.9 (28 Apr 2024 09:41), Max: 98.4 (27 Apr 2024 18:00)  HR: 88 (28 Apr 2024 09:41) (67 - 90)  BP: 140/84 (28 Apr 2024 09:41) (119/80 - 148/89)  BP(mean): --  RR: 17 (28 Apr 2024 09:41) (16 - 18)  SpO2: 97% (28 Apr 2024 09:41) (97% - 100%)    Parameters below as of 28 Apr 2024 09:41  Patient On (Oxygen Delivery Method): room air      Daily     Daily   BHUMI:      Chest Tube:      NG Tube:           STANDING  atorvastatin 40 milliGRAM(s) Oral at bedtime  clobetasol 0.05% Ointment 1 Application(s) Topical every 12 hours  clobetasol 0.05% Ointment 5 Application(s) Topical every 12 hours  dextrose 10% Bolus 125 milliLiter(s) IV Bolus once  dextrose 5%. 1000 milliLiter(s) (100 mL/Hr) IV Continuous <Continuous>  dextrose 5%. 1000 milliLiter(s) (50 mL/Hr) IV Continuous <Continuous>  dextrose 50% Injectable 25 Gram(s) IV Push once  dextrose 50% Injectable 12.5 Gram(s) IV Push once  glucagon  Injectable 1 milliGRAM(s) IntraMuscular once  heparin   Injectable 5000 Unit(s) SubCutaneous every 8 hours  insulin lispro (ADMELOG) corrective regimen sliding scale   SubCutaneous Before meals and at bedtime  losartan 50 milliGRAM(s) Oral daily  metoprolol succinate  milliGRAM(s) Oral daily  pantoprazole    Tablet 40 milliGRAM(s) Oral daily    PRN  benzocaine/menthol Lozenge 1 Lozenge Oral every 2 hours PRN Sore Throat  dextrose Oral Gel 15 Gram(s) Oral once PRN Blood Glucose LESS THAN 70 milliGRAM(s)/deciliter  diphenhydrAMINE 25 milliGRAM(s) Oral every 6 hours PRN Rash and/or Itching      Labs:  136  |  103  |  15  ----------------------------<  135<H>    (04-28)  3.8   |  20<L>  |  0.58          Ca    8.5      04-28  Mg    2.00  Phos  3.5          Urinalysis Basic - ( 28 Apr 2024 06:15 )    Color: x / Appearance: x / SG: x / pH: x  Gluc: 135 mg/dL / Ketone: x  / Bili: x / Urobili: x   Blood: x / Protein: x / Nitrite: x   Leuk Esterase: x / RBC: x / WBC x   Sq Epi: x / Non Sq Epi: x / Bacteria: x      Urinalysis Basic - ( 28 Apr 2024 06:15 )    Color: x / Appearance: x / SG: x / pH: x  Gluc: 135 mg/dL / Ketone: x  / Bili: x / Urobili: x   Blood: x / Protein: x / Nitrite: x   Leuk Esterase: x / RBC: x / WBC x   Sq Epi: x / Non Sq Epi: x / Bacteria: x          Physical Exam:  General: NAD  Respiratory: respirations non labored  Abdominal: soft, nontender, nondistended  Skin: pink patches on face, neck, chest, abdomen, back, lower extremities, improving    
A TEAM SURGERY DAILY PROGRESS NOTE  Patient is a 48y old  Male who presents with a chief complaint of SBO (26 Apr 2024 08:08)    SUBJECTIVE:   Overnight Events: No acute events     Patient seen and evaluated at bedside on AM rounds. Patient without complaints. Currently (+/+) for Flatus/BM. Denies abdominal pain.  Patient otherwise denies nausea, vomiting, chest pain, shortness of breath      OBJECTIVE:  Vital Signs Last 24 Hrs  T(C): 36.8 (27 Apr 2024 06:00), Max: 36.8 (26 Apr 2024 17:47)  T(F): 98.2 (27 Apr 2024 06:00), Max: 98.2 (26 Apr 2024 17:47)  HR: 78 (27 Apr 2024 06:00) (78 - 93)  BP: 131/69 (27 Apr 2024 06:00) (131/69 - 141/82)  BP(mean): --  RR: 18 (27 Apr 2024 06:00) (17 - 18)  SpO2: 100% (27 Apr 2024 06:00) (97% - 100%)    Parameters below as of 27 Apr 2024 06:00  Patient On (Oxygen Delivery Method): room air      I&O's Detail    26 Apr 2024 07:01  -  27 Apr 2024 07:00  --------------------------------------------------------  IN:    dextrose 5% + sodium chloride 0.45% w/ Additives: 2375 mL    IV PiggyBack: 300 mL  Total IN: 2675 mL    OUT:    Nasogastric/Oral tube (mL): 150 mL    Oral Fluid: 0 mL    Voided (mL): 2625 mL  Total OUT: 2775 mL    Total NET: -100 mL    STANDING  clobetasol 0.05% Ointment 1 Application(s) Topical every 12 hours  clobetasol 0.05% Ointment 5 Application(s) Topical every 12 hours  dextrose 10% Bolus 125 milliLiter(s) IV Bolus once  dextrose 5% + sodium chloride 0.45% with potassium chloride 20 mEq/L 1000 milliLiter(s) (125 mL/Hr) IV Continuous <Continuous>  dextrose 5%. 1000 milliLiter(s) (100 mL/Hr) IV Continuous <Continuous>  dextrose 5%. 1000 milliLiter(s) (50 mL/Hr) IV Continuous <Continuous>  dextrose 50% Injectable 25 Gram(s) IV Push once  dextrose 50% Injectable 12.5 Gram(s) IV Push once  glucagon  Injectable 1 milliGRAM(s) IntraMuscular once  heparin   Injectable 5000 Unit(s) SubCutaneous every 8 hours  insulin lispro (ADMELOG) corrective regimen sliding scale   SubCutaneous every 6 hours  metoprolol tartrate Injectable 5 milliGRAM(s) IV Push every 6 hours  pantoprazole  Injectable 40 milliGRAM(s) IV Push daily    PRN  benzocaine/menthol Lozenge 1 Lozenge Oral every 2 hours PRN Sore Throat  dextrose Oral Gel 15 Gram(s) Oral once PRN Blood Glucose LESS THAN 70 milliGRAM(s)/deciliter  diphenhydrAMINE Injectable 25 milliGRAM(s) IV Push every 6 hours PRN Rash and/or Itching      Labs:  137  |  104  |  17  ----------------------------<  152<H>    (04-27)  3.8   |  22  |  0.58          Ca    8.5      04-27  Mg    2.00  Phos  3.1          Urinalysis Basic - ( 27 Apr 2024 08:09 )    Color: x / Appearance: x / SG: x / pH: x  Gluc: 152 mg/dL / Ketone: x  / Bili: x / Urobili: x   Blood: x / Protein: x / Nitrite: x   Leuk Esterase: x / RBC: x / WBC x   Sq Epi: x / Non Sq Epi: x / Bacteria: x      Urinalysis Basic - ( 27 Apr 2024 08:09 )    Color: x / Appearance: x / SG: x / pH: x  Gluc: 152 mg/dL / Ketone: x  / Bili: x / Urobili: x   Blood: x / Protein: x / Nitrite: x   Leuk Esterase: x / RBC: x / WBC x   Sq Epi: x / Non Sq Epi: x / Bacteria: x    Physical Exam:  General: well developed, well nourished, NAD  Cardiovascular: appears well perfused   Respiratory: respirations non labored  Abdominal: soft, nontender, nondistended  Extremities: FROM, warm  Skin: pink patches on face, neck, chest, abdomen, back, lower extremities   Neurological: A+Ox3   
SURGERY DAILY PROGRESS NOTE    SUBJECTIVE:     Overnight: no acute events     Patient seen and evaluated on AM rounds. Reports itchiness slightly improved but rash spread to lower extremities   Patient otherwise denies nausea, vomiting, chest pain, shortness of breath     OBJECTIVE:  Vital Signs Last 24 Hrs  T(C): 36.6 (26 Apr 2024 05:43), Max: 37.1 (26 Apr 2024 02:08)  T(F): 97.8 (26 Apr 2024 05:43), Max: 98.8 (26 Apr 2024 02:08)  HR: 80 (26 Apr 2024 05:43) (80 - 95)  BP: 144/79 (26 Apr 2024 05:43) (118/69 - 145/80)  BP(mean): --  RR: 17 (26 Apr 2024 05:43) (16 - 18)  SpO2: 99% (26 Apr 2024 05:43) (96% - 99%)    Parameters below as of 26 Apr 2024 05:43  Patient On (Oxygen Delivery Method): room air      Daily     Daily   BHUMI:      Chest Tube:      NG Tube:   04-25-24 @ 07:01  -  04-26-24 @ 07:00  --------------------------------------------------------  OUT: 1050 mL            STANDING  clobetasol 0.05% Ointment 1 Application(s) Topical every 12 hours  clobetasol 0.05% Ointment 5 Application(s) Topical every 12 hours  dextrose 10% Bolus 125 milliLiter(s) IV Bolus once  dextrose 5% + sodium chloride 0.45% with potassium chloride 20 mEq/L 1000 milliLiter(s) (125 mL/Hr) IV Continuous <Continuous>  dextrose 5%. 1000 milliLiter(s) (50 mL/Hr) IV Continuous <Continuous>  dextrose 5%. 1000 milliLiter(s) (100 mL/Hr) IV Continuous <Continuous>  dextrose 50% Injectable 25 Gram(s) IV Push once  dextrose 50% Injectable 12.5 Gram(s) IV Push once  glucagon  Injectable 1 milliGRAM(s) IntraMuscular once  heparin   Injectable 5000 Unit(s) SubCutaneous every 8 hours  insulin lispro (ADMELOG) corrective regimen sliding scale   SubCutaneous every 6 hours  metoprolol tartrate Injectable 5 milliGRAM(s) IV Push every 6 hours  pantoprazole  Injectable 40 milliGRAM(s) IV Push daily    PRN  benzocaine/menthol Lozenge 1 Lozenge Oral every 2 hours PRN Sore Throat  dextrose Oral Gel 15 Gram(s) Oral once PRN Blood Glucose LESS THAN 70 milliGRAM(s)/deciliter  diphenhydrAMINE Injectable 25 milliGRAM(s) IV Push every 6 hours PRN Rash and/or Itching      Labs:  138  |  104  |  23  ----------------------------<  178<H>    (04-26)  3.7   |  23  |  0.67          Ca    8.4      04-26  Mg    2.00  Phos  3.0          Urinalysis Basic - ( 26 Apr 2024 05:59 )    Color: x / Appearance: x / SG: x / pH: x  Gluc: 178 mg/dL / Ketone: x  / Bili: x / Urobili: x   Blood: x / Protein: x / Nitrite: x   Leuk Esterase: x / RBC: x / WBC x   Sq Epi: x / Non Sq Epi: x / Bacteria: x      Urinalysis Basic - ( 26 Apr 2024 05:59 )    Color: x / Appearance: x / SG: x / pH: x  Gluc: 178 mg/dL / Ketone: x  / Bili: x / Urobili: x   Blood: x / Protein: x / Nitrite: x   Leuk Esterase: x / RBC: x / WBC x   Sq Epi: x / Non Sq Epi: x / Bacteria: x          Physical Exam:  General: NAD  Respiratory: respirations non labored  Abdominal: soft, nontender, nondistended  Skin: pink patches on face, neck, chest, abdomen, back, lower extremities     
A TEAM SURGERY DAILY PROGRESS NOTE  Patient is a 48y old  Male who presents with a chief complaint of SBO (23 Apr 2024 11:25)    SUBJECTIVE:   Overnight Events: Overnight patient endorsing erythema and itchiness in upper torso, back and bilateral upper extremities. 2/2 vancomycin dosed in ED. Was given solumedrol and famotidine.    Patient seen and evaluated at bedside on AM rounds. Improved itchiness, however with continued upper extremity rashes. Currently (-/-) for Flatus/BM.  Patient otherwise denies nausea, vomiting, chest pain, shortness of breath    OBJECTIVE:  Vital Signs Last 24 Hrs  T(C): 36.8 (24 Apr 2024 06:00), Max: 37.9 (23 Apr 2024 22:20)  T(F): 98.3 (24 Apr 2024 06:00), Max: 100.2 (23 Apr 2024 22:20)  HR: 107 (24 Apr 2024 06:00) (67 - 115)  BP: 145/91 (24 Apr 2024 06:00) (111/65 - 158/87)  BP(mean): --  RR: 17 (24 Apr 2024 06:00) (17 - 18)  SpO2: 96% (24 Apr 2024 06:00) (96% - 97%)    Parameters below as of 24 Apr 2024 06:00  Patient On (Oxygen Delivery Method): room air    I&O's Detail    23 Apr 2024 07:01  -  24 Apr 2024 07:00  --------------------------------------------------------  IN:    Lactated Ringers: 3000 mL  Total IN: 3000 mL    OUT:    Nasogastric/Oral tube (mL): 925 mL    Oral Fluid: 0 mL    Voided (mL): 1275 mL  Total OUT: 2200 mL    Total NET: 800 mL    STANDING  dextrose 10% Bolus 125 milliLiter(s) IV Bolus once  dextrose 5%. 1000 milliLiter(s) (50 mL/Hr) IV Continuous <Continuous>  dextrose 5%. 1000 milliLiter(s) (100 mL/Hr) IV Continuous <Continuous>  dextrose 50% Injectable 25 Gram(s) IV Push once  dextrose 50% Injectable 12.5 Gram(s) IV Push once  glucagon  Injectable 1 milliGRAM(s) IntraMuscular once  heparin   Injectable 5000 Unit(s) SubCutaneous every 8 hours  insulin lispro (ADMELOG) corrective regimen sliding scale   SubCutaneous every 6 hours  lactated ringers. 1000 milliLiter(s) (125 mL/Hr) IV Continuous <Continuous>  metoprolol tartrate Injectable 5 milliGRAM(s) IV Push every 6 hours    PRN  benzocaine/menthol Lozenge 1 Lozenge Oral every 2 hours PRN Sore Throat  dextrose Oral Gel 15 Gram(s) Oral once PRN Blood Glucose LESS THAN 70 milliGRAM(s)/deciliter    Labs:  142  |  106  |  22  ----------------------------<  162<H>    (04-24)  4.0   |  19<L>  |  0.77          Ca    8.8      04-24  Mg    2.10  Phos  3.3          Urinalysis Basic - ( 24 Apr 2024 05:47 )    Color: x / Appearance: x / SG: x / pH: x  Gluc: 162 mg/dL / Ketone: x  / Bili: x / Urobili: x   Blood: x / Protein: x / Nitrite: x   Leuk Esterase: x / RBC: x / WBC x   Sq Epi: x / Non Sq Epi: x / Bacteria: x    Urinalysis Basic - ( 24 Apr 2024 05:47 )    Color: x / Appearance: x / SG: x / pH: x  Gluc: 162 mg/dL / Ketone: x  / Bili: x / Urobili: x   Blood: x / Protein: x / Nitrite: x   Leuk Esterase: x / RBC: x / WBC x   Sq Epi: x / Non Sq Epi: x / Bacteria: x    Physical Exam:  General: well developed, well nourished, NAD  Cardiovascular: appears well perfused   Respiratory: respirations non labored  Gastrointestinal: soft, nontender, mildly distended. NGT in place to suction   Extremities: FROM, warm, erythematous rash on bilateral upper extremities  Neurological: A+Ox3   
SURGERY DAILY PROGRESS NOTE    SUBJECTIVE:     Overnight: no acute events     Patient seen and evaluated on AM rounds. pt reports feeling better   Patient otherwise denies nausea, vomiting, chest pain, shortness of breath     OBJECTIVE:  Vital Signs Last 24 Hrs  T(C): 36.6 (23 Apr 2024 10:03), Max: 36.9 (23 Apr 2024 01:28)  T(F): 97.9 (23 Apr 2024 10:03), Max: 98.4 (23 Apr 2024 01:28)  HR: 104 (23 Apr 2024 10:03) (104 - 120)  BP: 111/65 (23 Apr 2024 10:03) (74/62 - 113/67)  BP(mean): 82 (22 Apr 2024 22:34) (65 - 82)  RR: 17 (23 Apr 2024 10:03) (17 - 25)  SpO2: 96% (23 Apr 2024 10:03) (96% - 100%)    Parameters below as of 23 Apr 2024 10:03  Patient On (Oxygen Delivery Method): room air      Daily     Daily   BHUMI:      Chest Tube:      NG Tube:   04-22-24 @ 07:01  -  04-23-24 @ 07:00  --------------------------------------------------------  OUT: 1650 mL    04-23-24 @ 07:01  -  04-23-24 @ 11:25  --------------------------------------------------------  OUT: 250 mL            STANDING  dextrose 10% Bolus 125 milliLiter(s) IV Bolus once  dextrose 5%. 1000 milliLiter(s) (100 mL/Hr) IV Continuous <Continuous>  dextrose 5%. 1000 milliLiter(s) (50 mL/Hr) IV Continuous <Continuous>  dextrose 50% Injectable 25 Gram(s) IV Push once  dextrose 50% Injectable 12.5 Gram(s) IV Push once  glucagon  Injectable 1 milliGRAM(s) IntraMuscular once  heparin   Injectable 5000 Unit(s) SubCutaneous every 8 hours  insulin lispro (ADMELOG) corrective regimen sliding scale   SubCutaneous every 6 hours  lactated ringers. 1000 milliLiter(s) (125 mL/Hr) IV Continuous <Continuous>  metoprolol tartrate Injectable 5 milliGRAM(s) IV Push every 6 hours    PRN  benzocaine/menthol Lozenge 1 Lozenge Oral every 2 hours PRN Sore Throat  dextrose Oral Gel 15 Gram(s) Oral once PRN Blood Glucose LESS THAN 70 milliGRAM(s)/deciliter      Labs:  140  |  106  |  28<H>  ----------------------------<  218<H>    (04-23)  4.0   |  19<L>  |  1.07          Ca    8.8      04-23  Mg    1.50<L>  Phos  3.5          Urinalysis Basic - ( 23 Apr 2024 01:47 )    Color: x / Appearance: x / SG: x / pH: x  Gluc: 218 mg/dL / Ketone: x  / Bili: x / Urobili: x   Blood: x / Protein: x / Nitrite: x   Leuk Esterase: x / RBC: x / WBC x   Sq Epi: x / Non Sq Epi: x / Bacteria: x      Urinalysis Basic - ( 23 Apr 2024 01:47 )    Color: x / Appearance: x / SG: x / pH: x  Gluc: 218 mg/dL / Ketone: x  / Bili: x / Urobili: x   Blood: x / Protein: x / Nitrite: x   Leuk Esterase: x / RBC: x / WBC x   Sq Epi: x / Non Sq Epi: x / Bacteria: x          Physical Exam:  General: NAD  Respiratory: respirations non labored  Abdominal: soft, nontender, mildly distended

## 2024-04-28 NOTE — DISCHARGE NOTE PROVIDER - NSDCMRMEDTOKEN_GEN_ALL_CORE_FT
Farxiga 10 mg oral tablet: 1 tab(s) orally once a day  glimepiride 4 mg oral tablet: 1 tab(s) orally once a day  metFORMIN: 500 milligram(s) orally 3 times a day  pantoprazole 40 mg oral delayed release tablet: 1 tab(s) orally once a day (before a meal)  Rybelsus 7 mg oral tablet: 1 tab(s) orally once a day  telmisartan 40 mg oral tablet: 1 tab(s) orally once a day  Toprol-XL: 100 milligram(s) orally once a day   Crestor 10 mg oral tablet: 10 milligram(s) orally once a day  Farxiga 10 mg oral tablet: 1 tab(s) orally once a day  fenofibrate 160 mg oral tablet: 160 milligram(s) orally once a day  glimepiride 4 mg oral tablet: 1 tab(s) orally once a day  metFORMIN: 500 milligram(s) orally 3 times a day  pantoprazole 40 mg oral delayed release tablet: 1 tab(s) orally once a day (before a meal)  Rybelsus 7 mg oral tablet: 1 tab(s) orally once a day  telmisartan 40 mg oral tablet: 1 tab(s) orally once a day  Toprol-XL: 100 milligram(s) orally once a day

## 2024-04-28 NOTE — DISCHARGE NOTE PROVIDER - HOSPITAL COURSE
48y Male was admitted for small bowel obstruction on 04-22-24. Patient has a PMH of HTN, HLD, T2DM, SBO, diverticulitis, GERD. Hospital course was complicated by AGEP, and dermatology was consulted and recommended clobetasol Ointment 0.05% BID to affected areas. On 4/27, the patient's bowel function returned, and he started on clear liquid diet and advance as tolerated. On the day of discharge, the patient was advanced to regular diet and tolerated it well. The patient was hemodynamically stable and placed on home medications. The patient was told to follow up with Dr. SARAH White in 1-2 weeks and had no other issues.     Patient can follow up with dermatology outpatient as needed 48y Male was admitted for small bowel obstruction on 04-22-24. Patient has a PMH of HTN, HLD, T2DM, SBO, diverticulitis, GERD. Hospital course was complicated by AGEP, and dermatology was consulted and recommended clobetasol Ointment 0.05% BID to affected areas. On 4/27, the patient's bowel function returned, and he started on clear liquid diet and advance as tolerated. On the day of discharge, the patient was advanced to regular diet and tolerated it well. The patient was hemodynamically stable and placed on home medications. The patient was told to follow up with Dr. SARAH White in 1-2 weeks and had no other issues.     Patient can follow up with dermatology outpatient as needed.  [facility] located at [address] [place] [address]. [address], [place] [address] upon discharge, please instruct patient to call our office. Office phone number is [phone].

## 2024-04-28 NOTE — DISCHARGE NOTE PROVIDER - CARE PROVIDER_API CALL
Musa White  Surgery  [address] [place] [address]  Phone: [phone]  Fax: [phone]  Follow Up Time: 2 weeks

## 2024-04-28 NOTE — DISCHARGE NOTE PROVIDER - NSFOLLOWUPCLINICS_GEN_ALL_ED_FT
[facility] [place]  Dermatology  [address], [address]  [place] [address]  Phone: [phone]  Fax: [phone]  Follow Up Time: 2 weeks

## 2024-04-28 NOTE — PROGRESS NOTE ADULT - ASSESSMENT
48M with adhesive SBO recent gas and BM hypotensive but responded to IVF now hemodynamically normal with 1.5L ngt output in ED.     Recs:  - IVF/NPO/NGT  - monitor NGT output  - examine before pain meds  - home antidiabetics to insulin sliding scale  - scds and hsq (ANDERSON) for vte ppx  - AROBF    A team surgery  [id number]
48M with adhesive SBO recent gas and BM hypotensive but responded to IVF now hemodynamically normal with 1.5L ngt output in ED.     Recs:  - Diet: CLD  - examine before pain meds  - home antidiabetics to insulin sliding scale  - scds and hsq (ANDERSON) for vte ppx  - AROBF  - encourage ambulation   - Appreciate Derm Rec: Acute generalized exanthematous pustulosis (AGEP), apply clobetasol Ointment 0.05% BID to affected areas. Daily CBC with diff and CMP    A team surgery  [id number]
48M with adhesive SBO recent gas and BM hypotensive but responded to IVF now hemodynamically normal with 1.5L ngt output in ED.     Recs:  - IVF   - NPO  - monitor NGT output  - NGT LCS  - examine before pain meds  - home antidiabetics to insulin sliding scale  - scds and hsq (ANDERSON) for vte ppx     
1. Acute Generalized Exanthematous Pustulosis    Acute generalized exanthematous pustulosis (AGEP) is a relatively rare reaction pattern consisting of an acute febrile pustular eruption following medications or viral infection The syndrome occurs within 2 weeks of starting the medication and may occur as soon as 48 hours after initial drug ingestion. AGEP has been reported in infants, children, and adults. Fever, typically 39°C (102.2°F), is a near constant feature and persists about 1 week.  The natural course of AGEP is that it typically presents 1-4 days after drug exposure but can occur sooner if previously sensitized or up to 2 weeks out in some cases. By far, the most common culprit medications are antibiotics, particularly B-lactam antibiotics. When the medication is stopped the rash will resolve in less than 2 weeks and will then desquamate 2 weeks after resolution. Though this is not considered a dangerous drug rash, 17% have systemic findings: Liver (cholestatic pattern)>Renal>ARDS. They can also feel unwell with fever and leukocytosis. Unclear how much of LFT elevation and possible pulmonary syndrome are from this drug eruption vs aspiration.    - Suspect to Zosyn >> Vancomycin, both administered on 4/22   - Patient should be considered allergic to this medication and avoid it in the future except for life threatening infections.   - Can apply clobetasol Ointment 0.05% BID to affected areas, request 5 tubes from pharmacy nurse needs to request 5 tubes through pharmacy interface form.   - Trend CBC w/ diff and CMP (w/ LFTs) daily to r/o evolving DRESS (low suspicion).     Patient was seen at bedside and staffed in person with the dermatology attending Dr. Brush   Recommendations were communicated with the primary team.  Please page [phone] for further related questions.    Lilliana Burger MD  Resident Physician, PGY3  [facility]  Pager: [phone]  Office: [phone]. 
48M with adhesive SBO recent gas and BM hypotensive but responded to IVF now hemodynamically normal with 1.5L ngt output in ED.     Recs:  - Diet: regular   - examine before pain meds  - home antidiabetics to insulin sliding scale  - scds and hsq (ANDERSON) for vte ppx  - AROBF  - encourage ambulation   - Appreciate Derm Rec: Acute generalized exanthematous pustulosis (AGEP), apply clobetasol Ointment 0.05% BID to affected areas. Daily CBC with diff and CMP  - dispo: home    A team surgery  [id number]
48M with adhesive SBO recent gas and BM hypotensive but responded to IVF now hemodynamically normal with 1.5L ngt output in ED.     Recs:  - IVF/NPO/NGT  - monitor NGT output  - examine before pain meds  - home antidiabetics to insulin sliding scale  - scds and hsq (ANDERSON) for vte ppx  - AROBF  - encourage ambulation   - Appreciate Derm Rec: Acute generalized exanthematous pustulosis (AGEP), apply clobetasol Ointment 0.05% BID to affected areas    A team surgery  [id number]
48M with adhesive SBO recent gas and BM hypotensive but responded to IVF now hemodynamically normal with 1.5L ngt output in ED.     Recs:  - IVF/NPO/NGT  - monitor NGT output  - examine before pain meds  - home antidiabetics to insulin sliding scale  - scds and hsq (ANDERSON) for vte ppx  - AROBF  - encourage ambulation   - Appreciate Derm Rec: Acute generalized exanthematous pustulosis (AGEP), apply clobetasol Ointment 0.05% BID to affected areas. Daily CBC with diff and CMP    A team surgery  [id number]

## 2024-04-28 NOTE — DISCHARGE NOTE NURSING/CASE MANAGEMENT/SOCIAL WORK - NSDCPNINST_GEN_ALL_CORE
Take pain medications as prescribed by provider and report any pain that is unrelieved by medication.

## 2024-05-22 NOTE — BRIEF OPERATIVE NOTE - PRE-OP
<<-----Click on this checkbox to enter Pre-Op Dx REQUIRED- Click to run Sepsis Recognition Calculator

## 2024-06-04 ENCOUNTER — INPATIENT (INPATIENT)
Facility: HOSPITAL | Age: 49
LOS: 1 days | Discharge: HOME | End: 2024-06-06
Attending: SURGERY | Admitting: SURGERY
Payer: COMMERCIAL

## 2024-06-04 VITALS
WEIGHT: 275.58 LBS | OXYGEN SATURATION: 99 % | HEIGHT: 72 IN | RESPIRATION RATE: 18 BRPM | SYSTOLIC BLOOD PRESSURE: 120 MMHG | HEART RATE: 85 BPM | DIASTOLIC BLOOD PRESSURE: 65 MMHG | TEMPERATURE: 98 F

## 2024-06-04 DIAGNOSIS — R10.9 UNSPECIFIED ABDOMINAL PAIN: ICD-10-CM

## 2024-06-04 DIAGNOSIS — Z98.89 OTHER SPECIFIED POSTPROCEDURAL STATES: Chronic | ICD-10-CM

## 2024-06-04 DIAGNOSIS — Z98.890 OTHER SPECIFIED POSTPROCEDURAL STATES: Chronic | ICD-10-CM

## 2024-06-04 LAB
ALBUMIN SERPL ELPH-MCNC: 4.4 G/DL — SIGNIFICANT CHANGE UP (ref 3.3–5)
ALP SERPL-CCNC: 36 U/L — LOW (ref 40–120)
ALT FLD-CCNC: 28 U/L — SIGNIFICANT CHANGE UP (ref 4–41)
ANION GAP SERPL CALC-SCNC: 18 MMOL/L — HIGH (ref 7–14)
APTT BLD: 25.9 SEC — SIGNIFICANT CHANGE UP (ref 24.5–35.6)
AST SERPL-CCNC: 22 U/L — SIGNIFICANT CHANGE UP (ref 4–40)
BASE EXCESS BLDV CALC-SCNC: -0.6 MMOL/L — SIGNIFICANT CHANGE UP (ref -2–3)
BASE EXCESS BLDV CALC-SCNC: 0.8 MMOL/L — SIGNIFICANT CHANGE UP (ref -2–3)
BASOPHILS # BLD AUTO: 0.03 K/UL — SIGNIFICANT CHANGE UP (ref 0–0.2)
BASOPHILS NFR BLD AUTO: 0.2 % — SIGNIFICANT CHANGE UP (ref 0–2)
BILIRUB SERPL-MCNC: 0.7 MG/DL — SIGNIFICANT CHANGE UP (ref 0.2–1.2)
BLOOD GAS VENOUS COMPREHENSIVE RESULT: SIGNIFICANT CHANGE UP
BLOOD GAS VENOUS COMPREHENSIVE RESULT: SIGNIFICANT CHANGE UP
BUN SERPL-MCNC: 22 MG/DL — SIGNIFICANT CHANGE UP (ref 7–23)
CALCIUM SERPL-MCNC: 9.9 MG/DL — SIGNIFICANT CHANGE UP (ref 8.4–10.5)
CHLORIDE BLDV-SCNC: 105 MMOL/L — SIGNIFICANT CHANGE UP (ref 96–108)
CHLORIDE BLDV-SCNC: 109 MMOL/L — HIGH (ref 96–108)
CHLORIDE SERPL-SCNC: 104 MMOL/L — SIGNIFICANT CHANGE UP (ref 98–107)
CO2 BLDV-SCNC: 26.2 MMOL/L — HIGH (ref 22–26)
CO2 BLDV-SCNC: 28 MMOL/L — HIGH (ref 22–26)
CO2 SERPL-SCNC: 20 MMOL/L — LOW (ref 22–31)
CREAT SERPL-MCNC: 0.82 MG/DL — SIGNIFICANT CHANGE UP (ref 0.5–1.3)
EGFR: 107 ML/MIN/1.73M2 — SIGNIFICANT CHANGE UP
EOSINOPHIL # BLD AUTO: 0.04 K/UL — SIGNIFICANT CHANGE UP (ref 0–0.5)
EOSINOPHIL NFR BLD AUTO: 0.3 % — SIGNIFICANT CHANGE UP (ref 0–6)
GAS PNL BLDV: 138 MMOL/L — SIGNIFICANT CHANGE UP (ref 136–145)
GAS PNL BLDV: 139 MMOL/L — SIGNIFICANT CHANGE UP (ref 136–145)
GLUCOSE BLDC GLUCOMTR-MCNC: 103 MG/DL — HIGH (ref 70–99)
GLUCOSE BLDC GLUCOMTR-MCNC: 105 MG/DL — HIGH (ref 70–99)
GLUCOSE BLDV-MCNC: 128 MG/DL — HIGH (ref 70–99)
GLUCOSE BLDV-MCNC: 166 MG/DL — HIGH (ref 70–99)
GLUCOSE SERPL-MCNC: 163 MG/DL — HIGH (ref 70–99)
HCO3 BLDV-SCNC: 25 MMOL/L — SIGNIFICANT CHANGE UP (ref 22–29)
HCO3 BLDV-SCNC: 27 MMOL/L — SIGNIFICANT CHANGE UP (ref 22–29)
HCT VFR BLD CALC: 42.4 % — SIGNIFICANT CHANGE UP (ref 39–50)
HCT VFR BLDA CALC: 38 % — LOW (ref 39–51)
HCT VFR BLDA CALC: 41 % — SIGNIFICANT CHANGE UP (ref 39–51)
HGB BLD CALC-MCNC: 12.7 G/DL — SIGNIFICANT CHANGE UP (ref 12.6–17.4)
HGB BLD CALC-MCNC: 13.7 G/DL — SIGNIFICANT CHANGE UP (ref 12.6–17.4)
HGB BLD-MCNC: 13.2 G/DL — SIGNIFICANT CHANGE UP (ref 13–17)
IANC: 12.56 K/UL — HIGH (ref 1.8–7.4)
IMM GRANULOCYTES NFR BLD AUTO: 0.3 % — SIGNIFICANT CHANGE UP (ref 0–0.9)
INR BLD: 0.94 RATIO — SIGNIFICANT CHANGE UP (ref 0.85–1.18)
LACTATE BLDV-MCNC: 1.4 MMOL/L — SIGNIFICANT CHANGE UP (ref 0.5–2)
LACTATE BLDV-MCNC: 2.3 MMOL/L — HIGH (ref 0.5–2)
LIDOCAIN IGE QN: 27 U/L — SIGNIFICANT CHANGE UP (ref 7–60)
LYMPHOCYTES # BLD AUTO: 13.2 % — SIGNIFICANT CHANGE UP (ref 13–44)
LYMPHOCYTES # BLD AUTO: 2 K/UL — SIGNIFICANT CHANGE UP (ref 1–3.3)
MCHC RBC-ENTMCNC: 27.3 PG — SIGNIFICANT CHANGE UP (ref 27–34)
MCHC RBC-ENTMCNC: 31.1 GM/DL — LOW (ref 32–36)
MCV RBC AUTO: 87.8 FL — SIGNIFICANT CHANGE UP (ref 80–100)
MONOCYTES # BLD AUTO: 0.51 K/UL — SIGNIFICANT CHANGE UP (ref 0–0.9)
MONOCYTES NFR BLD AUTO: 3.4 % — SIGNIFICANT CHANGE UP (ref 2–14)
NEUTROPHILS # BLD AUTO: 12.56 K/UL — HIGH (ref 1.8–7.4)
NEUTROPHILS NFR BLD AUTO: 82.6 % — HIGH (ref 43–77)
NRBC # BLD: 0 /100 WBCS — SIGNIFICANT CHANGE UP (ref 0–0)
NRBC # FLD: 0 K/UL — SIGNIFICANT CHANGE UP (ref 0–0)
PCO2 BLDV: 43 MMHG — SIGNIFICANT CHANGE UP (ref 42–55)
PCO2 BLDV: 46 MMHG — SIGNIFICANT CHANGE UP (ref 42–55)
PH BLDV: 7.37 — SIGNIFICANT CHANGE UP (ref 7.32–7.43)
PH BLDV: 7.37 — SIGNIFICANT CHANGE UP (ref 7.32–7.43)
PLATELET # BLD AUTO: 255 K/UL — SIGNIFICANT CHANGE UP (ref 150–400)
PO2 BLDV: 39 MMHG — SIGNIFICANT CHANGE UP (ref 25–45)
PO2 BLDV: 54 MMHG — HIGH (ref 25–45)
POTASSIUM BLDV-SCNC: 4.1 MMOL/L — SIGNIFICANT CHANGE UP (ref 3.5–5.1)
POTASSIUM BLDV-SCNC: 4.8 MMOL/L — SIGNIFICANT CHANGE UP (ref 3.5–5.1)
POTASSIUM SERPL-MCNC: 4.3 MMOL/L — SIGNIFICANT CHANGE UP (ref 3.5–5.3)
POTASSIUM SERPL-SCNC: 4.3 MMOL/L — SIGNIFICANT CHANGE UP (ref 3.5–5.3)
PROT SERPL-MCNC: 7.8 G/DL — SIGNIFICANT CHANGE UP (ref 6–8.3)
PROTHROM AB SERPL-ACNC: 10.5 SEC — SIGNIFICANT CHANGE UP (ref 9.5–13)
RBC # BLD: 4.83 M/UL — SIGNIFICANT CHANGE UP (ref 4.2–5.8)
RBC # FLD: 15.9 % — HIGH (ref 10.3–14.5)
SAO2 % BLDV: 64.6 % — LOW (ref 67–88)
SAO2 % BLDV: 86.7 % — SIGNIFICANT CHANGE UP (ref 67–88)
SODIUM SERPL-SCNC: 142 MMOL/L — SIGNIFICANT CHANGE UP (ref 135–145)
WBC # BLD: 15.19 K/UL — HIGH (ref 3.8–10.5)
WBC # FLD AUTO: 15.19 K/UL — HIGH (ref 3.8–10.5)

## 2024-06-04 PROCEDURE — 99285 EMERGENCY DEPT VISIT HI MDM: CPT

## 2024-06-04 PROCEDURE — 74176 CT ABD & PELVIS W/O CONTRAST: CPT | Mod: 26,MC

## 2024-06-04 PROCEDURE — 99222 1ST HOSP IP/OBS MODERATE 55: CPT | Mod: GC

## 2024-06-04 RX ORDER — METOPROLOL TARTRATE 50 MG
100 TABLET ORAL
Qty: 0 | Refills: 0 | DISCHARGE

## 2024-06-04 RX ORDER — DEXTROSE 50 % IN WATER 50 %
15 SYRINGE (ML) INTRAVENOUS ONCE
Refills: 0 | Status: DISCONTINUED | OUTPATIENT
Start: 2024-06-04 | End: 2024-06-06

## 2024-06-04 RX ORDER — DAPAGLIFLOZIN 10 MG/1
1 TABLET, FILM COATED ORAL
Qty: 0 | Refills: 0 | DISCHARGE

## 2024-06-04 RX ORDER — SODIUM CHLORIDE 9 MG/ML
1000 INJECTION INTRAMUSCULAR; INTRAVENOUS; SUBCUTANEOUS ONCE
Refills: 0 | Status: COMPLETED | OUTPATIENT
Start: 2024-06-04 | End: 2024-06-04

## 2024-06-04 RX ORDER — GLIMEPIRIDE 1 MG
1 TABLET ORAL
Qty: 0 | Refills: 0 | DISCHARGE

## 2024-06-04 RX ORDER — SODIUM CHLORIDE 9 MG/ML
1000 INJECTION, SOLUTION INTRAVENOUS
Refills: 0 | Status: DISCONTINUED | OUTPATIENT
Start: 2024-06-04 | End: 2024-06-06

## 2024-06-04 RX ORDER — ENOXAPARIN SODIUM 100 MG/ML
40 INJECTION SUBCUTANEOUS EVERY 24 HOURS
Refills: 0 | Status: DISCONTINUED | OUTPATIENT
Start: 2024-06-04 | End: 2024-06-06

## 2024-06-04 RX ORDER — METOPROLOL TARTRATE 50 MG
5 TABLET ORAL EVERY 6 HOURS
Refills: 0 | Status: DISCONTINUED | OUTPATIENT
Start: 2024-06-04 | End: 2024-06-05

## 2024-06-04 RX ORDER — SODIUM CHLORIDE 9 MG/ML
1000 INJECTION, SOLUTION INTRAVENOUS
Refills: 0 | Status: DISCONTINUED | OUTPATIENT
Start: 2024-06-04 | End: 2024-06-04

## 2024-06-04 RX ORDER — METFORMIN HYDROCHLORIDE 850 MG/1
500 TABLET ORAL
Qty: 0 | Refills: 0 | DISCHARGE

## 2024-06-04 RX ORDER — PANTOPRAZOLE SODIUM 20 MG/1
40 TABLET, DELAYED RELEASE ORAL EVERY 24 HOURS
Refills: 0 | Status: DISCONTINUED | OUTPATIENT
Start: 2024-06-04 | End: 2024-06-06

## 2024-06-04 RX ORDER — FAMOTIDINE 10 MG/ML
20 INJECTION INTRAVENOUS ONCE
Refills: 0 | Status: COMPLETED | OUTPATIENT
Start: 2024-06-04 | End: 2024-06-04

## 2024-06-04 RX ORDER — DEXTROSE 10 % IN WATER 10 %
125 INTRAVENOUS SOLUTION INTRAVENOUS ONCE
Refills: 0 | Status: DISCONTINUED | OUTPATIENT
Start: 2024-06-04 | End: 2024-06-06

## 2024-06-04 RX ORDER — INSULIN LISPRO 100/ML
VIAL (ML) SUBCUTANEOUS EVERY 6 HOURS
Refills: 0 | Status: DISCONTINUED | OUTPATIENT
Start: 2024-06-04 | End: 2024-06-05

## 2024-06-04 RX ORDER — ONDANSETRON 8 MG/1
4 TABLET, FILM COATED ORAL ONCE
Refills: 0 | Status: COMPLETED | OUTPATIENT
Start: 2024-06-04 | End: 2024-06-04

## 2024-06-04 RX ORDER — GLUCAGON INJECTION, SOLUTION 0.5 MG/.1ML
1 INJECTION, SOLUTION SUBCUTANEOUS ONCE
Refills: 0 | Status: DISCONTINUED | OUTPATIENT
Start: 2024-06-04 | End: 2024-06-06

## 2024-06-04 RX ORDER — SODIUM CHLORIDE 9 MG/ML
1000 INJECTION, SOLUTION INTRAVENOUS ONCE
Refills: 0 | Status: COMPLETED | OUTPATIENT
Start: 2024-06-04 | End: 2024-06-04

## 2024-06-04 RX ORDER — TELMISARTAN 20 MG/1
1 TABLET ORAL
Qty: 0 | Refills: 0 | DISCHARGE

## 2024-06-04 RX ORDER — DEXTROSE MONOHYDRATE, SODIUM CHLORIDE, AND POTASSIUM CHLORIDE 50; .745; 4.5 G/1000ML; G/1000ML; G/1000ML
1000 INJECTION, SOLUTION INTRAVENOUS
Refills: 0 | Status: DISCONTINUED | OUTPATIENT
Start: 2024-06-04 | End: 2024-06-05

## 2024-06-04 RX ORDER — DEXTROSE 50 % IN WATER 50 %
25 SYRINGE (ML) INTRAVENOUS ONCE
Refills: 0 | Status: DISCONTINUED | OUTPATIENT
Start: 2024-06-04 | End: 2024-06-06

## 2024-06-04 RX ORDER — SEMAGLUTIDE 0.68 MG/ML
1 INJECTION, SOLUTION SUBCUTANEOUS
Qty: 0 | Refills: 0 | DISCHARGE

## 2024-06-04 RX ORDER — DEXTROSE 50 % IN WATER 50 %
12.5 SYRINGE (ML) INTRAVENOUS ONCE
Refills: 0 | Status: DISCONTINUED | OUTPATIENT
Start: 2024-06-04 | End: 2024-06-06

## 2024-06-04 RX ADMIN — ONDANSETRON 4 MILLIGRAM(S): 8 TABLET, FILM COATED ORAL at 19:12

## 2024-06-04 RX ADMIN — FAMOTIDINE 20 MILLIGRAM(S): 10 INJECTION INTRAVENOUS at 19:13

## 2024-06-04 RX ADMIN — SODIUM CHLORIDE 1000 MILLILITER(S): 9 INJECTION, SOLUTION INTRAVENOUS at 22:50

## 2024-06-04 RX ADMIN — SODIUM CHLORIDE 1000 MILLILITER(S): 9 INJECTION INTRAMUSCULAR; INTRAVENOUS; SUBCUTANEOUS at 19:13

## 2024-06-04 NOTE — H&P ADULT - HISTORY OF PRESENT ILLNESS
Sathya Em is a 48 y.o. man with history of HTN, HLD, DM, Buchanan and reversal (2009) c/b multiple SBOs s/p multiple ex lap + LINDA (2014, 2016) and ventral hernia repair (2018) presents with 1 day of abdominal pain. Reports pain started this morning and reports nausea, but no vomiting. Reports last passed gas at 1 pm and had a small BM before coming to ED. Although per patient ,he is still passing gas/BM with his obstructions. Denies fevers, chills, sob, cp.

## 2024-06-04 NOTE — ED ADULT NURSE NOTE - NSFALLUNIVINTERV_ED_ALL_ED
Bed/Stretcher in lowest position, wheels locked, appropriate side rails in place/Call bell, personal items and telephone in reach/Instruct patient to call for assistance before getting out of bed/chair/stretcher/Non-slip footwear applied when patient is off stretcher/[place] to call system/Physically safe environment - no spills, clutter or unnecessary equipment/Purposeful proactive rounding/Room/bathroom lighting operational, light cord in reach

## 2024-06-04 NOTE — ED ADULT NURSE NOTE - OBJECTIVE STATEMENT
Pt received to intake 7   , awake and alert, A&OX4, ambulatory. C/o abdominal pain along with N/V. Pt abdomen distended.   Respirations even and unlabored. Denies CP, SOB, N/V, HA, dizziness, palpitations, blurry vision. 20G IV placed to left wrist.      . Bed in lowest position, call bell within reach. Safety maintained.

## 2024-06-04 NOTE — H&P ADULT - ASSESSMENT
48 y.o. man with history of HTN, HLD, DM, Buchanan and reversal (2009) c/b multiple SBOs s/p multiple ex lap + LINDA (2014, 2016) and ventral hernia repair (2018) presents with 1 day of abdominal pain. CT with  no acute pathology    Plan:  - Admit to a team surgery under Dr. Held   - Monitor overnight   - Monitor for bowel function  - NPO/IVF  - DVT ppx     Plan discussed with Dr. Christopher Cruz MD, PGY-3   A Team Surgery  [id number]  48 y.o. man with history of HTN, HLD, DM, Buchanan and reversal (2009) c/b multiple SBOs s/p multiple ex lap + LINDA (2014, 2016) and ventral hernia repair (2018) presents with 1 day of abdominal pain. CT with  no acute pathology    Plan:  - Admit to a team surgery under Dr. White   - Patient would like to keep NGT until morning   - Monitor for bowel function  - NPO/IVF  - DVT ppx     Plan discussed with Dr. Christopher Cruz MD, PGY-3   A Team Surgery  [id number]

## 2024-06-04 NOTE — ED ADULT TRIAGE NOTE - CHIEF COMPLAINT QUOTE
pt living with DM type2, multiple SBO, c/o of diffuse abd pain with nausea since this afternoon, pt denies any vomiting, last BM this am pt denies any chest pain

## 2024-06-04 NOTE — ED PROVIDER NOTE - PROGRESS NOTE DETAILS
Labs reviewed.  General surgery consulted, inserted NG tube.  Patient returned from CT, pending read.  Surgery recommending admission to their service for further management.

## 2024-06-04 NOTE — ED PROVIDER NOTE - OBJECTIVE STATEMENT
48-year-old male past medical history hypertension, hypokalemia, diabetes- type II,, diverticulitis status post Buchanan, GERD, multiple episodes of SBO in the past.  Presents to ED for diffuse abdominal pain since this morning.  Patient reports started earlier this morning.  Felt nauseous but did not vomit.  Patient had bowel movement prior to coming to ED and reports normal flatus.  He does note having SBO despite bowel movement in the past.  He denies fevers, chills, chest pain, shortness of breath, vomiting.  He denies any dysuria.  He denies extremity numbness or weakness.  Patient spoke with his surgeon Dr. White prior to coming to ED.

## 2024-06-04 NOTE — ED ADULT NURSE REASSESSMENT NOTE - NS ED NURSE REASSESS COMMENT FT1
Report received from day RN Carolina. Pt A&ox4, ambulatory, on room air coming to ED c/o abdominal pain. Pt with no acute changes at this time. Pt endorses nausea has subsided. VSS. Pt respirations even and unlabored, chest rise and fall equal b/l. Pt denies chest pain, HA, SOB, dizziness, N/V/D, fever/chills. Left wrist 20g patent, no redness or swelling to site, +blood return. Repeat VBG collected and sent. Stretcher in lowest position, call bell within reach, pt safety maintained.

## 2024-06-04 NOTE — ED PROVIDER NOTE - CLINICAL SUMMARY MEDICAL DECISION MAKING FREE TEXT BOX
Patient with diffuse abdominal pain nauseousness.  Reports history of multiple SBO's in the past most recently managed operatively.  Inpatient notes reviewed.  Plan: Symptom relief, labs, CT.  Will discuss with surgery team.

## 2024-06-04 NOTE — ED PROVIDER NOTE - PHYSICAL EXAMINATION
GEN: no acute respiratory distress. nontoxic, speaking comfortably in full sentences  HEENT: NCAT. face symmetrical. PERRL 4mm, EOMI, MMM, oropharynx wnl.  Neck: no JVD, trachea midline, no lymphadenopathy, FROM  CV: RRR. +S1S2, no murmur. 2+ pulses in 4 extremities, cap refill <2 sec  Chest: CTA B/l. no wheezing, rales, rhonchi. no retractions. good air movement.   ABD: +BS, soft, non distended, mild diffuse upper abdominal tenderness. No guarding/rebound. No lesions, ecchymosis. Well-healed surgical scar  : no cva or suprapubic tenderness  MSK: No clubbing, cyanosis, edema. FROM of all extremities. no tenderness to palpation. No midline or paraspinal tenderness.   Neuro: AAOX3.  Sensation intact, motor 5/5 throughout.   SKIN: No rash

## 2024-06-04 NOTE — H&P ADULT - NSHPPHYSICALEXAM_GEN_ALL_CORE
Physical exam:  General; NAD  Respiratory: nonlabored breathing  Abdomen: soft, mildly distended, epigastric TTP. No rebound or guarding  Extremities: WWP

## 2024-06-04 NOTE — H&P ADULT - NSHPLABSRESULTS_GEN_ALL_CORE
LABS:                        13.2   15.19 )-----------( 255      ( 04 Jun 2024 19:05 )             42.4     06-04    142  |  104  |  22  ----------------------------<  163<H>  4.3   |  20<L>  |  0.82    Ca    9.9      04 Jun 2024 19:05    TPro  7.8  /  Alb  4.4  /  TBili  0.7  /  DBili  x   /  AST  22  /  ALT  28  /  AlkPhos  36<L>  06-04    Lactate:  06-04 @ 21:05  1.4  06-04 @ 19:05  2.3    PT/INR - ( 04 Jun 2024 19:05 )   PT: 10.5 sec;   INR: 0.94 ratio         PTT - ( 04 Jun 2024 19:05 )  PTT:25.9 sec          Urinalysis Basic - ( 04 Jun 2024 19:05 )    Color: x / Appearance: x / SG: x / pH: x  Gluc: 163 mg/dL / Ketone: x  / Bili: x / Urobili: x   Blood: x / Protein: x / Nitrite: x   Leuk Esterase: x / RBC: x / WBC x   Sq Epi: x / Non Sq Epi: x / Bacteria: x        IMAGING:

## 2024-06-05 ENCOUNTER — TRANSCRIPTION ENCOUNTER (OUTPATIENT)
Age: 49
End: 2024-06-05

## 2024-06-05 LAB
ANION GAP SERPL CALC-SCNC: 12 MMOL/L — SIGNIFICANT CHANGE UP (ref 7–14)
BUN SERPL-MCNC: 23 MG/DL — SIGNIFICANT CHANGE UP (ref 7–23)
CALCIUM SERPL-MCNC: 9.1 MG/DL — SIGNIFICANT CHANGE UP (ref 8.4–10.5)
CHLORIDE SERPL-SCNC: 106 MMOL/L — SIGNIFICANT CHANGE UP (ref 98–107)
CO2 SERPL-SCNC: 22 MMOL/L — SIGNIFICANT CHANGE UP (ref 22–31)
CREAT SERPL-MCNC: 0.78 MG/DL — SIGNIFICANT CHANGE UP (ref 0.5–1.3)
EGFR: 109 ML/MIN/1.73M2 — SIGNIFICANT CHANGE UP
GLUCOSE BLDC GLUCOMTR-MCNC: 147 MG/DL — HIGH (ref 70–99)
GLUCOSE BLDC GLUCOMTR-MCNC: 150 MG/DL — HIGH (ref 70–99)
GLUCOSE BLDC GLUCOMTR-MCNC: 185 MG/DL — HIGH (ref 70–99)
GLUCOSE BLDC GLUCOMTR-MCNC: 193 MG/DL — HIGH (ref 70–99)
GLUCOSE SERPL-MCNC: 140 MG/DL — HIGH (ref 70–99)
HCT VFR BLD CALC: 38.1 % — LOW (ref 39–50)
HGB BLD-MCNC: 12.1 G/DL — LOW (ref 13–17)
MAGNESIUM SERPL-MCNC: 2 MG/DL — SIGNIFICANT CHANGE UP (ref 1.6–2.6)
MCHC RBC-ENTMCNC: 27.7 PG — SIGNIFICANT CHANGE UP (ref 27–34)
MCHC RBC-ENTMCNC: 31.8 GM/DL — LOW (ref 32–36)
MCV RBC AUTO: 87.2 FL — SIGNIFICANT CHANGE UP (ref 80–100)
NRBC # BLD: 0 /100 WBCS — SIGNIFICANT CHANGE UP (ref 0–0)
NRBC # FLD: 0 K/UL — SIGNIFICANT CHANGE UP (ref 0–0)
PHOSPHATE SERPL-MCNC: 3.4 MG/DL — SIGNIFICANT CHANGE UP (ref 2.5–4.5)
PLATELET # BLD AUTO: 200 K/UL — SIGNIFICANT CHANGE UP (ref 150–400)
POTASSIUM SERPL-MCNC: 3.9 MMOL/L — SIGNIFICANT CHANGE UP (ref 3.5–5.3)
POTASSIUM SERPL-SCNC: 3.9 MMOL/L — SIGNIFICANT CHANGE UP (ref 3.5–5.3)
RBC # BLD: 4.37 M/UL — SIGNIFICANT CHANGE UP (ref 4.2–5.8)
RBC # FLD: 16.2 % — HIGH (ref 10.3–14.5)
SODIUM SERPL-SCNC: 140 MMOL/L — SIGNIFICANT CHANGE UP (ref 135–145)
WBC # BLD: 8.61 K/UL — SIGNIFICANT CHANGE UP (ref 3.8–10.5)
WBC # FLD AUTO: 8.61 K/UL — SIGNIFICANT CHANGE UP (ref 3.8–10.5)

## 2024-06-05 RX ORDER — INSULIN LISPRO 100/ML
VIAL (ML) SUBCUTANEOUS
Refills: 0 | Status: DISCONTINUED | OUTPATIENT
Start: 2024-06-05 | End: 2024-06-06

## 2024-06-05 RX ORDER — INSULIN LISPRO 100/ML
VIAL (ML) SUBCUTANEOUS AT BEDTIME
Refills: 0 | Status: DISCONTINUED | OUTPATIENT
Start: 2024-06-05 | End: 2024-06-06

## 2024-06-05 RX ORDER — METOPROLOL TARTRATE 50 MG
100 TABLET ORAL DAILY
Refills: 0 | Status: DISCONTINUED | OUTPATIENT
Start: 2024-06-05 | End: 2024-06-06

## 2024-06-05 RX ADMIN — ENOXAPARIN SODIUM 40 MILLIGRAM(S): 100 INJECTION SUBCUTANEOUS at 05:05

## 2024-06-05 RX ADMIN — DEXTROSE MONOHYDRATE, SODIUM CHLORIDE, AND POTASSIUM CHLORIDE 125 MILLILITER(S): 50; .745; 4.5 INJECTION, SOLUTION INTRAVENOUS at 01:04

## 2024-06-05 RX ADMIN — Medication 100 MILLIGRAM(S): at 14:14

## 2024-06-05 RX ADMIN — PANTOPRAZOLE SODIUM 40 MILLIGRAM(S): 20 TABLET, DELAYED RELEASE ORAL at 05:05

## 2024-06-05 RX ADMIN — Medication 5 MILLIGRAM(S): at 05:05

## 2024-06-05 RX ADMIN — Medication 5 MILLIGRAM(S): at 01:02

## 2024-06-05 RX ADMIN — Medication 1: at 18:05

## 2024-06-05 NOTE — DISCHARGE NOTE PROVIDER - HOSPITAL COURSE
Sathya Em is a 48 y.o. man with history of HTN, HLD, DM, Buchanan and reversal (2009) c/b multiple SBOs s/p multiple ex lap + LINDA (2014, 2016) and ventral hernia repair (2018) presents with 1 day of abdominal pain. Reports pain started this morning and reports nausea, but no vomiting. Reports last passed gas at 1 pm and had a small BM before coming to ED. Although per patient ,he is still passing gas/BM with his obstructions. Denies fevers, chills, sob, cp.   CT in ED was negative for obstruction. Pt. admitted overnight  NGT d/c next day. Pt. tolerating PO, denies pain and has normal bowel function upon discharge.

## 2024-06-05 NOTE — DISCHARGE NOTE PROVIDER - NSDCMRMEDTOKEN_GEN_ALL_CORE_FT
Crestor 10 mg oral tablet: 10 milligram(s) orally once a day  Farxiga 10 mg oral tablet: 1 tab(s) orally once a day  fenofibrate 160 mg oral tablet: 160 milligram(s) orally once a day  glimepiride 4 mg oral tablet: 1 tab(s) orally once a day  metFORMIN: 500 milligram(s) orally 3 times a day  pantoprazole 40 mg oral delayed release tablet: 1 tab(s) orally once a day (before a meal)  Rybelsus 7 mg oral tablet: 1 tab(s) orally once a day  telmisartan 40 mg oral tablet: 1 tab(s) orally once a day  Toprol-XL: 100 milligram(s) orally once a day

## 2024-06-05 NOTE — DISCHARGE NOTE PROVIDER - NSDCFUADDINST_GEN_ALL_CORE_FT
WOUND CARE:  Please keep incisions clean and dry. Please do not Scrub or rub incisions. Do not use lotion or powder on incisions.   BATHING: You may shower and/or sponge bathe. You may use warm soapy water in the shower and rinse, pat dry.  ACTIVITY: No heavy lifting or straining. Otherwise, you may return to your usual level of physical activity. If you are taking narcotic pain medication DO NOT drive a car, operate machinery or make important decisions.  DIET: Return to your usual diet.  NOTIFY YOUR SURGEON IF YOU HAVE: any bleeding that does not stop, any pus draining from your wound(s), any fever (over 100.4 F) persistent nausea/vomiting, or if your pain is not controlled on your discharge pain medications, unable to urinate.  Please follow up with your primary care physician in one week regarding your hospitalization, bring copies of your discharge paperwork.  Please follow up with your surgeon, Dr. White     BATHING: You may shower and/or sponge bathe regularly.  ACTIVITY: No heavy lifting or straining. Otherwise, you may return to your usual level of physical activity.   DIET: Return to your usual diet.  NOTIFY YOUR SURGEON IF YOU HAVE: any fever (over 100.4 F) persistent nausea/vomiting, or if your pain is not controlled on your discharge pain medications, unable to urinate.  Please follow up with your primary care physician in one week regarding your hospitalization, bring copies of your discharge paperwork.  Please follow up with your surgeon, Dr. White

## 2024-06-05 NOTE — DISCHARGE NOTE PROVIDER - CARE PROVIDER_API CALL
Musa White  Surgery  [address] [place] [address]  Phone: [phone]  Fax: [phone]  Follow Up Time: 1 week

## 2024-06-05 NOTE — PATIENT PROFILE ADULT - FALL HARM RISK - RISK INTERVENTIONS

## 2024-06-06 ENCOUNTER — TRANSCRIPTION ENCOUNTER (OUTPATIENT)
Age: 49
End: 2024-06-06

## 2024-06-06 VITALS
RESPIRATION RATE: 14 BRPM | TEMPERATURE: 97 F | SYSTOLIC BLOOD PRESSURE: 133 MMHG | OXYGEN SATURATION: 98 % | HEART RATE: 84 BPM | DIASTOLIC BLOOD PRESSURE: 80 MMHG

## 2024-06-06 LAB
GLUCOSE BLDC GLUCOMTR-MCNC: 165 MG/DL — HIGH (ref 70–99)
GLUCOSE BLDC GLUCOMTR-MCNC: 242 MG/DL — HIGH (ref 70–99)

## 2024-06-06 PROCEDURE — 99238 HOSP IP/OBS DSCHRG MGMT 30/<: CPT

## 2024-06-06 RX ADMIN — Medication 100 MILLIGRAM(S): at 05:45

## 2024-06-06 RX ADMIN — ENOXAPARIN SODIUM 40 MILLIGRAM(S): 100 INJECTION SUBCUTANEOUS at 05:45

## 2024-06-06 RX ADMIN — PANTOPRAZOLE SODIUM 40 MILLIGRAM(S): 20 TABLET, DELAYED RELEASE ORAL at 05:45

## 2024-06-06 RX ADMIN — Medication 1: at 08:38

## 2024-06-06 RX ADMIN — Medication 2: at 12:12

## 2024-06-06 NOTE — DISCHARGE NOTE NURSING/CASE MANAGEMENT/SOCIAL WORK - NSDCPEFALRISK_GEN_ALL_CORE
For information on Fall & Injury Prevention, visit: [email or web address] OR  [email or web address] OR  https://www.cdc.gov/steadi/patient.html

## 2024-06-06 NOTE — PROGRESS NOTE ADULT - ASSESSMENT
48 y.o. man with history of HTN, HLD, DM, Buchanan and reversal (2009) c/b multiple SBOs s/p multiple ex lap + LINDA (2014, 2016) and ventral hernia repair (2018) presents with 1 day of abdominal pain. CT with  no acute pathology    Plan:  Pt. clinically not obstructed + /+  -Given NGT minimal output, NGT removed patient agreed   -Will progress diet    A Team Surgery  [id number] 
48 y.o. man with history of HTN, HLD, DM, Buchanan and reversal (2009) c/b multiple SBOs s/p multiple ex lap + LINDA (2014, 2016) and ventral hernia repair (2018) presents with 1 day of abdominal pain.     Plan:  Pt. clinically not obstructed + /+  - Continue with LRD   - Once passes gas/has BM this AM ok to dc home     A Team Surgery  [id number]

## 2024-06-06 NOTE — PROGRESS NOTE ADULT - SUBJECTIVE AND OBJECTIVE BOX
TEAM [ A ] Surgery Daily Progress Note  =====================================================    SUBJECTIVE: Patient seen and examined at bedside on AM rounds. Patient reports feeling well, denies any increased pain with regular diet. +Flatus/BM yesterday. OOB/Amublating as tolerated. Denies fever, chills.    ALLERGIES:  iodine containing compounds (Rash)  fentanyl (Rash)  Flagyl (Unknown)  contrast media (iodine-based) (Rash)  morphine (Rash)  Cipro (Unknown)  ceftriaxone (Rash)  cephalosporins (Rash)  vancomycin (Pruritus; Rash)      --------------------------------------------------------------------------------------    VITAL SIGNS:  T(C): 36.4 (06 Jun 2024 05:30), Max: 37.1 (05 Jun 2024 22:00)  T(F): 97.6 (06 Jun 2024 05:30), Max: 98.7 (05 Jun 2024 22:00)  HR: 77 (06 Jun 2024 05:30) (77 - 92)  BP: 134/85 (06 Jun 2024 05:30) (117/76 - 134/85)  RR: 17 (06 Jun 2024 05:30) (17 - 18)  SpO2: 95% (06 Jun 2024 05:30) (95% - 98%)    O2 Parameters below as of 06 Jun 2024 05:30  Patient On (Oxygen Delivery Method): room air    --------------------------------------------------------------------------------------    EXAM    General: NAD, resting in bed comfortably.  Cardiac: regular rate, warm and well perfused  Respiratory: Nonlabored respirations, normal cw expansion.  Abdomen: soft, nontender, nondistended.   Extremities: normal strength, FROM, no deformities    --------------------------------------------------------------------------------------    INS AND OUTS:    05 Jun 2024 07:01  -  06 Jun 2024 07:00  --------------------------------------------------------  IN:    Oral Fluid: 840 mL  Total IN: 840 mL    OUT:    Voided (mL): 1675 mL  Total OUT: 1675 mL    Total NET: -835 mL      --------------------------------------------------------------------------------------
Tolerating diet., Non distended. To advance diet . likely discharge. DH
TEAM [ A ] Surgery Daily Progress Note  =====================================================    SUBJECTIVE: Patient seen and examined at bedside on AM rounds. Patient reports feeling well, pain controlled on current regimen. NPO, denies nausea, vomiting.    +Flatus/+    BM. OOB/Amublating as tolerated. Denies fever, chills.    ALLERGIES:  iodine containing compounds (Rash)  fentanyl (Rash)  Flagyl (Unknown)  contrast media (iodine-based) (Rash)  morphine (Rash)  Cipro (Unknown)  ceftriaxone (Rash)  cephalosporins (Rash)  vancomycin (Pruritus; Rash)      --------------------------------------------------------------------------------------    MEDICATIONS:    Neurologic Medications    Respiratory Medications    Cardiovascular Medications  metoprolol succinate  milliGRAM(s) Oral daily    Gastrointestinal Medications  dextrose 10% Bolus 125 milliLiter(s) IV Bolus once  dextrose 5%. 1000 milliLiter(s) IV Continuous <Continuous>  dextrose 5%. 1000 milliLiter(s) IV Continuous <Continuous>  pantoprazole  Injectable 40 milliGRAM(s) IV Push every 24 hours    Genitourinary Medications    Hematologic/Oncologic Medications  enoxaparin Injectable 40 milliGRAM(s) SubCutaneous every 24 hours    Antimicrobial/Immunologic Medications    Endocrine/Metabolic Medications  dextrose 50% Injectable 25 Gram(s) IV Push once  dextrose 50% Injectable 12.5 Gram(s) IV Push once  dextrose Oral Gel 15 Gram(s) Oral once PRN Blood Glucose LESS THAN 70 milliGRAM(s)/deciliter  glucagon  Injectable 1 milliGRAM(s) IntraMuscular once  insulin lispro (ADMELOG) corrective regimen sliding scale   SubCutaneous every 6 hours    Topical/Other Medications    --------------------------------------------------------------------------------------    VITAL SIGNS:  T(C): 36.9 (06-05-24 @ 10:05), Max: 36.9 (06-05-24 @ 10:05)  HR: 91 (06-05-24 @ 10:05) (82 - 91)  BP: 117/76 (06-05-24 @ 10:05) (117/69 - 138/76)  RR: 18 (06-05-24 @ 10:05) (16 - 18)  SpO2: 96% (06-05-24 @ 10:05) (95% - 99%)  --------------------------------------------------------------------------------------    EXAM    General: NAD, resting in bed comfortably.  Cardiac: regular rate, warm and well perfused  Respiratory: Nonlabored respirations, normal cw expansion.  Abdomen: soft, nontender, nondistended.   Extremities: normal strength, FROM, no deformities    --------------------------------------------------------------------------------------    LABS                          12.1   8.61  )-----------( 200      ( 05 Jun 2024 05:00 )             38.1   06-05    140  |  106  |  23  ----------------------------<  140<H>  3.9   |  22  |  0.78    Ca    9.1      05 Jun 2024 05:00  Phos  3.4     06-05  Mg     2.00     06-05    TPro  7.8  /  Alb  4.4  /  TBili  0.7  /  DBili  x   /  AST  22  /  ALT  28  /  AlkPhos  36<L>  06-04    --------------------------------------------------------------------------------------    INS AND OUTS:    06-04-24 @ 07:01  -  06-05-24 @ 07:00  --------------------------------------------------------  IN: 1125 mL / OUT: 900 mL / NET: 225 mL    06-05-24 @ 07:01  -  06-05-24 @ 13:35  --------------------------------------------------------  IN: 0 mL / OUT: 500 mL / NET: -500 mL      --------------------------------------------------------------------------------------

## 2024-06-06 NOTE — DISCHARGE NOTE NURSING/CASE MANAGEMENT/SOCIAL WORK - PATIENT PORTAL LINK FT
You can access the FollowMyHealth Patient Portal offered by [facility] by registering at the following website: [email or web address]. By joining [facility]’s FollowMyHealth portal, you will also be able to view your health information using other applications (apps) compatible with our system.

## 2024-06-06 NOTE — DISCHARGE NOTE NURSING/CASE MANAGEMENT/SOCIAL WORK - NSPROMEDSBROUGHTTOHOSP_GEN_A_NUR
60-year-old female presents to the emergency department for bilateral hand dermatitis.  Instructed a that they need to rewrap her hands at the facility and strict return precautions given for signs of infection which he is able to articulate.  DC home. no

## 2024-07-01 ENCOUNTER — EMERGENCY (EMERGENCY)
Facility: HOSPITAL | Age: 49
LOS: 1 days | Discharge: HOME | End: 2024-07-01
Attending: EMERGENCY MEDICINE | Admitting: EMERGENCY MEDICINE
Payer: COMMERCIAL

## 2024-07-01 VITALS
RESPIRATION RATE: 18 BRPM | OXYGEN SATURATION: 95 % | DIASTOLIC BLOOD PRESSURE: 75 MMHG | TEMPERATURE: 98 F | SYSTOLIC BLOOD PRESSURE: 116 MMHG | HEART RATE: 95 BPM

## 2024-07-01 VITALS
OXYGEN SATURATION: 99 % | HEIGHT: 72 IN | HEART RATE: 112 BPM | WEIGHT: 259.93 LBS | RESPIRATION RATE: 18 BRPM | SYSTOLIC BLOOD PRESSURE: 147 MMHG | TEMPERATURE: 98 F | DIASTOLIC BLOOD PRESSURE: 82 MMHG

## 2024-07-01 DIAGNOSIS — Z98.890 OTHER SPECIFIED POSTPROCEDURAL STATES: Chronic | ICD-10-CM

## 2024-07-01 DIAGNOSIS — Z98.89 OTHER SPECIFIED POSTPROCEDURAL STATES: Chronic | ICD-10-CM

## 2024-07-01 LAB
ALBUMIN SERPL ELPH-MCNC: 4.5 G/DL — SIGNIFICANT CHANGE UP (ref 3.3–5)
ALP SERPL-CCNC: 40 U/L — SIGNIFICANT CHANGE UP (ref 40–120)
ALT FLD-CCNC: 31 U/L — SIGNIFICANT CHANGE UP (ref 4–41)
ANION GAP SERPL CALC-SCNC: 13 MMOL/L — SIGNIFICANT CHANGE UP (ref 7–14)
APPEARANCE UR: CLEAR — SIGNIFICANT CHANGE UP
APTT BLD: 36.8 SEC — HIGH (ref 24.5–35.6)
AST SERPL-CCNC: 24 U/L — SIGNIFICANT CHANGE UP (ref 4–40)
BASOPHILS # BLD AUTO: 0.02 K/UL — SIGNIFICANT CHANGE UP (ref 0–0.2)
BASOPHILS NFR BLD AUTO: 0.3 % — SIGNIFICANT CHANGE UP (ref 0–2)
BILIRUB SERPL-MCNC: 0.7 MG/DL — SIGNIFICANT CHANGE UP (ref 0.2–1.2)
BILIRUB UR-MCNC: NEGATIVE — SIGNIFICANT CHANGE UP
BLOOD GAS VENOUS COMPREHENSIVE RESULT: SIGNIFICANT CHANGE UP
BUN SERPL-MCNC: 16 MG/DL — SIGNIFICANT CHANGE UP (ref 7–23)
CALCIUM SERPL-MCNC: 9.5 MG/DL — SIGNIFICANT CHANGE UP (ref 8.4–10.5)
CHLORIDE SERPL-SCNC: 104 MMOL/L — SIGNIFICANT CHANGE UP (ref 98–107)
CO2 SERPL-SCNC: 24 MMOL/L — SIGNIFICANT CHANGE UP (ref 22–31)
COLOR SPEC: SIGNIFICANT CHANGE UP
CREAT SERPL-MCNC: 0.79 MG/DL — SIGNIFICANT CHANGE UP (ref 0.5–1.3)
DIFF PNL FLD: NEGATIVE — SIGNIFICANT CHANGE UP
EGFR: 108 ML/MIN/1.73M2 — SIGNIFICANT CHANGE UP
EOSINOPHIL # BLD AUTO: 0.02 K/UL — SIGNIFICANT CHANGE UP (ref 0–0.5)
EOSINOPHIL NFR BLD AUTO: 0.3 % — SIGNIFICANT CHANGE UP (ref 0–6)
GLUCOSE SERPL-MCNC: 182 MG/DL — HIGH (ref 70–99)
GLUCOSE UR QL: >=1000 MG/DL
HCT VFR BLD CALC: 42 % — SIGNIFICANT CHANGE UP (ref 39–50)
HGB BLD-MCNC: 13.7 G/DL — SIGNIFICANT CHANGE UP (ref 13–17)
IANC: 6.01 K/UL — SIGNIFICANT CHANGE UP (ref 1.8–7.4)
IMM GRANULOCYTES NFR BLD AUTO: 0.4 % — SIGNIFICANT CHANGE UP (ref 0–0.9)
INR BLD: 0.95 RATIO — SIGNIFICANT CHANGE UP (ref 0.85–1.18)
KETONES UR-MCNC: ABNORMAL MG/DL
LEUKOCYTE ESTERASE UR-ACNC: NEGATIVE — SIGNIFICANT CHANGE UP
LIDOCAIN IGE QN: 27 U/L — SIGNIFICANT CHANGE UP (ref 7–60)
LYMPHOCYTES # BLD AUTO: 1.31 K/UL — SIGNIFICANT CHANGE UP (ref 1–3.3)
LYMPHOCYTES # BLD AUTO: 16.8 % — SIGNIFICANT CHANGE UP (ref 13–44)
MCHC RBC-ENTMCNC: 27.7 PG — SIGNIFICANT CHANGE UP (ref 27–34)
MCHC RBC-ENTMCNC: 32.6 GM/DL — SIGNIFICANT CHANGE UP (ref 32–36)
MCV RBC AUTO: 84.8 FL — SIGNIFICANT CHANGE UP (ref 80–100)
MONOCYTES # BLD AUTO: 0.41 K/UL — SIGNIFICANT CHANGE UP (ref 0–0.9)
MONOCYTES NFR BLD AUTO: 5.3 % — SIGNIFICANT CHANGE UP (ref 2–14)
NEUTROPHILS # BLD AUTO: 6.01 K/UL — SIGNIFICANT CHANGE UP (ref 1.8–7.4)
NEUTROPHILS NFR BLD AUTO: 76.9 % — SIGNIFICANT CHANGE UP (ref 43–77)
NITRITE UR-MCNC: NEGATIVE — SIGNIFICANT CHANGE UP
NRBC # BLD: 0 /100 WBCS — SIGNIFICANT CHANGE UP (ref 0–0)
NRBC # FLD: 0 K/UL — SIGNIFICANT CHANGE UP (ref 0–0)
PH UR: 6 — SIGNIFICANT CHANGE UP (ref 5–8)
PLATELET # BLD AUTO: 196 K/UL — SIGNIFICANT CHANGE UP (ref 150–400)
POTASSIUM SERPL-MCNC: 3.9 MMOL/L — SIGNIFICANT CHANGE UP (ref 3.5–5.3)
POTASSIUM SERPL-SCNC: 3.9 MMOL/L — SIGNIFICANT CHANGE UP (ref 3.5–5.3)
PROT SERPL-MCNC: 7.6 G/DL — SIGNIFICANT CHANGE UP (ref 6–8.3)
PROT UR-MCNC: 30 MG/DL
PROTHROM AB SERPL-ACNC: 10.7 SEC — SIGNIFICANT CHANGE UP (ref 9.5–13)
RBC # BLD: 4.95 M/UL — SIGNIFICANT CHANGE UP (ref 4.2–5.8)
RBC # FLD: 15.7 % — HIGH (ref 10.3–14.5)
SODIUM SERPL-SCNC: 141 MMOL/L — SIGNIFICANT CHANGE UP (ref 135–145)
SP GR SPEC: 1.05 — HIGH (ref 1–1.03)
UROBILINOGEN FLD QL: 1 MG/DL — SIGNIFICANT CHANGE UP (ref 0.2–1)
WBC # BLD: 7.8 K/UL — SIGNIFICANT CHANGE UP (ref 3.8–10.5)
WBC # FLD AUTO: 7.8 K/UL — SIGNIFICANT CHANGE UP (ref 3.8–10.5)

## 2024-07-01 PROCEDURE — 74176 CT ABD & PELVIS W/O CONTRAST: CPT | Mod: 26,MC

## 2024-07-01 PROCEDURE — 99285 EMERGENCY DEPT VISIT HI MDM: CPT

## 2024-07-01 RX ORDER — ONDANSETRON HYDROCHLORIDE 2 MG/ML
4 INJECTION INTRAMUSCULAR; INTRAVENOUS ONCE
Refills: 0 | Status: COMPLETED | OUTPATIENT
Start: 2024-07-01 | End: 2024-07-01

## 2024-07-01 RX ORDER — SODIUM CHLORIDE 0.9 % (FLUSH) 0.9 %
1000 SYRINGE (ML) INJECTION ONCE
Refills: 0 | Status: COMPLETED | OUTPATIENT
Start: 2024-07-01 | End: 2024-07-01

## 2024-07-01 RX ORDER — ACETAMINOPHEN 325 MG
1000 TABLET ORAL ONCE
Refills: 0 | Status: COMPLETED | OUTPATIENT
Start: 2024-07-01 | End: 2024-07-01

## 2024-07-01 RX ADMIN — ONDANSETRON HYDROCHLORIDE 4 MILLIGRAM(S): 2 INJECTION INTRAMUSCULAR; INTRAVENOUS at 15:54

## 2024-07-01 RX ADMIN — Medication 1000 MILLILITER(S): at 17:36

## 2024-07-01 RX ADMIN — Medication 400 MILLIGRAM(S): at 15:54

## 2024-07-02 LAB
CULTURE RESULTS: SIGNIFICANT CHANGE UP
SPECIMEN SOURCE: SIGNIFICANT CHANGE UP

## 2024-08-11 NOTE — DISCHARGE NOTE PROVIDER - NSRESEARCHGRANT_PROPHYLAXISRECOMFT_GEN_A_CORE
Pt here for urinary symptoms for last couple days.   Pain on urination and noticed blood when wiping.   Denies abd/back pain.   Denies fever.  
This is a surgical and/or non-medical patient.

## 2024-08-14 NOTE — PATIENT PROFILE ADULT. - CAREGIVER NAME
Brief CV Surgery Note        CVTS aware of consult for poss CABG. Viability study ordered per cardiology for today. Formal consult w/ recommendations to follow  pending results of viability study.        Lili Patton PA-C  [facility] Cardiothoracic Surgery  Pager: [phone]  August 14, 2024      Elva Em

## 2024-08-27 NOTE — DISCHARGE NOTE PROVIDER - PROVIDER RX CONTACT NUMBER
"Request for medication refill:  acetaminophen (TYLENOL) 500 MG tablet     albuterol (PROAIR HFA) 108 (90 Base) MCG/ACT inhaler     Providers if patient needs an appointment and you are willing to give a one month supply please refill for one month and  send a letter/MyChart using \".SMILLIMITEDREFILL\" .smillimited and route chart to \"P SMI \" (Giving one month refill in non controlled medications is strongly recommended before denial)    If refill has been denied, meaning absolutely no refills without visit, please complete the smart phrase \".smirxrefuse\" and route it to the \"P SMI MED REFILLS\"  pool to inform the patient and the pharmacy.    Vickie Sanchez CMA      "
[phone]

## 2024-09-13 ENCOUNTER — INPATIENT (INPATIENT)
Facility: HOSPITAL | Age: 49
LOS: 2 days | Discharge: HOME | End: 2024-09-16
Attending: SURGERY | Admitting: SURGERY
Payer: COMMERCIAL

## 2024-09-13 VITALS
HEART RATE: 112 BPM | SYSTOLIC BLOOD PRESSURE: 112 MMHG | RESPIRATION RATE: 18 BRPM | TEMPERATURE: 98 F | OXYGEN SATURATION: 100 % | HEIGHT: 72 IN | WEIGHT: 270.07 LBS | DIASTOLIC BLOOD PRESSURE: 68 MMHG

## 2024-09-13 DIAGNOSIS — Z98.89 OTHER SPECIFIED POSTPROCEDURAL STATES: Chronic | ICD-10-CM

## 2024-09-13 DIAGNOSIS — R10.9 UNSPECIFIED ABDOMINAL PAIN: ICD-10-CM

## 2024-09-13 DIAGNOSIS — Z98.890 OTHER SPECIFIED POSTPROCEDURAL STATES: Chronic | ICD-10-CM

## 2024-09-13 LAB
ALBUMIN SERPL ELPH-MCNC: 4.6 G/DL — SIGNIFICANT CHANGE UP (ref 3.3–5)
ALP SERPL-CCNC: 40 U/L — SIGNIFICANT CHANGE UP (ref 40–120)
ALT FLD-CCNC: 32 U/L — SIGNIFICANT CHANGE UP (ref 4–41)
ANION GAP SERPL CALC-SCNC: 18 MMOL/L — HIGH (ref 7–14)
ANISOCYTOSIS BLD QL: SLIGHT — SIGNIFICANT CHANGE UP
AST SERPL-CCNC: 20 U/L — SIGNIFICANT CHANGE UP (ref 4–40)
BASOPHILS # BLD AUTO: 0 K/UL — SIGNIFICANT CHANGE UP (ref 0–0.2)
BASOPHILS NFR BLD AUTO: 0 % — SIGNIFICANT CHANGE UP (ref 0–2)
BILIRUB SERPL-MCNC: 0.8 MG/DL — SIGNIFICANT CHANGE UP (ref 0.2–1.2)
BUN SERPL-MCNC: 21 MG/DL — SIGNIFICANT CHANGE UP (ref 7–23)
CALCIUM SERPL-MCNC: 10.2 MG/DL — SIGNIFICANT CHANGE UP (ref 8.4–10.5)
CHLORIDE SERPL-SCNC: 104 MMOL/L — SIGNIFICANT CHANGE UP (ref 98–107)
CO2 SERPL-SCNC: 21 MMOL/L — LOW (ref 22–31)
CREAT SERPL-MCNC: 0.88 MG/DL — SIGNIFICANT CHANGE UP (ref 0.5–1.3)
EGFR: 105 ML/MIN/1.73M2 — SIGNIFICANT CHANGE UP
EOSINOPHIL # BLD AUTO: 0.38 K/UL — SIGNIFICANT CHANGE UP (ref 0–0.5)
EOSINOPHIL NFR BLD AUTO: 4.3 % — SIGNIFICANT CHANGE UP (ref 0–6)
GLUCOSE BLDC GLUCOMTR-MCNC: 141 MG/DL — HIGH (ref 70–99)
GLUCOSE SERPL-MCNC: 223 MG/DL — HIGH (ref 70–99)
HCT VFR BLD CALC: 46.2 % — SIGNIFICANT CHANGE UP (ref 39–50)
HGB BLD-MCNC: 14.6 G/DL — SIGNIFICANT CHANGE UP (ref 13–17)
HOROWITZ INDEX BLDV+IHG-RTO: SIGNIFICANT CHANGE UP
IANC: 7.08 K/UL — SIGNIFICANT CHANGE UP (ref 1.8–7.4)
LIDOCAIN IGE QN: 20 U/L — SIGNIFICANT CHANGE UP (ref 7–60)
LYMPHOCYTES # BLD AUTO: 0.77 K/UL — LOW (ref 1–3.3)
LYMPHOCYTES # BLD AUTO: 8.6 % — LOW (ref 13–44)
MCHC RBC-ENTMCNC: 27.1 PG — SIGNIFICANT CHANGE UP (ref 27–34)
MCHC RBC-ENTMCNC: 31.6 GM/DL — LOW (ref 32–36)
MCV RBC AUTO: 85.9 FL — SIGNIFICANT CHANGE UP (ref 80–100)
METAMYELOCYTES # FLD: 0.9 % — SIGNIFICANT CHANGE UP (ref 0–1)
MICROCYTES BLD QL: SLIGHT — SIGNIFICANT CHANGE UP
MONOCYTES # BLD AUTO: 1.08 K/UL — HIGH (ref 0–0.9)
MONOCYTES NFR BLD AUTO: 12.1 % — SIGNIFICANT CHANGE UP (ref 2–14)
NEUTROPHILS # BLD AUTO: 6.29 K/UL — SIGNIFICANT CHANGE UP (ref 1.8–7.4)
NEUTROPHILS NFR BLD AUTO: 61.2 % — SIGNIFICANT CHANGE UP (ref 43–77)
NEUTS BAND # BLD: 9.5 % — HIGH (ref 0–6)
OVALOCYTES BLD QL SMEAR: SLIGHT — SIGNIFICANT CHANGE UP
PLAT MORPH BLD: NORMAL — SIGNIFICANT CHANGE UP
PLATELET # BLD AUTO: 249 K/UL — SIGNIFICANT CHANGE UP (ref 150–400)
PLATELET COUNT - ESTIMATE: NORMAL — SIGNIFICANT CHANGE UP
POIKILOCYTOSIS BLD QL AUTO: SLIGHT — SIGNIFICANT CHANGE UP
POLYCHROMASIA BLD QL SMEAR: SLIGHT — SIGNIFICANT CHANGE UP
POTASSIUM SERPL-MCNC: 4.1 MMOL/L — SIGNIFICANT CHANGE UP (ref 3.5–5.3)
POTASSIUM SERPL-SCNC: 4.1 MMOL/L — SIGNIFICANT CHANGE UP (ref 3.5–5.3)
PROT SERPL-MCNC: 8 G/DL — SIGNIFICANT CHANGE UP (ref 6–8.3)
RBC # BLD: 5.38 M/UL — SIGNIFICANT CHANGE UP (ref 4.2–5.8)
RBC # FLD: 15.2 % — HIGH (ref 10.3–14.5)
RBC BLD AUTO: ABNORMAL
SMUDGE CELLS # BLD: PRESENT — SIGNIFICANT CHANGE UP
SODIUM SERPL-SCNC: 143 MMOL/L — SIGNIFICANT CHANGE UP (ref 135–145)
TROPONIN T, HIGH SENSITIVITY RESULT: 17 NG/L — SIGNIFICANT CHANGE UP
VARIANT LYMPHS # BLD: 3.4 % — SIGNIFICANT CHANGE UP (ref 0–6)
WBC # BLD: 8.9 K/UL — SIGNIFICANT CHANGE UP (ref 3.8–10.5)
WBC # FLD AUTO: 8.9 K/UL — SIGNIFICANT CHANGE UP (ref 3.8–10.5)

## 2024-09-13 PROCEDURE — 71045 X-RAY EXAM CHEST 1 VIEW: CPT | Mod: 26

## 2024-09-13 PROCEDURE — 74176 CT ABD & PELVIS W/O CONTRAST: CPT | Mod: 26,MC

## 2024-09-13 PROCEDURE — 99222 1ST HOSP IP/OBS MODERATE 55: CPT | Mod: GC

## 2024-09-13 PROCEDURE — 99285 EMERGENCY DEPT VISIT HI MDM: CPT

## 2024-09-13 RX ORDER — DEXTROSE 15 G/33 G
15 GEL IN PACKET (GRAM) ORAL ONCE
Refills: 0 | Status: DISCONTINUED | OUTPATIENT
Start: 2024-09-13 | End: 2024-09-16

## 2024-09-13 RX ORDER — GLUCAGON INJECTION, SOLUTION 1 MG/.2ML
1 INJECTION, SOLUTION SUBCUTANEOUS ONCE
Refills: 0 | Status: DISCONTINUED | OUTPATIENT
Start: 2024-09-13 | End: 2024-09-16

## 2024-09-13 RX ORDER — ROSUVASTATIN CALCIUM 10 MG/1
1 TABLET ORAL
Refills: 0 | DISCHARGE

## 2024-09-13 RX ORDER — ACETAMINOPHEN 325 MG/1
1000 TABLET ORAL ONCE
Refills: 0 | Status: COMPLETED | OUTPATIENT
Start: 2024-09-13 | End: 2024-09-13

## 2024-09-13 RX ORDER — ONDANSETRON 2 MG/ML
4 INJECTION, SOLUTION INTRAMUSCULAR; INTRAVENOUS ONCE
Refills: 0 | Status: COMPLETED | OUTPATIENT
Start: 2024-09-13 | End: 2024-09-13

## 2024-09-13 RX ORDER — METOPROLOL TARTRATE 100 MG/1
5 TABLET ORAL EVERY 6 HOURS
Refills: 0 | Status: DISCONTINUED | OUTPATIENT
Start: 2024-09-13 | End: 2024-09-13

## 2024-09-13 RX ORDER — DEXTROSE 15 G/33 G
25 GEL IN PACKET (GRAM) ORAL ONCE
Refills: 0 | Status: DISCONTINUED | OUTPATIENT
Start: 2024-09-13 | End: 2024-09-16

## 2024-09-13 RX ORDER — ICOSAPENT ETHYL 1 G/1
2 CAPSULE, LIQUID FILLED ORAL
Refills: 0 | DISCHARGE

## 2024-09-13 RX ORDER — HEPARIN SODIUM,BOVINE 1000/ML
5000 VIAL (ML) INJECTION EVERY 8 HOURS
Refills: 0 | Status: DISCONTINUED | OUTPATIENT
Start: 2024-09-13 | End: 2024-09-16

## 2024-09-13 RX ORDER — HYDROMORPHONE HYDROCHLORIDE 2 MG/1
1 TABLET ORAL ONCE
Refills: 0 | Status: DISCONTINUED | OUTPATIENT
Start: 2024-09-13 | End: 2024-09-13

## 2024-09-13 RX ORDER — METOPROLOL TARTRATE 100 MG/1
5 TABLET ORAL EVERY 6 HOURS
Refills: 0 | Status: DISCONTINUED | OUTPATIENT
Start: 2024-09-13 | End: 2024-09-16

## 2024-09-13 RX ORDER — ONDANSETRON 2 MG/ML
4 INJECTION, SOLUTION INTRAMUSCULAR; INTRAVENOUS
Refills: 0 | Status: CANCELLED | OUTPATIENT
Start: 2024-09-13 | End: 2024-09-16

## 2024-09-13 RX ORDER — SAXAGLIPTIN 5 MG/1
1 TABLET, FILM COATED ORAL
Refills: 0 | DISCHARGE

## 2024-09-13 RX ORDER — DEXTROSE 15 G/33 G
12.5 GEL IN PACKET (GRAM) ORAL ONCE
Refills: 0 | Status: DISCONTINUED | OUTPATIENT
Start: 2024-09-13 | End: 2024-09-16

## 2024-09-13 RX ADMIN — ACETAMINOPHEN 400 MILLIGRAM(S): 325 TABLET ORAL at 18:28

## 2024-09-13 RX ADMIN — Medication 2000 MILLILITER(S): at 18:28

## 2024-09-13 RX ADMIN — ONDANSETRON 4 MILLIGRAM(S): 2 INJECTION, SOLUTION INTRAMUSCULAR; INTRAVENOUS at 18:28

## 2024-09-13 NOTE — ED PROVIDER NOTE - WET READ LAUNCH FT
1. Type 2 diabetes mellitus with complication, without long-term current use of insulin (HCC)  - stable  - Hemoglobin A1c 7.0%   -Continue same medications  -Limit carbohydrates to 45 grams with meals and 15 grams with snacks  -monitor blood sugars  -goal for blood sugar fasting or pre-meal  is   -goal for blood sugar 2 hours after a meal is less than 180  -goal for blood sugar at bedtime is less than 150  -Regular aerobic exercise  -Comprehensive Metabolic Panel; Future  - DIABETES FOOT EXAM    2. Essential hypertension  -stable  -Continue same medications  -Low sodium diet  -Regular aerobic exercise  -Comprehensive Metabolic Panel; Future    3. Mixed hyperlipidemia  - stable  -Continue same medications  -Low fat, low cholesterol diet  -Regular aerobic exercise  -Comprehensive Metabolic Panel; Future  -Lipid Panel; Future    4. Gastroesophageal reflux disease, unspecified whether esophagitis present  -stable  -patient also has Paredes's Esophagus  -Continue same medications  -Decrease caffeine, avoid spicy foods, avoid tomato based foods  -Eat small meals instead of large meals  -Wait 2-3 hours after eating before lying down   - External Referral To Gastroenterology, Dr. Shane Sánchez for EGD for GERD and Paredes's    5. Iron deficiency anemia, unspecified iron deficiency anemia type  -stable  -Continue same medications  - Iron and TIBC; Future  - CBC; Future    6. Vitamin D deficiency  -stable  -Continue same medications  - Vitamin D 25 Hydroxy; Future    7. Microalbuminuria  -Continue same medications  -Continue care per Nephrology    8.  Polyp of colon, unspecified part of colon, unspecified type  - External Referral To Gastroenterology, Dr. Shane Sánchez for colonoscopy There are no Wet Read(s) to document.

## 2024-09-13 NOTE — ED PROVIDER NOTE - OBJECTIVE STATEMENT
48M history of incisional hernia, multiple previous lysis of adhesions, previous SBO complaining of 1 day history of nausea, abdominal distention, and upper abdominal pain.  States this is similar to his previous SBO.  No vomiting, no fever/chills, passed 1 formed BM after onset of pain and none since then.  No difficulty urinating.  No complaints of lightheadedness or dizziness.  Patient at last episode of similar symptoms did not have SBO on CT scan but the prior visit was treated with NG tube suction successfully.  Patient called surgeon Dr. White and was told to go to the ED.

## 2024-09-13 NOTE — ED PROVIDER NOTE - CLINICAL SUMMARY MEDICAL DECISION MAKING FREE TEXT BOX
48M with history of SBO feels similar symptoms with nausea, abdominal distention, and pain.  Patient allergic to oral and IV contrast dye with reaction of rash and hives.  Previously been evaluated with noncontrast imaging.  Will treat with antiemetics, patient accepting of Tylenol, likely surgery eval pending CT results, patient also has incisional hernia and may have pain related to this.  No obvious skin induration incarcerated hernia findings.

## 2024-09-13 NOTE — H&P ADULT - NSICDXPASTMEDICALHX_GEN_ALL_CORE_FT
PAST MEDICAL HISTORY:  Diabetes mellitus, type 2     Diverticulitis     DVT (deep venous thrombosis) RLE 2016, post op, was on eliquis for 6 months    GERD (gastroesophageal reflux disease)     Hyperlipidemia     Hypertension     SBO (small bowel obstruction)

## 2024-09-13 NOTE — H&P ADULT - HISTORY OF PRESENT ILLNESS
HR is a 48 y.o. man with PMH of perforated diverticulitis (2009, s/p Isi's w/ reversal) c/b SBOs every few months since 2014 (3 requiring ex lap w LINDA in 2014, 2016, 2018), ventral hernia s/p repair in 2018, T2DM, and HTN, presenting with diffuse abdominal pain, nausea, and bloating x1 day. Last SBO was in 4/2024, resolved with NGT. Patient last had BM this afternoon, last remembers passing flatus this AM. Reports symptoms feel like prior SBOs, decided to stop PO intake earlier today. Denies fevers, chills, or emesis.    On arrival to ED, patient had HR of 112, v/s otherwise stable. Was made NPO, received Zofran, Tylenol, and 2L bolus LR. HR decreased to 106. Labs showed no leukocytosis, normal lipase. CXR unremarkable, Noncontrast CT A/P w SB dilation, question of transition point. SBO 2/2 to adhesions vs. hernia.

## 2024-09-13 NOTE — ED PROVIDER NOTE - HOW PATIENT ADDRESSED, PROFILE
"                                             Child / Adolescent Structured Interview  Standard Diagnostic Assessment    CLIENT'S NAME: Primo Johnson  MRN:   [id number]  :   2004  ACCT. NUMBER: [id number]  DATE OF SERVICE: 2020  VIDEO VISIT: No    Identifying Information:  Client is a 15 year old,  male. Client was referred to therapy by physician. Client is currently a student and reports he is able to function appropriately at school..  This initial session included the client's mother alone after completion of session with client.   She completed the intake paperwork following the meeting and client returned it at second session.  The client was not present in the initial session.  There are no language or communication issues or need for modification in treatment. There are no ethnic, cultural or Denominational factors that may be relevant for therapy. Client identified their preferred language to be English. Client does not need the assistance of an  or other support involved in therapy.      Client and Parent's Statements of Presenting Concern:  Client reported the following reason(s) for seeking therapy: \"a lot of stress and anxiousness over multiple areas of my life\".  Client explained that he has been able to maintain his grades but it has been increasingly more difficult to get motivated.  Client's mom reported the reason for seeking therapy as \"he feels overwhelmed at times and struggles to find balance\".  Client's mom reported the following concerns: eating habits, organizing skills, and anxiety management.  his symptoms have resulted in the following functional impairments: self-care and procrastination.  Client denied issues with functioning at work.  Client's mom reported that client \"doesn't want to work and comes up with many excuses to miss his shifts\".  She also reported concerns about money management.    History of Presenting Concern:  The client reports these concerns " "began in middle school.  Issues contributing to the current problem include: transitioning from high school to college in the near future.  Client has attempted to resolve these concerns in the past through pediatrician and dietitian. Client reports that other professional(s) are not involved in providing support services at this time.      Family and Social History:  Client grew up in [place], [place].  Parents  when the client was 1 or 2 years old years old. The client's mother did not remarry but has been in a relationship with who the client refers to as his step-dad since client was around 3 or 4 years old. The client lives with his mom, step-dad, half-sister, and grandma. The client has 2 siblings, including: a maternal half-sister who is 9 years old and an older step-brother from his dad's marriage. The client's living situation appears to be stable, as evidenced by parent concern and involvement in his treatment.  Client described his current relationships with family of origin as \"mostly healthy and supportive, with moments of unreasonable arguments and small quarrels that can add up.  Not a lot of emotional openness\".   Client's mom reported relationship problems between her and client mostly due to arguments over late homework, etc. Client reported having minimal relationship with his biological dad.  He indicated that he sees him about once a year and does not have communication with him in-between visits.   Family relationship issues include: desire to be more emotionally connected with mom.   Parent and client describes discipline as more \"lenient\" this past year as client is nearing an adult .  Client reported discipline used is taking away electronics and time spent with friends. The mother reports hours per week their child spends in the following:  Internet Use: 10-15+ hrs. Computer, smart phone or video games: 20+ hts.  Client and his mom did not agree on amount of time spent on video games. " " Client reported fewer hours. The family uses blocking devices for computer, TV, or internet: NO.   There are no identified legal issues. The biological mother and biological father has shared legal custody and mother has full physical custody.      Developmental History:  There were pregnanacy/birth related problems including: emergency  due to suspicion of umbilical cord wrapped around his neck. There were no major childhood illnesses.  The caregiver reported that the client had no significant delays in developmental tasks. There is a significant history of separation from primary caregiver(s). Client reported that after his parents divorce he spent about a year in [place] where his mom immigrated from to live with his maternal grandma and great-grandparents. Client's mom reported client was 2 years at the time and she was looking for a place to live in addition to the divorce. There is not a history of trauma, loss or abuse. There are no reported problems with sleep.  There are no concerns about sexual development or acitivity. Client is sexually active.    School Information:  The client currently attends school at [employer], and is in the 12th grade. There is not a history of grade retention or special educational services. There is not a history of ADHD symptoms. There is not a history of learning disorders. Academic performance is above grade level. There are no attendance issues. Client identified extensive stable and meaningful social connections.  Peer relationships are age appropriate.    Mental Health History:  There is not reported family history of mental issues / treatment.    Client is not currently receiving any mental health services.  Client has received the following mental health services in the past: no prior services.  Hospitalizations: None.       Chemical Health History:  Family history of chemical health issues includes the following: client's mom reported that client's dad \"has a " "drinking problem that started in his 20's\".  She reported that client is unaware of this because they have been intentional of keeping the image of his father positive..    The client has the following history of chemical health issues / treatment: none.     The Kiddie-Cage score was 0    There are no recommendations for follow-up based on this score    Psychological and Social History Assessment / Questionnaire:  Over the past 2 weeks, mother reports their child had problems with the following: problems concentrating, sleeping less than normal due to engagement in video games, wanting to eat more than normal, low self-esteem, worry, irritable and angry, problems with attention and focus, too much engagement with video games, relationship problems with mom    Review of Symptoms:  Depression: Lack of interest, Excessive or inappropriate guilt, Change in energy level and Feeling sad, down, or depressed  Rafaela:  No Symptoms  Psychosis: No Symptoms  Anxiety: Excessive worry, Nervousness, Social anxiety and Ruminations  Panic:  No symptoms  Post Traumatic Stress Disorder: No Symptoms  Obsessive Compulsive Disorder: No Symptoms  Eating Disorder: No Symptoms   Oppositional Defiant Disorder:  No Symptoms  ADD / ADHD:  Poor organizational skills  Conduct Disorder:No symptoms  Autism Spectrum Disorder: No symptoms    There was agreement between parent and child symptom report.       Safety Issues and Plan for Safety and Risk Management:    Client reports the client has had a history of suicidal ideation: Client reported that in middle school he had wished to be dead due to stressors at that time.  Denied thoughts of killing himself.    Client denies current fears or concerns for personal safety.  Client denies current or recent suicidal ideation or behaviors.  Client denies current or recent homicidal ideation or behaviors.  Client denies current or recent self injurious behavior or ideation.  Client denies other safety " concerns.  Client reports there are no firearms in the house.   Client reports the following protective factors: positive relationships positive social network and positive family connections    The patient was instructed to call 911 if there should be a change in any of these risk factors.      Medical Information:  There are no current medical concerns.    Current medications are:   Current Outpatient Medications   Medication Sig     ibuprofen (ADVIL) 200 MG tablet Take 200 mg by mouth every 8 hours as needed.     No current facility-administered medications for this visit.        Therapist verified client's current medications as listed above.     No Known Allergies  Therapist verified client allergies as listed above.    Client has had a physical exam to rule out medical causes for current symptoms. Date of last physical exam was greater than a year ago and client was encouraged to schedule an exam with PCP. The client has a [facility] Provider, who is named Nani Dc.. The client reports not having a psychiatrist.    There are no reported issues of chronic or episodic pain.  Current nutritional or weight concerns include: client reported desire to lose weight.  Client's mom reported approximately 50+ lb weight gain in past 3-4 years.  She reported he does not eat breakfast, does not like the school lunch and will not bring his own lunch, and will overeat after school and into the evening.  There are no concerns with vision or hearing.    Mental Status Assessment:  Appearance:   Appropriate   Eye Contact:   Good   Psychomotor Behavior: Restless -fidgeting throughout session  Attitude:   Cooperative   Orientation:   All  Speech   Rate / Production: Normal    Volume:  Normal   Mood:    Anxious   Affect:    Restricted   Thought Content:  Clear   Thought Form:  Coherent  Logical   Insight:    Good         Diagnostic Criteria:  A. The development of emotional or behavioral symptoms in  response to an identifiable stressor(s) occurring within 3 months of the onset of the stressor(s)  B. These symptoms or behaviors are clinically significant, as evidenced by one or both of the following:  C. The stress-related disturbance does not meet criteria for another disorder & is not not an exacerbation of another mental disorder  D. The symptoms do not represent normal bereavement  E. Once the stressor or its consequences have terminated, the symptoms do not persist for more than an additional 6 months       * Adjustment Disorder with Mixed Anxiety and Depressed Mood: The predominant manifestation is a combination of depression and anxiety    Patient's Strengths and Limitations:  Client strengths or resources that will help him succeed in counseling are:family support and positive school connection  Client limitations that may interfere with success in counseling:anxiety about being vulnerable .      Functional Status:  Client's symptoms are causing reduced functional status in the following areas: Activities of Daily Living - completion of tasks, increase in procrastination      DSM5 Diagnoses: (Sustained by DSM5 Criteria Listed Above)  Diagnoses: Adjustment Disorders  309.28 (F43.23) With mixed anxiety and depressed mood  Psychosocial & Contextual Factors: school  CASII: 15  SDQ:   Parent SDQ for 4-17 year olds, completed 5th February 2020  Score for overall stress 10 (0 - 13 is close to average)   Score for emotional distress 3 (0 - 3 is close to average)   Score for behavioural difficulties 3 (3 is slightly raised)   Score for hyperactivity and concentration difficulties 4 (0 - 5 is close to average)   Score for difficulties getting along with other young people 0 (0 - 2 is close to average)   Score for kind and helpful behaviour 6 (6 is LOW)     Self-report SDQ, completed 5th February 2020  Score for overall stress 20 (20 - 40 is VERY HIGH)   Score for emotional distress 7 (7 - 10 is VERY HIGH)   Score  for behavioural difficulties 2 (0 - 3 is close to average)   Score for hyperactivity and concentration difficulties 9 (8 - 10 is VERY HIGH)   Score for difficulties getting along with other young people 2 (0 - 2 is close to average)   Score for kind and helpful behaviour 9 (7 - 10 is close to average)    Preliminary Treatment Plan:    The client reports no currently identified Scientology, ethnic or cultural issues relevant to therapy.     services are not indicated.    Modifications to assist communication are not indicated.    The concerns identified by the client will be addressed in therapy.    Initial Treatment will focus on: Depressed Mood   Anxiety     As a preliminary treatment goal, client will experience a reduction in depressed mood, will develop more effective coping skills to manage depressive symptoms, will develop healthy cognitive patterns and beliefs and will increase ability to function adaptively and will experience a reduction in anxiety, will develop more effective coping skills to manage anxiety symptoms, will develop healthy cognitive patterns and beliefs and will increase ability to function adaptively.    The focus of initial interventions will be to alleviate anxiety, alleviate depressed mood, increase ability to function adaptively, increase coping skills and teach CBT skills.    Collaboration / coordination with other professionals is not indicated at this time.    Referral to another professional/service is not indicated at this time..      A Release of Information is not needed at this time.    Report to child / adult protection services was NA.    Patient will have open access to their mental health medical record.    Cydney Armenta, TIFFANIE  February 11, 2020     [place]

## 2024-09-13 NOTE — ED ADULT NURSE NOTE - OBJECTIVE STATEMENT
pt c/o b/l upper abd pain, nausea, vomiting. pt denies passing flatus. last bowel movement today (normal for him). pt with hx of multiple bowel obstructions and hernia surgery. healed surgical scar noted to umbilical area. abd soft, distended, tender to palpation. pt denies headache, dizziness, chest pain, shortness of breath, diarrhea, fever. PIV #20 left forearm, labs drawn and sent. vs as noted. will medicate per md orders. safety maintained

## 2024-09-13 NOTE — ED ADULT NURSE NOTE - NSFALLUNIVINTERV_ED_ALL_ED
Bed/Stretcher in lowest position, wheels locked, appropriate side rails in place/Call bell, personal items and telephone in reach/Instruct patient to call for assistance before getting out of bed/chair/stretcher/Non-slip footwear applied when patient is off stretcher/[place] to call system/Physically safe environment - no spills, clutter or unnecessary equipment/Purposeful proactive rounding/Room/bathroom lighting operational, light cord in reach

## 2024-09-13 NOTE — ED ADULT NURSE REASSESSMENT NOTE - NS ED NURSE REASSESS COMMENT FT1
pt laying in stretcher with eyes closed, easily aroused via verbal stimuli of calling name once. Pt c/o 5/10 pain across upper abdomen with nausea since this morning. Abdomen obese, distended , stating abdomen is very bloated compared to baseline. Last Bm today, formed stools. Denies having to strain. Denies any bloody stools. Phx multiple bowel obstruction , hernia and multiple abdominal surgeries in past. LR bolus 2 L , Zofran 4mg IVP and ofirmev 1gm IVPB given as ordered. Vital stable at present.

## 2024-09-13 NOTE — H&P ADULT - ASSESSMENT
48M w PMH of perforated diverticulitis (2009, s/p Isi's w/ reversal) c/b SBOs every few months since 2014 (3 requiring ex lap w LINDA in 2014, 2016, 2018), ventral hernia s/p repair in 2018, T2DM, and HTN, presenting [facility] ED on [facility]/13 with diffuse abdominal pain, nausea, and bloating x1 day. Noncontrast CT A/P w SB dilation, question of transition point. Radiographic and clinical findings most consistent w SBO 2/2 to adhesions vs. hernia.    Plan:  - Diet: NPO  - NGT  - Pain control: IV Tylenol 1000mg q6 PRN  - Zofran 4mg q6 PRN for nausea  - 1L LR bolus, then mIVF  - OOB as tolerated after PT eval  - FS q6h with ISS  - DVT ppx: SQH     48M w PMH of perforated diverticulitis (2009, s/p Isi's w/ reversal) c/b SBOs every few months since 2014 (3 requiring ex lap w LINDA in 2014, 2016, 2018), ventral hernia s/p repair in 2018, T2DM, and HTN, presenting [facility] ED on 9/13 with diffuse abdominal pain, nausea, and bloating x1 day. Noncontrast CT A/P w SB dilation, question of transition point. Radiographic and clinical findings most consistent w SBO 2/2 to adhesions vs. hernia.    Plan:  - Diet: NPO  - NGT  - Pain control: IV Tylenol 1000mg q6 PRN  - Zofran 4mg q6 PRN for nausea  - LR 120cc/hr  - OOB as tolerated after PT eval  - FS q6h with ISS  - DVT ppx: SQH

## 2024-09-13 NOTE — ED PROVIDER NOTE - CARE PLAN
1 Principal Discharge DX:	Abdominal pain   Principal Discharge DX:	Abdominal pain  Secondary Diagnosis:	SBO (small bowel obstruction)

## 2024-09-13 NOTE — H&P ADULT - ATTENDING COMMENTS
Recurrent SBO , Multiple admissions, . Discussed surgical intervention on multiple occassions. To place ng, hydrate , observe. DH

## 2024-09-13 NOTE — H&P ADULT - NSHPPHYSICALEXAM_GEN_ALL_CORE
GENERAL: NAD, lying in bed comfortably  HEAD: Atraumatic, normocephalic  EYES: EOMI, PERRLA, conjunctiva and sclera clear  ENT: Moist mucous membranes  NECK: Supple, no JVD  HEART: S1, S2, Regular rate and rhythm, no murmurs, rubs, or gallops  LUNGS: Unlabored respirations, clear to auscultation bilaterally, no crackles, wheezes, or rhonchi  ABDOMEN: Soft, moderately distended, moderate TTP in b/l upper quadrants. No tenderness to percussion, rebound, or guarding. Ventral hernia present, reducible.  EXTREMITIES: 2+ peripheral pulses bilaterally. No clubbing, cyanosis, or edema  NERVOUS SYSTEM:  A&Ox3, no focal deficits   SKIN: Erythematous skin lesion on L medial ankle--pt reports chronic from DVT in 2016. No other rashes or lesions

## 2024-09-13 NOTE — ED ADULT TRIAGE NOTE - CHIEF COMPLAINT QUOTE
abd pain    c/o abd pain, nausea, vomiting, passing very little flatus since this am.  has hx of multiple bowel obstructions.. feels like the same.  pmhx- htn, diabetes tyope 2, high triglycerides, dvt (taken off anticoagulants),

## 2024-09-13 NOTE — H&P ADULT - NSHPREVIEWOFSYSTEMS_GEN_ALL_CORE
Eyes: No visual changes, eye pain or redness.  HEENT: No throat pain, hearing changes, ear pain, or nose bleeding.  CV: No chest pain, palpitations, leg swelling, syncope, or presyncope.  Resp: No shortness of breath, cough, or sputum production.  GI: As per HPI. No diarrhea or constipation.   : No dysuria, hematuria, urinary frequency, or urinary urgency.  MSK: No muscle or joint pains.  Skin: (+) rash on medial L ankle--pt reports skin changes are chronic from RLE DVT in 2016, no lesions, bruises, or jaundice.  Neuro: No headache, numbness, tingling, weakness, or dizziness.

## 2024-09-14 LAB
A1C WITH ESTIMATED AVERAGE GLUCOSE RESULT: 9.1 % — HIGH (ref 4–5.6)
ANION GAP SERPL CALC-SCNC: 14 MMOL/L — SIGNIFICANT CHANGE UP (ref 7–14)
BUN SERPL-MCNC: 22 MG/DL — SIGNIFICANT CHANGE UP (ref 7–23)
CALCIUM SERPL-MCNC: 9.6 MG/DL — SIGNIFICANT CHANGE UP (ref 8.4–10.5)
CHLORIDE SERPL-SCNC: 107 MMOL/L — SIGNIFICANT CHANGE UP (ref 98–107)
CO2 SERPL-SCNC: 22 MMOL/L — SIGNIFICANT CHANGE UP (ref 22–31)
CREAT SERPL-MCNC: 0.86 MG/DL — SIGNIFICANT CHANGE UP (ref 0.5–1.3)
EGFR: 105 ML/MIN/1.73M2 — SIGNIFICANT CHANGE UP
ESTIMATED AVERAGE GLUCOSE: 214 — SIGNIFICANT CHANGE UP
GLUCOSE BLDC GLUCOMTR-MCNC: 127 MG/DL — HIGH (ref 70–99)
GLUCOSE BLDC GLUCOMTR-MCNC: 128 MG/DL — HIGH (ref 70–99)
GLUCOSE BLDC GLUCOMTR-MCNC: 132 MG/DL — HIGH (ref 70–99)
GLUCOSE BLDC GLUCOMTR-MCNC: 91 MG/DL — SIGNIFICANT CHANGE UP (ref 70–99)
GLUCOSE BLDC GLUCOMTR-MCNC: 99 MG/DL — SIGNIFICANT CHANGE UP (ref 70–99)
GLUCOSE SERPL-MCNC: 114 MG/DL — HIGH (ref 70–99)
HCT VFR BLD CALC: 39.3 % — SIGNIFICANT CHANGE UP (ref 39–50)
HGB BLD-MCNC: 12.3 G/DL — LOW (ref 13–17)
MAGNESIUM SERPL-MCNC: 2 MG/DL — SIGNIFICANT CHANGE UP (ref 1.6–2.6)
MCHC RBC-ENTMCNC: 27 PG — SIGNIFICANT CHANGE UP (ref 27–34)
MCHC RBC-ENTMCNC: 31.3 GM/DL — LOW (ref 32–36)
MCV RBC AUTO: 86.2 FL — SIGNIFICANT CHANGE UP (ref 80–100)
NRBC # BLD: 0 /100 WBCS — SIGNIFICANT CHANGE UP (ref 0–0)
NRBC # FLD: 0 K/UL — SIGNIFICANT CHANGE UP (ref 0–0)
PHOSPHATE SERPL-MCNC: 3.2 MG/DL — SIGNIFICANT CHANGE UP (ref 2.5–4.5)
PLATELET # BLD AUTO: 209 K/UL — SIGNIFICANT CHANGE UP (ref 150–400)
POTASSIUM SERPL-MCNC: 3.8 MMOL/L — SIGNIFICANT CHANGE UP (ref 3.5–5.3)
POTASSIUM SERPL-SCNC: 3.8 MMOL/L — SIGNIFICANT CHANGE UP (ref 3.5–5.3)
RBC # BLD: 4.56 M/UL — SIGNIFICANT CHANGE UP (ref 4.2–5.8)
RBC # FLD: 15.4 % — HIGH (ref 10.3–14.5)
SODIUM SERPL-SCNC: 143 MMOL/L — SIGNIFICANT CHANGE UP (ref 135–145)
WBC # BLD: 5.29 K/UL — SIGNIFICANT CHANGE UP (ref 3.8–10.5)
WBC # FLD AUTO: 5.29 K/UL — SIGNIFICANT CHANGE UP (ref 3.8–10.5)

## 2024-09-14 RX ORDER — PANTOPRAZOLE SODIUM 40 MG
40 TABLET, DELAYED RELEASE (ENTERIC COATED) ORAL DAILY
Refills: 0 | Status: DISCONTINUED | OUTPATIENT
Start: 2024-09-14 | End: 2024-09-16

## 2024-09-14 RX ORDER — ACETAMINOPHEN 325 MG/1
1000 TABLET ORAL EVERY 6 HOURS
Refills: 0 | Status: COMPLETED | OUTPATIENT
Start: 2024-09-14 | End: 2024-09-14

## 2024-09-14 RX ORDER — POTASSIUM CHLORIDE 10 MEQ
10 TABLET, EXT RELEASE, PARTICLES/CRYSTALS ORAL
Refills: 0 | Status: COMPLETED | OUTPATIENT
Start: 2024-09-14 | End: 2024-09-14

## 2024-09-14 RX ORDER — FLU VACCINE TS 2012-2013(5YR+) 45MCG/.5ML
0.5 VIAL (ML) INTRAMUSCULAR ONCE
Refills: 0 | Status: COMPLETED | OUTPATIENT
Start: 2024-09-14 | End: 2024-09-16

## 2024-09-14 RX ORDER — MENTHOL/CETYLPYRD CL 3 MG
1 LOZENGE MUCOUS MEMBRANE THREE TIMES A DAY
Refills: 0 | Status: DISCONTINUED | OUTPATIENT
Start: 2024-09-14 | End: 2024-09-16

## 2024-09-14 RX ORDER — BENZOCAINE/MENTHOL 20 %-0.5 %
1 AEROSOL (GRAM) TOPICAL ONCE
Refills: 0 | Status: COMPLETED | OUTPATIENT
Start: 2024-09-14 | End: 2024-09-14

## 2024-09-14 RX ORDER — POTASSIUM CHLORIDE 10 MEQ
20 TABLET, EXT RELEASE, PARTICLES/CRYSTALS ORAL ONCE
Refills: 0 | Status: COMPLETED | OUTPATIENT
Start: 2024-09-14 | End: 2024-09-14

## 2024-09-14 RX ADMIN — Medication 120 MILLILITER(S): at 00:37

## 2024-09-14 RX ADMIN — Medication 1 LOZENGE: at 19:17

## 2024-09-14 RX ADMIN — METOPROLOL TARTRATE 5 MILLIGRAM(S): 100 TABLET ORAL at 00:37

## 2024-09-14 RX ADMIN — METOPROLOL TARTRATE 5 MILLIGRAM(S): 100 TABLET ORAL at 19:17

## 2024-09-14 RX ADMIN — ACETAMINOPHEN 400 MILLIGRAM(S): 325 TABLET ORAL at 13:24

## 2024-09-14 RX ADMIN — Medication 5000 UNIT(S): at 05:01

## 2024-09-14 RX ADMIN — Medication 5000 UNIT(S): at 21:05

## 2024-09-14 RX ADMIN — ACETAMINOPHEN 1000 MILLIGRAM(S): 325 TABLET ORAL at 14:20

## 2024-09-14 RX ADMIN — Medication 100 MILLIEQUIVALENT(S): at 10:11

## 2024-09-14 RX ADMIN — METOPROLOL TARTRATE 5 MILLIGRAM(S): 100 TABLET ORAL at 13:21

## 2024-09-14 RX ADMIN — Medication 100 MILLIEQUIVALENT(S): at 11:36

## 2024-09-14 RX ADMIN — ACETAMINOPHEN 1000 MILLIGRAM(S): 325 TABLET ORAL at 21:50

## 2024-09-14 RX ADMIN — METOPROLOL TARTRATE 5 MILLIGRAM(S): 100 TABLET ORAL at 23:03

## 2024-09-14 RX ADMIN — Medication 5000 UNIT(S): at 13:23

## 2024-09-14 RX ADMIN — ACETAMINOPHEN 1000 MILLIGRAM(S): 325 TABLET ORAL at 06:20

## 2024-09-14 RX ADMIN — ACETAMINOPHEN 400 MILLIGRAM(S): 325 TABLET ORAL at 05:56

## 2024-09-14 RX ADMIN — ACETAMINOPHEN 400 MILLIGRAM(S): 325 TABLET ORAL at 21:04

## 2024-09-14 RX ADMIN — Medication 40 MILLIGRAM(S): at 13:20

## 2024-09-14 RX ADMIN — METOPROLOL TARTRATE 5 MILLIGRAM(S): 100 TABLET ORAL at 05:01

## 2024-09-14 RX ADMIN — ACETAMINOPHEN 400 MILLIGRAM(S): 325 TABLET ORAL at 01:01

## 2024-09-14 RX ADMIN — ACETAMINOPHEN 1000 MILLIGRAM(S): 325 TABLET ORAL at 01:20

## 2024-09-14 NOTE — PATIENT PROFILE ADULT - SURGICAL SITE INCISION
- Continue with Nasal Packing (Rapid Rhino)  - Strict blood pressure control.  - Nasal saline, 2 sprays to both nares 4 times a day  - Avoid nasal trauma; no nose rubbing, blowing or manipulating nasal packing.  Sneeze with mouth open and pinching nares.  - Avoid bending with head blow the waist.    -No heavy lifting.    Plan to remove packing tomorrow 8/15 am
-Bacitracin to b/l nares two times a day  - Strict blood pressure control.  - Nasal saline, 2 sprays to both nares 4 times a day  - Avoid nasal trauma; no nose rubbing, blowing or manipulating nasal packing.  Sneeze with mouth open and pinching nares.  - Avoid bending with head blow the waist.    -No heavy lifting.
no

## 2024-09-14 NOTE — PATIENT PROFILE ADULT - FALL HARM RISK - RISK INTERVENTIONS
Assistance with ambulation/Communicate Fall Risk and Risk Factors to all staff, patient, and family/Monitor for mental status changes/Monitor gait and stability/Reinforce activity limits and safety measures with patient and family/Visual Cue: Yellow wristband/Bed in lowest position, wheels locked, appropriate side rails in place/Call bell, personal items and telephone in reach/Instruct patient to call for assistance before getting out of bed or chair/Non-slip footwear when patient is out of bed/[place] to call system/Physically safe environment - no spills, clutter or unnecessary equipment/Purposeful Proactive Rounding/Room/bathroom lighting operational, light cord in reach

## 2024-09-15 LAB
ANION GAP SERPL CALC-SCNC: 17 MMOL/L — HIGH (ref 7–14)
BUN SERPL-MCNC: 20 MG/DL — SIGNIFICANT CHANGE UP (ref 7–23)
CALCIUM SERPL-MCNC: 9.1 MG/DL — SIGNIFICANT CHANGE UP (ref 8.4–10.5)
CHLORIDE SERPL-SCNC: 104 MMOL/L — SIGNIFICANT CHANGE UP (ref 98–107)
CO2 SERPL-SCNC: 21 MMOL/L — LOW (ref 22–31)
CREAT SERPL-MCNC: 0.72 MG/DL — SIGNIFICANT CHANGE UP (ref 0.5–1.3)
EGFR: 111 ML/MIN/1.73M2 — SIGNIFICANT CHANGE UP
GLUCOSE BLDC GLUCOMTR-MCNC: 113 MG/DL — HIGH (ref 70–99)
GLUCOSE BLDC GLUCOMTR-MCNC: 115 MG/DL — HIGH (ref 70–99)
GLUCOSE BLDC GLUCOMTR-MCNC: 132 MG/DL — HIGH (ref 70–99)
GLUCOSE BLDC GLUCOMTR-MCNC: 163 MG/DL — HIGH (ref 70–99)
GLUCOSE SERPL-MCNC: 108 MG/DL — HIGH (ref 70–99)
HCT VFR BLD CALC: 37.9 % — LOW (ref 39–50)
HGB BLD-MCNC: 11.7 G/DL — LOW (ref 13–17)
MAGNESIUM SERPL-MCNC: 1.9 MG/DL — SIGNIFICANT CHANGE UP (ref 1.6–2.6)
MCHC RBC-ENTMCNC: 26.9 PG — LOW (ref 27–34)
MCHC RBC-ENTMCNC: 30.9 GM/DL — LOW (ref 32–36)
MCV RBC AUTO: 87.1 FL — SIGNIFICANT CHANGE UP (ref 80–100)
NRBC # BLD: 0 /100 WBCS — SIGNIFICANT CHANGE UP (ref 0–0)
NRBC # FLD: 0 K/UL — SIGNIFICANT CHANGE UP (ref 0–0)
PHOSPHATE SERPL-MCNC: 2.7 MG/DL — SIGNIFICANT CHANGE UP (ref 2.5–4.5)
PLATELET # BLD AUTO: 179 K/UL — SIGNIFICANT CHANGE UP (ref 150–400)
POTASSIUM SERPL-MCNC: 3.9 MMOL/L — SIGNIFICANT CHANGE UP (ref 3.5–5.3)
POTASSIUM SERPL-SCNC: 3.9 MMOL/L — SIGNIFICANT CHANGE UP (ref 3.5–5.3)
RBC # BLD: 4.35 M/UL — SIGNIFICANT CHANGE UP (ref 4.2–5.8)
RBC # FLD: 15.2 % — HIGH (ref 10.3–14.5)
SODIUM SERPL-SCNC: 142 MMOL/L — SIGNIFICANT CHANGE UP (ref 135–145)
WBC # BLD: 4.97 K/UL — SIGNIFICANT CHANGE UP (ref 3.8–10.5)
WBC # FLD AUTO: 4.97 K/UL — SIGNIFICANT CHANGE UP (ref 3.8–10.5)

## 2024-09-15 PROCEDURE — 99232 SBSQ HOSP IP/OBS MODERATE 35: CPT | Mod: GC

## 2024-09-15 RX ADMIN — METOPROLOL TARTRATE 5 MILLIGRAM(S): 100 TABLET ORAL at 18:07

## 2024-09-15 RX ADMIN — Medication 40 MILLIGRAM(S): at 11:33

## 2024-09-15 RX ADMIN — METOPROLOL TARTRATE 5 MILLIGRAM(S): 100 TABLET ORAL at 11:35

## 2024-09-15 RX ADMIN — Medication 5000 UNIT(S): at 05:55

## 2024-09-15 RX ADMIN — METOPROLOL TARTRATE 5 MILLIGRAM(S): 100 TABLET ORAL at 05:55

## 2024-09-15 RX ADMIN — Medication 1: at 18:06

## 2024-09-15 RX ADMIN — Medication 5000 UNIT(S): at 21:32

## 2024-09-15 RX ADMIN — Medication 5000 UNIT(S): at 13:28

## 2024-09-16 ENCOUNTER — TRANSCRIPTION ENCOUNTER (OUTPATIENT)
Age: 49
End: 2024-09-16

## 2024-09-16 VITALS
OXYGEN SATURATION: 100 % | TEMPERATURE: 98 F | SYSTOLIC BLOOD PRESSURE: 134 MMHG | DIASTOLIC BLOOD PRESSURE: 72 MMHG | HEART RATE: 73 BPM | RESPIRATION RATE: 17 BRPM

## 2024-09-16 LAB
ANION GAP SERPL CALC-SCNC: 14 MMOL/L — SIGNIFICANT CHANGE UP (ref 7–14)
BUN SERPL-MCNC: 14 MG/DL — SIGNIFICANT CHANGE UP (ref 7–23)
CALCIUM SERPL-MCNC: 9 MG/DL — SIGNIFICANT CHANGE UP (ref 8.4–10.5)
CHLORIDE SERPL-SCNC: 101 MMOL/L — SIGNIFICANT CHANGE UP (ref 98–107)
CO2 SERPL-SCNC: 22 MMOL/L — SIGNIFICANT CHANGE UP (ref 22–31)
CREAT SERPL-MCNC: 0.59 MG/DL — SIGNIFICANT CHANGE UP (ref 0.5–1.3)
EGFR: 118 ML/MIN/1.73M2 — SIGNIFICANT CHANGE UP
GLUCOSE BLDC GLUCOMTR-MCNC: 136 MG/DL — HIGH (ref 70–99)
GLUCOSE BLDC GLUCOMTR-MCNC: 163 MG/DL — HIGH (ref 70–99)
GLUCOSE BLDC GLUCOMTR-MCNC: 172 MG/DL — HIGH (ref 70–99)
GLUCOSE SERPL-MCNC: 143 MG/DL — HIGH (ref 70–99)
HCT VFR BLD CALC: 35.8 % — LOW (ref 39–50)
HGB BLD-MCNC: 11.4 G/DL — LOW (ref 13–17)
MAGNESIUM SERPL-MCNC: 1.8 MG/DL — SIGNIFICANT CHANGE UP (ref 1.6–2.6)
MCHC RBC-ENTMCNC: 27.4 PG — SIGNIFICANT CHANGE UP (ref 27–34)
MCHC RBC-ENTMCNC: 31.8 GM/DL — LOW (ref 32–36)
MCV RBC AUTO: 86.1 FL — SIGNIFICANT CHANGE UP (ref 80–100)
NRBC # BLD: 0 /100 WBCS — SIGNIFICANT CHANGE UP (ref 0–0)
NRBC # FLD: 0 K/UL — SIGNIFICANT CHANGE UP (ref 0–0)
PHOSPHATE SERPL-MCNC: 2.5 MG/DL — SIGNIFICANT CHANGE UP (ref 2.5–4.5)
PLATELET # BLD AUTO: 161 K/UL — SIGNIFICANT CHANGE UP (ref 150–400)
POTASSIUM SERPL-MCNC: 3.7 MMOL/L — SIGNIFICANT CHANGE UP (ref 3.5–5.3)
POTASSIUM SERPL-SCNC: 3.7 MMOL/L — SIGNIFICANT CHANGE UP (ref 3.5–5.3)
RBC # BLD: 4.16 M/UL — LOW (ref 4.2–5.8)
RBC # FLD: 14.7 % — HIGH (ref 10.3–14.5)
SODIUM SERPL-SCNC: 137 MMOL/L — SIGNIFICANT CHANGE UP (ref 135–145)
WBC # BLD: 5.5 K/UL — SIGNIFICANT CHANGE UP (ref 3.8–10.5)
WBC # FLD AUTO: 5.5 K/UL — SIGNIFICANT CHANGE UP (ref 3.8–10.5)

## 2024-09-16 PROCEDURE — 99238 HOSP IP/OBS DSCHRG MGMT 30/<: CPT

## 2024-09-16 RX ADMIN — Medication 1: at 17:15

## 2024-09-16 RX ADMIN — METOPROLOL TARTRATE 5 MILLIGRAM(S): 100 TABLET ORAL at 06:44

## 2024-09-16 RX ADMIN — Medication 1: at 11:39

## 2024-09-16 RX ADMIN — Medication 0.5 MILLILITER(S): at 12:24

## 2024-09-16 RX ADMIN — Medication 5000 UNIT(S): at 06:40

## 2024-09-16 RX ADMIN — Medication 5000 UNIT(S): at 13:06

## 2024-09-16 RX ADMIN — METOPROLOL TARTRATE 5 MILLIGRAM(S): 100 TABLET ORAL at 00:12

## 2024-09-16 RX ADMIN — Medication 40 MILLIGRAM(S): at 11:40

## 2024-09-16 RX ADMIN — METOPROLOL TARTRATE 5 MILLIGRAM(S): 100 TABLET ORAL at 11:41

## 2024-09-16 NOTE — PROGRESS NOTE ADULT - ASSESSMENT
48M admitted for SBO 2/2 to adhesions vs. hernia. Pt states he is allergic to the contrast in a gastrograffin challenge and breaks out in a rash from previous GG challenge from a previous admission.    Plan:  - Diet: NPO  - NGT - monitor output  - Monitor bowel function, serial abdominal exam  - Pain control: IV Tylenol 1000mg q6 PRN  - Zofran 4mg q6 PRN for nausea  - continue LR 120cc/hr  - OOB as tolerated after PT eval  - FS q6h with ISS  - DVT ppx: [facility]  #[id number]  
48M admitted for SBO 2/2 to adhesions vs. hernia. Pt states he is allergic to the contrast in a gastrograffin challenge and breaks out in a rash from previous GG challenge from a previous admission.    Plan:  - remove NGT tube  -start clear liquid diet today   - Monitor bowel function, serial abdominal exam  - Pain control: IV Tylenol 1000mg q6 PRN  - Zofran 4mg q6 PRN for nausea  - continue LR 120cc/hr  - OOB as tolerated after PT eval  - FS q6h with ISS  - DVT ppx: [facility]  #[id number]  
· Assessment	  48M admitted for SBO 2/2 to adhesions vs. hernia. Pt states he is allergic to the contrast in a gastrograffin challenge and breaks out in a rash from previous GG challenge from a previous admission.    Plan:  -advance to low fiber diet  -if tolerated diet, plan for dc later today   - Monitor bowel function, serial abdominal exam- Pain control: IV Tylenol 1000mg q6 PRN  - continue LR 120cc/hr  - OOB as tolerated after PT eval  - FS q6h with ISS  - DVT ppx: [facility]  #[id number]

## 2024-09-16 NOTE — DISCHARGE NOTE NURSING/CASE MANAGEMENT/SOCIAL WORK - NSDCPEFALRISK_GEN_ALL_CORE
For information on Fall & Injury Prevention, visit: [email or web address] OR  [email or web address] OR  https://www.cdc.gov/steadi/patient.html

## 2024-09-16 NOTE — DISCHARGE NOTE NURSING/CASE MANAGEMENT/SOCIAL WORK - NSDCPNINST_GEN_ALL_CORE
Notify Dr White if you experience any increase in abdominal pain, nausea, vomiting or fever >100.5.  Drink plenty of fluids.  No heavy lifting or straining.  Continue to follow a consistent carbohydrate diet and follow up with PMD  for continued management of your diabetes.

## 2024-09-16 NOTE — DISCHARGE NOTE PROVIDER - NSDCMRMEDTOKEN_GEN_ALL_CORE_FT
Crestor 20 mg oral tablet: 1 tab(s) orally once a day  Farxiga 10 mg oral tablet: 1 tab(s) orally once a day  fenofibrate 160 mg oral tablet: 160 milligram(s) orally once a day  glimepiride 4 mg oral tablet: 1 tab(s) orally once a day  metFORMIN: 1,000 milligram(s) orally 2 times a day  pantoprazole 40 mg oral delayed release tablet: 1 tab(s) orally once a day (before a meal)  sAXagliptin 2.5 mg oral tablet: 1 tab(s) orally once a day  telmisartan 40 mg oral tablet: 1 tab(s) orally once a day  Toprol-XL: 100 milligram(s) orally once a day  Vascepa 1 g oral capsule: 2 cap(s) orally

## 2024-09-16 NOTE — PROGRESS NOTE ADULT - SUBJECTIVE AND OBJECTIVE BOX
GENERAL SURGERY DAILY PROGRESS NOTE:     Subjective and Objective:  -endorses bm  -no gas                MEDICATIONS  (STANDING):  dextrose 5%. 1000 milliLiter(s) (50 mL/Hr) IV Continuous <Continuous>  dextrose 5%. 1000 milliLiter(s) (100 mL/Hr) IV Continuous <Continuous>  dextrose 50% Injectable 25 Gram(s) IV Push once  dextrose 50% Injectable 12.5 Gram(s) IV Push once  dextrose 50% Injectable 25 Gram(s) IV Push once  glucagon  Injectable 1 milliGRAM(s) IntraMuscular once  heparin   Injectable 5000 Unit(s) SubCutaneous every 8 hours  influenza   Vaccine 0.5 milliLiter(s) IntraMuscular once  insulin lispro (ADMELOG) corrective regimen sliding scale   SubCutaneous every 6 hours  lactated ringers. 1000 milliLiter(s) (120 mL/Hr) IV Continuous <Continuous>  metoprolol tartrate Injectable 5 milliGRAM(s) IV Push every 6 hours  pantoprazole  Injectable 40 milliGRAM(s) IV Push daily    MEDICATIONS  (PRN):  benzocaine 20% Spray 1 Spray(s) Topical three times a day PRN sore throat  dextrose Oral Gel 15 Gram(s) Oral once PRN Blood Glucose LESS THAN 70 milliGRAM(s)/deciliter      Vital Signs Last 24 Hrs  T(C): 36.7 (15 Sep 2024 09:26), Max: 36.7 (14 Sep 2024 13:28)  T(F): 98 (15 Sep 2024 09:26), Max: 98 (14 Sep 2024 13:28)  HR: 92 (15 Sep 2024 09:26) (80 - 96)  BP: 142/66 (15 Sep 2024 09:26) (118/66 - 142/66)  BP(mean): --  RR: 18 (15 Sep 2024 09:26) (17 - 18)  SpO2: 97% (15 Sep 2024 09:26) (95% - 98%)    Parameters below as of 15 Sep 2024 05:55  Patient On (Oxygen Delivery Method): room air        I&O's Detail    14 Sep 2024 07:01  -  15 Sep 2024 07:00  --------------------------------------------------------  IN:  Total IN: 0 mL    OUT:    Nasogastric/Oral tube (mL): 250 mL    Voided (mL): 1500 mL  Total OUT: 1750 mL    Total NET: -1750 mL      15 Sep 2024 07:01  -  15 Sep 2024 12:34  --------------------------------------------------------  IN:    Lactated Ringers: 360 mL  Total IN: 360 mL    OUT:  Total OUT: 0 mL    Total NET: 360 mL          Daily     Daily Weight in k.7 (15 Sep 2024 01:51)    LABS:                        11.7   4.97  )-----------( 179      ( 15 Sep 2024 05:10 )             37.9     09-15    142  |  104  |  20  ----------------------------<  108<H>  3.9   |  21<L>  |  0.72    Ca    9.1      15 Sep 2024 05:10  Phos  2.7     -15  Mg     1.90     -15    TPro  8.0  /  Alb  4.6  /  TBili  0.8  /  DBili  x   /  AST  20  /  ALT  32  /  AlkPhos  40  09-13      Urinalysis Basic - ( 15 Sep 2024 05:10 )    Color: x / Appearance: x / SG: x / pH: x  Gluc: 108 mg/dL / Ketone: x  / Bili: x / Urobili: x   Blood: x / Protein: x / Nitrite: x   Leuk Esterase: x / RBC: x / WBC x   Sq Epi: x / Non Sq Epi: x / Bacteria: x        Physical Exam:  General Appearance: Appears well, NAD, NGT in place  Neck: Supple  Chest: Equal expansion bilaterally, equal breath sounds  CV: Pulse regular presently  Abdomen: Soft, nontender, abdominal scars from previous surgeries   Extremities: Grossly symmetric, SCD's in place               
Improved. Had bm , abdomen soft, nontender. To clamp NG. DH
GENERAL SURGERY DAILY PROGRESS NOTE:     Subjective and Objective:  -some gas pain   -tolerating clear diet  -denies nausea     Physical Exam:  General Appearance: Appears well, NAD, NGT in place  Neck: Supple  Chest: Equal expansion bilaterally, equal breath sounds  CV: Pulse regular presently  Abdomen: Soft, nontender, abdominal scars from previous surgeries   Extremities: Grossly symmetric, SCD's in place           MEDICATIONS  (STANDING):  dextrose 5%. 1000 milliLiter(s) (50 mL/Hr) IV Continuous <Continuous>  dextrose 5%. 1000 milliLiter(s) (100 mL/Hr) IV Continuous <Continuous>  dextrose 50% Injectable 25 Gram(s) IV Push once  dextrose 50% Injectable 12.5 Gram(s) IV Push once  dextrose 50% Injectable 25 Gram(s) IV Push once  glucagon  Injectable 1 milliGRAM(s) IntraMuscular once  heparin   Injectable 5000 Unit(s) SubCutaneous every 8 hours  influenza   Vaccine 0.5 milliLiter(s) IntraMuscular once  insulin lispro (ADMELOG) corrective regimen sliding scale   SubCutaneous every 6 hours  lactated ringers. 1000 milliLiter(s) (120 mL/Hr) IV Continuous <Continuous>  metoprolol tartrate Injectable 5 milliGRAM(s) IV Push every 6 hours  pantoprazole  Injectable 40 milliGRAM(s) IV Push daily    MEDICATIONS  (PRN):  benzocaine 20% Spray 1 Spray(s) Topical three times a day PRN sore throat  dextrose Oral Gel 15 Gram(s) Oral once PRN Blood Glucose LESS THAN 70 milliGRAM(s)/deciliter      Vital Signs Last 24 Hrs  T(C): 36.4 (16 Sep 2024 06:00), Max: 36.7 (15 Sep 2024 09:26)  T(F): 97.6 (16 Sep 2024 06:00), Max: 98 (15 Sep 2024 09:26)  HR: 79 (16 Sep 2024 06:00) (66 - 92)  BP: 139/86 (16 Sep 2024 06:00) (127/73 - 142/66)  BP(mean): --  RR: 17 (16 Sep 2024 06:00) (17 - 18)  SpO2: 98% (16 Sep 2024 06:00) (97% - 100%)    Parameters below as of 16 Sep 2024 06:00  Patient On (Oxygen Delivery Method): room air        I&O's Detail    15 Sep 2024 07:01  -  16 Sep 2024 07:00  --------------------------------------------------------  IN:    Lactated Ringers: 2760 mL    Oral Fluid: 240 mL  Total IN: 3000 mL    OUT:    Voided (mL): 1925 mL  Total OUT: 1925 mL    Total NET: 1075 mL          Daily     Daily     LABS:                        11.7   4.97  )-----------( 179      ( 15 Sep 2024 05:10 )             37.9     09-15    142  |  104  |  20  ----------------------------<  108<H>  3.9   |  21<L>  |  0.72    Ca    9.1      15 Sep 2024 05:10  Phos  2.7     09-15  Mg     1.90     09-15        Urinalysis Basic - ( 15 Sep 2024 05:10 )    Color: x / Appearance: x / SG: x / pH: x  Gluc: 108 mg/dL / Ketone: x  / Bili: x / Urobili: x   Blood: x / Protein: x / Nitrite: x   Leuk Esterase: x / RBC: x / WBC x   Sq Epi: x / Non Sq Epi: x / Bacteria: x        RADIOLOGY & ADDITIONAL STUDIES:            
SUBJECTIVE: Pt seen at bedside during AM rounds. No o/n events, patient resting comfortably.    Vital Signs Last 24 Hrs  T(C): 36.6 (14 Sep 2024 05:10), Max: 37.3 (13 Sep 2024 17:43)  T(F): 97.9 (14 Sep 2024 05:10), Max: 99.1 (13 Sep 2024 17:43)  HR: 96 (14 Sep 2024 05:10) (95 - 112)  BP: 141/70 (14 Sep 2024 05:10) (112/68 - 141/70)  BP(mean): --  RR: 17 (14 Sep 2024 05:10) (17 - 18)  SpO2: 100% (14 Sep 2024 05:10) (95% - 100%)    Parameters below as of 14 Sep 2024 05:10  Patient On (Oxygen Delivery Method): room air        I&O's Summary    13 Sep 2024 07:01  -  14 Sep 2024 07:00  --------------------------------------------------------  IN: 720 mL / OUT: 420 mL / NET: 300 mL        LABS:                        12.3   5.29  )-----------( 209      ( 14 Sep 2024 05:20 )             39.3     09-14    143  |  107  |  22  ----------------------------<  114<H>  3.8   |  22  |  0.86    Ca    9.6      14 Sep 2024 05:20  Phos  3.2     09-14  Mg     2.00     09-14    TPro  8.0  /  Alb  4.6  /  TBili  0.8  /  DBili  x   /  AST  20  /  ALT  32  /  AlkPhos  40  09-13      Urinalysis Basic - ( 14 Sep 2024 05:20 )    Color: x / Appearance: x / SG: x / pH: x  Gluc: 114 mg/dL / Ketone: x  / Bili: x / Urobili: x   Blood: x / Protein: x / Nitrite: x   Leuk Esterase: x / RBC: x / WBC x   Sq Epi: x / Non Sq Epi: x / Bacteria: x        Physical Exam:  General Appearance: Appears well, NAD, NGT in place  Neck: Supple  Chest: Equal expansion bilaterally, equal breath sounds  CV: Pulse regular presently  Abdomen: Soft, nontender, abdominal scars from previous surgeries   Extremities: Grossly symmetric, SCD's in place

## 2024-09-16 NOTE — DISCHARGE NOTE NURSING/CASE MANAGEMENT/SOCIAL WORK - PATIENT PORTAL LINK FT
You can access the FollowMyHealth Patient Portal offered by [facility] by registering at the following website: [email or web address]. By joining [facility]’s FollowMyHealth portal, you will also be able to view your health information using other applications (apps) compatible with our system.

## 2024-09-16 NOTE — DISCHARGE NOTE PROVIDER - HOSPITAL COURSE
48M w PMH of perforated diverticulitis (2009, s/p Isi's w/ reversal) c/b SBOs every few months since 2014 (3 requiring ex lap w LINDA in 2014, 2016, 2018), ventral hernia s/p repair in 2018, T2DM, and HTN, presenting to [facility] ED on 9/13 with diffuse abdominal pain, nausea, and bloating x1 day. Noncontrast CT A/P w SB dilation, question of transition point. Radiographic and clinical findings most consistent w SBO 2/2 to adhesions vs. hernia.    Patient was admitted with a small bowel obstruction and treated conservatively with NGT decompression and bowel rest. Patient was given gastrograffin and serial abdominal Xrays were taken, revealing contrast moving throughout the bowel and colon. Their diet was then slowly advanced as tolerated. Once patient was tolerating regular diet, voiding and ambulating without difficulty, they were found to be stable for discharge to home.

## 2024-09-16 NOTE — DISCHARGE NOTE PROVIDER - CARE PROVIDER_API CALL
Musa White  Surgery  [address] [place] [address]  Phone: [phone]  Fax: [phone]  Follow Up Time: 2 weeks

## 2024-09-19 NOTE — ED ADULT TRIAGE NOTE - NS ED NURSE BANDS TYPE
Allergy; [Maximal Pain Intensity: 10/10] : 10/10 [Least Pain Intensity: 10/10] : 10/10 [80: Normal activity with effort; some signs or symptoms of disease.] : 80: Normal activity with effort; some signs or symptoms of disease.

## 2024-10-03 NOTE — H&P PST ADULT - LAST STRESS TEST
Instructions: This plan will send the code FBSE to the PM system.  DO NOT or CHANGE the price. Detail Level: Simple Price (Do Not Change): 0.00 Denies

## 2024-11-05 NOTE — DISCHARGE NOTE ADULT - FUNCTIONAL SCREEN CURRENT LEVEL: BATHING, MLM
Did not pass protocol  Last office visit 2024  Last refill was: , 2024__tabs  Next appointment: n/a    Please sign pended medication if appropriate              Medication Quantity Refills Start End   alendronate 10 MG Oral Tab () 90 tablet 0 2024 10/30/2024   Sig:   Take 1 tablet (10 mg total) by mouth daily.     Route:   Oral         
(0) independent

## 2024-11-17 NOTE — DISCHARGE NOTE ADULT - PATIENT PORTAL LINK FT
Statement Selected You can access the [facility], offered by [facility], by registering with the following website: [email or web address]

## 2025-01-01 NOTE — DISCHARGE NOTE PROVIDER - NSDCCPCAREPLAN_GEN_ALL_CORE_FT
Patient : Solo Truong Age: 75 year old Sex: male   MRN: [id number] Encounter Date: 1/1/2025      History     Chief Complaint   Patient presents with    Neurologic Problem     Patient complains of left leg weakness.  Wife also noticed that he had slurred speech yesterday around 7 PM during a phone call.  She thought it was similar to if he had been drinking but he had not.  Symptoms did not last very long.  He has chronic tingling in his hands and took another prescription medication.  This morning his left leg was weak and collapsed under his weight, unable to bear weight        No Known Allergies    Current Discharge Medication List        Prior to Admission Medications    Details   aspirin (ECOTRIN) 81 MG EC tablet Take 81 mg by mouth daily.      fenofibrate (TRICOR) 48 MG tablet Take 1 tablet by mouth daily.  Qty: 90 tablet, Refills: 3      losartan (COZAAR) 100 MG tablet Take 1 tablet by mouth daily.  Qty: 90 tablet, Refills: 3      simvastatin (ZOCOR) 40 MG tablet Take 1 tablet by mouth nightly.  Qty: 90 tablet, Refills: 3      Cholecalciferol (VITAMIN D) 2000 units capsule Take 1 capsule by mouth daily.  Qty: 30 capsule, Refills: 0             Past Medical History:   Diagnosis Date    Alcohol abuse, episodic     Cardiomyopathy (CMD) 2016    stage 1    Hemorrhoids 01/20/2021    per colonoscopy (Internal)    Hx of colonoscopy 10/27/2015    colon screening procedure in 3 years.  Fam hx of colon cancer Dr Aceves    Hyperlipidemia     simvastatin    Lumbago     Need for hepatitis C screening test 12/14    negative    Tinnitus     saw specialists. over 12 years       Past Surgical History:   Procedure Laterality Date    APPLY HAND/WRIST CAST Right 12/2016    BIOPSY OF PROSTATE,NEEDLE/PUNCH      COLONOSCOPY DIAGNOSTIC  6/13/05    Normal Colon. Family hx of colon cancer, Repeat in 8-10 yrs, Dr. Mccormick    COLONOSCOPY DIAGNOSTIC  10/27/15    Colon in 5 yrs,normal,fam hx,.     COLONOSCOPY W  BIOPSY  2021    Colon 5ys,adenoma x1,internal hemorrhoids,Fam HX,.     EYE SURGERY      HEMORHOIDECTOMY INT EXT SINGLE COLUMN GROUP      Hemorrhoidectomy    HERNIA REPAIR      LASIK SURGERY  age 49    bilateral; enhancement age 54, and again age 62    REPAIR ING HERNIA,5+Y/O,REDUCIBL  age 30's    Hernia, inguinal; bilateral, separate surgeries    SERVICE TO GASTROENTEROLOGY         Family History   Problem Relation Age of Onset    Stroke Mother         stroke age 85    Cancer, Colon Mother         Colon cancer    Diabetes Father     Heart Father         late in life, not sure of the exact problem    Cancer Sister         uterine    Patient is unaware of any medical problems Sister     Patient is unaware of any medical problems Maternal Grandmother     Patient is unaware of any medical problems Maternal Grandfather     Patient is unaware of any medical problems Paternal Grandmother     Patient is unaware of any medical problems Paternal Grandfather     Patient is unaware of any medical problems Daughter     Patient is unaware of any medical problems Son        Social History     Tobacco Use    Smoking status: Former     Current packs/day: 0.00     Average packs/day: 2.0 packs/day for 15.0 years (30.0 ttl pk-yrs)     Types: Cigarettes     Start date: 1964     Quit date: 1979     Years since quittin.0    Smokeless tobacco: Never    Tobacco comments:     quit 25 years ago   Vaping Use    Vaping status: never used   Substance Use Topics    Alcohol use: Yes     Alcohol/week: 5.0 standard drinks of alcohol     Types: 5 Shots of liquor per week     Comment: 3 glasses of wine/day    Drug use: No       E-cigarette/Vaping    E-Cigarette/Vaping Use Never Used      E-Cigarette/Vaping Substances & Devices       Review of Systems   Constitutional:  Negative for activity change, appetite change, chills, fatigue and fever.   HENT:  Negative for ear pain, rhinorrhea, sinus pressure and sore throat.     Respiratory:  Negative for chest tightness and shortness of breath.    Cardiovascular:  Negative for chest pain and palpitations.   Gastrointestinal:  Negative for abdominal pain, diarrhea, nausea and vomiting.   Genitourinary:  Negative for dysuria, flank pain and hematuria.   Musculoskeletal:  Negative for back pain, gait problem, neck pain and neck stiffness.   Skin:  Negative for color change, pallor, rash and wound.   Neurological:  Positive for speech difficulty, weakness and numbness. Negative for dizziness, light-headedness and headaches.   Hematological:  Negative for adenopathy. Does not bruise/bleed easily.   Psychiatric/Behavioral:  Negative for agitation and confusion. The patient is not nervous/anxious and is not hyperactive.        Physical Exam     ED Triage Vitals [01/01/25 0812]   ED Triage Vitals Group      Temp 98 °F (36.7 °C)      Heart Rate 69      Resp 18      BP (!) 179/75      SpO2 97 %      EtCO2 mmHg       Height       Weight 223 lb 15.8 oz (101.6 kg)      Weight Scale Used       BMI (Calculated)       IBW/kg (Calculated)        Physical Exam  Vitals and nursing note reviewed.   Constitutional:       Appearance: Normal appearance. He is well-developed. He is obese. He is not toxic-appearing or diaphoretic.   HENT:      Head: Normocephalic and atraumatic.      Nose: Nose normal.      Mouth/Throat:      Mouth: Mucous membranes are not dry.      Pharynx: Uvula midline. No posterior oropharyngeal erythema.   Eyes:      General: No scleral icterus.        Right eye: No discharge.         Left eye: No discharge.      Conjunctiva/sclera: Conjunctivae normal.      Pupils: Pupils are equal, round, and reactive to light.   Neck:      Vascular: No JVD.      Trachea: Trachea normal. No tracheal deviation.   Cardiovascular:      Rate and Rhythm: Normal rate and regular rhythm.      Pulses: Normal pulses.      Heart sounds: Normal heart sounds.   Pulmonary:      Effort: Pulmonary effort is normal. No  tachypnea, accessory muscle usage or respiratory distress.      Breath sounds: Normal breath sounds. No stridor. No wheezing or rales.   Chest:      Chest wall: No tenderness.   Abdominal:      General: Bowel sounds are normal. There is no distension.      Palpations: Abdomen is soft.      Tenderness: There is no abdominal tenderness. There is no right CVA tenderness, left CVA tenderness, guarding or rebound.   Musculoskeletal:         General: No tenderness. Normal range of motion.      Cervical back: Full passive range of motion without pain, normal range of motion and neck supple.   Skin:     General: Skin is warm and dry.      Coloration: Skin is not pale.      Findings: No ecchymosis, erythema or rash.   Neurological:      General: No focal deficit present.      Mental Status: He is alert and oriented to person, place, and time. He is not disoriented.      Cranial Nerves: No cranial nerve deficit or facial asymmetry.      Motor: No weakness.         ED Course     Procedures    Lab Results     Results for orders placed or performed during the hospital encounter of 01/01/25   Comprehensive Metabolic Panel    Specimen: Blood, Venous   Result Value Ref Range    Fasting Status      Sodium 141 135 - 145 mmol/L    Potassium 3.7 3.4 - 5.1 mmol/L    Chloride 105 97 - 110 mmol/L    Carbon Dioxide 27 21 - 32 mmol/L    Anion Gap 13 7 - 19 mmol/L    Glucose 128 (H) 70 - 99 mg/dL    BUN 13 6 - 20 mg/dL    Creatinine 1.00 0.67 - 1.17 mg/dL    Glomerular Filtration Rate 78 >=60    BUN/Cr 13 7 - 25    Calcium 8.8 8.4 - 10.2 mg/dL    Bilirubin, Total 0.5 0.2 - 1.0 mg/dL    GOT/AST 22 <=37 Units/L    GPT/ALT 32 <64 Units/L    Alkaline Phosphatase 73 45 - 117 Units/L    Albumin 3.7 3.4 - 5.0 g/dL    Protein, Total 7.4 6.4 - 8.2 g/dL    Globulin 3.7 2.0 - 4.0 g/dL    A/G Ratio 1.0 1.0 - 2.4   Prothrombin Time (INR/PT)    Specimen: Blood, Venous   Result Value Ref Range    Protime- PT 11.0 9.7 - 11.8 sec    INR 1.0     Partial  Thromboplastin Time (PTT)    Specimen: Blood, Venous   Result Value Ref Range    PTT 29 22 - 32 sec   TROPONIN I, HIGH SENSITIVITY    Specimen: Blood, Venous   Result Value Ref Range    Troponin I, High Sensitivity 6 <77 ng/L   CBC with Automated Differential (performable only)    Specimen: Blood, Venous   Result Value Ref Range    WBC 5.9 4.2 - 11.0 K/mcL    RBC 5.10 4.50 - 5.90 mil/mcL    HGB 14.9 13.0 - 17.0 g/dL    HCT 41.8 39.0 - 51.0 %    MCV 82.0 78.0 - 100.0 fl    MCH 29.2 26.0 - 34.0 pg    MCHC 35.6 32.0 - 36.5 g/dL    RDW-CV 13.2 11.0 - 15.0 %    RDW-SD 39.0 39.0 - 50.0 fL     140 - 450 K/mcL    NRBC 0 <=0 /100 WBC    Neutrophil, Percent 68 %    Lymphocytes, Percent 22 %    Mono, Percent 8 %    Eosinophils, Percent 2 %    Basophils, Percent 0 %    Immature Granulocytes 0 %    Absolute Neutrophils 4.0 1.8 - 7.7 K/mcL    Absolute Lymphocytes 1.3 1.0 - 4.0 K/mcL    Absolute Monocytes 0.5 0.3 - 0.9 K/mcL    Absolute Eosinophils  0.1 0.0 - 0.5 K/mcL    Absolute Basophils 0.0 0.0 - 0.3 K/mcL    Absolute Immature Granulocytes 0.0 0.0 - 0.2 K/mcL   GLUCOSE, BEDSIDE - POINT OF CARE    Specimen: Blood   Result Value Ref Range    GLUCOSE, BEDSIDE - POINT OF CARE 141 (H) 70 - 99 mg/dL   Labs reviewed and independently interpreted, abnormalities as above.      EKG Results     ED Preliminary EKG Interpretation  Time of EK  Time of interpretation: 814  Interpretation: No STEMI. EKG obtained and independently interpreted. EKG shows normal sinus rhythm with a rate of 69, nonspecific ST and T wave abnormality, prolonged QT interval, abnormal EKG.      EKG tracing interpreted by ED physician    Radiology Results     Imaging Results              CT HEAD WO CONTRAST (Final result)  Result time 25 08:41:02      Final result                   Impression:    IMPRESSION: No acute intracranial abnormality.    Chronic small vessel ischemic disease and age-appropriate atrophy.    Electronically Signed by: Yoshi EDWARDS  MD Gris  Signed on: 1/1/2025 8:41 AM  Created on Workstation ID: [id number]  Signed on Workstation ID: [id number]               Narrative:    EXAM: CT HEAD WO CONTRAST    CLINICAL HISTORY: Neuro deficit, acute, stroke suspected    TECHNIQUE: Helical imaging through the brain was performed without IV  contrast.    COMPARISON: None.    FINDINGS: There are no abnormal intra or extra-axial fluid collections.  There is no intracranial hemorrhage. No mass or mass effect is identified.  Confluent low-attenuation is present in the periventricular distribution  consistent with chronic small vessel ischemic disease. 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 Ventricles and cortical sulci  are normal for age.                                  CT of the head was reviewed, radiologist report as above    ED Medication Orders (From admission, onward)      None                 Medical Decision Making  CT imaging of the head is negative for acute process.  His neurologic exam is unremarkable, however his presenting symptoms are concerning for possible TIA given speech abnormality yesterday.  I suspect his weakness may be secondary to taking gabapentin, but this is undifferentiated.  Plan consultation with neurology and admission to the hospital service for stroke workup    Clinical Impression     ED Diagnosis   1. Weakness of left lower extremity            Disposition        Admit 1/1/2025  9:10 AM  Telemetry Bed?: Yes  Admitting Physician: BOY JUAREZ [[id number]]  Observation Unit Appropriate?: Yes  Is this a telephone or verbal order?: This is a telephone order from the admitting physician         Jean Carlos Lopez MD  01/01/25 0950     PRINCIPAL DISCHARGE DIAGNOSIS  Diagnosis: Small bowel obstruction  Assessment and Plan of Treatment:       SECONDARY DISCHARGE DIAGNOSES  Diagnosis: Acute generalized exanthematous pustulosis due to drug  Assessment and Plan of Treatment:

## 2025-01-08 NOTE — ED ADULT TRIAGE NOTE - ESI TRIAGE ACUITY LEVEL, MLM
----- Message from Rita MCCONNELL CMA sent at 1/8/2025  9:30 AM CST -----    ----- Message -----  From: Lizbeth Newman MD  Sent: 1/7/2025   4:19 PM CST  To: [facility] Gold Team Msg Pool    Please notify the patient of stable results.  We are fortunately not seeing pneumonia. Follow-up as planned.    Lizbeth Newman MD  
I was unable to contact patient  after first attempt to discuss normal labs,  will try again Thursday 1/9/25 .  
3

## 2025-01-24 NOTE — ED ADULT NURSE NOTE - ATTEMPT TO OOB
no pt father states that pt has fever and cough since 2 days, states fever was 103 only went down to 102 with medication

## 2025-02-03 ENCOUNTER — INPATIENT (INPATIENT)
Facility: HOSPITAL | Age: 50
LOS: 3 days | Discharge: HOME | End: 2025-02-07
Attending: SURGERY | Admitting: SURGERY
Payer: COMMERCIAL

## 2025-02-03 VITALS
RESPIRATION RATE: 20 BRPM | TEMPERATURE: 97 F | OXYGEN SATURATION: 93 % | DIASTOLIC BLOOD PRESSURE: 90 MMHG | SYSTOLIC BLOOD PRESSURE: 140 MMHG | HEART RATE: 113 BPM | WEIGHT: 259.93 LBS

## 2025-02-03 DIAGNOSIS — Z98.89 OTHER SPECIFIED POSTPROCEDURAL STATES: Chronic | ICD-10-CM

## 2025-02-03 DIAGNOSIS — K56.609 UNSPECIFIED INTESTINAL OBSTRUCTION, UNSPECIFIED AS TO PARTIAL VERSUS COMPLETE OBSTRUCTION: ICD-10-CM

## 2025-02-03 DIAGNOSIS — Z98.890 OTHER SPECIFIED POSTPROCEDURAL STATES: Chronic | ICD-10-CM

## 2025-02-03 LAB
ALBUMIN SERPL ELPH-MCNC: 4.9 G/DL — SIGNIFICANT CHANGE UP (ref 3.3–5)
ALP SERPL-CCNC: 40 U/L — SIGNIFICANT CHANGE UP (ref 40–120)
ALT FLD-CCNC: 33 U/L — SIGNIFICANT CHANGE UP (ref 4–41)
ANION GAP SERPL CALC-SCNC: 16 MMOL/L — HIGH (ref 7–14)
ANION GAP SERPL CALC-SCNC: 20 MMOL/L — HIGH (ref 7–14)
ANISOCYTOSIS BLD QL: SLIGHT — SIGNIFICANT CHANGE UP
AST SERPL-CCNC: 21 U/L — SIGNIFICANT CHANGE UP (ref 4–40)
BASOPHILS # BLD AUTO: 0 K/UL — SIGNIFICANT CHANGE UP (ref 0–0.2)
BASOPHILS NFR BLD AUTO: 0 % — SIGNIFICANT CHANGE UP (ref 0–2)
BILIRUB SERPL-MCNC: 1 MG/DL — SIGNIFICANT CHANGE UP (ref 0.2–1.2)
BLD GP AB SCN SERPL QL: NEGATIVE — SIGNIFICANT CHANGE UP
BLOOD GAS VENOUS COMPREHENSIVE RESULT: SIGNIFICANT CHANGE UP
BUN SERPL-MCNC: 22 MG/DL — SIGNIFICANT CHANGE UP (ref 7–23)
BUN SERPL-MCNC: 23 MG/DL — SIGNIFICANT CHANGE UP (ref 7–23)
CALCIUM SERPL-MCNC: 10 MG/DL — SIGNIFICANT CHANGE UP (ref 8.4–10.5)
CALCIUM SERPL-MCNC: 11.3 MG/DL — HIGH (ref 8.4–10.5)
CHLORIDE SERPL-SCNC: 105 MMOL/L — SIGNIFICANT CHANGE UP (ref 98–107)
CHLORIDE SERPL-SCNC: 109 MMOL/L — HIGH (ref 98–107)
CO2 SERPL-SCNC: 19 MMOL/L — LOW (ref 22–31)
CO2 SERPL-SCNC: 22 MMOL/L — SIGNIFICANT CHANGE UP (ref 22–31)
CREAT SERPL-MCNC: 0.83 MG/DL — SIGNIFICANT CHANGE UP (ref 0.5–1.3)
CREAT SERPL-MCNC: 0.87 MG/DL — SIGNIFICANT CHANGE UP (ref 0.5–1.3)
EGFR: 104 ML/MIN/1.73M2 — SIGNIFICANT CHANGE UP
EGFR: 106 ML/MIN/1.73M2 — SIGNIFICANT CHANGE UP
EOSINOPHIL # BLD AUTO: 0.08 K/UL — SIGNIFICANT CHANGE UP (ref 0–0.5)
EOSINOPHIL NFR BLD AUTO: 0.9 % — SIGNIFICANT CHANGE UP (ref 0–6)
GAS PNL BLDV: SIGNIFICANT CHANGE UP
GIANT PLATELETS BLD QL SMEAR: PRESENT — SIGNIFICANT CHANGE UP
GLUCOSE BLDC GLUCOMTR-MCNC: 167 MG/DL — HIGH (ref 70–99)
GLUCOSE BLDC GLUCOMTR-MCNC: 203 MG/DL — HIGH (ref 70–99)
GLUCOSE SERPL-MCNC: 175 MG/DL — HIGH (ref 70–99)
GLUCOSE SERPL-MCNC: 310 MG/DL — HIGH (ref 70–99)
HCT VFR BLD CALC: 41.5 % — SIGNIFICANT CHANGE UP (ref 39–50)
HCT VFR BLD CALC: 47.6 % — SIGNIFICANT CHANGE UP (ref 39–50)
HGB BLD-MCNC: 13.3 G/DL — SIGNIFICANT CHANGE UP (ref 13–17)
HGB BLD-MCNC: 15.1 G/DL — SIGNIFICANT CHANGE UP (ref 13–17)
IANC: 6.3 K/UL — SIGNIFICANT CHANGE UP (ref 1.8–7.4)
INR BLD: 0.94 RATIO — SIGNIFICANT CHANGE UP (ref 0.85–1.16)
LACTATE BLDV-MCNC: 2.8 MMOL/L — HIGH (ref 0.5–2)
LIDOCAIN IGE QN: 13 U/L — SIGNIFICANT CHANGE UP (ref 7–60)
LYMPHOCYTES # BLD AUTO: 0.49 K/UL — LOW (ref 1–3.3)
LYMPHOCYTES # BLD AUTO: 5.7 % — LOW (ref 13–44)
MACROCYTES BLD QL: SLIGHT — SIGNIFICANT CHANGE UP
MAGNESIUM SERPL-MCNC: 2 MG/DL — SIGNIFICANT CHANGE UP (ref 1.6–2.6)
MANUAL SMEAR VERIFICATION: SIGNIFICANT CHANGE UP
MCHC RBC-ENTMCNC: 26.9 PG — LOW (ref 27–34)
MCHC RBC-ENTMCNC: 27.7 PG — SIGNIFICANT CHANGE UP (ref 27–34)
MCHC RBC-ENTMCNC: 31.7 G/DL — LOW (ref 32–36)
MCHC RBC-ENTMCNC: 32 G/DL — SIGNIFICANT CHANGE UP (ref 32–36)
MCV RBC AUTO: 84.8 FL — SIGNIFICANT CHANGE UP (ref 80–100)
MCV RBC AUTO: 86.3 FL — SIGNIFICANT CHANGE UP (ref 80–100)
MONOCYTES # BLD AUTO: 0.97 K/UL — HIGH (ref 0–0.9)
MONOCYTES NFR BLD AUTO: 11.3 % — SIGNIFICANT CHANGE UP (ref 2–14)
NEUTROPHILS # BLD AUTO: 6.47 K/UL — SIGNIFICANT CHANGE UP (ref 1.8–7.4)
NEUTROPHILS NFR BLD AUTO: 50 % — SIGNIFICANT CHANGE UP (ref 43–77)
NEUTS BAND # BLD: 25.5 % — CRITICAL HIGH (ref 0–6)
NEUTS BAND NFR BLD: 25.5 % — CRITICAL HIGH (ref 0–6)
NRBC # BLD AUTO: 0 K/UL — SIGNIFICANT CHANGE UP (ref 0–0)
NRBC # BLD: 0 /100 WBCS — SIGNIFICANT CHANGE UP (ref 0–0)
NRBC # FLD: 0 K/UL — SIGNIFICANT CHANGE UP (ref 0–0)
NRBC BLD-RTO: 0 /100 WBCS — SIGNIFICANT CHANGE UP (ref 0–0)
PHOSPHATE SERPL-MCNC: 4 MG/DL — SIGNIFICANT CHANGE UP (ref 2.5–4.5)
PLAT MORPH BLD: NORMAL — SIGNIFICANT CHANGE UP
PLATELET # BLD AUTO: 228 K/UL — SIGNIFICANT CHANGE UP (ref 150–400)
PLATELET # BLD AUTO: 269 K/UL — SIGNIFICANT CHANGE UP (ref 150–400)
PLATELET COUNT - ESTIMATE: NORMAL — SIGNIFICANT CHANGE UP
POIKILOCYTOSIS BLD QL AUTO: SLIGHT — SIGNIFICANT CHANGE UP
POLYCHROMASIA BLD QL SMEAR: SLIGHT — SIGNIFICANT CHANGE UP
POTASSIUM SERPL-MCNC: 3.7 MMOL/L — SIGNIFICANT CHANGE UP (ref 3.5–5.3)
POTASSIUM SERPL-MCNC: 4.4 MMOL/L — SIGNIFICANT CHANGE UP (ref 3.5–5.3)
POTASSIUM SERPL-SCNC: 3.7 MMOL/L — SIGNIFICANT CHANGE UP (ref 3.5–5.3)
POTASSIUM SERPL-SCNC: 4.4 MMOL/L — SIGNIFICANT CHANGE UP (ref 3.5–5.3)
PROT SERPL-MCNC: 8.5 G/DL — HIGH (ref 6–8.3)
PROTHROM AB SERPL-ACNC: 11.2 SEC — SIGNIFICANT CHANGE UP (ref 9.9–13.4)
RBC # BLD: 4.81 M/UL — SIGNIFICANT CHANGE UP (ref 4.2–5.8)
RBC # BLD: 5.61 M/UL — SIGNIFICANT CHANGE UP (ref 4.2–5.8)
RBC # FLD: 15.7 % — HIGH (ref 10.3–14.5)
RBC # FLD: 15.9 % — HIGH (ref 10.3–14.5)
RBC BLD AUTO: ABNORMAL
RH IG SCN BLD-IMP: POSITIVE — SIGNIFICANT CHANGE UP
SODIUM SERPL-SCNC: 144 MMOL/L — SIGNIFICANT CHANGE UP (ref 135–145)
SODIUM SERPL-SCNC: 147 MMOL/L — HIGH (ref 135–145)
VARIANT LYMPHS # BLD: 6.6 % — HIGH (ref 0–6)
VARIANT LYMPHS NFR BLD MANUAL: 6.6 % — HIGH (ref 0–6)
WBC # BLD: 5.99 K/UL — SIGNIFICANT CHANGE UP (ref 3.8–10.5)
WBC # BLD: 8.57 K/UL — SIGNIFICANT CHANGE UP (ref 3.8–10.5)
WBC # FLD AUTO: 5.99 K/UL — SIGNIFICANT CHANGE UP (ref 3.8–10.5)
WBC # FLD AUTO: 8.57 K/UL — SIGNIFICANT CHANGE UP (ref 3.8–10.5)

## 2025-02-03 PROCEDURE — 99222 1ST HOSP IP/OBS MODERATE 55: CPT

## 2025-02-03 PROCEDURE — 74176 CT ABD & PELVIS W/O CONTRAST: CPT | Mod: 26

## 2025-02-03 PROCEDURE — 71045 X-RAY EXAM CHEST 1 VIEW: CPT | Mod: 26

## 2025-02-03 PROCEDURE — 99285 EMERGENCY DEPT VISIT HI MDM: CPT

## 2025-02-03 RX ORDER — SODIUM CHLORIDE 9 G/ML
1000 INJECTION, SOLUTION INTRAVENOUS
Refills: 0 | Status: DISCONTINUED | OUTPATIENT
Start: 2025-02-03 | End: 2025-02-05

## 2025-02-03 RX ORDER — DM/PSEUDOEPHED/ACETAMINOPHEN 10-30-250
15 CAPSULE ORAL ONCE
Refills: 0 | Status: DISCONTINUED | OUTPATIENT
Start: 2025-02-03 | End: 2025-02-07

## 2025-02-03 RX ORDER — ACETAMINOPHEN 160 MG/5ML
1000 SUSPENSION ORAL EVERY 6 HOURS
Refills: 0 | Status: COMPLETED | OUTPATIENT
Start: 2025-02-03 | End: 2025-02-04

## 2025-02-03 RX ORDER — GLUCAGON 3 MG/1
1 POWDER NASAL ONCE
Refills: 0 | Status: DISCONTINUED | OUTPATIENT
Start: 2025-02-03 | End: 2025-02-07

## 2025-02-03 RX ORDER — DM/PSEUDOEPHED/ACETAMINOPHEN 10-30-250
12.5 CAPSULE ORAL ONCE
Refills: 0 | Status: DISCONTINUED | OUTPATIENT
Start: 2025-02-03 | End: 2025-02-07

## 2025-02-03 RX ORDER — DM/PSEUDOEPHED/ACETAMINOPHEN 10-30-250
25 CAPSULE ORAL ONCE
Refills: 0 | Status: DISCONTINUED | OUTPATIENT
Start: 2025-02-03 | End: 2025-02-07

## 2025-02-03 RX ORDER — INSULIN LISPRO 100/ML
VIAL (ML) SUBCUTANEOUS EVERY 6 HOURS
Refills: 0 | Status: DISCONTINUED | OUTPATIENT
Start: 2025-02-03 | End: 2025-02-06

## 2025-02-03 RX ORDER — SODIUM CHLORIDE 9 G/ML
1000 INJECTION, SOLUTION INTRAVENOUS
Refills: 0 | Status: DISCONTINUED | OUTPATIENT
Start: 2025-02-03 | End: 2025-02-07

## 2025-02-03 RX ORDER — SODIUM CHLORIDE 9 G/ML
1000 INJECTION, SOLUTION INTRAVENOUS ONCE
Refills: 0 | Status: COMPLETED | OUTPATIENT
Start: 2025-02-03 | End: 2025-02-03

## 2025-02-03 RX ORDER — ENOXAPARIN SODIUM 100 MG/ML
40 INJECTION SUBCUTANEOUS EVERY 24 HOURS
Refills: 0 | Status: DISCONTINUED | OUTPATIENT
Start: 2025-02-03 | End: 2025-02-07

## 2025-02-03 RX ORDER — ROSUVASTATIN CALCIUM 10 MG/1
20 TABLET, FILM COATED ORAL AT BEDTIME
Refills: 0 | Status: DISCONTINUED | OUTPATIENT
Start: 2025-02-03 | End: 2025-02-03

## 2025-02-03 RX ORDER — ONDANSETRON 4 MG/1
4 TABLET, ORALLY DISINTEGRATING ORAL ONCE
Refills: 0 | Status: COMPLETED | OUTPATIENT
Start: 2025-02-03 | End: 2025-02-03

## 2025-02-03 RX ORDER — PANTOPRAZOLE 20 MG/1
40 TABLET, DELAYED RELEASE ORAL
Refills: 0 | Status: DISCONTINUED | OUTPATIENT
Start: 2025-02-03 | End: 2025-02-06

## 2025-02-03 RX ORDER — ACETAMINOPHEN 160 MG/5ML
1000 SUSPENSION ORAL ONCE
Refills: 0 | Status: COMPLETED | OUTPATIENT
Start: 2025-02-03 | End: 2025-02-03

## 2025-02-03 RX ORDER — METOPROLOL SUCCINATE 25 MG
5 TABLET, EXTENDED RELEASE 24 HR ORAL EVERY 6 HOURS
Refills: 0 | Status: DISCONTINUED | OUTPATIENT
Start: 2025-02-03 | End: 2025-02-06

## 2025-02-03 RX ORDER — BACTERIOSTATIC SODIUM CHLORIDE 0.9 %
1000 VIAL (ML) INJECTION ONCE
Refills: 0 | Status: COMPLETED | OUTPATIENT
Start: 2025-02-03 | End: 2025-02-03

## 2025-02-03 RX ADMIN — Medication 5 MILLIGRAM(S): at 17:55

## 2025-02-03 RX ADMIN — ACETAMINOPHEN 1000 MILLIGRAM(S): 160 SUSPENSION ORAL at 16:05

## 2025-02-03 RX ADMIN — Medication 1: at 18:05

## 2025-02-03 RX ADMIN — SODIUM CHLORIDE 1000 MILLILITER(S): 9 INJECTION, SOLUTION INTRAVENOUS at 14:26

## 2025-02-03 RX ADMIN — Medication 1000 MILLILITER(S): at 11:12

## 2025-02-03 RX ADMIN — ACETAMINOPHEN 400 MILLIGRAM(S): 160 SUSPENSION ORAL at 11:12

## 2025-02-03 RX ADMIN — ONDANSETRON 4 MILLIGRAM(S): 4 TABLET, ORALLY DISINTEGRATING ORAL at 11:12

## 2025-02-03 RX ADMIN — ACETAMINOPHEN 400 MILLIGRAM(S): 160 SUSPENSION ORAL at 17:55

## 2025-02-03 RX ADMIN — SODIUM CHLORIDE 150 MILLILITER(S): 9 INJECTION, SOLUTION INTRAVENOUS at 15:38

## 2025-02-03 NOTE — ED ADULT TRIAGE NOTE - CHIEF COMPLAINT QUOTE
Patient complains of abdominal pain, diarrhea, nausea for the past day. Patient reports multiple SBOs in the past, reports feeling like symptoms are similar. Patient also complains of increased flatus, denies CP or SOB at this time. FS: 317 pmhx: HTN, DM, SBO

## 2025-02-03 NOTE — ED PROVIDER NOTE - PROGRESS NOTE DETAILS
ALFONSO Padilla: pt resting comfortably in bed NAD, pt with SBO on CT, surgery called will see pt shortly.  Pt advised of results. ALFONSO Padilla: pt seen and evaluated by Surgery, NG tube placed by surgery advised to admit to Dr White and obtain CXR for NG tube placement.  Pt resting comfortably, NAD. ALFONSO Padilla: received call from lab band 25% called surgery spoke with Dr Camacho advised of results states no antibiotics will monitor inpatient.  Blood cultures ordered.

## 2025-02-03 NOTE — H&P ADULT - NSHPLABSRESULTS_GEN_ALL_CORE
CBC Basic (02-03 @ 11:00)  WBC: 8.57 Hgb: 15.1 Hct: 47.6 Plt: 269    Metabolic Panel (02-03 @ 11:00)  144  |  105  |  22  ----------------------------<  310  4.4   |  19  |  0.87  Ca: 11.3/Phos: x/Magnesium: x    Liver Chemistries (02-03 @ 11:00)  Total protein 8.5 | Albumin 4.9  TBili 1.0 | DBili x  AST 21 | ALT 33 | AlkPhos 40    Coagulation Studies (02-03 @ 11:00)  PT/INR: 11.2/0.94, aPTT: x    CT Abdomen and Pelvis No Cont (02-03 @ 11:22)  Multiple dilated loops of small bowel measuring up to 5.3 cm with the area of transition in the right lower quadrant to normal bowel caliber, consistent with small bowel obstruction.

## 2025-02-03 NOTE — ED ADULT NURSE REASSESSMENT NOTE - NS ED NURSE REASSESS COMMENT FT1
Pt going to CDU, report given to SOSA Hennessy. Pt is A&Ox4, appears comfortable in stretcher. breathing is even and unlabored. NGT to low continuous suction. left AC 20G IV intact. Stretcher set in lowest position, safety maintained. No

## 2025-02-03 NOTE — ED PROVIDER NOTE - IN ACCORDANCE WITH [place] STATE LAW, WE OFFER EVERY PATIENT A HEPATITIS C TEST. WOULD YOU LIKE TO BE TESTED TODAY?
[facility] ADMISSION NOTE    Patient admitted to room 2301 at approximately 1320 via bed from surgery. Patient was accompanied by transport tech.     Verbal SBAR report received from Lakshmi prior to patient arrival.     Patient stayed in the same bed. Patient alert and oriented X 3. The patient is not having any pain. 0-10 Pain Scale: 1. Admission vital signs: Blood pressure 127/76, pulse 68, temperature 97.9  F (36.6  C), temperature source Axillary, resp. rate 22, weight 70.3 kg (155 lb), SpO2 95 %. Patient was oriented to plan of care, call light, bed controls, tv, telephone, bathroom, and visiting hours.     Risk Assessment    The following safety risks were identified during admission: fall. Yellow risk band applied: YES.     Skin Initial Assessment    This writer admitted this patient and completed a full skin assessment and Joseph score in the Adult PCS flowsheet. Appropriate interventions initiated as needed.     Secondary skin check completed by declined by patient.  States no pressure injuries.         Education    Patient has a [employer] to Observation order: No  Observation education completed and documented: N/A      Ami Álvarez RN       Opt out

## 2025-02-03 NOTE — H&P ADULT - NSHPPHYSICALEXAM_GEN_ALL_CORE
T(C): 36.8 (03 Feb 2025 14:25), Max: 36.8 (03 Feb 2025 14:25)  HR: 106 (03 Feb 2025 14:25) (106 - 113)  BP: 130/63 (03 Feb 2025 14:25) (130/63 - 151/88)  RR: 17 (03 Feb 2025 14:25) (16 - 20)  SpO2: 95% (03 Feb 2025 14:25) (93% - 96%)    Weight (kg): 117.9 (02-03)    General: Resting comfortably in bed in no acute distress.   Neurologic: Alert, oriented, and appropriately responds to questions.   Psychiatric: Euthymic mood, calm affect, linear thought process, appropriate thought content.   Respiratory: Equal chest wall expansion bilaterally with no accessory muscle use, no tachypnea, and no grossly increased work of breathing on room air.   Cardiac: Regular rate and rhythm by palpation of peripheral pulse. Capillary refill in < 2 seconds.   Abdomen: Soft, nontender and nondistended. No rebound tenderness or guarding is elicited. Laparotomy scar with a small scab at its inferior margin.   Extremities: Warm and well-perfused. Moves all extremities spontaneously.

## 2025-02-03 NOTE — ED PROVIDER NOTE - PROGRESS NOTE ADDITIONAL1
Occupational Therapy  Facility/Department: [facility] ICU  Occupational Therapy Re-Evaluation and Treatment  Discharge    Name: Gilberto Bravo  : 1974  MRN: [id number]  Date of Service: 2023    Discharge Recommendations:  Gilberto Bravo scored a 21/24 on the AM-PAC ADL Inpatient form. Current research shows that an AM-PAC score of 18 or greater is typically associated with a discharge to the patient's home setting. OT Equipment Recommendations  Equipment Needed: Yes  Mobility Devices: ADL Assistive Devices  ADL Assistive Devices: Shower Chair with back       Patient Diagnosis(es): The encounter diagnosis was Left carotid stenosis. Past Medical History:  has a past medical history of Anxiety, Asthma, COPD (chronic obstructive pulmonary disease) ([address]), Depression, Endometriosis, and Knee pain. Past Surgical History:  has a past surgical history that includes Tonsillectomy;  section; hernia repair; Endometrial ablation; Hysterectomy; Incontinence surgery (2017); and Knee arthroscopy (Right). Treatment Diagnosis: Impaired ADL and functional mobility      Assessment   Assessment: Functioning at SB/Supervision level using RW (s/p endarterectomy). No neuro deficits noted. Recommend home w/ initial 24 hr S. Recommend shower chair - informed of where to purchase. No further OT needs indicated. Will sign off. Pt encouraged to ambulate w/ staff. REQUIRES OT FOLLOW-UP: No  Activity Tolerance  Activity Tolerance: Patient Tolerated treatment well  Activity Tolerance Comments: reports baseline SOB on exertion; O2 sats WNL despite. Educated to take rest breaks prn - stated understanding. Plan   Occupational Therapy Plan  Discharge acute OT - no further acute care needs. Restrictions  Position Activity Restriction  Other position/activity restrictions: Up as tolerated    Subjective   General  Chart Reviewed:  Yes  Additional Pertinent Hx: Pt admitted 23 for intermittent episodes of vision loss left side, as well as dizziness. This been ongoing issue for last 3 months. Worsened acutely over the last week with increasing frequent episodes. MRI brain= Left frontal cortex punctate acute infarction  2. 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 Progression of mild white matter disease is most likely chronic small vessel ischemia with other etiologies, such as migraine headache syndrome, inflammatory  or demyelinating disease considered less likely. 4.Nonspecific partially empty sella could be sequela of benign intracranial   hypertension; 9/21 diagnostic angiogram today: Findings show > 95% left ICA stenosis with antegrade flow through the left ICA. 50% stenosis of the right ICA; 9/22 L carotid endarterectomy. Family / Caregiver Present: No  Referring Practitioner: Dr. Kristie Odom  Diagnosis: TIA    Subjective  Subjective: Pt in bed upon entry. Reports no blackening of vision today. Denies concerns for return home - states multiple family members can assist prn. Some surgical discomfort - did not rated     Social/Functional History  Social/Functional History  Lives With:  (2 sons and grandson (3 yo))  Type of Home: Apartment (3rd floor)  Home Layout: One level  Home Access: Stairs to enter with rails  Entrance Stairs - Number of Steps: 2 LUISA, 21 steps to apt level  Bathroom Shower/Tub: Tub/Shower unit  Bathroom Toilet: Standard (sink next to toilet)  Bathroom Equipment: None  Home Equipment: None  Has the patient had two or more falls in the past year or any fall with injury in the past year?: No (Several near falls)  ADL Assistance: Independent  Homemaking Assistance: Needs assistance (kids and boyfriend assist)  Ambulation Assistance: Independent  Transfer Assistance: Independent  Active : No  Occupation: Unemployed       Objective                Safety Devices  Type of Devices: Call light within reach; Left in bed;Nurse Additional Progress Note...

## 2025-02-03 NOTE — H&P ADULT - HISTORY OF PRESENT ILLNESS
Mr. Em is a 48 year old man with a history of perforated sigmoid diverticulitis for which he underwent a Isi's surgery (Dr. Musa White, 6/30/2009) and reversal (Dr. White, 11/18/2009) which has been complicated by recurrent small bowel obstructions; he has been admitted for SBO management more than 50 times since his index surgery. He has undergone two exploratory laparotomies for enterolysis (Dr. White, 3/19/2014 and 7/7/2016) as well as an open incarcerated ventral hernia repair with mesh and extensive enterolysis (Dr. White, 6/19/2018) complicated by seroma development requiring open evacuation (Dr. White, 7/18/2018). Since then he has not required any additional operative intervention, and he has had his frequent obstructions managed with nasogastric decompression and crystalloid resuscitation. Additional medical history includes type two diabetes, hypertension, and hyperlipidemia. He now presents with two days of abdominal pain and nonbloody, bilious emesis, which he states is typical of his obstructions.    In the ED he is hemodynamically stable and in no acute distress, and cross sectional imaging confirms the diagnosis of small bowel obstruction with a single discrete transition point. He denies associated fevers, chills, chest pain, shortness of breath, melena, hematochezia, oliguria, dysuria, or hematuria.  He is having small-volume diarrhea. Laboratory studies are remarkable for metabolic acidosis (bicarbonate 19), lactate elevation (3.8), and hypercalcemia (11.3). Upon nasogastric tube placement, 1300 cc of brown gastric contents are aspirated.

## 2025-02-03 NOTE — ED ADULT NURSE NOTE - OBJECTIVE STATEMENT
Patient came in with the c/o abdominal pain with nausea, vomiting and diarrhea started yesterday. Patient stated he has past medical h/o SBO and the pain feels same. Patient stated he is able to pass some gas. Abdominal distension noted. No other complaints. Denies fever/chills. Awaiting for MD to see and further orders. Nursing care continues

## 2025-02-03 NOTE — ED PROVIDER NOTE - CLINICAL SUMMARY MEDICAL DECISION MAKING FREE TEXT BOX
49-year-old male with a past medical history of diabetes hypertension, hyperlipidemia, multiple SBO's, status post Buchanan's procedure presents to the emergency department complaining of generalized abdominal pain, nausea and diarrhea that started last night consistent with previous SBO's.   Will obtain labs, non contrast CT abdomen/pelvis as pt has severe contrast allergy, pain control, reassess.

## 2025-02-03 NOTE — H&P ADULT - ASSESSMENT
Mr. Em is a 49 year old man who underwent a Isi's surgery and reversal in 2009 for perforated sigmoid diverticulitis, which has been unfortunately complicated by unremitting recurrence of small bowel obstructions requiring frequent admission. He now presents with another uncomplicated SBO.     Plan  - Admit to general surgery, Dr. Musa White.   - Low bicarbonate and high NG output in the ED concerning for hypovolemia, resuscitation started with 1000 cc ringer's lactate bolus as well as LR at maintenance rate.   - Medication reconciliation completed.  - Holding home dapagliflozin due to concern for elevated inpatient risk of euglycemic ketoacidosis; glycemic control regimen switched to sliding scale insulin while inpatient.  - Holding home telmisartan due to hypovolemia upon presentation.  - Continue home metoprolol, rosuvastatin, pantoprazole.   - Low molecular weight heparin and sequential compression devices for deep venous thrombosis prophylaxis.   - Continue NGT to low continuous wall suction.   - Follow lactate for clearance with fluid resuscitation.     Case discussed with attending surgeon Dr. White.     Abdi Camacho, PGY-2  General Surgery - A Team ([id number])

## 2025-02-03 NOTE — ED PROVIDER NOTE - ATTENDING APP SHARED VISIT CONTRIBUTION OF CARE
I, Hayden Ryder, DO personally saw the patient with PAMELA.  I have personally performed a face to face diagnostic evaluation on this patient.  I have reviewed the PAMELA note and agree with the history, exam, and plan of care, except as noted.  I personally saw the patient and performed a substantive portion of the visit including all aspects of the medical decision making.

## 2025-02-03 NOTE — ED PROVIDER NOTE - OBJECTIVE STATEMENT
49-year-old male with a past medical history of diabetes hypertension, hyperlipidemia, multiple SBO's, status post Buchanan's procedure presents to the emergency department complaining of generalized abdominal pain, nausea and diarrhea that started last night consistent with previous SBO's.  Patient fevers, chills, vomiting, chest pain, shortness of breath, dysuria, frequency, hematuria 49-year-old male with a past medical history of diabetes hypertension, hyperlipidemia, multiple SBO's, status post Buchanan's procedure presents to the emergency department complaining of generalized abdominal pain, nausea and diarrhea that started last night consistent with previous SBO's.  Pt states he is passing gas.  Patient fevers, chills, vomiting, chest pain, shortness of breath, dysuria, frequency, hematuria.

## 2025-02-04 LAB
A1C WITH ESTIMATED AVERAGE GLUCOSE RESULT: 9.1 % — HIGH (ref 4–5.6)
ANION GAP SERPL CALC-SCNC: 13 MMOL/L — SIGNIFICANT CHANGE UP (ref 7–14)
BUN SERPL-MCNC: 24 MG/DL — HIGH (ref 7–23)
CALCIUM SERPL-MCNC: 9.2 MG/DL — SIGNIFICANT CHANGE UP (ref 8.4–10.5)
CHLORIDE SERPL-SCNC: 108 MMOL/L — HIGH (ref 98–107)
CO2 SERPL-SCNC: 24 MMOL/L — SIGNIFICANT CHANGE UP (ref 22–31)
CREAT SERPL-MCNC: 0.85 MG/DL — SIGNIFICANT CHANGE UP (ref 0.5–1.3)
EGFR: 105 ML/MIN/1.73M2 — SIGNIFICANT CHANGE UP
ESTIMATED AVERAGE GLUCOSE: 214 — SIGNIFICANT CHANGE UP
GLUCOSE BLDC GLUCOMTR-MCNC: 136 MG/DL — HIGH (ref 70–99)
GLUCOSE BLDC GLUCOMTR-MCNC: 141 MG/DL — HIGH (ref 70–99)
GLUCOSE BLDC GLUCOMTR-MCNC: 149 MG/DL — HIGH (ref 70–99)
GLUCOSE BLDC GLUCOMTR-MCNC: 166 MG/DL — HIGH (ref 70–99)
GLUCOSE BLDC GLUCOMTR-MCNC: 185 MG/DL — HIGH (ref 70–99)
GLUCOSE SERPL-MCNC: 178 MG/DL — HIGH (ref 70–99)
HCT VFR BLD CALC: 39.5 % — SIGNIFICANT CHANGE UP (ref 39–50)
HGB BLD-MCNC: 12.1 G/DL — LOW (ref 13–17)
MAGNESIUM SERPL-MCNC: 1.9 MG/DL — SIGNIFICANT CHANGE UP (ref 1.6–2.6)
MCHC RBC-ENTMCNC: 26.8 PG — LOW (ref 27–34)
MCHC RBC-ENTMCNC: 30.6 G/DL — LOW (ref 32–36)
MCV RBC AUTO: 87.6 FL — SIGNIFICANT CHANGE UP (ref 80–100)
NRBC # BLD AUTO: 0 K/UL — SIGNIFICANT CHANGE UP (ref 0–0)
NRBC # BLD: 0 /100 WBCS — SIGNIFICANT CHANGE UP (ref 0–0)
NRBC # FLD: 0 K/UL — SIGNIFICANT CHANGE UP (ref 0–0)
NRBC BLD-RTO: 0 /100 WBCS — SIGNIFICANT CHANGE UP (ref 0–0)
PHOSPHATE SERPL-MCNC: 2.9 MG/DL — SIGNIFICANT CHANGE UP (ref 2.5–4.5)
PLATELET # BLD AUTO: 189 K/UL — SIGNIFICANT CHANGE UP (ref 150–400)
POTASSIUM SERPL-MCNC: 3.6 MMOL/L — SIGNIFICANT CHANGE UP (ref 3.5–5.3)
POTASSIUM SERPL-SCNC: 3.6 MMOL/L — SIGNIFICANT CHANGE UP (ref 3.5–5.3)
RBC # BLD: 4.51 M/UL — SIGNIFICANT CHANGE UP (ref 4.2–5.8)
RBC # FLD: 16 % — HIGH (ref 10.3–14.5)
SODIUM SERPL-SCNC: 145 MMOL/L — SIGNIFICANT CHANGE UP (ref 135–145)
WBC # BLD: 5.97 K/UL — SIGNIFICANT CHANGE UP (ref 3.8–10.5)
WBC # FLD AUTO: 5.97 K/UL — SIGNIFICANT CHANGE UP (ref 3.8–10.5)

## 2025-02-04 PROCEDURE — 99232 SBSQ HOSP IP/OBS MODERATE 35: CPT | Mod: GC

## 2025-02-04 RX ORDER — ACETAMINOPHEN 160 MG/5ML
1000 SUSPENSION ORAL EVERY 6 HOURS
Refills: 0 | Status: DISCONTINUED | OUTPATIENT
Start: 2025-02-04 | End: 2025-02-05

## 2025-02-04 RX ORDER — POTASSIUM CHLORIDE 750 MG/1
10 TABLET, EXTENDED RELEASE ORAL
Refills: 0 | Status: COMPLETED | OUTPATIENT
Start: 2025-02-04 | End: 2025-02-04

## 2025-02-04 RX ORDER — PHENOL 1.4 %
1 AEROSOL, SPRAY (ML) MUCOUS MEMBRANE EVERY 6 HOURS
Refills: 0 | Status: DISCONTINUED | OUTPATIENT
Start: 2025-02-04 | End: 2025-02-07

## 2025-02-04 RX ADMIN — POTASSIUM CHLORIDE 100 MILLIEQUIVALENT(S): 750 TABLET, EXTENDED RELEASE ORAL at 10:55

## 2025-02-04 RX ADMIN — SODIUM CHLORIDE 150 MILLILITER(S): 9 INJECTION, SOLUTION INTRAVENOUS at 07:36

## 2025-02-04 RX ADMIN — SODIUM CHLORIDE 150 MILLILITER(S): 9 INJECTION, SOLUTION INTRAVENOUS at 01:05

## 2025-02-04 RX ADMIN — Medication 5 MILLIGRAM(S): at 11:56

## 2025-02-04 RX ADMIN — SODIUM CHLORIDE 150 MILLILITER(S): 9 INJECTION, SOLUTION INTRAVENOUS at 18:17

## 2025-02-04 RX ADMIN — POTASSIUM CHLORIDE 100 MILLIEQUIVALENT(S): 750 TABLET, EXTENDED RELEASE ORAL at 11:57

## 2025-02-04 RX ADMIN — Medication 1 LOZENGE: at 18:20

## 2025-02-04 RX ADMIN — Medication 5 MILLIGRAM(S): at 00:08

## 2025-02-04 RX ADMIN — ENOXAPARIN SODIUM 40 MILLIGRAM(S): 100 INJECTION SUBCUTANEOUS at 12:00

## 2025-02-04 RX ADMIN — ACETAMINOPHEN 1000 MILLIGRAM(S): 160 SUSPENSION ORAL at 05:54

## 2025-02-04 RX ADMIN — SODIUM CHLORIDE 150 MILLILITER(S): 9 INJECTION, SOLUTION INTRAVENOUS at 08:43

## 2025-02-04 RX ADMIN — Medication 5 MILLIGRAM(S): at 23:32

## 2025-02-04 RX ADMIN — Medication 1: at 06:53

## 2025-02-04 RX ADMIN — Medication 1 LOZENGE: at 12:15

## 2025-02-04 RX ADMIN — ACETAMINOPHEN 400 MILLIGRAM(S): 160 SUSPENSION ORAL at 11:56

## 2025-02-04 RX ADMIN — ACETAMINOPHEN 400 MILLIGRAM(S): 160 SUSPENSION ORAL at 00:14

## 2025-02-04 RX ADMIN — Medication 5 MILLIGRAM(S): at 05:03

## 2025-02-04 RX ADMIN — SODIUM CHLORIDE 150 MILLILITER(S): 9 INJECTION, SOLUTION INTRAVENOUS at 11:57

## 2025-02-04 RX ADMIN — PANTOPRAZOLE 40 MILLIGRAM(S): 20 TABLET, DELAYED RELEASE ORAL at 08:43

## 2025-02-04 RX ADMIN — SODIUM CHLORIDE 150 MILLILITER(S): 9 INJECTION, SOLUTION INTRAVENOUS at 00:17

## 2025-02-04 RX ADMIN — POTASSIUM CHLORIDE 100 MILLIEQUIVALENT(S): 750 TABLET, EXTENDED RELEASE ORAL at 13:07

## 2025-02-04 RX ADMIN — Medication 5 MILLIGRAM(S): at 18:18

## 2025-02-04 RX ADMIN — ACETAMINOPHEN 1000 MILLIGRAM(S): 160 SUSPENSION ORAL at 12:26

## 2025-02-04 RX ADMIN — ACETAMINOPHEN 400 MILLIGRAM(S): 160 SUSPENSION ORAL at 05:02

## 2025-02-04 RX ADMIN — Medication 1: at 11:57

## 2025-02-04 NOTE — PATIENT PROFILE ADULT - HEALTH LITERACY
Multiple falls  Multifactorial  Lengthy hospital stay  Continue with safety measures  Continue with fall precautions  PT/OT eval and treat no

## 2025-02-04 NOTE — PATIENT PROFILE ADULT - FALL HARM RISK - RISK INTERVENTIONS
Assistance with ambulation/Communicate Fall Risk and Risk Factors to all staff, patient, and family/Reinforce activity limits and safety measures with patient and family/Visual Cue: Yellow wristband/Bed in lowest position, wheels locked, appropriate side rails in place/Call bell, personal items and telephone in reach/Instruct patient to call for assistance before getting out of bed or chair/Non-slip footwear when patient is out of bed/[place] to call system/Physically safe environment - no spills, clutter or unnecessary equipment/Purposeful Proactive Rounding/Room/bathroom lighting operational, light cord in reach

## 2025-02-04 NOTE — PROGRESS NOTE ADULT - ASSESSMENT
Mr. Em is a 49 year old man who underwent a Isi's surgery and reversal in 2009 for perforated sigmoid diverticulitis, which has been unfortunately complicated by unremitting recurrence of small bowel obstructions requiring frequent admission. He now presents with another uncomplicated SBO.     Plan  - NPO/ NGT  - LR @ 150  - Pain control with tylenol  - DVT ppx: Lovenox  - ISS; Holding home dapagliflozin  - Holding home telmisartan due to hypovolemia upon presentation.  - Continue home metoprolol, pantoprazole  - Follow lactate for clearance with fluid resuscitation.     A Team Surgery  [id number] Mr. Em is a 49 year old man who underwent a Isi's surgery and reversal in 2009 for perforated sigmoid diverticulitis, which has been unfortunately complicated by unremitting recurrence of small bowel obstructions requiring frequent admission. He now presents with another uncomplicated SBO.     Plan  - NPO/ NGT  - LR @ 150  - Plan for gastrografin challenge tomorrow (previously received in 2023)   - Pain control with tylenol  - DVT ppx: Lovenox  - ISS; Holding home dapagliflozin  - Holding home telmisartan due to hypovolemia upon presentation  - Continue home metoprolol, pantoprazole  - Follow lactate for clearance with fluid resuscitation    A Team Surgery  [id number]

## 2025-02-04 NOTE — PROGRESS NOTE ADULT - SUBJECTIVE AND OBJECTIVE BOX
INTERVAL EVENTS: No acute events overnight.  SUBJECTIVE: Patient seen and examined at bedside with surgical team, patient without complaints. Denies fever, chills, CP, SOB nausea, vomiting, abdominal pain.    OBJECTIVE:    Vital Signs Last 24 Hrs  T(C): 36.8 (04 Feb 2025 05:02), Max: 36.8 (03 Feb 2025 14:25)  T(F): 98.3 (04 Feb 2025 05:02), Max: 98.3 (03 Feb 2025 14:25)  HR: 104 (04 Feb 2025 05:02) (94 - 113)  BP: 144/74 (04 Feb 2025 05:02) (115/72 - 151/88)  BP(mean): 99 (03 Feb 2025 10:24) (99 - 99)  RR: 18 (04 Feb 2025 05:02) (16 - 20)  SpO2: 94% (04 Feb 2025 05:02) (93% - 96%)    Parameters below as of 04 Feb 2025 05:02  Patient On (Oxygen Delivery Method): room air    I&O's Detail    03 Feb 2025 07:01  -  04 Feb 2025 07:00  --------------------------------------------------------  IN:    Lactated Ringers: 750 mL  Total IN: 750 mL    OUT:    Nasogastric/Oral tube (mL): 325 mL    Oral Fluid: 0 mL    Voided (mL): 200 mL  Total OUT: 525 mL    Total NET: 225 mL      MEDICATIONS  (STANDING):  acetaminophen   IVPB .. 1000 milliGRAM(s) IV Intermittent every 6 hours  dextrose 5%. 1000 milliLiter(s) (50 mL/Hr) IV Continuous <Continuous>  dextrose 5%. 1000 milliLiter(s) (100 mL/Hr) IV Continuous <Continuous>  dextrose 50% Injectable 25 Gram(s) IV Push once  dextrose 50% Injectable 12.5 Gram(s) IV Push once  dextrose 50% Injectable 25 Gram(s) IV Push once  enoxaparin Injectable 40 milliGRAM(s) SubCutaneous every 24 hours  glucagon  Injectable 1 milliGRAM(s) IntraMuscular once  insulin lispro (ADMELOG) corrective regimen sliding scale   SubCutaneous every 6 hours  lactated ringers. 1000 milliLiter(s) (150 mL/Hr) IV Continuous <Continuous>  metoprolol tartrate Injectable 5 milliGRAM(s) IV Push every 6 hours  pantoprazole  Injectable 40 milliGRAM(s) IV Push with breakfast    MEDICATIONS  (PRN):  dextrose Oral Gel 15 Gram(s) Oral once PRN Blood Glucose LESS THAN 70 milliGRAM(s)/deciliter      PHYSICAL EXAM:  General: Resting comfortably in bed in no acute distress.   Neurologic: Alert, oriented, and appropriately responds to questions.   Respiratory: Equal chest wall expansion bilaterally with no accessory muscle use, no tachypnea, and no grossly increased work of breathing on room air.   Cardiac: Regular rate and rhythm by palpation of peripheral pulse.  Abdomen: Soft, nontender and nondistended. No rebound tenderness or guarding is elicited. Laparotomy scar with a small scab at its inferior margin.   Extremities: Warm and well-perfused. Moves all extremities spontaneously.      LABS:                        13.3   5.99  )-----------( 228      ( 03 Feb 2025 19:56 )             41.5     02-03    147[H]  |  109[H]  |  23  ----------------------------<  175[H]  3.7   |  22  |  0.83    Ca    10.0      03 Feb 2025 19:56  Phos  4.0     02-03  Mg     2.00     02-03    TPro  8.5[H]  /  Alb  4.9  /  TBili  1.0  /  DBili  x   /  AST  21  /  ALT  33  /  AlkPhos  40  02-03    PT/INR - ( 03 Feb 2025 11:00 )   PT: 11.2 sec;   INR: 0.94 ratio           LIVER FUNCTIONS - ( 03 Feb 2025 11:00 )  Alb: 4.9 g/dL / Pro: 8.5 g/dL / ALK PHOS: 40 U/L / ALT: 33 U/L / AST: 21 U/L / GGT: x           Urinalysis Basic - ( 03 Feb 2025 19:56 )    Color: x / Appearance: x / SG: x / pH: x  Gluc: 175 mg/dL / Ketone: x  / Bili: x / Urobili: x   Blood: x / Protein: x / Nitrite: x   Leuk Esterase: x / RBC: x / WBC x   Sq Epi: x / Non Sq Epi: x / Bacteria: x      ABO Interpretation: MIRELA (02-03-25 @ 13:58)         INTERVAL EVENTS: No acute events overnight.  SUBJECTIVE: Patient seen and examined at bedside with surgical team, patient without complaints. Denies fever, chills, CP, SOB nausea, vomiting, abdominal pain.    OBJECTIVE:    Vital Signs Last 24 Hrs  T(C): 36.8 (04 Feb 2025 05:02), Max: 36.8 (03 Feb 2025 14:25)  T(F): 98.3 (04 Feb 2025 05:02), Max: 98.3 (03 Feb 2025 14:25)  HR: 104 (04 Feb 2025 05:02) (94 - 113)  BP: 144/74 (04 Feb 2025 05:02) (115/72 - 151/88)  BP(mean): 99 (03 Feb 2025 10:24) (99 - 99)  RR: 18 (04 Feb 2025 05:02) (16 - 20)  SpO2: 94% (04 Feb 2025 05:02) (93% - 96%)    Parameters below as of 04 Feb 2025 05:02  Patient On (Oxygen Delivery Method): room air    I&O's Detail    03 Feb 2025 07:01  -  04 Feb 2025 07:00  --------------------------------------------------------  IN:    Lactated Ringers: 750 mL  Total IN: 750 mL    OUT:    Nasogastric/Oral tube (mL): 325 mL    Oral Fluid: 0 mL    Voided (mL): 200 mL  Total OUT: 525 mL    Total NET: 225 mL      MEDICATIONS  (STANDING):  acetaminophen   IVPB .. 1000 milliGRAM(s) IV Intermittent every 6 hours  dextrose 5%. 1000 milliLiter(s) (50 mL/Hr) IV Continuous <Continuous>  dextrose 5%. 1000 milliLiter(s) (100 mL/Hr) IV Continuous <Continuous>  dextrose 50% Injectable 25 Gram(s) IV Push once  dextrose 50% Injectable 12.5 Gram(s) IV Push once  dextrose 50% Injectable 25 Gram(s) IV Push once  enoxaparin Injectable 40 milliGRAM(s) SubCutaneous every 24 hours  glucagon  Injectable 1 milliGRAM(s) IntraMuscular once  insulin lispro (ADMELOG) corrective regimen sliding scale   SubCutaneous every 6 hours  lactated ringers. 1000 milliLiter(s) (150 mL/Hr) IV Continuous <Continuous>  metoprolol tartrate Injectable 5 milliGRAM(s) IV Push every 6 hours  pantoprazole  Injectable 40 milliGRAM(s) IV Push with breakfast    MEDICATIONS  (PRN):  dextrose Oral Gel 15 Gram(s) Oral once PRN Blood Glucose LESS THAN 70 milliGRAM(s)/deciliter      PHYSICAL EXAM:  General: Resting comfortably in bed in no acute distress.   Neurologic: Alert, oriented, and appropriately responds to questions.   Respiratory: Equal chest wall expansion bilaterally with no accessory muscle use, no tachypnea, and no grossly increased work of breathing on room air.   Cardiac: Regular rate and rhythm by palpation of peripheral pulse.  GI: Soft, nontender and nondistended. No rebound tenderness or guarding is elicited. Laparotomy scar with a small scab at its inferior margin. NGT w/ yellow output  Extremities: Warm and well-perfused. Moves all extremities spontaneously.      LABS:                        13.3   5.99  )-----------( 228      ( 03 Feb 2025 19:56 )             41.5     02-03    147[H]  |  109[H]  |  23  ----------------------------<  175[H]  3.7   |  22  |  0.83    Ca    10.0      03 Feb 2025 19:56  Phos  4.0     02-03  Mg     2.00     02-03    TPro  8.5[H]  /  Alb  4.9  /  TBili  1.0  /  DBili  x   /  AST  21  /  ALT  33  /  AlkPhos  40  02-03    PT/INR - ( 03 Feb 2025 11:00 )   PT: 11.2 sec;   INR: 0.94 ratio           LIVER FUNCTIONS - ( 03 Feb 2025 11:00 )  Alb: 4.9 g/dL / Pro: 8.5 g/dL / ALK PHOS: 40 U/L / ALT: 33 U/L / AST: 21 U/L / GGT: x           Urinalysis Basic - ( 03 Feb 2025 19:56 )    Color: x / Appearance: x / SG: x / pH: x  Gluc: 175 mg/dL / Ketone: x  / Bili: x / Urobili: x   Blood: x / Protein: x / Nitrite: x   Leuk Esterase: x / RBC: x / WBC x   Sq Epi: x / Non Sq Epi: x / Bacteria: x      ABO Interpretation: MIRELA (02-03-25 @ 13:58)         INTERVAL EVENTS: No acute events overnight.  SUBJECTIVE: Patient seen and examined at bedside with surgical team, patient without complaints. Denies fever, chills, CP, SOB nausea, vomiting, abdominal pain. Reports he is no longer having nausea after NGT was placed. Also reports diarrhea.     OBJECTIVE:    Vital Signs Last 24 Hrs  T(C): 36.8 (04 Feb 2025 05:02), Max: 36.8 (03 Feb 2025 14:25)  T(F): 98.3 (04 Feb 2025 05:02), Max: 98.3 (03 Feb 2025 14:25)  HR: 104 (04 Feb 2025 05:02) (94 - 113)  BP: 144/74 (04 Feb 2025 05:02) (115/72 - 151/88)  BP(mean): 99 (03 Feb 2025 10:24) (99 - 99)  RR: 18 (04 Feb 2025 05:02) (16 - 20)  SpO2: 94% (04 Feb 2025 05:02) (93% - 96%)    Parameters below as of 04 Feb 2025 05:02  Patient On (Oxygen Delivery Method): room air    I&O's Detail    03 Feb 2025 07:01  -  04 Feb 2025 07:00  --------------------------------------------------------  IN:    Lactated Ringers: 750 mL  Total IN: 750 mL    OUT:    Nasogastric/Oral tube (mL): 325 mL    Oral Fluid: 0 mL    Voided (mL): 200 mL  Total OUT: 525 mL    Total NET: 225 mL      MEDICATIONS  (STANDING):  acetaminophen   IVPB .. 1000 milliGRAM(s) IV Intermittent every 6 hours  dextrose 5%. 1000 milliLiter(s) (50 mL/Hr) IV Continuous <Continuous>  dextrose 5%. 1000 milliLiter(s) (100 mL/Hr) IV Continuous <Continuous>  dextrose 50% Injectable 25 Gram(s) IV Push once  dextrose 50% Injectable 12.5 Gram(s) IV Push once  dextrose 50% Injectable 25 Gram(s) IV Push once  enoxaparin Injectable 40 milliGRAM(s) SubCutaneous every 24 hours  glucagon  Injectable 1 milliGRAM(s) IntraMuscular once  insulin lispro (ADMELOG) corrective regimen sliding scale   SubCutaneous every 6 hours  lactated ringers. 1000 milliLiter(s) (150 mL/Hr) IV Continuous <Continuous>  metoprolol tartrate Injectable 5 milliGRAM(s) IV Push every 6 hours  pantoprazole  Injectable 40 milliGRAM(s) IV Push with breakfast    MEDICATIONS  (PRN):  dextrose Oral Gel 15 Gram(s) Oral once PRN Blood Glucose LESS THAN 70 milliGRAM(s)/deciliter      PHYSICAL EXAM:  General: Resting comfortably in bed in no acute distress.   Neurologic: Alert, oriented, and appropriately responds to questions.   Respiratory: Equal chest wall expansion bilaterally with no accessory muscle use, no tachypnea, and no grossly increased work of breathing on room air.   Cardiac: Regular rate and rhythm by palpation of peripheral pulse.  GI: Soft, nontender and nondistended. No rebound tenderness or guarding is elicited. Laparotomy scar with a small scab at its inferior margin. NGT w/ yellow output.  Extremities: Warm and well-perfused. Moves all extremities spontaneously.      LABS:                        13.3   5.99  )-----------( 228      ( 03 Feb 2025 19:56 )             41.5     02-03    147[H]  |  109[H]  |  23  ----------------------------<  175[H]  3.7   |  22  |  0.83    Ca    10.0      03 Feb 2025 19:56  Phos  4.0     02-03  Mg     2.00     02-03    TPro  8.5[H]  /  Alb  4.9  /  TBili  1.0  /  DBili  x   /  AST  21  /  ALT  33  /  AlkPhos  40  02-03    PT/INR - ( 03 Feb 2025 11:00 )   PT: 11.2 sec;   INR: 0.94 ratio           LIVER FUNCTIONS - ( 03 Feb 2025 11:00 )  Alb: 4.9 g/dL / Pro: 8.5 g/dL / ALK PHOS: 40 U/L / ALT: 33 U/L / AST: 21 U/L / GGT: x           Urinalysis Basic - ( 03 Feb 2025 19:56 )    Color: x / Appearance: x / SG: x / pH: x  Gluc: 175 mg/dL / Ketone: x  / Bili: x / Urobili: x   Blood: x / Protein: x / Nitrite: x   Leuk Esterase: x / RBC: x / WBC x   Sq Epi: x / Non Sq Epi: x / Bacteria: x      ABO Interpretation: MIRELA (02-03-25 @ 13:58)

## 2025-02-05 LAB
ANION GAP SERPL CALC-SCNC: 14 MMOL/L — SIGNIFICANT CHANGE UP (ref 7–14)
BUN SERPL-MCNC: 18 MG/DL — SIGNIFICANT CHANGE UP (ref 7–23)
CALCIUM SERPL-MCNC: 8.8 MG/DL — SIGNIFICANT CHANGE UP (ref 8.4–10.5)
CHLORIDE SERPL-SCNC: 107 MMOL/L — SIGNIFICANT CHANGE UP (ref 98–107)
CO2 SERPL-SCNC: 22 MMOL/L — SIGNIFICANT CHANGE UP (ref 22–31)
CREAT SERPL-MCNC: 0.68 MG/DL — SIGNIFICANT CHANGE UP (ref 0.5–1.3)
EGFR: 113 ML/MIN/1.73M2 — SIGNIFICANT CHANGE UP
GLUCOSE BLDC GLUCOMTR-MCNC: 165 MG/DL — HIGH (ref 70–99)
GLUCOSE BLDC GLUCOMTR-MCNC: 171 MG/DL — HIGH (ref 70–99)
GLUCOSE BLDC GLUCOMTR-MCNC: 173 MG/DL — HIGH (ref 70–99)
GLUCOSE SERPL-MCNC: 154 MG/DL — HIGH (ref 70–99)
HCT VFR BLD CALC: 36.8 % — LOW (ref 39–50)
HGB BLD-MCNC: 11.6 G/DL — LOW (ref 13–17)
MAGNESIUM SERPL-MCNC: 1.8 MG/DL — SIGNIFICANT CHANGE UP (ref 1.6–2.6)
MCHC RBC-ENTMCNC: 27.6 PG — SIGNIFICANT CHANGE UP (ref 27–34)
MCHC RBC-ENTMCNC: 31.5 G/DL — LOW (ref 32–36)
MCV RBC AUTO: 87.4 FL — SIGNIFICANT CHANGE UP (ref 80–100)
NRBC # BLD AUTO: 0 K/UL — SIGNIFICANT CHANGE UP (ref 0–0)
NRBC # BLD: 0 /100 WBCS — SIGNIFICANT CHANGE UP (ref 0–0)
NRBC # FLD: 0 K/UL — SIGNIFICANT CHANGE UP (ref 0–0)
NRBC BLD-RTO: 0 /100 WBCS — SIGNIFICANT CHANGE UP (ref 0–0)
PHOSPHATE SERPL-MCNC: 2.5 MG/DL — SIGNIFICANT CHANGE UP (ref 2.5–4.5)
PLATELET # BLD AUTO: 178 K/UL — SIGNIFICANT CHANGE UP (ref 150–400)
POTASSIUM SERPL-MCNC: 3.4 MMOL/L — LOW (ref 3.5–5.3)
POTASSIUM SERPL-SCNC: 3.4 MMOL/L — LOW (ref 3.5–5.3)
RBC # BLD: 4.21 M/UL — SIGNIFICANT CHANGE UP (ref 4.2–5.8)
RBC # FLD: 15.4 % — HIGH (ref 10.3–14.5)
SODIUM SERPL-SCNC: 143 MMOL/L — SIGNIFICANT CHANGE UP (ref 135–145)
WBC # BLD: 7.69 K/UL — SIGNIFICANT CHANGE UP (ref 3.8–10.5)
WBC # FLD AUTO: 7.69 K/UL — SIGNIFICANT CHANGE UP (ref 3.8–10.5)

## 2025-02-05 RX ORDER — SODIUM CHLORIDE 9 G/ML
1000 INJECTION, SOLUTION INTRAVENOUS
Refills: 0 | Status: DISCONTINUED | OUTPATIENT
Start: 2025-02-05 | End: 2025-02-07

## 2025-02-05 RX ORDER — POTASSIUM PHOSPHATE, MONOBASIC POTASSIUM PHOSPHATE, DIBASIC 236; 224 MG/ML; MG/ML
30 INJECTION, SOLUTION INTRAVENOUS ONCE
Refills: 0 | Status: COMPLETED | OUTPATIENT
Start: 2025-02-05 | End: 2025-02-05

## 2025-02-05 RX ORDER — MAGNESIUM SULFATE 0.8 MEQ/ML
2 AMPUL (ML) INJECTION ONCE
Refills: 0 | Status: COMPLETED | OUTPATIENT
Start: 2025-02-05 | End: 2025-02-05

## 2025-02-05 RX ADMIN — Medication 5 MILLIGRAM(S): at 13:12

## 2025-02-05 RX ADMIN — Medication 1: at 12:19

## 2025-02-05 RX ADMIN — Medication 1 LOZENGE: at 19:27

## 2025-02-05 RX ADMIN — ACETAMINOPHEN 400 MILLIGRAM(S): 160 SUSPENSION ORAL at 00:05

## 2025-02-05 RX ADMIN — Medication 5 MILLIGRAM(S): at 19:26

## 2025-02-05 RX ADMIN — Medication 5 MILLIGRAM(S): at 23:53

## 2025-02-05 RX ADMIN — PANTOPRAZOLE 40 MILLIGRAM(S): 20 TABLET, DELAYED RELEASE ORAL at 09:21

## 2025-02-05 RX ADMIN — ACETAMINOPHEN 400 MILLIGRAM(S): 160 SUSPENSION ORAL at 05:10

## 2025-02-05 RX ADMIN — Medication 1: at 06:45

## 2025-02-05 RX ADMIN — Medication 5 MILLIGRAM(S): at 05:10

## 2025-02-05 RX ADMIN — POTASSIUM PHOSPHATE, MONOBASIC POTASSIUM PHOSPHATE, DIBASIC 83.33 MILLIMOLE(S): 236; 224 INJECTION, SOLUTION INTRAVENOUS at 11:20

## 2025-02-05 RX ADMIN — Medication 1: at 23:54

## 2025-02-05 RX ADMIN — SODIUM CHLORIDE 150 MILLILITER(S): 9 INJECTION, SOLUTION INTRAVENOUS at 11:11

## 2025-02-05 RX ADMIN — Medication 25 GRAM(S): at 11:20

## 2025-02-05 RX ADMIN — Medication 1 LOZENGE: at 11:26

## 2025-02-05 RX ADMIN — ENOXAPARIN SODIUM 40 MILLIGRAM(S): 100 INJECTION SUBCUTANEOUS at 13:13

## 2025-02-05 NOTE — PROGRESS NOTE ADULT - ASSESSMENT
Mr. mE is a 49 year old man who underwent a Isi's surgery and reversal in 2009 for perforated sigmoid diverticulitis, which has been unfortunately complicated by unremitting recurrence of small bowel obstructions requiring frequent admission. He now presents with another uncomplicated SBO.     Plan  - NPO/ NGT  - IVF changed to D5 1/2NS 20K @ 150   - Patient previously did not tolerate gastrograffin thus will hold off   - Pain control with tylenol  - DVT ppx: Lovenox  - ISS; Holding home dapagliflozin  - Holding home telmisartan due to hypovolemia upon presentation  - Continue home metoprolol, pantoprazole  - Ambulation, OOB    A Team Surgery  [id number] Repair Anesthesia Method: local infiltration

## 2025-02-05 NOTE — PROGRESS NOTE ADULT - SUBJECTIVE AND OBJECTIVE BOX
A Team Surgery   INTERVAL EVENTS: No acute events overnight.  SUBJECTIVE: Patient seen and examined at bedside with surgical team, patient without complaints. Denies fever, chills, CP, SOB nausea, vomiting, abdominal pain.     OBJECTIVE:    Vital Signs Last 24 Hrs  T(C): 36.4 (05 Feb 2025 10:03), Max: 37.1 (04 Feb 2025 22:00)  T(F): 97.5 (05 Feb 2025 10:03), Max: 98.8 (04 Feb 2025 22:00)  HR: 93 (05 Feb 2025 10:03) (93 - 102)  BP: 132/71 (05 Feb 2025 10:03) (132/71 - 156/77)  RR: 18 (05 Feb 2025 10:03) (16 - 18)  SpO2: 98% (05 Feb 2025 10:03) (95% - 98%)    Parameters below as of 05 Feb 2025 06:14  Patient On (Oxygen Delivery Method): room air      I&O's Detail      04 Feb 2025 07:01  -  05 Feb 2025 07:00  --------------------------------------------------------  IN:    IV PiggyBack: 400 mL    Lactated Ringers: 1800 mL  Total IN: 2200 mL    OUT:    Nasogastric/Oral tube (mL): 650 mL    Voided (mL): 1250 mL  Total OUT: 1900 mL    Total NET: 300 mL      05 Feb 2025 07:01  -  05 Feb 2025 10:59  --------------------------------------------------------  IN:  Total IN: 0 mL    OUT:    Voided (mL): 200 mL  Total OUT: 200 mL    Total NET: -200 mL      PHYSICAL EXAM:  General: Resting comfortably in bed in no acute distress  Neurologic: Alert, oriented, and appropriately responds to questions  Respiratory: Equal chest wall expansion bilaterally with no accessory muscle use, no tachypnea, and no grossly increased work of breathing on room air  GI: Soft, nontender and nondistended. No rebound tenderness or guarding is elicited. Laparotomy scar with a small scab at its inferior margin. NGT w/ yellow output  Extremities: Warm and well-perfused. Moves all extremities spontaneously      LABS:                          11.6   7.69  )-----------( 178      ( 05 Feb 2025 07:23 )             36.8   02-05    143  |  107  |  18  ----------------------------<  154[H]  3.4[L]   |  22  |  0.68    Ca    8.8      05 Feb 2025 07:23  Phos  2.5     02-05  Mg     1.80     02-05

## 2025-02-06 LAB
ANION GAP SERPL CALC-SCNC: 13 MMOL/L — SIGNIFICANT CHANGE UP (ref 7–14)
BUN SERPL-MCNC: 14 MG/DL — SIGNIFICANT CHANGE UP (ref 7–23)
CALCIUM SERPL-MCNC: 8.8 MG/DL — SIGNIFICANT CHANGE UP (ref 8.4–10.5)
CHLORIDE SERPL-SCNC: 106 MMOL/L — SIGNIFICANT CHANGE UP (ref 98–107)
CO2 SERPL-SCNC: 21 MMOL/L — LOW (ref 22–31)
CREAT SERPL-MCNC: 0.61 MG/DL — SIGNIFICANT CHANGE UP (ref 0.5–1.3)
EGFR: 116 ML/MIN/1.73M2 — SIGNIFICANT CHANGE UP
GLUCOSE BLDC GLUCOMTR-MCNC: 152 MG/DL — HIGH (ref 70–99)
GLUCOSE BLDC GLUCOMTR-MCNC: 161 MG/DL — HIGH (ref 70–99)
GLUCOSE BLDC GLUCOMTR-MCNC: 188 MG/DL — HIGH (ref 70–99)
GLUCOSE BLDC GLUCOMTR-MCNC: 190 MG/DL — HIGH (ref 70–99)
GLUCOSE BLDC GLUCOMTR-MCNC: 207 MG/DL — HIGH (ref 70–99)
GLUCOSE SERPL-MCNC: 194 MG/DL — HIGH (ref 70–99)
HCT VFR BLD CALC: 36 % — LOW (ref 39–50)
HGB BLD-MCNC: 11.5 G/DL — LOW (ref 13–17)
MAGNESIUM SERPL-MCNC: 2.2 MG/DL — SIGNIFICANT CHANGE UP (ref 1.6–2.6)
MCHC RBC-ENTMCNC: 27.7 PG — SIGNIFICANT CHANGE UP (ref 27–34)
MCHC RBC-ENTMCNC: 31.9 G/DL — LOW (ref 32–36)
MCV RBC AUTO: 86.7 FL — SIGNIFICANT CHANGE UP (ref 80–100)
NRBC # BLD AUTO: 0 K/UL — SIGNIFICANT CHANGE UP (ref 0–0)
NRBC # BLD: 0 /100 WBCS — SIGNIFICANT CHANGE UP (ref 0–0)
NRBC # FLD: 0 K/UL — SIGNIFICANT CHANGE UP (ref 0–0)
NRBC BLD-RTO: 0 /100 WBCS — SIGNIFICANT CHANGE UP (ref 0–0)
PHOSPHATE SERPL-MCNC: 2.4 MG/DL — LOW (ref 2.5–4.5)
PLATELET # BLD AUTO: 167 K/UL — SIGNIFICANT CHANGE UP (ref 150–400)
POTASSIUM SERPL-MCNC: 3.7 MMOL/L — SIGNIFICANT CHANGE UP (ref 3.5–5.3)
POTASSIUM SERPL-SCNC: 3.7 MMOL/L — SIGNIFICANT CHANGE UP (ref 3.5–5.3)
RBC # BLD: 4.15 M/UL — LOW (ref 4.2–5.8)
RBC # FLD: 15.2 % — HIGH (ref 10.3–14.5)
SODIUM SERPL-SCNC: 140 MMOL/L — SIGNIFICANT CHANGE UP (ref 135–145)
WBC # BLD: 9.25 K/UL — SIGNIFICANT CHANGE UP (ref 3.8–10.5)
WBC # FLD AUTO: 9.25 K/UL — SIGNIFICANT CHANGE UP (ref 3.8–10.5)

## 2025-02-06 RX ORDER — INSULIN LISPRO 100/ML
VIAL (ML) SUBCUTANEOUS
Refills: 0 | Status: DISCONTINUED | OUTPATIENT
Start: 2025-02-06 | End: 2025-02-07

## 2025-02-06 RX ORDER — METOPROLOL SUCCINATE 25 MG
100 TABLET, EXTENDED RELEASE 24 HR ORAL EVERY 12 HOURS
Refills: 0 | Status: DISCONTINUED | OUTPATIENT
Start: 2025-02-06 | End: 2025-02-07

## 2025-02-06 RX ORDER — PANTOPRAZOLE 20 MG/1
40 TABLET, DELAYED RELEASE ORAL
Refills: 0 | Status: DISCONTINUED | OUTPATIENT
Start: 2025-02-06 | End: 2025-02-07

## 2025-02-06 RX ORDER — ACETAMINOPHEN 160 MG/5ML
1000 SUSPENSION ORAL ONCE
Refills: 0 | Status: COMPLETED | OUTPATIENT
Start: 2025-02-06 | End: 2025-02-06

## 2025-02-06 RX ADMIN — Medication 100 MILLIGRAM(S): at 17:07

## 2025-02-06 RX ADMIN — Medication 1: at 06:23

## 2025-02-06 RX ADMIN — ACETAMINOPHEN 400 MILLIGRAM(S): 160 SUSPENSION ORAL at 04:09

## 2025-02-06 RX ADMIN — ENOXAPARIN SODIUM 40 MILLIGRAM(S): 100 INJECTION SUBCUTANEOUS at 11:38

## 2025-02-06 RX ADMIN — Medication 1: at 12:18

## 2025-02-06 RX ADMIN — Medication 5 MILLIGRAM(S): at 05:22

## 2025-02-06 RX ADMIN — Medication 5 MILLIGRAM(S): at 11:37

## 2025-02-06 RX ADMIN — ACETAMINOPHEN 1000 MILLIGRAM(S): 160 SUSPENSION ORAL at 04:25

## 2025-02-06 RX ADMIN — Medication 1: at 17:07

## 2025-02-06 RX ADMIN — Medication 1: at 21:28

## 2025-02-06 RX ADMIN — PANTOPRAZOLE 40 MILLIGRAM(S): 20 TABLET, DELAYED RELEASE ORAL at 08:48

## 2025-02-06 RX ADMIN — Medication 1 LOZENGE: at 15:32

## 2025-02-06 NOTE — PROGRESS NOTE ADULT - SUBJECTIVE AND OBJECTIVE BOX
INTERVAL EVENTS: No acute events overnight.  SUBJECTIVE: Patient seen and examined at bedside with surgical team, patient without complaints. Denies fever, chills, CP, SOB nausea, vomiting, abdominal pain.    OBJECTIVE:    Vital Signs Last 24 Hrs  T(C): 36.8 (06 Feb 2025 05:22), Max: 37 (06 Feb 2025 02:14)  T(F): 98.3 (06 Feb 2025 05:22), Max: 98.6 (06 Feb 2025 02:14)  HR: 87 (06 Feb 2025 05:22) (86 - 95)  BP: 141/83 (06 Feb 2025 05:22) (110/59 - 143/78)  BP(mean): --  RR: 16 (06 Feb 2025 05:22) (16 - 18)  SpO2: 97% (06 Feb 2025 05:22) (97% - 99%)    Parameters below as of 06 Feb 2025 05:22  Patient On (Oxygen Delivery Method): room air    I&O's Detail    05 Feb 2025 07:01  -  06 Feb 2025 07:00  --------------------------------------------------------  IN:    dextrose 5% + sodium chloride 0.45% w/ Additives: 3600 mL    IV PiggyBack: 550 mL  Total IN: 4150 mL    OUT:    Nasogastric/Oral tube (mL): 850 mL    Oral Fluid: 0 mL    Voided (mL): 1525 mL  Total OUT: 2375 mL    Total NET: 1775 mL      MEDICATIONS  (STANDING):  dextrose 5% + sodium chloride 0.45% 1000 milliLiter(s) (150 mL/Hr) IV Continuous <Continuous>  dextrose 5%. 1000 milliLiter(s) (50 mL/Hr) IV Continuous <Continuous>  dextrose 5%. 1000 milliLiter(s) (100 mL/Hr) IV Continuous <Continuous>  dextrose 50% Injectable 25 Gram(s) IV Push once  dextrose 50% Injectable 12.5 Gram(s) IV Push once  dextrose 50% Injectable 25 Gram(s) IV Push once  enoxaparin Injectable 40 milliGRAM(s) SubCutaneous every 24 hours  glucagon  Injectable 1 milliGRAM(s) IntraMuscular once  insulin lispro (ADMELOG) corrective regimen sliding scale   SubCutaneous every 6 hours  metoprolol tartrate Injectable 5 milliGRAM(s) IV Push every 6 hours  pantoprazole  Injectable 40 milliGRAM(s) IV Push with breakfast    MEDICATIONS  (PRN):  benzocaine/menthol Lozenge 1 Lozenge Oral every 6 hours PRN NGT discomfort  dextrose Oral Gel 15 Gram(s) Oral once PRN Blood Glucose LESS THAN 70 milliGRAM(s)/deciliter      PHYSICAL EXAM:  General: Resting comfortably in bed in no acute distress  Neurologic: Alert, oriented, and appropriately responds to questions  Respiratory: Equal chest wall expansion bilaterally with no accessory muscle use, no tachypnea, and no grossly increased work of breathing on room air  GI: Soft, nontender and nondistended. No rebound tenderness or guarding is elicited. Laparotomy scar with a small scab at its inferior margin. NGT  Extremities: Warm and well-perfused. Moves all extremities spontaneously      LABS:                        11.5   9.25  )-----------( 167      ( 06 Feb 2025 06:14 )             36.0     02-05    143  |  107  |  18  ----------------------------<  154[H]  3.4[L]   |  22  |  0.68    Ca    8.8      05 Feb 2025 07:23  Phos  2.5     02-05  Mg     1.80     02-05          Urinalysis Basic - ( 05 Feb 2025 07:23 )    Color: x / Appearance: x / SG: x / pH: x  Gluc: 154 mg/dL / Ketone: x  / Bili: x / Urobili: x   Blood: x / Protein: x / Nitrite: x   Leuk Esterase: x / RBC: x / WBC x   Sq Epi: x / Non Sq Epi: x / Bacteria: x

## 2025-02-06 NOTE — PROGRESS NOTE ADULT - ASSESSMENT
49 year old man who underwent a Isi's surgery and reversal in 2009 for perforated sigmoid diverticulitis, which has been unfortunately complicated by unremitting recurrence of small bowel obstructions requiring frequent admission. He now presents with another uncomplicated SBO.     Plan  - NPO/ NGT  - IVF changed to D5 1/2NS 20K @ 150   - Patient previously did not tolerate gastrograffin thus will hold off   - Pain control with tylenol  - DVT ppx: Lovenox  - ISS; Holding home dapagliflozin  - Holding home telmisartan due to hypovolemia upon presentation  - Continue home metoprolol, pantoprazole  - Ambulation, OOB    A Team Surgery  [id number]

## 2025-02-07 ENCOUNTER — TRANSCRIPTION ENCOUNTER (OUTPATIENT)
Age: 50
End: 2025-02-07

## 2025-02-07 VITALS
DIASTOLIC BLOOD PRESSURE: 79 MMHG | TEMPERATURE: 98 F | HEART RATE: 75 BPM | OXYGEN SATURATION: 99 % | RESPIRATION RATE: 18 BRPM | SYSTOLIC BLOOD PRESSURE: 136 MMHG

## 2025-02-07 LAB
ANION GAP SERPL CALC-SCNC: 14 MMOL/L — SIGNIFICANT CHANGE UP (ref 7–14)
BUN SERPL-MCNC: 15 MG/DL — SIGNIFICANT CHANGE UP (ref 7–23)
CALCIUM SERPL-MCNC: 8.7 MG/DL — SIGNIFICANT CHANGE UP (ref 8.4–10.5)
CHLORIDE SERPL-SCNC: 106 MMOL/L — SIGNIFICANT CHANGE UP (ref 98–107)
CO2 SERPL-SCNC: 18 MMOL/L — LOW (ref 22–31)
CREAT SERPL-MCNC: 0.57 MG/DL — SIGNIFICANT CHANGE UP (ref 0.5–1.3)
EGFR: 119 ML/MIN/1.73M2 — SIGNIFICANT CHANGE UP
GLUCOSE BLDC GLUCOMTR-MCNC: 165 MG/DL — HIGH (ref 70–99)
GLUCOSE BLDC GLUCOMTR-MCNC: 170 MG/DL — HIGH (ref 70–99)
GLUCOSE SERPL-MCNC: 159 MG/DL — HIGH (ref 70–99)
HCT VFR BLD CALC: 36.8 % — LOW (ref 39–50)
HGB BLD-MCNC: 11.6 G/DL — LOW (ref 13–17)
MAGNESIUM SERPL-MCNC: 2.1 MG/DL — SIGNIFICANT CHANGE UP (ref 1.6–2.6)
MCHC RBC-ENTMCNC: 27.1 PG — SIGNIFICANT CHANGE UP (ref 27–34)
MCHC RBC-ENTMCNC: 31.5 G/DL — LOW (ref 32–36)
MCV RBC AUTO: 86 FL — SIGNIFICANT CHANGE UP (ref 80–100)
NRBC # BLD AUTO: 0 K/UL — SIGNIFICANT CHANGE UP (ref 0–0)
NRBC # BLD: 0 /100 WBCS — SIGNIFICANT CHANGE UP (ref 0–0)
NRBC # FLD: 0 K/UL — SIGNIFICANT CHANGE UP (ref 0–0)
NRBC BLD-RTO: 0 /100 WBCS — SIGNIFICANT CHANGE UP (ref 0–0)
PHOSPHATE SERPL-MCNC: 2.7 MG/DL — SIGNIFICANT CHANGE UP (ref 2.5–4.5)
PLATELET # BLD AUTO: 160 K/UL — SIGNIFICANT CHANGE UP (ref 150–400)
POTASSIUM SERPL-MCNC: 3.8 MMOL/L — SIGNIFICANT CHANGE UP (ref 3.5–5.3)
POTASSIUM SERPL-SCNC: 3.8 MMOL/L — SIGNIFICANT CHANGE UP (ref 3.5–5.3)
RBC # BLD: 4.28 M/UL — SIGNIFICANT CHANGE UP (ref 4.2–5.8)
RBC # FLD: 15 % — HIGH (ref 10.3–14.5)
SODIUM SERPL-SCNC: 138 MMOL/L — SIGNIFICANT CHANGE UP (ref 135–145)
WBC # BLD: 8.06 K/UL — SIGNIFICANT CHANGE UP (ref 3.8–10.5)
WBC # FLD AUTO: 8.06 K/UL — SIGNIFICANT CHANGE UP (ref 3.8–10.5)

## 2025-02-07 PROCEDURE — 99238 HOSP IP/OBS DSCHRG MGMT 30/<: CPT

## 2025-02-07 RX ORDER — POTASSIUM CHLORIDE 750 MG/1
20 TABLET, EXTENDED RELEASE ORAL ONCE
Refills: 0 | Status: COMPLETED | OUTPATIENT
Start: 2025-02-07 | End: 2025-02-07

## 2025-02-07 RX ORDER — SODIUM PHOSPHATE, DIBASIC, ANHYDROUS, POTASSIUM PHOSPHATE, MONOBASIC, AND SODIUM PHOSPHATE, MONOBASIC, MONOHYDRATE 852; 155; 130 MG/1; MG/1; MG/1
1 TABLET, COATED ORAL ONCE
Refills: 0 | Status: COMPLETED | OUTPATIENT
Start: 2025-02-07 | End: 2025-02-07

## 2025-02-07 RX ADMIN — Medication 1: at 11:58

## 2025-02-07 RX ADMIN — PANTOPRAZOLE 40 MILLIGRAM(S): 20 TABLET, DELAYED RELEASE ORAL at 05:42

## 2025-02-07 RX ADMIN — SODIUM PHOSPHATE, DIBASIC, ANHYDROUS, POTASSIUM PHOSPHATE, MONOBASIC, AND SODIUM PHOSPHATE, MONOBASIC, MONOHYDRATE 1 PACKET(S): 852; 155; 130 TABLET, COATED ORAL at 09:33

## 2025-02-07 RX ADMIN — Medication 1: at 08:26

## 2025-02-07 RX ADMIN — Medication 100 MILLIGRAM(S): at 05:42

## 2025-02-07 RX ADMIN — POTASSIUM CHLORIDE 20 MILLIEQUIVALENT(S): 750 TABLET, EXTENDED RELEASE ORAL at 09:32

## 2025-02-07 RX ADMIN — ENOXAPARIN SODIUM 40 MILLIGRAM(S): 100 INJECTION SUBCUTANEOUS at 11:21

## 2025-02-07 NOTE — DISCHARGE NOTE NURSING/CASE MANAGEMENT/SOCIAL WORK - FINANCIAL ASSISTANCE
[facility] provides services at a reduced cost to those who are determined to be eligible through [facility]’s financial assistance program. Information regarding [facility]’s financial assistance program can be found by going to [email or web address] or by calling [phone].

## 2025-02-07 NOTE — DISCHARGE NOTE PROVIDER - CARE PROVIDER_API CALL
Musa White  Surgery  [address] [place] [address]  Phone: [phone]  Fax: [phone]  Follow Up Time:

## 2025-02-07 NOTE — PROGRESS NOTE ADULT - ASSESSMENT
49 year old man who underwent a Isi's surgery and reversal in 2009 for perforated sigmoid diverticulitis, which has been unfortunately complicated by unremitting recurrence of small bowel obstructions requiring frequent admission. He now presents with another uncomplicated SBO. He was managed conservatively with NGT placement and bowel rest, he is now tolerating clear liquids and may be tentatively advanced to regular diet.     Plan  - Diet: regular diet   - Pain control with Tylenol PRN  - DVT ppx: Lovenox  - ISS; Holding home dapagliflozin  - Holding home telmisartan due to hypovolemia upon presentation  - Continue home metoprolol, pantoprazole  - Ambulation, OOB  - Monitor for return of bowel function    A Team Surgery  [id number]

## 2025-02-07 NOTE — DISCHARGE NOTE PROVIDER - HOSPITAL COURSE
49 year old man who underwent a Isi's surgery and reversal in 2009 for perforated sigmoid diverticulitis, which has been unfortunately complicated by unremitting recurrence of small bowel obstructions requiring frequent admission. He now presents with another uncomplicated SBO. He was managed conservatively with NGT placement and bowel rest, he is now tolerating clear liquids and may be tentatively advanced to regular diet.  Upon tolerance of PO advancements patient will be discharged from    49 year old man who underwent a Isi's surgery and reversal in 2009 for perforated sigmoid diverticulitis, which has been unfortunately complicated by unremitting recurrence of small bowel obstructions requiring frequent admission. He now presents with another uncomplicated SBO. He was managed conservatively with NGT placement and bowel rest, he is now tolerating clear liquids and may be tentatively advanced to regular diet.  Upon tolerance of PO advancements patient will be discharged from home.

## 2025-02-07 NOTE — DISCHARGE NOTE NURSING/CASE MANAGEMENT/SOCIAL WORK - NSDCVIVACCINE_GEN_ALL_CORE_FT
influenza, injectable, quadrivalent, preservative free; 11-Sep-2017 14:02; Tavia Waggoner (RN); [employer]; [id number]; IntraMuscular; Deltoid Left.; 0.5 milliLiter(s); VIS (VIS Published: 07-Aug-2015, VIS Presented: 11-Sep-2017);   Influenza, split virus, trivalent, preservative free; 16-Sep-2024 12:24; Abimbola Jones); [employer]; [id number] (Exp. Date: 01-Jun-2025); IntraMuscular; Deltoid Left.; 0.5 milliLiter(s); VIS (VIS Published: 06-Aug-2021, VIS Presented: 16-Sep-2024);

## 2025-02-07 NOTE — PROGRESS NOTE ADULT - SUBJECTIVE AND OBJECTIVE BOX
Subjective:  Patient seen at bedside this AM. Reports feeling well, without complaints. Denies chest pain, SOB, abdominal pain. Tolerating clear liquids without N/V. Has been ambulating around the unit frequently. He reports that he has been passing some gas but has not had a bowel movement.     24h Events:   - Overnight, no acute events    Objective:  Vital Signs  T(C): 36.7 (02-07 @ 05:42), Max: 36.9 (02-06 @ 21:13)  HR: 72 (02-07 @ 05:42) (61 - 83)  BP: 144/84 (02-07 @ 05:42) (127/72 - 144/84)  RR: 16 (02-07 @ 05:42) (16 - 17)  SpO2: 99% (02-07 @ 05:42) (97% - 99%)  02-06-25 @ 07:01  -  02-07-25 @ 07:00  --------------------------------------------------------  IN:  Total IN: 0 mL    OUT:    Voided (mL): 2050 mL  Total OUT: 2050 mL    Total NET: -2050 mL    Physical Exam:  General: Resting comfortably in bed in no acute distress  Neurologic: Alert, oriented, and appropriately responds to questions  Respiratory: No increased WOB, no tachypnea, on RA  GI: Soft, nontender and nondistended. No rebound tenderness or guarding.  Extremities: Warm and well-perfused. Moves all extremities spontaneously    Labs:                        11.5   9.25  )-----------( 167      ( 06 Feb 2025 06:14 )             36.0   02-06    140  |  106  |  14  ----------------------------<  194[H]  3.7   |  21[L]  |  0.61    Ca    8.8      06 Feb 2025 06:14  Phos  2.4     02-06  Mg     2.20     02-06      CAPILLARY BLOOD GLUCOSE      POCT Blood Glucose.: 152 mg/dL (06 Feb 2025 20:57)  POCT Blood Glucose.: 161 mg/dL (06 Feb 2025 17:06)  POCT Blood Glucose.: 188 mg/dL (06 Feb 2025 12:05)      Medications:  MEDICATIONS  (STANDING):  dextrose 5% + sodium chloride 0.45% 1000 milliLiter(s) (50 mL/Hr) IV Continuous <Continuous>  dextrose 5%. 1000 milliLiter(s) (50 mL/Hr) IV Continuous <Continuous>  dextrose 5%. 1000 milliLiter(s) (100 mL/Hr) IV Continuous <Continuous>  dextrose 50% Injectable 25 Gram(s) IV Push once  dextrose 50% Injectable 12.5 Gram(s) IV Push once  dextrose 50% Injectable 25 Gram(s) IV Push once  enoxaparin Injectable 40 milliGRAM(s) SubCutaneous every 24 hours  glucagon  Injectable 1 milliGRAM(s) IntraMuscular once  insulin lispro (ADMELOG) corrective regimen sliding scale   SubCutaneous Before meals and at bedtime  metoprolol tartrate 100 milliGRAM(s) Oral every 12 hours  pantoprazole    Tablet 40 milliGRAM(s) Oral before breakfast    MEDICATIONS  (PRN):  benzocaine/menthol Lozenge 1 Lozenge Oral every 6 hours PRN NGT discomfort  dextrose Oral Gel 15 Gram(s) Oral once PRN Blood Glucose LESS THAN 70 milliGRAM(s)/deciliter      Imaging:

## 2025-02-07 NOTE — PROGRESS NOTE ADULT - REASON FOR ADMISSION
Small bowel obstruction.

## 2025-02-07 NOTE — DISCHARGE NOTE PROVIDER - NSDCMRMEDTOKEN_GEN_ALL_CORE_FT
Crestor 20 mg oral tablet: 1 tab(s) orally once a day  Farxiga 10 mg oral tablet: 1 tab(s) orally once a day  fenofibrate 160 mg oral tablet: 160 milligram(s) orally once a day  metFORMIN: 1,000 milligram(s) orally 2 times a day  pantoprazole 40 mg oral delayed release tablet: 1 tab(s) orally once a day (before a meal)  sAXagliptin 2.5 mg oral tablet: 2 tab(s) orally once a day  telmisartan 40 mg oral tablet: 1 tab(s) orally once a day  Toprol-XL: 100 milligram(s) orally 2 times a day  Vascepa 1 g oral capsule: 2 cap(s) orally 2 times a day

## 2025-02-11 NOTE — DISCHARGE NOTE PROVIDER - NS AS DC PROVIDER CONTACT Y/N MULTI
40 yo male with recurrent auricular  hematoma s/p aspiration and I&D by OSH x 3. He has not previously had a bolster or pressure dressing placed. He is on Bactrim. An I&D was completed in the ED and a xeroform bolster was placed.     - ENT will arrange for follow up on Thurs/Friday this week for removal of bolster  - Recommend ciprofloxacin or antibiotic that w cartilage penetration  - Dispo per ED  
Yes

## 2025-02-20 NOTE — ED PROVIDER NOTE - CROS ED [facility] ALL NEG
No protocol for requested medication.    Medication: phentermine 15 MG capsule     Last office visit date: 1/17/25    Pharmacy: [id number] [place], [place] - [facility] [address] AT [facility]    Order pended, routed to clinician for review.   
negative...

## 2025-03-05 NOTE — DISCHARGE NOTE ADULT - MEDICATION SUMMARY - MEDICATIONS TO TAKE
I will START or STAY ON the medications listed below when I get home from the hospital:    Medrol 2 mg oral tablet  -- 4 milligram(s) by mouth taper as directed  -- It is very important that you take or use this exactly as directed.  Do not skip doses or discontinue unless directed by your doctor.  Obtain medical advice before taking any non-prescription drugs as some may affect the action of this medication.  Take with food or milk.    -- Indication: For steroids    Diovan 80 mg oral tablet  -- 1 tab(s) by mouth once a day  -- hold for low blood pressure  -- Indication: For blood pressure    glimepiride 4 mg oral tablet  -- 1 tab(s) by mouth once a day  -- Indication: For diabetes    glimepiride 1 mg oral tablet  -- 1 tab(s) by mouth once a day  -- Indication: For diabetes    Farxiga 5 mg oral tablet  -- 1 tab(s) by mouth once a day  -- Indication: For diabetes    diphenhydrAMINE 25 mg oral capsule  -- 1 cap(s) by mouth every 6 hours, As needed, Rash and/or Itching  -- Indication: For rash    TriCor 145 mg oral tablet  -- 1 tab(s) by mouth once a day  -- Indication: For blood lipids    Crestor 5 mg oral tablet  -- 1 tab(s) by mouth once a day (at bedtime)  -- Indication: For cholesterol    Toprol- mg oral tablet, extended release  -- 1 tab(s) by mouth once a day  -- Indication: For blood pressure    pantoprazole 40 mg oral delayed release tablet  -- 1 tab(s) by mouth once a day (before a meal)  -- Indication: For reflux
[Follow-Up] : a follow-up evaluation of

## 2025-03-14 NOTE — ED PROVIDER NOTE - INTERNATIONAL TRAVEL
Problem: Pain  Goal: Verbalizes/displays adequate comfort level or baseline comfort level  3/14/2025 1241 by Alana Diaz, RN  Outcome: Progressing  Flowsheets (Taken 3/14/2025 1241)  Verbalizes/displays adequate comfort level or baseline comfort level:   Encourage patient to monitor pain and request assistance   Assess pain using appropriate pain scale   Administer analgesics based on type and severity of pain and evaluate response   Implement non-pharmacological measures as appropriate and evaluate response     Problem: Discharge Planning  Goal: Discharge to home or other facility with appropriate resources  3/14/2025 1241 by Alana Diaz, RN  Outcome: Progressing  Flowsheets (Taken 3/14/2025 1241)  Discharge to home or other facility with appropriate resources:   Identify barriers to discharge with patient and caregiver   Arrange for needed discharge resources and transportation as appropriate   Identify discharge learning needs (meds, wound care, etc)      No

## 2025-03-28 NOTE — PATIENT PROFILE ADULT. - REASON FOR ADMISSION
P Quality Flow/Interdisciplinary Rounds Progress Note        Quality Flow Rounds held on March 28, 2025    Disciplines Attending:  Bedside Nurse, , , and Nursing Unit Leadership    [place] Gerard was admitted on 3/19/2025  3:52 AM    Anticipated Discharge Date:       Disposition:    Kosta Score:  Kosta Scale Score: 13    [facility] RISK OF UNPLANNED READMISSION 2.0             25.5 Total Score        Discussed patient goal for the day, patient clinical progression, and barriers to discharge.  The following Goal(s) of the Day/Commitment(s) have been identified:  discharge planning      Vika Betancourt RN  March 28, 2025         abdominal pain , c/o nausea no vomiting. last BM last night. Diarrhea small amount

## 2025-04-02 NOTE — PATIENT PROFILE ADULT - DATE OF LAST VACCINATION
Quality 226: Preventive Care And Screening: Tobacco Use: Screening And Cessation Intervention: Patient screened for tobacco use and is an ex/non-smoker Quality 47: Advance Care Plan: Advance Care Planning discussed and documented in the medical record; patient did not wish or was not able to name a surrogate decision maker or provide an advance care plan. Detail Level: Detailed Quality 130: Documentation Of Current Medications In The Medical Record: Current Medications Documented 06-Mar-2021

## 2025-05-01 NOTE — ED PROCEDURE NOTE - CPROC ED POST PROC CARE GUIDE1
[phone] Verbal/written post procedure instructions were given to patient/caregiver./Keep the cast/splint/dressing clean and dry./Instructed patient/caregiver to follow-up with primary care physician./Instructed patient/caregiver regarding signs and symptoms of infection.

## 2025-05-19 NOTE — ED ADULT TRIAGE NOTE - ARRIVAL FROM
VENETOCLAX:  Take with food, once daily, at the same time.   If you miss a dose, skip it. Do not double up.   
Home

## 2025-06-05 NOTE — ED PROVIDER NOTE - CONSTITUTIONAL NEGATIVE STATEMENT, MLM
Addended by: SHERRY MACE on: 6/5/2025 04:15 PM     Modules accepted: Level of Service     APER no fever and no chills.